# Patient Record
Sex: MALE | Race: WHITE | NOT HISPANIC OR LATINO | URBAN - METROPOLITAN AREA
[De-identification: names, ages, dates, MRNs, and addresses within clinical notes are randomized per-mention and may not be internally consistent; named-entity substitution may affect disease eponyms.]

---

## 2021-07-27 ENCOUNTER — EMERGENCY (EMERGENCY)
Facility: HOSPITAL | Age: 66
LOS: 0 days | Discharge: HOME | End: 2021-07-27
Attending: EMERGENCY MEDICINE | Admitting: EMERGENCY MEDICINE
Payer: MEDICARE

## 2021-07-27 VITALS
TEMPERATURE: 98 F | RESPIRATION RATE: 18 BRPM | OXYGEN SATURATION: 98 % | DIASTOLIC BLOOD PRESSURE: 91 MMHG | HEART RATE: 78 BPM | SYSTOLIC BLOOD PRESSURE: 201 MMHG

## 2021-07-27 VITALS — OXYGEN SATURATION: 100 % | SYSTOLIC BLOOD PRESSURE: 153 MMHG | HEART RATE: 60 BPM | DIASTOLIC BLOOD PRESSURE: 68 MMHG

## 2021-07-27 DIAGNOSIS — R53.1 WEAKNESS: ICD-10-CM

## 2021-07-27 DIAGNOSIS — Z95.5 PRESENCE OF CORONARY ANGIOPLASTY IMPLANT AND GRAFT: ICD-10-CM

## 2021-07-27 DIAGNOSIS — Z86.69 PERSONAL HISTORY OF OTHER DISEASES OF THE NERVOUS SYSTEM AND SENSE ORGANS: ICD-10-CM

## 2021-07-27 DIAGNOSIS — I10 ESSENTIAL (PRIMARY) HYPERTENSION: ICD-10-CM

## 2021-07-27 DIAGNOSIS — R35.0 FREQUENCY OF MICTURITION: ICD-10-CM

## 2021-07-27 DIAGNOSIS — I25.10 ATHEROSCLEROTIC HEART DISEASE OF NATIVE CORONARY ARTERY WITHOUT ANGINA PECTORIS: ICD-10-CM

## 2021-07-27 DIAGNOSIS — R42 DIZZINESS AND GIDDINESS: ICD-10-CM

## 2021-07-27 DIAGNOSIS — E87.1 HYPO-OSMOLALITY AND HYPONATREMIA: ICD-10-CM

## 2021-07-27 LAB
ALBUMIN SERPL ELPH-MCNC: 4.7 G/DL — SIGNIFICANT CHANGE UP (ref 3.5–5.2)
ALP SERPL-CCNC: 120 U/L — HIGH (ref 30–115)
ALT FLD-CCNC: 6 U/L — SIGNIFICANT CHANGE UP (ref 0–41)
ANION GAP SERPL CALC-SCNC: 12 MMOL/L — SIGNIFICANT CHANGE UP (ref 7–14)
APPEARANCE UR: CLEAR — SIGNIFICANT CHANGE UP
AST SERPL-CCNC: 26 U/L — SIGNIFICANT CHANGE UP (ref 0–41)
BASOPHILS # BLD AUTO: 0.04 K/UL — SIGNIFICANT CHANGE UP (ref 0–0.2)
BASOPHILS NFR BLD AUTO: 0.7 % — SIGNIFICANT CHANGE UP (ref 0–1)
BILIRUB SERPL-MCNC: 0.6 MG/DL — SIGNIFICANT CHANGE UP (ref 0.2–1.2)
BILIRUB UR-MCNC: NEGATIVE — SIGNIFICANT CHANGE UP
BUN SERPL-MCNC: 12 MG/DL — SIGNIFICANT CHANGE UP (ref 10–20)
CALCIUM SERPL-MCNC: 9.3 MG/DL — SIGNIFICANT CHANGE UP (ref 8.5–10.1)
CHLORIDE SERPL-SCNC: 92 MMOL/L — LOW (ref 98–110)
CO2 SERPL-SCNC: 25 MMOL/L — SIGNIFICANT CHANGE UP (ref 17–32)
COLOR SPEC: COLORLESS — SIGNIFICANT CHANGE UP
CREAT SERPL-MCNC: 0.8 MG/DL — SIGNIFICANT CHANGE UP (ref 0.7–1.5)
DIFF PNL FLD: NEGATIVE — SIGNIFICANT CHANGE UP
EOSINOPHIL # BLD AUTO: 0.12 K/UL — SIGNIFICANT CHANGE UP (ref 0–0.7)
EOSINOPHIL NFR BLD AUTO: 2.1 % — SIGNIFICANT CHANGE UP (ref 0–8)
GLUCOSE SERPL-MCNC: 114 MG/DL — HIGH (ref 70–99)
GLUCOSE UR QL: NEGATIVE — SIGNIFICANT CHANGE UP
HCT VFR BLD CALC: 38.5 % — LOW (ref 42–52)
HGB BLD-MCNC: 14.2 G/DL — SIGNIFICANT CHANGE UP (ref 14–18)
IMM GRANULOCYTES NFR BLD AUTO: 0.4 % — HIGH (ref 0.1–0.3)
KETONES UR-MCNC: NEGATIVE — SIGNIFICANT CHANGE UP
LEUKOCYTE ESTERASE UR-ACNC: NEGATIVE — SIGNIFICANT CHANGE UP
LYMPHOCYTES # BLD AUTO: 1.26 K/UL — SIGNIFICANT CHANGE UP (ref 1.2–3.4)
LYMPHOCYTES # BLD AUTO: 22.1 % — SIGNIFICANT CHANGE UP (ref 20.5–51.1)
MAGNESIUM SERPL-MCNC: 2 MG/DL — SIGNIFICANT CHANGE UP (ref 1.8–2.4)
MCHC RBC-ENTMCNC: 31.4 PG — HIGH (ref 27–31)
MCHC RBC-ENTMCNC: 36.9 G/DL — SIGNIFICANT CHANGE UP (ref 32–37)
MCV RBC AUTO: 85.2 FL — SIGNIFICANT CHANGE UP (ref 80–94)
MONOCYTES # BLD AUTO: 0.57 K/UL — SIGNIFICANT CHANGE UP (ref 0.1–0.6)
MONOCYTES NFR BLD AUTO: 10 % — HIGH (ref 1.7–9.3)
NEUTROPHILS # BLD AUTO: 3.7 K/UL — SIGNIFICANT CHANGE UP (ref 1.4–6.5)
NEUTROPHILS NFR BLD AUTO: 64.7 % — SIGNIFICANT CHANGE UP (ref 42.2–75.2)
NITRITE UR-MCNC: NEGATIVE — SIGNIFICANT CHANGE UP
NRBC # BLD: 0 /100 WBCS — SIGNIFICANT CHANGE UP (ref 0–0)
PH UR: 7.5 — SIGNIFICANT CHANGE UP (ref 5–8)
PLATELET # BLD AUTO: 169 K/UL — SIGNIFICANT CHANGE UP (ref 130–400)
POTASSIUM SERPL-MCNC: 4.3 MMOL/L — SIGNIFICANT CHANGE UP (ref 3.5–5)
POTASSIUM SERPL-SCNC: 4.3 MMOL/L — SIGNIFICANT CHANGE UP (ref 3.5–5)
PROT SERPL-MCNC: 6.9 G/DL — SIGNIFICANT CHANGE UP (ref 6–8)
PROT UR-MCNC: NEGATIVE — SIGNIFICANT CHANGE UP
RBC # BLD: 4.52 M/UL — LOW (ref 4.7–6.1)
RBC # FLD: 11.8 % — SIGNIFICANT CHANGE UP (ref 11.5–14.5)
SODIUM SERPL-SCNC: 129 MMOL/L — LOW (ref 135–146)
SP GR SPEC: 1.01 — LOW (ref 1.01–1.03)
TROPONIN T SERPL-MCNC: <0.01 NG/ML — SIGNIFICANT CHANGE UP
UROBILINOGEN FLD QL: SIGNIFICANT CHANGE UP
WBC # BLD: 5.71 K/UL — SIGNIFICANT CHANGE UP (ref 4.8–10.8)
WBC # FLD AUTO: 5.71 K/UL — SIGNIFICANT CHANGE UP (ref 4.8–10.8)

## 2021-07-27 PROCEDURE — 71045 X-RAY EXAM CHEST 1 VIEW: CPT | Mod: 26

## 2021-07-27 PROCEDURE — 93010 ELECTROCARDIOGRAM REPORT: CPT

## 2021-07-27 PROCEDURE — 99285 EMERGENCY DEPT VISIT HI MDM: CPT

## 2021-07-27 NOTE — ED PROVIDER NOTE - ATTENDING CONTRIBUTION TO CARE
66 y/o male with h/o htn, parkinsons, cad s/p stent, in ER with c/o elevated BP. SBP at home 192, pt felt concerned and so came to ER.  Per pt/wife, pt also feeling some generalized weakness recently.  Had some dizziness a few days ago, not now.  no ha.  denies any cp/sob.  no cough. no abd pain.  no n/v/d.  + urinary frequency which pt states is his baseline.  no dysuria. no leg pain.  + b/l leg weakness, which is his baseline 2/2 parkinsons.    PE - nad, nc/at, eomi, perrl, op - clear, mmm, cta b/l, no w/r/r, rrr, abd- soft, nt/nd, nabs, no LE tenderness/swelling, A&O x 3, moves all extremities, no focal neuro deficits.  -check labs, ua, re-eval

## 2021-07-27 NOTE — ED PROVIDER NOTE - CLINICAL SUMMARY MEDICAL DECISION MAKING FREE TEXT BOX
labs reviewed, mild hyponatremia.  ua neg. ekg with no acute ischemic changes.  BP improved without intervention. pt/wife state pt at baseline and they want to go home.  pt to f/u with pmd re: bp meds and to return to ER for any new/concerning symptoms.

## 2021-07-27 NOTE — ED PROVIDER NOTE - PATIENT PORTAL LINK FT
You can access the FollowMyHealth Patient Portal offered by [facility] by registering at the following website: [email or web address]. By joining [employer]’s FollowMyHealth portal, you will also be able to view your health information using other applications (apps) compatible with our system.

## 2021-07-27 NOTE — ED ADULT TRIAGE NOTE - CHIEF COMPLAINT QUOTE
pt c/o dizziness and elevated Bp x 2 days. hx of parkinson's. c/o weakness to bilat legs. denies any CP

## 2021-07-27 NOTE — ED ADULT NURSE NOTE - OBJECTIVE STATEMENT
Pt came to ED c/o dizziness and elevated Bp x 2 days. Pt. has hx of parkinson's. c/o weakness to bilat legs. denies any CP or sob.

## 2021-07-27 NOTE — ED ADULT NURSE NOTE - MODE OF DISCHARGE
I put in an order for the COVID-19 testing for patient  He can go to [address] [place] [address] testing  He should still follow the masking and social distancing guidelines, and should also take extra steps with quarantining while awaiting results  Ambulatory

## 2021-07-27 NOTE — ED PROVIDER NOTE - OBJECTIVE STATEMENT
65 y male, pmh of htn, parkinson's, pw high bp reading. Endorses when he was taking his bp at home today, reading was 192 systolic. Pt felt nervous and generically weak and came to the ed. Denies headache/vision changes/ nausea/vomiting, cp. Denies f/c, sob, abd pain, dysuria.

## 2021-07-27 NOTE — ED PROVIDER NOTE - NS ED ROS FT
Eyes:  No visual changes, eye pain or discharge.  ENMT:  No hearing changes, pain, no sore throat or runny nose, no difficulty swallowing  Cardiac:  No chest pain, SOB or edema. No chest pain with exertion.  Respiratory:  No cough or respiratory distress. No hemoptysis. No history of asthma or RAD.  GI:  No nausea, vomiting, diarrhea or abdominal pain.  :  No dysuria, frequency or burning.  MS:  No myalgia, muscle weakness, joint pain or back pain.  Neuro:  No headache or LOC. +diffuse weakness.   Skin:  No skin rash.   Endocrine: No history of thyroid disease or diabetes.

## 2021-07-28 LAB
CULTURE RESULTS: NO GROWTH — SIGNIFICANT CHANGE UP
SPECIMEN SOURCE: SIGNIFICANT CHANGE UP

## 2025-01-29 ENCOUNTER — INPATIENT (INPATIENT)
Facility: HOSPITAL | Age: 70
LOS: 34 days | Discharge: HOME | DRG: 195 | End: 2025-03-05
Attending: STUDENT IN AN ORGANIZED HEALTH CARE EDUCATION/TRAINING PROGRAM | Admitting: INTERNAL MEDICINE
Payer: MEDICARE

## 2025-01-29 VITALS
OXYGEN SATURATION: 90 % | SYSTOLIC BLOOD PRESSURE: 113 MMHG | TEMPERATURE: 98 F | HEART RATE: 94 BPM | RESPIRATION RATE: 30 BRPM | DIASTOLIC BLOOD PRESSURE: 76 MMHG

## 2025-01-29 DIAGNOSIS — J18.9 PNEUMONIA, UNSPECIFIED ORGANISM: ICD-10-CM

## 2025-01-29 PROBLEM — G20 PARKINSON'S DISEASE: Chronic | Status: ACTIVE | Noted: 2021-07-27

## 2025-01-29 LAB
ALBUMIN SERPL ELPH-MCNC: 3.3 G/DL — LOW (ref 3.5–5.2)
ALP SERPL-CCNC: 93 U/L — SIGNIFICANT CHANGE UP (ref 30–115)
ALT FLD-CCNC: <5 U/L — SIGNIFICANT CHANGE UP (ref 0–41)
ANION GAP SERPL CALC-SCNC: 16 MMOL/L — HIGH (ref 7–14)
APPEARANCE UR: CLEAR — SIGNIFICANT CHANGE UP
AST SERPL-CCNC: 21 U/L — SIGNIFICANT CHANGE UP (ref 0–41)
BACTERIA # UR AUTO: NEGATIVE /HPF — SIGNIFICANT CHANGE UP
BASE EXCESS BLDV CALC-SCNC: 4 MMOL/L — HIGH (ref -2–3)
BASOPHILS # BLD AUTO: 0 K/UL — SIGNIFICANT CHANGE UP (ref 0–0.2)
BASOPHILS NFR BLD AUTO: 0 % — SIGNIFICANT CHANGE UP (ref 0–1)
BILIRUB SERPL-MCNC: 0.7 MG/DL — SIGNIFICANT CHANGE UP (ref 0.2–1.2)
BILIRUB UR-MCNC: NEGATIVE — SIGNIFICANT CHANGE UP
BUN SERPL-MCNC: 86 MG/DL — CRITICAL HIGH (ref 10–20)
CA-I SERPL-SCNC: 1.03 MMOL/L — LOW (ref 1.15–1.33)
CALCIUM SERPL-MCNC: 8.4 MG/DL — SIGNIFICANT CHANGE UP (ref 8.4–10.5)
CAST: 22 /LPF — HIGH (ref 0–4)
CHLORIDE SERPL-SCNC: 104 MMOL/L — SIGNIFICANT CHANGE UP (ref 98–110)
CO2 SERPL-SCNC: 27 MMOL/L — SIGNIFICANT CHANGE UP (ref 17–32)
COARSE GRAN CASTS #/AREA URNS AUTO: PRESENT
COLOR SPEC: SIGNIFICANT CHANGE UP
CREAT SERPL-MCNC: 1.5 MG/DL — SIGNIFICANT CHANGE UP (ref 0.7–1.5)
DIFF PNL FLD: NEGATIVE — SIGNIFICANT CHANGE UP
EGFR: 50 ML/MIN/1.73M2 — LOW
EGFR: 50 ML/MIN/1.73M2 — LOW
EOSINOPHIL # BLD AUTO: 0 K/UL — SIGNIFICANT CHANGE UP (ref 0–0.7)
EOSINOPHIL NFR BLD AUTO: 0 % — SIGNIFICANT CHANGE UP (ref 0–8)
FLUAV AG NPH QL: DETECTED
FLUBV AG NPH QL: SIGNIFICANT CHANGE UP
GAS PNL BLDA: SIGNIFICANT CHANGE UP
GAS PNL BLDV: 136 MMOL/L — SIGNIFICANT CHANGE UP (ref 136–145)
GAS PNL BLDV: SIGNIFICANT CHANGE UP
GAS PNL BLDV: SIGNIFICANT CHANGE UP
GIANT PLATELETS BLD QL SMEAR: PRESENT — SIGNIFICANT CHANGE UP
GLUCOSE SERPL-MCNC: 101 MG/DL — HIGH (ref 70–99)
GLUCOSE UR QL: NEGATIVE MG/DL — SIGNIFICANT CHANGE UP
HCO3 BLDV-SCNC: 30 MMOL/L — HIGH (ref 22–29)
HCT VFR BLD CALC: 39.3 % — LOW (ref 42–52)
HGB BLD-MCNC: 13.1 G/DL — LOW (ref 14–18)
KETONES UR-MCNC: ABNORMAL MG/DL
LACTATE BLDV-MCNC: 2.2 MMOL/L — HIGH (ref 0.5–2)
LEUKOCYTE ESTERASE UR-ACNC: NEGATIVE — SIGNIFICANT CHANGE UP
LYMPHOCYTES # BLD AUTO: 0 % — LOW (ref 20.5–51.1)
LYMPHOCYTES # BLD AUTO: 0 K/UL — LOW (ref 1.2–3.4)
MANUAL SMEAR VERIFICATION: SIGNIFICANT CHANGE UP
MCHC RBC-ENTMCNC: 30.7 PG — SIGNIFICANT CHANGE UP (ref 27–31)
MCHC RBC-ENTMCNC: 33.3 G/DL — SIGNIFICANT CHANGE UP (ref 32–37)
MCV RBC AUTO: 92 FL — SIGNIFICANT CHANGE UP (ref 80–94)
MONOCYTES # BLD AUTO: 0.55 K/UL — SIGNIFICANT CHANGE UP (ref 0.1–0.6)
MONOCYTES NFR BLD AUTO: 3.5 % — SIGNIFICANT CHANGE UP (ref 1.7–9.3)
NEUTROPHILS # BLD AUTO: 15.26 K/UL — HIGH (ref 1.4–6.5)
NEUTROPHILS NFR BLD AUTO: 94.8 % — HIGH (ref 42.2–75.2)
NEUTS BAND # BLD: 1.7 % — SIGNIFICANT CHANGE UP (ref 0–6)
NEUTS BAND NFR BLD: 1.7 % — SIGNIFICANT CHANGE UP (ref 0–6)
NITRITE UR-MCNC: NEGATIVE — SIGNIFICANT CHANGE UP
NT-PROBNP SERPL-SCNC: 699 PG/ML — HIGH (ref 0–300)
PCO2 BLDV: 48 MMHG — SIGNIFICANT CHANGE UP (ref 42–55)
PH BLDV: 7.4 — SIGNIFICANT CHANGE UP (ref 7.32–7.43)
PH UR: 5.5 — SIGNIFICANT CHANGE UP (ref 5–8)
PLAT MORPH BLD: NORMAL — SIGNIFICANT CHANGE UP
PLATELET # BLD AUTO: 239 K/UL — SIGNIFICANT CHANGE UP (ref 130–400)
PMV BLD: 11 FL — HIGH (ref 7.4–10.4)
PO2 BLDV: 24 MMHG — LOW (ref 25–45)
POLYCHROMASIA BLD QL SMEAR: SLIGHT — SIGNIFICANT CHANGE UP
POTASSIUM BLDV-SCNC: 4 MMOL/L — SIGNIFICANT CHANGE UP (ref 3.5–5.1)
POTASSIUM SERPL-MCNC: 4.9 MMOL/L — SIGNIFICANT CHANGE UP (ref 3.5–5)
POTASSIUM SERPL-SCNC: 4.9 MMOL/L — SIGNIFICANT CHANGE UP (ref 3.5–5)
PROT SERPL-MCNC: 5.7 G/DL — LOW (ref 6–8)
PROT UR-MCNC: 30 MG/DL
RBC # BLD: 4.27 M/UL — LOW (ref 4.7–6.1)
RBC # FLD: 13.2 % — SIGNIFICANT CHANGE UP (ref 11.5–14.5)
RBC BLD AUTO: NORMAL — SIGNIFICANT CHANGE UP
RBC CASTS # UR COMP ASSIST: 3 /HPF — SIGNIFICANT CHANGE UP (ref 0–4)
RSV RNA NPH QL NAA+NON-PROBE: SIGNIFICANT CHANGE UP
SARS-COV-2 RNA SPEC QL NAA+PROBE: SIGNIFICANT CHANGE UP
SODIUM SERPL-SCNC: 147 MMOL/L — HIGH (ref 135–146)
SP GR SPEC: 1.02 — SIGNIFICANT CHANGE UP (ref 1–1.03)
SQUAMOUS # UR AUTO: 2 /HPF — SIGNIFICANT CHANGE UP (ref 0–5)
TROPONIN T, HIGH SENSITIVITY RESULT: 46 NG/L — HIGH (ref 6–21)
UROBILINOGEN FLD QL: 1 MG/DL — SIGNIFICANT CHANGE UP (ref 0.2–1)
WBC # BLD: 15.81 K/UL — HIGH (ref 4.8–10.8)
WBC # FLD AUTO: 15.81 K/UL — HIGH (ref 4.8–10.8)
WBC UR QL: 1 /HPF — SIGNIFICANT CHANGE UP (ref 0–5)

## 2025-01-29 PROCEDURE — 85730 THROMBOPLASTIN TIME PARTIAL: CPT

## 2025-01-29 PROCEDURE — 70496 CT ANGIOGRAPHY HEAD: CPT | Mod: MC

## 2025-01-29 PROCEDURE — 93926 LOWER EXTREMITY STUDY: CPT | Mod: RT

## 2025-01-29 PROCEDURE — 82550 ASSAY OF CK (CPK): CPT

## 2025-01-29 PROCEDURE — 83010 ASSAY OF HAPTOGLOBIN QUANT: CPT

## 2025-01-29 PROCEDURE — 86900 BLOOD TYPING SEROLOGIC ABO: CPT

## 2025-01-29 PROCEDURE — 72192 CT PELVIS W/O DYE: CPT | Mod: MC

## 2025-01-29 PROCEDURE — 47490 INCISION OF GALLBLADDER: CPT

## 2025-01-29 PROCEDURE — 83615 LACTATE (LD) (LDH) ENZYME: CPT

## 2025-01-29 PROCEDURE — 87116 MYCOBACTERIA CULTURE: CPT

## 2025-01-29 PROCEDURE — 87102 FUNGUS ISOLATION CULTURE: CPT

## 2025-01-29 PROCEDURE — 84295 ASSAY OF SERUM SODIUM: CPT

## 2025-01-29 PROCEDURE — 87206 SMEAR FLUORESCENT/ACID STAI: CPT

## 2025-01-29 PROCEDURE — 85610 PROTHROMBIN TIME: CPT

## 2025-01-29 PROCEDURE — 80048 BASIC METABOLIC PNL TOTAL CA: CPT

## 2025-01-29 PROCEDURE — 71045 X-RAY EXAM CHEST 1 VIEW: CPT | Mod: 26

## 2025-01-29 PROCEDURE — 83735 ASSAY OF MAGNESIUM: CPT

## 2025-01-29 PROCEDURE — 87070 CULTURE OTHR SPECIMN AEROBIC: CPT

## 2025-01-29 PROCEDURE — 85027 COMPLETE CBC AUTOMATED: CPT

## 2025-01-29 PROCEDURE — 82962 GLUCOSE BLOOD TEST: CPT

## 2025-01-29 PROCEDURE — 70450 CT HEAD/BRAIN W/O DYE: CPT | Mod: MC

## 2025-01-29 PROCEDURE — 82330 ASSAY OF CALCIUM: CPT

## 2025-01-29 PROCEDURE — 87640 STAPH A DNA AMP PROBE: CPT

## 2025-01-29 PROCEDURE — 84145 PROCALCITONIN (PCT): CPT

## 2025-01-29 PROCEDURE — 93010 ELECTROCARDIOGRAM REPORT: CPT

## 2025-01-29 PROCEDURE — 85379 FIBRIN DEGRADATION QUANT: CPT

## 2025-01-29 PROCEDURE — 85018 HEMOGLOBIN: CPT

## 2025-01-29 PROCEDURE — 87449 NOS EACH ORGANISM AG IA: CPT

## 2025-01-29 PROCEDURE — 74018 RADEX ABDOMEN 1 VIEW: CPT

## 2025-01-29 PROCEDURE — 94660 CPAP INITIATION&MGMT: CPT

## 2025-01-29 PROCEDURE — L8699: CPT

## 2025-01-29 PROCEDURE — 87641 MR-STAPH DNA AMP PROBE: CPT

## 2025-01-29 PROCEDURE — 94003 VENT MGMT INPAT SUBQ DAY: CPT

## 2025-01-29 PROCEDURE — 71045 X-RAY EXAM CHEST 1 VIEW: CPT

## 2025-01-29 PROCEDURE — 93005 ELECTROCARDIOGRAM TRACING: CPT

## 2025-01-29 PROCEDURE — 83880 ASSAY OF NATRIURETIC PEPTIDE: CPT

## 2025-01-29 PROCEDURE — 84146 ASSAY OF PROLACTIN: CPT

## 2025-01-29 PROCEDURE — 81001 URINALYSIS AUTO W/SCOPE: CPT

## 2025-01-29 PROCEDURE — 87077 CULTURE AEROBIC IDENTIFY: CPT

## 2025-01-29 PROCEDURE — 87205 SMEAR GRAM STAIN: CPT

## 2025-01-29 PROCEDURE — 93970 EXTREMITY STUDY: CPT

## 2025-01-29 PROCEDURE — 84484 ASSAY OF TROPONIN QUANT: CPT

## 2025-01-29 PROCEDURE — 87186 SC STD MICRODIL/AGAR DIL: CPT

## 2025-01-29 PROCEDURE — 82803 BLOOD GASES ANY COMBINATION: CPT

## 2025-01-29 PROCEDURE — 86901 BLOOD TYPING SEROLOGIC RH(D): CPT

## 2025-01-29 PROCEDURE — 71250 CT THORAX DX C-: CPT | Mod: MC

## 2025-01-29 PROCEDURE — 36430 TRANSFUSION BLD/BLD COMPNT: CPT

## 2025-01-29 PROCEDURE — 71275 CT ANGIOGRAPHY CHEST: CPT | Mod: MC

## 2025-01-29 PROCEDURE — 87075 CULTR BACTERIA EXCEPT BLOOD: CPT

## 2025-01-29 PROCEDURE — 76705 ECHO EXAM OF ABDOMEN: CPT

## 2025-01-29 PROCEDURE — 94760 N-INVAS EAR/PLS OXIMETRY 1: CPT

## 2025-01-29 PROCEDURE — 80053 COMPREHEN METABOLIC PANEL: CPT

## 2025-01-29 PROCEDURE — 85025 COMPLETE CBC W/AUTO DIFF WBC: CPT

## 2025-01-29 PROCEDURE — 87184 SC STD DISK METHOD PER PLATE: CPT

## 2025-01-29 PROCEDURE — 94002 VENT MGMT INPAT INIT DAY: CPT

## 2025-01-29 PROCEDURE — 86923 COMPATIBILITY TEST ELECTRIC: CPT

## 2025-01-29 PROCEDURE — 71250 CT THORAX DX C-: CPT | Mod: 26

## 2025-01-29 PROCEDURE — P9016: CPT

## 2025-01-29 PROCEDURE — C1769: CPT

## 2025-01-29 PROCEDURE — 87015 SPECIMEN INFECT AGNT CONCNTJ: CPT

## 2025-01-29 PROCEDURE — 87252 VIRUS INOCULATION TISSUE: CPT

## 2025-01-29 PROCEDURE — 86850 RBC ANTIBODY SCREEN: CPT

## 2025-01-29 PROCEDURE — 84132 ASSAY OF SERUM POTASSIUM: CPT

## 2025-01-29 PROCEDURE — 84478 ASSAY OF TRIGLYCERIDES: CPT

## 2025-01-29 PROCEDURE — 99291 CRITICAL CARE FIRST HOUR: CPT | Mod: FS

## 2025-01-29 PROCEDURE — 74177 CT ABD & PELVIS W/CONTRAST: CPT | Mod: MC

## 2025-01-29 PROCEDURE — 99291 CRITICAL CARE FIRST HOUR: CPT

## 2025-01-29 PROCEDURE — 83036 HEMOGLOBIN GLYCOSYLATED A1C: CPT

## 2025-01-29 PROCEDURE — 84100 ASSAY OF PHOSPHORUS: CPT

## 2025-01-29 PROCEDURE — 80076 HEPATIC FUNCTION PANEL: CPT

## 2025-01-29 PROCEDURE — 70498 CT ANGIOGRAPHY NECK: CPT | Mod: MC

## 2025-01-29 PROCEDURE — 86022 PLATELET ANTIBODIES: CPT

## 2025-01-29 PROCEDURE — 92610 EVALUATE SWALLOWING FUNCTION: CPT | Mod: GN

## 2025-01-29 PROCEDURE — 36415 COLL VENOUS BLD VENIPUNCTURE: CPT

## 2025-01-29 PROCEDURE — 83605 ASSAY OF LACTIC ACID: CPT

## 2025-01-29 PROCEDURE — 85384 FIBRINOGEN ACTIVITY: CPT

## 2025-01-29 PROCEDURE — 87040 BLOOD CULTURE FOR BACTERIA: CPT

## 2025-01-29 PROCEDURE — 85045 AUTOMATED RETICULOCYTE COUNT: CPT

## 2025-01-29 PROCEDURE — 95816 EEG AWAKE AND DROWSY: CPT

## 2025-01-29 PROCEDURE — C1729: CPT

## 2025-01-29 PROCEDURE — 93306 TTE W/DOPPLER COMPLETE: CPT

## 2025-01-29 PROCEDURE — 87899 AGENT NOS ASSAY W/OPTIC: CPT

## 2025-01-29 PROCEDURE — 73701 CT LOWER EXTREMITY W/DYE: CPT | Mod: MC,RT

## 2025-01-29 PROCEDURE — C9399: CPT

## 2025-01-29 PROCEDURE — 85014 HEMATOCRIT: CPT

## 2025-01-29 RX ORDER — CEFTRIAXONE 500 MG/1
2000 INJECTION, POWDER, FOR SOLUTION INTRAMUSCULAR; INTRAVENOUS ONCE
Refills: 0 | Status: COMPLETED | OUTPATIENT
Start: 2025-01-29 | End: 2025-01-29

## 2025-01-29 RX ORDER — RASAGILINE 0.5 MG/1
1 TABLET ORAL
Refills: 0 | Status: DISCONTINUED | OUTPATIENT
Start: 2025-01-29 | End: 2025-02-26

## 2025-01-29 RX ORDER — ACETAMINOPHEN 500 MG/5ML
650 LIQUID (ML) ORAL ONCE
Refills: 0 | Status: COMPLETED | OUTPATIENT
Start: 2025-01-29 | End: 2025-01-29

## 2025-01-29 RX ORDER — VANCOMYCIN HCL IN 5 % DEXTROSE 1.5G/250ML
1000 PLASTIC BAG, INJECTION (ML) INTRAVENOUS EVERY 24 HOURS
Refills: 0 | Status: DISCONTINUED | OUTPATIENT
Start: 2025-01-29 | End: 2025-01-31

## 2025-01-29 RX ORDER — OSELTAMIVIR PHOSPHATE 75 MG/1
75 CAPSULE ORAL ONCE
Refills: 0 | Status: COMPLETED | OUTPATIENT
Start: 2025-01-29 | End: 2025-01-29

## 2025-01-29 RX ORDER — SODIUM CHLORIDE 9 G/1000ML
1000 INJECTION, SOLUTION INTRAVENOUS ONCE
Refills: 0 | Status: COMPLETED | OUTPATIENT
Start: 2025-01-29 | End: 2025-01-29

## 2025-01-29 RX ORDER — ENOXAPARIN SODIUM 100 MG/ML
40 INJECTION SUBCUTANEOUS EVERY 24 HOURS
Refills: 0 | Status: DISCONTINUED | OUTPATIENT
Start: 2025-01-29 | End: 2025-01-31

## 2025-01-29 RX ORDER — ASPIRIN 325 MG
1 TABLET ORAL
Refills: 0 | DISCHARGE

## 2025-01-29 RX ORDER — AZITHROMYCIN 250 MG
500 CAPSULE ORAL ONCE
Refills: 0 | Status: COMPLETED | OUTPATIENT
Start: 2025-01-29 | End: 2025-01-29

## 2025-01-29 RX ORDER — VANCOMYCIN HCL IN 5 % DEXTROSE 1.5G/250ML
1000 PLASTIC BAG, INJECTION (ML) INTRAVENOUS EVERY 12 HOURS
Refills: 0 | Status: DISCONTINUED | OUTPATIENT
Start: 2025-01-29 | End: 2025-01-29

## 2025-01-29 RX ORDER — OSELTAMIVIR PHOSPHATE 75 MG/1
30 CAPSULE ORAL
Refills: 0 | Status: DISCONTINUED | OUTPATIENT
Start: 2025-01-30 | End: 2025-02-03

## 2025-01-29 RX ORDER — OSELTAMIVIR PHOSPHATE 75 MG/1
CAPSULE ORAL
Refills: 0 | Status: DISCONTINUED | OUTPATIENT
Start: 2025-01-29 | End: 2025-02-03

## 2025-01-29 RX ORDER — ASPIRIN 325 MG
81 TABLET ORAL DAILY
Refills: 0 | Status: DISCONTINUED | OUTPATIENT
Start: 2025-01-29 | End: 2025-01-31

## 2025-01-29 RX ORDER — CEFTRIAXONE 500 MG/1
1000 INJECTION, POWDER, FOR SOLUTION INTRAMUSCULAR; INTRAVENOUS EVERY 24 HOURS
Refills: 0 | Status: DISCONTINUED | OUTPATIENT
Start: 2025-01-29 | End: 2025-01-30

## 2025-01-29 RX ORDER — CARBIDOPA/LEVODOPA 25MG-100MG
7 TABLET ORAL
Refills: 0 | Status: DISCONTINUED | OUTPATIENT
Start: 2025-01-29 | End: 2025-01-29

## 2025-01-29 RX ADMIN — SODIUM CHLORIDE 1000 MILLILITER(S): 9 INJECTION, SOLUTION INTRAVENOUS at 10:42

## 2025-01-29 RX ADMIN — CEFTRIAXONE 100 MILLIGRAM(S): 500 INJECTION, POWDER, FOR SOLUTION INTRAMUSCULAR; INTRAVENOUS at 10:33

## 2025-01-29 RX ADMIN — Medication 75 MILLILITER(S): at 16:54

## 2025-01-29 RX ADMIN — Medication 650 MILLIGRAM(S): at 10:13

## 2025-01-29 RX ADMIN — Medication 255 MILLIGRAM(S): at 10:33

## 2025-01-29 RX ADMIN — Medication 250 MILLIGRAM(S): at 19:31

## 2025-01-29 RX ADMIN — Medication 3 MILLILITER(S): at 10:38

## 2025-01-29 NOTE — H&P ADULT - ASSESSMENT
Acute Hypoxic Respiratory Failure  Toxic Metabolic Encephalopathy   CAP  R/o aspiration  PNA  Sepsis POA  HO Parkinson's Disease  HO Dementia   HO CAD    PLAN:    CNS: CT head, avoid depressants    HEENT: Oral care    PULMONARY:  HOB @ 45 degrees.  Aspiration precautions. AVAPS 4 hours on and off, QHS and PRN, ABG noted, CXR reviewed, CT Chest NC, low threshold for intubation    CARDIOVASCULAR: keep even to negative fluid balance. pro-BNP noted 699. trend CE, TTE    GI: GI prophylaxis.  Feeding per speech and swallow.  Bowel regimen     RENAL:  Follow up lytes.  Correct as needed    INFECTIOUS DISEASE: f/u cultures, Ceftriaxone, Levaquin and Vancomycin for now, check urine legionella and strep Ag, nasal MRSA, full RVP, procalcitonin     HEMATOLOGICAL:  DVT prophylaxis. LE duplex    ENDOCRINE:  Follow up FS.  Insulin protocol if needed.    MUSCULOSKELETAL: Bedrest for now    Goals of care    MICU monitoring

## 2025-01-29 NOTE — ED ADULT NURSE REASSESSMENT NOTE - NS ED NURSE REASSESS COMMENT FT1
Pt repeatedly removing BIBAP mask.  BIBPAP removed for now and placed on 4L NC. ICU resident made aware and approved order

## 2025-01-29 NOTE — ED PROVIDER NOTE - OBJECTIVE STATEMENT
69-year-old male past medical history of hypertension, Parkinson's, CAD presents for worsening generalized weakness over the past week, decreased p.o. intake and development of shortness of breath today. I am unable to obtain a comprehensive history, review of systems, past medical history, and/or physical exam due to constraints imposed by the urgency of the patient's clinical condition and/or mental status.

## 2025-01-29 NOTE — ED ADULT TRIAGE NOTE - CHIEF COMPLAINT QUOTE
BIBA from home, EMS states family reports a  "gradual decline" in patient's mental status, decreased PO intake, increased work of breathing. history of Parkinson's and dementia

## 2025-01-29 NOTE — H&P ADULT - NSHPLABSRESULTS_GEN_ALL_CORE
LABS:                        13.1   15.81 )-----------( 239      ( 29 Jan 2025 10:40 )             39.3     01-29    147[H]  |  104  |  86[HH]  ----------------------------<  101[H]  4.9   |  27  |  1.5    Ca    8.4      29 Jan 2025 10:40    TPro  5.7[L]  /  Alb  3.3[L]  /  TBili  0.7  /  DBili  x   /  AST  21  /  ALT  <5  /  AlkPhos  93  01-29    ABG - ( 29 Jan 2025 13:00 )  pH, Arterial: 7.48  pH, Blood: x     /  pCO2: 38    /  pO2: 76    / HCO3: 28    / Base Excess: 4.6   /  SaO2: 97.4      CXR:Diffuse bilateral opacities, right greater than left.

## 2025-01-29 NOTE — ED PROVIDER NOTE - CRITICAL CARE ATTENDING CONTRIBUTION TO CARE
This was a shared visit with the [facility]ER[facility]. I reviewed and verified the documentation and independently performed the documented: History, Exam and Medical Decision Making.    I have reviewed and agree with the mid-level note, except as documented in my note below.    69-year-old male history of HTN, Parkinson's disease,? CAD, PSH significant for back surgery, now presents with progressive weakness over the past week, has not been ambulating, eating or drinking, today reported shortness of breath, denies fever, hemoptysis, orthopnea, PND, chest pain, LE pain or swelling, recent immobilization or travel or other associated complaints at present. Old chart reviewed. I have reviewed and agree with the initial nursing note, except as documented in my note.    VSS, Temp 100.9, awake, alert, oropharynx clear, no skin rash or lesions, no tracheal deviation, labored breathing without accessory muscle use apparent, mild retractions, speaks single words, equal BS BL, no w/r/r, +S1/S2, RRR, no m/r/g, abdomen soft, NT, ND, +BS, AO x 3.

## 2025-01-29 NOTE — CONSULT NOTE ADULT - ASSESSMENT
IMPRESSION:      PLAN:    CNS: Daily SAT/SBT    HEENT: Oral care    PULMONARY:  HOB @ 45 degrees.  Keep Pulse Ox >94%    CARDIOVASCULAR: Vasopressor as needed. Keep MAP >65    GI: GI prophylaxis.  Feeding.  Bowel regimen     RENAL:  Follow up lytes.  Correct as needed    INFECTIOUS DISEASE: Follow up cultures    HEMATOLOGICAL:  DVT prophylaxis.    ENDOCRINE:  Follow up FS.  Insulin protocol if needed.    MUSCULOSKELETAL: Bedrest    LINES/DRAINS:  PIV    ADVANCED DIRECTIVES:  FULL CODE  HCP/Emergency Contact-     INDICATION FOR ICU:     DISPO:        IMPRESSION:  acute hypoxic respiratory failure  community acquired pneumonia  HO CAD    PLAN:    CNS: CT head non con    HEENT: Oral care    PULMONARY:  HOB @ 45 degrees.  aspiration precautions. continue with AVAPs therapy keep pulse ox 92-96%. duonebs q4hrs for wheezing. low threshold for intubation    CARDIOVASCULAR: keep even to negative fluid balance. bnp troponin. echocardiogram    GI: GI prophylaxis.  Feeding while off bipap.  Bowel regimen     RENAL:  Follow up lytes.  Correct as needed    INFECTIOUS DISEASE: 2 sets of blood cultures. sputum culture. IV abx: ceftriaxone azithromycin. urine strep legionella. full RVP panel. procal MRSA nares.    HEMATOLOGICAL:  DVT prophylaxis. LE duplex    ENDOCRINE:  Follow up FS.  Insulin protocol if needed.    MUSCULOSKELETAL: Bedrest    LINES/DRAINS:  PIV    ADVANCED DIRECTIVES:  FULL CODE    DISPO: MICU       IMPRESSION:  acute hypoxic respiratory failure  community acquired pneumonia  HO CAD    PLAN:    CNS: CT head non con    HEENT: Oral care    PULMONARY:  HOB @ 45 degrees.  aspiration precautions. continue with AVAPs therapy keep pulse ox 92-96%. duonebs q4hrs for wheezing. low threshold for intubation    CARDIOVASCULAR: keep even to negative fluid balance. bnp noted 699. trend troponin until peak/fall. echocardiogram    GI: GI prophylaxis.  Feeding while off avaps.  Bowel regimen     RENAL:  Follow up lytes.  Correct as needed    INFECTIOUS DISEASE: 2 sets of blood cultures. sputum culture. IV abx: ceftriaxone azithromycin. urine strep legionella. full RVP panel. procal MRSA nares.    HEMATOLOGICAL:  DVT prophylaxis. LE duplex    ENDOCRINE:  Follow up FS.  Insulin protocol if needed.    MUSCULOSKELETAL: Bedrest    LINES/DRAINS:  PIV    ADVANCED DIRECTIVES:  FULL CODE    DISPO: MICU       IMPRESSION:    Acute Hypoxic Respiratory Failure  Toxic Metabolic Encephalopathy   Probable CAP  HO Parkinson's Disease  HO Dementia   HO CAD    PLAN:    CNS: CT head, avoid depressants    HEENT: Oral care    PULMONARY:  HOB @ 45 degrees.  Aspiration precautions. AVAPS 4 hours on and off, QHS and PRN, ABG noted, CXR reviewed, CT Chest NC, low threshold for intubation     CARDIOVASCULAR: keep even to negative fluid balance. pro-BNP noted 699. trend CE, TTE    GI: GI prophylaxis.  Feeding per speech and swallow.  Bowel regimen     RENAL:  Follow up lytes.  Correct as needed    INFECTIOUS DISEASE: f/u cultures, Ceftriaxone, Levaquin and Vancomycin for now, check urine legionella and strep Ag, nasal MRSA, full RVP, procalcitonin     HEMATOLOGICAL:  DVT prophylaxis. LE duplex    ENDOCRINE:  Follow up FS.  Insulin protocol if needed.    MUSCULOSKELETAL: Bedrest for now    Goals of care    MICU monitoring        IMPRESSION:    Acute Hypoxic Respiratory Failure  Toxic Metabolic Encephalopathy   Probable CAP  Sepsis POA  HO Parkinson's Disease  HO Dementia   HO CAD    PLAN:    CNS: CT head, avoid depressants    HEENT: Oral care    PULMONARY:  HOB @ 45 degrees.  Aspiration precautions. AVAPS 4 hours on and off, QHS and PRN, ABG noted, CXR reviewed, CT Chest NC, low threshold for intubation    CARDIOVASCULAR: keep even to negative fluid balance. pro-BNP noted 699. trend CE, TTE    GI: GI prophylaxis.  Feeding per speech and swallow.  Bowel regimen     RENAL:  Follow up lytes.  Correct as needed    INFECTIOUS DISEASE: f/u cultures, Ceftriaxone, Levaquin and Vancomycin for now, check urine legionella and strep Ag, nasal MRSA, full RVP, procalcitonin     HEMATOLOGICAL:  DVT prophylaxis. LE duplex    ENDOCRINE:  Follow up FS.  Insulin protocol if needed.    MUSCULOSKELETAL: Bedrest for now    Goals of care    MICU monitoring

## 2025-01-29 NOTE — H&P ADULT - NSHPPHYSICALEXAM_GEN_ALL_CORE
ICU Vital Signs Last 24 Hrs  T(C): 36.6 (29 Jan 2025 09:29), Max: 36.6 (29 Jan 2025 09:29)  T(F): 97.9 (29 Jan 2025 09:29), Max: 97.9 (29 Jan 2025 09:29)  HR: 70 (29 Jan 2025 12:05) (70 - 94)  BP: 123/70 (29 Jan 2025 12:05) (107/64 - 123/70)  RR: 18 (29 Jan 2025 12:05) (18 - 30)  SpO2: 98% (29 Jan 2025 12:05) (90% - 98%)    O2 Parameters below as of 29 Jan 2025 12:05  Patient On (Oxygen Delivery Method): BiPAP/CPAP    CONST: Lethargic, protecting airway, On bipap  EYES: Sclera and conjunctiva clear.   ENT: No nasal discharge. Oropharynx normal appearing  NECK: Non-tender, no meningeal signs. normal ROM. supple   CARD: S1 S2; No jvd  RESP: Tachypneic, diffuse rhonchi.  GI: Soft, non-tender, non-distended. normal BS  MS: Normal ROM in all extremities. pulses 2 +. no calf tenderness   SKIN: Warm, dry, no acute rashes. Good turgor  NEURO: AOx1-2, no focal deficits

## 2025-01-29 NOTE — CONSULT NOTE ADULT - SUBJECTIVE AND OBJECTIVE BOX
Patient is a 69y old  Male who presents with a chief complaint of     HPI:  69-year-old male history of HTN, Parkinson's disease,? CAD, PSH significant for back surgery, now presents with progressive weakness over the past week, has not been ambulating, eating or drinking, today reported shortness of breath, denies fever, hemoptysis, orthopnea, PND, chest pain, LE pain or swelling, recent immobilization or travel or other associated complaints at present. Old chart reviewed. I have reviewed and agree with the initial nursing note, except as documented in my note.    VSS, Temp 100.9, awake, alert, oropharynx clear, no skin rash or lesions, no tracheal deviation, labored breathing without accessory muscle use apparent, mild retractions, speaks single words, equal BS BL, no w/r/r, +S1/S2, RRR, no m/r/g, abdomen soft, NT, ND, +BS, AO x 3.      PAST MEDICAL & SURGICAL HISTORY:  Parkinson disease          FAMILY HISTORY:  :  No known cardiovacular family hisotry     Review Of Systems:     All ROS are negative except per HPI       Allergies    Allergy Status Unknown    Intolerances          PHYSICAL EXAM    ICU Vital Signs Last 24 Hrs  T(C): 36.6 (29 Jan 2025 09:29), Max: 36.6 (29 Jan 2025 09:29)  T(F): 97.9 (29 Jan 2025 09:29), Max: 97.9 (29 Jan 2025 09:29)  HR: 94 (29 Jan 2025 09:29) (94 - 94)  BP: 113/76 (29 Jan 2025 09:29) (113/76 - 113/76)  BP(mean): --  ABP: --  ABP(mean): --  RR: 30 (29 Jan 2025 09:29) (30 - 30)  SpO2: 90% (29 Jan 2025 09:29) (90% - 90%)    O2 Parameters below as of 29 Jan 2025 09:29  Patient On (Oxygen Delivery Method): mask, nonrebreather  O2 Flow (L/min): 8          CONSTITUTIONAL:  lethargic but withdraws to pain    ENT:   Airway patent,   Mouth with normal mucosa.   No thrush      CARDIAC:   Normal rate,   Regular rhythm.    No edema      Vascular:   normal systolic impulse  no bruits    RESPIRATORY:   bilateral crackles  Not tachypneic,  No use of accessory muscles    GASTROINTESTINAL:  Abdomen soft,   Non-tender,   No guarding,   + BS      NEUROLOGICAL:   Alert and oriented x0    SKIN:   Skin normal color for race,   No evidence of rash.                LABS:                          13.1   15.81 )-----------( 239      ( 29 Jan 2025 10:40 )             39.3                                               01-29    147[H]  |  104  |  86[HH]  ----------------------------<  101[H]  4.9   |  27  |  1.5    Ca    8.4      29 Jan 2025 10:40    TPro  5.7[L]  /  Alb  3.3[L]  /  TBili  0.7  /  DBili  x   /  AST  21  /  ALT  <5  /  AlkPhos  93  01-29                                             Urinalysis Basic - ( 29 Jan 2025 10:40 )    Color: x / Appearance: x / SG: x / pH: x  Gluc: 101 mg/dL / Ketone: x  / Bili: x / Urobili: x   Blood: x / Protein: x / Nitrite: x   Leuk Esterase: x / RBC: x / WBC x   Sq Epi: x / Non Sq Epi: x / Bacteria: x                                                  LIVER FUNCTIONS - ( 29 Jan 2025 10:40 )  Alb: 3.3 g/dL / Pro: 5.7 g/dL / ALK PHOS: 93 U/L / ALT: <5 U/L / AST: 21 U/L / GGT: x                                                                                                                                       X-Rays reviewed       bilateral opacities    MEDICATIONS  (STANDING):  sodium chloride 0.9%. 1000 milliLiter(s) (125 mL/Hr) IV Continuous <Continuous>    MEDICATIONS  (PRN):         Patient is a 69y old  Male who presents with a chief complaint of SOB    HPI obtained from ED: 69-year-old male history of HTN, Parkinson's disease,? CAD, PSH significant for back surgery, now presents with progressive weakness over the past week, has not been ambulating, eating or drinking, today reported shortness of breath, denies fever, hemoptysis, orthopnea, PND, chest pain, LE pain or swelling, recent immobilization or travel or other associated complaints at present.     PAST MEDICAL & SURGICAL HISTORY:  Parkinson disease          FAMILY HISTORY:  :  No known cardiovascular family history     Review Of Systems:     All ROS are negative except per HPI       Allergies    Allergy Status Unknown    Intolerances      Vital Signs Last 24 Hrs  T(C): 36.6 (29 Jan 2025 09:29), Max: 36.6 (29 Jan 2025 09:29)  T(F): 97.9 (29 Jan 2025 09:29), Max: 97.9 (29 Jan 2025 09:29)  HR: 94 (29 Jan 2025 09:29) (94 - 94)  BP: 113/76 (29 Jan 2025 09:29) (113/76 - 113/76)  RR: 30 (29 Jan 2025 09:29) (30 - 30)  SpO2: 90% (29 Jan 2025 09:29) (90% - 90%)    O2 Parameters below as of 29 Jan 2025 09:29  Patient On (Oxygen Delivery Method): mask, nonrebreather  O2 Flow (L/min): 8          CONSTITUTIONAL:  ill appearing    ENT:   Airway patent,   Mouth with normal mucosa.   No thrush      CARDIAC:   Normal rate  Regular rhythm.        RESPIRATORY:   bilateral crackles  Not tachypneic,  No use of accessory muscles    GASTROINTESTINAL:  Abdomen soft,   Non-tender,   No guarding,   + BS      NEUROLOGICAL:  alert    SKIN:   Skin normal color for race                LABS:                          13.1   15.81 )-----------( 239      ( 29 Jan 2025 10:40 )             39.3                                               01-29    147[H]  |  104  |  86[HH]  ----------------------------<  101[H]  4.9   |  27  |  1.5    Ca    8.4      29 Jan 2025 10:40    TPro  5.7[L]  /  Alb  3.3[L]  /  TBili  0.7  /  DBili  x   /  AST  21  /  ALT  <5  /  AlkPhos  93  01-29                                             Urinalysis Basic - ( 29 Jan 2025 10:40 )    Color: x / Appearance: x / SG: x / pH: x  Gluc: 101 mg/dL / Ketone: x  / Bili: x / Urobili: x   Blood: x / Protein: x / Nitrite: x   Leuk Esterase: x / RBC: x / WBC x   Sq Epi: x / Non Sq Epi: x / Bacteria: x                                                  LIVER FUNCTIONS - ( 29 Jan 2025 10:40 )  Alb: 3.3 g/dL / Pro: 5.7 g/dL / ALK PHOS: 93 U/L / ALT: <5 U/L / AST: 21 U/L / GGT: x                                                              MEDICATIONS  (STANDING):  sodium chloride 0.9%. 1000 milliLiter(s) (125 mL/Hr) IV Continuous <Continuous>

## 2025-01-29 NOTE — ED ADULT NURSE NOTE - NSFALLRISKINTERV_ED_ALL_ED
Assistance OOB with selected safe patient handling equipment if applicable/Assistance with ambulation/Communicate fall risk and risk factors to all staff, patient, and family/Monitor gait and stability/Monitor for mental status changes and reorient to person, place, and time, as needed/Provide visual cue: yellow wristband, yellow gown, etc/Reinforce activity limits and safety measures with patient and family/Toileting schedule using arm’s reach rule for commode and bathroom/Use of alarms - bed, stretcher, chair and/or video monitoring/Call bell, personal items and telephone in reach/Instruct patient to call for assistance before getting out of bed/chair/stretcher/Non-slip footwear applied when patient is off stretcher/Sneads to call system/Physically safe environment - no spills, clutter or unnecessary equipment/Purposeful Proactive Rounding/Room/bathroom lighting operational, light cord in reach

## 2025-01-29 NOTE — ED ADULT NURSE REASSESSMENT NOTE - NS ED NURSE REASSESS COMMENT FT1
Upon reassessment, two pressure injuries noted, Stage 2 bed sore on ulcer and Stage 2 bed sore on lower back. Wounds cleaned and applied Allevyn

## 2025-01-29 NOTE — CONSULT NOTE ADULT - ATTENDING COMMENTS
IMPRESSION:    Acute Hypoxic Respiratory Failure  Toxic Metabolic Encephalopathy   Probable CAP  HO Parkinson's Disease  HO Dementia   HO CAD IMPRESSION:    Acute Hypoxic Respiratory Failure  Toxic Metabolic Encephalopathy   Probable CAP  Sepsis POA  HO Parkinson's Disease  HO Dementia   HO CAD

## 2025-01-29 NOTE — ED PROVIDER NOTE - DISPOSITION TYPE
Morenita Yip (:  2004) is a 22 y.o. male,New patient, here for evaluation of the following chief complaint(s):  Follow-up (Left shoulder injury, )         ASSESSMENT/PLAN:  1. Rotator cuff strain, left, initial encounter  -     Amb External Referral To Physical Therapy    No follow-ups on file. Subjective   SUBJECTIVE/OBJECTIVE:  This is a 69-year-old right-hand-dominant male complaining of left shoulder pain. He was snowboarding 2 weeks ago when he fell landing on an outstretched left arm. He felt a pop in his shoulder. He was subsequently seen in emergency department where x-rays did not show any fracture of the left shoulder. He states his pain is improving. He initially had pain raising his arm above shoulder level. He states that has improved. He denies change in sensation of the arm. He denies any new neck pain. Review of Systems   Constitutional: Negative. HENT: Negative. Respiratory: Negative. Skin: Negative. Neurological: Negative. Objective   EXAMINATION:   THREE XRAY VIEWS OF THE LEFT SHOULDER       2022 6:19 pm       COMPARISON:   None. HISTORY:   ORDERING SYSTEM PROVIDED HISTORY: pain\   TECHNOLOGIST PROVIDED HISTORY:   Reason for exam:->pain\   What reading provider will be dictating this exam?->CRC       FINDINGS:   Glenohumeral joint is normally aligned. No evidence of acute fracture or   dislocation. No abnormal periarticular calcifications. [facility] [place] joint is   unremarkable in appearance. Visualized lung is unremarkable. Impression   No acute abnormality. Physical Exam  Musculoskeletal:      Comments: Left shoulder-no acromioclavicular, clavicle, SC joint tenderness with palpation. Abduction and abduction strength is strong and symmetrical.  Internal rotation is 0 degrees, external rotation is symmetrical and 130 degrees. Supraspinatus test elicits mild pain but no specific weakness.   Apprehension sign is negative. [place]'s test is negative. Biceps contour is normal.  Sensation is intact distally to light touch. Explained to the patient has rotator cuff strain. I do not believe he has a complete rotator cuff tear that would warrant surgery. X-rays did not show any dislocation. It does not sound like the patient had a subluxation. He attends [employer]. I like him to begin performing physical therapy, the family has a relationship with a physical therapist at [facility].  Patient was given a prescription for physical therapy to take to that physical therapist.  Begin taking meloxicam daily. We discussed [place] and wall climbing exercises. Patient will follow-up with me in about 4 weeks. An electronic signature was used to authenticate this note.     --NIR Arreola ADMIT Patient

## 2025-01-29 NOTE — H&P ADULT - HISTORY OF PRESENT ILLNESS
69-year-old male past medical history of hypertension, Parkinson's, CAD presents for shortness of breath and cough of 1 day duration. History was obtained from patient's wife who noted that the patient has been having decreased po intake, activity, and unintentional weight loss for some time. Howver, yesterday he started having cough and shortness of breath.   Denies any sick contacts or choking on food.    InED pt was found to be hypoxic and in respiratory distress. He was placed on FAcemask O2 then switched to AVAPS.  He is somnolent/lethargic and unable to follow commad. opens eyes slightly when on physical stimuli .    Work up showed AHRF and sepsis 2/2 to mutilobar PNA.

## 2025-01-29 NOTE — ED PROVIDER NOTE - PROGRESS NOTE DETAILS
bh: Due to the need for continuous patient care in the emergency department, the times documented are the time of computer entry, not necessarily the time or order in which the event occurred. bh: VSS, bipap ordered, amenable to intubation if needed.

## 2025-01-29 NOTE — H&P ADULT - NSICDXPASTMEDICALHX_GEN_ALL_CORE_FT
PAST MEDICAL HISTORY:  CAD (coronary artery disease)     History of basal cell carcinoma (BCC) excision     Parkinson disease

## 2025-01-29 NOTE — ED PROVIDER NOTE - CLINICAL SUMMARY MEDICAL DECISION MAKING FREE TEXT BOX
Patient's with fever, leukocytosis, PNA. Presentation not consistent with acute cardiac etiologies to include ACS (non ischemic ekg), CHF, pericardial effusion / tamponade. Presentation not consistent with acute respiratory etiologies to include acute PE, pneumothorax , asthma, COPD exacerbation, allergic etiologies. Presentation also not consistent with non-cardiopulmonary causes to include toxidromes, metabolic etiologies such as acidemia or electrolyte derangements, sepsis, neurologic causes (i.e. demyelinating diseases). Plan to admit for further evaluation and management.

## 2025-01-29 NOTE — ED PROVIDER NOTE - PHYSICAL EXAMINATION
CONST: Lethargic, protecting airway  EYES: Sclera and conjunctiva clear.   ENT: No nasal discharge. Oropharynx normal appearing  NECK: Non-tender, no meningeal signs. normal ROM. supple   CARD: S1 S2; No jvd  RESP: Labored breathing, diffuse rhonchi.  GI: Soft, non-tender, non-distended. normal BS  MS: Normal ROM in all extremities. pulses 2 +. no calf tenderness   SKIN: Warm, dry, no acute rashes. Good turgor

## 2025-01-30 DIAGNOSIS — Z51.5 ENCOUNTER FOR PALLIATIVE CARE: ICD-10-CM

## 2025-01-30 DIAGNOSIS — G20.A1 PARKINSON'S DISEASE WITHOUT DYSKINESIA, WITHOUT MENTION OF FLUCTUATIONS: ICD-10-CM

## 2025-01-30 DIAGNOSIS — J18.9 PNEUMONIA, UNSPECIFIED ORGANISM: ICD-10-CM

## 2025-01-30 DIAGNOSIS — J96.01 ACUTE RESPIRATORY FAILURE WITH HYPOXIA: ICD-10-CM

## 2025-01-30 LAB
ALBUMIN SERPL ELPH-MCNC: 2.8 G/DL — LOW (ref 3.5–5.2)
ALBUMIN SERPL ELPH-MCNC: 2.9 G/DL — LOW (ref 3.5–5.2)
ALP SERPL-CCNC: 79 U/L — SIGNIFICANT CHANGE UP (ref 30–115)
ALP SERPL-CCNC: 82 U/L — SIGNIFICANT CHANGE UP (ref 30–115)
ALT FLD-CCNC: <5 U/L — SIGNIFICANT CHANGE UP (ref 0–41)
ALT FLD-CCNC: <5 U/L — SIGNIFICANT CHANGE UP (ref 0–41)
ANION GAP SERPL CALC-SCNC: 10 MMOL/L — SIGNIFICANT CHANGE UP (ref 7–14)
ANION GAP SERPL CALC-SCNC: 11 MMOL/L — SIGNIFICANT CHANGE UP (ref 7–14)
AST SERPL-CCNC: 14 U/L — SIGNIFICANT CHANGE UP (ref 0–41)
AST SERPL-CCNC: 15 U/L — SIGNIFICANT CHANGE UP (ref 0–41)
BASOPHILS # BLD AUTO: 0.01 K/UL — SIGNIFICANT CHANGE UP (ref 0–0.2)
BASOPHILS NFR BLD AUTO: 0.1 % — SIGNIFICANT CHANGE UP (ref 0–1)
BILIRUB SERPL-MCNC: 0.6 MG/DL — SIGNIFICANT CHANGE UP (ref 0.2–1.2)
BILIRUB SERPL-MCNC: 0.6 MG/DL — SIGNIFICANT CHANGE UP (ref 0.2–1.2)
BUN SERPL-MCNC: 76 MG/DL — CRITICAL HIGH (ref 10–20)
BUN SERPL-MCNC: 78 MG/DL — CRITICAL HIGH (ref 10–20)
CALCIUM SERPL-MCNC: 8.4 MG/DL — SIGNIFICANT CHANGE UP (ref 8.4–10.4)
CALCIUM SERPL-MCNC: 8.4 MG/DL — SIGNIFICANT CHANGE UP (ref 8.4–10.4)
CHLORIDE SERPL-SCNC: 113 MMOL/L — HIGH (ref 98–110)
CHLORIDE SERPL-SCNC: 114 MMOL/L — HIGH (ref 98–110)
CO2 SERPL-SCNC: 27 MMOL/L — SIGNIFICANT CHANGE UP (ref 17–32)
CO2 SERPL-SCNC: 28 MMOL/L — SIGNIFICANT CHANGE UP (ref 17–32)
CREAT SERPL-MCNC: 1.1 MG/DL — SIGNIFICANT CHANGE UP (ref 0.7–1.5)
CREAT SERPL-MCNC: 1.1 MG/DL — SIGNIFICANT CHANGE UP (ref 0.7–1.5)
D DIMER BLD IA.RAPID-MCNC: 3659 NG/ML DDU — HIGH
EGFR: 72 ML/MIN/1.73M2 — SIGNIFICANT CHANGE UP
EOSINOPHIL # BLD AUTO: 0 K/UL — SIGNIFICANT CHANGE UP (ref 0–0.7)
EOSINOPHIL NFR BLD AUTO: 0 % — SIGNIFICANT CHANGE UP (ref 0–8)
GAS PNL BLDA: SIGNIFICANT CHANGE UP
GLUCOSE SERPL-MCNC: 114 MG/DL — HIGH (ref 70–99)
GLUCOSE SERPL-MCNC: 142 MG/DL — HIGH (ref 70–99)
GRAM STN FLD: ABNORMAL
HCT VFR BLD CALC: 34.6 % — LOW (ref 42–52)
HCT VFR BLD CALC: 35.3 % — LOW (ref 42–52)
HGB BLD-MCNC: 11.5 G/DL — LOW (ref 14–18)
HGB BLD-MCNC: 11.8 G/DL — LOW (ref 14–18)
IMM GRANULOCYTES NFR BLD AUTO: 0.5 % — HIGH (ref 0.1–0.3)
LEGIONELLA AG UR QL: NEGATIVE — SIGNIFICANT CHANGE UP
LYMPHOCYTES # BLD AUTO: 0.13 K/UL — LOW (ref 1.2–3.4)
LYMPHOCYTES # BLD AUTO: 1.1 % — LOW (ref 20.5–51.1)
MAGNESIUM SERPL-MCNC: 2.5 MG/DL — HIGH (ref 1.8–2.4)
MCHC RBC-ENTMCNC: 30.8 PG — SIGNIFICANT CHANGE UP (ref 27–31)
MCHC RBC-ENTMCNC: 30.9 PG — SIGNIFICANT CHANGE UP (ref 27–31)
MCHC RBC-ENTMCNC: 33.2 G/DL — SIGNIFICANT CHANGE UP (ref 32–37)
MCHC RBC-ENTMCNC: 33.4 G/DL — SIGNIFICANT CHANGE UP (ref 32–37)
MCV RBC AUTO: 92.2 FL — SIGNIFICANT CHANGE UP (ref 80–94)
MCV RBC AUTO: 93 FL — SIGNIFICANT CHANGE UP (ref 80–94)
METHOD TYPE: SIGNIFICANT CHANGE UP
MONOCYTES # BLD AUTO: 0.44 K/UL — SIGNIFICANT CHANGE UP (ref 0.1–0.6)
MONOCYTES NFR BLD AUTO: 3.6 % — SIGNIFICANT CHANGE UP (ref 1.7–9.3)
MRSA PCR RESULT.: NEGATIVE — SIGNIFICANT CHANGE UP
MSSA DNA SPEC QL NAA+PROBE: SIGNIFICANT CHANGE UP
NEUTROPHILS # BLD AUTO: 11.49 K/UL — HIGH (ref 1.4–6.5)
NEUTROPHILS NFR BLD AUTO: 94.7 % — HIGH (ref 42.2–75.2)
NRBC # BLD: 0 /100 WBCS — SIGNIFICANT CHANGE UP (ref 0–0)
NRBC # BLD: 0 /100 WBCS — SIGNIFICANT CHANGE UP (ref 0–0)
NRBC BLD-RTO: 0 /100 WBCS — SIGNIFICANT CHANGE UP (ref 0–0)
NRBC BLD-RTO: 0 /100 WBCS — SIGNIFICANT CHANGE UP (ref 0–0)
PLATELET # BLD AUTO: 179 K/UL — SIGNIFICANT CHANGE UP (ref 130–400)
PLATELET # BLD AUTO: 198 K/UL — SIGNIFICANT CHANGE UP (ref 130–400)
PMV BLD: 11.2 FL — HIGH (ref 7.4–10.4)
PMV BLD: 11.5 FL — HIGH (ref 7.4–10.4)
POTASSIUM SERPL-MCNC: 4.3 MMOL/L — SIGNIFICANT CHANGE UP (ref 3.5–5)
POTASSIUM SERPL-MCNC: 4.3 MMOL/L — SIGNIFICANT CHANGE UP (ref 3.5–5)
POTASSIUM SERPL-SCNC: 4.3 MMOL/L — SIGNIFICANT CHANGE UP (ref 3.5–5)
POTASSIUM SERPL-SCNC: 4.3 MMOL/L — SIGNIFICANT CHANGE UP (ref 3.5–5)
PROCALCITONIN SERPL-MCNC: 6.93 NG/ML — HIGH (ref 0.02–0.1)
PROT SERPL-MCNC: 4.8 G/DL — LOW (ref 6–8)
PROT SERPL-MCNC: 4.9 G/DL — LOW (ref 6–8)
RBC # BLD: 3.72 M/UL — LOW (ref 4.7–6.1)
RBC # BLD: 3.83 M/UL — LOW (ref 4.7–6.1)
RBC # FLD: 13.2 % — SIGNIFICANT CHANGE UP (ref 11.5–14.5)
RBC # FLD: 13.3 % — SIGNIFICANT CHANGE UP (ref 11.5–14.5)
S PNEUM AG UR QL: NEGATIVE — SIGNIFICANT CHANGE UP
SODIUM SERPL-SCNC: 151 MMOL/L — HIGH (ref 135–146)
SODIUM SERPL-SCNC: 152 MMOL/L — HIGH (ref 135–146)
SPECIMEN SOURCE: SIGNIFICANT CHANGE UP
SPECIMEN SOURCE: SIGNIFICANT CHANGE UP
TROPONIN T, HIGH SENSITIVITY RESULT: 26 NG/L — HIGH (ref 6–21)
WBC # BLD: 12.13 K/UL — HIGH (ref 4.8–10.8)
WBC # BLD: 12.62 K/UL — HIGH (ref 4.8–10.8)
WBC # FLD AUTO: 12.13 K/UL — HIGH (ref 4.8–10.8)
WBC # FLD AUTO: 12.62 K/UL — HIGH (ref 4.8–10.8)

## 2025-01-30 PROCEDURE — 99291 CRITICAL CARE FIRST HOUR: CPT

## 2025-01-30 PROCEDURE — 93970 EXTREMITY STUDY: CPT | Mod: 26

## 2025-01-30 PROCEDURE — 99223 1ST HOSP IP/OBS HIGH 75: CPT

## 2025-01-30 PROCEDURE — 93306 TTE W/DOPPLER COMPLETE: CPT | Mod: 26

## 2025-01-30 PROCEDURE — 71045 X-RAY EXAM CHEST 1 VIEW: CPT | Mod: 26

## 2025-01-30 RX ORDER — SODIUM CHLORIDE 9 G/1000ML
1000 INJECTION, SOLUTION INTRAVENOUS
Refills: 0 | Status: DISCONTINUED | OUTPATIENT
Start: 2025-01-30 | End: 2025-01-31

## 2025-01-30 RX ORDER — PIPERACILLIN-TAZO-DEXTROSE,ISO 3.375G/5
3.75 IV SOLUTION, PIGGYBACK PREMIX FROZEN(ML) INTRAVENOUS EVERY 8 HOURS
Refills: 0 | Status: DISCONTINUED | OUTPATIENT
Start: 2025-01-30 | End: 2025-01-31

## 2025-01-30 RX ADMIN — Medication 250 MILLIGRAM(S): at 18:35

## 2025-01-30 RX ADMIN — Medication 25 GRAM(S): at 22:46

## 2025-01-30 RX ADMIN — ENOXAPARIN SODIUM 40 MILLIGRAM(S): 100 INJECTION SUBCUTANEOUS at 22:47

## 2025-01-30 RX ADMIN — SODIUM CHLORIDE 80 MILLILITER(S): 9 INJECTION, SOLUTION INTRAVENOUS at 11:46

## 2025-01-30 RX ADMIN — Medication 25 GRAM(S): at 13:42

## 2025-01-30 NOTE — PROGRESS NOTE ADULT - SUBJECTIVE AND OBJECTIVE BOX
Patient is a 69y old  Male who presents with a chief complaint of penumonia (2025 15:13)        Over Night Events:  Comfortable in bed.  On NC.  Off pressors.          ROS:     All ROS are negative except HPI         PHYSICAL EXAM    ICU Vital Signs Last 24 Hrs  T(C): 37.2 (2025 05:30), Max: 37.2 (2025 05:30)  T(F): 98.9 (2025 05:30), Max: 98.9 (2025 05:30)  HR: 80 (2025 05:30) (70 - 102)  BP: 167/82 (2025 05:30) (107/64 - 168/83)  BP(mean): 101 (2025 14:00) (95 - 101)  ABP: --  ABP(mean): --  RR: 18 (2025 05:30) (18 - 30)  SpO2: 98% (2025 05:30) (90% - 100%)    O2 Parameters below as of 2025 05:30  Patient On (Oxygen Delivery Method): nasal cannula  O2 Flow (L/min): 4          CONSTITUTIONAL:   NAD    ENT:   Airway patent,   Mouth with normal mucosa.     EYES:   Pupils equal,   Round and reactive to light.    CARDIAC:   Normal rate,   Regular rhythm.    No edema    RESPIRATORY:   No wheezing  Bilateral crackles   Normal chest expansion  Not tachypneic,  No use of accessory muscles    GASTROINTESTINAL:  Abdomen soft,   Non-tender,   No guarding,   + BS    MUSCULOSKELETAL:   Range of motion is not limited,  No clubbing, cyanosis    NEUROLOGICAL:   Alert and oriented   No motor  deficits.    SKIN:   Skin normal color for race,   Warm and dry  No evidence of rash.          25 @ 07:01  -  25 @ 06:36  --------------------------------------------------------  IN:    Lactated Ringers Bolus: 1000 mL    sodium chloride 0.9%: 225 mL  Total IN: 1225 mL    OUT:    Voided (mL): 420 mL  Total OUT: 420 mL    Total NET: 805 mL          LABS:                            13.1   15.81 )-----------( 239      ( 2025 10:40 )             39.3                                                   147[H]  |  104  |  86[HH]  ----------------------------<  101[H]  4.9   |  27  |  1.5    Ca    8.4      2025 10:40    TPro  5.7[L]  /  Alb  3.3[L]  /  TBili  0.7  /  DBili  x   /  AST  21  /  ALT  <5  /  AlkPhos  93                                               Urinalysis Basic - ( 2025 16:48 )    Color: Dark Yellow / Appearance: Clear / S.023 / pH: x  Gluc: x / Ketone: Trace mg/dL  / Bili: Negative / Urobili: 1.0 mg/dL   Blood: x / Protein: 30 mg/dL / Nitrite: Negative   Leuk Esterase: Negative / RBC: 3 /HPF / WBC 1 /HPF   Sq Epi: x / Non Sq Epi: 2 /HPF / Bacteria: Negative /HPF                                                  LIVER FUNCTIONS - ( 2025 10:40 )  Alb: 3.3 g/dL / Pro: 5.7 g/dL / ALK PHOS: 93 U/L / ALT: <5 U/L / AST: 21 U/L / GGT: x                                                  Urinalysis with Rflx Culture (collected 2025 16:48)                                                                                       ABG - ( 2025 13:00 )  pH, Arterial: 7.48  pH, Blood: x     /  pCO2: 38    /  pO2: 76    / HCO3: 28    / Base Excess: 4.6   /  SaO2: 97.4                MEDICATIONS  (STANDING):  aspirin enteric coated 81 milliGRAM(s) Oral daily  cefTRIAXone   IVPB 1000 milliGRAM(s) IV Intermittent every 24 hours  enoxaparin Injectable 40 milliGRAM(s) SubCutaneous every 24 hours  levoFLOXacin IVPB      oseltamivir 30 milliGRAM(s) Oral two times a day  oseltamivir      rasagiline Tablet 1 milliGRAM(s) Oral <User Schedule>  sodium chloride 0.9%. 1000 milliLiter(s) (75 mL/Hr) IV Continuous <Continuous>  vancomycin  IVPB 1000 milliGRAM(s) IV Intermittent every 24 hours    MEDICATIONS  (PRN):      New X-rays reviewed:                                                                                  ECHO    CXR interpreted by me: Bilateral infiltrates       Patient is a 69y old  Male who presents with a chief complaint of penumonia (2025 15:13)        Over Night Events:  On NC.  Off pressors.  Remains lethargic and confused.          ROS:     All ROS are negative except HPI         PHYSICAL EXAM    ICU Vital Signs Last 24 Hrs  T(C): 37.2 (2025 05:30), Max: 37.2 (2025 05:30)  T(F): 98.9 (2025 05:30), Max: 98.9 (2025 05:30)  HR: 80 (2025 05:30) (70 - 102)  BP: 167/82 (2025 05:30) (107/64 - 168/83)  BP(mean): 101 (2025 14:00) (95 - 101)  ABP: --  ABP(mean): --  RR: 18 (2025 05:30) (18 - 30)  SpO2: 98% (2025 05:30) (90% - 100%)    O2 Parameters below as of 2025 05:30  Patient On (Oxygen Delivery Method): nasal cannula  O2 Flow (L/min): 4          CONSTITUTIONAL:  Ill appearing     ENT:   Airway patent,   Mouth with normal mucosa.     EYES:   Pupils equal,   Round and reactive to light.    CARDIAC:   Normal rate,   Regular rhythm.    No edema    RESPIRATORY:   No wheezing  Bilateral crackles   Normal chest expansion  Not tachypneic,  No use of accessory muscles    GASTROINTESTINAL:  Abdomen soft,   Non-tender,   No guarding,   + BS    MUSCULOSKELETAL:   Range of motion is not limited,  No clubbing, cyanosis    NEUROLOGICAL:   Lethargic   Not following commands     SKIN:   Skin normal color for race,   Warm and dry  No evidence of rash.          25 @ 07:01  -  25 @ 06:36  --------------------------------------------------------  IN:    Lactated Ringers Bolus: 1000 mL    sodium chloride 0.9%: 225 mL  Total IN: 1225 mL    OUT:    Voided (mL): 420 mL  Total OUT: 420 mL    Total NET: 805 mL          LABS:                            13.1   15.81 )-----------( 239      ( 2025 10:40 )             39.3                                                   147[H]  |  104  |  86[HH]  ----------------------------<  101[H]  4.9   |  27  |  1.5    Ca    8.4      2025 10:40    TPro  5.7[L]  /  Alb  3.3[L]  /  TBili  0.7  /  DBili  x   /  AST  21  /  ALT  <5  /  AlkPhos  93                                               Urinalysis Basic - ( 2025 16:48 )    Color: Dark Yellow / Appearance: Clear / S.023 / pH: x  Gluc: x / Ketone: Trace mg/dL  / Bili: Negative / Urobili: 1.0 mg/dL   Blood: x / Protein: 30 mg/dL / Nitrite: Negative   Leuk Esterase: Negative / RBC: 3 /HPF / WBC 1 /HPF   Sq Epi: x / Non Sq Epi: 2 /HPF / Bacteria: Negative /HPF                                                  LIVER FUNCTIONS - ( 2025 10:40 )  Alb: 3.3 g/dL / Pro: 5.7 g/dL / ALK PHOS: 93 U/L / ALT: <5 U/L / AST: 21 U/L / GGT: x                                                  Urinalysis with Rflx Culture (collected 2025 16:48)                                                                                       ABG - ( 2025 13:00 )  pH, Arterial: 7.48  pH, Blood: x     /  pCO2: 38    /  pO2: 76    / HCO3: 28    / Base Excess: 4.6   /  SaO2: 97.4                MEDICATIONS  (STANDING):  aspirin enteric coated 81 milliGRAM(s) Oral daily  cefTRIAXone   IVPB 1000 milliGRAM(s) IV Intermittent every 24 hours  enoxaparin Injectable 40 milliGRAM(s) SubCutaneous every 24 hours  levoFLOXacin IVPB      oseltamivir 30 milliGRAM(s) Oral two times a day  oseltamivir      rasagiline Tablet 1 milliGRAM(s) Oral <User Schedule>  sodium chloride 0.9%. 1000 milliLiter(s) (75 mL/Hr) IV Continuous <Continuous>  vancomycin  IVPB 1000 milliGRAM(s) IV Intermittent every 24 hours    MEDICATIONS  (PRN):      New X-rays reviewed:                                                                                  ECHO    CXR interpreted by me: Bilateral infiltrates

## 2025-01-30 NOTE — CONSULT NOTE ADULT - ASSESSMENT
69-year-old male past medical history of hypertension, Parkinson's, CAD presents for shortness of breath and cough found to have PNA and respiratory failure. Palliative consulted to assist with GOC.     Patient presently lethargic. No family present.     Discussed with Dr. Clarke; crit team to update family before the palliative care team makes contact    Plan:  - symptoms per primary team  - continue current medical management    Palliative care will continue to follow.   Please call [phone] with questions or concerns 24/7.   _____________  Jimmy Grimes MD  Palliative Medicine  [facility]   of Geriatric and Palliative Medicine  [phone]

## 2025-01-30 NOTE — CONSULT NOTE ADULT - SUBJECTIVE AND OBJECTIVE BOX
HPI:  69-year-old male past medical history of hypertension, Parkinson's, CAD presents for shortness of breath and cough of 1 day duration. History was obtained from patient's wife who noted that the patient has been having decreased po intake, activity, and unintentional weight loss for some time. Howver, yesterday he started having cough and shortness of breath.   Denies any sick contacts or choking on food.    InED pt was found to be hypoxic and in respiratory distress. He was placed on FAcemask O2 then switched to AVAPS.  He is somnolent/lethargic and unable to follow commad. opens eyes slightly when on physical stimuli .    Work up showed AHRF and sepsis 2/2 to mutilobar PNA. (29 Jan 2025 15:13)    Patient on NIV. Lethargic. No family present.     ITEMS NOT CHECKED ARE NOT PRESENT    SOCIAL HISTORY:   Significant other/partner[x ]  Children[ ]  Muslim/Spirituality:  Substance hx:  [ ]   Tobacco hx:  [ ]   Alcohol hx: [ ]   Living Situation: [ ]Home  [ ]Long term care  [ ]Rehab [ ]Other  Home Services: [ ] HHA [ ] Visting RN [ ] Hospice  Occupation:  Home Opioid hx:  [ ] Y [ ] N [ ] I-Stop Reference No:     ADVANCE DIRECTIVES:    [ ] Full Code [ ] DNR  MOLST  [ ]  Living Will  [ ]   DECISION MAKER(s):  [ ] Health Care Proxy(s)  [ ] Surrogate(s)  [ ] Guardian           Name(s): Phone Number(s):    BASELINE (I)ADL(s) (prior to admission):  [place]: [ ]Total  [ ] Moderate [ ]Dependent  Palliative Performance Status Version 2:         %    http://npcrc.org/files/news/palliative_performance_scale_ppsv2.pdf    Allergies    Allergy Status Unknown    Intolerances    MEDICATIONS  (STANDING):  aspirin enteric coated 81 milliGRAM(s) Oral daily  dextrose 5%. 1000 milliLiter(s) (80 mL/Hr) IV Continuous <Continuous>  enoxaparin Injectable 40 milliGRAM(s) SubCutaneous every 24 hours  levoFLOXacin IVPB      oseltamivir 30 milliGRAM(s) Oral two times a day  oseltamivir      piperacillin/tazobactam IVPB.. 3.75 Gram(s) IV Intermittent every 8 hours  rasagiline Tablet 1 milliGRAM(s) Oral <User Schedule>  vancomycin  IVPB 1000 milliGRAM(s) IV Intermittent every 24 hours    MEDICATIONS  (PRN):      PRESENT SYMPTOMS: [x ]Unable to obtain due to poor mentation   Source if other than patient:  [ ]Family   [ ]Team     Pain: [ ]yes [ ]no  QOL impact -   Location -                    Aggravating factors -  Alleviating factors -   Quality -  Radiation -  Timing -   Severity (0-10 scale):  Minimal acceptable level (0-10 scale):     CPOT:  3  https://www.[facility].org/getattachment/[email or web address]/Critical-Care-Pain-Observation-Tool-(CPOT)    PAIN AD Score:   http://geriatrictoolkit.[facility]/cog/painad.pdf     Dyspnea:                           [ ]None[ ]Mild [ ]Moderate [ ]Severe     Respiratory Distress Observation Scale (RDOS): 2  A score of 0 to 2 signifies little or no respiratory distress, 3 signifies mild distress, scores 4 to 6 indicate moderate distress, and scores greater than 7 signify severe distress  https://www.[facility].ca/sites/default/files/PDFS/659987-zmdttmeogsp-magbupzr-cdgchmiyvmi-nlpbn.pdf    Anxiety:                             [ ]None[ ]Mild [ ]Moderate [ ]Severe   Fatigue:                             [ ]None[ ]Mild [ ]Moderate [ ]Severe   Nausea:                             [ ]None[ ]Mild [ ]Moderate [ ]Severe   Loss of appetite:              [ ]None[ ]Mild [ ]Moderate [ ]Severe   Constipation:                    [ ]None[ ]Mild [ ]Moderate [ ]Severe    Other Symptoms:  [ ]All other review of systems negative     Palliative Performance Status Version 2:         %    http://npcrc.org/files/news/palliative_performance_scale_ppsv2.pdf  PHYSICAL EXAM:    GENERAL:  NAD   PULMONARY:  Non labored breathing  NEUROLOGIC: Lying in bed  BEHAVIORAL/PSYCH:  Calm    LABS: I have reviewed daily labs                          11.5 12.13 )-----------( 179      ( 30 Jan 2025 07:26 )             34.6       01-30    151[H]  |  113[H]  |  78[HH]  ----------------------------<  114[H]  4.3   |  28  |  1.1    Ca    8.4      30 Jan 2025 07:26  Mg     2.5     01-30    TPro  4.9[L]  /  Alb  2.9[L]  /  TBili  0.6  /  DBili  x   /  AST  15  /  ALT  <5  /  AlkPhos  79  01-30          RADIOLOGY & ADDITIONAL STUDIES: I have reviewed new imaging    PROTEIN CALORIE MALNUTRITION PRESENT: [ ]mild [ ]moderate [ ]severe [ ]underweight [ ]morbid obesity  https://www.andeal.org/vault/2440/web/files/ONC/Table_Clinical%20Characteristics%20to%20Document%20Malnutrition-White%20JV%20et%20al%202012.pdf      Weight (kg): 52.2 (01-29-25 @ 10:14)    [ ]PPSV2 < or = to 30% [ ]significant weight loss  [ ]poor nutritional intake  [ ]anasarca      [ ]Artificial Nutrition      Palliative Care Spiritual/Emotional Screening Tool Question  Severity (0-4):                    OR                    [ x] Unable to determine. Will assess at later time if appropriate.  Score of 2 or greater indicates recommendation of Chaplaincy and/or SW referral  Chaplaincy Referral: [ ] Yes [ ] Refused [ ] Following     Caregiver [place]:  [ ] Yes [ ] No    OR    [x ] Unable to determine. Will assess at later time if appropriate.  Social Work Referral [ ]  Patient and Family Centered Care Referral [ ]    Anticipatory Grief Present: [ ] Yes [ ] No    OR     [ x] Unable to determine. Will assess at later time if appropriate.  Social Work Referral [ ]  Patient and Family Centered Care Referral [ ]    REFERRALS:   [ ]Chaplaincy  [ ]Hospice  [ ]Child Life  [ ]Social Work  [ ]Case management [ ]Holistic Therapy     Palliative care education provided to patient and/or family

## 2025-01-30 NOTE — PROGRESS NOTE ADULT - ASSESSMENT
IMPRESSION:    Acute Hypoxic Respiratory Failure improved  Toxic Metabolic Encephalopathy improved   CAP  Influenza A  Sepsis POA  HO Parkinson's Disease  HO Dementia   HO CAD    PLAN:    CNS:  Avoid depressants.  MS improving.  Continue Anti parkinson's     HEENT: Oral care.  Speech and swallow     PULMONARY:  HOB @ 45 degrees.  Aspiration precautions.  ABG noted.  CT Chest noted.  Wean o2 as tolerated      CARDIOVASCULAR:  Avoid overload.  Gentle hydration.  ECHO     GI: GI prophylaxis.  Feeding per speech and swallow.  Bowel regimen     RENAL:  Follow up lytes.  Correct as needed    INFECTIOUS DISEASE: Follow up cultures, Zosyn, Levaquin and Vancomycin for now.  Check urine legionella and strep Ag.  Nasal MRSA.  RVP noted.  tamiflu.  Follow up  procalcitonin     HEMATOLOGICAL:  DVT prophylaxis. Dimer.  Monitor CBC     ENDOCRINE:  Follow up FS.  Insulin protocol if needed.    MUSCULOSKELETAL: OOB with PT OT     DGTF    This is a transition of care note.    For any questions or clarifications during regular hours, please do not hesitate to call or text me.    For after hours and weekends, please call the MICU fellow at [phone].          IMPRESSION:    Acute Hypoxic Respiratory Failure   Toxic Metabolic Encephalopathy  CAP likely aspiration   Influenza A  Sepsis POA  HO Parkinson's Disease  HO Dementia   HO CAD    PLAN:    CNS:  Avoid depressants.  CTH.  Continue Anti parkinson's     HEENT: Oral care.  Speech and swallow when more awake.  NGT     PULMONARY:  HOB @ 45 degrees.  Aspiration precautions.  ABG noted.  CT Chest noted.  Wean o2 as tolerated.  Might need intubation.  ETCO2 monitoring      CARDIOVASCULAR:  Avoid overload.  Gentle hydration.  ECHO     GI: GI prophylaxis.  Feeding per speech and swallow.  NG feeding for now.  Bowel regimen     RENAL:  Follow up lytes.  Correct as needed.  Monitor UO    INFECTIOUS DISEASE: Follow up cultures, Zosyn, Levaquin and Vancomycin for now.  Check urine legionella and strep Ag.  Nasal MRSA.  RVP noted.  Tamiflu.  Follow up  procalcitonin     HEMATOLOGICAL:  DVT prophylaxis. Dimer.  Monitor CBC     ENDOCRINE:  Follow up FS.  Insulin protocol if needed.    MUSCULOSKELETAL: Bed rest.  Off loading    MICU

## 2025-01-30 NOTE — GOALS OF CARE CONVERSATION - ADVANCED CARE PLANNING - CONVERSATION DETAILS
Discussed with patient's spouse Nichole with Kristian Mcadams (Palliative Care NP) who is [place]ian-speaking. Nichole has received a medical update and understands that patient is currently being treated for respiratory failure. She is hoping that he is able to be transferred out of the ED soon, and has no other concerns. She understands that should patient's respiratory status worsen, he could need intubation, and she would be agreeable with this interventions. She is also agreeable to resuscitation if needed.     Plan:  - continue current interventions  - full code     Palliative care will continue to follow peripherally.   Please call [phone] with questions or concerns 24/7.   _____________  He Victor M Grimes MD  Palliative Medicine  [facility]   of Geriatric and Palliative Medicine  [phone]

## 2025-01-31 LAB
ALBUMIN SERPL ELPH-MCNC: 2.8 G/DL — LOW (ref 3.5–5.2)
ALBUMIN SERPL ELPH-MCNC: 2.9 G/DL — LOW (ref 3.5–5.2)
ALP SERPL-CCNC: 89 U/L — SIGNIFICANT CHANGE UP (ref 30–115)
ALP SERPL-CCNC: 92 U/L — SIGNIFICANT CHANGE UP (ref 30–115)
ALT FLD-CCNC: 6 U/L — SIGNIFICANT CHANGE UP (ref 0–41)
ALT FLD-CCNC: <5 U/L — SIGNIFICANT CHANGE UP (ref 0–41)
ANION GAP SERPL CALC-SCNC: 10 MMOL/L — SIGNIFICANT CHANGE UP (ref 7–14)
ANION GAP SERPL CALC-SCNC: 11 MMOL/L — SIGNIFICANT CHANGE UP (ref 7–14)
ANION GAP SERPL CALC-SCNC: 12 MMOL/L — SIGNIFICANT CHANGE UP (ref 7–14)
APTT BLD: 27.2 SEC — SIGNIFICANT CHANGE UP (ref 27–39.2)
APTT BLD: 30 SEC — SIGNIFICANT CHANGE UP (ref 27–39.2)
APTT BLD: 30.5 SEC — SIGNIFICANT CHANGE UP (ref 27–39.2)
AST SERPL-CCNC: 14 U/L — SIGNIFICANT CHANGE UP (ref 0–41)
AST SERPL-CCNC: 14 U/L — SIGNIFICANT CHANGE UP (ref 0–41)
BILIRUB SERPL-MCNC: 0.9 MG/DL — SIGNIFICANT CHANGE UP (ref 0.2–1.2)
BILIRUB SERPL-MCNC: 0.9 MG/DL — SIGNIFICANT CHANGE UP (ref 0.2–1.2)
BUN SERPL-MCNC: 56 MG/DL — HIGH (ref 10–20)
BUN SERPL-MCNC: 59 MG/DL — HIGH (ref 10–20)
BUN SERPL-MCNC: 60 MG/DL — HIGH (ref 10–20)
CALCIUM SERPL-MCNC: 8.3 MG/DL — LOW (ref 8.4–10.4)
CALCIUM SERPL-MCNC: 8.4 MG/DL — SIGNIFICANT CHANGE UP (ref 8.4–10.4)
CALCIUM SERPL-MCNC: 8.5 MG/DL — SIGNIFICANT CHANGE UP (ref 8.4–10.5)
CHLORIDE SERPL-SCNC: 111 MMOL/L — HIGH (ref 98–110)
CHLORIDE SERPL-SCNC: 112 MMOL/L — HIGH (ref 98–110)
CHLORIDE SERPL-SCNC: 112 MMOL/L — HIGH (ref 98–110)
CO2 SERPL-SCNC: 29 MMOL/L — SIGNIFICANT CHANGE UP (ref 17–32)
CO2 SERPL-SCNC: 29 MMOL/L — SIGNIFICANT CHANGE UP (ref 17–32)
CO2 SERPL-SCNC: 30 MMOL/L — SIGNIFICANT CHANGE UP (ref 17–32)
CREAT SERPL-MCNC: 0.9 MG/DL — SIGNIFICANT CHANGE UP (ref 0.7–1.5)
CREAT SERPL-MCNC: 1 MG/DL — SIGNIFICANT CHANGE UP (ref 0.7–1.5)
CREAT SERPL-MCNC: 1 MG/DL — SIGNIFICANT CHANGE UP (ref 0.7–1.5)
EGFR: 81 ML/MIN/1.73M2 — SIGNIFICANT CHANGE UP
EGFR: 92 ML/MIN/1.73M2 — SIGNIFICANT CHANGE UP
EGFR: 92 ML/MIN/1.73M2 — SIGNIFICANT CHANGE UP
FLUAV H1 2009 PAND RNA SPEC QL NAA+PROBE: DETECTED
GLUCOSE SERPL-MCNC: 116 MG/DL — HIGH (ref 70–99)
GLUCOSE SERPL-MCNC: 126 MG/DL — HIGH (ref 70–99)
GLUCOSE SERPL-MCNC: 161 MG/DL — HIGH (ref 70–99)
HCT VFR BLD CALC: 36.1 % — LOW (ref 42–52)
HCT VFR BLD CALC: 37.6 % — LOW (ref 42–52)
HCT VFR BLD CALC: 37.9 % — LOW (ref 42–52)
HGB BLD-MCNC: 11.7 G/DL — LOW (ref 14–18)
HGB BLD-MCNC: 12.3 G/DL — LOW (ref 14–18)
HGB BLD-MCNC: 12.4 G/DL — LOW (ref 14–18)
INR BLD: 1.88 RATIO — HIGH (ref 0.65–1.3)
INR BLD: 1.9 RATIO — HIGH (ref 0.65–1.3)
INR BLD: 1.94 RATIO — HIGH (ref 0.65–1.3)
MAGNESIUM SERPL-MCNC: 2.4 MG/DL — SIGNIFICANT CHANGE UP (ref 1.8–2.4)
MAGNESIUM SERPL-MCNC: 2.4 MG/DL — SIGNIFICANT CHANGE UP (ref 1.8–2.4)
MCHC RBC-ENTMCNC: 30.7 PG — SIGNIFICANT CHANGE UP (ref 27–31)
MCHC RBC-ENTMCNC: 30.9 PG — SIGNIFICANT CHANGE UP (ref 27–31)
MCHC RBC-ENTMCNC: 30.9 PG — SIGNIFICANT CHANGE UP (ref 27–31)
MCHC RBC-ENTMCNC: 32.4 G/DL — SIGNIFICANT CHANGE UP (ref 32–37)
MCHC RBC-ENTMCNC: 32.7 G/DL — SIGNIFICANT CHANGE UP (ref 32–37)
MCHC RBC-ENTMCNC: 32.7 G/DL — SIGNIFICANT CHANGE UP (ref 32–37)
MCV RBC AUTO: 94.5 FL — HIGH (ref 80–94)
MCV RBC AUTO: 94.5 FL — HIGH (ref 80–94)
MCV RBC AUTO: 94.8 FL — HIGH (ref 80–94)
NRBC # BLD: 0 /100 WBCS — SIGNIFICANT CHANGE UP (ref 0–0)
NRBC BLD-RTO: 0 /100 WBCS — SIGNIFICANT CHANGE UP (ref 0–0)
PLATELET # BLD AUTO: 156 K/UL — SIGNIFICANT CHANGE UP (ref 130–400)
PLATELET # BLD AUTO: 172 K/UL — SIGNIFICANT CHANGE UP (ref 130–400)
PLATELET # BLD AUTO: 180 K/UL — SIGNIFICANT CHANGE UP (ref 130–400)
PMV BLD: 11.4 FL — HIGH (ref 7.4–10.4)
PMV BLD: 11.6 FL — HIGH (ref 7.4–10.4)
PMV BLD: 11.8 FL — HIGH (ref 7.4–10.4)
POTASSIUM SERPL-MCNC: 4 MMOL/L — SIGNIFICANT CHANGE UP (ref 3.5–5)
POTASSIUM SERPL-MCNC: 4.1 MMOL/L — SIGNIFICANT CHANGE UP (ref 3.5–5)
POTASSIUM SERPL-MCNC: 4.2 MMOL/L — SIGNIFICANT CHANGE UP (ref 3.5–5)
POTASSIUM SERPL-SCNC: 4 MMOL/L — SIGNIFICANT CHANGE UP (ref 3.5–5)
POTASSIUM SERPL-SCNC: 4.1 MMOL/L — SIGNIFICANT CHANGE UP (ref 3.5–5)
POTASSIUM SERPL-SCNC: 4.2 MMOL/L — SIGNIFICANT CHANGE UP (ref 3.5–5)
PROT SERPL-MCNC: 4.9 G/DL — LOW (ref 6–8)
PROT SERPL-MCNC: 4.9 G/DL — LOW (ref 6–8)
PROTHROM AB SERPL-ACNC: 22.5 SEC — HIGH (ref 9.95–12.87)
PROTHROM AB SERPL-ACNC: 22.7 SEC — HIGH (ref 9.95–12.87)
PROTHROM AB SERPL-ACNC: 23.2 SEC — HIGH (ref 9.95–12.87)
RAPID RVP RESULT: DETECTED
RBC # BLD: 3.81 M/UL — LOW (ref 4.7–6.1)
RBC # BLD: 3.98 M/UL — LOW (ref 4.7–6.1)
RBC # BLD: 4.01 M/UL — LOW (ref 4.7–6.1)
RBC # FLD: 13.2 % — SIGNIFICANT CHANGE UP (ref 11.5–14.5)
RBC # FLD: 13.2 % — SIGNIFICANT CHANGE UP (ref 11.5–14.5)
RBC # FLD: 13.3 % — SIGNIFICANT CHANGE UP (ref 11.5–14.5)
SARS-COV-2 RNA SPEC QL NAA+PROBE: SIGNIFICANT CHANGE UP
SODIUM SERPL-SCNC: 151 MMOL/L — HIGH (ref 135–146)
SODIUM SERPL-SCNC: 152 MMOL/L — HIGH (ref 135–146)
SODIUM SERPL-SCNC: 153 MMOL/L — HIGH (ref 135–146)
WBC # BLD: 12.97 K/UL — HIGH (ref 4.8–10.8)
WBC # BLD: 15.91 K/UL — HIGH (ref 4.8–10.8)
WBC # BLD: 16.05 K/UL — HIGH (ref 4.8–10.8)
WBC # FLD AUTO: 12.97 K/UL — HIGH (ref 4.8–10.8)
WBC # FLD AUTO: 15.91 K/UL — HIGH (ref 4.8–10.8)
WBC # FLD AUTO: 16.05 K/UL — HIGH (ref 4.8–10.8)

## 2025-01-31 PROCEDURE — 70450 CT HEAD/BRAIN W/O DYE: CPT | Mod: 26,59,77

## 2025-01-31 PROCEDURE — 71045 X-RAY EXAM CHEST 1 VIEW: CPT | Mod: 26,77

## 2025-01-31 PROCEDURE — 99222 1ST HOSP IP/OBS MODERATE 55: CPT

## 2025-01-31 PROCEDURE — 71045 X-RAY EXAM CHEST 1 VIEW: CPT | Mod: 26

## 2025-01-31 PROCEDURE — 70450 CT HEAD/BRAIN W/O DYE: CPT | Mod: 26,59

## 2025-01-31 PROCEDURE — 70498 CT ANGIOGRAPHY NECK: CPT | Mod: 26

## 2025-01-31 PROCEDURE — 99291 CRITICAL CARE FIRST HOUR: CPT

## 2025-01-31 PROCEDURE — 70496 CT ANGIOGRAPHY HEAD: CPT | Mod: 26

## 2025-01-31 RX ORDER — SODIUM CHLORIDE 9 G/1000ML
1000 INJECTION, SOLUTION INTRAVENOUS
Refills: 0 | Status: DISCONTINUED | OUTPATIENT
Start: 2025-01-31 | End: 2025-02-02

## 2025-01-31 RX ORDER — CEFEPIME 2 G/20ML
1000 INJECTION, POWDER, FOR SOLUTION INTRAVENOUS EVERY 12 HOURS
Refills: 0 | Status: DISCONTINUED | OUTPATIENT
Start: 2025-01-31 | End: 2025-01-31

## 2025-01-31 RX ORDER — CEFEPIME 2 G/20ML
INJECTION, POWDER, FOR SOLUTION INTRAVENOUS
Refills: 0 | Status: DISCONTINUED | OUTPATIENT
Start: 2025-01-31 | End: 2025-01-31

## 2025-01-31 RX ORDER — DESMOPRESSIN ACETATE 4 UG/ML
15 INJECTION INTRAVENOUS ONCE
Refills: 0 | Status: COMPLETED | OUTPATIENT
Start: 2025-01-31 | End: 2025-01-31

## 2025-01-31 RX ORDER — CEFEPIME 2 G/20ML
1000 INJECTION, POWDER, FOR SOLUTION INTRAVENOUS ONCE
Refills: 0 | Status: COMPLETED | OUTPATIENT
Start: 2025-01-31 | End: 2025-01-31

## 2025-01-31 RX ORDER — CEFAZOLIN SODIUM IN 0.9 % NACL 3 G/100 ML
2000 INTRAVENOUS SOLUTION, PIGGYBACK (ML) INTRAVENOUS ONCE
Refills: 0 | Status: COMPLETED | OUTPATIENT
Start: 2025-01-31 | End: 2025-01-31

## 2025-01-31 RX ORDER — CEFAZOLIN SODIUM IN 0.9 % NACL 3 G/100 ML
2000 INTRAVENOUS SOLUTION, PIGGYBACK (ML) INTRAVENOUS EVERY 8 HOURS
Refills: 0 | Status: DISCONTINUED | OUTPATIENT
Start: 2025-01-31 | End: 2025-02-02

## 2025-01-31 RX ORDER — SODIUM CHLORIDE 9 G/1000ML
1000 INJECTION, SOLUTION INTRAVENOUS
Refills: 0 | Status: DISCONTINUED | OUTPATIENT
Start: 2025-01-31 | End: 2025-01-31

## 2025-01-31 RX ORDER — LABETALOL HYDROCHLORIDE 200 MG/1
10 TABLET, FILM COATED ORAL ONCE
Refills: 0 | Status: COMPLETED | OUTPATIENT
Start: 2025-01-31 | End: 2025-01-31

## 2025-01-31 RX ORDER — CARBIDOPA/LEVODOPA 25MG-100MG
2 TABLET ORAL THREE TIMES A DAY
Refills: 0 | Status: DISCONTINUED | OUTPATIENT
Start: 2025-01-31 | End: 2025-02-26

## 2025-01-31 RX ORDER — CEFAZOLIN SODIUM IN 0.9 % NACL 3 G/100 ML
INTRAVENOUS SOLUTION, PIGGYBACK (ML) INTRAVENOUS
Refills: 0 | Status: DISCONTINUED | OUTPATIENT
Start: 2025-01-31 | End: 2025-02-02

## 2025-01-31 RX ORDER — NIFEDIPINE 30 MG
60 TABLET, EXTENDED RELEASE 24 HR ORAL ONCE
Refills: 0 | Status: COMPLETED | OUTPATIENT
Start: 2025-01-31 | End: 2025-01-31

## 2025-01-31 RX ADMIN — SODIUM CHLORIDE 125 MILLILITER(S): 9 INJECTION, SOLUTION INTRAVENOUS at 18:36

## 2025-01-31 RX ADMIN — DESMOPRESSIN ACETATE 215 MICROGRAM(S): 4 INJECTION INTRAVENOUS at 15:19

## 2025-01-31 RX ADMIN — Medication 25 GRAM(S): at 06:32

## 2025-01-31 RX ADMIN — Medication 2 TABLET(S): at 21:51

## 2025-01-31 RX ADMIN — LABETALOL HYDROCHLORIDE 10 MILLIGRAM(S): 200 TABLET, FILM COATED ORAL at 02:17

## 2025-01-31 RX ADMIN — Medication 2 TABLET(S): at 17:20

## 2025-01-31 RX ADMIN — Medication 100 MILLIGRAM(S): at 14:03

## 2025-01-31 RX ADMIN — Medication 10 MILLIGRAM(S): at 23:08

## 2025-01-31 RX ADMIN — CEFEPIME 100 MILLIGRAM(S): 2 INJECTION, POWDER, FOR SOLUTION INTRAVENOUS at 08:09

## 2025-01-31 RX ADMIN — Medication 100 MILLIGRAM(S): at 21:51

## 2025-01-31 RX ADMIN — OSELTAMIVIR PHOSPHATE 30 MILLIGRAM(S): 75 CAPSULE ORAL at 17:21

## 2025-01-31 NOTE — CONSULT NOTE ADULT - SUBJECTIVE AND OBJECTIVE BOX
SUMMARY: HPI:  69-year-old male past medical history of hypertension, Parkinson's, CAD presents for shortness of breath and cough of 1 day duration. History was obtained from patient's wife who noted that the patient has been having decreased po intake, activity, and unintentional weight loss for some time. Howver, yesterday he started having cough and shortness of breath.   Denies any sick contacts or choking on food.    InED pt was found to be hypoxic and in respiratory distress. He was placed on FAcemask O2 then switched to AVAPS.  He is somnolent/lethargic and unable to follow commad. opens eyes slightly when on physical stimuli .    Work up showed AHRF and sepsis 2/2 to mutilobar PNA. (29 Jan 2025 15:13)    24 hour EVENTS: CTH showing trace IVH, NSICU consulted    ALLERGIES: Allergies    Allergy Status Unknown    Intolerances    ADMISSION SCORES:   GCS: 5  ICH Score: 2    REVIEW OF SYSTEMS: Negative except for that of HPI    VITALS: [x] Reviewed  ICU Vital Signs Last 24 Hrs  T(C): 37.8 (31 Jan 2025 05:11), Max: 37.8 (31 Jan 2025 05:11)  T(F): 100.1 (31 Jan 2025 05:11), Max: 100.1 (31 Jan 2025 05:11)  HR: 87 (31 Jan 2025 06:20) (72 - 93)  BP: 159/80 (31 Jan 2025 06:20) (150/86 - 186/91)  BP(mean): 120 (31 Jan 2025 01:15) (106 - 130)  ABP: --  ABP(mean): --  RR: 18 (31 Jan 2025 06:20) (18 - 20)  SpO2: 95% (31 Jan 2025 06:20) (95% - 100%)    LABS:  Na: 152 (01-30 @ 17:16), 151 (01-30 @ 07:26), 147 (01-29 @ 10:40)  K: 4.3 (01-30 @ 17:16), 4.3 (01-30 @ 07:26), 4.9 (01-29 @ 10:40)  Cl: 114 (01-30 @ 17:16), 113 (01-30 @ 07:26), 104 (01-29 @ 10:40)  CO2: 27 (01-30 @ 17:16), 28 (01-30 @ 07:26), 27 (01-29 @ 10:40)  BUN: 76 (01-30 @ 17:16), 78 (01-30 @ 07:26), 86 (01-29 @ 10:40)  Cr: 1.1 (01-30 @ 17:16), 1.1 (01-30 @ 07:26), 1.5 (01-29 @ 10:40)  Glu: 142(01-30 @ 17:16), 114(01-30 @ 07:26), 101(01-29 @ 10:40)    Hgb: 11.8 (01-30 @ 17:16), 11.5 (01-30 @ 07:26), 13.1 (01-29 @ 10:40)  Hct: 35.3 (01-30 @ 17:16), 34.6 (01-30 @ 07:26), 39.3 (01-29 @ 10:40)  WBC: 12.62 (01-30 @ 17:16), 12.13 (01-30 @ 07:26), 15.81 (01-29 @ 10:40)  Plt: 198 (01-30 @ 17:16), 179 (01-30 @ 07:26), 239 (01-29 @ 10:40)    INR:   PTT:     LIVER FUNCTIONS - ( 30 Jan 2025 17:16 )  Alb: 2.8 g/dL / Pro: 4.8 g/dL / ALK PHOS: 82 U/L / ALT: <5 U/L / AST: 14 U/L / GGT: x           ABG - ( 30 Jan 2025 14:11 )  pH, Arterial: 7.48  pH, Blood: x     /  pCO2: 41    /  pO2: 79    / HCO3: 30    / Base Excess: 6.4   /  SaO2: 97.4        MEDICATIONS  (STANDING):  ceFAZolin   IVPB      ceFAZolin   IVPB 2000 milliGRAM(s) IV Intermittent once  ceFAZolin   IVPB 2000 milliGRAM(s) IV Intermittent every 8 hours  oseltamivir 30 milliGRAM(s) Oral two times a day  oseltamivir      rasagiline Tablet 1 milliGRAM(s) Oral <User Schedule>    MEDICATIONS  (PRN):      IMAGING/DATA: [x] Reviewed    EXAMINATION:  General: No acute distress  HEENT: Anicteric sclerae  Cardiac: [id number]  Lungs: Clear  Abdomen: Soft, non-tender, +BS  Extremities: No c/c/e  Skin/Incision Site: Clean, dry and intact  Neurologic: Obtunded, no command following, gaze midline, PERRL, face symmetric, minimal movement to noxious stimuli in b/l UE and LE      DEVICES:   [] Restraints [] PIVs [] ET tube [] central line [] PICC [] arterial line [] leslie [] NGT/OGT [] EVD [] LD [] BRYNN/HMV [] LiCOX [] ICP monitor [] Trach [] PEG [] Chest Tube [] other:

## 2025-01-31 NOTE — PROGRESS NOTE ADULT - ASSESSMENT
IMPRESSION:    Acute Hypoxic Respiratory Failure   Toxic Metabolic Encephalopathy  CAP likely aspiration   Influenza A  MSSA bacteremia   Sepsis POA  HO Parkinson's Disease  HO Dementia   HO CAD    PLAN:    CNS:  Avoid depressants.  CTH.  Continue Anti parkinson's     HEENT: Oral care.  Speech and swallow when more awake.  NGT     PULMONARY:  HOB @ 45 degrees.  Aspiration precautions.  ABG noted.  Wean o2 as tolerated.  Might need intubation.  ETCO2 monitoring,  repeat CXR       CARDIOVASCULAR:  Avoid overload.  GDFR.  ECHO noted.  Continue gentle hydration while nPO     GI: GI prophylaxis.  Feeding per speech and swallow.  NG feeding for now.  Bowel regimen.  Free H2O via NGT      RENAL:  Follow up lytes.  Correct as needed.  Monitor UO    INFECTIOUS DISEASE: Cultures noted.  Change ABX for Cefepime for 5 days adn switch to Ancef.  Daily BC.  Urine legionella and strep Ag negative.  Nasal MRSA negative.  RVP noted.  Continue Tamiflu.       HEMATOLOGICAL:  DVT prophylaxis. Dimer noted.  LE duplex.  Monitor CBC     ENDOCRINE:  Follow up FS.  Insulin protocol if needed.    MUSCULOSKELETAL: Bed rest.  Off loading    SDU     Palliative care eval noted.      Prognosis overall remains poor     This is a transition of care note.    For any questions or clarifications during regular hours, please do not hesitate to call or text me.    For after hours and weekends, please call the MICU fellow at [phone].

## 2025-01-31 NOTE — PROGRESS NOTE ADULT - SUBJECTIVE AND OBJECTIVE BOX
Patient is a 69y old  Male who presents with a chief complaint of penumonia (30 Jan 2025 12:09)        Over Night Events:  Remains lethargic.  Off pressors.          ROS:     All ROS are negative except HPI         PHYSICAL EXAM    ICU Vital Signs Last 24 Hrs  T(C): 37.8 (31 Jan 2025 05:11), Max: 37.8 (31 Jan 2025 05:11)  T(F): 100.1 (31 Jan 2025 05:11), Max: 100.1 (31 Jan 2025 05:11)  HR: 87 (31 Jan 2025 06:20) (72 - 107)  BP: 159/80 (31 Jan 2025 06:20) (150/86 - 186/91)  BP(mean): 120 (31 Jan 2025 01:15) (102 - 130)  ABP: --  ABP(mean): --  RR: 18 (31 Jan 2025 06:20) (18 - 20)  SpO2: 95% (31 Jan 2025 06:20) (95% - 100%)    O2 Parameters below as of 31 Jan 2025 06:20  Patient On (Oxygen Delivery Method): nasal cannula  O2 Flow (L/min): 2          CONSTITUTIONAL:  Ill appearing     ENT:   Airway patent,   Mouth with normal mucosa.       EYES:   Pupils equal,   Round and reactive to light.    CARDIAC:   Normal rate,   Regular rhythm.    No edema    RESPIRATORY:   No wheezing  Bilateral BS  Normal chest expansion  Not tachypneic,  No use of accessory muscles    GASTROINTESTINAL:  Abdomen soft,   Non-tender,   No guarding,   + BS    MUSCULOSKELETAL:   No clubbing, cyanosis    NEUROLOGICAL:   Lethargic  Responds to pain     SKIN:   Skin normal color for race,   Warm and dry  No evidence of rash.        LABS:                            11.8   12.62 )-----------( 198      ( 30 Jan 2025 17:16 )             35.3                                               01-30    152[H]  |  114[H]  |  76[HH]  ----------------------------<  142[H]  4.3   |  27  |  1.1    Ca    8.4      30 Jan 2025 17:16  Mg     2.5     01-30    TPro  4.8[L]  /  Alb  2.8[L]  /  TBili  0.6  /  DBili  x   /  AST  14  /  ALT  <5  /  AlkPhos  82  01-30                                             Urinalysis Basic - ( 30 Jan 2025 17:16 )    Color: x / Appearance: x / SG: x / pH: x  Gluc: 142 mg/dL / Ketone: x  / Bili: x / Urobili: x   Blood: x / Protein: x / Nitrite: x   Leuk Esterase: x / RBC: x / WBC x   Sq Epi: x / Non Sq Epi: x / Bacteria: x                                                  LIVER FUNCTIONS - ( 30 Jan 2025 17:16 )  Alb: 2.8 g/dL / Pro: 4.8 g/dL / ALK PHOS: 82 U/L / ALT: <5 U/L / AST: 14 U/L / GGT: x                                                  Urinalysis with Rflx Culture (collected 29 Jan 2025 16:48)    Culture - Blood (collected 29 Jan 2025 10:09)  Source: .Blood BLOOD  Gram Stain (30 Jan 2025 15:40):    Growth in aerobic bottle: Gram Positive Cocci in Clusters    Growth in anaerobic bottle: Gram Positive Cocci in Clusters  Preliminary Report (30 Jan 2025 15:41):    Growth in aerobic bottle: Gram Positive Cocci in Clusters    Growth in anaerobic bottle: Gram Positive Cocci in Clusters    Direct identification is available within approximately 3-5    hours either by Blood Panel Multiplexed PCR or Direct    MALDI-TOF. Details: [email or web address]  Organism: Blood Culture PCR (30 Jan 2025 14:14)  Organism: Blood Culture PCR (30 Jan 2025 14:14)    Culture - Blood (collected 29 Jan 2025 10:09)  Source: .Blood BLOOD  Gram Stain (30 Jan 2025 12:23):    Growth in aerobic bottle: Gram Positive Cocci in Clusters  Preliminary Report (30 Jan 2025 12:23):    Growth in aerobic bottle: Gram Positive Cocci in Clusters                                                                                       ABG - ( 30 Jan 2025 14:11 )  pH, Arterial: 7.48  pH, Blood: x     /  pCO2: 41    /  pO2: 79    / HCO3: 30    / Base Excess: 6.4   /  SaO2: 97.4                MEDICATIONS  (STANDING):  aspirin enteric coated 81 milliGRAM(s) Oral daily  dextrose 5%. 1000 milliLiter(s) (80 mL/Hr) IV Continuous <Continuous>  enoxaparin Injectable 40 milliGRAM(s) SubCutaneous every 24 hours  levoFLOXacin IVPB      levoFLOXacin IVPB 750 milliGRAM(s) IV Intermittent every 48 hours  oseltamivir 30 milliGRAM(s) Oral two times a day  oseltamivir      piperacillin/tazobactam IVPB.. 3.75 Gram(s) IV Intermittent every 8 hours  rasagiline Tablet 1 milliGRAM(s) Oral <User Schedule>  vancomycin  IVPB 1000 milliGRAM(s) IV Intermittent every 24 hours    MEDICATIONS  (PRN):      New X-rays reviewed:                                                                                  ECHO

## 2025-01-31 NOTE — CONSULT NOTE ADULT - ASSESSMENT
69-year-old male past medical history of hypertension, Parkinson's, CAD presents for shortness of breath and cough of 1 day duration, NSICU consulted for trace IVH    Recommendations:    - Repeat CTH 6 hours after last, please obtain CTA with next CTH  - Resume all parkinsonian medications (may need NGT)  - vEEG  - D/c lovenox/ASA - renally dose DDAVP to reverse ASA  -  - 140  - Normothermia  -  - 180  - Nsx c/s  - If repeat CTH and CTA stable, NSICU will likely transfer consult to Gen Neuro 69-year-old male past medical history of hypertension, Parkinson's, CAD presents for shortness of breath and cough of 1 day duration, NSICU consulted for trace IVH      encephalopathy- possibly toxic-metabolic     Recommendations:    - Repeat CTH 6 hours after last, please obtain CTA h/n with next CTH  obtain coags and reverse coagulopathy   - Resume all parkinsonian medications (may need NGT)  - vEEG  - D/c lovenox/ASA   - dose DDAVP 0.3 mcg/kg IVPB over 20 min to for uremic associated thrombocytopathy   -  - 140  - Normothermia  -  - 180  - If repeat CTH and CTA stable, NSICU will likely transfer consult to stroke neuro

## 2025-01-31 NOTE — CONSULT NOTE ADULT - NS ATTEST RISK PROBLEM GEN_ALL_CORE FT
- Patient has an illness which poses a threat to life or bodily function without treatment  - The doses of the antimicrobials will be adjusted according to the estimated creatinine clearance (using the Cockroft-Gault equation)  - I independently interpreted the most recent CT CXR, multifocal PNA  - I discussed my recommendations with the primary team housestaff

## 2025-01-31 NOTE — CONSULT NOTE ADULT - SUBJECTIVE AND OBJECTIVE BOX
GARETT ESCAMILLA  69y, Male  Allergy: Allergy Status Unknown      CHIEF COMPLAINT:   penumonia (31 Jan 2025 07:29)      LOS  2d    HPI  HPI:  69-year-old male past medical history of hypertension, Parkinson's, CAD presents for shortness of breath and cough of 1 day duration. History was obtained from patient's wife who noted that the patient has been having decreased po intake, activity, and unintentional weight loss for some time. Howver, yesterday he started having cough and shortness of breath.   Denies any sick contacts or choking on food.    InED pt was found to be hypoxic and in respiratory distress. He was placed on FAcemask O2 then switched to AVAPS.  He is somnolent/lethargic and unable to follow commad. opens eyes slightly when on physical stimuli .    Work up showed AHRF and sepsis 2/2 to mutilobar PNA. (29 Jan 2025 15:13)      INFECTIOUS DISEASE HISTORY:  ID consulted for Flu+ and PNA  CT concerning for multifocal PNA  BCX MSSA  cannot obtain further history from the patient secondary to altered mental status or sedation     Currently ordered for:  cefepime   IVPB      cefepime   IVPB 1000 milliGRAM(s) IV Intermittent every 12 hours  oseltamivir 30 milliGRAM(s) Oral two times a day  oseltamivir          PMH  PAST MEDICAL & SURGICAL HISTORY:  Parkinson disease      CAD (coronary artery disease)      History of basal cell carcinoma (BCC) excision          FAMILY HISTORY      SOCIAL HISTORY  Social History:        ROS  unable to obtain history secondary to patient's mental status and/or sedation     VITALS:  T(F): 100.1, Max: 100.1 (01-31-25 @ 05:11)  HR: 87  BP: 159/80  RR: 18Vital Signs Last 24 Hrs  T(C): 37.8 (31 Jan 2025 05:11), Max: 37.8 (31 Jan 2025 05:11)  T(F): 100.1 (31 Jan 2025 05:11), Max: 100.1 (31 Jan 2025 05:11)  HR: 87 (31 Jan 2025 06:20) (72 - 96)  BP: 159/80 (31 Jan 2025 06:20) (150/86 - 186/91)  BP(mean): 120 (31 Jan 2025 01:15) (102 - 130)  RR: 18 (31 Jan 2025 06:20) (18 - 20)  SpO2: 95% (31 Jan 2025 06:20) (95% - 100%)    Parameters below as of 31 Jan 2025 06:20  Patient On (Oxygen Delivery Method): nasal cannula  O2 Flow (L/min): 2      PHYSICAL EXAM:  Gen: chronically ill appearing cachectic  HEENT: Normocephalic, atraumatic  Neck: supple, no lymphadenopathy  CV: Regular rate & regular rhythm  Lungs: decreased BS at bases, no fremitus  Abdomen: Soft, BS present  Ext: Warm, well perfused  Neuro: non focal, not following commands  Skin: no rash, no erythema  Lines: no phlebitis     TESTS & MEASUREMENTS:                        11.8   12.62 )-----------( 198      ( 30 Jan 2025 17:16 )             35.3     01-30    152[H]  |  114[H]  |  76[HH]  ----------------------------<  142[H]  4.3   |  27  |  1.1    Ca    8.4      30 Jan 2025 17:16  Mg     2.5     01-30    TPro  4.8[L]  /  Alb  2.8[L]  /  TBili  0.6  /  DBili  x   /  AST  14  /  ALT  <5  /  AlkPhos  82  01-30      LIVER FUNCTIONS - ( 30 Jan 2025 17:16 )  Alb: 2.8 g/dL / Pro: 4.8 g/dL / ALK PHOS: 82 U/L / ALT: <5 U/L / AST: 14 U/L / GGT: x           Urinalysis Basic - ( 30 Jan 2025 17:16 )    Color: x / Appearance: x / SG: x / pH: x  Gluc: 142 mg/dL / Ketone: x  / Bili: x / Urobili: x   Blood: x / Protein: x / Nitrite: x   Leuk Esterase: x / RBC: x / WBC x   Sq Epi: x / Non Sq Epi: x / Bacteria: x        Urinalysis with Rflx Culture (collected 01-29-25 @ 16:48)    Culture - Blood (collected 01-29-25 @ 10:09)  Source: .Blood BLOOD  Gram Stain (01-30-25 @ 15:40):    Growth in aerobic bottle: Gram Positive Cocci in Clusters    Growth in anaerobic bottle: Gram Positive Cocci in Clusters  Preliminary Report (01-30-25 @ 15:41):    Growth in aerobic bottle: Gram Positive Cocci in Clusters    Growth in anaerobic bottle: Gram Positive Cocci in Clusters    Direct identification is available within approximately 3-5    hours either by Blood Panel Multiplexed PCR or Direct    MALDI-TOF. Details: [email or web address]  Organism: Blood Culture PCR (01-30-25 @ 14:14)  Organism: Blood Culture PCR (01-30-25 @ 14:14)      Method Type: PCR      -  Methicillin SENSITIVE Staphylococcus aureus (MSSA): Detec Any isolate of Staphylococcus aureus from a blood culture is NOT considered a contaminant.    Culture - Blood (collected 01-29-25 @ 10:09)  Source: .Blood BLOOD  Gram Stain (01-30-25 @ 12:23):    Growth in aerobic bottle: Gram Positive Cocci in Clusters  Preliminary Report (01-30-25 @ 12:23):    Growth in aerobic bottle: Gram Positive Cocci in Clusters        Blood Gas Venous - Lactate: 2.2 mmol/L (01-29-25 @ 10:40)      INFECTIOUS DISEASES TESTING  MRSA PCR Result.: Negative (01-30-25 @ 13:40)  Procalcitonin: 6.93 ng/mL (01-30-25 @ 07:26)  Legionella Antigen, Urine: Negative (01-29-25 @ 15:49)  Streptococcus pneumoniae Ag, Ur Result: Negative (01-29-25 @ 15:49)      INFLAMMATORY MARKERS      RADIOLOGY & ADDITIONAL TESTS:  I have personally reviewed the last Chest xray  CXR  Xray Chest 1 View AP/PA:   ACC: [id number] EXAM:  XR CHEST 1 VIEW   ORDERED BY: DREA JAFFE     PROCEDURE DATE:  01/31/2025          INTERPRETATION:  CLINICAL HISTORY: Dyspnea    COMPARISON: 1/30/2025.    TECHNIQUE: Frontal    FINDINGS:    Support devices: None.    Cardiac/mediastinum/hilum: Stable.    Lung parenchyma/Pleura: Bilateral opacities unchanged. No pleural   effusion or air leak    Skeleton/soft tissues: Stable.      IMPRESSION:    Bilateral opacities unchanged. No pleural effusion or air leak    --- End of Report ---            LOLI FALL MD; Attending Radiologist  This document has been electronically signed. Jan 31 2025 10:30AM (01-31-25 @ 08:34)      CT  CT Chest No Cont:   ACC: [id number] EXAM:  CT CHEST   ORDERED BY: RASHAUN SUH     PROCEDURE DATE:  01/29/2025          INTERPRETATION:  CLINICAL INFORMATION: Shortness of breath    COMPARISON: None.    CONTRAST/COMPLICATIONS:  IV Contrast: None  Oral Contrast: None      PROCEDURE:  CT of the Chest was performed.  Sagittal and coronal reformats were performed.    FINDINGS:    LUNGS AND LARGE AIRWAYS: Patent central airways. Multifocal consolidative   opacities seen in the lungs bilaterally, most significant areas of   involvement include the posterior right upper lobe right lower lobe and   left lower lobe.  PLEURA: No pleural effusion.  VESSELS: Aortic calcifications. Coronary artery calcifications.  HEART: Heart size is normal. No pericardial effusion.  MEDIASTINUM AND JOE: No lymphadenopathy.  CHEST WALL AND LOWER NECK: Within normal limits.  VISUALIZED UPPER ABDOMEN: Significant streak artifact from arm   positioning limits evaluation of the upper abdomen. Punctate left   nonobstructing renal calculus.  BONES: Degenerative changes.    IMPRESSION:  Multifocal bilateral consolidative opacities as above, suggestive of   multifocal pneumonia in the appropriate clinical context.        --- End of Report ---            FAROOQ SZYMANSKI MD; Attending Radiologist  This document has been electronically signed. Jan 29 2025  8:44PM (01-29-25 @ 18:09)      CARDIOLOGY TESTING  12 Lead ECG:   Ventricular Rate 91 BPM    Atrial Rate 91 BPM    P-R Interval 158 ms <TRUNCATED> (01-29-25 @ 10:39)       MEDICATIONS  aspirin enteric coated 81 Oral daily  cefepime   IVPB     cefepime   IVPB 1000 IV Intermittent every 12 hours  enoxaparin Injectable 40 SubCutaneous every 24 hours  oseltamivir 30 Oral two times a day  oseltamivir     rasagiline Tablet 1 Oral <User Schedule>      ANTIBIOTICS:  cefepime   IVPB      cefepime   IVPB 1000 milliGRAM(s) IV Intermittent every 12 hours  oseltamivir 30 milliGRAM(s) Oral two times a day  oseltamivir          ALLERGIES:  Allergy Status Unknown

## 2025-01-31 NOTE — CONSULT NOTE ADULT - ASSESSMENT
ASSESSMENT  69-year-old male past medical history of hypertension, Parkinson's, CAD presents for shortness of breath and cough of 1 day duration. History was obtained from patient's wife who noted that the patient has been having decreased po intake, activity, and unintentional weight loss for some time. Howver, yesterday he started having cough and shortness of breath. Found to have Flu, PNA and MSSA bacteremia     IMPRESSION  #Influenza +, MSSA bacteremia , suspect MSSA superimposed PNA    1/29 BCX MSSA 3/4 bottles    MRSA PCR Result.: Negative (01-30-25 @ 13:40)    Procalcitonin: 6.93 ng/mL (01-30-25 @ 07:26)    Legionella Antigen, Urine: Negative (01-29-25 @ 15:49)    Streptococcus pneumoniae Ag, Ur Result: Negative (01-29-25 @ 15:49)    CT Multifocal bilateral consolidative opacities as above, suggestive of   multifocal pneumonia in the appropriate clinical context.  #Lactic acidosis  #Hypernatremia   #Severe protein calorie malnutrition  BMI (kg/m2): 17  #PD  #Immunodeficiency secondary to Senescence which could results in poor clinical outcomes  #Abx allergy: Allergy Status Unknown    Creatinine: 1.1 (01-30-25 @ 17:16) crcl calculated 46     Height (cm): 175.3 (01-30-25 @ 13:00)  Weight (kg): 52.2 (01-29-25 @ 10:14)    RECOMMENDATIONS  - tamiflu 30mg BID x 5 days  - D/C Cefepime  - Cefazolin 2g q8h IV   - Repeat BM AM 2/1   - TTE    If any questions, please send a message or call on [employer] Teams  Please continue to update ID with any pertinent new laboratory, radiographic findings, or change in clinical status

## 2025-01-31 NOTE — CONSULT NOTE ADULT - NS ATTEND AMEND GEN_ALL_CORE FT
69 with parkinsonian's cad on dapt, admitted to flu pna, encephalopathy now NSICU called for trace R IVH    interval cth in 6 hours with cta h/n (if renal function can tolerate)  obtain coags and reverse coagulopathy  hold antithrombotics  administer DDAVP 0.3 mcg/kg for uremic thrombocytopathy  sbp goal 110-140  septic/metabolic w/u as source of encephalopathy  resume parkinsonian's medication (for risk of withdrawal- monitor for hyperpyrexia, confusion, increased extremity tone)     remainder of care per primary team

## 2025-02-01 LAB
-  CEFTAROLINE: SIGNIFICANT CHANGE UP
-  CLINDAMYCIN: SIGNIFICANT CHANGE UP
-  ERYTHROMYCIN: SIGNIFICANT CHANGE UP
-  GENTAMICIN: SIGNIFICANT CHANGE UP
-  OXACILLIN: SIGNIFICANT CHANGE UP
-  RIFAMPIN: SIGNIFICANT CHANGE UP
-  TETRACYCLINE: SIGNIFICANT CHANGE UP
-  TRIMETHOPRIM/SULFAMETHOXAZOLE: SIGNIFICANT CHANGE UP
-  VANCOMYCIN: SIGNIFICANT CHANGE UP
ALBUMIN SERPL ELPH-MCNC: 2.9 G/DL — LOW (ref 3.5–5.2)
ALP SERPL-CCNC: 94 U/L — SIGNIFICANT CHANGE UP (ref 30–115)
ALT FLD-CCNC: <5 U/L — SIGNIFICANT CHANGE UP (ref 0–41)
ANION GAP SERPL CALC-SCNC: 10 MMOL/L — SIGNIFICANT CHANGE UP (ref 7–14)
ANION GAP SERPL CALC-SCNC: 12 MMOL/L — SIGNIFICANT CHANGE UP (ref 7–14)
ANION GAP SERPL CALC-SCNC: 9 MMOL/L — SIGNIFICANT CHANGE UP (ref 7–14)
AST SERPL-CCNC: 18 U/L — SIGNIFICANT CHANGE UP (ref 0–41)
BASE EXCESS BLDA CALC-SCNC: 10.3 MMOL/L — HIGH (ref -2–3)
BASOPHILS # BLD AUTO: 0.01 K/UL — SIGNIFICANT CHANGE UP (ref 0–0.2)
BASOPHILS NFR BLD AUTO: 0.1 % — SIGNIFICANT CHANGE UP (ref 0–1)
BILIRUB SERPL-MCNC: 1.4 MG/DL — HIGH (ref 0.2–1.2)
BUN SERPL-MCNC: 53 MG/DL — HIGH (ref 10–20)
BUN SERPL-MCNC: 56 MG/DL — HIGH (ref 10–20)
BUN SERPL-MCNC: 57 MG/DL — HIGH (ref 10–20)
CALCIUM SERPL-MCNC: 8.2 MG/DL — LOW (ref 8.4–10.4)
CALCIUM SERPL-MCNC: 8.2 MG/DL — LOW (ref 8.4–10.5)
CALCIUM SERPL-MCNC: 8.4 MG/DL — SIGNIFICANT CHANGE UP (ref 8.4–10.5)
CHLORIDE SERPL-SCNC: 113 MMOL/L — HIGH (ref 98–110)
CO2 SERPL-SCNC: 30 MMOL/L — SIGNIFICANT CHANGE UP (ref 17–32)
CO2 SERPL-SCNC: 31 MMOL/L — SIGNIFICANT CHANGE UP (ref 17–32)
CO2 SERPL-SCNC: 31 MMOL/L — SIGNIFICANT CHANGE UP (ref 17–32)
CREAT SERPL-MCNC: 0.8 MG/DL — SIGNIFICANT CHANGE UP (ref 0.7–1.5)
CULTURE RESULTS: ABNORMAL
CULTURE RESULTS: ABNORMAL
EGFR: 95 ML/MIN/1.73M2 — SIGNIFICANT CHANGE UP
EOSINOPHIL # BLD AUTO: 0.01 K/UL — SIGNIFICANT CHANGE UP (ref 0–0.7)
EOSINOPHIL NFR BLD AUTO: 0.1 % — SIGNIFICANT CHANGE UP (ref 0–8)
GLUCOSE SERPL-MCNC: 119 MG/DL — HIGH (ref 70–99)
GLUCOSE SERPL-MCNC: 123 MG/DL — HIGH (ref 70–99)
GLUCOSE SERPL-MCNC: 138 MG/DL — HIGH (ref 70–99)
GRAM STN FLD: ABNORMAL
HCO3 BLDA-SCNC: 35 MMOL/L — HIGH (ref 21–28)
HCT VFR BLD CALC: 39.4 % — LOW (ref 42–52)
HGB BLD-MCNC: 12.4 G/DL — LOW (ref 14–18)
HOROWITZ INDEX BLDA+IHG-RTO: 100 — SIGNIFICANT CHANGE UP
IMM GRANULOCYTES NFR BLD AUTO: 0.6 % — HIGH (ref 0.1–0.3)
LYMPHOCYTES # BLD AUTO: 0.31 K/UL — LOW (ref 1.2–3.4)
LYMPHOCYTES # BLD AUTO: 2.7 % — LOW (ref 20.5–51.1)
MAGNESIUM SERPL-MCNC: 2.5 MG/DL — HIGH (ref 1.8–2.4)
MCHC RBC-ENTMCNC: 30.6 PG — SIGNIFICANT CHANGE UP (ref 27–31)
MCHC RBC-ENTMCNC: 31.5 G/DL — LOW (ref 32–37)
MCV RBC AUTO: 97.3 FL — HIGH (ref 80–94)
METHOD TYPE: SIGNIFICANT CHANGE UP
MONOCYTES # BLD AUTO: 0.52 K/UL — SIGNIFICANT CHANGE UP (ref 0.1–0.6)
MONOCYTES NFR BLD AUTO: 4.6 % — SIGNIFICANT CHANGE UP (ref 1.7–9.3)
NEUTROPHILS # BLD AUTO: 10.37 K/UL — HIGH (ref 1.4–6.5)
NEUTROPHILS NFR BLD AUTO: 91.9 % — HIGH (ref 42.2–75.2)
NRBC # BLD: 0 /100 WBCS — SIGNIFICANT CHANGE UP (ref 0–0)
NRBC BLD-RTO: 0 /100 WBCS — SIGNIFICANT CHANGE UP (ref 0–0)
ORGANISM # SPEC MICROSCOPIC CNT: ABNORMAL
ORGANISM # SPEC MICROSCOPIC CNT: ABNORMAL
ORGANISM # SPEC MICROSCOPIC CNT: SIGNIFICANT CHANGE UP
PCO2 BLDA: 46 MMHG — SIGNIFICANT CHANGE UP (ref 35–48)
PH BLDA: 7.49 — HIGH (ref 7.35–7.45)
PLATELET # BLD AUTO: 151 K/UL — SIGNIFICANT CHANGE UP (ref 130–400)
PMV BLD: 11.9 FL — HIGH (ref 7.4–10.4)
PO2 BLDA: 68 MMHG — LOW (ref 83–108)
POTASSIUM SERPL-MCNC: 3.7 MMOL/L — SIGNIFICANT CHANGE UP (ref 3.5–5)
POTASSIUM SERPL-MCNC: 3.9 MMOL/L — SIGNIFICANT CHANGE UP (ref 3.5–5)
POTASSIUM SERPL-MCNC: 4 MMOL/L — SIGNIFICANT CHANGE UP (ref 3.5–5)
POTASSIUM SERPL-SCNC: 3.7 MMOL/L — SIGNIFICANT CHANGE UP (ref 3.5–5)
POTASSIUM SERPL-SCNC: 3.9 MMOL/L — SIGNIFICANT CHANGE UP (ref 3.5–5)
POTASSIUM SERPL-SCNC: 4 MMOL/L — SIGNIFICANT CHANGE UP (ref 3.5–5)
PROT SERPL-MCNC: 5 G/DL — LOW (ref 6–8)
RBC # BLD: 4.05 M/UL — LOW (ref 4.7–6.1)
RBC # FLD: 13.2 % — SIGNIFICANT CHANGE UP (ref 11.5–14.5)
SAO2 % BLDA: 95.4 % — SIGNIFICANT CHANGE UP (ref 94–98)
SODIUM SERPL-SCNC: 153 MMOL/L — HIGH (ref 135–146)
SODIUM SERPL-SCNC: 154 MMOL/L — HIGH (ref 135–146)
SODIUM SERPL-SCNC: 155 MMOL/L — HIGH (ref 135–146)
SPECIMEN SOURCE: SIGNIFICANT CHANGE UP
SPECIMEN SOURCE: SIGNIFICANT CHANGE UP
WBC # BLD: 11.29 K/UL — HIGH (ref 4.8–10.8)
WBC # FLD AUTO: 11.29 K/UL — HIGH (ref 4.8–10.8)

## 2025-02-01 PROCEDURE — 99291 CRITICAL CARE FIRST HOUR: CPT

## 2025-02-01 PROCEDURE — 71045 X-RAY EXAM CHEST 1 VIEW: CPT | Mod: 26

## 2025-02-01 PROCEDURE — 99233 SBSQ HOSP IP/OBS HIGH 50: CPT

## 2025-02-01 RX ORDER — ACETAMINOPHEN 500 MG/5ML
650 LIQUID (ML) ORAL EVERY 6 HOURS
Refills: 0 | Status: DISCONTINUED | OUTPATIENT
Start: 2025-02-01 | End: 2025-03-05

## 2025-02-01 RX ORDER — LOSARTAN POTASSIUM 100 MG/1
25 TABLET, FILM COATED ORAL DAILY
Refills: 0 | Status: DISCONTINUED | OUTPATIENT
Start: 2025-02-01 | End: 2025-02-08

## 2025-02-01 RX ADMIN — SODIUM CHLORIDE 125 MILLILITER(S): 9 INJECTION, SOLUTION INTRAVENOUS at 05:25

## 2025-02-01 RX ADMIN — Medication 650 MILLIGRAM(S): at 17:40

## 2025-02-01 RX ADMIN — OSELTAMIVIR PHOSPHATE 30 MILLIGRAM(S): 75 CAPSULE ORAL at 05:22

## 2025-02-01 RX ADMIN — Medication 2 TABLET(S): at 13:36

## 2025-02-01 RX ADMIN — Medication 2 TABLET(S): at 05:22

## 2025-02-01 RX ADMIN — Medication 2 TABLET(S): at 22:42

## 2025-02-01 RX ADMIN — Medication 100 MILLIGRAM(S): at 13:37

## 2025-02-01 RX ADMIN — OSELTAMIVIR PHOSPHATE 30 MILLIGRAM(S): 75 CAPSULE ORAL at 17:06

## 2025-02-01 RX ADMIN — Medication 100 MILLIGRAM(S): at 05:25

## 2025-02-01 RX ADMIN — SODIUM CHLORIDE 125 MILLILITER(S): 9 INJECTION, SOLUTION INTRAVENOUS at 22:11

## 2025-02-01 RX ADMIN — Medication 650 MILLIGRAM(S): at 17:06

## 2025-02-01 RX ADMIN — RASAGILINE 1 MILLIGRAM(S): 0.5 TABLET ORAL at 05:25

## 2025-02-01 RX ADMIN — LOSARTAN POTASSIUM 25 MILLIGRAM(S): 100 TABLET, FILM COATED ORAL at 22:42

## 2025-02-01 RX ADMIN — Medication 100 MILLIGRAM(S): at 22:11

## 2025-02-01 NOTE — SWALLOW BEDSIDE ASSESSMENT ADULT - SLP PERTINENT HISTORY OF CURRENT PROBLEM
69-year-old male w/ PMHx hypertension, Parkinson's, basal cell carcinoma (BCC) excision, CAD presented for shortness of breath and cough of 1 day duration.  Wife noted decreased po intake, activity, and unintentional weight loss for some time.  InED pt was found to be hypoxic and in respiratory distress. He was placed on Facemask O2 then switched to AVAPS. Work up showed AHRF and sepsis 2/2 to mutilobar PNA. NSICU consulted for trace IVH  within the right occipital horn. Remains dysarthric and lethargic. NSICU reports improvement after starting Parkinsons meds. IVH stable on last CTH. 69-year-old male w/ PMHx hypertension, Parkinson's, basal cell carcinoma (BCC) excision, CAD presented for shortness of breath and cough of 1 day duration.  Wife noted decreased po intake, activity, and unintentional weight loss for some time.  In ED pt was found to be hypoxic and in respiratory distress. He was placed on Facemask O2 then switched to AVAPS. Work up showed AHRF and sepsis 2/2 to mutilobar PNA & +flu. NSICU consulted for trace IVH  within the right occipital horn. Remains dysarthric and lethargic. NSICU reports improvement after starting Parkinsons meds. IVH stable on last CTH.

## 2025-02-01 NOTE — PROGRESS NOTE ADULT - ASSESSMENT
IMPRESSION:    Acute Hypoxic Respiratory Failure   Toxic Metabolic Encephalopathy  infectious encephalopathy   CAP likely aspiration   Influenza A  MSSA bacteremia   Sepsis POA  HO Parkinson's Disease   HO CAD    PLAN:    CNS:  Avoid depressants.  CTH. noted with right periventricular bleed stable. Neurosurgery consult.  Desmo given.  Continue Anti parkinson's     HEENT: Oral care.  Speech and swallow when more awake.  NGT     PULMONARY:  HOB @ 45 degrees. on 3L   Aspiration precautions.  ABG noted.  Wean o2 as tolerated.  Might need intubation.  ETCO2 monitoring,     CARDIOVASCULAR:  Avoid overload.  GDFR.  ECHO noted. MARY ordered.      GI: GI prophylaxis.  Feeding per speech and swallow.  NG feeding for now.  Bowel regimen.  Free H2O via NGT    RENAL:  Follow up lytes. hypernatremia noted, free water and d5, BMP in PM  Correct as needed.  Monitor UO    INFECTIOUS DISEASE: Cultures noted.  Change ABX for Cefepime for 5 days adn switch to Ancef.    Nasal MRSA negative.  RVP noted.  Continue Tamiflu.       HEMATOLOGICAL:  DVT prophylaxis. Dimer noted.  LE duplex.  Monitor CBC     ENDOCRINE:  Follow up FS.  Insulin protocol if needed.    MUSCULOSKELETAL: Bed rest.  Off loading    SDU     Palliative care eval noted.      Prognosis overall remains poor     This is a transition of care note.    For any questions or clarifications during regular hours, please do not hesitate to call or text me.    For after hours and weekends, please call the MICU fellow at [phone].        IMPRESSION:    Acute Hypoxic Respiratory Failure   Toxic Metabolic Encephalopathy  infectious encephalopathy   CAP likely aspiration   Influenza A  MSSA bacteremia   Sepsis POA  HO Parkinson's Disease   HO CAD    PLAN:    CNS:  Avoid depressants.  CTH. noted with right periventricular bleed stable. Neurosurgery consult.  Desmo given.  Continue Anti parkinson's     HEENT: Oral care.  Speech and swallow when more awake.  NGT     PULMONARY:  HOB @ 45 degrees. on 3L   Aspiration precautions.  ABG noted.  Wean o2 as tolerated.  Might need intubation.  ETCO2 monitoring,     CARDIOVASCULAR:  Avoid overload.  GDFR.  ECHO noted. MARY ordered.      GI: GI prophylaxis.  Feeding per speech and swallow.  NG feeding for now.  Bowel regimen.  Free H2O via NGT    RENAL:  Follow up lytes. hypernatremia noted, free water and d5, BMP in PM  Correct as needed.  Monitor UO    INFECTIOUS DISEASE: Cultures noted.  Change ABX for Cefepime for 5 days adn switch to Ancef.    Nasal MRSA negative.  RVP noted.  Continue Tamiflu.       HEMATOLOGICAL:  DVT prophylaxis. Dimer noted.  LE duplex.  Monitor CBC     ENDOCRINE:  Follow up FS.  Insulin protocol if needed.    MUSCULOSKELETAL: Bed rest.  Off loading    SDU DGTF    Palliative care eval noted.      Prognosis overall remains poor     This is a transition of care note.    For any questions or clarifications during regular hours, please do not hesitate to call or text me.    For after hours and weekends, please call the MICU fellow at [phone].        IMPRESSION:    Acute Hypoxic Respiratory Failure   Toxic Metabolic Encephalopathy  infectious encephalopathy   CAP likely aspiration   Influenza A  MSSA bacteremia   Sepsis POA  HO Parkinson's Disease   HO CAD    PLAN:    CNS:  Avoid depressants.  CTH. noted with right periventricular bleed stable. Neuro on board. s/p Desmopressin. Continue Anti parkinson's     HEENT: Oral care.  Speech and swallow when more awake.  NGT     PULMONARY:  HOB @ 45 degrees. on 3L   Aspiration precautions.  ABG noted.  Wean o2 as tolerated.      CARDIOVASCULAR:  Avoid overload.  GDFR.  ECHO noted. MARY ordered.      GI: GI prophylaxis.  Feeding per speech and swallow.  NG feeding for now.  Bowel regimen.  Free H2O via NGT    RENAL:  Follow up lytes. hypernatremia noted, free water and d5, BMP in PM   Monitor UO    INFECTIOUS DISEASE: Cultures noted.  Changed ABX from Cefepime for 5 days now switch to Ancef.    Nasal MRSA negative.  RVP noted.  Continue Tamiflu.       HEMATOLOGICAL:  DVT prophylaxis. Dimer noted.  LE duplex.  Monitor CBC     ENDOCRINE:  Follow up FS.  Insulin protocol if needed.    MUSCULOSKELETAL: Bed rest.  Off loading    SDU DGT to tele    Palliative care eval noted.      Prognosis overall remains poor however he does not have ICU needs at this time     This is a transition of care note.    For any questions or clarifications during regular hours, please do not hesitate to call or text me.    For after hours and weekends, please call the MICU fellow at [phone].        IMPRESSION:    Acute Hypoxic Respiratory Failure   Toxic Metabolic Encephalopathy  infectious encephalopathy   CAP likely aspiration   Influenza A  MSSA bacteremia   Sepsis POA  HO Parkinson's Disease   HO CAD    PLAN:    CNS:  Avoid depressants.  CTH. noted with right periventricular bleed stable. Neuro on board. s/p Desmopressin. Continue Anti parkinson's     HEENT: Oral care.  Speech and swallow when more awake.  NGT     PULMONARY:  HOB @ 45 degrees. on 3L   Aspiration precautions.  ABG noted.  Wean o2 as tolerated.      CARDIOVASCULAR:  Avoid overload.  GDFR.  ECHO noted. MAYR ordered.      GI: GI prophylaxis.  Feeding per speech and swallow.  NG feeding for now.  Bowel regimen.  Free H2O via NGT    RENAL:  Follow up lytes. hypernatremia noted, free water and d5, BMP in PM   Monitor UO    INFECTIOUS DISEASE: Cultures noted.  Changed ABX from Cefepime for 5 days now switch to Ancef.    Nasal MRSA negative.  RVP noted.  Continue Tamiflu.       HEMATOLOGICAL:  DVT prophylaxis. Dimer noted.  LE duplex.  Monitor CBC     ENDOCRINE:  Follow up FS.  Insulin protocol if needed.    MUSCULOSKELETAL: Bed rest.  Off loading    SDU DGT to tele    Palliative care eval noted.      Prognosis overall remains poor however he does not have ICU needs at this time     This is a transition of care note.    For any questions or clarifications during regular hours, please do not hesitate to call or text me.    For after hours and weekends, please call the MICU fellow at [phone].

## 2025-02-01 NOTE — CONSULT NOTE ADULT - ASSESSMENT
69-year-old male past medical history of hypertension, Parkinson's, CAD presents for shortness of breath and cough of 1 day duration. History was obtained from patient's wife who noted that the patient has been having decreased po intake, activity, and unintentional weight loss for some time. Cardiology consulted for evaluation for MARY in the setting of Staph Aureus bacteremia.    IMPRESSION  #MSSA bacteremia possible IE  #reported hx of CAD  #HTN  #Parkinson's disease     PLAN  - Due to the patients mental status and swallowing dysfunction he is not a candidate for MARY at this time  - Recommend continuing IE treatment for the full 6 weeks   - Please see attending attestation below    **Incomplete Note** 69-year-old male past medical history of hypertension, Parkinson's, CAD presents for shortness of breath and cough of 1 day duration. History was obtained from patient's wife who noted that the patient has been having decreased po intake, activity, and unintentional weight loss for some time. Cardiology consulted for evaluation for MARY in the setting of Staph Aureus bacteremia.    IMPRESSION  #MSSA bacteremia possible IE  #reported hx of CAD  #HTN  #Parkinson's disease   #IVH    PLAN  - Due to the patients mental status and swallowing dysfunction he is not a candidate for MARY at this time  - Recommend continuing IE treatment for the full 6 weeks   - Please see attending attestation below    **Incomplete Note** 69-year-old male past medical history of hypertension, Parkinson's, CAD presents for shortness of breath and cough of 1 day duration. History was obtained from patient's wife who noted that the patient has been having decreased po intake, activity, and unintentional weight loss for some time. Cardiology consulted for evaluation for MARY in the setting of Staph Aureus bacteremia.    IMPRESSION  #MSSA bacteremia possible IE  #reported hx of CAD  #HTN  #Parkinson's disease   #IVH    PLAN  - Due to the patients mental status and swallowing dysfunction he is not a candidate for MARY at this time  - Recommend continuing IE treatment for the full duration of IE treatment  - Please see attending attestation below

## 2025-02-01 NOTE — CONSULT NOTE ADULT - SUBJECTIVE AND OBJECTIVE BOX
Neurology Consult    Patient is a 69y old  Male who presents with a chief complaint of penumonia (01 Feb 2025 13:36)      HPI:  69-year-old male past medical history of hypertension, Parkinson's, CAD presents for shortness of breath and cough of 1 day duration. History was obtained from patient's wife who noted that the patient has been having decreased po intake, activity, and unintentional weight loss for some time. Howver, yesterday he started having cough and shortness of breath.   Denies any sick contacts or choking on food.    InED pt was found to be hypoxic and in respiratory distress. He was placed on FAcemask O2 then switched to AVAPS.  He is somnolent/lethargic and unable to follow commad. opens eyes slightly when on physical stimuli .    Work up showed AHRF and sepsis 2/2 to mutilobar PNA. (29 Jan 2025 15:13)      PAST MEDICAL & SURGICAL HISTORY:  Parkinson disease      CAD (coronary artery disease)      History of basal cell carcinoma (BCC) excision          FAMILY HISTORY:      Social History: (-) x 3    Allergies    Allergy Status Unknown    Intolerances        MEDICATIONS  (STANDING):  carbidopa/levodopa  25/250 2 Tablet(s) Oral three times a day  ceFAZolin   IVPB      ceFAZolin   IVPB 2000 milliGRAM(s) IV Intermittent every 8 hours  dextrose 5%. 1000 milliLiter(s) (125 mL/Hr) IV Continuous <Continuous>  oseltamivir 30 milliGRAM(s) Oral two times a day  oseltamivir      rasagiline Tablet 1 milliGRAM(s) Oral <User Schedule>    MEDICATIONS  (PRN):  acetaminophen     Tablet .. 650 milliGRAM(s) Oral every 6 hours PRN Temp greater or equal to 38C (100.4F), Mild Pain (1 - 3)  losartan 25 milliGRAM(s) Oral daily PRN >140 SBP    Vital Signs Last 24 Hrs  T(C): 37.5 (01 Feb 2025 04:00), Max: 37.8 (31 Jan 2025 20:33)  T(F): 99.5 (01 Feb 2025 04:00), Max: 100.1 (31 Jan 2025 20:33)  HR: 83 (01 Feb 2025 11:00) (74 - 97)  BP: 135/73 (01 Feb 2025 11:00) (135/70 - 167/89)  BP(mean): 98 (01 Feb 2025 11:00) (95 - 121)  RR: 44 (01 Feb 2025 10:00) (16 - 44)  SpO2: 97% (01 Feb 2025 11:00) (93% - 98%)    Parameters below as of 31 Jan 2025 20:33  Patient On (Oxygen Delivery Method): nasal cannula  O2 Flow (L/min): 2      Examination:   [id number]  Patient lethargic  Arouses to verbal stimuli  Minimal verbal output but severely dysarthric  Closes eyes to commands in [place]  Patient with minimal increased tone of bilateral UE  Following simple commands such as close eyes in [place]  Moves Bilateral UE laterally but not against gravity  Reacts to noxious stimuli to bilateral LE with minimal movement  No facial asymmetry, reacts to visual threat throughout.     NIHSS 18, ICH score 2    Labs:   CBC Full  -  ( 01 Feb 2025 04:37 )  WBC Count : 11.29 K/uL  RBC Count : 4.05 M/uL  Hemoglobin : 12.4 g/dL  Hematocrit : 39.4 %  Platelet Count - Automated : 151 K/uL  Mean Cell Volume : 97.3 fL  Mean Cell Hemoglobin : 30.6 pg  Mean Cell Hemoglobin Concentration : 31.5 g/dL  Auto Neutrophil # : 10.37 K/uL  Auto Lymphocyte # : 0.31 K/uL  Auto Monocyte # : 0.52 K/uL  Auto Eosinophil # : 0.01 K/uL  Auto Basophil # : 0.01 K/uL  Auto Neutrophil % : 91.9 %  Auto Lymphocyte % : 2.7 %  Auto Monocyte % : 4.6 %  Auto Eosinophil % : 0.1 %  Auto Basophil % : 0.1 %    02-01    155[H]  |  113[H]  |  56[H]  ----------------------------<  123[H]  4.0   |  30  |  0.8    Ca    8.4      01 Feb 2025 04:37  Mg     2.5     02-01    TPro  5.0[L]  /  Alb  2.9[L]  /  TBili  1.4[H]  /  DBili  x   /  AST  18  /  ALT  <5  /  AlkPhos  94  02-01    LIVER FUNCTIONS - ( 01 Feb 2025 04:37 )  Alb: 2.9 g/dL / Pro: 5.0 g/dL / ALK PHOS: 94 U/L / ALT: <5 U/L / AST: 18 U/L / GGT: x           PT/INR - ( 31 Jan 2025 22:34 )   PT: 23.20 sec;   INR: 1.94 ratio         PTT - ( 31 Jan 2025 22:34 )  PTT:30.5 sec  Urinalysis Basic - ( 01 Feb 2025 04:37 )    Color: x / Appearance: x / SG: x / pH: x  Gluc: 123 mg/dL / Ketone: x  / Bili: x / Urobili: x   Blood: x / Protein: x / Nitrite: x   Leuk Esterase: x / RBC: x / WBC x   Sq Epi: x / Non Sq Epi: x / Bacteria: x          Neuroimaging:  NCHCT: CT Head No Cont:   ACC: [id number] EXAM:  CT BRAIN   ORDERED BY: RASHAUN GARCIA     PROCEDURE DATE:  01/31/2025          INTERPRETATION:  CLINICAL INDICATION: Follow-up, intraventricular   hemorrhage.    TECHNIQUE: CT of the head was performed without the administration of   intravenous contrast.    COMPARISON: CT head from earlier that day, 11:40 AM.    FINDINGS:    Trace intraventricular hemorrhage layering within the right occipital   horn, stable in appearance (series 2, image 15). No new hemorrhage, mass   effect, or midline shift.    Unchanged prominence of the sulci, sylvian fissures, and ventricles   consistent with age-related cortical volume loss.    Chronic microvascular ischemic changes again noted.    Mucosal thickening in the ethmoid air cells. Interval placement of a   nasogastric tube.      IMPRESSION:    Unchanged trace intraventricular hemorrhage within the right occipital   horn.    < from: CT Angio Neck w/ IV Cont (01.31.25 @ 18:43) >  IMPRESSION:    CTA HEAD:  Moderate stenosis of the right P2 segment of the PCA.    Intracranial vessels are otherwise patent and unremarkable.    CTA NECK:  No evidence of carotid or vertebral artery stenosis.    Incidentally noted likely patent ductus arteriosus with stenosis versus   mural thrombus at the ostium of the left main pulmonary artery.      < end of copied text >            JESI CASTILLO MD; Resident Radiologist  This document has been electronically signed.  CLAIR MALIK MD; Attending Radiologist  This document has been electronically signed. Jan 31 2025  7:45PM (01-31-25 @ 18:33)      02-01-25 @ 17:28

## 2025-02-01 NOTE — CONSULT NOTE ADULT - SUBJECTIVE AND OBJECTIVE BOX
Outpt cardiologist:    HPI:  69-year-old male past medical history of hypertension, Parkinson's, CAD presents for shortness of breath and cough of 1 day duration. History was obtained from patient's wife who noted that the patient has been having decreased po intake, activity, and unintentional weight loss for some time. Howver, yesterday he started having cough and shortness of breath.   Denies any sick contacts or choking on food.    InED pt was found to be hypoxic and in respiratory distress. He was placed on FAcemask O2 then switched to AVAPS.  He is somnolent/lethargic and unable to follow commad. opens eyes slightly when on physical stimuli .    Work up showed AHRF and sepsis 2/2 to mutilobar PNA. (29 Jan 2025 15:13)      ---  cardio fellow additional notes:    Cardiology consulted for evaluation for MARY in the setting of Staph Aureus bacteremia. He is altered and not following commands with B/L mitten restraints on and he has not been cleared for a PO diet and has an NG tube in for nutrition. He also examines to be cachectic.     PAST MEDICAL & SURGICAL HISTORY  Parkinson disease    CAD (coronary artery disease)    History of basal cell carcinoma (BCC) excision        FAMILY HISTORY:  FAMILY HISTORY:      SOCIAL HISTORY:  Social History:      ALLERGIES:  Allergy Status Unknown      MEDICATIONS:  carbidopa/levodopa  25/250 2 Tablet(s) Oral three times a day  ceFAZolin   IVPB      ceFAZolin   IVPB 2000 milliGRAM(s) IV Intermittent every 8 hours  dextrose 5%. 1000 milliLiter(s) (125 mL/Hr) IV Continuous <Continuous>  oseltamivir 30 milliGRAM(s) Oral two times a day  oseltamivir      rasagiline Tablet 1 milliGRAM(s) Oral <User Schedule>    PRN:  losartan 25 milliGRAM(s) Oral daily PRN      HOME MEDICATIONS:  Home Medications:  aspirin 81 mg oral tablet: 1 tab(s) orally once a day (29 Jan 2025 15:28)  Crexont 87.5 mg-350 mg oral capsule, extended release: 2 cap(s) orally 3 times a day (29 Jan 2025 15:28)  rasagiline 1 mg oral tablet: 1 tab(s) orally once a day (29 Jan 2025 15:28)      VITALS:   T(F): 99.5 (02-01 @ 04:00), Max: 100.1 (01-31 @ 05:11)  HR: 83 (02-01 @ 11:00) (70 - 107)  BP: 135/73 (02-01 @ 11:00) (107/64 - 186/91)  BP(mean): 98 (02-01 @ 11:00) (95 - 130)  RR: 44 (02-01 @ 10:00) (16 - 44)  SpO2: 97% (02-01 @ 11:00) (90% - 100%)    I&O's Summary    31 Jan 2025 07:01  -  01 Feb 2025 07:00  --------------------------------------------------------  IN: 1475 mL / OUT: 50 mL / NET: 1425 mL    01 Feb 2025 07:01  -  01 Feb 2025 13:38  --------------------------------------------------------  IN: 625 mL / OUT: 400 mL / NET: 225 mL            PHYSICAL EXAM:  General: Not in distress.   Cardio: regular, S1, S2, no murmur  Pulm: B/L BS crackles  Abdomen: Soft, non-tender, non-distended.  Extremities: No edema b/l le  Neuro: A&O x0 not following commands    LABS:                        12.4   11.29 )-----------( 151      ( 01 Feb 2025 04:37 )             39.4     02-01    155[H]  |  113[H]  |  56[H]  ----------------------------<  123[H]  4.0   |  30  |  0.8    Ca    8.4      01 Feb 2025 04:37  Mg     2.5     02-01    TPro  5.0[L]  /  Alb  2.9[L]  /  TBili  1.4[H]  /  DBili  x   /  AST  18  /  ALT  <5  /  AlkPhos  94  02-01    PT/INR - ( 31 Jan 2025 22:34 )   PT: 23.20 sec;   INR: 1.94 ratio         PTT - ( 31 Jan 2025 22:34 )  PTT:30.5 sec          Troponin trend:        SARS-CoV-2: NotDetec (29 Jan 2025 10:30)      RADIOLOGY:  -CXR:  IMPRESSION:    Bilateral opacities unchanged. No pleural effusion or air leak    --- End of Report ---            LOLI FALL MD; Attending Radiologist  This document has been electronically signed. Jan 31 2025 10:30AM    -TTE:  Summary:   1. Normal global left ventricular systolic function.   2. LV Ejection Fraction by Aguilar's Method with a biplane EF of 56 %.   3. Mildly increased LV wall thickness.   4. Spectral Doppler shows impaired relaxation pattern of left   ventricular myocardial filling (Grade I diastolic dysfunction).   5. Normal left atrial size.   6. Normal right atrial size.   7. Mild to moderate mitral valve regurgitation.   8. Moderate tricuspid regurgitation.   9. Mild pulmonic valve regurgitation.  10. Estimated pulmonary artery systolic pressure is 41.4 mmHg assuming a   right atrial pressure of 3 mmHg, which is consistent with mild pulmonary   hypertension.    -CCTA:  -STRESS TEST:  -CATHETERIZATION:  -OTHER:  ECG:    Ventricular Rate 91 BPM    Atrial Rate 91 BPM    P-R Interval 158 ms    QRS Duration 74 ms    Q-T Interval 366 ms    QTC Calculation(Bazett) 450 ms    P Axis 77 degrees    R Axis 64 degrees    T Axis 68 degrees    Diagnosis Line Normal sinus rhythm  Biatrial enlargement  Abnormal ECG    Confirmed by LILLIE LUND, JOSE A ([id number]) on 1/29/2025 12:29:13 PM    TELEMETRY EVENTS:   Outpt cardiologist:    HPI:  69-year-old male past medical history of hypertension, Parkinson's, CAD presents for shortness of breath and cough of 1 day duration. History was obtained from patient's wife who noted that the patient has been having decreased po intake, activity, and unintentional weight loss for some time. Howver, yesterday he started having cough and shortness of breath.   Denies any sick contacts or choking on food.    InED pt was found to be hypoxic and in respiratory distress. He was placed on FAcemask O2 then switched to AVAPS.  He is somnolent/lethargic and unable to follow commad. opens eyes slightly when on physical stimuli .    Work up showed AHRF and sepsis 2/2 to mutilobar PNA. (29 Jan 2025 15:13)      ---  cardio fellow additional notes:    Cardiology consulted for evaluation for MARY in the setting of Staph Aureus bacteremia. He is altered and not following commands with B/L mitten restraints on and he has not been cleared for a PO diet and has an NG tube in for nutrition. He also examines to be cachectic. Also found to have intraventricular hemorrhage.     PAST MEDICAL & SURGICAL HISTORY  Parkinson disease    CAD (coronary artery disease)    History of basal cell carcinoma (BCC) excision        FAMILY HISTORY:  FAMILY HISTORY:      SOCIAL HISTORY:  Social History:      ALLERGIES:  Allergy Status Unknown      MEDICATIONS:  carbidopa/levodopa  25/250 2 Tablet(s) Oral three times a day  ceFAZolin   IVPB      ceFAZolin   IVPB 2000 milliGRAM(s) IV Intermittent every 8 hours  dextrose 5%. 1000 milliLiter(s) (125 mL/Hr) IV Continuous <Continuous>  oseltamivir 30 milliGRAM(s) Oral two times a day  oseltamivir      rasagiline Tablet 1 milliGRAM(s) Oral <User Schedule>    PRN:  losartan 25 milliGRAM(s) Oral daily PRN      HOME MEDICATIONS:  Home Medications:  aspirin 81 mg oral tablet: 1 tab(s) orally once a day (29 Jan 2025 15:28)  Crexont 87.5 mg-350 mg oral capsule, extended release: 2 cap(s) orally 3 times a day (29 Jan 2025 15:28)  rasagiline 1 mg oral tablet: 1 tab(s) orally once a day (29 Jan 2025 15:28)      VITALS:   T(F): 99.5 (02-01 @ 04:00), Max: 100.1 (01-31 @ 05:11)  HR: 83 (02-01 @ 11:00) (70 - 107)  BP: 135/73 (02-01 @ 11:00) (107/64 - 186/91)  BP(mean): 98 (02-01 @ 11:00) (95 - 130)  RR: 44 (02-01 @ 10:00) (16 - 44)  SpO2: 97% (02-01 @ 11:00) (90% - 100%)    I&O's Summary    31 Jan 2025 07:01  -  01 Feb 2025 07:00  --------------------------------------------------------  IN: 1475 mL / OUT: 50 mL / NET: 1425 mL    01 Feb 2025 07:01  -  01 Feb 2025 13:38  --------------------------------------------------------  IN: 625 mL / OUT: 400 mL / NET: 225 mL            PHYSICAL EXAM:  General: Not in distress.   Cardio: regular, S1, S2, no murmur  Pulm: B/L BS crackles  Abdomen: Soft, non-tender, non-distended.  Extremities: No edema b/l le  Neuro: A&O x0 not following commands    LABS:                        12.4   11.29 )-----------( 151      ( 01 Feb 2025 04:37 )             39.4     02-01    155[H]  |  113[H]  |  56[H]  ----------------------------<  123[H]  4.0   |  30  |  0.8    Ca    8.4      01 Feb 2025 04:37  Mg     2.5     02-01    TPro  5.0[L]  /  Alb  2.9[L]  /  TBili  1.4[H]  /  DBili  x   /  AST  18  /  ALT  <5  /  AlkPhos  94  02-01    PT/INR - ( 31 Jan 2025 22:34 )   PT: 23.20 sec;   INR: 1.94 ratio         PTT - ( 31 Jan 2025 22:34 )  PTT:30.5 sec          Troponin trend:        SARS-CoV-2: NotDetec (29 Jan 2025 10:30)      RADIOLOGY:  -CXR:  IMPRESSION:    Bilateral opacities unchanged. No pleural effusion or air leak    --- End of Report ---            LOLI FALL MD; Attending Radiologist  This document has been electronically signed. Jan 31 2025 10:30AM    -TTE:  Summary:   1. Normal global left ventricular systolic function.   2. LV Ejection Fraction by Aguilar's Method with a biplane EF of 56 %.   3. Mildly increased LV wall thickness.   4. Spectral Doppler shows impaired relaxation pattern of left   ventricular myocardial filling (Grade I diastolic dysfunction).   5. Normal left atrial size.   6. Normal right atrial size.   7. Mild to moderate mitral valve regurgitation.   8. Moderate tricuspid regurgitation.   9. Mild pulmonic valve regurgitation.  10. Estimated pulmonary artery systolic pressure is 41.4 mmHg assuming a   right atrial pressure of 3 mmHg, which is consistent with mild pulmonary   hypertension.    -CCTA:  -STRESS TEST:  -CATHETERIZATION:  -OTHER:  ECG:    Ventricular Rate 91 BPM    Atrial Rate 91 BPM    P-R Interval 158 ms    QRS Duration 74 ms    Q-T Interval 366 ms    QTC Calculation(Bazett) 450 ms    P Axis 77 degrees    R Axis 64 degrees    T Axis 68 degrees    Diagnosis Line Normal sinus rhythm  Biatrial enlargement  Abnormal ECG    Confirmed by LILLIE LUND, JOSE A ([id number]) on 1/29/2025 12:29:13 PM    TELEMETRY EVENTS:

## 2025-02-01 NOTE — CONSULT NOTE ADULT - ASSESSMENT
Impression:  69-year-old male past medical history of hypertension, Parkinson's, CAD presents for shortness of breath and cough of 1 day duration, NSICU consulted for trace IVH  within the right occipital horn. Remains dysarthric and lethargic. NSICU reports improvement after starting parkinsons meds. IVH stable on last CTH.        Suggestion:  - Trend coags and reverse coagulopathy  - vEEG  - Continue to hold lovenox/ASA   -  - 140       Impression:  69-year-old male past medical history of hypertension, Parkinson's, CAD presents for shortness of breath and cough of 1 day duration, NSICU consulted for trace IVH  within the right occipital horn. Remains dysarthric and lethargic. NSICU reports improvement after starting parkinsons meds. IVH stable on last CTH.        Suggestion:  - Trend coags and reverse coagulopathy  - Continue to hold lovenox/ASA   -  - 140       Impression:  69-year-old male past medical history of hypertension, Parkinson's, CAD presents for shortness of breath and cough of 1 day duration, NSICU consulted for trace IVH  within the right occipital horn. Remains dysarthric and lethargic. NSICU reports improvement after starting parkinsons meds. IVH stable on last CTH. Suspect IVH 2/2 coagulopathy.        Suggestion:  - Trend coags and reverse coagulopathy  - Continue to hold lovenox/ASA   - c/w home PD medications  -  - 140

## 2025-02-01 NOTE — CHART NOTE - NSCHARTNOTEFT_GEN_A_CORE
Patient had an episode of likely aspiration   Desatted to 70s while on NC, aggressive suctioning w/ nasotracheal and orotracheal suctioning, placed on NRB  SPO2 increased to 93-94%  ABG obtained which showed low pO2, otherwise unremarkable  CXR w/ multifocal pna, unchanged from prior on wet read  Attempted to call spouse Nichole, no answer Patient had an episode of likely aspiration   Desatted to 70s while on NC, aggressive suctioning w/ nasotracheal and orotracheal suctioning, placed on NRB  SPO2 increased to 93-94%  ABG obtained which showed low pO2, otherwise unremarkable  CXR w/ multifocal pna, unchanged from prior on wet read  Attempted to call spouse Nichole, no answer    Also Na 153, has not improved  On D5 @ 125 cc/hr  Increase free water flushes to 400 cc q6H  F/u BMP in AM

## 2025-02-01 NOTE — CONSULT NOTE ADULT - ATTENDING COMMENTS
Cardiology consulted for MARY in the s/o MSSA bacteremia. Patient is here with failure to thrive, NGT in place, and Na 155. He has a grim prognosis, and he is not a surgical candidate if endocarditis were found. TTE with no echocardiographic evidence of infective endocarditis. Patient is a poor candidate for MARY given his dysphagia, his metabolic disarray, his poor prognosis, and the limited benefit it would provide. Please treat with antibiotics for the full course required for endocarditis.     Cardiology consult team to sign off. Cardiology consulted for MARY in the s/o MSSA bacteremia. Patient is here with failure to thrive, NGT in place, and Na 155. He has a grim prognosis, and he is not a surgical candidate if endocarditis were found. TTE with no echocardiographic evidence of infective endocarditis. Patient is a poor candidate for MARY given his dysphagia, his metabolic disarray, his poor prognosis, and the limited benefit it would provide. Please treat with antibiotics for the full course required for endocarditis. Wife and son are at bedside and were made aware.     Cardiology consult team to sign off. Cardiology consulted for MARY in the s/o MSSA bacteremia. Patient is here with failure to thrive, NGT in place, and Na 155. He has a grim prognosis, and he is not a surgical candidate if endocarditis were found. TTE with no echocardiographic evidence of infective endocarditis. Patient is a poor candidate for MARY given his dysphagia, his metabolic disarray, his poor prognosis, and the limited benefit it would provide. Please treat with antibiotics for the full course required for endocarditis and treat his hypernatremia. Wife and son are at bedside and were made aware.     Cardiology consult team to sign off.

## 2025-02-01 NOTE — CONSULT NOTE ADULT - CRITICAL CARE SERVICES PROVIDED
Patient is critically ill, requiring critical care services.
Patient is critically ill, requiring critical care services.
BMI: BMI (kg/m2): 20.9 (07-15-23 @ 09:31)  HbA1c: A1C with Estimated Average Glucose Result: 5.0 % (07-12-23 @ 08:30)    Glucose:   BP: 107/69 (07-21-23 @ 08:33) (107/69 - 123/75)  Lipid Panel: Date/Time: 07-12-23 @ 08:30  Cholesterol, Serum: 150  Direct LDL: --  HDL Cholesterol, Serum: 86  Total Cholesterol/HDL Ration Measurement: --  Triglycerides, Serum: 36

## 2025-02-01 NOTE — PROGRESS NOTE ADULT - SUBJECTIVE AND OBJECTIVE BOX
Patient is a 69y old  Male who presents with a chief complaint of weakness and hypersomnolent found with pneumonia  and flu (31 Jan 2025 13:07)      Over Night Events:  Patient seen and examined.   no events overnight   CT brain mild interventricular hemorrhage  desmopressin        ROS:  See HPI    PHYSICAL EXAM    ICU Vital Signs Last 24 Hrs  T(C): 37.5 (01 Feb 2025 04:00), Max: 37.8 (31 Jan 2025 20:33)  T(F): 99.5 (01 Feb 2025 04:00), Max: 100.1 (31 Jan 2025 20:33)  HR: 94 (01 Feb 2025 09:00) (74 - 97)  BP: 135/70 (01 Feb 2025 09:00) (135/70 - 167/89)  BP(mean): 95 (01 Feb 2025 09:00) (95 - 121)  ABP: --  ABP(mean): --  RR: 16 (01 Feb 2025 06:00) (16 - 18)  SpO2: 96% (01 Feb 2025 09:00) (93% - 99%)    O2 Parameters below as of 31 Jan 2025 20:33  Patient On (Oxygen Delivery Method): nasal cannula  O2 Flow (L/min): 2          General: No acute distress awake but confused   HEENT:        MMM no new lesions    NGT     Lymph Nodes: NO cervical LN   Lungs: Bilateral BS rhonchi  Cardiovascular: Regular S1, s2 heard   Abdomen: Soft, Positive BS  Extremities: No clubbing   Skin: warm   Neurological: confused      I&O's Detail    31 Jan 2025 07:01  -  01 Feb 2025 07:00  --------------------------------------------------------  IN:    dextrose 5%: 1375 mL    IV PiggyBack: 100 mL  Total IN: 1475 mL    OUT:    Voided (mL): 50 mL  Total OUT: 50 mL    Total NET: 1425 mL          LABS:                          12.4   11.29 )-----------( 151      ( 01 Feb 2025 04:37 )             39.4         01 Feb 2025 04:37    155    |  113    |  56     ----------------------------<  123    4.0     |  30     |  0.8      Ca    8.4        01 Feb 2025 04:37  Mg     2.5       01 Feb 2025 04:37    TPro  5.0    /  Alb  2.9    /  TBili  1.4    /  DBili  x      /  AST  18     /  ALT  <5     /  AlkPhos  94     01 Feb 2025 04:37  Amylase x     lipase x                                                 PT/INR - ( 31 Jan 2025 22:34 )   PT: 23.20 sec;   INR: 1.94 ratio         PTT - ( 31 Jan 2025 22:34 )  PTT:30.5 sec                                       Urinalysis Basic - ( 01 Feb 2025 04:37 )    Color: x / Appearance: x / SG: x / pH: x  Gluc: 123 mg/dL / Ketone: x  / Bili: x / Urobili: x   Blood: x / Protein: x / Nitrite: x   Leuk Esterase: x / RBC: x / WBC x   Sq Epi: x / Non Sq Epi: x / Bacteria: x                                                                Urinalysis with Rflx Culture (collected 29 Jan 2025 16:48)    Culture - Blood (collected 29 Jan 2025 10:09)  Source: .Blood BLOOD  Gram Stain (30 Jan 2025 15:40):    Growth in aerobic bottle: Gram Positive Cocci in Clusters    Growth in anaerobic bottle: Gram Positive Cocci in Clusters  Final Report (01 Feb 2025 07:51):    Growth in aerobic and anaerobic bottles: Staphylococcus aureus    Direct identification is available within approximately 3-5    hours either by Blood Panel Multiplexed PCR or Direct    MALDI-TOF. Details: [email or web address]  Organism: Blood Culture PCR  Staphylococcus aureus (01 Feb 2025 07:51)  Organism: Staphylococcus aureus (01 Feb 2025 07:51)  Organism: Blood Culture PCR (01 Feb 2025 07:51)    Culture - Blood (collected 29 Jan 2025 10:09)  Source: .Blood BLOOD  Gram Stain (01 Feb 2025 00:35):    Growth in aerobic bottle: Gram Positive Cocci in Clusters    Growth in anaerobic bottle: Gram Positive Cocci in Clusters  Preliminary Report (01 Feb 2025 00:36):    Growth in aerobic bottle: Staphylococcus aureus    Growth in anaerobic bottle: Gram Positive Cocci in Clusters    See previous culture [id number]                                                                                       ABG - ( 30 Jan 2025 14:11 )  pH, Arterial: 7.48  pH, Blood: x     /  pCO2: 41    /  pO2: 79    / HCO3: 30    / Base Excess: 6.4   /  SaO2: 97.4                MEDICATIONS  (STANDING):  carbidopa/levodopa  25/250 2 Tablet(s) Oral three times a day  ceFAZolin   IVPB      ceFAZolin   IVPB 2000 milliGRAM(s) IV Intermittent every 8 hours  dextrose 5%. 1000 milliLiter(s) (125 mL/Hr) IV Continuous <Continuous>  oseltamivir 30 milliGRAM(s) Oral two times a day  oseltamivir      rasagiline Tablet 1 milliGRAM(s) Oral <User Schedule>    MEDICATIONS  (PRN):  hydrALAZINE Injectable 10 milliGRAM(s) IV Push every 6 hours PRN Hypertension                   Patient is a 69y old  Male who presents with a chief complaint of weakness and hypersomnolent found with pneumonia  and flu (31 Jan 2025 13:07)      Over Night Events:  Patient seen and examined.   Underwent CT brain with  mild interventricular hemorrhage  desmopressin        ROS:  See HPI    PHYSICAL EXAM    ICU Vital Signs Last 24 Hrs  T(C): 37.5 (01 Feb 2025 04:00), Max: 37.8 (31 Jan 2025 20:33)  T(F): 99.5 (01 Feb 2025 04:00), Max: 100.1 (31 Jan 2025 20:33)  HR: 94 (01 Feb 2025 09:00) (74 - 97)  BP: 135/70 (01 Feb 2025 09:00) (135/70 - 167/89)  BP(mean): 95 (01 Feb 2025 09:00) (95 - 121)  ABP: --  ABP(mean): --  RR: 16 (01 Feb 2025 06:00) (16 - 18)  SpO2: 96% (01 Feb 2025 09:00) (93% - 99%)    O2 Parameters below as of 31 Jan 2025 20:33  Patient On (Oxygen Delivery Method): nasal cannula  O2 Flow (L/min): 2          General: No acute distress awake but confused   HEENT:        MMM no new lesions    NGT     Lymph Nodes: NO cervical LN   Lungs: Bilateral BS rhonchi  Cardiovascular: Regular S1, s2 heard   Abdomen: Soft, Positive BS  Extremities: No clubbing   Skin: warm   Neurological: confused      I&O's Detail    31 Jan 2025 07:01  -  01 Feb 2025 07:00  --------------------------------------------------------  IN:    dextrose 5%: 1375 mL    IV PiggyBack: 100 mL  Total IN: 1475 mL    OUT:    Voided (mL): 50 mL  Total OUT: 50 mL    Total NET: 1425 mL          LABS:                          12.4   11.29 )-----------( 151      ( 01 Feb 2025 04:37 )             39.4         01 Feb 2025 04:37    155    |  113    |  56     ----------------------------<  123    4.0     |  30     |  0.8      Ca    8.4        01 Feb 2025 04:37  Mg     2.5       01 Feb 2025 04:37    TPro  5.0    /  Alb  2.9    /  TBili  1.4    /  DBili  x      /  AST  18     /  ALT  <5     /  AlkPhos  94     01 Feb 2025 04:37  Amylase x     lipase x                                                 PT/INR - ( 31 Jan 2025 22:34 )   PT: 23.20 sec;   INR: 1.94 ratio         PTT - ( 31 Jan 2025 22:34 )  PTT:30.5 sec                                       Urinalysis Basic - ( 01 Feb 2025 04:37 )    Color: x / Appearance: x / SG: x / pH: x  Gluc: 123 mg/dL / Ketone: x  / Bili: x / Urobili: x   Blood: x / Protein: x / Nitrite: x   Leuk Esterase: x / RBC: x / WBC x   Sq Epi: x / Non Sq Epi: x / Bacteria: x                                                                Urinalysis with Rflx Culture (collected 29 Jan 2025 16:48)    Culture - Blood (collected 29 Jan 2025 10:09)  Source: .Blood BLOOD  Gram Stain (30 Jan 2025 15:40):    Growth in aerobic bottle: Gram Positive Cocci in Clusters    Growth in anaerobic bottle: Gram Positive Cocci in Clusters  Final Report (01 Feb 2025 07:51):    Growth in aerobic and anaerobic bottles: Staphylococcus aureus    Direct identification is available within approximately 3-5    hours either by Blood Panel Multiplexed PCR or Direct    MALDI-TOF. Details: [email or web address]  Organism: Blood Culture PCR  Staphylococcus aureus (01 Feb 2025 07:51)  Organism: Staphylococcus aureus (01 Feb 2025 07:51)  Organism: Blood Culture PCR (01 Feb 2025 07:51)    Culture - Blood (collected 29 Jan 2025 10:09)  Source: .Blood BLOOD  Gram Stain (01 Feb 2025 00:35):    Growth in aerobic bottle: Gram Positive Cocci in Clusters    Growth in anaerobic bottle: Gram Positive Cocci in Clusters  Preliminary Report (01 Feb 2025 00:36):    Growth in aerobic bottle: Staphylococcus aureus    Growth in anaerobic bottle: Gram Positive Cocci in Clusters    See previous culture [id number]                                                                                       ABG - ( 30 Jan 2025 14:11 )  pH, Arterial: 7.48  pH, Blood: x     /  pCO2: 41    /  pO2: 79    / HCO3: 30    / Base Excess: 6.4   /  SaO2: 97.4                MEDICATIONS  (STANDING):  carbidopa/levodopa  25/250 2 Tablet(s) Oral three times a day  ceFAZolin   IVPB      ceFAZolin   IVPB 2000 milliGRAM(s) IV Intermittent every 8 hours  dextrose 5%. 1000 milliLiter(s) (125 mL/Hr) IV Continuous <Continuous>  oseltamivir 30 milliGRAM(s) Oral two times a day  oseltamivir      rasagiline Tablet 1 milliGRAM(s) Oral <User Schedule>    MEDICATIONS  (PRN):  hydrALAZINE Injectable 10 milliGRAM(s) IV Push every 6 hours PRN Hypertension

## 2025-02-02 LAB
ALBUMIN SERPL ELPH-MCNC: 2.8 G/DL — LOW (ref 3.5–5.2)
ALP SERPL-CCNC: 109 U/L — SIGNIFICANT CHANGE UP (ref 30–115)
ALT FLD-CCNC: <5 U/L — SIGNIFICANT CHANGE UP (ref 0–41)
ANION GAP SERPL CALC-SCNC: 11 MMOL/L — SIGNIFICANT CHANGE UP (ref 7–14)
AST SERPL-CCNC: 35 U/L — SIGNIFICANT CHANGE UP (ref 0–41)
BASE EXCESS BLDA CALC-SCNC: 7.7 MMOL/L — HIGH (ref -2–3)
BASE EXCESS BLDA CALC-SCNC: 9.7 MMOL/L — HIGH (ref -2–3)
BASOPHILS # BLD AUTO: 0.02 K/UL — SIGNIFICANT CHANGE UP (ref 0–0.2)
BASOPHILS NFR BLD AUTO: 0.1 % — SIGNIFICANT CHANGE UP (ref 0–1)
BILIRUB SERPL-MCNC: 4.3 MG/DL — HIGH (ref 0.2–1.2)
BUN SERPL-MCNC: 45 MG/DL — HIGH (ref 10–20)
CALCIUM SERPL-MCNC: 8 MG/DL — LOW (ref 8.4–10.5)
CHLORIDE SERPL-SCNC: 108 MMOL/L — SIGNIFICANT CHANGE UP (ref 98–110)
CO2 SERPL-SCNC: 27 MMOL/L — SIGNIFICANT CHANGE UP (ref 17–32)
CREAT SERPL-MCNC: 0.5 MG/DL — LOW (ref 0.7–1.5)
EGFR: 110 ML/MIN/1.73M2 — SIGNIFICANT CHANGE UP
EGFR: 110 ML/MIN/1.73M2 — SIGNIFICANT CHANGE UP
EOSINOPHIL # BLD AUTO: 0 K/UL — SIGNIFICANT CHANGE UP (ref 0–0.7)
EOSINOPHIL NFR BLD AUTO: 0 % — SIGNIFICANT CHANGE UP (ref 0–8)
GAS PNL BLDA: SIGNIFICANT CHANGE UP
GAS PNL BLDA: SIGNIFICANT CHANGE UP
GLUCOSE SERPL-MCNC: 147 MG/DL — HIGH (ref 70–99)
HCO3 BLDA-SCNC: 33 MMOL/L — HIGH (ref 21–28)
HCO3 BLDA-SCNC: 34 MMOL/L — HIGH (ref 21–28)
HCT VFR BLD CALC: 37.3 % — LOW (ref 42–52)
HGB BLD-MCNC: 12 G/DL — LOW (ref 14–18)
HOROWITZ INDEX BLDA+IHG-RTO: 100 — SIGNIFICANT CHANGE UP
HOROWITZ INDEX BLDA+IHG-RTO: 80 — SIGNIFICANT CHANGE UP
IMM GRANULOCYTES NFR BLD AUTO: 0.7 % — HIGH (ref 0.1–0.3)
LYMPHOCYTES # BLD AUTO: 0.26 K/UL — LOW (ref 1.2–3.4)
LYMPHOCYTES # BLD AUTO: 1.8 % — LOW (ref 20.5–51.1)
MAGNESIUM SERPL-MCNC: 2.2 MG/DL — SIGNIFICANT CHANGE UP (ref 1.8–2.4)
MCHC RBC-ENTMCNC: 30.5 PG — SIGNIFICANT CHANGE UP (ref 27–31)
MCHC RBC-ENTMCNC: 32.2 G/DL — SIGNIFICANT CHANGE UP (ref 32–37)
MCV RBC AUTO: 94.7 FL — HIGH (ref 80–94)
MONOCYTES # BLD AUTO: 0.52 K/UL — SIGNIFICANT CHANGE UP (ref 0.1–0.6)
MONOCYTES NFR BLD AUTO: 3.6 % — SIGNIFICANT CHANGE UP (ref 1.7–9.3)
NEUTROPHILS # BLD AUTO: 13.64 K/UL — HIGH (ref 1.4–6.5)
NEUTROPHILS NFR BLD AUTO: 93.8 % — HIGH (ref 42.2–75.2)
NRBC # BLD: 0 /100 WBCS — SIGNIFICANT CHANGE UP (ref 0–0)
NRBC BLD-RTO: 0 /100 WBCS — SIGNIFICANT CHANGE UP (ref 0–0)
PCO2 BLDA: 45 MMHG — SIGNIFICANT CHANGE UP (ref 35–48)
PCO2 BLDA: 46 MMHG — SIGNIFICANT CHANGE UP (ref 35–48)
PH BLDA: 7.46 — HIGH (ref 7.35–7.45)
PH BLDA: 7.49 — HIGH (ref 7.35–7.45)
PLATELET # BLD AUTO: 117 K/UL — LOW (ref 130–400)
PMV BLD: 12.1 FL — HIGH (ref 7.4–10.4)
PO2 BLDA: 112 MMHG — HIGH (ref 83–108)
PO2 BLDA: 85 MMHG — SIGNIFICANT CHANGE UP (ref 83–108)
POTASSIUM SERPL-MCNC: 3.9 MMOL/L — SIGNIFICANT CHANGE UP (ref 3.5–5)
POTASSIUM SERPL-SCNC: 3.9 MMOL/L — SIGNIFICANT CHANGE UP (ref 3.5–5)
PROT SERPL-MCNC: 4.5 G/DL — LOW (ref 6–8)
RBC # BLD: 3.94 M/UL — LOW (ref 4.7–6.1)
RBC # FLD: 13.1 % — SIGNIFICANT CHANGE UP (ref 11.5–14.5)
SAO2 % BLDA: 96.1 % — SIGNIFICANT CHANGE UP (ref 94–98)
SAO2 % BLDA: 98.5 % — HIGH (ref 94–98)
SODIUM SERPL-SCNC: 146 MMOL/L — SIGNIFICANT CHANGE UP (ref 135–146)
WBC # BLD: 14.54 K/UL — HIGH (ref 4.8–10.8)
WBC # FLD AUTO: 14.54 K/UL — HIGH (ref 4.8–10.8)

## 2025-02-02 PROCEDURE — 99233 SBSQ HOSP IP/OBS HIGH 50: CPT | Mod: FS

## 2025-02-02 PROCEDURE — 71045 X-RAY EXAM CHEST 1 VIEW: CPT | Mod: 26,76

## 2025-02-02 PROCEDURE — 99223 1ST HOSP IP/OBS HIGH 75: CPT

## 2025-02-02 PROCEDURE — 71275 CT ANGIOGRAPHY CHEST: CPT | Mod: 26

## 2025-02-02 PROCEDURE — 99291 CRITICAL CARE FIRST HOUR: CPT

## 2025-02-02 PROCEDURE — 76705 ECHO EXAM OF ABDOMEN: CPT | Mod: 26

## 2025-02-02 PROCEDURE — 70450 CT HEAD/BRAIN W/O DYE: CPT | Mod: 26

## 2025-02-02 RX ORDER — PROPOFOL 10 MG/ML
0.5 INJECTION, EMULSION INTRAVENOUS
Qty: 1000 | Refills: 0 | Status: DISCONTINUED | OUTPATIENT
Start: 2025-02-02 | End: 2025-02-08

## 2025-02-02 RX ORDER — DEXMEDETOMIDINE HYDROCHLORIDE IN SODIUM CHLORIDE 4 UG/ML
0.5 INJECTION INTRAVENOUS
Qty: 400 | Refills: 0 | Status: DISCONTINUED | OUTPATIENT
Start: 2025-02-02 | End: 2025-02-23

## 2025-02-02 RX ORDER — PIPERACILLIN-TAZO-DEXTROSE,ISO 3.375G/5
3.38 IV SOLUTION, PIGGYBACK PREMIX FROZEN(ML) INTRAVENOUS ONCE
Refills: 0 | Status: COMPLETED | OUTPATIENT
Start: 2025-02-02 | End: 2025-02-02

## 2025-02-02 RX ORDER — FENTANYL CITRATE-0.9 % NACL/PF 100MCG/2ML
50 SYRINGE (ML) INTRAVENOUS ONCE
Refills: 0 | Status: DISCONTINUED | OUTPATIENT
Start: 2025-02-02 | End: 2025-02-02

## 2025-02-02 RX ORDER — NOREPINEPHRINE BITARTRATE 8 MG
0.05 SOLUTION INTRAVENOUS
Qty: 16 | Refills: 0 | Status: DISCONTINUED | OUTPATIENT
Start: 2025-02-02 | End: 2025-02-03

## 2025-02-02 RX ORDER — PIPERACILLIN-TAZO-DEXTROSE,ISO 3.375G/5
3.38 IV SOLUTION, PIGGYBACK PREMIX FROZEN(ML) INTRAVENOUS EVERY 8 HOURS
Refills: 0 | Status: DISCONTINUED | OUTPATIENT
Start: 2025-02-03 | End: 2025-02-03

## 2025-02-02 RX ORDER — PIPERACILLIN-TAZO-DEXTROSE,ISO 3.375G/5
3.38 IV SOLUTION, PIGGYBACK PREMIX FROZEN(ML) INTRAVENOUS ONCE
Refills: 0 | Status: DISCONTINUED | OUTPATIENT
Start: 2025-02-02 | End: 2025-02-02

## 2025-02-02 RX ORDER — SODIUM CHLORIDE 9 G/1000ML
1000 INJECTION, SOLUTION INTRAVENOUS
Refills: 0 | Status: DISCONTINUED | OUTPATIENT
Start: 2025-02-02 | End: 2025-02-03

## 2025-02-02 RX ADMIN — Medication 50 MICROGRAM(S): at 21:13

## 2025-02-02 RX ADMIN — Medication 2 TABLET(S): at 15:13

## 2025-02-02 RX ADMIN — Medication 50 MICROGRAM(S): at 21:28

## 2025-02-02 RX ADMIN — DEXMEDETOMIDINE HYDROCHLORIDE IN SODIUM CHLORIDE 6.39 MICROGRAM(S)/KG/HR: 4 INJECTION INTRAVENOUS at 21:30

## 2025-02-02 RX ADMIN — PROPOFOL 0.15 MICROGRAM(S)/KG/MIN: 10 INJECTION, EMULSION INTRAVENOUS at 21:14

## 2025-02-02 RX ADMIN — Medication 50 MILLIEQUIVALENT(S): at 21:22

## 2025-02-02 RX ADMIN — Medication 100 MILLIGRAM(S): at 15:13

## 2025-02-02 RX ADMIN — OSELTAMIVIR PHOSPHATE 30 MILLIGRAM(S): 75 CAPSULE ORAL at 18:44

## 2025-02-02 RX ADMIN — DEXMEDETOMIDINE HYDROCHLORIDE IN SODIUM CHLORIDE 6.39 MICROGRAM(S)/KG/HR: 4 INJECTION INTRAVENOUS at 21:14

## 2025-02-02 RX ADMIN — Medication 2 TABLET(S): at 05:27

## 2025-02-02 RX ADMIN — Medication 50 MILLIEQUIVALENT(S): at 21:30

## 2025-02-02 RX ADMIN — Medication 25 GRAM(S): at 21:30

## 2025-02-02 RX ADMIN — RASAGILINE 1 MILLIGRAM(S): 0.5 TABLET ORAL at 05:27

## 2025-02-02 RX ADMIN — OSELTAMIVIR PHOSPHATE 30 MILLIGRAM(S): 75 CAPSULE ORAL at 05:27

## 2025-02-02 RX ADMIN — Medication 2 TABLET(S): at 23:14

## 2025-02-02 RX ADMIN — Medication 100 MILLIGRAM(S): at 05:27

## 2025-02-02 RX ADMIN — PROPOFOL 0.15 MICROGRAM(S)/KG/MIN: 10 INJECTION, EMULSION INTRAVENOUS at 21:30

## 2025-02-02 RX ADMIN — Medication 1 APPLICATION(S): at 05:28

## 2025-02-02 RX ADMIN — Medication 200 GRAM(S): at 18:45

## 2025-02-02 NOTE — DIETITIAN INITIAL EVALUATION ADULT - ADD RECOMMEND
Recommendation: Change tube feed regimen to Jevity 1.2 goal rate 360 mL q6hrs. Tube feed regimen at goal to provide 1728 kcal, 80 g protein, 1166 mL free H2O. Provide 100 mL pre & post flushes with each feed. Monitor PO4.

## 2025-02-02 NOTE — DIETITIAN INITIAL EVALUATION ADULT - PERTINENT LABORATORY DATA
02-02    146  |  108  |  45[H]  ----------------------------<  147[H]  3.9   |  27  |  0.5[L]    Ca    8.0[L]      02 Feb 2025 04:54  Mg     2.2     02-02    TPro  4.5[L]  /  Alb  2.8[L]  /  TBili  4.3[H]  /  DBili  x   /  AST  35  /  ALT  <5  /  AlkPhos  109  02-02

## 2025-02-02 NOTE — PROGRESS NOTE ADULT - SUBJECTIVE AND OBJECTIVE BOX
SUBJECTIVE/OVERNIGHT EVENTS  Today is hospital day 4d. This morning patient was seen and examined at bedside, resting comfortably in bed. Overnight, patient desatted to the 70s and was started on NRB and suction. Feeds were held 2/2 concern for aspiration. Today, patient endorses SOB but denies any pain at this time. Denies fever, chills, nausea, vomiting, diarrhea, constipation, hematochezia, dysuria, hematuria, chest pain, palpitations, sob. Patient was informed of their plan of care at this time.    CODE STATUS: FULL    MEDICATIONS  STANDING MEDICATIONS  carbidopa/levodopa  25/250 2 Tablet(s) Oral three times a day  ceFAZolin   IVPB      ceFAZolin   IVPB 2000 milliGRAM(s) IV Intermittent every 8 hours  chlorhexidine 2% Cloths 1 Application(s) Topical <User Schedule>  lactated ringers. 1000 milliLiter(s) IV Continuous <Continuous>  oseltamivir 30 milliGRAM(s) Oral two times a day  oseltamivir      rasagiline Tablet 1 milliGRAM(s) Oral <User Schedule>    PRN MEDICATIONS  acetaminophen     Tablet .. 650 milliGRAM(s) Oral every 6 hours PRN  losartan 25 milliGRAM(s) Oral daily PRN    VITALS  T(F): 98.5 (02-02-25 @ 12:00), Max: 100.9 (02-01-25 @ 16:00)  HR: 97 (02-02-25 @ 12:00) (79 - 101)  BP: 135/68 (02-02-25 @ 12:00) (118/57 - 160/79)  RR: 24 (02-02-25 @ 12:00) (18 - 45)  SpO2: 94% (02-02-25 @ 12:00) (84% - 100%)    PHYSICAL EXAM  GENERAL  ( X ) NAD, lying in bed comfortably     (  ) obtunded     (  ) lethargic     (  ) somnolent    HEAD  ( X ) Atraumatic     (  ) hematoma     (  ) laceration (specify location:       )     NECK  ( X ) Supple     (  ) neck stiffness     (  ) nuchal rigidity     (  )  no JVD     (  ) JVD present ( -- cm)    HEART  Rate -->  ( X ) normal rate    (  ) bradycardic    (  ) tachycardic  Rhythm -->  ( X ) regular    (  ) regularly irregular    (  ) irregularly irregular  Murmurs -->  ( X ) normal s1/s2    (  ) systolic murmur    (  ) diastolic murmur    (  ) continuous murmur     (  ) S3 present    (  ) S4 present    LUNGS  ( X )Unlabored respirations     (  ) tachypnea  ( X ) B/L air entry     (  ) decreased breath sounds in:  (location     )    (  ) no adventitious sound     (  ) crackles     ( X ) wheezing- R sided      (  ) rhonchi      (specify location:       )  (  ) chest wall tenderness (specify location:       )    ABDOMEN  ( X ) Soft     (  ) tense   |   ( X ) nondistended     (  ) distended   |   ( X ) +BS     (  ) hypoactive bowel sounds     (  ) hyperactive bowel sounds  ( X ) nontender     (  ) RUQ tenderness     (  ) RLQ tenderness     (  ) LLQ tenderness     (  ) epigastric tenderness     (  ) diffuse tenderness  (  ) Splenomegaly      (  ) Hepatomegaly      (  ) Jaundice     (  ) ecchymosis     EXTREMITIES  ( X ) Normal     (  ) Rash     (  ) ecchymosis     (  ) varicose veins      (  ) pitting edema     (  ) non-pitting edema   (  ) ulceration     (  ) gangrene:     (location:     )    NERVOUS SYSTEM  ( X ) A&Ox1-2     (  ) confused     (  ) lethargic  CN II-XII:     (  ) Intact     (  ) focal deficits  (Specify:     )   Upper extremities:     (  ) strength X/5     (  ) focal deficit (specify:    )  Lower extremities:     (  ) strength  X/5    (  ) focal deficit (specify:    )    SKIN  ( X ) No rashes or lesions     (  ) maculopapular rash     (  ) pustules     (  ) vesicles     (  ) ulcer     (  ) ecchymosis     (specify location:     )      LABS             12.0   14.54 )-----------( 117      ( 02-02-25 @ 04:54 )             37.3     146  |  108  |  45  -------------------------<  147   02-02-25 @ 04:54  3.9  |  27  |  0.5    Ca      8.0     02-02-25 @ 04:54  Mg     2.2     02-02-25 @ 04:54    TPro  4.5  /  Alb  2.8  /  TBili  4.3  /  DBili  x   /  AST  35  /  ALT  <5  /  AlkPhos  109  /  GGT  x     02-02-25 @ 04:54    PT/INR - ( 01-31-25 @ 22:34 )   PT: 23.20 sec[H];   INR: 1.94 ratio[H]  PTT - ( 01-31-25 @ 22:34 )  PTT:30.5 sec      Urinalysis Basic - ( 02 Feb 2025 04:54 )    Color: x / Appearance: x / SG: x / pH: x  Gluc: 147 mg/dL / Ketone: x  / Bili: x / Urobili: x   Blood: x / Protein: x / Nitrite: x   Leuk Esterase: x / RBC: x / WBC x   Sq Epi: x / Non Sq Epi: x / Bacteria: x      ABG - ( 01 Feb 2025 21:17 )  pH, Arterial: 7.49  pH, Blood: x     /  pCO2: 46    /  pO2: 68    / HCO3: 35    / Base Excess: 10.3  /  SaO2: 95.4                Culture - Blood (collected 31 Jan 2025 17:22)  Source: .Blood BLOOD  Preliminary Report (01 Feb 2025 23:08):    No growth at 24 hours    Culture - Blood (collected 31 Jan 2025 17:22)  Source: .Blood BLOOD  Preliminary Report (01 Feb 2025 23:08):    No growth at 24 hours

## 2025-02-02 NOTE — PROGRESS NOTE ADULT - SUBJECTIVE AND OBJECTIVE BOX
Patient is a 69y old  Male who presents with a chief complaint of pneumonia (01 Feb 2025 17:27)      Over Night Events:        ROS:     All ROS are negative except HPI         PHYSICAL EXAM    ICU Vital Signs Last 24 Hrs  T(C): 36.9 (02 Feb 2025 04:00), Max: 38.3 (01 Feb 2025 16:00)  T(F): 98.4 (02 Feb 2025 04:00), Max: 100.9 (01 Feb 2025 16:00)  HR: 80 (02 Feb 2025 06:00) (79 - 96)  BP: 129/64 (02 Feb 2025 06:00) (123/68 - 160/79)  BP(mean): 91 (02 Feb 2025 06:00) (89 - 113)  ABP: --  ABP(mean): --  RR: 21 (02 Feb 2025 06:00) (18 - 45)  SpO2: 100% (02 Feb 2025 06:00) (84% - 100%)    O2 Parameters below as of 02 Feb 2025 00:00  Patient On (Oxygen Delivery Method): mask, nonrebreather            CONSTITUTIONAL:  NAD    ENT:   Airway patent,   Mouth with normal mucosa.   No thrush    EYES:   Pupils equal,   Round and reactive to light.    CARDIAC:   Normal rate,   Regular rhythm.    No edema    RESPIRATORY:   No wheezing  Bilateral BS  Normal chest expansion  Not tachypneic,  No use of accessory muscles    GASTROINTESTINAL:  Abdomen soft,   Non-tender,   No guarding,   + BS    MUSCULOSKELETAL:   Range of motion is not limited,  No clubbing, cyanosis    NEUROLOGICAL:   Alert and oriented   No motor  deficits.    SKIN:   Skin normal color for race,   Warm and dry and intact.   No evidence of rash.        02-01-25 @ 07:01  -  02-02-25 @ 07:00  --------------------------------------------------------  IN:    dextrose 5%: 2875 mL    Free Water: 500 mL    IV PiggyBack: 50 mL    Jevity: 400 mL  Total IN: 3825 mL    OUT:    Voided (mL): 1400 mL  Total OUT: 1400 mL    Total NET: 2425 mL          LABS:                            12.0   14.54 )-----------( 117      ( 02 Feb 2025 04:54 )             37.3                                               02-02    146  |  108  |  45[H]  ----------------------------<  147[H]  3.9   |  27  |  0.5[L]    Ca    8.0[L]      02 Feb 2025 04:54  Mg     2.2     02-02    TPro  4.5[L]  /  Alb  2.8[L]  /  TBili  4.3[H]  /  DBili  x   /  AST  35  /  ALT  <5  /  AlkPhos  109  02-02      PT/INR - ( 31 Jan 2025 22:34 )   PT: 23.20 sec;   INR: 1.94 ratio         PTT - ( 31 Jan 2025 22:34 )  PTT:30.5 sec                                       Urinalysis Basic - ( 02 Feb 2025 04:54 )    Color: x / Appearance: x / SG: x / pH: x  Gluc: 147 mg/dL / Ketone: x  / Bili: x / Urobili: x   Blood: x / Protein: x / Nitrite: x   Leuk Esterase: x / RBC: x / WBC x   Sq Epi: x / Non Sq Epi: x / Bacteria: x                                                  LIVER FUNCTIONS - ( 02 Feb 2025 04:54 )  Alb: 2.8 g/dL / Pro: 4.5 g/dL / ALK PHOS: 109 U/L / ALT: <5 U/L / AST: 35 U/L / GGT: x                                                  Culture - Blood (collected 31 Jan 2025 17:22)  Source: .Blood BLOOD  Preliminary Report (01 Feb 2025 23:08):    No growth at 24 hours    Culture - Blood (collected 31 Jan 2025 17:22)  Source: .Blood BLOOD  Preliminary Report (01 Feb 2025 23:08):    No growth at 24 hours                                                                                       ABG - ( 01 Feb 2025 21:17 )  pH, Arterial: 7.49  pH, Blood: x     /  pCO2: 46    /  pO2: 68    / HCO3: 35    / Base Excess: 10.3  /  SaO2: 95.4                MEDICATIONS  (STANDING):  carbidopa/levodopa  25/250 2 Tablet(s) Oral three times a day  ceFAZolin   IVPB      ceFAZolin   IVPB 2000 milliGRAM(s) IV Intermittent every 8 hours  chlorhexidine 2% Cloths 1 Application(s) Topical <User Schedule>  dextrose 5%. 1000 milliLiter(s) (125 mL/Hr) IV Continuous <Continuous>  oseltamivir 30 milliGRAM(s) Oral two times a day  oseltamivir      rasagiline Tablet 1 milliGRAM(s) Oral <User Schedule>    MEDICATIONS  (PRN):  acetaminophen     Tablet .. 650 milliGRAM(s) Oral every 6 hours PRN Temp greater or equal to 38C (100.4F), Mild Pain (1 - 3)  losartan 25 milliGRAM(s) Oral daily PRN >140 SBP      New X-rays reviewed:                                                                                  ECHO    CXR interpreted by me:       Patient is a 69y old  Male who presents with a chief complaint of pneumonia (01 Feb 2025 17:27)      Over Night Events: Desaturated to 70s overnight.           ROS:     All ROS are negative except HPI         PHYSICAL EXAM    ICU Vital Signs Last 24 Hrs  T(C): 36.9 (02 Feb 2025 04:00), Max: 38.3 (01 Feb 2025 16:00)  T(F): 98.4 (02 Feb 2025 04:00), Max: 100.9 (01 Feb 2025 16:00)  HR: 80 (02 Feb 2025 06:00) (79 - 96)  BP: 129/64 (02 Feb 2025 06:00) (123/68 - 160/79)  BP(mean): 91 (02 Feb 2025 06:00) (89 - 113)  ABP: --  ABP(mean): --  RR: 21 (02 Feb 2025 06:00) (18 - 45)  SpO2: 100% (02 Feb 2025 06:00) (84% - 100%)    O2 Parameters below as of 02 Feb 2025 00:00  Patient On (Oxygen Delivery Method): mask, nonrebreather            CONSTITUTIONAL:  Elderly    ENT:   Airway patent,   Mouth with normal mucosa.   No thrush    EYES:   Pupils equal,   Round and reactive to light.    CARDIAC:   Normal rate,   Regular rhythm.    No edema    RESPIRATORY:   Rt sided wheeze  Normal chest expansion  Not tachypneic,  No use of accessory muscles    GASTROINTESTINAL:  Abdomen soft,   Non-tender,   No guarding,   + BS    MUSCULOSKELETAL:   Range of motion is not limited,  No clubbing, cyanosis    NEUROLOGICAL:   Alert and oriented x1  No motor  deficits.    SKIN:   Skin normal color for race,         02-01-25 @ 07:01  -  02-02-25 @ 07:00  --------------------------------------------------------  IN:    dextrose 5%: 2875 mL    Free Water: 500 mL    IV PiggyBack: 50 mL    Jevity: 400 mL  Total IN: 3825 mL    OUT:    Voided (mL): 1400 mL  Total OUT: 1400 mL    Total NET: 2425 mL          LABS:                            12.0   14.54 )-----------( 117      ( 02 Feb 2025 04:54 )             37.3                                               02-02    146  |  108  |  45[H]  ----------------------------<  147[H]  3.9   |  27  |  0.5[L]    Ca    8.0[L]      02 Feb 2025 04:54  Mg     2.2     02-02    TPro  4.5[L]  /  Alb  2.8[L]  /  TBili  4.3[H]  /  DBili  x   /  AST  35  /  ALT  <5  /  AlkPhos  109  02-02      PT/INR - ( 31 Jan 2025 22:34 )   PT: 23.20 sec;   INR: 1.94 ratio         PTT - ( 31 Jan 2025 22:34 )  PTT:30.5 sec                                       Urinalysis Basic - ( 02 Feb 2025 04:54 )    Color: x / Appearance: x / SG: x / pH: x  Gluc: 147 mg/dL / Ketone: x  / Bili: x / Urobili: x   Blood: x / Protein: x / Nitrite: x   Leuk Esterase: x / RBC: x / WBC x   Sq Epi: x / Non Sq Epi: x / Bacteria: x                                                  LIVER FUNCTIONS - ( 02 Feb 2025 04:54 )  Alb: 2.8 g/dL / Pro: 4.5 g/dL / ALK PHOS: 109 U/L / ALT: <5 U/L / AST: 35 U/L / GGT: x                                                  Culture - Blood (collected 31 Jan 2025 17:22)  Source: .Blood BLOOD  Preliminary Report (01 Feb 2025 23:08):    No growth at 24 hours    Culture - Blood (collected 31 Jan 2025 17:22)  Source: .Blood BLOOD  Preliminary Report (01 Feb 2025 23:08):    No growth at 24 hours                                                                                       ABG - ( 01 Feb 2025 21:17 )  pH, Arterial: 7.49  pH, Blood: x     /  pCO2: 46    /  pO2: 68    / HCO3: 35    / Base Excess: 10.3  /  SaO2: 95.4                MEDICATIONS  (STANDING):  carbidopa/levodopa  25/250 2 Tablet(s) Oral three times a day  ceFAZolin   IVPB      ceFAZolin   IVPB 2000 milliGRAM(s) IV Intermittent every 8 hours  chlorhexidine 2% Cloths 1 Application(s) Topical <User Schedule>  dextrose 5%. 1000 milliLiter(s) (125 mL/Hr) IV Continuous <Continuous>  oseltamivir 30 milliGRAM(s) Oral two times a day  oseltamivir      rasagiline Tablet 1 milliGRAM(s) Oral <User Schedule>    MEDICATIONS  (PRN):  acetaminophen     Tablet .. 650 milliGRAM(s) Oral every 6 hours PRN Temp greater or equal to 38C (100.4F), Mild Pain (1 - 3)  losartan 25 milliGRAM(s) Oral daily PRN >140 SBP      New X-rays reviewed:                                                                                  ECHO    CXR interpreted by me:

## 2025-02-02 NOTE — DIETITIAN INITIAL EVALUATION ADULT - OTHER INFO
Pertinent Medical Information: Presented w/ SOB, cough x1 day duration. Reportedly had decreased po intake, activity and unintentional wt loss for sometime PTP. Admitted for AHRF and sepsis 2/2 multilobar PNA. Acute Hypoxic Respiratory Failure likely 2/2 CAP/aspiration and flu- improved per progress notes.    Seen by SLP services 2/1 - SLP notes "not appropriate for PO trials at this time 2' lethargy" Recommends continue NPO with alternate means of nutrition/hydration.    PMH includes hypertension, Parkinson's, CAD

## 2025-02-02 NOTE — DIETITIAN INITIAL EVALUATION ADULT - NUTRITIONGOAL OUTCOME1
Pt to demonstrate tolerance to nutrition support regimen, meeting >95% & <105% of estimated nutrient needs over next 3 days.    Monitor: Skin, labs, BM, wt, nutrition focused physical findings, body composition, GI, tube feed tolerance, diet order.

## 2025-02-02 NOTE — DIETITIAN INITIAL EVALUATION ADULT - PERTINENT MEDS FT
MEDICATIONS  (STANDING):  carbidopa/levodopa  25/250 2 Tablet(s) Oral three times a day  chlorhexidine 2% Cloths 1 Application(s) Topical <User Schedule>  dexMEDEtomidine Infusion 0.5 MICROgram(s)/kG/Hr (6.39 mL/Hr) IV Continuous <Continuous>  lactated ringers. 1000 milliLiter(s) (50 mL/Hr) IV Continuous <Continuous>  norepinephrine Infusion 0.05 MICROgram(s)/kG/Min (2.4 mL/Hr) IV Continuous <Continuous>  oseltamivir 30 milliGRAM(s) Oral two times a day  oseltamivir      potassium chloride  20 mEq/100 mL IVPB 20 milliEquivalent(s) IV Intermittent every 2 hours  propofol Infusion 0.5 MICROgram(s)/kG/Min (0.15 mL/Hr) IV Continuous <Continuous>  rasagiline Tablet 1 milliGRAM(s) Oral <User Schedule>    MEDICATIONS  (PRN):  acetaminophen     Tablet .. 650 milliGRAM(s) Oral every 6 hours PRN Temp greater or equal to 38C (100.4F), Mild Pain (1 - 3)  losartan 25 milliGRAM(s) Oral daily PRN >140 SBP

## 2025-02-02 NOTE — CHART NOTE - NSCHARTNOTEFT_GEN_A_CORE
Primary Team called regarding new CTA chest findings which showed is a focal filling defect within the main pulmonary artery at site of the ductus arteriosus suspicious for thrombus and if AC could be started.    CT head from 2/2 showed unchanged trace intraventricular hemorrhage in the right occipital horn since CT head on 1/31/2025.     Recommendation:  Advised weighing risk vs benefits prior to starting AC, per primary team.     If AC is indicated, recommend Heparin gtt with PTT goal 50-70   Get STAT CT head once PTT is therapeutic.   Q2 neuro checks   SBP goal <140    Discussed with attending .

## 2025-02-02 NOTE — PROGRESS NOTE ADULT - ASSESSMENT
69-year-old male past medical history of hypertension, Parkinson's, CAD presents for shortness of breath and cough of 1 day duration. History was obtained from patient's wife who noted that the patient has been having decreased po intake, activity, and unintentional weight loss for some time. Howver, yesterday he started having cough and shortness of breath. Admitted to MICU for AHRF and sepsis 2/2 to mutilobar PNA.    #Acute Hypoxic Respiratory Failure likely 2/2 CAP/aspiration and flu- improved  #Toxic Metabolic Encephalopathy likely 2/2 CAP/aspiration and flu- improved  #Influenza A positive  #MSSA bacteremia- resolved  #Sepsis POA  - Aspiration precautions.    - Start HFNC.    - F/u CTA chest to r/o PE.  - Repeat Bcx- NGTD.    - S/p cefepime  - C/w Ancef (Day 3)  - Nasal MRSA negative.    - C/w Tamiflu (Day 5)  - F/u S&S c/s   - C/w NGT feeds    #Periventricular bleed  - Avoid depressants.    - CTH. noted with right periventricular bleed, stable.   - Neuro on board.   - s/p Desmopressin.   - Continue Anti parkinson's meds.    - F/u vEEG.    - F/u Palliative c/s    #Elevated Bilirubin  - F/u RUQ US  - F/u LE duplex  - F/u DIC and hemolysis panel    #Possible Mural thrombus in PA  #CAD  - Avoid overload  - C/w LR at 50  - TTE: EF 56%, G1DD, mild-mod MR, mod TR, mild VT, mild pHTN  - Cardio recs appreciated       - Patient is poor candidate for MARY       - consider full endocarditis abx course    #MISC  - DVT ppx: SCDs  - GI ppx: not needed  - Activity: as tolerated  - Diet: NPO w/ tube feed    Pending: CTA chest, S&S c/s, vEEG, palliative c/s, RUQ US, DIC panel, LE duplex

## 2025-02-02 NOTE — CONSULT NOTE ADULT - SUBJECTIVE AND OBJECTIVE BOX
GENERAL SURGERY CONSULT NOTE    Patient: GARETT ESCAMILLA , 69y ([birth year])Male   MRN: [id number]  Location: [address]  Visit: 01-29-25 Inpatient  Date: 02-02-25 @ 17:28    HPI:  69M with PMH of HTN, Parkinson's, CAD presents for shortness of breath and cough of 1 day duration. History was obtained from patient's wife who noted that the patient has been having decreased po intake, activity, and unintentional weight loss for some time. Howver, yesterday he started having cough and shortness of breath. Denies any sick contacts or choking on food.InED pt was found to be hypoxic and in respiratory distress. He was placed on FAcemask O2 then switched to AVAPS. He is somnolent/lethargic and unable to follow commad. opens eyes slightly when on physical stimuli. Work up showed AHRF and sepsis 2/2 to mutilobar PNA. Patient admitted to MICU for further management.     Surgery consulted for distended GB with sludge and GB polyp in the setting of rising bilirubin.     PAST MEDICAL & SURGICAL HISTORY:  Parkinson disease  CAD (coronary artery disease)  History of basal cell carcinoma (BCC) excision    Home Medications:  aspirin 81 mg oral tablet: 1 tab(s) orally once a day (29 Jan 2025 15:28)  Crexont 87.5 mg-350 mg oral capsule, extended release: 2 cap(s) orally 3 times a day (29 Jan 2025 15:28)  rasagiline 1 mg oral tablet: 1 tab(s) orally once a day (29 Jan 2025 15:28)    VITALS:  T(F): 98.5 (02-02-25 @ 12:00), Max: 99.8 (02-01-25 @ 20:00)  HR: 103 (02-02-25 @ 16:45) (79 - 104)  BP: 115/59 (02-02-25 @ 15:00) (111/58 - 160/79)  RR: 20 (02-02-25 @ 15:00) (18 - 45)  SpO2: 95% (02-02-25 @ 16:45) (84% - 100%)    PHYSICAL EXAM:  General: lethargic, grunting to voice  HEENT: NCAT, BEATRICE, EOMI, Trachea ML, Neck supple  Cardiac: RRR  Respiratory: increased work of breathing, accessory muscle usage  Abdomen: Soft inbetween breaths, non-distended  Musculoskeletal: Strength 5/5 BL UE/LE, ROM intact, compartments soft  Neuro: Sensation grossly intact and equal throughout, no focal deficits  Vascular: Pulses 2+ throughout, extremities well perfused  Skin: Warm/dry, normal color, no jaundice    MEDICATIONS  (STANDING):  carbidopa/levodopa  25/250 2 Tablet(s) Oral three times a day  chlorhexidine 2% Cloths 1 Application(s) Topical <User Schedule>  lactated ringers. 1000 milliLiter(s) (50 mL/Hr) IV Continuous <Continuous>  oseltamivir 30 milliGRAM(s) Oral two times a day  oseltamivir      piperacillin/tazobactam IVPB. 3.375 Gram(s) IV Intermittent once  piperacillin/tazobactam IVPB. 3.375 Gram(s) IV Intermittent once  piperacillin/tazobactam IVPB.- 3.375 Gram(s) IV Intermittent once  rasagiline Tablet 1 milliGRAM(s) Oral <User Schedule>    MEDICATIONS  (PRN):  acetaminophen     Tablet .. 650 milliGRAM(s) Oral every 6 hours PRN Temp greater or equal to 38C (100.4F), Mild Pain (1 - 3)  losartan 25 milliGRAM(s) Oral daily PRN >140 SBP      LAB/STUDIES:                        12.0   14.54 )-----------( 117      ( 02 Feb 2025 04:54 )             37.3     02-02    146  |  108  |  45[H]  ----------------------------<  147[H]  3.9   |  27  |  0.5[L]    Ca    8.0[L]      02 Feb 2025 04:54  Mg     2.2     02-02    TPro  4.5[L]  /  Alb  2.8[L]  /  TBili  4.3[H]  /  DBili  x   /  AST  35  /  ALT  <5  /  AlkPhos  109  02-02    PT/INR - ( 31 Jan 2025 22:34 )   PT: 23.20 sec;   INR: 1.94 ratio         PTT - ( 31 Jan 2025 22:34 )  PTT:30.5 sec  LIVER FUNCTIONS - ( 02 Feb 2025 04:54 )  Alb: 2.8 g/dL / Pro: 4.5 g/dL / ALK PHOS: 109 U/L / ALT: <5 U/L / AST: 35 U/L / GGT: x           Urinalysis Basic - ( 02 Feb 2025 04:54 )    Color: x / Appearance: x / SG: x / pH: x  Gluc: 147 mg/dL / Ketone: x  / Bili: x / Urobili: x   Blood: x / Protein: x / Nitrite: x   Leuk Esterase: x / RBC: x / WBC x   Sq Epi: x / Non Sq Epi: x / Bacteria: x    ABG - ( 01 Feb 2025 21:17 )  pH, Arterial: 7.49  pH, Blood: x     /  pCO2: 46    /  pO2: 68    / HCO3: 35    / Base Excess: 10.3  /  SaO2: 95.4      Culture - Blood (collected 31 Jan 2025 17:22)  Source: .Blood BLOOD  Preliminary Report (01 Feb 2025 23:08):    No growth at 24 hours    Culture - Blood (collected 31 Jan 2025 17:22)  Source: .Blood BLOOD  Preliminary Report (01 Feb 2025 23:08):    No growth at 24 hours    IMAGING:  < from: US Abdomen Upper Quadrant Right (02.02.25 @ 12:28) >  FINDINGS:  Liver: Within normal limits.  Bile ducts: Normal caliber. Common bile duct measures 5 mm.  Gallbladder: Distended gallbladder with sludge. No gallbladder wall   thickening. Gallbladder polyp measuring 4 mm. Negative Navarro's sign.  Pancreas: Largely obscured by overlying bowel gas.  Right kidney: Measures 11.8 cm. No hydronephrosis.  Other: Partially imaged right lower lobe consolidation.    ASSESSMENT:  69M with PMH of HTN, Parkinson's, CAD presents for shortness of breath and cough of 1 day duration. History was obtained from patient's wife who noted that the patient has been having decreased po intake, activity, and unintentional weight loss for some time. Howver, yesterday he started having cough and shortness of breath. Denies any sick contacts or choking on food.InED pt was found to be hypoxic and in respiratory distress. He was placed on FAcemask O2 then switched to AVAPS. He is somnolent/lethargic and unable to follow commad. opens eyes slightly when on physical stimuli. Work up showed AHRF and sepsis 2/2 to mutilobar PNA. Patient admitted to MICU for further management.     Surgery consulted for distended GB with sludge and GB polyp in the setting of rising bilirubin.     PLAN:  #Distended Gallbladder w Sludge/Polyp (4mm)   - Patient likely has cholestasis 2/2 acute illness   - Recommend MRCP when stable and trended LFTs with fractionated bilirubin   - Continue IV abx   - If patient begins to decompensate with rising bilirubin, recommend IR consult for percutaneous cholecystostomy tube   - Will follow    Discussed with surgical attending on call, Dr. Mora  SPECTRA [phone]

## 2025-02-02 NOTE — DIETITIAN INITIAL EVALUATION ADULT - ORAL INTAKE PTA/DIET HISTORY
Pt confused at time of RD visit & unable to participate in nutrition interview. Per family, pt reportedly w/ poor appetite & po intake over past six months d/t increased weakness & worsening functional capacity. Reportedly consuming <75% of estimated nutrient needs >3 months. Followed a soft diet PTP. No therapeutic diet restrictions reported. Consumed 2-3 meals/day but small portions reported. UBW unknown but unintentional wt loss reported per family. No previous admit wt records available at this time. NKFA. No supplements reported.

## 2025-02-02 NOTE — PROGRESS NOTE ADULT - ASSESSMENT
IMPRESSION:    Acute Hypoxic Respiratory Failure   Toxic Metabolic Encephalopathy  infectious encephalopathy   CAP likely aspiration   Influenza A  MSSA bacteremia   Sepsis POA  HO Parkinson's Disease   HO CAD    PLAN:    CNS:  Avoid depressants.  CTH. noted with right periventricular bleed stable. Neuro on board. s/p Desmopressin. Continue Anti parkinson's     HEENT: Oral care.  Speech and swallow when more awake.  NGT     PULMONARY:  HOB @ 45 degrees. on 3L   Aspiration precautions.  ABG noted.  Wean o2 as tolerated.      CARDIOVASCULAR:  Avoid overload.  GDFR.  ECHO noted. MARY ordered.      GI: GI prophylaxis.  Feeding per speech and swallow.  NG feeding for now.  Bowel regimen.  Free H2O via NGT    RENAL:  Follow up lytes. hypernatremia noted, free water and d5, BMP in PM   Monitor UO    INFECTIOUS DISEASE: Cultures noted.  Changed ABX from Cefepime for 5 days now switch to Ancef.    Nasal MRSA negative.  RVP noted.  Continue Tamiflu.       HEMATOLOGICAL:  DVT prophylaxis. Dimer noted.  LE duplex.  Monitor CBC     ENDOCRINE:  Follow up FS.  Insulin protocol if needed.    MUSCULOSKELETAL: Bed rest.  Off loading    SDU DGT to tele    Palliative care eval noted.      Prognosis overall remains poor however he does not have ICU needs at this time     This is a transition of care note.    For any questions or clarifications during regular hours, please do not hesitate to call or text me.    For after hours and weekends, please call the MICU fellow at [phone].              IMPRESSION:    Acute Hypoxic Respiratory Failure , improved  Toxic Metabolic Encephalopathy  CAP likely aspiration   Influenza A  MSSA bacteremia   Sepsis POA  Periventricular bleed  Elevated Bilirubin  ?Mural thrombus in PA  HO Parkinson's Disease   HO CAD    PLAN:    CNS:  Avoid depressants.  CTH. noted with right periventricular bleed stable. Neuro on board. s/p Desmopressin. Continue Anti parkinson's.  vEEG.  Palliative care eval.     HEENT: Oral care.  Speech and swallow when more awake.  NGT     PULMONARY:  HOB @ 45 degrees.  Aspiration precautions.  Start HFNC.  CTA chest to r/o PE.      CARDIOVASCULAR:  Avoid overload.  GDFR.  ECHO noted.  Cardio eval noted - poor candidate for MARY.      GI: GI prophylaxis.  SLP eval.  NG feeding for now.  Bowel regimen.  Free H2O via NGT    RENAL:  Follow up lytes.  Free water.   BMP in PM.  Monitor UO    INFECTIOUS DISEASE: Cultures noted.  Changed ABX from Cefepime for 5 days now switch to Ancef.  Nasal MRSA negative.  RVP noted.  Continue Tamiflu.       HEMATOLOGICAL:  DVT prophylaxis. Dimer noted.  LE duplex.  Monitor CBC .  DIC and hemolysis panel    ENDOCRINE:  Follow up FS.  Insulin protocol if needed.    MUSCULOSKELETAL: Bed rest.  Off loading    Full code  MICU for today - SDU if stays stable on HFNC

## 2025-02-02 NOTE — DIETITIAN INITIAL EVALUATION ADULT - NSFNSGIASSESSMENTFT_GEN_A_CORE
No GI issues reported at this time. Abd noted non-distended per progress notes. Per family, pt had BM 1/31; no nausea/vomiting/diarrhea/constipation reported at this time.

## 2025-02-02 NOTE — DIETITIAN INITIAL EVALUATION ADULT - NS FNS DIET ORDER
Jevity 1.2 at 400 mL q8hrs with 75 mL pre & post flushes with each feed. Tube feed regimen at goal provides 1440 kcal, 67 g protein, 1422 mL free H2O. Tube feed regimen reportedly first provided yesterday (2/1). Per RN, pt is receiving tube feed regimen at goal rate and is tolerating tube feed regimen at this time.

## 2025-02-02 NOTE — CHART NOTE - NSCHARTNOTEFT_GEN_A_CORE
Patient is having increased difficulty breathing and respiratory distress.  ABG was alkalotic with CO2 wnl.  Repeat ABG was more alkalotic an hour later  Patient is on AVAPS and satting well, but became increasingly lethargic throughout the day and is now obtunded, not responding to painful stimuli. Pupils equal and reactive.  Family was updated via telephone and wife is ok with intubation.  Patient is being intubated.   Patient has recent intracranial bleed.  CTH will be ordered once patient is stabilized and intubated.

## 2025-02-02 NOTE — DIETITIAN INITIAL EVALUATION ADULT - NSFNSGIIOFT_GEN_A_CORE
IBW: 61.8 kg.    02-01-25 @ 07:01  -  02-02-25 @ 07:00  --------------------------------------------------------  OUT:  Total OUT: 0 mL    Total NET: 400 mL      02-02-25 @ 07:01  -  02-02-25 @ 23:48  --------------------------------------------------------  OUT:  Total OUT: 0 mL    Total NET: 400 mL       MSK + distal fibula fx  No dislocation, No foreign body

## 2025-02-03 LAB
ALBUMIN SERPL ELPH-MCNC: 2.5 G/DL — LOW (ref 3.5–5.2)
ALP SERPL-CCNC: 124 U/L — HIGH (ref 30–115)
ALT FLD-CCNC: <5 U/L — SIGNIFICANT CHANGE UP (ref 0–41)
ANION GAP SERPL CALC-SCNC: 8 MMOL/L — SIGNIFICANT CHANGE UP (ref 7–14)
APTT BLD: 31.3 SEC — SIGNIFICANT CHANGE UP (ref 27–39.2)
AST SERPL-CCNC: 26 U/L — SIGNIFICANT CHANGE UP (ref 0–41)
BASOPHILS # BLD AUTO: 0.01 K/UL — SIGNIFICANT CHANGE UP (ref 0–0.2)
BASOPHILS NFR BLD AUTO: 0.1 % — SIGNIFICANT CHANGE UP (ref 0–1)
BILIRUB SERPL-MCNC: 3 MG/DL — HIGH (ref 0.2–1.2)
BUN SERPL-MCNC: 49 MG/DL — HIGH (ref 10–20)
CALCIUM SERPL-MCNC: 7.8 MG/DL — LOW (ref 8.4–10.5)
CHLORIDE SERPL-SCNC: 111 MMOL/L — HIGH (ref 98–110)
CO2 SERPL-SCNC: 30 MMOL/L — SIGNIFICANT CHANGE UP (ref 17–32)
CREAT SERPL-MCNC: 0.8 MG/DL — SIGNIFICANT CHANGE UP (ref 0.7–1.5)
EGFR: 95 ML/MIN/1.73M2 — SIGNIFICANT CHANGE UP
EGFR: 95 ML/MIN/1.73M2 — SIGNIFICANT CHANGE UP
EOSINOPHIL # BLD AUTO: 0.01 K/UL — SIGNIFICANT CHANGE UP (ref 0–0.7)
EOSINOPHIL NFR BLD AUTO: 0.1 % — SIGNIFICANT CHANGE UP (ref 0–8)
GAS PNL BLDA: SIGNIFICANT CHANGE UP
GAS PNL BLDA: SIGNIFICANT CHANGE UP
GLUCOSE SERPL-MCNC: 163 MG/DL — HIGH (ref 70–99)
HCT VFR BLD CALC: 33.2 % — LOW (ref 42–52)
HGB BLD-MCNC: 10.7 G/DL — LOW (ref 14–18)
IMM GRANULOCYTES NFR BLD AUTO: 0.6 % — HIGH (ref 0.1–0.3)
INR BLD: 2.02 RATIO — HIGH (ref 0.65–1.3)
LYMPHOCYTES # BLD AUTO: 0.34 K/UL — LOW (ref 1.2–3.4)
LYMPHOCYTES # BLD AUTO: 2.6 % — LOW (ref 20.5–51.1)
MAGNESIUM SERPL-MCNC: 2.3 MG/DL — SIGNIFICANT CHANGE UP (ref 1.8–2.4)
MCHC RBC-ENTMCNC: 30.7 PG — SIGNIFICANT CHANGE UP (ref 27–31)
MCHC RBC-ENTMCNC: 32.2 G/DL — SIGNIFICANT CHANGE UP (ref 32–37)
MCV RBC AUTO: 95.1 FL — HIGH (ref 80–94)
MONOCYTES # BLD AUTO: 0.49 K/UL — SIGNIFICANT CHANGE UP (ref 0.1–0.6)
MONOCYTES NFR BLD AUTO: 3.8 % — SIGNIFICANT CHANGE UP (ref 1.7–9.3)
NEUTROPHILS # BLD AUTO: 12.08 K/UL — HIGH (ref 1.4–6.5)
NEUTROPHILS NFR BLD AUTO: 92.8 % — HIGH (ref 42.2–75.2)
NRBC # BLD: 0 /100 WBCS — SIGNIFICANT CHANGE UP (ref 0–0)
NRBC BLD-RTO: 0 /100 WBCS — SIGNIFICANT CHANGE UP (ref 0–0)
NT-PROBNP SERPL-SCNC: 508 PG/ML — HIGH (ref 0–300)
PLATELET # BLD AUTO: 117 K/UL — LOW (ref 130–400)
PMV BLD: 13.4 FL — HIGH (ref 7.4–10.4)
POTASSIUM SERPL-MCNC: 4.1 MMOL/L — SIGNIFICANT CHANGE UP (ref 3.5–5)
POTASSIUM SERPL-SCNC: 4.1 MMOL/L — SIGNIFICANT CHANGE UP (ref 3.5–5)
PROT SERPL-MCNC: 4 G/DL — LOW (ref 6–8)
PROTHROM AB SERPL-ACNC: 24.2 SEC — HIGH (ref 9.95–12.87)
RBC # BLD: 3.49 M/UL — LOW (ref 4.7–6.1)
RBC # FLD: 13.1 % — SIGNIFICANT CHANGE UP (ref 11.5–14.5)
SODIUM SERPL-SCNC: 149 MMOL/L — HIGH (ref 135–146)
WBC # BLD: 13.01 K/UL — HIGH (ref 4.8–10.8)
WBC # FLD AUTO: 13.01 K/UL — HIGH (ref 4.8–10.8)

## 2025-02-03 PROCEDURE — 99232 SBSQ HOSP IP/OBS MODERATE 35: CPT

## 2025-02-03 PROCEDURE — 99291 CRITICAL CARE FIRST HOUR: CPT

## 2025-02-03 PROCEDURE — 71045 X-RAY EXAM CHEST 1 VIEW: CPT | Mod: 26

## 2025-02-03 RX ORDER — CEFEPIME 2 G/20ML
2000 INJECTION, POWDER, FOR SOLUTION INTRAVENOUS EVERY 8 HOURS
Refills: 0 | Status: DISCONTINUED | OUTPATIENT
Start: 2025-02-03 | End: 2025-02-05

## 2025-02-03 RX ORDER — NAFCILLIN 2 G/100ML
INJECTION, SOLUTION INTRAVENOUS
Refills: 0 | Status: DISCONTINUED | OUTPATIENT
Start: 2025-02-03 | End: 2025-02-17

## 2025-02-03 RX ORDER — NOREPINEPHRINE BITARTRATE 8 MG
0.05 SOLUTION INTRAVENOUS
Qty: 8 | Refills: 0 | Status: DISCONTINUED | OUTPATIENT
Start: 2025-02-03 | End: 2025-02-08

## 2025-02-03 RX ORDER — NAFCILLIN 2 G/100ML
2 INJECTION, SOLUTION INTRAVENOUS EVERY 4 HOURS
Refills: 0 | Status: DISCONTINUED | OUTPATIENT
Start: 2025-02-03 | End: 2025-02-17

## 2025-02-03 RX ORDER — CEFEPIME 2 G/20ML
INJECTION, POWDER, FOR SOLUTION INTRAVENOUS
Refills: 0 | Status: DISCONTINUED | OUTPATIENT
Start: 2025-02-03 | End: 2025-02-05

## 2025-02-03 RX ORDER — OSELTAMIVIR PHOSPHATE 75 MG/1
75 CAPSULE ORAL
Refills: 0 | Status: DISCONTINUED | OUTPATIENT
Start: 2025-02-03 | End: 2025-02-05

## 2025-02-03 RX ORDER — POLYETHYLENE GLYCOL 3350 17 G/17G
17 POWDER, FOR SOLUTION ORAL EVERY 12 HOURS
Refills: 0 | Status: DISCONTINUED | OUTPATIENT
Start: 2025-02-03 | End: 2025-03-05

## 2025-02-03 RX ORDER — SENNA 187 MG
2 TABLET ORAL AT BEDTIME
Refills: 0 | Status: DISCONTINUED | OUTPATIENT
Start: 2025-02-03 | End: 2025-03-05

## 2025-02-03 RX ORDER — NAFCILLIN 2 G/100ML
2 INJECTION, SOLUTION INTRAVENOUS ONCE
Refills: 0 | Status: COMPLETED | OUTPATIENT
Start: 2025-02-03 | End: 2025-02-03

## 2025-02-03 RX ORDER — CEFEPIME 2 G/20ML
2000 INJECTION, POWDER, FOR SOLUTION INTRAVENOUS ONCE
Refills: 0 | Status: COMPLETED | OUTPATIENT
Start: 2025-02-03 | End: 2025-02-03

## 2025-02-03 RX ADMIN — Medication 1 APPLICATION(S): at 05:56

## 2025-02-03 RX ADMIN — NAFCILLIN 200 GRAM(S): 2 INJECTION, SOLUTION INTRAVENOUS at 18:24

## 2025-02-03 RX ADMIN — Medication 15 MILLILITER(S): at 05:55

## 2025-02-03 RX ADMIN — NAFCILLIN 200 GRAM(S): 2 INJECTION, SOLUTION INTRAVENOUS at 11:00

## 2025-02-03 RX ADMIN — CEFEPIME 100 MILLIGRAM(S): 2 INJECTION, POWDER, FOR SOLUTION INTRAVENOUS at 21:49

## 2025-02-03 RX ADMIN — NAFCILLIN 200 GRAM(S): 2 INJECTION, SOLUTION INTRAVENOUS at 14:29

## 2025-02-03 RX ADMIN — Medication 50 MILLIEQUIVALENT(S): at 01:03

## 2025-02-03 RX ADMIN — NAFCILLIN 200 GRAM(S): 2 INJECTION, SOLUTION INTRAVENOUS at 21:49

## 2025-02-03 RX ADMIN — Medication 2 TABLET(S): at 21:48

## 2025-02-03 RX ADMIN — RASAGILINE 1 MILLIGRAM(S): 0.5 TABLET ORAL at 05:55

## 2025-02-03 RX ADMIN — CEFEPIME 100 MILLIGRAM(S): 2 INJECTION, POWDER, FOR SOLUTION INTRAVENOUS at 10:59

## 2025-02-03 RX ADMIN — CEFEPIME 100 MILLIGRAM(S): 2 INJECTION, POWDER, FOR SOLUTION INTRAVENOUS at 14:22

## 2025-02-03 RX ADMIN — POLYETHYLENE GLYCOL 3350 17 GRAM(S): 17 POWDER, FOR SOLUTION ORAL at 17:34

## 2025-02-03 RX ADMIN — Medication 40 MILLIGRAM(S): at 12:12

## 2025-02-03 RX ADMIN — Medication 15 MILLILITER(S): at 17:34

## 2025-02-03 RX ADMIN — OSELTAMIVIR PHOSPHATE 30 MILLIGRAM(S): 75 CAPSULE ORAL at 05:55

## 2025-02-03 RX ADMIN — Medication 2 TABLET(S): at 05:55

## 2025-02-03 RX ADMIN — Medication 2 TABLET(S): at 13:50

## 2025-02-03 RX ADMIN — OSELTAMIVIR PHOSPHATE 75 MILLIGRAM(S): 75 CAPSULE ORAL at 17:34

## 2025-02-03 RX ADMIN — Medication 25 GRAM(S): at 05:54

## 2025-02-03 NOTE — PROGRESS NOTE ADULT - SUBJECTIVE AND OBJECTIVE BOX
SUBJECTIVE/OVERNIGHT EVENTS  Today is hospital day 5d. This morning patient was seen and examined at bedside. Intubated overnight due to increasing WOB, worsening ABG and declining mental status     CODE STATUS: FULL    MEDICATIONS  STANDING MEDICATIONS  carbidopa/levodopa  25/250 2 Tablet(s) Oral three times a day  ceFAZolin   IVPB      ceFAZolin   IVPB 2000 milliGRAM(s) IV Intermittent every 8 hours  chlorhexidine 2% Cloths 1 Application(s) Topical <User Schedule>  lactated ringers. 1000 milliLiter(s) IV Continuous <Continuous>  oseltamivir 30 milliGRAM(s) Oral two times a day  oseltamivir      rasagiline Tablet 1 milliGRAM(s) Oral <User Schedule>    PRN MEDICATIONS  acetaminophen     Tablet .. 650 milliGRAM(s) Oral every 6 hours PRN  losartan 25 milliGRAM(s) Oral daily PRN    VITALS  T(F): 98.5 (02-02-25 @ 12:00), Max: 100.9 (02-01-25 @ 16:00)  HR: 97 (02-02-25 @ 12:00) (79 - 101)  BP: 135/68 (02-02-25 @ 12:00) (118/57 - 160/79)  RR: 24 (02-02-25 @ 12:00) (18 - 45)  SpO2: 94% (02-02-25 @ 12:00) (84% - 100%)    PHYSICAL EXAM  GENERAL  ( X ) NAD, lying in bed comfortably     (  ) obtunded     (  ) lethargic     (  ) somnolent    HEAD  ( X ) Atraumatic     (  ) hematoma     (  ) laceration (specify location:       )     NECK  ( X ) Supple     (  ) neck stiffness     (  ) nuchal rigidity     (  )  no JVD     (  ) JVD present ( -- cm)    HEART  Rate -->  ( X ) normal rate    (  ) bradycardic    (  ) tachycardic  Rhythm -->  ( X ) regular    (  ) regularly irregular    (  ) irregularly irregular  Murmurs -->  ( X ) normal s1/s2    (  ) systolic murmur    (  ) diastolic murmur    (  ) continuous murmur     (  ) S3 present    (  ) S4 present    LUNGS  ( X )Unlabored respirations     (  ) tachypnea  ( X ) B/L air entry     (  ) decreased breath sounds in:  (location     )    (  ) no adventitious sound     (  ) crackles     ( X ) wheezing- R sided      (  ) rhonchi      (specify location:       )  (  ) chest wall tenderness (specify location:       )    ABDOMEN  ( X ) Soft     (  ) tense   |   ( X ) nondistended     (  ) distended   |   ( X ) +BS     (  ) hypoactive bowel sounds     (  ) hyperactive bowel sounds  ( X ) nontender     (  ) RUQ tenderness     (  ) RLQ tenderness     (  ) LLQ tenderness     (  ) epigastric tenderness     (  ) diffuse tenderness  (  ) Splenomegaly      (  ) Hepatomegaly      (  ) Jaundice     (  ) ecchymosis     EXTREMITIES  ( X ) Normal     (  ) Rash     (  ) ecchymosis     (  ) varicose veins      (  ) pitting edema     (  ) non-pitting edema   (  ) ulceration     (  ) gangrene:     (location:     )    NERVOUS SYSTEM  ( X ) A&Ox1-2     (  ) confused     (  ) lethargic  CN II-XII:     (  ) Intact     (  ) focal deficits  (Specify:     )   Upper extremities:     (  ) strength X/5     (  ) focal deficit (specify:    )  Lower extremities:     (  ) strength  X/5    (  ) focal deficit (specify:    )    SKIN  ( X ) No rashes or lesions     (  ) maculopapular rash     (  ) pustules     (  ) vesicles     (  ) ulcer     (  ) ecchymosis     (specify location:     )      LABS             12.0   14.54 )-----------( 117      ( 02-02-25 @ 04:54 )             37.3     146  |  108  |  45  -------------------------<  147   02-02-25 @ 04:54  3.9  |  27  |  0.5    Ca      8.0     02-02-25 @ 04:54  Mg     2.2     02-02-25 @ 04:54    TPro  4.5  /  Alb  2.8  /  TBili  4.3  /  DBili  x   /  AST  35  /  ALT  <5  /  AlkPhos  109  /  GGT  x     02-02-25 @ 04:54    PT/INR - ( 01-31-25 @ 22:34 )   PT: 23.20 sec[H];   INR: 1.94 ratio[H]  PTT - ( 01-31-25 @ 22:34 )  PTT:30.5 sec      Urinalysis Basic - ( 02 Feb 2025 04:54 )    Color: x / Appearance: x / SG: x / pH: x  Gluc: 147 mg/dL / Ketone: x  / Bili: x / Urobili: x   Blood: x / Protein: x / Nitrite: x   Leuk Esterase: x / RBC: x / WBC x   Sq Epi: x / Non Sq Epi: x / Bacteria: x      ABG - ( 01 Feb 2025 21:17 )  pH, Arterial: 7.49  pH, Blood: x     /  pCO2: 46    /  pO2: 68    / HCO3: 35    / Base Excess: 10.3  /  SaO2: 95.4                Culture - Blood (collected 31 Jan 2025 17:22)  Source: .Blood BLOOD  Preliminary Report (01 Feb 2025 23:08):    No growth at 24 hours    Culture - Blood (collected 31 Jan 2025 17:22)  Source: .Blood BLOOD  Preliminary Report (01 Feb 2025 23:08):    No growth at 24 hours

## 2025-02-03 NOTE — PROGRESS NOTE ADULT - ASSESSMENT
ASSESSMENT  69-year-old male past medical history of hypertension, Parkinson's, CAD presents for shortness of breath and cough of 1 day duration. History was obtained from patient's wife who noted that the patient has been having decreased po intake, activity, and unintentional weight loss for some time. Howver, yesterday he started having cough and shortness of breath. Found to have Flu, PNA and MSSA bacteremia     IMPRESSION  # intubated on mechanical ventilation , septic shock requiring pressors   #Influenza +, MSSA bacteremia , suspect MSSA superimposed PNA    1/31 BCX NGTD     1/29 BCX MSSA 3/4 bottles    MRSA PCR Result.: Negative (01-30-25 @ 13:40)    Procalcitonin: 6.93 ng/mL (01-30-25 @ 07:26)    Legionella Antigen, Urine: Negative (01-29-25 @ 15:49)    Streptococcus pneumoniae Ag, Ur Result: Negative (01-29-25 @ 15:49)    CT Multifocal bilateral consolidative opacities as above, suggestive of   multifocal pneumonia in the appropriate clinical context.  #Lactic acidosis  #Hypernatremia   #Severe protein calorie malnutrition  BMI (kg/m2): 17  #PD  #Immunodeficiency secondary to Senescence which could results in poor clinical outcomes  #Abx allergy: Allergy Status Unknown    Creatinine: 0.8 crcl 63 calculated    Height (cm): 175.3 (01-30-25 @ 13:00)  Weight (kg): 52.2 (01-29-25 @ 10:14)    RECOMMENDATIONS  - Send DTA  - tamiflu 30mg BID x 5 days  - D/C Zosyn, not recommended as first line for MSSA bacteremia   - Cefepime 2g q8h IV  - Nafcillin 2g q4h IV   - TTE no vegetations , guidelines recommend MARY as community acquired bacteremia   - Poor prognosis, GOC     If any questions, please send a message or call on [employer] Teams  Please continue to update ID with any pertinent new laboratory, radiographic findings, or change in clinical status ASSESSMENT  69-year-old male past medical history of hypertension, Parkinson's, CAD presents for shortness of breath and cough of 1 day duration. History was obtained from patient's wife who noted that the patient has been having decreased po intake, activity, and unintentional weight loss for some time. Howver, yesterday he started having cough and shortness of breath. Found to have Flu, PNA and MSSA bacteremia     IMPRESSION  # intubated on mechanical ventilation , septic shock requiring pressors   #Influenza +, MSSA bacteremia , suspect MSSA superimposed PNA    1/31 BCX NGTD     1/29 BCX MSSA 3/4 bottles    MRSA PCR Result.: Negative (01-30-25 @ 13:40)    Procalcitonin: 6.93 ng/mL (01-30-25 @ 07:26)    Legionella Antigen, Urine: Negative (01-29-25 @ 15:49)    Streptococcus pneumoniae Ag, Ur Result: Negative (01-29-25 @ 15:49)    CT Multifocal bilateral consolidative opacities as above, suggestive of   multifocal pneumonia in the appropriate clinical context.  #Lactic acidosis  #Hypernatremia   #Severe protein calorie malnutrition  BMI (kg/m2): 17  #PD  #Immunodeficiency secondary to Senescence which could results in poor clinical outcomes  #Abx allergy: Allergy Status Unknown    Creatinine: 0.8 crcl 63 calculated    Height (cm): 175.3 (01-30-25 @ 13:00)  Weight (kg): 52.2 (01-29-25 @ 10:14)    RECOMMENDATIONS  - Send DTA  - tamiflu increase to 75mg BID to complete 10 days given critical illness   - D/C Zosyn, not recommended as first line for MSSA bacteremia   - Cefepime 2g q8h IV  - Nafcillin 2g q4h IV   - TTE no vegetations , guidelines recommend MARY as community acquired bacteremia   - Poor prognosis, GOC     If any questions, please send a message or call on [employer] Teams  Please continue to update ID with any pertinent new laboratory, radiographic findings, or change in clinical status

## 2025-02-03 NOTE — PROGRESS NOTE ADULT - ASSESSMENT
IMPRESSION:    Acute Hypoxic Respiratory Failure so reintubation  Toxic Metabolic Encephalopathy  CAP likely aspiration   Influenza A  MSSA bacteremia repeat CX -  Sepsis POA  Periventricular bleed  Elevated Bilirubin  ?Mural thrombus in PA  HO Parkinson's Disease   HO CAD    PLAN:    CNS:  Avoid depressants.  CTH. noted with right periventricular bleed stable. Neuro on board. Continue Anti parkinson's.  vEEG.  Palliative care eval.     HEENT: Oral care.      PULMONARY:  HOB @ 45 degrees.  Aspiration precautions.  Increase PEEP, dec FIO2    CARDIOVASCULAR:  Avoid overload.  Cario for MARY    GI: GI prophylaxis.  FEEDING, free water    RENAL:  Follow up lytes.  Free water.   BMP in PM.  Monitor UO    INFECTIOUS DISEASE: Cultures noted.  tamiflu, naf, cefepime    HEMATOLOGICAL:  DVT prophylaxis.  HIT abx    ENDOCRINE:  Follow up FS.  Insulin protocol if needed.    MUSCULOSKELETAL: Bed rest.  Off loading    Full code  MICU

## 2025-02-03 NOTE — INITIAL ORGAN DONATION REFERRAL - NSREQFORORGANDONATIONREFERRAL_GEN_ALL_CORE
Detail Level: Detailed no Detail Level: Generalized Sunscreen Recommendations: Prescribed broad spectrum sunscreen spf 30+ Patient Specific Counseling (Will Not Stick From Patient To Patient): Photo taken today. Patient to monitor and return if he notices changes. Detail Level: Zone

## 2025-02-03 NOTE — PROGRESS NOTE ADULT - SUBJECTIVE AND OBJECTIVE BOX
WILLIAMTIFFANIEGARETT BAXTER  69y, Male  Allergy: Allergy Status Unknown      LOS  5d    CHIEF COMPLAINT: pneumonia (02 Feb 2025 17:26)      INTERVAL EVENTS/HPI  - T(F): , Max: 100.1 (02-02-25 @ 20:00), intubated on mechanical ventilation , now off pressors, repeat BCX NG,   - WBC Count: 13.01 (02-03-25 @ 05:14)  WBC Count: 14.54 (02-02-25 @ 04:54)     - Creatinine: 0.8 (02-03-25 @ 05:14)  Creatinine: 0.5 (02-02-25 @ 04:54)     -   -   -     ROS  cannot obtain secondary to patient's sedation and/or mental status    VITALS:  T(F): 99.4, Max: 100.1 (02-02-25 @ 20:00)  HR: 69  BP: 113/67  RR: 18Vital Signs Last 24 Hrs  T(C): 37.4 (03 Feb 2025 04:00), Max: 37.8 (02 Feb 2025 20:00)  T(F): 99.4 (03 Feb 2025 04:00), Max: 100.1 (02 Feb 2025 20:00)  HR: 69 (03 Feb 2025 06:00) (69 - 118)  BP: 113/67 (03 Feb 2025 06:00) (79/49 - 144/63)  BP(mean): 85 (03 Feb 2025 06:00) (59 - 104)  RR: 18 (03 Feb 2025 06:00) (17 - 33)  SpO2: 100% (03 Feb 2025 06:00) (90% - 100%)    Parameters below as of 03 Feb 2025 04:00  Patient On (Oxygen Delivery Method): ventilator    O2 Concentration (%): 100    PHYSICAL EXAM:  ***    FH: Non-contributory  Social Hx: Non-contributory    TESTS & MEASUREMENTS:                        10.7   13.01 )-----------( 117      ( 03 Feb 2025 05:14 )             33.2     02-03    149[H]  |  111[H]  |  49[H]  ----------------------------<  163[H]  4.1   |  30  |  0.8    Ca    7.8[L]      03 Feb 2025 05:14  Mg     2.3     02-03    TPro  4.0[L]  /  Alb  2.5[L]  /  TBili  3.0[H]  /  DBili  x   /  AST  26  /  ALT  x   /  AlkPhos  124[H]  02-03      LIVER FUNCTIONS - ( 03 Feb 2025 05:14 )  Alb: 2.5 g/dL / Pro: 4.0 g/dL / ALK PHOS: 124 U/L / ALT: x     / AST: 26 U/L / GGT: x           Urinalysis Basic - ( 03 Feb 2025 05:14 )    Color: x / Appearance: x / SG: x / pH: x  Gluc: 163 mg/dL / Ketone: x  / Bili: x / Urobili: x   Blood: x / Protein: x / Nitrite: x   Leuk Esterase: x / RBC: x / WBC x   Sq Epi: x / Non Sq Epi: x / Bacteria: x        Culture - Blood (collected 01-31-25 @ 17:22)  Source: .Blood BLOOD  Preliminary Report (02-02-25 @ 23:01):    No growth at 48 Hours    Culture - Blood (collected 01-31-25 @ 17:22)  Source: .Blood BLOOD  Preliminary Report (02-02-25 @ 23:01):    No growth at 48 Hours    Urinalysis with Rflx Culture (collected 01-29-25 @ 16:48)    Culture - Blood (collected 01-29-25 @ 10:09)  Source: .Blood BLOOD  Gram Stain (01-30-25 @ 15:40):    Growth in aerobic bottle: Gram Positive Cocci in Clusters    Growth in anaerobic bottle: Gram Positive Cocci in Clusters  Final Report (02-01-25 @ 07:51):    Growth in aerobic and anaerobic bottles: Staphylococcus aureus    Direct identification is available within approximately 3-5    hours either by Blood Panel Multiplexed PCR or Direct    MALDI-TOF. Details: [email or web address]  Organism: Blood Culture PCR  Staphylococcus aureus (02-01-25 @ 07:51)  Organism: Staphylococcus aureus (02-01-25 @ 07:51)      Method Type: KARTIK      -  Ceftaroline: S <=0.5      -  Clindamycin: R <=0.25 This isolate is presumed to be clindamycin resistant based on detection of inducible resistance. Clindamycin may still be effective in some patients.      -  Erythromycin: R >4      -  Gentamicin: S <=4 Should not be used as monotherapy      -  Oxacillin: S <=0.25 Oxacillin predicts susceptibility for dicloxacillin, methicillin, and nafcillin      -  Rifampin: S <=1 Should not be used as monotherapy      -  Tetracycline: S <=4      -  Trimethoprim/Sulfamethoxazole: S <=0.5/9.5      -  Vancomycin: S 1  Organism: Blood Culture PCR (02-01-25 @ 07:51)      Method Type: PCR      -  Methicillin SENSITIVE Staphylococcus aureus (MSSA): Detec Any isolate of Staphylococcus aureus from a blood culture is NOT considered a contaminant.    Culture - Blood (collected 01-29-25 @ 10:09)  Source: .Blood BLOOD  Gram Stain (02-01-25 @ 00:35):    Growth in aerobic bottle: Gram Positive Cocci in Clusters    Growth in anaerobic bottle: Gram Positive Cocci in Clusters  Final Report (02-01-25 @ 16:44):    Growth in aerobic and anaerobic bottles: Staphylococcus aureus    See previous culture [id number]        Blood Gas Venous - Lactate: 2.2 mmol/L (01-29-25 @ 10:40)      INFECTIOUS DISEASES TESTING  MRSA PCR Result.: Negative (01-30-25 @ 13:40)  Procalcitonin: 6.93 (01-30-25 @ 07:26)  Legionella Antigen, Urine: Negative (01-29-25 @ 15:49)  Rapid RVP Result: Detected (01-29-25 @ 10:30)      INFLAMMATORY MARKERS      RADIOLOGY & ADDITIONAL TESTS:  I have personally reviewed the last available Chest xray  CXR  Xray Chest 1 View AP/PA:   ACC: [id number] EXAM:  XR CHEST 1 VIEW   ORDERED BY: JANENE URENA     PROCEDURE DATE:  02/02/2025          INTERPRETATION:  CLINICAL HISTORY / REASON FOR EXAM: Line placement.    COMPARISON: Chest radiograph from February 2, 2025.    TECHNIQUE/POSITIONING: The patient is rotated. Single image, AP chest   radiograph.    FINDINGS:    SUPPORT DEVICES: Endotracheal tube with distal tip approximately 3 cm   above the minh. Enteric tube courses below the left hemidiaphragm, with   distal tip overlying the gastric body.    CARDIAC/MEDIASTINUM/HILUM: Unchanged cardiac silhouette.    LUNG PARENCHYMA/PLEURA: Unchanged bilateral patchy opacities.    SKELETON/SOFT TISSUES: Unchanged.      IMPRESSION:    Support devices in satisfactory position.    --- End of Report ---            JENNIE JAIN MD; Attending Radiologist  This document has been electronically signed. Feb 2 2025  9:47PM (02-02-25 @ 21:11)      CT  CT Angio Chest PE Protocol w/ IV Cont:   ACC: [id number] EXAM:  CT ANGIO CHEST PULM ART [facility]   ORDERED BY: DREA JAFFE     PROCEDURE DATE:  02/02/2025          INTERPRETATION:  CLINICAL INDICATION: patent ductus arteriosus    TECHNIQUE:  CTA of the thorax was performed after administration of intravenous   contrast. Sagittal and coronal reformats were performed as well as MIP   reconstructions.    COMPARISON CT: 1/29/2025    INTERPRETATION:    AIRWAYS, LUNGS AND PLEURA: Increased opacities/debris within the central   bronchi with occlusion of the right lower lobe central bronchi. There is   increased consolidation within the right lower lobe. Small bilateral   pleural effusion, increased. Additional findings overall without   significant change    HEART AND VESSELS: There is afocal filling defect within the main   pulmonary artery at site of the ductus arteriosus . Additional findings   without significant change    MEDIASTINUM: No significant change    BONES AND SOFT TISSUES: No significant change    PARTIALLY IMAGED UPPER ABDOMEN: Interval placement of enteric tube with   tip within the stomach. Otherwise no significant change    IMPRESSION:  There is a focal filling defect within the main pulmonary artery at site   of the ductus arteriosus suspicious for thrombus.    Increased opacities/debris within the central bronchi with occlusion of   the right lower lobe central bronchi. There is increased consolidation   within the right lower lobe.          --- End of Report ---            PAL HINDS MD; Attending Radiologist  This document has been electronically signed. Feb 2 2025  9:48PM (02-02-25 @ 13:44)      CARDIOLOGY TESTING  12 Lead ECG:   Ventricular Rate 91 BPM    Atrial Rate 91 BPM    P-R Interval 158 ms    QRS Duration 74 ms    Q-T Interval 366 ms    QTC Calculation(Bazett) 450 ms    P Axis 77 degrees    R Axis 64 degrees    T Axis 68 degrees    Diagnosis Line Normal sinus rhythm  Biatrial enlargement  Abnormal ECG    Confirmed by LILLIE LUND, [facility] ([id number]) on 1/29/2025 12:29:13 PM (01-29-25 @ 10:39)      MEDICATIONS  carbidopa/levodopa  25/250 2  chlorhexidine 0.12% Liquid 15  chlorhexidine 2% Cloths 1  dexMEDEtomidine Infusion 0.5  norepinephrine Infusion 0.05  oseltamivir 30  oseltamivir   piperacillin/tazobactam IVPB.. 3.375  propofol Infusion 0.5  rasagiline Tablet 1      WEIGHT  Weight (kg): 51.1 (01-31-25 @ 20:33)  Creatinine: 0.8 mg/dL (02-03-25 @ 05:14)      ANTIBIOTICS:  oseltamivir 30 milliGRAM(s) Oral two times a day  oseltamivir      piperacillin/tazobactam IVPB.. 3.375 Gram(s) IV Intermittent every 8 hours      All available historical records have been reviewed   GARETT ESCAMILLA  69y, Male  Allergy: Allergy Status Unknown      LOS  5d    CHIEF COMPLAINT: pneumonia (02 Feb 2025 17:26)      INTERVAL EVENTS/HPI  - T(F): , Max: 100.1 (02-02-25 @ 20:00), intubated on mechanical ventilation , now off pressors, repeat BCX NG,   - WBC Count: 13.01 (02-03-25 @ 05:14)  WBC Count: 14.54 (02-02-25 @ 04:54)     - Creatinine: 0.8 (02-03-25 @ 05:14)  Creatinine: 0.5 (02-02-25 @ 04:54)     -   -   -     ROS  cannot obtain secondary to patient's sedation and/or mental status    VITALS:  T(F): 99.4, Max: 100.1 (02-02-25 @ 20:00)  HR: 69  BP: 113/67  RR: 18Vital Signs Last 24 Hrs  T(C): 37.4 (03 Feb 2025 04:00), Max: 37.8 (02 Feb 2025 20:00)  T(F): 99.4 (03 Feb 2025 04:00), Max: 100.1 (02 Feb 2025 20:00)  HR: 69 (03 Feb 2025 06:00) (69 - 118)  BP: 113/67 (03 Feb 2025 06:00) (79/49 - 144/63)  BP(mean): 85 (03 Feb 2025 06:00) (59 - 104)  RR: 18 (03 Feb 2025 06:00) (17 - 33)  SpO2: 100% (03 Feb 2025 06:00) (90% - 100%)    Parameters below as of 03 Feb 2025 04:00  Patient On (Oxygen Delivery Method): ventilator    O2 Concentration (%): 100    PHYSICAL EXAM:  General: intubated  HEENT: NCAT  Neck: supple  CV: RRR  Lungs: symmetric chest expansion, decreased BS at bases  Abd: Soft  Extr: wwp  Skin: no rash  Neuro: sedated  Lines: no phlebitis     FH: Non-contributory  Social Hx: Non-contributory    TESTS & MEASUREMENTS:                        10.7   13.01 )-----------( 117      ( 03 Feb 2025 05:14 )             33.2     02-03    149[H]  |  111[H]  |  49[H]  ----------------------------<  163[H]  4.1   |  30  |  0.8    Ca    7.8[L]      03 Feb 2025 05:14  Mg     2.3     02-03    TPro  4.0[L]  /  Alb  2.5[L]  /  TBili  3.0[H]  /  DBili  x   /  AST  26  /  ALT  x   /  AlkPhos  124[H]  02-03      LIVER FUNCTIONS - ( 03 Feb 2025 05:14 )  Alb: 2.5 g/dL / Pro: 4.0 g/dL / ALK PHOS: 124 U/L / ALT: x     / AST: 26 U/L / GGT: x           Urinalysis Basic - ( 03 Feb 2025 05:14 )    Color: x / Appearance: x / SG: x / pH: x  Gluc: 163 mg/dL / Ketone: x  / Bili: x / Urobili: x   Blood: x / Protein: x / Nitrite: x   Leuk Esterase: x / RBC: x / WBC x   Sq Epi: x / Non Sq Epi: x / Bacteria: x        Culture - Blood (collected 01-31-25 @ 17:22)  Source: .Blood BLOOD  Preliminary Report (02-02-25 @ 23:01):    No growth at 48 Hours    Culture - Blood (collected 01-31-25 @ 17:22)  Source: .Blood BLOOD  Preliminary Report (02-02-25 @ 23:01):    No growth at 48 Hours    Urinalysis with Rflx Culture (collected 01-29-25 @ 16:48)    Culture - Blood (collected 01-29-25 @ 10:09)  Source: .Blood BLOOD  Gram Stain (01-30-25 @ 15:40):    Growth in aerobic bottle: Gram Positive Cocci in Clusters    Growth in anaerobic bottle: Gram Positive Cocci in Clusters  Final Report (02-01-25 @ 07:51):    Growth in aerobic and anaerobic bottles: Staphylococcus aureus    Direct identification is available within approximately 3-5    hours either by Blood Panel Multiplexed PCR or Direct    MALDI-TOF. Details: [email or web address]  Organism: Blood Culture PCR  Staphylococcus aureus (02-01-25 @ 07:51)  Organism: Staphylococcus aureus (02-01-25 @ 07:51)      Method Type: KARTIK      -  Ceftaroline: S <=0.5      -  Clindamycin: R <=0.25 This isolate is presumed to be clindamycin resistant based on detection of inducible resistance. Clindamycin may still be effective in some patients.      -  Erythromycin: R >4      -  Gentamicin: S <=4 Should not be used as monotherapy      -  Oxacillin: S <=0.25 Oxacillin predicts susceptibility for dicloxacillin, methicillin, and nafcillin      -  Rifampin: S <=1 Should not be used as monotherapy      -  Tetracycline: S <=4      -  Trimethoprim/Sulfamethoxazole: S <=0.5/9.5      -  Vancomycin: S 1  Organism: Blood Culture PCR (02-01-25 @ 07:51)      Method Type: PCR      -  Methicillin SENSITIVE Staphylococcus aureus (MSSA): Detec Any isolate of Staphylococcus aureus from a blood culture is NOT considered a contaminant.    Culture - Blood (collected 01-29-25 @ 10:09)  Source: .Blood BLOOD  Gram Stain (02-01-25 @ 00:35):    Growth in aerobic bottle: Gram Positive Cocci in Clusters    Growth in anaerobic bottle: Gram Positive Cocci in Clusters  Final Report (02-01-25 @ 16:44):    Growth in aerobic and anaerobic bottles: Staphylococcus aureus    See previous culture [id number]        Blood Gas Venous - Lactate: 2.2 mmol/L (01-29-25 @ 10:40)      INFECTIOUS DISEASES TESTING  MRSA PCR Result.: Negative (01-30-25 @ 13:40)  Procalcitonin: 6.93 (01-30-25 @ 07:26)  Legionella Antigen, Urine: Negative (01-29-25 @ 15:49)  Rapid RVP Result: Detected (01-29-25 @ 10:30)      INFLAMMATORY MARKERS      RADIOLOGY & ADDITIONAL TESTS:  I have personally reviewed the last available Chest xray  CXR  Xray Chest 1 View AP/PA:   ACC: [id number] EXAM:  XR CHEST 1 VIEW   ORDERED BY: JANENE URENA     PROCEDURE DATE:  02/02/2025          INTERPRETATION:  CLINICAL HISTORY / REASON FOR EXAM: Line placement.    COMPARISON: Chest radiograph from February 2, 2025.    TECHNIQUE/POSITIONING: The patient is rotated. Single image, AP chest   radiograph.    FINDINGS:    SUPPORT DEVICES: Endotracheal tube with distal tip approximately 3 cm   above the minh. Enteric tube courses below the left hemidiaphragm, with   distal tip overlying the gastric body.    CARDIAC/MEDIASTINUM/HILUM: Unchanged cardiac silhouette.    LUNG PARENCHYMA/PLEURA: Unchanged bilateral patchy opacities.    SKELETON/SOFT TISSUES: Unchanged.      IMPRESSION:    Support devices in satisfactory position.    --- End of Report ---            JENNIE JAIN MD; Attending Radiologist  This document has been electronically signed. Feb 2 2025  9:47PM (02-02-25 @ 21:11)      CT  CT Angio Chest PE Protocol w/ IV Cont:   ACC: [id number] EXAM:  CT ANGIO CHEST PULM ART [facility]   ORDERED BY: DREA JAFFE     PROCEDURE DATE:  02/02/2025          INTERPRETATION:  CLINICAL INDICATION: patent ductus arteriosus    TECHNIQUE:  CTA of the thorax was performed after administration of intravenous   contrast. Sagittal and coronal reformats were performed as well as MIP   reconstructions.    COMPARISON CT: 1/29/2025    INTERPRETATION:    AIRWAYS, LUNGS AND PLEURA: Increased opacities/debris within the central   bronchi with occlusion of the right lower lobe central bronchi. There is   increased consolidation within the right lower lobe. Small bilateral   pleural effusion, increased. Additional findings overall without   significant change    HEART AND VESSELS: There is afocal filling defect within the main   pulmonary artery at site of the ductus arteriosus . Additional findings   without significant change    MEDIASTINUM: No significant change    BONES AND SOFT TISSUES: No significant change    PARTIALLY IMAGED UPPER ABDOMEN: Interval placement of enteric tube with   tip within the stomach. Otherwise no significant change    IMPRESSION:  There is a focal filling defect within the main pulmonary artery at site   of the ductus arteriosus suspicious for thrombus.    Increased opacities/debris within the central bronchi with occlusion of   the right lower lobe central bronchi. There is increased consolidation   within the right lower lobe.          --- End of Report ---            PAL HINDS MD; Attending Radiologist  This document has been electronically signed. Feb 2 2025  9:48PM (02-02-25 @ 13:44)      CARDIOLOGY TESTING  12 Lead ECG:   Ventricular Rate 91 BPM    Atrial Rate 91 BPM    P-R Interval 158 ms    QRS Duration 74 ms    Q-T Interval 366 ms    QTC Calculation(Bazett) 450 ms    P Axis 77 degrees    R Axis 64 degrees    T Axis 68 degrees    Diagnosis Line Normal sinus rhythm  Biatrial enlargement  Abnormal ECG    Confirmed by LILLIE LUND, [facility] ([id number]) on 1/29/2025 12:29:13 PM (01-29-25 @ 10:39)      MEDICATIONS  carbidopa/levodopa  25/250 2  chlorhexidine 0.12% Liquid 15  chlorhexidine 2% Cloths 1  dexMEDEtomidine Infusion 0.5  norepinephrine Infusion 0.05  oseltamivir 30  oseltamivir   piperacillin/tazobactam IVPB.. 3.375  propofol Infusion 0.5  rasagiline Tablet 1      WEIGHT  Weight (kg): 51.1 (01-31-25 @ 20:33)  Creatinine: 0.8 mg/dL (02-03-25 @ 05:14)      ANTIBIOTICS:  oseltamivir 30 milliGRAM(s) Oral two times a day  oseltamivir      piperacillin/tazobactam IVPB.. 3.375 Gram(s) IV Intermittent every 8 hours      All available historical records have been reviewed

## 2025-02-03 NOTE — PROGRESS NOTE ADULT - ASSESSMENT
ASSESSMENT:  69M with PMH of HTN, Parkinson's, CAD presents for shortness of breath and cough of 1 day duration. History was obtained from patient's wife who noted that the patient has been having decreased po intake, activity, and unintentional weight loss for some time. Howver, yesterday he started having cough and shortness of breath. Denies any sick contacts or choking on food.InED pt was found to be hypoxic and in respiratory distress. He was placed on FAcemask O2 then switched to AVAPS. He is somnolent/lethargic and unable to follow commad. opens eyes slightly when on physical stimuli. Work up showed AHRF and sepsis 2/2 to mutilobar PNA. Patient admitted to MICU for further management.     PLAN:  - No acute surgical intervention   - Patient likely has cholestasis 2/2 acute illness  - If patient begins to decompensate with rising bilirubin, recommend IR consult for percutaneous cholecystostomy tube  - Surgery to sign off   - Recall as needed   - Rest of care per primary team   [phone]

## 2025-02-03 NOTE — PROGRESS NOTE ADULT - SUBJECTIVE AND OBJECTIVE BOX
GENERAL SURGERY PROGRESS NOTE    Patient: GARETT ESCAMILLA , 69y ([birth year])Male   MRN: [id number]  Location: [address]  Visit: 01-29-25 Inpatient  Date: 02-03-25 @ 12:39    Hospital Day #: 6    Procedure/Dx/Injuries: Distended GB     Events of past 24 hours: Patient seen and examined at bedside. Patient intubated, on norepinpherine and sedated on propofol. No acute surgical intervention.     PAST MEDICAL & SURGICAL HISTORY:  Parkinson disease      CAD (coronary artery disease)      History of basal cell carcinoma (BCC) excision          Vitals:   T(F): 99.5 (02-03-25 @ 08:00), Max: 100.1 (02-02-25 @ 20:00)  HR: 91 (02-03-25 @ 11:00)  BP: 97/54 (02-03-25 @ 11:00)  RR: 22 (02-03-25 @ 11:00)  SpO2: 98% (02-03-25 @ 11:00)  Mode: AC/ CMV (Assist Control/ Continuous Mandatory Ventilation), RR (machine): 16, TV (machine): 400, FiO2: 60, PEEP: 8, ITime: 1, MAP: 11, PIP: 30    Diet, NPO with Tube Feed:   Tube Feeding Modality: Nasogastric  Jevity 1.2 Zaid (JEVITY1.2RTH)  Total Volume for 24 Hours (mL): 1200  Bolus  Total Volume of Bolus (mL):  400  Tube Feed Frequency: Every 8 hours   Tube Feed Start Time: 18:00  Bolus Feed Rate (mL per Hour): 100   Bolus Feed Duration (in Hours): 4  Free Water Flush Instructions:  250cc q 6 hours      Fluids:     I & O's:    02-02-25 @ 07:01  -  02-03-25 @ 07:00  --------------------------------------------------------  IN:    Dexmedetomidine: 196.1 mL    dextrose 5%: 250 mL    Free Water: 550 mL    IV PiggyBack: 400 mL    Jevity: 1200 mL    Lactated Ringers: 850 mL    Norepinephrine: 6.5 mL    Norepinephrine: 10.4 mL    Propofol: 74.9 mL  Total IN: 3537.9 mL    OUT:    Voided (mL): 400 mL  Total OUT: 400 mL    Total NET: 3137.9 mL    PHYSICAL EXAM:  GENERAL: Intubated   CHEST/LUNG: vent 100/8   HEART: Regular rate and rhythm  ABDOMEN: Soft, Nontender, Nondistended  EXTREMITIES:  No clubbing, cyanosis, or edema      MEDICATIONS  (STANDING):  carbidopa/levodopa  25/250 2 Tablet(s) Oral three times a day  cefepime   IVPB      cefepime   IVPB 2000 milliGRAM(s) IV Intermittent every 8 hours  chlorhexidine 0.12% Liquid 15 milliLiter(s) Oral Mucosa every 12 hours  chlorhexidine 2% Cloths 1 Application(s) Topical <User Schedule>  dexMEDEtomidine Infusion 0.5 MICROgram(s)/kG/Hr (6.39 mL/Hr) IV Continuous <Continuous>  nafcillin  IVPB      nafcillin  IVPB 2 Gram(s) IV Intermittent every 4 hours  norepinephrine Infusion 0.05 MICROgram(s)/kG/Min (4.79 mL/Hr) IV Continuous <Continuous>  oseltamivir 75 milliGRAM(s) Oral two times a day  pantoprazole   Suspension 40 milliGRAM(s) Oral daily  propofol Infusion 0.5 MICROgram(s)/kG/Min (0.15 mL/Hr) IV Continuous <Continuous>  rasagiline Tablet 1 milliGRAM(s) Oral <User Schedule>    MEDICATIONS  (PRN):  acetaminophen     Tablet .. 650 milliGRAM(s) Oral every 6 hours PRN Temp greater or equal to 38C (100.4F), Mild Pain (1 - 3)  losartan 25 milliGRAM(s) Oral daily PRN >140 SBP      DVT PROPHYLAXIS:   GI PROPHYLAXIS: pantoprazole   Suspension 40 milliGRAM(s) Oral daily    ANTICOAGULATION:   ANTIBIOTICS:  cefepime   IVPB    cefepime   IVPB 2000 milliGRAM(s)  nafcillin  IVPB    nafcillin  IVPB 2 Gram(s)  oseltamivir 75 milliGRAM(s)      LAB/STUDIES:  Labs:  CAPILLARY BLOOD GLUCOSE                              10.7   13.01 )-----------( 117      ( 03 Feb 2025 05:14 )             33.2       Auto Neutrophil %: 92.8 % (02-03-25 @ 05:14)  Auto Immature Granulocyte %: 0.6 % (02-03-25 @ 05:14)    02-03    149[H]  |  111[H]  |  49[H]  ----------------------------<  163[H]  4.1   |  30  |  0.8      Calcium: 7.8 mg/dL (02-03-25 @ 05:14)      LFTs:             4.0  | 3.0  | 26       ------------------[124     ( 03 Feb 2025 05:14 )  2.5  | x    | <5          Lipase:x      Amylase:x         Blood Gas Arterial, Lactate: 1.3 mmol/L (02-03-25 @ 08:50)  Blood Gas Arterial, Lactate: 2.0 mmol/L (02-03-25 @ 04:36)  Blood Gas Arterial, Lactate: 1.6 mmol/L (02-02-25 @ 21:44)  Blood Gas Arterial, Lactate: 1.6 mmol/L (02-02-25 @ 20:07)  Blood Gas Arterial, Lactate: 1.5 mmol/L (02-02-25 @ 18:32)  Blood Gas Arterial, Lactate: 1.2 mmol/L (02-01-25 @ 21:17)    ABG - ( 03 Feb 2025 08:50 )  pH: 7.48  /  pCO2: 44    /  pO2: 178   / HCO3: 33    / Base Excess: 8.4   /  SaO2: 98.0      ABG - ( 03 Feb 2025 04:36 )  pH: 7.45  /  pCO2: 48    /  pO2: 67    / HCO3: 33    / Base Excess: 8.1   /  SaO2: 94.9      ABG - ( 02 Feb 2025 21:44 )  pH: 7.46  /  pCO2: 46    /  pO2: 85    / HCO3: 33    / Base Excess: 7.7   /  SaO2: 98.5      Coags:     24.20  ----< 2.02    ( 03 Feb 2025 02:07 )     31.3       Urinalysis Basic - ( 03 Feb 2025 05:14 )    Color: x / Appearance: x / SG: x / pH: x  Gluc: 163 mg/dL / Ketone: x  / Bili: x / Urobili: x   Blood: x / Protein: x / Nitrite: x   Leuk Esterase: x / RBC: x / WBC x   Sq Epi: x / Non Sq Epi: x / Bacteria: x        Culture - Blood (collected 02 Feb 2025 04:54)  Source: .Blood BLOOD  Preliminary Report (03 Feb 2025 12:01):    No growth at 24 hours    Culture - Blood (collected 31 Jan 2025 17:22)  Source: .Blood BLOOD  Preliminary Report (02 Feb 2025 23:01):    No growth at 48 Hours    Culture - Blood (collected 31 Jan 2025 17:22)  Source: .Blood BLOOD  Preliminary Report (02 Feb 2025 23:01):    No growth at 48 Hours

## 2025-02-03 NOTE — PROGRESS NOTE ADULT - ASSESSMENT
69-year-old male past medical history of hypertension, Parkinson's, CAD presents for shortness of breath and cough of 1 day duration. History was obtained from patient's wife who noted that the patient has been having decreased po intake, activity, and unintentional weight loss for some time. However yesterday he started having cough and shortness of breath. Admitted to MICU for AHRF and sepsis 2/2 to mutilobar PNA.    #Acute Hypoxic Respiratory Failure likely 2/2 CAP/aspiration and flu- improved  #Toxic Metabolic Encephalopathy likely 2/2 CAP/aspiration and flu- improved  #Influenza A positive  #MSSA bacteremia- resolved  #Sepsis POA  - Aspiration precautions.    - patient inc WOB, worsening alkalosis, obtunded-intubated for airway protection   - CTA chest to r/o PE: focal filling defect within main pulm artery   - repeat CT head: stable intraventricular hemorrhage   - Neuro for AC: risk vs benefits, can start AC with heprain ggt and repeat CT head once PTT 50-70.   - Platelets downtrending, 239>>117, check HIT panel if neg then can start AC   - Repeat Bcx- NGTD.    - Nasal MRSA negative  - ID: abx switched to nafcillin and cefepime   - C/w Tamiflu 75 BID  - f/u repeat ABG   - F/u S&S c/s   - C/w NGT feeds  - f/u palliative and cardio regarding MARY since patient vented now     #Periventricular bleed  - Avoid depressants.    - CTH. noted with right periventricular bleed, stable.   - Neuro on board.   - s/p Desmopressin.   - Continue Anti parkinson's meds.    - F/u vEEG.    - F/u Palliative c/s    #Elevated Bilirubin  - F/u RUQ US  - F/u LE duplex  - F/u DIC and hemolysis panel    #Possible Mural thrombus in PA  #CAD  - Avoid overload  - C/w LR at 50  - TTE: EF 56%, G1DD, mild-mod MR, mod TR, mild VA, mild pHTN  - Cardio recs appreciated       - Patient is poor candidate for MARY       - consider full endocarditis abx course    #MISC  - DVT ppx: SCDs  - GI ppx: not needed  - Activity: as tolerated  - Diet: NPO w/ tube feed    Pending: CTA chest, S&S c/s, vEEG, palliative c/s, RUQ US, DIC panel, LE duplex

## 2025-02-03 NOTE — PROGRESS NOTE ADULT - SUBJECTIVE AND OBJECTIVE BOX
Over Night Events: events noted, remains critically ill, sp reintubation, on propofol, precedex    PHYSICAL EXAM    ICU Vital Signs Last 24 Hrs  T(C): 37.5 (03 Feb 2025 08:00), Max: 37.8 (02 Feb 2025 20:00)  T(F): 99.5 (03 Feb 2025 08:00), Max: 100.1 (02 Feb 2025 20:00)  HR: 71 (03 Feb 2025 08:00) (66 - 118)  BP: 116/67 (03 Feb 2025 08:00) (79/49 - 144/63)  BP(mean): 85 (03 Feb 2025 08:00) (59 - 104)  RR: 18 (03 Feb 2025 08:00) (17 - 33)  SpO2: 100% (03 Feb 2025 08:00) (90% - 100%)    O2 Parameters below as of 03 Feb 2025 08:00  Patient On (Oxygen Delivery Method): ventilator    O2 Concentration (%): 100        General: ill looking  ETT  Lungs: Bilateral Rhonchi  Cardiovascular: Regular   Abdomen: Soft, Positive BS  Extremities: No clubbing   cachectic      02-02-25 @ 07:01  -  02-03-25 @ 07:00  --------------------------------------------------------  IN:    Dexmedetomidine: 196.1 mL    dextrose 5%: 250 mL    Free Water: 550 mL    IV PiggyBack: 400 mL    Jevity: 1200 mL    Lactated Ringers: 850 mL    Norepinephrine: 6.5 mL    Norepinephrine: 10.4 mL    Propofol: 74.9 mL  Total IN: 3537.9 mL    OUT:    Voided (mL): 400 mL  Total OUT: 400 mL    Total NET: 3137.9 mL      02-03-25 @ 07:01  -  02-03-25 @ 08:38  --------------------------------------------------------  IN:    Dexmedetomidine: 19.2 mL    Propofol: 9.2 mL  Total IN: 28.4 mL    OUT:  Total OUT: 0 mL    Total NET: 28.4 mL          LABS:                          10.7   13.01 )-----------( 117      ( 03 Feb 2025 05:14 )             33.2                                               02-03    149[H]  |  111[H]  |  49[H]  ----------------------------<  163[H]  4.1   |  30  |  0.8    Ca    7.8[L]      03 Feb 2025 05:14  Mg     2.3     02-03    TPro  4.0[L]  /  Alb  2.5[L]  /  TBili  3.0[H]  /  DBili  x   /  AST  26  /  ALT  <5  /  AlkPhos  124[H]  02-03      PT/INR - ( 03 Feb 2025 02:07 )   PT: 24.20 sec;   INR: 2.02 ratio         PTT - ( 03 Feb 2025 02:07 )  PTT:31.3 sec                                       Urinalysis Basic - ( 03 Feb 2025 05:14 )    Color: x / Appearance: x / SG: x / pH: x  Gluc: 163 mg/dL / Ketone: x  / Bili: x / Urobili: x   Blood: x / Protein: x / Nitrite: x   Leuk Esterase: x / RBC: x / WBC x   Sq Epi: x / Non Sq Epi: x / Bacteria: x                                                  LIVER FUNCTIONS - ( 03 Feb 2025 05:14 )  Alb: 2.5 g/dL / Pro: 4.0 g/dL / ALK PHOS: 124 U/L / ALT: <5 U/L / AST: 26 U/L / GGT: x                                                  Culture - Blood (collected 31 Jan 2025 17:22)  Source: .Blood BLOOD  Preliminary Report (02 Feb 2025 23:01):    No growth at 48 Hours    Culture - Blood (collected 31 Jan 2025 17:22)  Source: .Blood BLOOD  Preliminary Report (02 Feb 2025 23:01):    No growth at 48 Hours                                                   Mode: AC/ CMV (Assist Control/ Continuous Mandatory Ventilation)  RR (machine): 16  TV (machine): 400  FiO2: 100  PEEP: 8  ITime: 1  MAP: 11  PIP: 30                                      ABG - ( 03 Feb 2025 04:36 )  pH, Arterial: 7.45  pH, Blood: x     /  pCO2: 48    /  pO2: 67    / HCO3: 33    / Base Excess: 8.1   /  SaO2: 94.9          P/P 17/14      MEDICATIONS  (STANDING):  carbidopa/levodopa  25/250 2 Tablet(s) Oral three times a day  chlorhexidine 0.12% Liquid 15 milliLiter(s) Oral Mucosa every 12 hours  chlorhexidine 2% Cloths 1 Application(s) Topical <User Schedule>  dexMEDEtomidine Infusion 0.5 MICROgram(s)/kG/Hr (6.39 mL/Hr) IV Continuous <Continuous>  norepinephrine Infusion 0.05 MICROgram(s)/kG/Min (4.79 mL/Hr) IV Continuous <Continuous>  oseltamivir 30 milliGRAM(s) Oral two times a day  oseltamivir      piperacillin/tazobactam IVPB.. 3.375 Gram(s) IV Intermittent every 8 hours  propofol Infusion 0.5 MICROgram(s)/kG/Min (0.15 mL/Hr) IV Continuous <Continuous>  rasagiline Tablet 1 milliGRAM(s) Oral <User Schedule>    MEDICATIONS  (PRN):  acetaminophen     Tablet .. 650 milliGRAM(s) Oral every 6 hours PRN Temp greater or equal to 38C (100.4F), Mild Pain (1 - 3)  losartan 25 milliGRAM(s) Oral daily PRN >140 SBP    CXR noted

## 2025-02-04 LAB
ALBUMIN SERPL ELPH-MCNC: 2.4 G/DL — LOW (ref 3.5–5.2)
ALP SERPL-CCNC: 171 U/L — HIGH (ref 30–115)
ALT FLD-CCNC: <5 U/L — SIGNIFICANT CHANGE UP (ref 0–41)
ANION GAP SERPL CALC-SCNC: 11 MMOL/L — SIGNIFICANT CHANGE UP (ref 7–14)
APTT BLD: 29.8 SEC — SIGNIFICANT CHANGE UP (ref 27–39.2)
APTT BLD: 33.5 SEC — SIGNIFICANT CHANGE UP (ref 27–39.2)
APTT BLD: 48.3 SEC — HIGH (ref 27–39.2)
AST SERPL-CCNC: 37 U/L — SIGNIFICANT CHANGE UP (ref 0–41)
BASOPHILS # BLD AUTO: 0.02 K/UL — SIGNIFICANT CHANGE UP (ref 0–0.2)
BASOPHILS NFR BLD AUTO: 0.1 % — SIGNIFICANT CHANGE UP (ref 0–1)
BILIRUB SERPL-MCNC: 5.2 MG/DL — HIGH (ref 0.2–1.2)
BUN SERPL-MCNC: 53 MG/DL — HIGH (ref 10–20)
CALCIUM SERPL-MCNC: 8.1 MG/DL — LOW (ref 8.4–10.4)
CHLORIDE SERPL-SCNC: 111 MMOL/L — HIGH (ref 98–110)
CO2 SERPL-SCNC: 27 MMOL/L — SIGNIFICANT CHANGE UP (ref 17–32)
CREAT SERPL-MCNC: 1.1 MG/DL — SIGNIFICANT CHANGE UP (ref 0.7–1.5)
EGFR: 72 ML/MIN/1.73M2 — SIGNIFICANT CHANGE UP
EGFR: 72 ML/MIN/1.73M2 — SIGNIFICANT CHANGE UP
EOSINOPHIL # BLD AUTO: 0.02 K/UL — SIGNIFICANT CHANGE UP (ref 0–0.7)
EOSINOPHIL NFR BLD AUTO: 0.1 % — SIGNIFICANT CHANGE UP (ref 0–8)
GAS PNL BLDA: SIGNIFICANT CHANGE UP
GLUCOSE SERPL-MCNC: 175 MG/DL — HIGH (ref 70–99)
GRAM STN FLD: ABNORMAL
HCT VFR BLD CALC: 39.7 % — LOW (ref 42–52)
HEPARIN-PF4 AB RESULT: <0.6 U/ML — SIGNIFICANT CHANGE UP (ref 0–0.9)
HEPARIN-PF4 AB RESULT: <0.6 U/ML — SIGNIFICANT CHANGE UP (ref 0–0.9)
HGB BLD-MCNC: 12.8 G/DL — LOW (ref 14–18)
IMM GRANULOCYTES NFR BLD AUTO: 1 % — HIGH (ref 0.1–0.3)
INR BLD: 1.34 RATIO — HIGH (ref 0.65–1.3)
INR BLD: 1.79 RATIO — HIGH (ref 0.65–1.3)
LYMPHOCYTES # BLD AUTO: 0.58 K/UL — LOW (ref 1.2–3.4)
LYMPHOCYTES # BLD AUTO: 3.5 % — LOW (ref 20.5–51.1)
MAGNESIUM SERPL-MCNC: 2.2 MG/DL — SIGNIFICANT CHANGE UP (ref 1.8–2.4)
MCHC RBC-ENTMCNC: 30.7 PG — SIGNIFICANT CHANGE UP (ref 27–31)
MCHC RBC-ENTMCNC: 32.2 G/DL — SIGNIFICANT CHANGE UP (ref 32–37)
MCV RBC AUTO: 95.2 FL — HIGH (ref 80–94)
MONOCYTES # BLD AUTO: 0.76 K/UL — HIGH (ref 0.1–0.6)
MONOCYTES NFR BLD AUTO: 4.6 % — SIGNIFICANT CHANGE UP (ref 1.7–9.3)
NEUTROPHILS # BLD AUTO: 15.14 K/UL — HIGH (ref 1.4–6.5)
NEUTROPHILS NFR BLD AUTO: 90.7 % — HIGH (ref 42.2–75.2)
NRBC # BLD: 0 /100 WBCS — SIGNIFICANT CHANGE UP (ref 0–0)
NRBC BLD-RTO: 0 /100 WBCS — SIGNIFICANT CHANGE UP (ref 0–0)
PF4 HEPARIN CMPLX AB SER-ACNC: NEGATIVE — SIGNIFICANT CHANGE UP
PF4 HEPARIN CMPLX AB SER-ACNC: NEGATIVE — SIGNIFICANT CHANGE UP
PHOSPHATE SERPL-MCNC: 3.9 MG/DL — SIGNIFICANT CHANGE UP (ref 2.1–4.9)
PLATELET # BLD AUTO: 156 K/UL — SIGNIFICANT CHANGE UP (ref 130–400)
PMV BLD: 13.2 FL — HIGH (ref 7.4–10.4)
POTASSIUM SERPL-MCNC: 4.5 MMOL/L — SIGNIFICANT CHANGE UP (ref 3.5–5)
POTASSIUM SERPL-SCNC: 4.5 MMOL/L — SIGNIFICANT CHANGE UP (ref 3.5–5)
PROT SERPL-MCNC: 4.5 G/DL — LOW (ref 6–8)
PROTHROM AB SERPL-ACNC: 15.9 SEC — HIGH (ref 9.95–12.87)
PROTHROM AB SERPL-ACNC: 21.4 SEC — HIGH (ref 9.95–12.87)
RBC # BLD: 4.17 M/UL — LOW (ref 4.7–6.1)
RBC # FLD: 13.4 % — SIGNIFICANT CHANGE UP (ref 11.5–14.5)
SODIUM SERPL-SCNC: 149 MMOL/L — HIGH (ref 135–146)
SPECIMEN SOURCE: SIGNIFICANT CHANGE UP
WBC # BLD: 16.69 K/UL — HIGH (ref 4.8–10.8)
WBC # FLD AUTO: 16.69 K/UL — HIGH (ref 4.8–10.8)

## 2025-02-04 PROCEDURE — 74177 CT ABD & PELVIS W/CONTRAST: CPT | Mod: 26

## 2025-02-04 PROCEDURE — 99291 CRITICAL CARE FIRST HOUR: CPT | Mod: 25

## 2025-02-04 PROCEDURE — 31624 DX BRONCHOSCOPE/LAVAGE: CPT

## 2025-02-04 PROCEDURE — 71045 X-RAY EXAM CHEST 1 VIEW: CPT | Mod: 26

## 2025-02-04 RX ORDER — METRONIDAZOLE 250 MG
500 TABLET ORAL EVERY 12 HOURS
Refills: 0 | Status: DISCONTINUED | OUTPATIENT
Start: 2025-02-04 | End: 2025-02-04

## 2025-02-04 RX ORDER — FENTANYL CITRATE-0.9 % NACL/PF 100MCG/2ML
0.5 SYRINGE (ML) INTRAVENOUS
Qty: 2500 | Refills: 0 | Status: DISCONTINUED | OUTPATIENT
Start: 2025-02-04 | End: 2025-02-04

## 2025-02-04 RX ORDER — ACETAMINOPHEN 500 MG/5ML
1000 LIQUID (ML) ORAL ONCE
Refills: 0 | Status: COMPLETED | OUTPATIENT
Start: 2025-02-04 | End: 2025-02-04

## 2025-02-04 RX ORDER — HEPARIN SODIUM 1000 [USP'U]/ML
2000 INJECTION INTRAVENOUS; SUBCUTANEOUS ONCE
Refills: 0 | Status: COMPLETED | OUTPATIENT
Start: 2025-02-04 | End: 2025-02-04

## 2025-02-04 RX ORDER — IOHEXOL 350 MG/ML
30 INJECTION, SOLUTION INTRAVENOUS ONCE
Refills: 0 | Status: COMPLETED | OUTPATIENT
Start: 2025-02-04 | End: 2025-02-04

## 2025-02-04 RX ORDER — HEPARIN SODIUM 1000 [USP'U]/ML
6.5 INJECTION INTRAVENOUS; SUBCUTANEOUS
Qty: 25000 | Refills: 0 | Status: DISCONTINUED | OUTPATIENT
Start: 2025-02-04 | End: 2025-02-04

## 2025-02-04 RX ORDER — METRONIDAZOLE 250 MG
500 TABLET ORAL EVERY 8 HOURS
Refills: 0 | Status: DISCONTINUED | OUTPATIENT
Start: 2025-02-04 | End: 2025-02-07

## 2025-02-04 RX ORDER — LACTULOSE 10 G/15ML
20 SOLUTION ORAL EVERY 8 HOURS
Refills: 0 | Status: DISCONTINUED | OUTPATIENT
Start: 2025-02-04 | End: 2025-02-05

## 2025-02-04 RX ORDER — FENTANYL CITRATE-0.9 % NACL/PF 100MCG/2ML
0.5 SYRINGE (ML) INTRAVENOUS
Qty: 2500 | Refills: 0 | Status: DISCONTINUED | OUTPATIENT
Start: 2025-02-04 | End: 2025-02-05

## 2025-02-04 RX ADMIN — OSELTAMIVIR PHOSPHATE 75 MILLIGRAM(S): 75 CAPSULE ORAL at 06:48

## 2025-02-04 RX ADMIN — POLYETHYLENE GLYCOL 3350 17 GRAM(S): 17 POWDER, FOR SOLUTION ORAL at 17:16

## 2025-02-04 RX ADMIN — NAFCILLIN 200 GRAM(S): 2 INJECTION, SOLUTION INTRAVENOUS at 21:35

## 2025-02-04 RX ADMIN — DEXMEDETOMIDINE HYDROCHLORIDE IN SODIUM CHLORIDE 6.39 MICROGRAM(S)/KG/HR: 4 INJECTION INTRAVENOUS at 13:39

## 2025-02-04 RX ADMIN — Medication 100 MILLIGRAM(S): at 10:22

## 2025-02-04 RX ADMIN — Medication 102 MILLIGRAM(S): at 23:09

## 2025-02-04 RX ADMIN — DEXMEDETOMIDINE HYDROCHLORIDE IN SODIUM CHLORIDE 6.39 MICROGRAM(S)/KG/HR: 4 INJECTION INTRAVENOUS at 18:01

## 2025-02-04 RX ADMIN — Medication 40 MILLIGRAM(S): at 13:46

## 2025-02-04 RX ADMIN — NAFCILLIN 200 GRAM(S): 2 INJECTION, SOLUTION INTRAVENOUS at 17:16

## 2025-02-04 RX ADMIN — LACTULOSE 20 GRAM(S): 10 SOLUTION ORAL at 13:38

## 2025-02-04 RX ADMIN — Medication 2 TABLET(S): at 21:34

## 2025-02-04 RX ADMIN — CEFEPIME 100 MILLIGRAM(S): 2 INJECTION, POWDER, FOR SOLUTION INTRAVENOUS at 13:39

## 2025-02-04 RX ADMIN — CEFEPIME 100 MILLIGRAM(S): 2 INJECTION, POWDER, FOR SOLUTION INTRAVENOUS at 21:35

## 2025-02-04 RX ADMIN — PROPOFOL 0.15 MICROGRAM(S)/KG/MIN: 10 INJECTION, EMULSION INTRAVENOUS at 18:01

## 2025-02-04 RX ADMIN — LACTULOSE 20 GRAM(S): 10 SOLUTION ORAL at 10:25

## 2025-02-04 RX ADMIN — POLYETHYLENE GLYCOL 3350 17 GRAM(S): 17 POWDER, FOR SOLUTION ORAL at 06:49

## 2025-02-04 RX ADMIN — CEFEPIME 100 MILLIGRAM(S): 2 INJECTION, POWDER, FOR SOLUTION INTRAVENOUS at 06:48

## 2025-02-04 RX ADMIN — Medication 15 MILLILITER(S): at 19:33

## 2025-02-04 RX ADMIN — Medication 2 TABLET(S): at 13:40

## 2025-02-04 RX ADMIN — Medication 100 MILLIGRAM(S): at 21:35

## 2025-02-04 RX ADMIN — Medication 2 TABLET(S): at 06:48

## 2025-02-04 RX ADMIN — NAFCILLIN 200 GRAM(S): 2 INJECTION, SOLUTION INTRAVENOUS at 02:34

## 2025-02-04 RX ADMIN — NAFCILLIN 200 GRAM(S): 2 INJECTION, SOLUTION INTRAVENOUS at 14:40

## 2025-02-04 RX ADMIN — PROPOFOL 0.15 MICROGRAM(S)/KG/MIN: 10 INJECTION, EMULSION INTRAVENOUS at 10:17

## 2025-02-04 RX ADMIN — Medication 1 APPLICATION(S): at 06:49

## 2025-02-04 RX ADMIN — OSELTAMIVIR PHOSPHATE 75 MILLIGRAM(S): 75 CAPSULE ORAL at 17:16

## 2025-02-04 RX ADMIN — IOHEXOL 30 MILLILITER(S): 350 INJECTION, SOLUTION INTRAVENOUS at 10:26

## 2025-02-04 RX ADMIN — Medication 650 MILLIGRAM(S): at 06:30

## 2025-02-04 RX ADMIN — Medication 400 MILLIGRAM(S): at 03:30

## 2025-02-04 RX ADMIN — LACTULOSE 20 GRAM(S): 10 SOLUTION ORAL at 21:34

## 2025-02-04 RX ADMIN — Medication 102 MILLIGRAM(S): at 16:32

## 2025-02-04 RX ADMIN — RASAGILINE 1 MILLIGRAM(S): 0.5 TABLET ORAL at 06:49

## 2025-02-04 RX ADMIN — Medication 15 MILLILITER(S): at 06:49

## 2025-02-04 RX ADMIN — NOREPINEPHRINE BITARTRATE 4.79 MICROGRAM(S)/KG/MIN: 8 SOLUTION at 10:17

## 2025-02-04 RX ADMIN — NAFCILLIN 200 GRAM(S): 2 INJECTION, SOLUTION INTRAVENOUS at 10:00

## 2025-02-04 RX ADMIN — HEPARIN SODIUM 2000 UNIT(S): 1000 INJECTION INTRAVENOUS; SUBCUTANEOUS at 05:18

## 2025-02-04 RX ADMIN — Medication 1000 MILLIGRAM(S): at 04:00

## 2025-02-04 RX ADMIN — NAFCILLIN 200 GRAM(S): 2 INJECTION, SOLUTION INTRAVENOUS at 06:49

## 2025-02-04 RX ADMIN — Medication 650 MILLIGRAM(S): at 07:00

## 2025-02-04 RX ADMIN — HEPARIN SODIUM 0.07 UNIT(S)/HR: 1000 INJECTION INTRAVENOUS; SUBCUTANEOUS at 05:19

## 2025-02-04 RX ADMIN — Medication 100 MILLIGRAM(S): at 16:33

## 2025-02-04 NOTE — CONSULT NOTE ADULT - ASSESSMENT
INTERVENTIONAL RADIOLOGY CONSULT:     HPI:  69-year-old male past medical history of hypertension, Parkinson's, CAD presents for shortness of breath and cough of 1 day duration. History was obtained from patient's wife who noted that the patient has been having decreased po intake, activity, and unintentional weight loss for some time. Howver, yesterday he started having cough and shortness of breath.   Denies any sick contacts or choking on food.    InED pt was found to be hypoxic and in respiratory distress. He was placed on FAcemask O2 then switched to AVAPS.  He is somnolent/lethargic and unable to follow commad. opens eyes slightly when on physical stimuli .    Work up showed AHRF and sepsis 2/2 to mutilobar PNA. (29 Jan 2025 15:13)      PAST MEDICAL & SURGICAL HISTORY:  Parkinson disease      CAD (coronary artery disease)      History of basal cell carcinoma (BCC) excision        MEDICATIONS  (STANDING):  carbidopa/levodopa  25/250 2 Tablet(s) Oral three times a day  cefepime   IVPB      cefepime   IVPB 2000 milliGRAM(s) IV Intermittent every 8 hours  chlorhexidine 0.12% Liquid 15 milliLiter(s) Oral Mucosa every 12 hours  chlorhexidine 2% Cloths 1 Application(s) Topical <User Schedule>  dexMEDEtomidine Infusion 0.5 MICROgram(s)/kG/Hr (6.39 mL/Hr) IV Continuous <Continuous>  fentaNYL   Infusion. 0.501 MICROgram(s)/kG/Hr (2.56 mL/Hr) IV Continuous <Continuous>  lactulose Syrup 20 Gram(s) Oral every 8 hours  metroNIDAZOLE  IVPB 500 milliGRAM(s) IV Intermittent every 8 hours  nafcillin  IVPB 2 Gram(s) IV Intermittent every 4 hours  nafcillin  IVPB      norepinephrine Infusion 0.05 MICROgram(s)/kG/Min (4.79 mL/Hr) IV Continuous <Continuous>  oseltamivir 75 milliGRAM(s) Oral two times a day  pantoprazole   Suspension 40 milliGRAM(s) Oral daily  polyethylene glycol 3350 17 Gram(s) Oral every 12 hours  propofol Infusion 0.5 MICROgram(s)/kG/Min (0.15 mL/Hr) IV Continuous <Continuous>  rasagiline Tablet 1 milliGRAM(s) Oral <User Schedule>  senna 2 Tablet(s) Oral at bedtime    MEDICATIONS  (PRN):  acetaminophen     Tablet .. 650 milliGRAM(s) Oral every 6 hours PRN Temp greater or equal to 38C (100.4F), Mild Pain (1 - 3)  losartan 25 milliGRAM(s) Oral daily PRN >140 SBP    Allergies    Allergy Status Unknown    Intolerances        Physical Exam:   Vital Signs Last 24 Hrs  T(C): 39.2 (04 Feb 2025 07:00), Max: 39.9 (04 Feb 2025 04:30)  T(F): 102.5 (04 Feb 2025 07:00), Max: 103.8 (04 Feb 2025 04:30)  HR: 110 (04 Feb 2025 11:00) (87 - 128)  BP: 125/59 (04 Feb 2025 11:00) (61/38 - 141/59)  BP(mean): 84 (04 Feb 2025 11:00) (44 - 97)  RR: 13 (04 Feb 2025 11:00) (12 - 37)  SpO2: 96% (04 Feb 2025 11:00) (93% - 100%)    Parameters below as of 04 Feb 2025 11:00  Patient On (Oxygen Delivery Method): ventilator    O2 Concentration (%): 40    Labs:                         12.8   16.69 )-----------( 156      ( 04 Feb 2025 04:54 )             39.7     02-04    149[H]  |  111[H]  |  53[H]  ----------------------------<  175[H]  4.5   |  27  |  1.1    Ca    8.1[L]      04 Feb 2025 04:54  Phos  3.9     02-04  Mg     2.2     02-04    TPro  4.5[L]  /  Alb  2.4[L]  /  TBili  5.2[H]  /  DBili  x   /  AST  37  /  ALT  <5  /  AlkPhos  171[H]  02-04    PT/INR - ( 03 Feb 2025 02:07 )   PT: 24.20 sec;   INR: 2.02 ratio         PTT - ( 04 Feb 2025 11:00 )  PTT:48.3 sec    ASSESSMENT/PLAN:   69F on ASA (last dose Jan 31) and heparin gtt, admitted to MICU for AHRF and sepsis secondary to PNA. US 2/2/25 shows GB sludge, no gallbladder wall thickening, and negative sonographic Navarro's. CT 2/4/25 shows a distended GB with pericholecystic fluid. WBC is increasing an team is concerned about cholecystitis as the cause. IR consulted for perc haydee tube. NPO.  - INR is currently 2.02. Need <1.5 for the procedure.  - Heparin gtt will need to be off for 6 hours prior to procedure.  - If INR<1.5 tomorrow AM, please call IR for possible add-on case.     Thank you for the courtesy of this consult. Please call Interventional Radiology [phone] (M-F, until 5pm) or [phone] (all other hours) if questions.

## 2025-02-04 NOTE — PROCEDURE NOTE - NSBRONCHPROCDETAILS_GEN_A_CORE_FT
PROCEDURE PERFORMED:  Bronchoscopy, washing.    CONSENT: After explanining the risk and benefit the consent was obtained from     The patient had been previously intubated and was on mechanical ventilatory support. He was on sedation, which was continued and adjusted during the procedure. His FiO2 was increased to 100% during the procedure. The  fiberoptic bronchoscope was introduced through an endotracheal tube adaptor and the tip of the endotracheal tube was noted to be in good position above the minh.   The Right tracheobronchial tree was inspected closely to the level of the subsegmental bronchi. All bronchi are patent with no endobronchial lesions and no mucosal lesions noted. There were copious amount of secretions in The RML lobe, thick   The Left tracheobronchial tree was also patent and intact with the mucosa normal   The bronchoscope was then introduced to the R lobe and secretions were suctioned   The procedure was completed and all samples were submitted for appropriate studies  After adequate clearing of secretions was accomplished, the bronchoscope was removed from the patient and the procedure was terminated.   The patient tolerated the procedure well and there were no complications. PROCEDURE PERFORMED:  Bronchoscopy, washing.    CONSENT: After explanining the risk and benefit the consent was obtained from     The patient had been previously intubated and was on mechanical ventilatory support. He was on sedation, which was continued and adjusted during the procedure. His FiO2 was increased to 100% during the procedure. The  fiberoptic bronchoscope was introduced through an endotracheal tube adaptor and the tip of the endotracheal tube was noted to be in good position above the minh.   The Right tracheobronchial tree was inspected closely to the level of the subsegmental bronchi. All bronchi are patent with no endobronchial lesions and no mucosal lesions noted. There were copious amount of secretions in The RML/ RLL lobe, thick   The Left tracheobronchial tree was also patent and intact with the mucosa normal   The bronchoscope was then introduced to the R lobe and secretions were suctioned   The procedure was completed and all samples were submitted for appropriate studies  After adequate clearing of secretions was accomplished, the bronchoscope was removed from the patient and the procedure was terminated.   The patient tolerated the procedure well and there were no complications.

## 2025-02-04 NOTE — CHART NOTE - NSCHARTNOTEFT_GEN_A_CORE
Registered Dietitian Follow-Up     Patient Profile Reviewed                           Yes [x]   No []     Nutrition History Previously Obtained        Yes [x]  No []       Pertinent Subjective Information:  Patient noted to be intubated at this time. Propofol infusing at 12.2 mL/hr at time of RD visit     Pertinent Medical Interventions:  Acute Hypoxic Respiratory Failure so reintubation  Toxic Metabolic Encephalopathy  CAP likely aspiration   Influenza A  MSSA bacteremia repeat CX -  Sepsis POA  cholecystitis  Periventricular bleed  Elevated Bilirubin  ?Mural thrombus in PA    Feeding NPO; CT AP     Diet order:   Diet, NPO:   Except Medications (25 @ 08:41) [Active]    Anthropometrics:  Height (cm): 165 (25 @ 20:33)  Weight (kg): 51.1 (25 @ 20:33)  BMI (kg/m2): 18.8 (25 @ 20:33)  IBW: 61.8 kg     Daily Weight in k (), Weight in k.5 (), Weight in k.8 (), Weight in k.1 ()  51.1 kg () vs 58 kg ()  indicating 13.5% weight gain x 4 days    MEDICATIONS  (STANDING):  carbidopa/levodopa  25/250 2 Tablet(s) Oral three times a day  cefepime   IVPB      cefepime   IVPB 2000 milliGRAM(s) IV Intermittent every 8 hours  chlorhexidine 0.12% Liquid 15 milliLiter(s) Oral Mucosa every 12 hours  chlorhexidine 2% Cloths 1 Application(s) Topical <User Schedule>  dexMEDEtomidine Infusion 0.5 MICROgram(s)/kG/Hr (6.39 mL/Hr) IV Continuous <Continuous>  fentaNYL   Infusion. 0.501 MICROgram(s)/kG/Hr (2.56 mL/Hr) IV Continuous <Continuous>  heparin  Infusion 6.5 Unit(s)/Hr (8.5 mL/Hr) IV Continuous <Continuous>  lactulose Syrup 20 Gram(s) Oral every 8 hours  metroNIDAZOLE  IVPB 500 milliGRAM(s) IV Intermittent every 8 hours  nafcillin  IVPB      nafcillin  IVPB 2 Gram(s) IV Intermittent every 4 hours  norepinephrine Infusion 0.05 MICROgram(s)/kG/Min (4.79 mL/Hr) IV Continuous <Continuous>  oseltamivir 75 milliGRAM(s) Oral two times a day  pantoprazole   Suspension 40 milliGRAM(s) Oral daily  polyethylene glycol 3350 17 Gram(s) Oral every 12 hours  propofol Infusion 0.5 MICROgram(s)/kG/Min (0.15 mL/Hr) IV Continuous <Continuous>  rasagiline Tablet 1 milliGRAM(s) Oral <User Schedule>  senna 2 Tablet(s) Oral at bedtime    MEDICATIONS  (PRN):  acetaminophen     Tablet .. 650 milliGRAM(s) Oral every 6 hours PRN Temp greater or equal to 38C (100.4F), Mild Pain (1 - 3)  losartan 25 milliGRAM(s) Oral daily PRN >140 SBP    Pertinent Labs: - @ 04:54: Na 149[H], BUN 53[H], Cr 1.1, [H], K+ 4.5, Phos 3.9, Mg 2.2, Alk Phos 171[H], ALT/SGPT <5, AST/SGOT 37, HbA1c --    Finger Sticks:    Physical Findings:  - Appearance: intubated  - GI function: no recent BM documented   - Tubes: +OGT  - Oral/Mouth cavity: NPO   - Skin: Per WOCN note 2/: evolving DTI to Sacrococcygeal region extending to left buttock; DTI to Left Heel  - Edema: no edema documented      Nutrition Requirements:  Weight Used: 51.1 kg      Estimated Energy Needs    Continue []  Adjust [x]  1277 - 1533 kcal/day (25-30 kcal/kg) vs 1885.86 kcal/day (PSE Ve 9.2, Tmax 39.9)   +330 kcal from propofol (12.5 mL/hr)     Estimated Protein Needs    Continue []  Adjust [x]  61 - 77 gm/day (1.2 - 1.5 gm/kg)      Estimated Fluid Needs        Continue []  Adjust [x]  1 mL/kcal      Nutrient Intake: NPO     [x] Previous Nutrition Diagnosis:  Severe PCM             [x] Ongoing          [] Resolved    Nutrition Education: deferred     Nutrition Intervention:  EN recs, coordination of care     Goal/Expected Outcome:   EN to meet >85% and <105% of estimated nutrient needs within 3-5 days      Indicator/Monitoring:   EN initiation, BM, GI s/s, weight, labs, skin status, NFPF     Recommendation:   1) When medically feasible to initiate EN, would recommend 18 continuous hour feeds with Vital HP (low fat formula while on propofol) formula starting at 20 mL and advancing 10 mL q4hrs until goal rate of 50 mL x 18hrs is reached.    This will provide: 900 kcal + 330 kcal from propofol = 1230 kcal, 78.7 gm Protein, 756 mL free water from formula + recommend 50 mL water flushes q6hrs = 956 mL total water (or per Team)    IF PATIENT IS OFF OF PROPOFOL:  Recommend 18hr continuous feeds of Peptamen AF formula (as glucose appears elevated at this time). Start feeds at 20 mL and advance 10 mL q4hrs until goal rate of  55 mL x 18 hrs is reached.    This will provide:  990 mL total volume, 1188 kcal, 77.2 gm Protein, 801.9 mL free water from formula + recommend 50 mL water flushes q6hrs = 1001.9 mL water (or per Team)    2) Monitor electrolytes, BG, renal profile  3) Monitor BM, GI s/s - continue bowel regimen when starting feeds    Patient is at high nutrition risk, RD to f/u  RD to remain available: Dayanna Jeffers, MACARIO [phone] or via Teams

## 2025-02-04 NOTE — PROGRESS NOTE ADULT - ASSESSMENT
IMPRESSION:    Acute Hypoxic Respiratory Failure so reintubation  Toxic Metabolic Encephalopathy  CAP likely aspiration   Influenza A  MSSA bacteremia repeat CX -  Sepsis POA  Periventricular bleed  Elevated Bilirubin  ?Mural thrombus in PA  HO Parkinson's Disease   HO CAD    PLAN:    CNS:  Avoid depressants.  CTH. noted with right periventricular bleed stable. Neuro on board. Continue Anti parkinson's.  vEEG.      HEENT: Oral care.      PULMONARY:  HOB @ 45 degrees.  Aspiration precautions.   PEEP 8, dec FIO2, Monitor airway pressures    CARDIOVASCULAR:  Avoid overload.  Cardio for MARY    GI: GI prophylaxis.  FEEDING, free water    RENAL:  Follow up lytes.  Free water.   BMP in PM.  Monitor UO    INFECTIOUS DISEASE: Cultures noted.  tamiflu, naf, cefepime    HEMATOLOGICAL:  DVT prophylaxis.  HIT abx -, IV hep    ENDOCRINE:  Follow up FS.  Insulin protocol if needed.    MUSCULOSKELETAL: Bed rest.  Off loading    Full code  MICU  palliative care eval             IMPRESSION:    Acute Hypoxic Respiratory Failure so reintubation  Toxic Metabolic Encephalopathy  CAP likely aspiration   Influenza A  MSSA bacteremia repeat CX -  Sepsis POA  cholecystitis  Periventricular bleed  Elevated Bilirubin  ?Mural thrombus in PA  HO Parkinson's Disease   HO CAD    PLAN:    CNS:  CTH. noted with right periventricular bleed stable. Neuro on board. Continue Anti parkinson's.  vEEG.  ADD Fentanyl dc propofol    HEENT: Oral care.      PULMONARY:  HOB @ 45 degrees.  Aspiration precautions.   PEEP 8, dec FIO2, Monitor airway pressures, bornch    CARDIOVASCULAR:  Avoid overload.  Cardio for MARY on hold, taper pressors    GI: GI prophylaxis.  FEEDING NPO., CT AP    RENAL:  Follow up lytes.  Free water.   BMP in PM.  Monitor UO    INFECTIOUS DISEASE: Cultures noted.  tamiflu, naf, cefepime    HEMATOLOGICAL:  DVT prophylaxis.  HIT abx -, IV hep    ENDOCRINE:  Follow up FS.  Insulin protocol if needed.    MUSCULOSKELETAL: Bed rest.  Off loading    Full code  MICU  palliative care judith

## 2025-02-04 NOTE — PROGRESS NOTE ADULT - SUBJECTIVE AND OBJECTIVE BOX
SUBJECTIVE/OVERNIGHT EVENTS  Today is hospital day 6d. This morning patient was seen and examined at bedside.     CODE STATUS: FULL    MEDICATIONS  MEDICATIONS  (STANDING):  carbidopa/levodopa  25/250 2 Tablet(s) Oral three times a day  cefepime   IVPB      cefepime   IVPB 2000 milliGRAM(s) IV Intermittent every 8 hours  chlorhexidine 0.12% Liquid 15 milliLiter(s) Oral Mucosa every 12 hours  chlorhexidine 2% Cloths 1 Application(s) Topical <User Schedule>  dexMEDEtomidine Infusion 0.5 MICROgram(s)/kG/Hr (6.39 mL/Hr) IV Continuous <Continuous>  fentaNYL   Infusion. 0.501 MICROgram(s)/kG/Hr (2.56 mL/Hr) IV Continuous <Continuous>  heparin  Infusion 6.5 Unit(s)/Hr (8.5 mL/Hr) IV Continuous <Continuous>  lactulose Syrup 20 Gram(s) Oral every 8 hours  metroNIDAZOLE  IVPB 500 milliGRAM(s) IV Intermittent every 8 hours  nafcillin  IVPB 2 Gram(s) IV Intermittent every 4 hours  nafcillin  IVPB      norepinephrine Infusion 0.05 MICROgram(s)/kG/Min (4.79 mL/Hr) IV Continuous <Continuous>  oseltamivir 75 milliGRAM(s) Oral two times a day  pantoprazole   Suspension 40 milliGRAM(s) Oral daily  polyethylene glycol 3350 17 Gram(s) Oral every 12 hours  propofol Infusion 0.5 MICROgram(s)/kG/Min (0.15 mL/Hr) IV Continuous <Continuous>  rasagiline Tablet 1 milliGRAM(s) Oral <User Schedule>  senna 2 Tablet(s) Oral at bedtime    MEDICATIONS  (PRN):  acetaminophen     Tablet .. 650 milliGRAM(s) Oral every 6 hours PRN Temp greater or equal to 38C (100.4F), Mild Pain (1 - 3)  losartan 25 milliGRAM(s) Oral daily PRN >140 SBP    VITALS  Vital Signs Last 24 Hrs  T(C): 39.2 (04 Feb 2025 07:00), Max: 39.9 (04 Feb 2025 04:30)  T(F): 102.5 (04 Feb 2025 07:00), Max: 103.8 (04 Feb 2025 04:30)  HR: 118 (04 Feb 2025 07:30) (87 - 126)  BP: 111/53 (04 Feb 2025 07:00) (74/50 - 141/59)  BP(mean): 76 (04 Feb 2025 07:00) (58 - 97)  RR: 15 (04 Feb 2025 07:00) (15 - 34)  SpO2: 95% (04 Feb 2025 07:30) (93% - 100%)    Parameters below as of 04 Feb 2025 04:00  Patient On (Oxygen Delivery Method): ventilator    O2 Concentration (%): 40    PHYSICAL EXAM  GENERAL  ( X ) NAD, lying in bed comfortably     (  ) obtunded     (  ) lethargic     (  ) somnolent    HEAD  ( X ) Atraumatic     (  ) hematoma     (  ) laceration (specify location:       )     NECK  ( X ) Supple     (  ) neck stiffness     (  ) nuchal rigidity     (  )  no JVD     (  ) JVD present ( -- cm)    HEART  Rate -->  ( X ) normal rate    (  ) bradycardic    (  ) tachycardic  Rhythm -->  ( X ) regular    (  ) regularly irregular    (  ) irregularly irregular  Murmurs -->  ( X ) normal s1/s2    (  ) systolic murmur    (  ) diastolic murmur    (  ) continuous murmur     (  ) S3 present    (  ) S4 present    LUNGS  ( X )Unlabored respirations     (  ) tachypnea  ( X ) B/L air entry     (  ) decreased breath sounds in:  (location     )    (  ) no adventitious sound     (  ) crackles     ( X ) wheezing- R sided      (  ) rhonchi      (specify location:       )  (  ) chest wall tenderness (specify location:       )    ABDOMEN  ( X ) Soft     (  ) tense   |   ( X ) nondistended     (  ) distended   |   ( X ) +BS     (  ) hypoactive bowel sounds     (  ) hyperactive bowel sounds  ( X ) nontender     (  ) RUQ tenderness     (  ) RLQ tenderness     (  ) LLQ tenderness     (  ) epigastric tenderness     (  ) diffuse tenderness  (  ) Splenomegaly      (  ) Hepatomegaly      (  ) Jaundice     (  ) ecchymosis     EXTREMITIES  ( X ) Normal     (  ) Rash     (  ) ecchymosis     (  ) varicose veins      (  ) pitting edema     (  ) non-pitting edema   (  ) ulceration     (  ) gangrene:     (location:     )    NERVOUS SYSTEM  ( X ) A&Ox1-2     (  ) confused     (  ) lethargic  CN II-XII:     (  ) Intact     (  ) focal deficits  (Specify:     )   Upper extremities:     (  ) strength X/5     (  ) focal deficit (specify:    )  Lower extremities:     (  ) strength  X/5    (  ) focal deficit (specify:    )    SKIN  ( X ) No rashes or lesions     (  ) maculopapular rash     (  ) pustules     (  ) vesicles     (  ) ulcer     (  ) ecchymosis     (specify location:     )      LABS              LABS:                          12.8   16.69 )-----------( 156      ( 04 Feb 2025 04:54 )             39.7     02-04    149[H]  |  111[H]  |  53[H]  ----------------------------<  175[H]  4.5   |  27  |  1.1    Ca    8.1[L]      04 Feb 2025 04:54  Phos  3.9     02-04  Mg     2.2     02-04    TPro  4.5[L]  /  Alb  2.4[L]  /  TBili  5.2[H]  /  DBili  x   /  AST  37  /  ALT  <5  /  AlkPhos  171[H]  02-04    LIVER FUNCTIONS - ( 04 Feb 2025 04:54 )  Alb: 2.4 g/dL / Pro: 4.5 g/dL / ALK PHOS: 171 U/L / ALT: <5 U/L / AST: 37 U/L / GGT: x           PT/INR - ( 03 Feb 2025 02:07 )   PT: 24.20 sec;   INR: 2.02 ratio         PTT - ( 04 Feb 2025 03:03 )  PTT:29.8 sec  Urinalysis Basic - ( 04 Feb 2025 04:54 )    Color: x / Appearance: x / SG: x / pH: x  Gluc: 175 mg/dL / Ketone: x  / Bili: x / Urobili: x   Blood: x / Protein: x / Nitrite: x   Leuk Esterase: x / RBC: x / WBC x   Sq Epi: x / Non Sq Epi: x / Bacteria: x

## 2025-02-04 NOTE — PROGRESS NOTE ADULT - SUBJECTIVE AND OBJECTIVE BOX
GARETT ESCAMILLA  69y, Male  Allergy: Allergy Status Unknown      LOS  6d    CHIEF COMPLAINT: pneumonia (04 Feb 2025 07:04)      INTERVAL EVENTS/HPI  - T(F): , Max: 103.8 (02-04-25 @ 04:30) fever  - WBC Count: 16.69 (02-04-25 @ 04:54)  WBC Count: 13.01 (02-03-25 @ 05:14)  - Creatinine: 1.1 (02-04-25 @ 04:54)  Creatinine: 0.8 (02-03-25 @ 05:14)     -   -   -     ROS  cannot obtain secondary to patient's sedation and/or mental status    VITALS:  T(F): 102.5, Max: 103.8 (02-04-25 @ 04:30)  HR: 118  BP: 111/53  RR: 15Vital Signs Last 24 Hrs  T(C): 39.2 (04 Feb 2025 07:00), Max: 39.9 (04 Feb 2025 04:30)  T(F): 102.5 (04 Feb 2025 07:00), Max: 103.8 (04 Feb 2025 04:30)  HR: 118 (04 Feb 2025 07:30) (75 - 126)  BP: 111/53 (04 Feb 2025 07:00) (59/38 - 141/59)  BP(mean): 76 (04 Feb 2025 07:00) (44 - 97)  RR: 15 (04 Feb 2025 07:00) (15 - 34)  SpO2: 95% (04 Feb 2025 07:30) (93% - 100%)    Parameters below as of 04 Feb 2025 04:00  Patient On (Oxygen Delivery Method): ventilator    O2 Concentration (%): 40    PHYSICAL EXAM:  ***    FH: Non-contributory  Social Hx: Non-contributory    TESTS & MEASUREMENTS:                        12.8   16.69 )-----------( 156      ( 04 Feb 2025 04:54 )             39.7     02-04    149[H]  |  111[H]  |  53[H]  ----------------------------<  175[H]  4.5   |  27  |  1.1    Ca    8.1[L]      04 Feb 2025 04:54  Phos  3.9     02-04  Mg     2.2     02-04    TPro  4.5[L]  /  Alb  2.4[L]  /  TBili  5.2[H]  /  DBili  x   /  AST  37  /  ALT  <5  /  AlkPhos  171[H]  02-04      LIVER FUNCTIONS - ( 04 Feb 2025 04:54 )  Alb: 2.4 g/dL / Pro: 4.5 g/dL / ALK PHOS: 171 U/L / ALT: <5 U/L / AST: 37 U/L / GGT: x           Urinalysis Basic - ( 04 Feb 2025 04:54 )    Color: x / Appearance: x / SG: x / pH: x  Gluc: 175 mg/dL / Ketone: x  / Bili: x / Urobili: x   Blood: x / Protein: x / Nitrite: x   Leuk Esterase: x / RBC: x / WBC x   Sq Epi: x / Non Sq Epi: x / Bacteria: x        Culture - Blood (collected 02-02-25 @ 04:54)  Source: .Blood BLOOD  Preliminary Report (02-03-25 @ 12:01):    No growth at 24 hours    Culture - Blood (collected 01-31-25 @ 17:22)  Source: .Blood BLOOD  Preliminary Report (02-03-25 @ 23:01):    No growth at 72 Hours    Culture - Blood (collected 01-31-25 @ 17:22)  Source: .Blood BLOOD  Preliminary Report (02-03-25 @ 23:01):    No growth at 72 Hours    Urinalysis with Rflx Culture (collected 01-29-25 @ 16:48)    Culture - Blood (collected 01-29-25 @ 10:09)  Source: .Blood BLOOD  Gram Stain (01-30-25 @ 15:40):    Growth in aerobic bottle: Gram Positive Cocci in Clusters    Growth in anaerobic bottle: Gram Positive Cocci in Clusters  Final Report (02-01-25 @ 07:51):    Growth in aerobic and anaerobic bottles: Staphylococcus aureus    Direct identification is available within approximately 3-5    hours either by Blood Panel Multiplexed PCR or Direct    MALDI-TOF. Details: [email or web address]  Organism: Blood Culture PCR  Staphylococcus aureus (02-01-25 @ 07:51)  Organism: Staphylococcus aureus (02-01-25 @ 07:51)      Method Type: KARTIK      -  Ceftaroline: S <=0.5      -  Clindamycin: R <=0.25 This isolate is presumed to be clindamycin resistant based on detection of inducible resistance. Clindamycin may still be effective in some patients.      -  Erythromycin: R >4      -  Gentamicin: S <=4 Should not be used as monotherapy      -  Oxacillin: S <=0.25 Oxacillin predicts susceptibility for dicloxacillin, methicillin, and nafcillin      -  Rifampin: S <=1 Should not be used as monotherapy      -  Tetracycline: S <=4      -  Trimethoprim/Sulfamethoxazole: S <=0.5/9.5      -  Vancomycin: S 1  Organism: Blood Culture PCR (02-01-25 @ 07:51)      Method Type: PCR      -  Methicillin SENSITIVE Staphylococcus aureus (MSSA): Detec Any isolate of Staphylococcus aureus from a blood culture is NOT considered a contaminant.    Culture - Blood (collected 01-29-25 @ 10:09)  Source: .Blood BLOOD  Gram Stain (02-01-25 @ 00:35):    Growth in aerobic bottle: Gram Positive Cocci in Clusters    Growth in anaerobic bottle: Gram Positive Cocci in Clusters  Final Report (02-01-25 @ 16:44):    Growth in aerobic and anaerobic bottles: Staphylococcus aureus    See previous culture [id number]            INFECTIOUS DISEASES TESTING  MRSA PCR Result.: Negative (01-30-25 @ 13:40)  Procalcitonin: 6.93 (01-30-25 @ 07:26)  Legionella Antigen, Urine: Negative (01-29-25 @ 15:49)  Rapid RVP Result: Detected (01-29-25 @ 10:30)      INFLAMMATORY MARKERS      RADIOLOGY & ADDITIONAL TESTS:  I have personally reviewed the last available Chest xray  CXR  Xray Chest 1 View AP/PA:   ACC: [id number] EXAM:  XR CHEST 1 VIEW   ORDERED BY: JANENE URENA     PROCEDURE DATE:  02/02/2025          INTERPRETATION:  CLINICAL HISTORY / REASON FOR EXAM: Line placement.    COMPARISON: Chest radiograph from February 2, 2025.    TECHNIQUE/POSITIONING: The patient is rotated. Single image, AP chest   radiograph.    FINDINGS:    SUPPORT DEVICES: Endotracheal tube with distal tip approximately 3 cm   above the minh. Enteric tube courses below the left hemidiaphragm, with   distal tip overlying the gastric body.    CARDIAC/MEDIASTINUM/HILUM: Unchanged cardiac silhouette.    LUNG PARENCHYMA/PLEURA: Unchanged bilateral patchy opacities.    SKELETON/SOFT TISSUES: Unchanged.      IMPRESSION:    Support devices in satisfactory position.    --- End of Report ---            JENNIE JAIN MD; Attending Radiologist  This document has been electronically signed. Feb 2 2025  9:47PM (02-02-25 @ 21:11)      CT  CT Angio Chest PE Protocol w/ IV Cont:   ACC: [id number] EXAM:  CT ANGIO CHEST PULM ART [facility]   ORDERED BY: DREA JAFFE     PROCEDURE DATE:  02/02/2025          INTERPRETATION:  CLINICAL INDICATION: patent ductus arteriosus    TECHNIQUE:  CTA of the thorax was performed after administration of intravenous   contrast. Sagittal and coronal reformats were performed as well as MIP   reconstructions.    COMPARISON CT: 1/29/2025    INTERPRETATION:    AIRWAYS, LUNGS AND PLEURA: Increased opacities/debris within the central   bronchi with occlusion of the right lower lobe central bronchi. There is   increased consolidation within the right lower lobe. Small bilateral   pleural effusion, increased. Additional findings overall without   significant change    HEART AND VESSELS: There is afocal filling defect within the main   pulmonary artery at site of the ductus arteriosus . Additional findings   without significant change    MEDIASTINUM: No significant change    BONES AND SOFT TISSUES: No significant change    PARTIALLY IMAGED UPPER ABDOMEN: Interval placement of enteric tube with   tip within the stomach. Otherwise no significant change    IMPRESSION:  There is a focal filling defect within the main pulmonary artery at site   of the ductus arteriosus suspicious for thrombus.    Increased opacities/debris within the central bronchi with occlusion of   the right lower lobe central bronchi. There is increased consolidation   within the right lower lobe.          --- End of Report ---            PAL HINDS MD; Attending Radiologist  This document has been electronically signed. Feb 2 2025  9:48PM (02-02-25 @ 13:44)      CARDIOLOGY TESTING  12 Lead ECG:   Ventricular Rate 91 BPM    Atrial Rate 91 BPM    P-R Interval 158 ms    QRS Duration 74 ms    Q-T Interval 366 ms    QTC Calculation(Bazett) 450 ms    P Axis 77 degrees    R Axis 64 degrees    T Axis 68 degrees    Diagnosis Line Normal sinus rhythm  Biatrial enlargement  Abnormal ECG    Confirmed by LILLIE LUND, [facility] ([id number]) on 1/29/2025 12:29:13 PM (01-29-25 @ 10:39)      MEDICATIONS  carbidopa/levodopa  25/250 2  cefepime   IVPB   cefepime   IVPB 2000  chlorhexidine 0.12% Liquid 15  chlorhexidine 2% Cloths 1  dexMEDEtomidine Infusion 0.5  fentaNYL   Infusion. 0.5  heparin  Infusion 6.5  iohexol 300 mG (iodine)/mL Oral Solution 30  lactulose Syrup 20  metroNIDAZOLE  IVPB 500  nafcillin  IVPB 2  nafcillin  IVPB   norepinephrine Infusion 0.05  oseltamivir 75  pantoprazole   Suspension 40  polyethylene glycol 3350 17  propofol Infusion 0.5  rasagiline Tablet 1  senna 2      WEIGHT  Weight (kg): 51.1 (01-31-25 @ 20:33)  Creatinine: 1.1 mg/dL (02-04-25 @ 04:54)      ANTIBIOTICS:  cefepime   IVPB      cefepime   IVPB 2000 milliGRAM(s) IV Intermittent every 8 hours  metroNIDAZOLE  IVPB 500 milliGRAM(s) IV Intermittent every 8 hours  nafcillin  IVPB 2 Gram(s) IV Intermittent every 4 hours  nafcillin  IVPB      oseltamivir 75 milliGRAM(s) Oral two times a day      All available historical records have been reviewed   GARETT ESCAMILLA  69y, Male  Allergy: Allergy Status Unknown      LOS  6d    CHIEF COMPLAINT: pneumonia (04 Feb 2025 07:04)      INTERVAL EVENTS/HPI  - T(F): , Max: 103.8 (02-04-25 @ 04:30) fever, s/p bronch with copious secretions  - WBC Count: 16.69 (02-04-25 @ 04:54)  WBC Count: 13.01 (02-03-25 @ 05:14)  - Creatinine: 1.1 (02-04-25 @ 04:54)  Creatinine: 0.8 (02-03-25 @ 05:14)     -   -   -     ROS  cannot obtain secondary to patient's sedation and/or mental status    VITALS:  T(F): 102.5, Max: 103.8 (02-04-25 @ 04:30)  HR: 118  BP: 111/53  RR: 15Vital Signs Last 24 Hrs  T(C): 39.2 (04 Feb 2025 07:00), Max: 39.9 (04 Feb 2025 04:30)  T(F): 102.5 (04 Feb 2025 07:00), Max: 103.8 (04 Feb 2025 04:30)  HR: 118 (04 Feb 2025 07:30) (75 - 126)  BP: 111/53 (04 Feb 2025 07:00) (59/38 - 141/59)  BP(mean): 76 (04 Feb 2025 07:00) (44 - 97)  RR: 15 (04 Feb 2025 07:00) (15 - 34)  SpO2: 95% (04 Feb 2025 07:30) (93% - 100%)    Parameters below as of 04 Feb 2025 04:00  Patient On (Oxygen Delivery Method): ventilator    O2 Concentration (%): 40    PHYSICAL EXAM:  General: intubated  HEENT: NCAT  Neck: supple  CV: RRR  Lungs: symmetric chest expansion, decreased BS at bases  Abd: Soft  Extr: wwp  Skin: no rash  Neuro: sedated  Lines: no phlebitis     FH: Non-contributory  Social Hx: Non-contributory    TESTS & MEASUREMENTS:                        12.8   16.69 )-----------( 156      ( 04 Feb 2025 04:54 )             39.7     02-04    149[H]  |  111[H]  |  53[H]  ----------------------------<  175[H]  4.5   |  27  |  1.1    Ca    8.1[L]      04 Feb 2025 04:54  Phos  3.9     02-04  Mg     2.2     02-04    TPro  4.5[L]  /  Alb  2.4[L]  /  TBili  5.2[H]  /  DBili  x   /  AST  37  /  ALT  <5  /  AlkPhos  171[H]  02-04      LIVER FUNCTIONS - ( 04 Feb 2025 04:54 )  Alb: 2.4 g/dL / Pro: 4.5 g/dL / ALK PHOS: 171 U/L / ALT: <5 U/L / AST: 37 U/L / GGT: x           Urinalysis Basic - ( 04 Feb 2025 04:54 )    Color: x / Appearance: x / SG: x / pH: x  Gluc: 175 mg/dL / Ketone: x  / Bili: x / Urobili: x   Blood: x / Protein: x / Nitrite: x   Leuk Esterase: x / RBC: x / WBC x   Sq Epi: x / Non Sq Epi: x / Bacteria: x        Culture - Blood (collected 02-02-25 @ 04:54)  Source: .Blood BLOOD  Preliminary Report (02-03-25 @ 12:01):    No growth at 24 hours    Culture - Blood (collected 01-31-25 @ 17:22)  Source: .Blood BLOOD  Preliminary Report (02-03-25 @ 23:01):    No growth at 72 Hours    Culture - Blood (collected 01-31-25 @ 17:22)  Source: .Blood BLOOD  Preliminary Report (02-03-25 @ 23:01):    No growth at 72 Hours    Urinalysis with Rflx Culture (collected 01-29-25 @ 16:48)    Culture - Blood (collected 01-29-25 @ 10:09)  Source: .Blood BLOOD  Gram Stain (01-30-25 @ 15:40):    Growth in aerobic bottle: Gram Positive Cocci in Clusters    Growth in anaerobic bottle: Gram Positive Cocci in Clusters  Final Report (02-01-25 @ 07:51):    Growth in aerobic and anaerobic bottles: Staphylococcus aureus    Direct identification is available within approximately 3-5    hours either by Blood Panel Multiplexed PCR or Direct    MALDI-TOF. Details: [email or web address]  Organism: Blood Culture PCR  Staphylococcus aureus (02-01-25 @ 07:51)  Organism: Staphylococcus aureus (02-01-25 @ 07:51)      Method Type: KARTIK      -  Ceftaroline: S <=0.5      -  Clindamycin: R <=0.25 This isolate is presumed to be clindamycin resistant based on detection of inducible resistance. Clindamycin may still be effective in some patients.      -  Erythromycin: R >4      -  Gentamicin: S <=4 Should not be used as monotherapy      -  Oxacillin: S <=0.25 Oxacillin predicts susceptibility for dicloxacillin, methicillin, and nafcillin      -  Rifampin: S <=1 Should not be used as monotherapy      -  Tetracycline: S <=4      -  Trimethoprim/Sulfamethoxazole: S <=0.5/9.5      -  Vancomycin: S 1  Organism: Blood Culture PCR (02-01-25 @ 07:51)      Method Type: PCR      -  Methicillin SENSITIVE Staphylococcus aureus (MSSA): Detec Any isolate of Staphylococcus aureus from a blood culture is NOT considered a contaminant.    Culture - Blood (collected 01-29-25 @ 10:09)  Source: .Blood BLOOD  Gram Stain (02-01-25 @ 00:35):    Growth in aerobic bottle: Gram Positive Cocci in Clusters    Growth in anaerobic bottle: Gram Positive Cocci in Clusters  Final Report (02-01-25 @ 16:44):    Growth in aerobic and anaerobic bottles: Staphylococcus aureus    See previous culture [id number]            INFECTIOUS DISEASES TESTING  MRSA PCR Result.: Negative (01-30-25 @ 13:40)  Procalcitonin: 6.93 (01-30-25 @ 07:26)  Legionella Antigen, Urine: Negative (01-29-25 @ 15:49)  Rapid RVP Result: Detected (01-29-25 @ 10:30)      INFLAMMATORY MARKERS      RADIOLOGY & ADDITIONAL TESTS:  I have personally reviewed the last available Chest xray  CXR  Xray Chest 1 View AP/PA:   ACC: [id number] EXAM:  XR CHEST 1 VIEW   ORDERED BY: JANENE URENA     PROCEDURE DATE:  02/02/2025          INTERPRETATION:  CLINICAL HISTORY / REASON FOR EXAM: Line placement.    COMPARISON: Chest radiograph from February 2, 2025.    TECHNIQUE/POSITIONING: The patient is rotated. Single image, AP chest   radiograph.    FINDINGS:    SUPPORT DEVICES: Endotracheal tube with distal tip approximately 3 cm   above the minh. Enteric tube courses below the left hemidiaphragm, with   distal tip overlying the gastric body.    CARDIAC/MEDIASTINUM/HILUM: Unchanged cardiac silhouette.    LUNG PARENCHYMA/PLEURA: Unchanged bilateral patchy opacities.    SKELETON/SOFT TISSUES: Unchanged.      IMPRESSION:    Support devices in satisfactory position.    --- End of Report ---            JENNIE JAIN MD; Attending Radiologist  This document has been electronically signed. Feb 2 2025  9:47PM (02-02-25 @ 21:11)      CT  CT Angio Chest PE Protocol w/ IV Cont:   ACC: [id number] EXAM:  CT ANGIO CHEST PULM [facility]   ORDERED BY: DREA JAFFE     PROCEDURE DATE:  02/02/2025          INTERPRETATION:  CLINICAL INDICATION: patent ductus arteriosus    TECHNIQUE:  CTA of the thorax was performed after administration of intravenous   contrast. Sagittal and coronal reformats were performed as well as MIP   reconstructions.    COMPARISON CT: 1/29/2025    INTERPRETATION:    AIRWAYS, LUNGS AND PLEURA: Increased opacities/debris within the central   bronchi with occlusion of the right lower lobe central bronchi. There is   increased consolidation within the right lower lobe. Small bilateral   pleural effusion, increased. Additional findings overall without   significant change    HEART AND VESSELS: There is afocal filling defect within the main   pulmonary artery at site of the ductus arteriosus . Additional findings   without significant change    MEDIASTINUM: No significant change    BONES AND SOFT TISSUES: No significant change    PARTIALLY IMAGED UPPER ABDOMEN: Interval placement of enteric tube with   tip within the stomach. Otherwise no significant change    IMPRESSION:  There is a focal filling defect within the main pulmonary artery at site   of the ductus arteriosus suspicious for thrombus.    Increased opacities/debris within the central bronchi with occlusion of   the right lower lobe central bronchi. There is increased consolidation   within the right lower lobe.          --- End of Report ---            PAL HINDS MD; Attending Radiologist  This document has been electronically signed. Feb 2 2025  9:48PM (02-02-25 @ 13:44)      CARDIOLOGY TESTING  12 Lead ECG:   Ventricular Rate 91 BPM    Atrial Rate 91 BPM    P-R Interval 158 ms    QRS Duration 74 ms    Q-T Interval 366 ms    QTC Calculation(Bazett) 450 ms    P Axis 77 degrees    R Axis 64 degrees    T Axis 68 degrees    Diagnosis Line Normal sinus rhythm  Biatrial enlargement  Abnormal ECG    Confirmed by LILLIE LUND, [facility] ([id number]) on 1/29/2025 12:29:13 PM (01-29-25 @ 10:39)      MEDICATIONS  carbidopa/levodopa  25/250 2  cefepime   IVPB   cefepime   IVPB 2000  chlorhexidine 0.12% Liquid 15  chlorhexidine 2% Cloths 1  dexMEDEtomidine Infusion 0.5  fentaNYL   Infusion. 0.5  heparin  Infusion 6.5  iohexol 300 mG (iodine)/mL Oral Solution 30  lactulose Syrup 20  metroNIDAZOLE  IVPB 500  nafcillin  IVPB 2  nafcillin  IVPB   norepinephrine Infusion 0.05  oseltamivir 75  pantoprazole   Suspension 40  polyethylene glycol 3350 17  propofol Infusion 0.5  rasagiline Tablet 1  senna 2      WEIGHT  Weight (kg): 51.1 (01-31-25 @ 20:33)  Creatinine: 1.1 mg/dL (02-04-25 @ 04:54)      ANTIBIOTICS:  cefepime   IVPB      cefepime   IVPB 2000 milliGRAM(s) IV Intermittent every 8 hours  metroNIDAZOLE  IVPB 500 milliGRAM(s) IV Intermittent every 8 hours  nafcillin  IVPB 2 Gram(s) IV Intermittent every 4 hours  nafcillin  IVPB      oseltamivir 75 milliGRAM(s) Oral two times a day      All available historical records have been reviewed

## 2025-02-04 NOTE — PROGRESS NOTE ADULT - ASSESSMENT
Called Patient left message on voicemail,  Asked Pt to call Writer back directly re: her concern.     Clinic hours today and    [facility] Call [facility] # provided.      ASSESSMENT  69-year-old male past medical history of hypertension, Parkinson's, CAD presents for shortness of breath and cough of 1 day duration. History was obtained from patient's wife who noted that the patient has been having decreased po intake, activity, and unintentional weight loss for some time. Howver, yesterday he started having cough and shortness of breath. 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  Increased opacities/debris within the central bronchi with occlusion of the right lower lobe central bronchi. There is increased consolidation   within the right lower lobe.  #Influenza +, MSSA bacteremia , suspect MSSA superimposed PNA    2/ 2 BCX NGTD     1/31 BCX NGTD     1/29 BCX MSSA 3/4 bottles    MRSA PCR Result.: Negative (01-30-25 @ 13:40)    Procalcitonin: 6.93 ng/mL (01-30-25 @ 07:26)    Legionella Antigen, Urine: Negative (01-29-25 @ 15:49)    Streptococcus pneumoniae Ag, Ur Result: Negative (01-29-25 @ 15:49)    CT Multifocal bilateral consolidative opacities as above, suggestive of   multifocal pneumonia in the appropriate clinical context.  #Lactic acidosis  #Hypernatremia   #Severe protein calorie malnutrition  BMI (kg/m2): 17  #PD  #Immunodeficiency secondary to Senescence which could results in poor clinical outcomes  #Abx allergy: Allergy Status Unknown    Creatinine: 0.8 crcl 63 calculated    Height (cm): 175.3 (01-30-25 @ 13:00)  Weight (kg): 52.2 (01-29-25 @ 10:14)    RECOMMENDATIONS  - f/u DTA  - Continue tamiflu 75mg BID to complete 10 days given critical illness   - Cefepime 2g q8h IV  - Nafcillin 2g q4h IV to target MSSA in blood  - ICU team added flagyl  - If worsening hemodynamic compromise, D/C Cefepime/flagyl--> Meropenem 1g q8h IV   - TTE no vegetations , guidelines recommend MARY as community acquired bacteremia   - Grave prognosis, GOC     If any questions, please send a message or call on [employer] Teams  Please continue to update ID with any pertinent new laboratory, radiographic findings, or change in clinical status ASSESSMENT  69-year-old male past medical history of hypertension, Parkinson's, CAD presents for shortness of breath and cough of 1 day duration. History was obtained from patient's wife who noted that the patient has been having decreased po intake, activity, and unintentional weight loss for some time. Howver, yesterday he started having cough and shortness of breath. 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  Increased opacities/debris within the central bronchi with occlusion of the right lower lobe central bronchi. There is increased consolidation   within the right lower lobe.  #Influenza +, MSSA bacteremia , suspect MSSA superimposed PNA    2/ 2 BCX NGTD     1/31 BCX NGTD     1/29 BCX MSSA 3/4 bottles    MRSA PCR Result.: Negative (01-30-25 @ 13:40)    Procalcitonin: 6.93 ng/mL (01-30-25 @ 07:26)    Legionella Antigen, Urine: Negative (01-29-25 @ 15:49)    Streptococcus pneumoniae Ag, Ur Result: Negative (01-29-25 @ 15:49)    CT Multifocal bilateral consolidative opacities as above, suggestive of   multifocal pneumonia in the appropriate clinical context.  #Lactic acidosis  #Hypernatremia   #Severe protein calorie malnutrition  BMI (kg/m2): 17  #PD  #Immunodeficiency secondary to Senescence which could results in poor clinical outcomes  #Abx allergy: Allergy Status Unknown    Creatinine: 0.8 crcl 63 calculated    Height (cm): 175.3 (01-30-25 @ 13:00)  Weight (kg): 52.2 (01-29-25 @ 10:14)    RECOMMENDATIONS  - f/u DTA, bronch studies  - Continue tamiflu 75mg BID to complete 10 days given critical illness   - Cefepime 2g q8h IV  - Nafcillin 2g q4h IV to target MSSA in blood  - ICU team added flagyl  - If worsening hemodynamic compromise, D/C Cefepime/flagyl--> Meropenem 1g q8h IV   - TTE no vegetations , guidelines recommend MARY as community acquired bacteremia   - Grave prognosis, GOC     If any questions, please send a message or call on [employer] Teams  Please continue to update ID with any pertinent new laboratory, radiographic findings, or change in clinical status

## 2025-02-04 NOTE — PROGRESS NOTE ADULT - SUBJECTIVE AND OBJECTIVE BOX
Over Night Events: events noted, remains critically ill, still intubated, ventilated, fever    PHYSICAL EXAM    ICU Vital Signs Last 24 Hrs  T(C): 39.6 (04 Feb 2025 04:00), Max: 39.6 (04 Feb 2025 04:00)  T(F): 103.3 (04 Feb 2025 04:00), Max: 103.3 (04 Feb 2025 04:00)  HR: 118 (04 Feb 2025 05:00) (67 - 120)  BP: 110/58 (04 Feb 2025 05:00) (59/38 - 141/59)  BP(mean): 80 (04 Feb 2025 05:00) (44 - 95)  RR: 20 (04 Feb 2025 05:00) (17 - 31)  SpO2: 95% (04 Feb 2025 05:00) (93% - 100%)    O2 Parameters below as of 04 Feb 2025 04:00  Patient On (Oxygen Delivery Method): ventilator    O2 Concentration (%): 40        General: ill looking  ETT  Lungs: Bilateral rhonchi  Cardiovascular: FRANKLYN 2.6  Abdomen: Soft, Positive BS  Extremities: No clubbing   Neurological: Non focal       02-03-25 @ 07:01  -  02-04-25 @ 07:00  --------------------------------------------------------  IN:    Dexmedetomidine: 230.4 mL    Free Water: 500 mL    IV PiggyBack: 400 mL    Jevity: 400 mL    Norepinephrine: 34.3 mL    Propofol: 125.4 mL  Total IN: 1690.1 mL    OUT:    Indwelling Catheter - Urethral (mL): 550 mL    Intermittent Catheterization - Urethral (mL): 750 mL  Total OUT: 1300 mL    Total NET: 390.1 mL          LABS:                          12.8   16.69 )-----------( 156      ( 04 Feb 2025 04:54 )             39.7                                               02-04    149[H]  |  111[H]  |  53[H]  ----------------------------<  175[H]  4.5   |  27  |  1.1    Ca    8.1[L]      04 Feb 2025 04:54  Phos  3.9     02-04  Mg     2.2     02-04    TPro  4.5[L]  /  Alb  2.4[L]  /  TBili  5.2[H]  /  DBili  x   /  AST  37  /  ALT  <5  /  AlkPhos  171[H]  02-04      PT/INR - ( 03 Feb 2025 02:07 )   PT: 24.20 sec;   INR: 2.02 ratio         PTT - ( 04 Feb 2025 03:03 )  PTT:29.8 sec                                       Urinalysis Basic - ( 04 Feb 2025 04:54 )    Color: x / Appearance: x / SG: x / pH: x  Gluc: 175 mg/dL / Ketone: x  / Bili: x / Urobili: x   Blood: x / Protein: x / Nitrite: x   Leuk Esterase: x / RBC: x / WBC x   Sq Epi: x / Non Sq Epi: x / Bacteria: x                                                  LIVER FUNCTIONS - ( 04 Feb 2025 04:54 )  Alb: 2.4 g/dL / Pro: 4.5 g/dL / ALK PHOS: 171 U/L / ALT: <5 U/L / AST: 37 U/L / GGT: x                                                  Culture - Blood (collected 02 Feb 2025 04:54)  Source: .Blood BLOOD  Preliminary Report (03 Feb 2025 12:01):    No growth at 24 hours                                                   Mode: AC/ CMV (Assist Control/ Continuous Mandatory Ventilation)  RR (machine): 16  TV (machine): 400  FiO2: 40  PEEP: 8  ITime: 1  MAP: 11  PIP: 21                                      ABG - ( 04 Feb 2025 03:46 )  pH, Arterial: 7.41  pH, Blood: x     /  pCO2: 46    /  pO2: 67    / HCO3: 29    / Base Excess: 3.8   /  SaO2: 94.1                MEDICATIONS  (STANDING):  carbidopa/levodopa  25/250 2 Tablet(s) Oral three times a day  cefepime   IVPB      cefepime   IVPB 2000 milliGRAM(s) IV Intermittent every 8 hours  chlorhexidine 0.12% Liquid 15 milliLiter(s) Oral Mucosa every 12 hours  chlorhexidine 2% Cloths 1 Application(s) Topical <User Schedule>  dexMEDEtomidine Infusion 0.5 MICROgram(s)/kG/Hr (6.39 mL/Hr) IV Continuous <Continuous>  heparin  Infusion 6.5 Unit(s)/Hr (6.5 mL/Hr) IV Continuous <Continuous>  nafcillin  IVPB      nafcillin  IVPB 2 Gram(s) IV Intermittent every 4 hours  norepinephrine Infusion 0.05 MICROgram(s)/kG/Min (4.79 mL/Hr) IV Continuous <Continuous>  oseltamivir 75 milliGRAM(s) Oral two times a day  pantoprazole   Suspension 40 milliGRAM(s) Oral daily  polyethylene glycol 3350 17 Gram(s) Oral every 12 hours  propofol Infusion 0.5 MICROgram(s)/kG/Min (0.15 mL/Hr) IV Continuous <Continuous>  rasagiline Tablet 1 milliGRAM(s) Oral <User Schedule>  senna 2 Tablet(s) Oral at bedtime    MEDICATIONS  (PRN):  acetaminophen     Tablet .. 650 milliGRAM(s) Oral every 6 hours PRN Temp greater or equal to 38C (100.4F), Mild Pain (1 - 3)  losartan 25 milliGRAM(s) Oral daily PRN >140 SBP      CXR NOTED     Over Night Events: events noted, remains critically ill, still intubated, ventilated, fever, levophed 0.21, propof 40, hep IV, increase    PHYSICAL EXAM    ICU Vital Signs Last 24 Hrs  T(C): 39.6 (04 Feb 2025 04:00), Max: 39.6 (04 Feb 2025 04:00)  T(F): 103.3 (04 Feb 2025 04:00), Max: 103.3 (04 Feb 2025 04:00)  HR: 118 (04 Feb 2025 05:00) (67 - 120)  BP: 110/58 (04 Feb 2025 05:00) (59/38 - 141/59)  BP(mean): 80 (04 Feb 2025 05:00) (44 - 95)  RR: 20 (04 Feb 2025 05:00) (17 - 31)  SpO2: 95% (04 Feb 2025 05:00) (93% - 100%)    O2 Parameters below as of 04 Feb 2025 04:00  Patient On (Oxygen Delivery Method): ventilator    O2 Concentration (%): 40        General: ill looking  ETT  Lungs: Bilateral rhonchi  Cardiovascular: FRANKLYN 2.6  Abdomen: Soft, Positive BS  Extremities: No clubbing   Neurological: Non focal       02-03-25 @ 07:01  -  02-04-25 @ 07:00  --------------------------------------------------------  IN:    Dexmedetomidine: 230.4 mL    Free Water: 500 mL    IV PiggyBack: 400 mL    Jevity: 400 mL    Norepinephrine: 34.3 mL    Propofol: 125.4 mL  Total IN: 1690.1 mL    OUT:    Indwelling Catheter - Urethral (mL): 550 mL    Intermittent Catheterization - Urethral (mL): 750 mL  Total OUT: 1300 mL    Total NET: 390.1 mL          LABS:                          12.8   16.69 )-----------( 156      ( 04 Feb 2025 04:54 )             39.7                                               02-04    149[H]  |  111[H]  |  53[H]  ----------------------------<  175[H]  4.5   |  27  |  1.1    Ca    8.1[L]      04 Feb 2025 04:54  Phos  3.9     02-04  Mg     2.2     02-04    TPro  4.5[L]  /  Alb  2.4[L]  /  TBili  5.2[H]  /  DBili  x   /  AST  37  /  ALT  <5  /  AlkPhos  171[H]  02-04      PT/INR - ( 03 Feb 2025 02:07 )   PT: 24.20 sec;   INR: 2.02 ratio         PTT - ( 04 Feb 2025 03:03 )  PTT:29.8 sec                                       Urinalysis Basic - ( 04 Feb 2025 04:54 )    Color: x / Appearance: x / SG: x / pH: x  Gluc: 175 mg/dL / Ketone: x  / Bili: x / Urobili: x   Blood: x / Protein: x / Nitrite: x   Leuk Esterase: x / RBC: x / WBC x   Sq Epi: x / Non Sq Epi: x / Bacteria: x                                                  LIVER FUNCTIONS - ( 04 Feb 2025 04:54 )  Alb: 2.4 g/dL / Pro: 4.5 g/dL / ALK PHOS: 171 U/L / ALT: <5 U/L / AST: 37 U/L / GGT: x                                                  Culture - Blood (collected 02 Feb 2025 04:54)  Source: .Blood BLOOD  Preliminary Report (03 Feb 2025 12:01):    No growth at 24 hours                                                   Mode: AC/ CMV (Assist Control/ Continuous Mandatory Ventilation)  RR (machine): 16  TV (machine): 400  FiO2: 40  PEEP: 8  ITime: 1  MAP: 11  PIP: 21                                      ABG - ( 04 Feb 2025 03:46 )  pH, Arterial: 7.41  pH, Blood: x     /  pCO2: 46    /  pO2: 67    / HCO3: 29    / Base Excess: 3.8   /  SaO2: 94.1                MEDICATIONS  (STANDING):  carbidopa/levodopa  25/250 2 Tablet(s) Oral three times a day  cefepime   IVPB      cefepime   IVPB 2000 milliGRAM(s) IV Intermittent every 8 hours  chlorhexidine 0.12% Liquid 15 milliLiter(s) Oral Mucosa every 12 hours  chlorhexidine 2% Cloths 1 Application(s) Topical <User Schedule>  dexMEDEtomidine Infusion 0.5 MICROgram(s)/kG/Hr (6.39 mL/Hr) IV Continuous <Continuous>  heparin  Infusion 6.5 Unit(s)/Hr (6.5 mL/Hr) IV Continuous <Continuous>  nafcillin  IVPB      nafcillin  IVPB 2 Gram(s) IV Intermittent every 4 hours  norepinephrine Infusion 0.05 MICROgram(s)/kG/Min (4.79 mL/Hr) IV Continuous <Continuous>  oseltamivir 75 milliGRAM(s) Oral two times a day  pantoprazole   Suspension 40 milliGRAM(s) Oral daily  polyethylene glycol 3350 17 Gram(s) Oral every 12 hours  propofol Infusion 0.5 MICROgram(s)/kG/Min (0.15 mL/Hr) IV Continuous <Continuous>  rasagiline Tablet 1 milliGRAM(s) Oral <User Schedule>  senna 2 Tablet(s) Oral at bedtime    MEDICATIONS  (PRN):  acetaminophen     Tablet .. 650 milliGRAM(s) Oral every 6 hours PRN Temp greater or equal to 38C (100.4F), Mild Pain (1 - 3)  losartan 25 milliGRAM(s) Oral daily PRN >140 SBP      CXR NOTED

## 2025-02-04 NOTE — PROGRESS NOTE ADULT - ASSESSMENT
69-year-old male past medical history of hypertension, Parkinson's, CAD presents for shortness of breath and cough of 1 day duration. History was obtained from patient's wife who noted that the patient has been having decreased po intake, activity, and unintentional weight loss for some time. However yesterday he started having cough and shortness of breath. Admitted to MICU for AHRF and sepsis 2/2 to mutilobar PNA.    #Acute Hypoxic Respiratory Failure likely 2/2 CAP/aspiration and flu- improved  #Toxic Metabolic Encephalopathy likely 2/2 CAP/aspiration and flu- improved  #Influenza A positive  #MSSA bacteremia- resolved  #Sepsis POA  - Aspiration precautions.    - patient inc WOB, worsening alkalosis, obtunded-intubated for airway protection   - CTA chest to r/o PE: focal filling defect within main pulm artery   - repeat CT head: stable intraventricular hemorrhage   - Neuro for AC: risk vs benefits, can start AC with heprain ggt and repeat CT head once PTT 50-70.   - Platelets downtrending, 239>>117, HIT neg   - Repeat Bcx- NGTD.    - Nasal MRSA negative  - ID: abx switched to nafcillin and cefepime  - add metronidazole, ID: if patient decompensates, dc cefepime/metro, start patricia 1g   - C/w Tamiflu 75 BID, total of 10 days   - F/u S&S c/s   - C/w NGT feeds  - cardio: patient critically sick, not a candidate for surgery therefore no MARY, can c/w abx for IE   - s/p bronch, f/u cultures   - patient npo, can dc heparin for possible procedure with IR     #Elevated Bilirubin  #Leukocytosis   #Cholangitis vs cholecystitis?   - F/u RUQ US: distended GB with sludge, GB polyp   - surgery: no acute intervention, if decompensating, IR for cholecystomy   - T.bili: 5<3, alk phos trending up (LFTs wnl), worsening leukocytosis   - IR: CTAP w oral/IV con, possible perc procedure   - add fentanyl for sedation    #Periventricular bleed  - CTH. noted with right periventricular bleed, stable.   - Neuro on board  - s/p Desmopressin.   - Continue Anti parkinson's meds.    - F/u Palliative c/s    #Constipation  - senna and miralax  - add lactulose     #Possible Mural thrombus in PA  #CAD  - Avoid overload  - C/w LR at 50  - TTE: EF 56%, G1DD, mild-mod MR, mod TR, mild KY, mild pHTN  - Cardio recs appreciated       - Patient is poor candidate for MARY       - consider full endocarditis abx course    #MISC  - DVT ppx: SCDs  - GI ppx: not needed  - Activity: as tolerated  - Diet: NPO w/ tube feed

## 2025-02-05 LAB
ALBUMIN SERPL ELPH-MCNC: 2.2 G/DL — LOW (ref 3.5–5.2)
ALBUMIN SERPL ELPH-MCNC: 2.2 G/DL — LOW (ref 3.5–5.2)
ALP SERPL-CCNC: 127 U/L — HIGH (ref 30–115)
ALP SERPL-CCNC: 130 U/L — HIGH (ref 30–115)
ALT FLD-CCNC: <5 U/L — SIGNIFICANT CHANGE UP (ref 0–41)
ALT FLD-CCNC: <5 U/L — SIGNIFICANT CHANGE UP (ref 0–41)
ANION GAP SERPL CALC-SCNC: 11 MMOL/L — SIGNIFICANT CHANGE UP (ref 7–14)
ANION GAP SERPL CALC-SCNC: 12 MMOL/L — SIGNIFICANT CHANGE UP (ref 7–14)
AST SERPL-CCNC: 19 U/L — SIGNIFICANT CHANGE UP (ref 0–41)
AST SERPL-CCNC: 19 U/L — SIGNIFICANT CHANGE UP (ref 0–41)
BASOPHILS # BLD AUTO: 0.02 K/UL — SIGNIFICANT CHANGE UP (ref 0–0.2)
BASOPHILS NFR BLD AUTO: 0.1 % — SIGNIFICANT CHANGE UP (ref 0–1)
BILIRUB SERPL-MCNC: 4.1 MG/DL — HIGH (ref 0.2–1.2)
BILIRUB SERPL-MCNC: 4.5 MG/DL — HIGH (ref 0.2–1.2)
BUN SERPL-MCNC: 77 MG/DL — CRITICAL HIGH (ref 10–20)
BUN SERPL-MCNC: 79 MG/DL — CRITICAL HIGH (ref 10–20)
CALCIUM SERPL-MCNC: 6.9 MG/DL — LOW (ref 8.4–10.5)
CALCIUM SERPL-MCNC: 7.1 MG/DL — LOW (ref 8.4–10.5)
CHLORIDE SERPL-SCNC: 106 MMOL/L — SIGNIFICANT CHANGE UP (ref 98–110)
CHLORIDE SERPL-SCNC: 107 MMOL/L — SIGNIFICANT CHANGE UP (ref 98–110)
CO2 SERPL-SCNC: 24 MMOL/L — SIGNIFICANT CHANGE UP (ref 17–32)
CO2 SERPL-SCNC: 27 MMOL/L — SIGNIFICANT CHANGE UP (ref 17–32)
CREAT SERPL-MCNC: 1.9 MG/DL — HIGH (ref 0.7–1.5)
CREAT SERPL-MCNC: 2 MG/DL — HIGH (ref 0.7–1.5)
CULTURE RESULTS: ABNORMAL
CULTURE RESULTS: SIGNIFICANT CHANGE UP
CULTURE RESULTS: SIGNIFICANT CHANGE UP
EGFR: 35 ML/MIN/1.73M2 — LOW
EGFR: 35 ML/MIN/1.73M2 — LOW
EGFR: 37 ML/MIN/1.73M2 — LOW
EGFR: 37 ML/MIN/1.73M2 — LOW
EOSINOPHIL # BLD AUTO: 0.07 K/UL — SIGNIFICANT CHANGE UP (ref 0–0.7)
EOSINOPHIL NFR BLD AUTO: 0.4 % — SIGNIFICANT CHANGE UP (ref 0–8)
GAS PNL BLDA: SIGNIFICANT CHANGE UP
GLUCOSE SERPL-MCNC: 140 MG/DL — HIGH (ref 70–99)
GLUCOSE SERPL-MCNC: 146 MG/DL — HIGH (ref 70–99)
GRAM STN FLD: SIGNIFICANT CHANGE UP
HCT VFR BLD CALC: 34 % — LOW (ref 42–52)
HGB BLD-MCNC: 10.8 G/DL — LOW (ref 14–18)
IMM GRANULOCYTES NFR BLD AUTO: 0.9 % — HIGH (ref 0.1–0.3)
INR BLD: 1.38 RATIO — HIGH (ref 0.65–1.3)
LYMPHOCYTES # BLD AUTO: 0.54 K/UL — LOW (ref 1.2–3.4)
LYMPHOCYTES # BLD AUTO: 2.8 % — LOW (ref 20.5–51.1)
MAGNESIUM SERPL-MCNC: 2.6 MG/DL — HIGH (ref 1.8–2.4)
MCHC RBC-ENTMCNC: 30.1 PG — SIGNIFICANT CHANGE UP (ref 27–31)
MCHC RBC-ENTMCNC: 31.8 G/DL — LOW (ref 32–37)
MCV RBC AUTO: 94.7 FL — HIGH (ref 80–94)
MONOCYTES # BLD AUTO: 0.61 K/UL — HIGH (ref 0.1–0.6)
MONOCYTES NFR BLD AUTO: 3.2 % — SIGNIFICANT CHANGE UP (ref 1.7–9.3)
NEUTROPHILS # BLD AUTO: 17.66 K/UL — HIGH (ref 1.4–6.5)
NEUTROPHILS NFR BLD AUTO: 92.6 % — HIGH (ref 42.2–75.2)
NIGHT BLUE STAIN TISS: SIGNIFICANT CHANGE UP
NRBC # BLD: 0 /100 WBCS — SIGNIFICANT CHANGE UP (ref 0–0)
NRBC BLD-RTO: 0 /100 WBCS — SIGNIFICANT CHANGE UP (ref 0–0)
PHOSPHATE SERPL-MCNC: 5 MG/DL — HIGH (ref 2.1–4.9)
PLATELET # BLD AUTO: 156 K/UL — SIGNIFICANT CHANGE UP (ref 130–400)
PMV BLD: 12.6 FL — HIGH (ref 7.4–10.4)
POTASSIUM SERPL-MCNC: 4.3 MMOL/L — SIGNIFICANT CHANGE UP (ref 3.5–5)
POTASSIUM SERPL-MCNC: 4.3 MMOL/L — SIGNIFICANT CHANGE UP (ref 3.5–5)
POTASSIUM SERPL-SCNC: 4.3 MMOL/L — SIGNIFICANT CHANGE UP (ref 3.5–5)
POTASSIUM SERPL-SCNC: 4.3 MMOL/L — SIGNIFICANT CHANGE UP (ref 3.5–5)
PROT SERPL-MCNC: 4 G/DL — LOW (ref 6–8)
PROT SERPL-MCNC: 4.2 G/DL — LOW (ref 6–8)
PROTHROM AB SERPL-ACNC: 16.4 SEC — HIGH (ref 9.95–12.87)
RBC # BLD: 3.59 M/UL — LOW (ref 4.7–6.1)
RBC # FLD: 13.5 % — SIGNIFICANT CHANGE UP (ref 11.5–14.5)
SODIUM SERPL-SCNC: 141 MMOL/L — SIGNIFICANT CHANGE UP (ref 135–146)
SODIUM SERPL-SCNC: 146 MMOL/L — SIGNIFICANT CHANGE UP (ref 135–146)
SPECIMEN SOURCE: SIGNIFICANT CHANGE UP
TRIGL SERPL-MCNC: 240 MG/DL — HIGH
WBC # BLD: 19.08 K/UL — HIGH (ref 4.8–10.8)
WBC # FLD AUTO: 19.08 K/UL — HIGH (ref 4.8–10.8)

## 2025-02-05 PROCEDURE — 99291 CRITICAL CARE FIRST HOUR: CPT

## 2025-02-05 PROCEDURE — 71045 X-RAY EXAM CHEST 1 VIEW: CPT | Mod: 26

## 2025-02-05 PROCEDURE — 47490 INCISION OF GALLBLADDER: CPT

## 2025-02-05 RX ORDER — OSELTAMIVIR PHOSPHATE 75 MG/1
30 CAPSULE ORAL DAILY
Refills: 0 | Status: DISCONTINUED | OUTPATIENT
Start: 2025-02-06 | End: 2025-02-08

## 2025-02-05 RX ORDER — FENTANYL CITRATE-0.9 % NACL/PF 100MCG/2ML
50 SYRINGE (ML) INTRAVENOUS
Refills: 0 | Status: DISCONTINUED | OUTPATIENT
Start: 2025-02-05 | End: 2025-02-11

## 2025-02-05 RX ORDER — CEFEPIME 2 G/20ML
2000 INJECTION, POWDER, FOR SOLUTION INTRAVENOUS EVERY 12 HOURS
Refills: 0 | Status: DISCONTINUED | OUTPATIENT
Start: 2025-02-05 | End: 2025-02-05

## 2025-02-05 RX ORDER — LACTULOSE 10 G/15ML
20 SOLUTION ORAL EVERY 6 HOURS
Refills: 0 | Status: DISCONTINUED | OUTPATIENT
Start: 2025-02-05 | End: 2025-02-08

## 2025-02-05 RX ORDER — CEFEPIME 2 G/20ML
1000 INJECTION, POWDER, FOR SOLUTION INTRAVENOUS EVERY 12 HOURS
Refills: 0 | Status: DISCONTINUED | OUTPATIENT
Start: 2025-02-05 | End: 2025-02-08

## 2025-02-05 RX ORDER — OSELTAMIVIR PHOSPHATE 75 MG/1
30 CAPSULE ORAL
Refills: 0 | Status: DISCONTINUED | OUTPATIENT
Start: 2025-02-05 | End: 2025-02-05

## 2025-02-05 RX ORDER — SODIUM CHLORIDE 9 G/1000ML
1000 INJECTION, SOLUTION INTRAVENOUS
Refills: 0 | Status: DISCONTINUED | OUTPATIENT
Start: 2025-02-05 | End: 2025-02-05

## 2025-02-05 RX ORDER — SODIUM CHLORIDE 9 G/1000ML
1000 INJECTION, SOLUTION INTRAVENOUS
Refills: 0 | Status: DISCONTINUED | OUTPATIENT
Start: 2025-02-05 | End: 2025-02-08

## 2025-02-05 RX ADMIN — Medication 15 MILLILITER(S): at 05:20

## 2025-02-05 RX ADMIN — Medication 100 MILLIGRAM(S): at 13:55

## 2025-02-05 RX ADMIN — Medication 2 TABLET(S): at 21:08

## 2025-02-05 RX ADMIN — NAFCILLIN 200 GRAM(S): 2 INJECTION, SOLUTION INTRAVENOUS at 02:03

## 2025-02-05 RX ADMIN — Medication 40 MILLIGRAM(S): at 12:04

## 2025-02-05 RX ADMIN — DEXMEDETOMIDINE HYDROCHLORIDE IN SODIUM CHLORIDE 6.39 MICROGRAM(S)/KG/HR: 4 INJECTION INTRAVENOUS at 21:07

## 2025-02-05 RX ADMIN — NAFCILLIN 200 GRAM(S): 2 INJECTION, SOLUTION INTRAVENOUS at 13:54

## 2025-02-05 RX ADMIN — Medication 2 TABLET(S): at 05:20

## 2025-02-05 RX ADMIN — POLYETHYLENE GLYCOL 3350 17 GRAM(S): 17 POWDER, FOR SOLUTION ORAL at 18:00

## 2025-02-05 RX ADMIN — NAFCILLIN 200 GRAM(S): 2 INJECTION, SOLUTION INTRAVENOUS at 05:20

## 2025-02-05 RX ADMIN — CEFEPIME 100 MILLIGRAM(S): 2 INJECTION, POWDER, FOR SOLUTION INTRAVENOUS at 05:20

## 2025-02-05 RX ADMIN — Medication 100 MILLIGRAM(S): at 05:20

## 2025-02-05 RX ADMIN — Medication 1 APPLICATION(S): at 05:21

## 2025-02-05 RX ADMIN — LACTULOSE 20 GRAM(S): 10 SOLUTION ORAL at 05:20

## 2025-02-05 RX ADMIN — CEFEPIME 100 MILLIGRAM(S): 2 INJECTION, POWDER, FOR SOLUTION INTRAVENOUS at 18:00

## 2025-02-05 RX ADMIN — NAFCILLIN 200 GRAM(S): 2 INJECTION, SOLUTION INTRAVENOUS at 18:01

## 2025-02-05 RX ADMIN — Medication 100 MILLIGRAM(S): at 21:08

## 2025-02-05 RX ADMIN — SODIUM CHLORIDE 100 MILLILITER(S): 9 INJECTION, SOLUTION INTRAVENOUS at 09:59

## 2025-02-05 RX ADMIN — LACTULOSE 20 GRAM(S): 10 SOLUTION ORAL at 18:02

## 2025-02-05 RX ADMIN — Medication 15 MILLILITER(S): at 17:59

## 2025-02-05 RX ADMIN — PROPOFOL 0.15 MICROGRAM(S)/KG/MIN: 10 INJECTION, EMULSION INTRAVENOUS at 21:06

## 2025-02-05 RX ADMIN — NAFCILLIN 200 GRAM(S): 2 INJECTION, SOLUTION INTRAVENOUS at 21:07

## 2025-02-05 RX ADMIN — DEXMEDETOMIDINE HYDROCHLORIDE IN SODIUM CHLORIDE 6.39 MICROGRAM(S)/KG/HR: 4 INJECTION INTRAVENOUS at 14:31

## 2025-02-05 RX ADMIN — OSELTAMIVIR PHOSPHATE 75 MILLIGRAM(S): 75 CAPSULE ORAL at 05:21

## 2025-02-05 RX ADMIN — SODIUM CHLORIDE 100 MILLILITER(S): 9 INJECTION, SOLUTION INTRAVENOUS at 21:07

## 2025-02-05 RX ADMIN — RASAGILINE 1 MILLIGRAM(S): 0.5 TABLET ORAL at 06:25

## 2025-02-05 RX ADMIN — DEXMEDETOMIDINE HYDROCHLORIDE IN SODIUM CHLORIDE 6.39 MICROGRAM(S)/KG/HR: 4 INJECTION INTRAVENOUS at 07:59

## 2025-02-05 RX ADMIN — PROPOFOL 0.15 MICROGRAM(S)/KG/MIN: 10 INJECTION, EMULSION INTRAVENOUS at 12:06

## 2025-02-05 RX ADMIN — Medication 2 TABLET(S): at 21:07

## 2025-02-05 RX ADMIN — Medication 2 TABLET(S): at 13:54

## 2025-02-05 RX ADMIN — NAFCILLIN 200 GRAM(S): 2 INJECTION, SOLUTION INTRAVENOUS at 09:58

## 2025-02-05 RX ADMIN — POLYETHYLENE GLYCOL 3350 17 GRAM(S): 17 POWDER, FOR SOLUTION ORAL at 05:20

## 2025-02-05 NOTE — CHART NOTE - NSCHARTNOTEFT_GEN_A_CORE
Patient critically ill with distended GB and pericholecystic fluid on pressors.  Will attempt perc haydee today.  Risks, benefits, and alternatives discussed with patient's wife and she would like us to proceed.

## 2025-02-05 NOTE — PROGRESS NOTE ADULT - SUBJECTIVE AND OBJECTIVE BOX
SUBJECTIVE/OVERNIGHT EVENTS  Today is hospital day 7d. This morning patient was seen and examined at bedside.     CODE STATUS: FULL    MEDICATIONS  MEDICATIONS  (STANDING):  carbidopa/levodopa  25/250 2 Tablet(s) Oral three times a day  cefepime   IVPB      cefepime   IVPB 2000 milliGRAM(s) IV Intermittent every 8 hours  chlorhexidine 0.12% Liquid 15 milliLiter(s) Oral Mucosa every 12 hours  dexMEDEtomidine Infusion 0.5 MICROgram(s)/kG/Hr (6.39 mL/Hr) IV Continuous <Continuous>  fentaNYL   Infusion. 0.501 MICROgram(s)/kG/Hr (2.56 mL/Hr) IV Continuous <Continuous>  lactated ringers. 1000 milliLiter(s) (100 mL/Hr) IV Continuous <Continuous>  lactulose Syrup 20 Gram(s) Oral every 8 hours  metroNIDAZOLE  IVPB 500 milliGRAM(s) IV Intermittent every 8 hours  nafcillin  IVPB 2 Gram(s) IV Intermittent every 4 hours  nafcillin  IVPB      norepinephrine Infusion 0.05 MICROgram(s)/kG/Min (4.79 mL/Hr) IV Continuous <Continuous>  oseltamivir 75 milliGRAM(s) Oral two times a day  pantoprazole   Suspension 40 milliGRAM(s) Oral daily  polyethylene glycol 3350 17 Gram(s) Oral every 12 hours  propofol Infusion 0.5 MICROgram(s)/kG/Min (0.15 mL/Hr) IV Continuous <Continuous>  rasagiline Tablet 1 milliGRAM(s) Oral <User Schedule>  senna 2 Tablet(s) Oral at bedtime    MEDICATIONS  (PRN):  acetaminophen     Tablet .. 650 milliGRAM(s) Oral every 6 hours PRN Temp greater or equal to 38C (100.4F), Mild Pain (1 - 3)  fentaNYL    Injectable 50 MICROGram(s) IV Push every 2 hours PRN Aggitation  losartan 25 milliGRAM(s) Oral daily PRN >140 SBP      VITALS  ICU Vital Signs Last 24 Hrs  T(C): 37.6 (05 Feb 2025 04:00), Max: 37.9 (04 Feb 2025 20:00)  T(F): 99.6 (05 Feb 2025 04:00), Max: 100.3 (04 Feb 2025 20:00)  HR: 100 (05 Feb 2025 08:15) (94 - 122)  BP: 102/61 (05 Feb 2025 08:15) (95/55 - 159/78)  BP(mean): 76 (05 Feb 2025 08:15) (65 - 110)  ABP: --  ABP(mean): --  RR: 19 (05 Feb 2025 08:15) (9 - 37)  SpO2: 100% (05 Feb 2025 08:15) (96% - 100%)    O2 Parameters below as of 05 Feb 2025 08:00  Patient On (Oxygen Delivery Method): ventilator    O2 Concentration (%): 40    PHYSICAL EXAM  GENERAL  ( X ) NAD, lying in bed comfortably     (  ) obtunded     (  ) lethargic     (  ) somnolent    HEAD  ( X ) Atraumatic     (  ) hematoma     (  ) laceration (specify location:       )     NECK  ( X ) Supple     (  ) neck stiffness     (  ) nuchal rigidity     (  )  no JVD     (  ) JVD present ( -- cm)    HEART  Rate -->  ( X ) normal rate    (  ) bradycardic    (  ) tachycardic  Rhythm -->  ( X ) regular    (  ) regularly irregular    (  ) irregularly irregular  Murmurs -->  ( X ) normal s1/s2    (  ) systolic murmur    (  ) diastolic murmur    (  ) continuous murmur     (  ) S3 present    (  ) S4 present    LUNGS  ( X )Unlabored respirations     (  ) tachypnea  ( X ) B/L air entry     (  ) decreased breath sounds in:  (location     )    (  ) no adventitious sound     (  ) crackles     ( X ) wheezing- R sided      (  ) rhonchi      (specify location:       )  (  ) chest wall tenderness (specify location:       )    ABDOMEN  ( X ) Soft     (  ) tense   |   ( X ) nondistended     (  ) distended   |   ( X ) +BS     (  ) hypoactive bowel sounds     (  ) hyperactive bowel sounds  ( X ) nontender     (  ) RUQ tenderness     (  ) RLQ tenderness     (  ) LLQ tenderness     (  ) epigastric tenderness     (  ) diffuse tenderness  (  ) Splenomegaly      (  ) Hepatomegaly      (  ) Jaundice     (  ) ecchymosis     EXTREMITIES  ( X ) Normal     (  ) Rash     (  ) ecchymosis     (  ) varicose veins      (  ) pitting edema     (  ) non-pitting edema   (  ) ulceration     (  ) gangrene:     (location:     )    NERVOUS SYSTEM  ( X ) A&Ox1-2     (  ) confused     (  ) lethargic  CN II-XII:     (  ) Intact     (  ) focal deficits  (Specify:     )   Upper extremities:     (  ) strength X/5     (  ) focal deficit (specify:    )  Lower extremities:     (  ) strength  X/5    (  ) focal deficit (specify:    )    SKIN  ( X ) No rashes or lesions     (  ) maculopapular rash     (  ) pustules     (  ) vesicles     (  ) ulcer     (  ) ecchymosis     (specify location:     )      LABS              LABS:                          10.8   19.08 )-----------( 156      ( 05 Feb 2025 04:49 )             34.0     02-05    141  |  106  |  77[HH]  ----------------------------<  146[H]  4.3   |  24  |  2.0[H]    Ca    7.1[L]      05 Feb 2025 04:49  Phos  5.0     02-05  Mg     2.6     02-05    TPro  4.0[L]  /  Alb  2.2[L]  /  TBili  4.5[H]  /  DBili  x   /  AST  19  /  ALT  <5  /  AlkPhos  130[H]  02-05    LIVER FUNCTIONS - ( 05 Feb 2025 04:49 )  Alb: 2.2 g/dL / Pro: 4.0 g/dL / ALK PHOS: 130 U/L / ALT: <5 U/L / AST: 19 U/L / GGT: x           PT/INR - ( 05 Feb 2025 04:49 )   PT: 16.40 sec;   INR: 1.38 ratio         PTT - ( 04 Feb 2025 21:10 )  PTT:33.5 sec  Urinalysis Basic - ( 05 Feb 2025 04:49 )    Color: x / Appearance: x / SG: x / pH: x  Gluc: 146 mg/dL / Ketone: x  / Bili: x / Urobili: x   Blood: x / Protein: x / Nitrite: x   Leuk Esterase: x / RBC: x / WBC x   Sq Epi: x / Non Sq Epi: x / Bacteria: x

## 2025-02-05 NOTE — PROGRESS NOTE ADULT - ASSESSMENT
ASSESSMENT  69-year-old male past medical history of hypertension, Parkinson's, CAD presents for shortness of breath and cough of 1 day duration. History was obtained from patient's wife who noted that the patient has been having decreased po intake, activity, and unintentional weight loss for some time. Howver, yesterday he started having cough and shortness of breath. 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  Increased opacities/debris within the central bronchi with occlusion of the right lower lobe central bronchi. There is increased consolidation   within the right lower lobe.  #Influenza +, MSSA bacteremia , suspect MSSA superimposed PNA     bronch   No organisms seen per oil power field     BCX NGTD      BCX NGTD      BCX MSSA 3/ bottles    MRSA PCR Result.: Negative (25 @ 13:40)    Procalcitonin: 6.93 ng/mL (25 @ 07:26)    Legionella Antigen, Urine: Negative (25 @ 15:49)    Streptococcus pneumoniae Ag, Ur Result: Negative (25 @ 15:49)    CT Multifocal bilateral consolidative opacities as above, suggestive of   multifocal pneumonia in the appropriate clinical context.  #Lactic acidosis  #Hypernatremia   #Severe protein calorie malnutrition  BMI (kg/m2): 17  #PD  #Immunodeficiency secondary to Senescence which could results in poor clinical outcomes  #Abx allergy: Allergy Status Unknown    #LYNNE Creatinine: 2.0 mg/dL (25 @ 04:49) 30 mL/min  Creatinine clearance for normal weight patient, using ideal body weight of 61 kg (135 lbs).    Height (cm): 175.3 (25 @ 13:00)  Weight (kg): 52.2 (25 @ 10:14)    RECOMMENDATIONS  - f/u bronch studies, thus far NG   - DECREASE tamiflu 30 mg twice daily to complete 10 days given critical illness   -  to Cefepime 2g q12h IV (if any worsening Cr decrease to 1g q12h )  - Nafcillin 2g q4h IV to target MSSA in blood  - ICU team added flagyl, see no need for anaerobic coverage  - TTE no vegetations , guidelines recommend MARY as community acquired bacteremia once stable   - Grave prognosis, GOC     If any questions, please send a message or call on [employer] Teams  Please continue to update ID with any pertinent new laboratory, radiographic findings, or change in clinical status ASSESSMENT  69-year-old male past medical history of hypertension, Parkinson's, CAD presents for shortness of breath and cough of 1 day duration. History was obtained from patient's wife who noted that the patient has been having decreased po intake, activity, and unintentional weight loss for some time. Howver, yesterday he started having cough and shortness of breath. 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  Increased opacities/debris within the central bronchi with occlusion of the right lower lobe central bronchi. There is increased consolidation   within the right lower lobe.  #Influenza +, MSSA bacteremia , suspect MSSA superimposed PNA     bronch   No organisms seen per oil power field     BCX NGTD      BCX NGTD      BCX MSSA 3/ bottles    MRSA PCR Result.: Negative (25 @ 13:40)    Procalcitonin: 6.93 ng/mL (25 @ 07:26)    Legionella Antigen, Urine: Negative (25 @ 15:49)    Streptococcus pneumoniae Ag, Ur Result: Negative (25 @ 15:49)    CT Multifocal bilateral consolidative opacities as above, suggestive of   multifocal pneumonia in the appropriate clinical context.  #Lactic acidosis  #Hypernatremia   #Severe protein calorie malnutrition  BMI (kg/m2): 17  #PD  #Immunodeficiency secondary to Senescence which could results in poor clinical outcomes  #Abx allergy: Allergy Status Unknown    #LYNNE Creatinine: 2.0 mg/dL (25 @ 04:49) 25 mL/min  Creatinine clearance, original Cockcroft-Gault    Height (cm): 175.3 (25 @ 13:00)  Weight (kg): 52.2 (25 @ 10:14)    RECOMMENDATIONS  - f/u bronch studies, thus far NG   - DECREASE tamiflu 30 mg  daily to complete 10 days given critical illness   -  to Cefepime 1g q12h IV   - Nafcillin 2g q4h IV to target MSSA in blood  - ICU team added flagyl, see no need for anaerobic coverage  - TTE no vegetations , guidelines recommend MARY as community acquired bacteremia once stable   - Grave prognosis, GOC     If any questions, please send a message or call on [employer] Teams  Please continue to update ID with any pertinent new laboratory, radiographic findings, or change in clinical status

## 2025-02-05 NOTE — PROGRESS NOTE ADULT - ASSESSMENT
69-year-old male past medical history of hypertension, Parkinson's, CAD presents for shortness of breath and cough of 1 day duration. History was obtained from patient's wife who noted that the patient has been having decreased po intake, activity, and unintentional weight loss for some time. However yesterday he started having cough and shortness of breath. Admitted to MICU for AHRF and sepsis 2/2 to mutilobar PNA.    #Acute Hypoxic Respiratory Failure likely 2/2 CAP/aspiration and flu  #Toxic Metabolic Encephalopathy likely 2/2 CAP/aspiration and flu  #Influenza A positive  #MSSA bacteremia- resolved  #Sepsis POA  - Aspiration precautions.    - patient inc WOB, worsening alkalosis, obtunded-intubated for airway protection   - Nasal MRSA negative  - CTA chest to r/o PE: focal filling defect within main pulm artery   - repeat CT head: stable intraventricular hemorrhage   - Platelets downtrending, 239>>117, HIT neg   - ID: abx switched to nafcillin and cefepime  - add metronidazole, ID: if patient decompensates, dc cefepime/metro, start patricia 1g   - C/w Tamiflu 75 BID, total of 10 days   - cardio: patient critically sick, not a candidate for surgery therefore no MARY, can c/w abx for IE   - s/p bronch, f/u cultures   - IR eval for perc cholecystostomy: add on for 2/5   - INR >2, now <1.5 s/p vit K   - dc heparin   - no repeat CT head since pt got CTAP w contrast and that would have showed up on non-con CT head   - cr 2<1.1, start LR @ 75  - f/u CMP at 11    #Elevated Bilirubin  #Leukocytosis   #Cholangitis vs cholecystitis?   - F/u RUQ US: distended GB with sludge, GB polyp   - surgery: no acute intervention, if decompensating, IR for cholecystomy   - T.bili: 5<3, alk phos trending up (LFTs wnl), worsening leukocytosis   - add fentanyl for pain   - IR: perc haydee 2/5    #Periventricular bleed  - CTH. noted with right periventricular bleed, stable.   - Neuro on board  - s/p Desmopressin.   - Continue Anti parkinson's meds.    - F/u Palliative c/s    #Constipation  - senna and miralax  - add lactulose     #Possible Mural thrombus in PA  #CAD  - Avoid overload  - C/w LR at 50  - TTE: EF 56%, G1DD, mild-mod MR, mod TR, mild SC, mild pHTN  - Cardio recs appreciated       - Patient is poor candidate for MARY       - consider full endocarditis abx course    #MISC  - DVT ppx: SCDs  - GI ppx: not needed  - Activity: as tolerated  - Diet: NPO w/ tube feed

## 2025-02-05 NOTE — PROGRESS NOTE ADULT - SUBJECTIVE AND OBJECTIVE BOX
Over Night Events: events noted, remains critically ill, still intubated, ventilated, febrile, CT AP noted, sp bronch, IR reviewed    PHYSICAL EXAM    ICU Vital Signs Last 24 Hrs  T(C): 37.6 (05 Feb 2025 04:00), Max: 39.2 (04 Feb 2025 07:00)  T(F): 99.6 (05 Feb 2025 04:00), Max: 102.5 (04 Feb 2025 07:00)  HR: 104 (05 Feb 2025 05:00) (94 - 128)  BP: 96/52 (05 Feb 2025 05:00) (61/38 - 159/78)  BP(mean): 65 (05 Feb 2025 05:00) (44 - 110)  ABP: --  ABP(mean): --  RR: 22 (05 Feb 2025 05:00) (9 - 37)  SpO2: 100% (05 Feb 2025 05:00) (94% - 100%)    O2 Parameters below as of 05 Feb 2025 04:00  Patient On (Oxygen Delivery Method): ventilator    O2 Concentration (%): 40        General: ill looking  ETT/ cachectic  Lungs: Bilateral Rhonchi  Cardiovascular: Regular   Abdomen: Soft, Positive BS  not following commands      02-03-25 @ 07:01  -  02-04-25 @ 07:00  --------------------------------------------------------  IN:    Dexmedetomidine: 450.4 mL    Free Water: 1000 mL    Heparin: 19.5 mL    IV PiggyBack: 400 mL    Jevity: 1200 mL    Norepinephrine: 140 mL    Propofol: 271.8 mL  Total IN: 3481.7 mL    OUT:    Indwelling Catheter - Urethral (mL): 1295 mL    Intermittent Catheterization - Urethral (mL): 750 mL  Total OUT: 2045 mL    Total NET: 1436.7 mL      02-04-25 @ 07:01  -  02-05-25 @ 06:53  --------------------------------------------------------  IN:    Dexmedetomidine: 183.6 mL    IV PiggyBack: 500 mL    Norepinephrine: 92.9 mL    Propofol: 147.6 mL  Total IN: 924.1 mL    OUT:    Indwelling Catheter - Urethral (mL): 1460 mL  Total OUT: 1460 mL    Total NET: -535.9 mL          LABS:                          10.8   19.08 )-----------( 156      ( 05 Feb 2025 04:49 )             34.0                                               02-05    141  |  106  |  77[HH]  ----------------------------<  146[H]  4.3   |  24  |  2.0[H]    Ca    7.1[L]      05 Feb 2025 04:49  Phos  5.0     02-05  Mg     2.6     02-05    TPro  4.0[L]  /  Alb  2.2[L]  /  TBili  4.5[H]  /  DBili  x   /  AST  19  /  ALT  <5  /  AlkPhos  130[H]  02-05      PT/INR - ( 05 Feb 2025 04:49 )   PT: 16.40 sec;   INR: 1.38 ratio         PTT - ( 04 Feb 2025 21:10 )  PTT:33.5 sec                                       Urinalysis Basic - ( 05 Feb 2025 04:49 )    Color: x / Appearance: x / SG: x / pH: x  Gluc: 146 mg/dL / Ketone: x  / Bili: x / Urobili: x   Blood: x / Protein: x / Nitrite: x   Leuk Esterase: x / RBC: x / WBC x   Sq Epi: x / Non Sq Epi: x / Bacteria: x                                                  LIVER FUNCTIONS - ( 05 Feb 2025 04:49 )  Alb: 2.2 g/dL / Pro: 4.0 g/dL / ALK PHOS: 130 U/L / ALT: <5 U/L / AST: 19 U/L / GGT: x                                                  Culture - Sputum (collected 04 Feb 2025 09:00)  Source: ET Tube ET Tube  Gram Stain (04 Feb 2025 19:37):    Numerous polymorphonuclear leukocytes per low power field    Rare Squamous epithelial cells per low power field    Rare Yeast like cells per oil power field                                                   Mode: AC/ CMV (Assist Control/ Continuous Mandatory Ventilation)  RR (machine): 16  TV (machine): 400  FiO2: 40  PEEP: 8  ITime: 1  MAP: 10  PIP: 14                                      ABG - ( 05 Feb 2025 03:31 )  pH, Arterial: 7.42  pH, Blood: x     /  pCO2: 40    /  pO2: 109   / HCO3: 26    / Base Excess: 1.3   /  SaO2: 99.6                MEDICATIONS  (STANDING):  carbidopa/levodopa  25/250 2 Tablet(s) Oral three times a day  cefepime   IVPB      cefepime   IVPB 2000 milliGRAM(s) IV Intermittent every 8 hours  chlorhexidine 0.12% Liquid 15 milliLiter(s) Oral Mucosa every 12 hours  dexMEDEtomidine Infusion 0.5 MICROgram(s)/kG/Hr (6.39 mL/Hr) IV Continuous <Continuous>  fentaNYL   Infusion. 0.501 MICROgram(s)/kG/Hr (2.56 mL/Hr) IV Continuous <Continuous>  lactulose Syrup 20 Gram(s) Oral every 8 hours  metroNIDAZOLE  IVPB 500 milliGRAM(s) IV Intermittent every 8 hours  nafcillin  IVPB 2 Gram(s) IV Intermittent every 4 hours  nafcillin  IVPB      norepinephrine Infusion 0.05 MICROgram(s)/kG/Min (4.79 mL/Hr) IV Continuous <Continuous>  oseltamivir 75 milliGRAM(s) Oral two times a day  pantoprazole   Suspension 40 milliGRAM(s) Oral daily  polyethylene glycol 3350 17 Gram(s) Oral every 12 hours  propofol Infusion 0.5 MICROgram(s)/kG/Min (0.15 mL/Hr) IV Continuous <Continuous>  rasagiline Tablet 1 milliGRAM(s) Oral <User Schedule>  senna 2 Tablet(s) Oral at bedtime    MEDICATIONS  (PRN):  acetaminophen     Tablet .. 650 milliGRAM(s) Oral every 6 hours PRN Temp greater or equal to 38C (100.4F), Mild Pain (1 - 3)  losartan 25 milliGRAM(s) Oral daily PRN >140 SBP      cxr noted     Over Night Events: events noted, remains critically ill, still intubated, ventilated, febrile, CT AP noted, sp bronch, IR reviewed, on levophed 0.08, propofol, precedex    PHYSICAL EXAM    ICU Vital Signs Last 24 Hrs  T(C): 37.6 (05 Feb 2025 04:00), Max: 39.2 (04 Feb 2025 07:00)  T(F): 99.6 (05 Feb 2025 04:00), Max: 102.5 (04 Feb 2025 07:00)  HR: 104 (05 Feb 2025 05:00) (94 - 128)  BP: 96/52 (05 Feb 2025 05:00) (61/38 - 159/78)  BP(mean): 65 (05 Feb 2025 05:00) (44 - 110  RR: 22 (05 Feb 2025 05:00) (9 - 37)  SpO2: 100% (05 Feb 2025 05:00) (94% - 100%)    O2 Parameters below as of 05 Feb 2025 04:00  Patient On (Oxygen Delivery Method): ventilator    O2 Concentration (%): 40        General: ill looking  ETT/ cachectic  Lungs: Bilateral Rhonchi  Cardiovascular: Regular   Abdomen: Soft, Positive BS  not following commands      02-03-25 @ 07:01  -  02-04-25 @ 07:00  --------------------------------------------------------  IN:    Dexmedetomidine: 450.4 mL    Free Water: 1000 mL    Heparin: 19.5 mL    IV PiggyBack: 400 mL    Jevity: 1200 mL    Norepinephrine: 140 mL    Propofol: 271.8 mL  Total IN: 3481.7 mL    OUT:    Indwelling Catheter - Urethral (mL): 1295 mL    Intermittent Catheterization - Urethral (mL): 750 mL  Total OUT: 2045 mL    Total NET: 1436.7 mL      02-04-25 @ 07:01  -  02-05-25 @ 06:53  --------------------------------------------------------  IN:    Dexmedetomidine: 183.6 mL    IV PiggyBack: 500 mL    Norepinephrine: 92.9 mL    Propofol: 147.6 mL  Total IN: 924.1 mL    OUT:    Indwelling Catheter - Urethral (mL): 1460 mL  Total OUT: 1460 mL    Total NET: -535.9 mL          LABS:                          10.8   19.08 )-----------( 156      ( 05 Feb 2025 04:49 )             34.0                                               02-05    141  |  106  |  77[HH]  ----------------------------<  146[H]  4.3   |  24  |  2.0[H]    Ca    7.1[L]      05 Feb 2025 04:49  Phos  5.0     02-05  Mg     2.6     02-05    TPro  4.0[L]  /  Alb  2.2[L]  /  TBili  4.5[H]  /  DBili  x   /  AST  19  /  ALT  <5  /  AlkPhos  130[H]  02-05      PT/INR - ( 05 Feb 2025 04:49 )   PT: 16.40 sec;   INR: 1.38 ratio         PTT - ( 04 Feb 2025 21:10 )  PTT:33.5 sec                                       Urinalysis Basic - ( 05 Feb 2025 04:49 )    Color: x / Appearance: x / SG: x / pH: x  Gluc: 146 mg/dL / Ketone: x  / Bili: x / Urobili: x   Blood: x / Protein: x / Nitrite: x   Leuk Esterase: x / RBC: x / WBC x   Sq Epi: x / Non Sq Epi: x / Bacteria: x                                                  LIVER FUNCTIONS - ( 05 Feb 2025 04:49 )  Alb: 2.2 g/dL / Pro: 4.0 g/dL / ALK PHOS: 130 U/L / ALT: <5 U/L / AST: 19 U/L / GGT: x                                                  Culture - Sputum (collected 04 Feb 2025 09:00)  Source: ET Tube ET Tube  Gram Stain (04 Feb 2025 19:37):    Numerous polymorphonuclear leukocytes per low power field    Rare Squamous epithelial cells per low power field    Rare Yeast like cells per oil power field                                                   Mode: AC/ CMV (Assist Control/ Continuous Mandatory Ventilation)  RR (machine): 16  TV (machine): 400  FiO2: 40  PEEP: 8  ITime: 1  MAP: 10  PIP: 14                                      ABG - ( 05 Feb 2025 03:31 )  pH, Arterial: 7.42  pH, Blood: x     /  pCO2: 40    /  pO2: 109   / HCO3: 26    / Base Excess: 1.3   /  SaO2: 99.6                MEDICATIONS  (STANDING):  carbidopa/levodopa  25/250 2 Tablet(s) Oral three times a day  cefepime   IVPB      cefepime   IVPB 2000 milliGRAM(s) IV Intermittent every 8 hours  chlorhexidine 0.12% Liquid 15 milliLiter(s) Oral Mucosa every 12 hours  dexMEDEtomidine Infusion 0.5 MICROgram(s)/kG/Hr (6.39 mL/Hr) IV Continuous <Continuous>  fentaNYL   Infusion. 0.501 MICROgram(s)/kG/Hr (2.56 mL/Hr) IV Continuous <Continuous>  lactulose Syrup 20 Gram(s) Oral every 8 hours  metroNIDAZOLE  IVPB 500 milliGRAM(s) IV Intermittent every 8 hours  nafcillin  IVPB 2 Gram(s) IV Intermittent every 4 hours  nafcillin  IVPB      norepinephrine Infusion 0.05 MICROgram(s)/kG/Min (4.79 mL/Hr) IV Continuous <Continuous>  oseltamivir 75 milliGRAM(s) Oral two times a day  pantoprazole   Suspension 40 milliGRAM(s) Oral daily  polyethylene glycol 3350 17 Gram(s) Oral every 12 hours  propofol Infusion 0.5 MICROgram(s)/kG/Min (0.15 mL/Hr) IV Continuous <Continuous>  rasagiline Tablet 1 milliGRAM(s) Oral <User Schedule>  senna 2 Tablet(s) Oral at bedtime    MEDICATIONS  (PRN):  acetaminophen     Tablet .. 650 milliGRAM(s) Oral every 6 hours PRN Temp greater or equal to 38C (100.4F), Mild Pain (1 - 3)  losartan 25 milliGRAM(s) Oral daily PRN >140 SBP      cxr noted

## 2025-02-05 NOTE — PROGRESS NOTE ADULT - SUBJECTIVE AND OBJECTIVE BOX
GARETT ESCAMILLA  69y, Male  Allergy: Allergy Status Unknown      LOS  7d    CHIEF COMPLAINT: pneumonia (05 Feb 2025 10:05)      INTERVAL EVENTS/HPI  - T(F): , Max: 100.3 (02-04-25 @ 20:00), on pressors   - WBC Count: 19.08 (02-05-25 @ 04:49)  WBC Count: 16.69 (02-04-25 @ 04:54)     - Creatinine: 2.0 (02-05-25 @ 04:49)  Creatinine: 1.1 (02-04-25 @ 04:54)     -   -   -     ROS  cannot obtain secondary to patient's sedation and/or mental status    VITALS:  T(F): 99.6, Max: 100.3 (02-04-25 @ 20:00)  HR: 100  BP: 102/61  RR: 19Vital Signs Last 24 Hrs  T(C): 37.6 (05 Feb 2025 04:00), Max: 37.9 (04 Feb 2025 20:00)  T(F): 99.6 (05 Feb 2025 04:00), Max: 100.3 (04 Feb 2025 20:00)  HR: 100 (05 Feb 2025 08:15) (94 - 122)  BP: 102/61 (05 Feb 2025 08:15) (95/55 - 159/78)  BP(mean): 76 (05 Feb 2025 08:15) (65 - 110)  RR: 19 (05 Feb 2025 08:15) (9 - 37)  SpO2: 100% (05 Feb 2025 08:15) (96% - 100%)    Parameters below as of 05 Feb 2025 08:00  Patient On (Oxygen Delivery Method): ventilator    O2 Concentration (%): 40    PHYSICAL EXAM:  ***    FH: Non-contributory  Social Hx: Non-contributory    TESTS & MEASUREMENTS:                        10.8   19.08 )-----------( 156      ( 05 Feb 2025 04:49 )             34.0     02-05    141  |  106  |  77[HH]  ----------------------------<  146[H]  4.3   |  24  |  2.0[H]    Ca    7.1[L]      05 Feb 2025 04:49  Phos  5.0     02-05  Mg     2.6     02-05    TPro  4.0[L]  /  Alb  2.2[L]  /  TBili  4.5[H]  /  DBili  x   /  AST  19  /  ALT  <5  /  AlkPhos  130[H]  02-05      LIVER FUNCTIONS - ( 05 Feb 2025 04:49 )  Alb: 2.2 g/dL / Pro: 4.0 g/dL / ALK PHOS: 130 U/L / ALT: <5 U/L / AST: 19 U/L / GGT: x           Urinalysis Basic - ( 05 Feb 2025 04:49 )    Color: x / Appearance: x / SG: x / pH: x  Gluc: 146 mg/dL / Ketone: x  / Bili: x / Urobili: x   Blood: x / Protein: x / Nitrite: x   Leuk Esterase: x / RBC: x / WBC x   Sq Epi: x / Non Sq Epi: x / Bacteria: x        Culture - Bronchial (collected 02-04-25 @ 09:29)  Source: Bronch Wash  Gram Stain (02-05-25 @ 07:26):    Few-moderate polymorphonuclear leukocytes seen per low power field    No Squamous epithelial cells seen per low power field    No organisms seen per oil power field    Culture - Sputum (collected 02-04-25 @ 09:00)  Source: ET Tube ET Tube  Gram Stain (02-04-25 @ 19:37):    Numerous polymorphonuclear leukocytes per low power field    Rare Squamous epithelial cells per low power field    Rare Yeast like cells per oil power field  Preliminary Report (02-05-25 @ 08:27):    Commensal halina consistent with body site    Culture - Blood (collected 02-02-25 @ 04:54)  Source: .Blood BLOOD  Preliminary Report (02-04-25 @ 12:02):    No growth at 48 Hours    Culture - Blood (collected 01-31-25 @ 17:22)  Source: .Blood BLOOD  Preliminary Report (02-04-25 @ 23:00):    No growth at 4 days    Culture - Blood (collected 01-31-25 @ 17:22)  Source: .Blood BLOOD  Preliminary Report (02-04-25 @ 23:00):    No growth at 4 days    Urinalysis with Rflx Culture (collected 01-29-25 @ 16:48)    Culture - Blood (collected 01-29-25 @ 10:09)  Source: .Blood BLOOD  Gram Stain (01-30-25 @ 15:40):    Growth in aerobic bottle: Gram Positive Cocci in Clusters    Growth in anaerobic bottle: Gram Positive Cocci in Clusters  Final Report (02-01-25 @ 07:51):    Growth in aerobic and anaerobic bottles: Staphylococcus aureus    Direct identification is available within approximately 3-5    hours either by Blood Panel Multiplexed PCR or Direct    MALDI-TOF. Details: [email or web address]  Organism: Blood Culture PCR  Staphylococcus aureus (02-01-25 @ 07:51)  Organism: Staphylococcus aureus (02-01-25 @ 07:51)      -  Clindamycin: R <=0.25 This isolate is presumed to be clindamycin resistant based on detection of inducible resistance. Clindamycin may still be effective in some patients.      -  Oxacillin: S <=0.25 Oxacillin predicts susceptibility for dicloxacillin, methicillin, and nafcillin      -  Gentamicin: S <=4 Should not be used as monotherapy      -  Vancomycin: S 1      -  Ceftaroline: S <=0.5      -  Tetracycline: S <=4      Method Type: KARTIK      -  Rifampin: S <=1 Should not be used as monotherapy      -  Erythromycin: R >4      -  Trimethoprim/Sulfamethoxazole: S <=0.5/9.5  Organism: Blood Culture PCR (02-01-25 @ 07:51)      Method Type: PCR      -  Methicillin SENSITIVE Staphylococcus aureus (MSSA): Detec Any isolate of Staphylococcus aureus from a blood culture is NOT considered a contaminant.    Culture - Blood (collected 01-29-25 @ 10:09)  Source: .Blood BLOOD  Gram Stain (02-01-25 @ 00:35):    Growth in aerobic bottle: Gram Positive Cocci in Clusters    Growth in anaerobic bottle: Gram Positive Cocci in Clusters  Final Report (02-01-25 @ 16:44):    Growth in aerobic and anaerobic bottles: Staphylococcus aureus    See previous culture [id number]            INFECTIOUS DISEASES TESTING  MRSA PCR Result.: Negative (01-30-25 @ 13:40)  Procalcitonin: 6.93 (01-30-25 @ 07:26)  Legionella Antigen, Urine: Negative (01-29-25 @ 15:49)  Rapid RVP Result: Detected (01-29-25 @ 10:30)      INFLAMMATORY MARKERS      RADIOLOGY & ADDITIONAL TESTS:  I have personally reviewed the last available Chest xray  CXR  Xray Chest 1 View AP/PA:   ACC: [id number] EXAM:  XR CHEST 1 VIEW   ORDERED BY: JANENE URENA     PROCEDURE DATE:  02/02/2025          INTERPRETATION:  CLINICAL HISTORY / REASON FOR EXAM: Line placement.    COMPARISON: Chest radiograph from February 2, 2025.    TECHNIQUE/POSITIONING: The patient is rotated. Single image, AP chest   radiograph.    FINDINGS:    SUPPORT DEVICES: Endotracheal tube with distal tip approximately 3 cm   above the minh. Enteric tube courses below the left hemidiaphragm, with   distal tip overlying the gastric body.    CARDIAC/MEDIASTINUM/HILUM: Unchanged cardiac silhouette.    LUNG PARENCHYMA/PLEURA: Unchanged bilateral patchy opacities.    SKELETON/SOFT TISSUES: Unchanged.      IMPRESSION:    Support devices in satisfactory position.    --- End of Report ---            JENNIE JAIN MD; Attending Radiologist  This document has been electronically signed. Feb 2 2025  9:47PM (02-02-25 @ 21:11)      CT  CT Abdomen and Pelvis w/ Oral Cont and w/ IV Cont:   ACC: [id number] EXAM:  CT ABDOMEN AND PELVIS OC IC   ORDERED BY: ANISH SINGH     PROCEDURE DATE:  02/04/2025          INTERPRETATION:  CLINICAL INFORMATION: Rule out cholangitis    COMPARISON: None.    CONTRAST/COMPLICATIONS:  IV Contrast: Omnipaque 350  95 cc administered   5 cc discarded  Oral Contrast: Omnipaque 300  .    PROCEDURE:  CT of the Abdomen and Pelvis was performed.  Sagittal and coronal reformats were performed.    FINDINGS:  LOWER CHEST: Right lung base near lobar consolidationwith small right   pleural effusion. Additional nodular opacities seen at the lung bases   mostly in the left lower lobe. Correlate for infectious/inflammatory   etiologies.    LIVER: Within normal limits.  BILE DUCTS: Normal caliber.  GALLBLADDER: Trace pericholecystic fluid. No calcified gallstones.  SPLEEN: Within normal limits.  PANCREAS: Within normal limits.  ADRENALS: Within normal limits.  KIDNEYS/URETERS: Bilateral renal cysts. No hydroureteronephrosis.    BLADDER: Mars catheter.  REPRODUCTIVE ORGANS: Unremarkable at CT.    BOWEL: No bowel obstruction. Appendix is not visualized. Solid and liquid   stool seen throughout the colon.  PERITONEUM/RETROPERITONEUM: Enteric tube terminates in the stomach.   Within normal limits.  VESSELS: Atherosclerotic changes.  LYMPH NODES: No lymphadenopathy.  ABDOMINAL WALL: Mild anasarca.  BONES: Degenerative changes.    IMPRESSION:  Right lung base near lobar consolidation with small right pleural   effusion. Additional nodular opacities seen atthe lung bases mostly in   the left lower lobe. Correlate for infectious/inflammatory etiologies.    Mild anasarca, trace pericholecystic fluid, question fluid overload.    Solid and liquid stool seen throughout the colon.    --- End of Report ---            FAROOQ SZYMANSKI MD; Attending Radiologist  This document has been electronically signed. Feb 4 2025  2:06PM (02-04-25 @ 13:11)      CARDIOLOGY TESTING  12 Lead ECG:   Ventricular Rate 91 BPM    Atrial Rate 91 BPM    P-R Interval 158 ms    QRS Duration 74 ms    Q-T Interval 366 ms    QTC Calculation(Bazett) 450 ms    P Axis 77 degrees    R Axis 64 degrees    T Axis 68 degrees    Diagnosis Line Normal sinus rhythm  Biatrial enlargement  Abnormal ECG    Confirmed by LILLIE LUND, [facility] ([id number]) on 1/29/2025 12:29:13 PM (01-29-25 @ 10:39)      MEDICATIONS  carbidopa/levodopa  25/250 2  cefepime   IVPB   cefepime   IVPB 2000  chlorhexidine 0.12% Liquid 15  dexMEDEtomidine Infusion 0.5  fentaNYL   Infusion. 0.501  lactated ringers. 1000  lactulose Syrup 20  metroNIDAZOLE  IVPB 500  nafcillin  IVPB 2  nafcillin  IVPB   norepinephrine Infusion 0.05  oseltamivir 75  pantoprazole   Suspension 40  polyethylene glycol 3350 17  propofol Infusion 0.5  rasagiline Tablet 1  senna 2      WEIGHT  Weight (kg): 51.1 (01-31-25 @ 20:33)  Creatinine: 2.0 mg/dL (02-05-25 @ 04:49)      ANTIBIOTICS:  cefepime   IVPB      cefepime   IVPB 2000 milliGRAM(s) IV Intermittent every 8 hours  metroNIDAZOLE  IVPB 500 milliGRAM(s) IV Intermittent every 8 hours  nafcillin  IVPB 2 Gram(s) IV Intermittent every 4 hours  nafcillin  IVPB      oseltamivir 75 milliGRAM(s) Oral two times a day      All available historical records have been reviewed   GARETT ESCAMILLA  69y, Male  Allergy: Allergy Status Unknown      LOS  7d    CHIEF COMPLAINT: pneumonia (05 Feb 2025 10:05)      INTERVAL EVENTS/HPI  - T(F): , Max: 100.3 (02-04-25 @ 20:00), on pressors   - WBC Count: 19.08 (02-05-25 @ 04:49)  WBC Count: 16.69 (02-04-25 @ 04:54)     - Creatinine: 2.0 (02-05-25 @ 04:49)  Creatinine: 1.1 (02-04-25 @ 04:54)     -   -   -     ROS  cannot obtain secondary to patient's sedation and/or mental status    VITALS:  T(F): 99.6, Max: 100.3 (02-04-25 @ 20:00)  HR: 100  BP: 102/61  RR: 19Vital Signs Last 24 Hrs  T(C): 37.6 (05 Feb 2025 04:00), Max: 37.9 (04 Feb 2025 20:00)  T(F): 99.6 (05 Feb 2025 04:00), Max: 100.3 (04 Feb 2025 20:00)  HR: 100 (05 Feb 2025 08:15) (94 - 122)  BP: 102/61 (05 Feb 2025 08:15) (95/55 - 159/78)  BP(mean): 76 (05 Feb 2025 08:15) (65 - 110)  RR: 19 (05 Feb 2025 08:15) (9 - 37)  SpO2: 100% (05 Feb 2025 08:15) (96% - 100%)    Parameters below as of 05 Feb 2025 08:00  Patient On (Oxygen Delivery Method): ventilator    O2 Concentration (%): 40    PHYSICAL EXAM:  General: intubated  HEENT: NCAT  Neck: supple  CV: RRR  Lungs: symmetric chest expansion, decreased BS at bases  Abd: Soft  Extr: wwp  Skin: no rash  Neuro: sedated  Lines: no phlebitis     FH: Non-contributory  Social Hx: Non-contributory    TESTS & MEASUREMENTS:                        10.8   19.08 )-----------( 156      ( 05 Feb 2025 04:49 )             34.0     02-05    141  |  106  |  77[HH]  ----------------------------<  146[H]  4.3   |  24  |  2.0[H]    Ca    7.1[L]      05 Feb 2025 04:49  Phos  5.0     02-05  Mg     2.6     02-05    TPro  4.0[L]  /  Alb  2.2[L]  /  TBili  4.5[H]  /  DBili  x   /  AST  19  /  ALT  <5  /  AlkPhos  130[H]  02-05      LIVER FUNCTIONS - ( 05 Feb 2025 04:49 )  Alb: 2.2 g/dL / Pro: 4.0 g/dL / ALK PHOS: 130 U/L / ALT: <5 U/L / AST: 19 U/L / GGT: x           Urinalysis Basic - ( 05 Feb 2025 04:49 )    Color: x / Appearance: x / SG: x / pH: x  Gluc: 146 mg/dL / Ketone: x  / Bili: x / Urobili: x   Blood: x / Protein: x / Nitrite: x   Leuk Esterase: x / RBC: x / WBC x   Sq Epi: x / Non Sq Epi: x / Bacteria: x        Culture - Bronchial (collected 02-04-25 @ 09:29)  Source: Bronch Wash  Gram Stain (02-05-25 @ 07:26):    Few-moderate polymorphonuclear leukocytes seen per low power field    No Squamous epithelial cells seen per low power field    No organisms seen per oil power field    Culture - Sputum (collected 02-04-25 @ 09:00)  Source: ET Tube ET Tube  Gram Stain (02-04-25 @ 19:37):    Numerous polymorphonuclear leukocytes per low power field    Rare Squamous epithelial cells per low power field    Rare Yeast like cells per oil power field  Preliminary Report (02-05-25 @ 08:27):    Commensal halina consistent with body site    Culture - Blood (collected 02-02-25 @ 04:54)  Source: .Blood BLOOD  Preliminary Report (02-04-25 @ 12:02):    No growth at 48 Hours    Culture - Blood (collected 01-31-25 @ 17:22)  Source: .Blood BLOOD  Preliminary Report (02-04-25 @ 23:00):    No growth at 4 days    Culture - Blood (collected 01-31-25 @ 17:22)  Source: .Blood BLOOD  Preliminary Report (02-04-25 @ 23:00):    No growth at 4 days    Urinalysis with Rflx Culture (collected 01-29-25 @ 16:48)    Culture - Blood (collected 01-29-25 @ 10:09)  Source: .Blood BLOOD  Gram Stain (01-30-25 @ 15:40):    Growth in aerobic bottle: Gram Positive Cocci in Clusters    Growth in anaerobic bottle: Gram Positive Cocci in Clusters  Final Report (02-01-25 @ 07:51):    Growth in aerobic and anaerobic bottles: Staphylococcus aureus    Direct identification is available within approximately 3-5    hours either by Blood Panel Multiplexed PCR or Direct    MALDI-TOF. Details: [email or web address]  Organism: Blood Culture PCR  Staphylococcus aureus (02-01-25 @ 07:51)  Organism: Staphylococcus aureus (02-01-25 @ 07:51)      -  Clindamycin: R <=0.25 This isolate is presumed to be clindamycin resistant based on detection of inducible resistance. Clindamycin may still be effective in some patients.      -  Oxacillin: S <=0.25 Oxacillin predicts susceptibility for dicloxacillin, methicillin, and nafcillin      -  Gentamicin: S <=4 Should not be used as monotherapy      -  Vancomycin: S 1      -  Ceftaroline: S <=0.5      -  Tetracycline: S <=4      Method Type: KARTIK      -  Rifampin: S <=1 Should not be used as monotherapy      -  Erythromycin: R >4      -  Trimethoprim/Sulfamethoxazole: S <=0.5/9.5  Organism: Blood Culture PCR (02-01-25 @ 07:51)      Method Type: PCR      -  Methicillin SENSITIVE Staphylococcus aureus (MSSA): Detec Any isolate of Staphylococcus aureus from a blood culture is NOT considered a contaminant.    Culture - Blood (collected 01-29-25 @ 10:09)  Source: .Blood BLOOD  Gram Stain (02-01-25 @ 00:35):    Growth in aerobic bottle: Gram Positive Cocci in Clusters    Growth in anaerobic bottle: Gram Positive Cocci in Clusters  Final Report (02-01-25 @ 16:44):    Growth in aerobic and anaerobic bottles: Staphylococcus aureus    See previous culture [id number]            INFECTIOUS DISEASES TESTING  MRSA PCR Result.: Negative (01-30-25 @ 13:40)  Procalcitonin: 6.93 (01-30-25 @ 07:26)  Legionella Antigen, Urine: Negative (01-29-25 @ 15:49)  Rapid RVP Result: Detected (01-29-25 @ 10:30)      INFLAMMATORY MARKERS      RADIOLOGY & ADDITIONAL TESTS:  I have personally reviewed the last available Chest xray  CXR  Xray Chest 1 View AP/PA:   ACC: [id number] EXAM:  XR CHEST 1 VIEW   ORDERED BY: JANENE URENA     PROCEDURE DATE:  02/02/2025          INTERPRETATION:  CLINICAL HISTORY / REASON FOR EXAM: Line placement.    COMPARISON: Chest radiograph from February 2, 2025.    TECHNIQUE/POSITIONING: The patient is rotated. Single image, AP chest   radiograph.    FINDINGS:    SUPPORT DEVICES: Endotracheal tube with distal tip approximately 3 cm   above the minh. Enteric tube courses below the left hemidiaphragm, with   distal tip overlying the gastric body.    CARDIAC/MEDIASTINUM/HILUM: Unchanged cardiac silhouette.    LUNG PARENCHYMA/PLEURA: Unchanged bilateral patchy opacities.    SKELETON/SOFT TISSUES: Unchanged.      IMPRESSION:    Support devices in satisfactory position.    --- End of Report ---            JENNIE JAIN MD; Attending Radiologist  This document has been electronically signed. Feb 2 2025  9:47PM (02-02-25 @ 21:11)      CT  CT Abdomen and Pelvis w/ Oral Cont and w/ IV Cont:   ACC: [id number] EXAM:  CT ABDOMEN AND PELVIS OC IC   ORDERED BY: ANISH SINGH     PROCEDURE DATE:  02/04/2025          INTERPRETATION:  CLINICAL INFORMATION: Rule out cholangitis    COMPARISON: None.    CONTRAST/COMPLICATIONS:  IV Contrast: Omnipaque 350  95 cc administered   5 cc discarded  Oral Contrast: Omnipaque 300  .    PROCEDURE:  CT of the Abdomen and Pelvis was performed.  Sagittal and coronal reformats were performed.    FINDINGS:  LOWER CHEST: Right lung base near lobar consolidationwith small right   pleural effusion. Additional nodular opacities seen at the lung bases   mostly in the left lower lobe. Correlate for infectious/inflammatory   etiologies.    LIVER: Within normal limits.  BILE DUCTS: Normal caliber.  GALLBLADDER: Trace pericholecystic fluid. No calcified gallstones.  SPLEEN: Within normal limits.  PANCREAS: Within normal limits.  ADRENALS: Within normal limits.  KIDNEYS/URETERS: Bilateral renal cysts. No hydroureteronephrosis.    BLADDER: Mars catheter.  REPRODUCTIVE ORGANS: Unremarkable at CT.    BOWEL: No bowel obstruction. Appendix is not visualized. Solid and liquid   stool seen throughout the colon.  PERITONEUM/RETROPERITONEUM: Enteric tube terminates in the stomach.   Within normal limits.  VESSELS: Atherosclerotic changes.  LYMPH NODES: No lymphadenopathy.  ABDOMINAL WALL: Mild anasarca.  BONES: Degenerative changes.    IMPRESSION:  Right lung base near lobar consolidation with small right pleural   effusion. Additional nodular opacities seen atthe lung bases mostly in   the left lower lobe. Correlate for infectious/inflammatory etiologies.    Mild anasarca, trace pericholecystic fluid, question fluid overload.    Solid and liquid stool seen throughout the colon.    --- End of Report ---            FAROOQ SZYMANSKI MD; Attending Radiologist  This document has been electronically signed. Feb 4 2025  2:06PM (02-04-25 @ 13:11)      CARDIOLOGY TESTING  12 Lead ECG:   Ventricular Rate 91 BPM    Atrial Rate 91 BPM    P-R Interval 158 ms    QRS Duration 74 ms    Q-T Interval 366 ms    QTC Calculation(Bazett) 450 ms    P Axis 77 degrees    R Axis 64 degrees    T Axis 68 degrees    Diagnosis Line Normal sinus rhythm  Biatrial enlargement  Abnormal ECG    Confirmed by LILLIE LUND, [facility] ([id number]) on 1/29/2025 12:29:13 PM (01-29-25 @ 10:39)      MEDICATIONS  carbidopa/levodopa  25/250 2  cefepime   IVPB   cefepime   IVPB 2000  chlorhexidine 0.12% Liquid 15  dexMEDEtomidine Infusion 0.5  fentaNYL   Infusion. 0.501  lactated ringers. 1000  lactulose Syrup 20  metroNIDAZOLE  IVPB 500  nafcillin  IVPB 2  nafcillin  IVPB   norepinephrine Infusion 0.05  oseltamivir 75  pantoprazole   Suspension 40  polyethylene glycol 3350 17  propofol Infusion 0.5  rasagiline Tablet 1  senna 2      WEIGHT  Weight (kg): 51.1 (01-31-25 @ 20:33)  Creatinine: 2.0 mg/dL (02-05-25 @ 04:49)      ANTIBIOTICS:  cefepime   IVPB      cefepime   IVPB 2000 milliGRAM(s) IV Intermittent every 8 hours  metroNIDAZOLE  IVPB 500 milliGRAM(s) IV Intermittent every 8 hours  nafcillin  IVPB 2 Gram(s) IV Intermittent every 4 hours  nafcillin  IVPB      oseltamivir 75 milliGRAM(s) Oral two times a day      All available historical records have been reviewed

## 2025-02-05 NOTE — PROGRESS NOTE ADULT - ASSESSMENT
IMPRESSION:    Acute Hypoxic Respiratory Failure so reintubation  Toxic Metabolic Encephalopathy  CAP likely aspiration   Influenza A  MSSA bacteremia repeat CX -  Sepsis POA  LYNNE  cholecystitis  Periventricular bleed  Elevated Bilirubin  ?Mural thrombus in PA  HO Parkinson's Disease   HO CAD    PLAN:    CNS:  CTH. noted with right periventricular bleed stable. Neuro on board. Continue Anti parkinson's.  Daily SAT    HEENT: Oral care.      PULMONARY:  HOB @ 45 degrees.  Aspiration precautions.   PEEP 8, dec FIO2 keeP Sao2 92 o 96%, Monitor airway pressures, fup bronch result    CARDIOVASCULAR:  Avoid overload.  Cardio for MARY on hold, taper pressors    GI: GI prophylaxis.  FEEDING NPO., CT AP noted, IR eval    RENAL:  Follow up lytes.  Free water.   BMP in PM.  Monitor UO    INFECTIOUS DISEASE: Cultures noted.  tamiflu, naf, cefepime, flagyl    HEMATOLOGICAL:  DVT prophylaxis.  HIT abx -, IV hep on hold    ENDOCRINE:  Follow up FS.  Insulin protocol if needed.    MUSCULOSKELETAL: Bed rest.  Off loading    Full code  MICU  palliative care eval  very poor prognosis             IMPRESSION:    Acute Hypoxic Respiratory Failure sp reintubation  Toxic Metabolic Encephalopathy  CAP likely aspiration   Influenza A  MSSA bacteremia repeat CX -  Sepsis POA  LYNNE  cholecystitis  Periventricular bleed  Elevated Bilirubin  ?Mural thrombus in PA  HO Parkinson's Disease   HO CAD    PLAN:    CNS:  CTH. noted with right periventricular bleed stable. Neuro on board. Continue Anti parkinson's.  Daily SAT    HEENT: Oral care.      PULMONARY:  HOB @ 45 degrees.  Aspiration precautions.   PEEP 8, dec FIO2 keeP Sao2 92 o 96%, Monitor airway pressures, fup bronch result    CARDIOVASCULAR:  Avoid overload.  Cardio for MARY on hold, taper pressors    GI: GI prophylaxis.  FEEDING NPO., CT AP noted, IR fup    RENAL:  Follow up lytes.  Free water.   BMP in PM.  Monitor UO    INFECTIOUS DISEASE: Cultures noted.  tamiflu, naf, cefepime, flagyl    HEMATOLOGICAL:  DVT prophylaxis.  HIT abx -, IV hep on hold    ENDOCRINE:  Follow up FS.  Insulin protocol if needed.    MUSCULOSKELETAL: Bed rest.  Off loading    Full code  MICU  palliative care eval  very poor prognosis

## 2025-02-05 NOTE — CHART NOTE - NSCHARTNOTEFT_GEN_A_CORE
ICU TRANSFER NOTE      Procedure:   Post op diagnosis:      __x__  Intubated  TV:_400_____       Rate: __16____      FiO2: __40____    ____  Patent Airway    ____  Full return of protective reflexes    ____  Full recovery from anesthesia / back to baseline status    Vitals  HR: 98  BP: 134/71  RR: 16  O2 Sat: 100%  Temp: 37    Mental Status:  ____ Awake   _____ Alert   _____ Drowsy   __x___ Sedated    Nausea/Vomiting:  __x__ NO  ______Yes,   See Post - Op Orders          Pain Scale (0-10):  _____    Treatment: ____ None    __x__ See Post - Op/PCA Orders    Post - Operative Fluids:   ____ Oral   __x__ See Post - Op Orders    Plan: Discharge when criteria met:   ____Home       _____Floor     ___x__Critical Care   Other:_________________    Comments: Patient had smooth intraoperative event, no anesthesia complication.

## 2025-02-06 LAB
ALBUMIN SERPL ELPH-MCNC: 2.2 G/DL — LOW (ref 3.5–5.2)
ALBUMIN SERPL ELPH-MCNC: 2.3 G/DL — LOW (ref 3.5–5.2)
ALP SERPL-CCNC: 111 U/L — SIGNIFICANT CHANGE UP (ref 30–115)
ALP SERPL-CCNC: 122 U/L — HIGH (ref 30–115)
ALT FLD-CCNC: <5 U/L — SIGNIFICANT CHANGE UP (ref 0–41)
ALT FLD-CCNC: <5 U/L — SIGNIFICANT CHANGE UP (ref 0–41)
ANION GAP SERPL CALC-SCNC: 17 MMOL/L — HIGH (ref 7–14)
ANION GAP SERPL CALC-SCNC: 6 MMOL/L — LOW (ref 7–14)
APTT BLD: 30.9 SEC — SIGNIFICANT CHANGE UP (ref 27–39.2)
APTT BLD: 32.9 SEC — SIGNIFICANT CHANGE UP (ref 27–39.2)
AST SERPL-CCNC: 17 U/L — SIGNIFICANT CHANGE UP (ref 0–41)
AST SERPL-CCNC: 19 U/L — SIGNIFICANT CHANGE UP (ref 0–41)
BASOPHILS # BLD AUTO: 0.01 K/UL — SIGNIFICANT CHANGE UP (ref 0–0.2)
BASOPHILS NFR BLD AUTO: 0.1 % — SIGNIFICANT CHANGE UP (ref 0–1)
BILIRUB SERPL-MCNC: 3 MG/DL — HIGH (ref 0.2–1.2)
BILIRUB SERPL-MCNC: 3.3 MG/DL — HIGH (ref 0.2–1.2)
BUN SERPL-MCNC: 78 MG/DL — CRITICAL HIGH (ref 10–20)
BUN SERPL-MCNC: 79 MG/DL — CRITICAL HIGH (ref 10–20)
CALCIUM SERPL-MCNC: 6.5 MG/DL — LOW (ref 8.4–10.5)
CALCIUM SERPL-MCNC: 7 MG/DL — LOW (ref 8.4–10.5)
CHLORIDE SERPL-SCNC: 105 MMOL/L — SIGNIFICANT CHANGE UP (ref 98–110)
CHLORIDE SERPL-SCNC: 112 MMOL/L — HIGH (ref 98–110)
CO2 SERPL-SCNC: 20 MMOL/L — SIGNIFICANT CHANGE UP (ref 17–32)
CO2 SERPL-SCNC: 23 MMOL/L — SIGNIFICANT CHANGE UP (ref 17–32)
CREAT SERPL-MCNC: 1.8 MG/DL — HIGH (ref 0.7–1.5)
CREAT SERPL-MCNC: 1.9 MG/DL — HIGH (ref 0.7–1.5)
CULTURE RESULTS: SIGNIFICANT CHANGE UP
EGFR: 37 ML/MIN/1.73M2 — LOW
EGFR: 37 ML/MIN/1.73M2 — LOW
EGFR: 40 ML/MIN/1.73M2 — LOW
EGFR: 40 ML/MIN/1.73M2 — LOW
EOSINOPHIL # BLD AUTO: 0.03 K/UL — SIGNIFICANT CHANGE UP (ref 0–0.7)
EOSINOPHIL NFR BLD AUTO: 0.2 % — SIGNIFICANT CHANGE UP (ref 0–8)
GAS PNL BLDA: SIGNIFICANT CHANGE UP
GLUCOSE SERPL-MCNC: 148 MG/DL — HIGH (ref 70–99)
GLUCOSE SERPL-MCNC: 157 MG/DL — HIGH (ref 70–99)
HCT VFR BLD CALC: 31.6 % — LOW (ref 42–52)
HCT VFR BLD CALC: 32.9 % — LOW (ref 42–52)
HGB BLD-MCNC: 10.2 G/DL — LOW (ref 14–18)
HGB BLD-MCNC: 10.6 G/DL — LOW (ref 14–18)
IMM GRANULOCYTES NFR BLD AUTO: 0.5 % — HIGH (ref 0.1–0.3)
INR BLD: 1.31 RATIO — HIGH (ref 0.65–1.3)
LYMPHOCYTES # BLD AUTO: 0.43 K/UL — LOW (ref 1.2–3.4)
LYMPHOCYTES # BLD AUTO: 3.5 % — LOW (ref 20.5–51.1)
MAGNESIUM SERPL-MCNC: 2.4 MG/DL — SIGNIFICANT CHANGE UP (ref 1.8–2.4)
MCHC RBC-ENTMCNC: 29.9 PG — SIGNIFICANT CHANGE UP (ref 27–31)
MCHC RBC-ENTMCNC: 30.2 PG — SIGNIFICANT CHANGE UP (ref 27–31)
MCHC RBC-ENTMCNC: 32.2 G/DL — SIGNIFICANT CHANGE UP (ref 32–37)
MCHC RBC-ENTMCNC: 32.3 G/DL — SIGNIFICANT CHANGE UP (ref 32–37)
MCV RBC AUTO: 92.7 FL — SIGNIFICANT CHANGE UP (ref 80–94)
MCV RBC AUTO: 93.5 FL — SIGNIFICANT CHANGE UP (ref 80–94)
MONOCYTES # BLD AUTO: 0.49 K/UL — SIGNIFICANT CHANGE UP (ref 0.1–0.6)
MONOCYTES NFR BLD AUTO: 4 % — SIGNIFICANT CHANGE UP (ref 1.7–9.3)
NEUTROPHILS # BLD AUTO: 11.31 K/UL — HIGH (ref 1.4–6.5)
NEUTROPHILS NFR BLD AUTO: 91.7 % — HIGH (ref 42.2–75.2)
NRBC # BLD: 0 /100 WBCS — SIGNIFICANT CHANGE UP (ref 0–0)
NRBC # BLD: 0 /100 WBCS — SIGNIFICANT CHANGE UP (ref 0–0)
NRBC BLD-RTO: 0 /100 WBCS — SIGNIFICANT CHANGE UP (ref 0–0)
NRBC BLD-RTO: 0 /100 WBCS — SIGNIFICANT CHANGE UP (ref 0–0)
PHOSPHATE SERPL-MCNC: 5.6 MG/DL — HIGH (ref 2.1–4.9)
PLATELET # BLD AUTO: 154 K/UL — SIGNIFICANT CHANGE UP (ref 130–400)
PLATELET # BLD AUTO: 210 K/UL — SIGNIFICANT CHANGE UP (ref 130–400)
PMV BLD: 12.3 FL — HIGH (ref 7.4–10.4)
PMV BLD: 12.6 FL — HIGH (ref 7.4–10.4)
POTASSIUM SERPL-MCNC: 3.6 MMOL/L — SIGNIFICANT CHANGE UP (ref 3.5–5)
POTASSIUM SERPL-MCNC: 4 MMOL/L — SIGNIFICANT CHANGE UP (ref 3.5–5)
POTASSIUM SERPL-SCNC: 3.6 MMOL/L — SIGNIFICANT CHANGE UP (ref 3.5–5)
POTASSIUM SERPL-SCNC: 4 MMOL/L — SIGNIFICANT CHANGE UP (ref 3.5–5)
PROT SERPL-MCNC: 4 G/DL — LOW (ref 6–8)
PROT SERPL-MCNC: 4.5 G/DL — LOW (ref 6–8)
PROTHROM AB SERPL-ACNC: 15.5 SEC — HIGH (ref 9.95–12.87)
RBC # BLD: 3.38 M/UL — LOW (ref 4.7–6.1)
RBC # BLD: 3.55 M/UL — LOW (ref 4.7–6.1)
RBC # FLD: 14 % — SIGNIFICANT CHANGE UP (ref 11.5–14.5)
RBC # FLD: 14.1 % — SIGNIFICANT CHANGE UP (ref 11.5–14.5)
SODIUM SERPL-SCNC: 141 MMOL/L — SIGNIFICANT CHANGE UP (ref 135–146)
SODIUM SERPL-SCNC: 142 MMOL/L — SIGNIFICANT CHANGE UP (ref 135–146)
SPECIMEN SOURCE: SIGNIFICANT CHANGE UP
WBC # BLD: 12.33 K/UL — HIGH (ref 4.8–10.8)
WBC # BLD: 14.73 K/UL — HIGH (ref 4.8–10.8)
WBC # FLD AUTO: 12.33 K/UL — HIGH (ref 4.8–10.8)
WBC # FLD AUTO: 14.73 K/UL — HIGH (ref 4.8–10.8)

## 2025-02-06 PROCEDURE — 71045 X-RAY EXAM CHEST 1 VIEW: CPT | Mod: 26

## 2025-02-06 PROCEDURE — 99291 CRITICAL CARE FIRST HOUR: CPT

## 2025-02-06 RX ORDER — HEPARIN SODIUM 1000 [USP'U]/ML
INJECTION INTRAVENOUS; SUBCUTANEOUS
Qty: 25000 | Refills: 0 | Status: DISCONTINUED | OUTPATIENT
Start: 2025-02-06 | End: 2025-02-08

## 2025-02-06 RX ORDER — HEPARIN SODIUM 1000 [USP'U]/ML
4000 INJECTION INTRAVENOUS; SUBCUTANEOUS EVERY 6 HOURS
Refills: 0 | Status: DISCONTINUED | OUTPATIENT
Start: 2025-02-06 | End: 2025-02-08

## 2025-02-06 RX ORDER — HEPARIN SODIUM 1000 [USP'U]/ML
2000 INJECTION INTRAVENOUS; SUBCUTANEOUS EVERY 6 HOURS
Refills: 0 | Status: DISCONTINUED | OUTPATIENT
Start: 2025-02-06 | End: 2025-02-08

## 2025-02-06 RX ORDER — HEPARIN SODIUM 1000 [USP'U]/ML
4000 INJECTION INTRAVENOUS; SUBCUTANEOUS ONCE
Refills: 0 | Status: COMPLETED | OUTPATIENT
Start: 2025-02-06 | End: 2025-02-06

## 2025-02-06 RX ADMIN — SODIUM CHLORIDE 100 MILLILITER(S): 9 INJECTION, SOLUTION INTRAVENOUS at 18:35

## 2025-02-06 RX ADMIN — LACTULOSE 20 GRAM(S): 10 SOLUTION ORAL at 12:00

## 2025-02-06 RX ADMIN — CEFEPIME 100 MILLIGRAM(S): 2 INJECTION, POWDER, FOR SOLUTION INTRAVENOUS at 05:11

## 2025-02-06 RX ADMIN — Medication 2 TABLET(S): at 21:09

## 2025-02-06 RX ADMIN — PROPOFOL 0.15 MICROGRAM(S)/KG/MIN: 10 INJECTION, EMULSION INTRAVENOUS at 05:11

## 2025-02-06 RX ADMIN — NAFCILLIN 200 GRAM(S): 2 INJECTION, SOLUTION INTRAVENOUS at 10:24

## 2025-02-06 RX ADMIN — HEPARIN SODIUM 1000 UNIT(S)/HR: 1000 INJECTION INTRAVENOUS; SUBCUTANEOUS at 10:30

## 2025-02-06 RX ADMIN — Medication 100 MILLIGRAM(S): at 13:03

## 2025-02-06 RX ADMIN — DEXMEDETOMIDINE HYDROCHLORIDE IN SODIUM CHLORIDE 6.39 MICROGRAM(S)/KG/HR: 4 INJECTION INTRAVENOUS at 08:39

## 2025-02-06 RX ADMIN — Medication 650 MILLIGRAM(S): at 12:00

## 2025-02-06 RX ADMIN — HEPARIN SODIUM 1200 UNIT(S)/HR: 1000 INJECTION INTRAVENOUS; SUBCUTANEOUS at 18:00

## 2025-02-06 RX ADMIN — Medication 15 MILLILITER(S): at 17:31

## 2025-02-06 RX ADMIN — NAFCILLIN 200 GRAM(S): 2 INJECTION, SOLUTION INTRAVENOUS at 23:00

## 2025-02-06 RX ADMIN — Medication 2 TABLET(S): at 05:12

## 2025-02-06 RX ADMIN — LACTULOSE 20 GRAM(S): 10 SOLUTION ORAL at 17:32

## 2025-02-06 RX ADMIN — Medication 650 MILLIGRAM(S): at 18:31

## 2025-02-06 RX ADMIN — NAFCILLIN 200 GRAM(S): 2 INJECTION, SOLUTION INTRAVENOUS at 05:11

## 2025-02-06 RX ADMIN — OSELTAMIVIR PHOSPHATE 30 MILLIGRAM(S): 75 CAPSULE ORAL at 12:00

## 2025-02-06 RX ADMIN — POLYETHYLENE GLYCOL 3350 17 GRAM(S): 17 POWDER, FOR SOLUTION ORAL at 05:12

## 2025-02-06 RX ADMIN — Medication 2 TABLET(S): at 13:02

## 2025-02-06 RX ADMIN — POLYETHYLENE GLYCOL 3350 17 GRAM(S): 17 POWDER, FOR SOLUTION ORAL at 17:32

## 2025-02-06 RX ADMIN — LACTULOSE 20 GRAM(S): 10 SOLUTION ORAL at 05:12

## 2025-02-06 RX ADMIN — LACTULOSE 20 GRAM(S): 10 SOLUTION ORAL at 00:05

## 2025-02-06 RX ADMIN — Medication 100 MILLIGRAM(S): at 05:11

## 2025-02-06 RX ADMIN — Medication 100 MILLIGRAM(S): at 21:09

## 2025-02-06 RX ADMIN — Medication 40 MILLIGRAM(S): at 12:00

## 2025-02-06 RX ADMIN — NAFCILLIN 200 GRAM(S): 2 INJECTION, SOLUTION INTRAVENOUS at 01:00

## 2025-02-06 RX ADMIN — NAFCILLIN 200 GRAM(S): 2 INJECTION, SOLUTION INTRAVENOUS at 18:06

## 2025-02-06 RX ADMIN — DEXMEDETOMIDINE HYDROCHLORIDE IN SODIUM CHLORIDE 6.39 MICROGRAM(S)/KG/HR: 4 INJECTION INTRAVENOUS at 14:17

## 2025-02-06 RX ADMIN — DEXMEDETOMIDINE HYDROCHLORIDE IN SODIUM CHLORIDE 6.39 MICROGRAM(S)/KG/HR: 4 INJECTION INTRAVENOUS at 18:32

## 2025-02-06 RX ADMIN — RASAGILINE 1 MILLIGRAM(S): 0.5 TABLET ORAL at 05:10

## 2025-02-06 RX ADMIN — Medication 650 MILLIGRAM(S): at 12:30

## 2025-02-06 RX ADMIN — NAFCILLIN 200 GRAM(S): 2 INJECTION, SOLUTION INTRAVENOUS at 14:17

## 2025-02-06 RX ADMIN — HEPARIN SODIUM 4000 UNIT(S): 1000 INJECTION INTRAVENOUS; SUBCUTANEOUS at 10:24

## 2025-02-06 RX ADMIN — PROPOFOL 0.15 MICROGRAM(S)/KG/MIN: 10 INJECTION, EMULSION INTRAVENOUS at 18:35

## 2025-02-06 RX ADMIN — Medication 15 MILLILITER(S): at 05:12

## 2025-02-06 RX ADMIN — SODIUM CHLORIDE 100 MILLILITER(S): 9 INJECTION, SOLUTION INTRAVENOUS at 05:11

## 2025-02-06 RX ADMIN — CEFEPIME 100 MILLIGRAM(S): 2 INJECTION, POWDER, FOR SOLUTION INTRAVENOUS at 17:32

## 2025-02-06 RX ADMIN — HEPARIN SODIUM 4000 UNIT(S): 1000 INJECTION INTRAVENOUS; SUBCUTANEOUS at 18:00

## 2025-02-06 NOTE — PROGRESS NOTE ADULT - SUBJECTIVE AND OBJECTIVE BOX
Over Night Events: events noted, remains critically ill, still intubated, ventilated, IR noted, afebrile    PHYSICAL EXAM    ICU Vital Signs Last 24 Hrs  T(C): 36.5 (06 Feb 2025 04:00), Max: 37.7 (05 Feb 2025 20:00)  T(F): 97.7 (06 Feb 2025 04:00), Max: 99.8 (05 Feb 2025 20:00)  HR: 90 (06 Feb 2025 06:15) (78 - 110)  BP: 84/48 (06 Feb 2025 06:15) (84/48 - 151/71)  BP(mean): 57 (06 Feb 2025 06:15) (57 - 102)  RR: 21 (06 Feb 2025 06:15) (8 - 32)  SpO2: 99% (06 Feb 2025 06:15) (98% - 100%)    O2 Parameters below as of 06 Feb 2025 04:00  Patient On (Oxygen Delivery Method): ventilator    O2 Concentration (%): 40        General: ill looking  Cachectic  ETT  Lungs: Bilateral Rhonchi  Cardiovascular: FRANKLYN 2/6  Abdomen: Soft, Positive BS  Extremities: No clubbing   Neurological: Non focal       02-04-25 @ 07:01  -  02-05-25 @ 07:00  --------------------------------------------------------  IN:    Dexmedetomidine: 198.9 mL    IV PiggyBack: 500 mL    Norepinephrine: 115.1 mL    Propofol: 159.9 mL  Total IN: 973.9 mL    OUT:    Indwelling Catheter - Urethral (mL): 1460 mL  Total OUT: 1460 mL    Total NET: -486.1 mL      02-05-25 @ 07:01  -  02-06-25 @ 06:50  --------------------------------------------------------  IN:    Dexmedetomidine: 198.9 mL    Free Water: 100 mL    IV PiggyBack: 250 mL    Lactated Ringers: 2400 mL    Norepinephrine: 46.6 mL    Propofol: 119.6 mL  Total IN: 3115.1 mL    OUT:    Drain (mL): 450 mL    Indwelling Catheter - Urethral (mL): 2080 mL  Total OUT: 2530 mL    Total NET: 585.1 mL          LABS:                          10.2   12.33 )-----------( 154      ( 06 Feb 2025 04:57 )             31.6                                               02-05    146  |  107  |  79[HH]  ----------------------------<  140[H]  4.3   |  27  |  1.9[H]    Ca    6.9[L]      05 Feb 2025 11:33  Phos  5.0     02-05  Mg     2.6     02-05    TPro  4.2[L]  /  Alb  2.2[L]  /  TBili  4.1[H]  /  DBili  x   /  AST  19  /  ALT  <5  /  AlkPhos  127[H]  02-05      PT/INR - ( 05 Feb 2025 04:49 )   PT: 16.40 sec;   INR: 1.38 ratio         PTT - ( 04 Feb 2025 21:10 )  PTT:33.5 sec                                       Urinalysis Basic - ( 05 Feb 2025 11:33 )    Color: x / Appearance: x / SG: x / pH: x  Gluc: 140 mg/dL / Ketone: x  / Bili: x / Urobili: x   Blood: x / Protein: x / Nitrite: x   Leuk Esterase: x / RBC: x / WBC x   Sq Epi: x / Non Sq Epi: x / Bacteria: x                                                  LIVER FUNCTIONS - ( 05 Feb 2025 11:33 )  Alb: 2.2 g/dL / Pro: 4.2 g/dL / ALK PHOS: 127 U/L / ALT: <5 U/L / AST: 19 U/L / GGT: x                                                  Culture - Acid Fast - Bronchial w/Smear (collected 04 Feb 2025 09:29)  Source: Bronch Wash  Preliminary Report (05 Feb 2025 23:07):    Culture is being performed.    Culture - Bronchial (collected 04 Feb 2025 09:29)  Source: Bronch Wash  Gram Stain (05 Feb 2025 07:26):    Few-moderate polymorphonuclear leukocytes seen per low power field    No Squamous epithelial cells seen per low power field    No organisms seen per oil power field  Preliminary Report (05 Feb 2025 16:12):    No growth    Culture - Sputum (collected 04 Feb 2025 09:00)  Source: ET Tube ET Tube  Gram Stain (04 Feb 2025 19:37):    Numerous polymorphonuclear leukocytes per low power field    Rare Squamous epithelial cells per low power field    Rare Yeast like cells per oil power field  Final Report (05 Feb 2025 22:23):    Commensal halina consistent with body site                                                   Mode: AC/ CMV (Assist Control/ Continuous Mandatory Ventilation)  RR (machine): 16  TV (machine): 400  FiO2: 40  PEEP: 8  ITime: 1  MAP: 10  PIP: 15                                      ABG - ( 06 Feb 2025 03:14 )  pH, Arterial: 7.45  pH, Blood: x     /  pCO2: 34    /  pO2: 163   / HCO3: 24    / Base Excess: x     /  SaO2: 100.0               MEDICATIONS  (STANDING):  carbidopa/levodopa  25/250 2 Tablet(s) Oral three times a day  cefepime   IVPB 1000 milliGRAM(s) IV Intermittent every 12 hours  chlorhexidine 0.12% Liquid 15 milliLiter(s) Oral Mucosa every 12 hours  dexMEDEtomidine Infusion 0.5 MICROgram(s)/kG/Hr (6.39 mL/Hr) IV Continuous <Continuous>  lactated ringers. 1000 milliLiter(s) (100 mL/Hr) IV Continuous <Continuous>  lactulose Syrup 20 Gram(s) Oral every 6 hours  metroNIDAZOLE  IVPB 500 milliGRAM(s) IV Intermittent every 8 hours  nafcillin  IVPB      nafcillin  IVPB 2 Gram(s) IV Intermittent every 4 hours  norepinephrine Infusion 0.05 MICROgram(s)/kG/Min (4.79 mL/Hr) IV Continuous <Continuous>  oseltamivir 30 milliGRAM(s) Oral daily  pantoprazole   Suspension 40 milliGRAM(s) Oral daily  polyethylene glycol 3350 17 Gram(s) Oral every 12 hours  propofol Infusion 0.5 MICROgram(s)/kG/Min (0.15 mL/Hr) IV Continuous <Continuous>  rasagiline Tablet 1 milliGRAM(s) Oral <User Schedule>  senna 2 Tablet(s) Oral at bedtime    MEDICATIONS  (PRN):  acetaminophen     Tablet .. 650 milliGRAM(s) Oral every 6 hours PRN Temp greater or equal to 38C (100.4F), Mild Pain (1 - 3)  fentaNYL    Injectable 50 MICROGram(s) IV Push every 2 hours PRN Aggitation  losartan 25 milliGRAM(s) Oral daily PRN >140 SBP           Over Night Events: events noted, remains critically ill, still intubated, ventilated, IR noted, afebrile, precedex, propofol, levophed     PHYSICAL EXAM    ICU Vital Signs Last 24 Hrs  T(C): 36.5 (06 Feb 2025 04:00), Max: 37.7 (05 Feb 2025 20:00)  T(F): 97.7 (06 Feb 2025 04:00), Max: 99.8 (05 Feb 2025 20:00)  HR: 90 (06 Feb 2025 06:15) (78 - 110)  BP: 84/48 (06 Feb 2025 06:15) (84/48 - 151/71)  BP(mean): 57 (06 Feb 2025 06:15) (57 - 102)  RR: 21 (06 Feb 2025 06:15) (8 - 32)  SpO2: 99% (06 Feb 2025 06:15) (98% - 100%)    O2 Parameters below as of 06 Feb 2025 04:00  Patient On (Oxygen Delivery Method): ventilator    O2 Concentration (%): 40        General: ill looking  Cachectic  ETT  Lungs: Bilateral Rhonchi  Cardiovascular: FRANKLYN 2/6  Abdomen: Soft, Positive BS  Extremities: No clubbing   Neurological: Non focal       02-04-25 @ 07:01  -  02-05-25 @ 07:00  --------------------------------------------------------  IN:    Dexmedetomidine: 198.9 mL    IV PiggyBack: 500 mL    Norepinephrine: 115.1 mL    Propofol: 159.9 mL  Total IN: 973.9 mL    OUT:    Indwelling Catheter - Urethral (mL): 1460 mL  Total OUT: 1460 mL    Total NET: -486.1 mL      02-05-25 @ 07:01  -  02-06-25 @ 06:50  --------------------------------------------------------  IN:    Dexmedetomidine: 198.9 mL    Free Water: 100 mL    IV PiggyBack: 250 mL    Lactated Ringers: 2400 mL    Norepinephrine: 46.6 mL    Propofol: 119.6 mL  Total IN: 3115.1 mL    OUT:    Drain (mL): 450 mL    Indwelling Catheter - Urethral (mL): 2080 mL  Total OUT: 2530 mL    Total NET: 585.1 mL          LABS:                          10.2   12.33 )-----------( 154      ( 06 Feb 2025 04:57 )             31.6                                               02-05    146  |  107  |  79[HH]  ----------------------------<  140[H]  4.3   |  27  |  1.9[H]    Ca    6.9[L]      05 Feb 2025 11:33  Phos  5.0     02-05  Mg     2.6     02-05    TPro  4.2[L]  /  Alb  2.2[L]  /  TBili  4.1[H]  /  DBili  x   /  AST  19  /  ALT  <5  /  AlkPhos  127[H]  02-05      PT/INR - ( 05 Feb 2025 04:49 )   PT: 16.40 sec;   INR: 1.38 ratio         PTT - ( 04 Feb 2025 21:10 )  PTT:33.5 sec                                       Urinalysis Basic - ( 05 Feb 2025 11:33 )    Color: x / Appearance: x / SG: x / pH: x  Gluc: 140 mg/dL / Ketone: x  / Bili: x / Urobili: x   Blood: x / Protein: x / Nitrite: x   Leuk Esterase: x / RBC: x / WBC x   Sq Epi: x / Non Sq Epi: x / Bacteria: x                                                  LIVER FUNCTIONS - ( 05 Feb 2025 11:33 )  Alb: 2.2 g/dL / Pro: 4.2 g/dL / ALK PHOS: 127 U/L / ALT: <5 U/L / AST: 19 U/L / GGT: x                                                  Culture - Acid Fast - Bronchial w/Smear (collected 04 Feb 2025 09:29)  Source: Bronch Wash  Preliminary Report (05 Feb 2025 23:07):    Culture is being performed.    Culture - Bronchial (collected 04 Feb 2025 09:29)  Source: Bronch Wash  Gram Stain (05 Feb 2025 07:26):    Few-moderate polymorphonuclear leukocytes seen per low power field    No Squamous epithelial cells seen per low power field    No organisms seen per oil power field  Preliminary Report (05 Feb 2025 16:12):    No growth    Culture - Sputum (collected 04 Feb 2025 09:00)  Source: ET Tube ET Tube  Gram Stain (04 Feb 2025 19:37):    Numerous polymorphonuclear leukocytes per low power field    Rare Squamous epithelial cells per low power field    Rare Yeast like cells per oil power field  Final Report (05 Feb 2025 22:23):    Commensal halina consistent with body site                                                   Mode: AC/ CMV (Assist Control/ Continuous Mandatory Ventilation)  RR (machine): 16  TV (machine): 400  FiO2: 40  PEEP: 8  ITime: 1  MAP: 10  PIP: 15                                      ABG - ( 06 Feb 2025 03:14 )  pH, Arterial: 7.45  pH, Blood: x     /  pCO2: 34    /  pO2: 163   / HCO3: 24    / Base Excess: x     /  SaO2: 100.0               MEDICATIONS  (STANDING):  carbidopa/levodopa  25/250 2 Tablet(s) Oral three times a day  cefepime   IVPB 1000 milliGRAM(s) IV Intermittent every 12 hours  chlorhexidine 0.12% Liquid 15 milliLiter(s) Oral Mucosa every 12 hours  dexMEDEtomidine Infusion 0.5 MICROgram(s)/kG/Hr (6.39 mL/Hr) IV Continuous <Continuous>  lactated ringers. 1000 milliLiter(s) (100 mL/Hr) IV Continuous <Continuous>  lactulose Syrup 20 Gram(s) Oral every 6 hours  metroNIDAZOLE  IVPB 500 milliGRAM(s) IV Intermittent every 8 hours  nafcillin  IVPB      nafcillin  IVPB 2 Gram(s) IV Intermittent every 4 hours  norepinephrine Infusion 0.05 MICROgram(s)/kG/Min (4.79 mL/Hr) IV Continuous <Continuous>  oseltamivir 30 milliGRAM(s) Oral daily  pantoprazole   Suspension 40 milliGRAM(s) Oral daily  polyethylene glycol 3350 17 Gram(s) Oral every 12 hours  propofol Infusion 0.5 MICROgram(s)/kG/Min (0.15 mL/Hr) IV Continuous <Continuous>  rasagiline Tablet 1 milliGRAM(s) Oral <User Schedule>  senna 2 Tablet(s) Oral at bedtime    MEDICATIONS  (PRN):  acetaminophen     Tablet .. 650 milliGRAM(s) Oral every 6 hours PRN Temp greater or equal to 38C (100.4F), Mild Pain (1 - 3)  fentaNYL    Injectable 50 MICROGram(s) IV Push every 2 hours PRN Aggitation  losartan 25 milliGRAM(s) Oral daily PRN >140 SBP

## 2025-02-06 NOTE — PROGRESS NOTE ADULT - ASSESSMENT
IMPRESSION:    Acute Hypoxic Respiratory Failure sp reintubation  Toxic Metabolic Encephalopathy  CAP likely aspiration   Influenza A  MSSA bacteremia repeat CX -  Sepsis POA  LYNNE  cholecystitis sp percut haydee  Periventricular bleed  Elevated Bilirubin  ?Mural thrombus in PA  HO Parkinson's Disease   HO CAD    PLAN:    CNS:  CTH. noted with right periventricular bleed stable. Continue Anti parkinson's.  Daily SAT    HEENT: Oral care.      PULMONARY:  HOB @ 45 degrees.  Aspiration precautions.   PEEP 8, dec FIO2 keeP Sao2 92 o 96%, Monitor airway pressures, fup bronch result    CARDIOVASCULAR:  Avoid overload.  Cardio for MARY on hold, taper pressors    GI: GI prophylaxis.  FEEDING NPO., CT AP noted, IR notd    RENAL:  Follow up lytes.  Free water.   BMP in PM.  Monitor UO    INFECTIOUS DISEASE: Cultures noted.  tamiflu, naf, cefepime, flagyl    HEMATOLOGICAL:  DVT prophylaxis.  HIT abx -, IV hep on hold    ENDOCRINE:  Follow up FS.  Insulin protocol if needed.    MUSCULOSKELETAL: Bed rest.  Off loading    Full code  MICU  palliative care fup  very poor prognosis             IMPRESSION:    Acute Hypoxic Respiratory Failure sp reintubation  Toxic Metabolic Encephalopathy  CAP likely aspiration   Influenza A  MSSA bacteremia repeat CX -  Sepsis POA  LYNNE improving  cholecystitis sp percut haydee  Periventricular bleed  Elevated Bilirubin  ?Mural thrombus in PA  HO Parkinson's Disease   HO CAD    PLAN:    CNS:  CTH. noted with right periventricular bleed stable. Continue Anti parkinson's.  Daily SAT    HEENT: Oral care.      PULMONARY:  HOB @ 45 degrees.  Aspiration precautions.   PEEP 8, dec FIO2 keeP Sao2 92 o 96%, Monitor airway pressures, fup bronch result    CARDIOVASCULAR:  Avoid overload.  Cardio for MARY on hold, taper pressors    GI: GI prophylaxis.  FEEDING NPO., CT AP noted, IR noted    RENAL:  Follow up lytes.  Free water.   BMP in PM.  Monitor UO    INFECTIOUS DISEASE: Cultures noted.  tamiflu, naf, cefepime, flagyl    HEMATOLOGICAL:  DVT prophylaxis.  HIT abx -, IV hep on hold    ENDOCRINE:  Follow up FS.  Insulin protocol if needed.    MUSCULOSKELETAL: Bed rest.  Off loading    Full code  MICU  palliative care fup  very poor prognosis

## 2025-02-06 NOTE — PROGRESS NOTE ADULT - ASSESSMENT
69-year-old male past medical history of hypertension, Parkinson's, CAD presents for shortness of breath and cough of 1 day duration. History was obtained from patient's wife who noted that the patient has been having decreased po intake, activity, and unintentional weight loss for some time. However yesterday he started having cough and shortness of breath. Admitted to MICU for AHRF and sepsis 2/2 to mutilobar PNA.    #Acute Hypoxic Respiratory Failure likely 2/2 CAP/aspiration and flu  #Toxic Metabolic Encephalopathy likely 2/2 CAP/aspiration and flu  #Influenza A positive  #MSSA bacteremia- resolved  #Sepsis POA  - Aspiration precautions.    - patient inc WOB, worsening alkalosis, obtunded-intubated for airway protection   - Nasal MRSA negative  - CTA chest to r/o PE: focal filling defect within main pulm artery   - repeat CT head: stable intraventricular hemorrhage   - Platelets downtrending, 239>>117, HIT neg   - add metronidazole, ID: if patient decompensates, dc cefepime/metro, start patricia 1g   - cardio: patient critically sick, not a candidate for surgery therefore no MARY, can c/w abx for IE   - s/p bronch, f/u cultures   - s/p IR perc haydee on 2/5, drained 250 cc dark bile, 450 cc output overnight. F/u GB drainage cultures   - ID: tamiflu 30 mg daily, dec cefepime to 1g q12h   - resume heparin, repeat CT head once PTT therapeutic   - cr improving, c/w LR @ 100    #Elevated Bilirubin-improving   #Leukocytosis-improving   #Cholangitis vs cholecystitis?   - F/u RUQ US: distended GB with sludge, GB polyp   - IR: perc haydee 2/5, drained 250 cc dark bile, 450 cc output overnight. F/u GB drainage cultures     #Periventricular bleed  - CTH. noted with right periventricular bleed, stable.   - Neuro on board  - Continue Anti parkinson's meds.    - resume heparin, repeat CT head once PTT therapeutic     #Constipation  - senna and miralax  - add lactulose     #Possible Mural thrombus in PA  #CAD  - Avoid overload  - C/w LR at 50  - TTE: EF 56%, G1DD, mild-mod MR, mod TR, mild SD, mild pHTN  - Cardio recs appreciated       - Patient is poor candidate for MARY       - consider full endocarditis abx course    #MISC  - DVT ppx: SCDs  - GI ppx: not needed  - Activity: as tolerated  - Diet: NPO w/ tube feed

## 2025-02-06 NOTE — PROGRESS NOTE ADULT - SUBJECTIVE AND OBJECTIVE BOX
SUBJECTIVE/OVERNIGHT EVENTS  Today is hospital day 8d. This morning patient was seen and examined at bedside.     CODE STATUS: FULL    MEDICATIONS  MEDICATIONS  (STANDING):  carbidopa/levodopa  25/250 2 Tablet(s) Oral three times a day  cefepime   IVPB 1000 milliGRAM(s) IV Intermittent every 12 hours  chlorhexidine 0.12% Liquid 15 milliLiter(s) Oral Mucosa every 12 hours  dexMEDEtomidine Infusion 0.5 MICROgram(s)/kG/Hr (6.39 mL/Hr) IV Continuous <Continuous>  heparin   Injectable 4000 Unit(s) IV Push once  heparin  Infusion.  Unit(s)/Hr (10 mL/Hr) IV Continuous <Continuous>  lactated ringers. 1000 milliLiter(s) (100 mL/Hr) IV Continuous <Continuous>  lactulose Syrup 20 Gram(s) Oral every 6 hours  metroNIDAZOLE  IVPB 500 milliGRAM(s) IV Intermittent every 8 hours  nafcillin  IVPB      nafcillin  IVPB 2 Gram(s) IV Intermittent every 4 hours  norepinephrine Infusion 0.05 MICROgram(s)/kG/Min (4.79 mL/Hr) IV Continuous <Continuous>  oseltamivir 30 milliGRAM(s) Oral daily  pantoprazole   Suspension 40 milliGRAM(s) Oral daily  polyethylene glycol 3350 17 Gram(s) Oral every 12 hours  propofol Infusion 0.5 MICROgram(s)/kG/Min (0.15 mL/Hr) IV Continuous <Continuous>  rasagiline Tablet 1 milliGRAM(s) Oral <User Schedule>  senna 2 Tablet(s) Oral at bedtime    MEDICATIONS  (PRN):  acetaminophen     Tablet .. 650 milliGRAM(s) Oral every 6 hours PRN Temp greater or equal to 38C (100.4F), Mild Pain (1 - 3)  fentaNYL    Injectable 50 MICROGram(s) IV Push every 2 hours PRN Aggitation  heparin   Injectable 4000 Unit(s) IV Push every 6 hours PRN For aPTT less than 40  heparin   Injectable 2000 Unit(s) IV Push every 6 hours PRN For aPTT between 40 - 57  losartan 25 milliGRAM(s) Oral daily PRN >140 SBP      VITALS  ICU Vital Signs Last 24 Hrs  T(C): 38.1 (06 Feb 2025 08:00), Max: 38.1 (06 Feb 2025 08:00)  T(F): 100.6 (06 Feb 2025 08:00), Max: 100.6 (06 Feb 2025 08:00)  HR: 87 (06 Feb 2025 09:00) (78 - 110)  BP: 85/51 (06 Feb 2025 09:00) (84/48 - 151/71)  BP(mean): 64 (06 Feb 2025 09:00) (57 - 102)  ABP: --  ABP(mean): --  RR: 18 (06 Feb 2025 09:00) (8 - 32)  SpO2: 100% (06 Feb 2025 09:00) (91% - 100%)    O2 Parameters below as of 06 Feb 2025 09:00  Patient On (Oxygen Delivery Method): ventilator    O2 Concentration (%): 40    PHYSICAL EXAM  GENERAL  ( X ) NAD, lying in bed comfortably     (  ) obtunded     (  ) lethargic     (  ) somnolent    HEAD  ( X ) Atraumatic     (  ) hematoma     (  ) laceration (specify location:       )     NECK  ( X ) Supple     (  ) neck stiffness     (  ) nuchal rigidity     (  )  no JVD     (  ) JVD present ( -- cm)    HEART  Rate -->  ( X ) normal rate    (  ) bradycardic    (  ) tachycardic  Rhythm -->  ( X ) regular    (  ) regularly irregular    (  ) irregularly irregular  Murmurs -->  ( X ) normal s1/s2    (  ) systolic murmur    (  ) diastolic murmur    (  ) continuous murmur     (  ) S3 present    (  ) S4 present    LUNGS  ( X )Unlabored respirations     (  ) tachypnea  ( X ) B/L air entry     (  ) decreased breath sounds in:  (location     )    (  ) no adventitious sound     (  ) crackles     ( X ) wheezing- R sided      (  ) rhonchi      (specify location:       )  (  ) chest wall tenderness (specify location:       )    ABDOMEN  ( X ) Soft     (  ) tense   |   ( X ) nondistended     (  ) distended   |   ( X ) +BS     (  ) hypoactive bowel sounds     (  ) hyperactive bowel sounds  ( X ) nontender     (  ) RUQ tenderness     (  ) RLQ tenderness     (  ) LLQ tenderness     (  ) epigastric tenderness     (  ) diffuse tenderness  (  ) Splenomegaly      (  ) Hepatomegaly      (  ) Jaundice     (  ) ecchymosis     EXTREMITIES  ( X ) Normal     (  ) Rash     (  ) ecchymosis     (  ) varicose veins      (  ) pitting edema     (  ) non-pitting edema   (  ) ulceration     (  ) gangrene:     (location:     )    NERVOUS SYSTEM  ( ) A&Ox1-2     (  ) confused     (  ) lethargic  CN II-XII:     (  ) Intact     (  ) focal deficits  (Specify:     )   Upper extremities:     (  ) strength X/5     (  ) focal deficit (specify:    )  Lower extremities:     (  ) strength  X/5    (  ) focal deficit (specify:    )    SKIN  ( X ) No rashes or lesions     (  ) maculopapular rash     (  ) pustules     (  ) vesicles     (  ) ulcer     (  ) ecchymosis     (specify location:     )      LABS                LABS:                          10.2   12.33 )-----------( 154      ( 06 Feb 2025 04:57 )             31.6     02-06    142  |  105  |  78[HH]  ----------------------------<  148[H]  4.0   |  20  |  1.8[H]    Ca    6.5[L]      06 Feb 2025 04:57  Phos  5.6     02-06  Mg     2.4     02-06    TPro  4.0[L]  /  Alb  2.2[L]  /  TBili  3.3[H]  /  DBili  x   /  AST  17  /  ALT  <5  /  AlkPhos  111  02-06    LIVER FUNCTIONS - ( 06 Feb 2025 04:57 )  Alb: 2.2 g/dL / Pro: 4.0 g/dL / ALK PHOS: 111 U/L / ALT: <5 U/L / AST: 17 U/L / GGT: x           PT/INR - ( 06 Feb 2025 08:49 )   PT: 15.50 sec;   INR: 1.31 ratio         PTT - ( 06 Feb 2025 08:49 )  PTT:32.9 sec  Urinalysis Basic - ( 06 Feb 2025 04:57 )    Color: x / Appearance: x / SG: x / pH: x  Gluc: 148 mg/dL / Ketone: x  / Bili: x / Urobili: x   Blood: x / Protein: x / Nitrite: x   Leuk Esterase: x / RBC: x / WBC x   Sq Epi: x / Non Sq Epi: x / Bacteria: x

## 2025-02-06 NOTE — PROGRESS NOTE ADULT - SUBJECTIVE AND OBJECTIVE BOX
GARETT ESCAMILLA  69y, Male  Allergy: Allergy Status Unknown      LOS      CHIEF COMPLAINT: pneumonia (06 Feb 2025 09:45)      INTERVAL EVENTS/HPI  - T(F): , Max: 100.6 (02-06-25 @ 08:00), fever, pressors, WBC downtrending   - WBC Count: 12.33 (02-06-25 @ 04:57)  WBC Count: 19.08 (02-05-25 @ 04:49)     - Creatinine: 1.8 (02-06-25 @ 04:57)  Creatinine: 1.9 (02-05-25 @ 11:33)     -   -   -     ROS  cannot obtain secondary to patient's sedation and/or mental status    VITALS:  T(F): 100.6, Max: 100.6 (02-06-25 @ 08:00)  HR: 90  BP: 106/54  RR: 21Vital Signs Last 24 Hrs  T(C): 38.1 (06 Feb 2025 08:00), Max: 38.1 (06 Feb 2025 08:00)  T(F): 100.6 (06 Feb 2025 08:00), Max: 100.6 (06 Feb 2025 08:00)  HR: 90 (06 Feb 2025 10:00) (78 - 110)  BP: 106/54 (06 Feb 2025 10:00) (84/48 - 151/71)  BP(mean): 76 (06 Feb 2025 10:00) (57 - 102)  RR: 21 (06 Feb 2025 10:00) (8 - 32)  SpO2: 100% (06 Feb 2025 10:00) (91% - 100%)    Parameters below as of 06 Feb 2025 09:00  Patient On (Oxygen Delivery Method): ventilator    O2 Concentration (%): 40    PHYSICAL EXAM:  ***    FH: Non-contributory  Social Hx: Non-contributory    TESTS & MEASUREMENTS:                        10.2   12.33 )-----------( 154      ( 06 Feb 2025 04:57 )             31.6     02-06    142  |  105  |  78[HH]  ----------------------------<  148[H]  4.0   |  20  |  1.8[H]    Ca    6.5[L]      06 Feb 2025 04:57  Phos  5.6     02-06  Mg     2.4     02-06    TPro  4.0[L]  /  Alb  2.2[L]  /  TBili  3.3[H]  /  DBili  x   /  AST  17  /  ALT  <5  /  AlkPhos  111  02-06      LIVER FUNCTIONS - ( 06 Feb 2025 04:57 )  Alb: 2.2 g/dL / Pro: 4.0 g/dL / ALK PHOS: 111 U/L / ALT: <5 U/L / AST: 17 U/L / GGT: x           Urinalysis Basic - ( 06 Feb 2025 04:57 )    Color: x / Appearance: x / SG: x / pH: x  Gluc: 148 mg/dL / Ketone: x  / Bili: x / Urobili: x   Blood: x / Protein: x / Nitrite: x   Leuk Esterase: x / RBC: x / WBC x   Sq Epi: x / Non Sq Epi: x / Bacteria: x        Culture - Acid Fast - Bronchial w/Smear (collected 02-04-25 @ 09:29)  Source: Bronch Wash  Preliminary Report (02-05-25 @ 23:07):    Culture is being performed.    Culture - Bronchial (collected 02-04-25 @ 09:29)  Source: Bronch Wash  Gram Stain (02-05-25 @ 07:26):    Few-moderate polymorphonuclear leukocytes seen per low power field    No Squamous epithelial cells seen per low power field    No organisms seen per oil power field  Preliminary Report (02-05-25 @ 16:12):    No growth    Culture - Sputum (collected 02-04-25 @ 09:00)  Source: ET Tube ET Tube  Gram Stain (02-04-25 @ 19:37):    Numerous polymorphonuclear leukocytes per low power field    Rare Squamous epithelial cells per low power field    Rare Yeast like cells per oil power field  Final Report (02-05-25 @ 22:23):    Commensal halina consistent with body site    Culture - Blood (collected 02-02-25 @ 04:54)  Source: .Blood BLOOD  Preliminary Report (02-05-25 @ 12:01):    No growth at 72 Hours    Culture - Blood (collected 01-31-25 @ 17:22)  Source: .Blood BLOOD  Final Report (02-05-25 @ 23:07):    No growth at 5 days    Culture - Blood (collected 01-31-25 @ 17:22)  Source: .Blood BLOOD  Final Report (02-05-25 @ 23:07):    No growth at 5 days    Urinalysis with Rflx Culture (collected 01-29-25 @ 16:48)    Culture - Blood (collected 01-29-25 @ 10:09)  Source: .Blood BLOOD  Gram Stain (01-30-25 @ 15:40):    Growth in aerobic bottle: Gram Positive Cocci in Clusters    Growth in anaerobic bottle: Gram Positive Cocci in Clusters  Final Report (02-01-25 @ 07:51):    Growth in aerobic and anaerobic bottles: Staphylococcus aureus    Direct identification is available within approximately 3-5    hours either by Blood Panel Multiplexed PCR or Direct    MALDI-TOF. Details: [email or web address]  Organism: Blood Culture PCR  Staphylococcus aureus (02-01-25 @ 07:51)  Organism: Staphylococcus aureus (02-01-25 @ 07:51)      Method Type: KARTIK      -  Ceftaroline: S <=0.5      -  Clindamycin: R <=0.25 This isolate is presumed to be clindamycin resistant based on detection of inducible resistance. Clindamycin may still be effective in some patients.      -  Erythromycin: R >4      -  Gentamicin: S <=4 Should not be used as monotherapy      -  Oxacillin: S <=0.25 Oxacillin predicts susceptibility for dicloxacillin, methicillin, and nafcillin      -  Rifampin: S <=1 Should not be used as monotherapy      -  Tetracycline: S <=4      -  Trimethoprim/Sulfamethoxazole: S <=0.5/9.5      -  Vancomycin: S 1  Organism: Blood Culture PCR (02-01-25 @ 07:51)      Method Type: PCR      -  Methicillin SENSITIVE Staphylococcus aureus (MSSA): Detec Any isolate of Staphylococcus aureus from a blood culture is NOT considered a contaminant.    Culture - Blood (collected 01-29-25 @ 10:09)  Source: .Blood BLOOD  Gram Stain (02-01-25 @ 00:35):    Growth in aerobic bottle: Gram Positive Cocci in Clusters    Growth in anaerobic bottle: Gram Positive Cocci in Clusters  Final Report (02-01-25 @ 16:44):    Growth in aerobic and anaerobic bottles: Staphylococcus aureus    See previous culture [id number]            INFECTIOUS DISEASES TESTING  MRSA PCR Result.: Negative (01-30-25 @ 13:40)  Procalcitonin: 6.93 (01-30-25 @ 07:26)  Legionella Antigen, Urine: Negative (01-29-25 @ 15:49)  Rapid RVP Result: Detected (01-29-25 @ 10:30)      INFLAMMATORY MARKERS      RADIOLOGY & ADDITIONAL TESTS:  I have personally reviewed the last available Chest xray  CXR      CT  CT Abdomen and Pelvis w/ Oral Cont and w/ IV Cont:   ACC: [id number] EXAM:  CT ABDOMEN AND PELVIS OC IC   ORDERED BY: ANISH SINGH     PROCEDURE DATE:  02/04/2025          INTERPRETATION:  CLINICAL INFORMATION: Rule out cholangitis    COMPARISON: None.    CONTRAST/COMPLICATIONS:  IV Contrast: Omnipaque 350  95 cc administered   5 cc discarded  Oral Contrast: Omnipaque 300  .    PROCEDURE:  CT of the Abdomen and Pelvis was performed.  Sagittal and coronal reformats were performed.    FINDINGS:  LOWER CHEST: Right lung base near lobar consolidationwith small right   pleural effusion. Additional nodular opacities seen at the lung bases   mostly in the left lower lobe. Correlate for infectious/inflammatory   etiologies.    LIVER: Within normal limits.  BILE DUCTS: Normal caliber.  GALLBLADDER: Trace pericholecystic fluid. No calcified gallstones.  SPLEEN: Within normal limits.  PANCREAS: Within normal limits.  ADRENALS: Within normal limits.  KIDNEYS/URETERS: Bilateral renal cysts. No hydroureteronephrosis.    BLADDER: Mars catheter.  REPRODUCTIVE ORGANS: Unremarkable at CT.    BOWEL: No bowel obstruction. Appendix is not visualized. Solid and liquid   stool seen throughout the colon.  PERITONEUM/RETROPERITONEUM: Enteric tube terminates in the stomach.   Within normal limits.  VESSELS: Atherosclerotic changes.  LYMPH NODES: No lymphadenopathy.  ABDOMINAL WALL: Mild anasarca.  BONES: Degenerative changes.    IMPRESSION:  Right lung base near lobar consolidation with small right pleural   effusion. Additional nodular opacities seen atthe lung bases mostly in   the left lower lobe. Correlate for infectious/inflammatory etiologies.    Mild anasarca, trace pericholecystic fluid, question fluid overload.    Solid and liquid stool seen throughout the colon.    --- End of Report ---            FAROOQ SZYMANSKI MD; Attending Radiologist  This document has been electronically signed. Feb 4 2025  2:06PM (02-04-25 @ 13:11)      CARDIOLOGY TESTING  12 Lead ECG:   Ventricular Rate 91 BPM    Atrial Rate 91 BPM    P-R Interval 158 ms    QRS Duration 74 ms    Q-T Interval 366 ms    QTC Calculation(Bazett) 450 ms    P Axis 77 degrees    R Axis 64 degrees    T Axis 68 degrees    Diagnosis Line Normal sinus rhythm  Biatrial enlargement  Abnormal ECG    Confirmed by LILLIE LUND, [facility] ([id number]) on 1/29/2025 12:29:13 PM (01-29-25 @ 10:39)      MEDICATIONS  carbidopa/levodopa  25/250 2  cefepime   IVPB 1000  chlorhexidine 0.12% Liquid 15  dexMEDEtomidine Infusion 0.5  heparin   Injectable 4000  heparin  Infusion.   lactated ringers. 1000  lactulose Syrup 20  metroNIDAZOLE  IVPB 500  nafcillin  IVPB   nafcillin  IVPB 2  norepinephrine Infusion 0.05  oseltamivir 30  pantoprazole   Suspension 40  polyethylene glycol 3350 17  propofol Infusion 0.5  rasagiline Tablet 1  senna 2      WEIGHT  Weight (kg): 51.1 (02-05-25 @ 13:52)  Creatinine: 1.8 mg/dL (02-06-25 @ 04:57)  Creatinine: 1.9 mg/dL (02-05-25 @ 11:33)      ANTIBIOTICS:  cefepime   IVPB 1000 milliGRAM(s) IV Intermittent every 12 hours  metroNIDAZOLE  IVPB 500 milliGRAM(s) IV Intermittent every 8 hours  nafcillin  IVPB      nafcillin  IVPB 2 Gram(s) IV Intermittent every 4 hours  oseltamivir 30 milliGRAM(s) Oral daily      All available historical records have been reviewed   GARETT ESCAMILLA  69y, Male  Allergy: Allergy Status Unknown      LOS  8d    CHIEF COMPLAINT: pneumonia (06 Feb 2025 09:45)      INTERVAL EVENTS/HPI  - T(F): , Max: 100.6 (02-06-25 @ 08:00), fever, pressors, WBC downtrending , s/p Successful placement of 8Fr perc haydee tube. 250cc of dark bile aspirated from GB.  - WBC Count: 12.33 (02-06-25 @ 04:57)  WBC Count: 19.08 (02-05-25 @ 04:49)     - Creatinine: 1.8 (02-06-25 @ 04:57)  Creatinine: 1.9 (02-05-25 @ 11:33)     -   -   -     ROS  cannot obtain secondary to patient's sedation and/or mental status    VITALS:  T(F): 100.6, Max: 100.6 (02-06-25 @ 08:00)  HR: 90  BP: 106/54  RR: 21Vital Signs Last 24 Hrs  T(C): 38.1 (06 Feb 2025 08:00), Max: 38.1 (06 Feb 2025 08:00)  T(F): 100.6 (06 Feb 2025 08:00), Max: 100.6 (06 Feb 2025 08:00)  HR: 90 (06 Feb 2025 10:00) (78 - 110)  BP: 106/54 (06 Feb 2025 10:00) (84/48 - 151/71)  BP(mean): 76 (06 Feb 2025 10:00) (57 - 102)  RR: 21 (06 Feb 2025 10:00) (8 - 32)  SpO2: 100% (06 Feb 2025 10:00) (91% - 100%)    Parameters below as of 06 Feb 2025 09:00  Patient On (Oxygen Delivery Method): ventilator    O2 Concentration (%): 40    PHYSICAL EXAM:  General: intubated  HEENT: NCAT  Neck: supple  CV: RRR  Lungs: symmetric chest expansion, decreased BS at bases  Abd: Soft per haydee  Extr: wwp  Skin: no rash  Neuro: sedated  Lines: no phlebitis     FH: Non-contributory  Social Hx: Non-contributory    TESTS & MEASUREMENTS:                        10.2   12.33 )-----------( 154      ( 06 Feb 2025 04:57 )             31.6     02-06    142  |  105  |  78[HH]  ----------------------------<  148[H]  4.0   |  20  |  1.8[H]    Ca    6.5[L]      06 Feb 2025 04:57  Phos  5.6     02-06  Mg     2.4     02-06    TPro  4.0[L]  /  Alb  2.2[L]  /  TBili  3.3[H]  /  DBili  x   /  AST  17  /  ALT  <5  /  AlkPhos  111  02-06      LIVER FUNCTIONS - ( 06 Feb 2025 04:57 )  Alb: 2.2 g/dL / Pro: 4.0 g/dL / ALK PHOS: 111 U/L / ALT: <5 U/L / AST: 17 U/L / GGT: x           Urinalysis Basic - ( 06 Feb 2025 04:57 )    Color: x / Appearance: x / SG: x / pH: x  Gluc: 148 mg/dL / Ketone: x  / Bili: x / Urobili: x   Blood: x / Protein: x / Nitrite: x   Leuk Esterase: x / RBC: x / WBC x   Sq Epi: x / Non Sq Epi: x / Bacteria: x        Culture - Acid Fast - Bronchial w/Smear (collected 02-04-25 @ 09:29)  Source: Bronch Wash  Preliminary Report (02-05-25 @ 23:07):    Culture is being performed.    Culture - Bronchial (collected 02-04-25 @ 09:29)  Source: Bronch Wash  Gram Stain (02-05-25 @ 07:26):    Few-moderate polymorphonuclear leukocytes seen per low power field    No Squamous epithelial cells seen per low power field    No organisms seen per oil power field  Preliminary Report (02-05-25 @ 16:12):    No growth    Culture - Sputum (collected 02-04-25 @ 09:00)  Source: ET Tube ET Tube  Gram Stain (02-04-25 @ 19:37):    Numerous polymorphonuclear leukocytes per low power field    Rare Squamous epithelial cells per low power field    Rare Yeast like cells per oil power field  Final Report (02-05-25 @ 22:23):    Commensal halina consistent with body site    Culture - Blood (collected 02-02-25 @ 04:54)  Source: .Blood BLOOD  Preliminary Report (02-05-25 @ 12:01):    No growth at 72 Hours    Culture - Blood (collected 01-31-25 @ 17:22)  Source: .Blood BLOOD  Final Report (02-05-25 @ 23:07):    No growth at 5 days    Culture - Blood (collected 01-31-25 @ 17:22)  Source: .Blood BLOOD  Final Report (02-05-25 @ 23:07):    No growth at 5 days    Urinalysis with Rflx Culture (collected 01-29-25 @ 16:48)    Culture - Blood (collected 01-29-25 @ 10:09)  Source: .Blood BLOOD  Gram Stain (01-30-25 @ 15:40):    Growth in aerobic bottle: Gram Positive Cocci in Clusters    Growth in anaerobic bottle: Gram Positive Cocci in Clusters  Final Report (02-01-25 @ 07:51):    Growth in aerobic and anaerobic bottles: Staphylococcus aureus    Direct identification is available within approximately 3-5    hours either by Blood Panel Multiplexed PCR or Direct    MALDI-TOF. Details: [email or web address]  Organism: Blood Culture PCR  Staphylococcus aureus (02-01-25 @ 07:51)  Organism: Staphylococcus aureus (02-01-25 @ 07:51)      Method Type: KARTIK      -  Ceftaroline: S <=0.5      -  Clindamycin: R <=0.25 This isolate is presumed to be clindamycin resistant based on detection of inducible resistance. Clindamycin may still be effective in some patients.      -  Erythromycin: R >4      -  Gentamicin: S <=4 Should not be used as monotherapy      -  Oxacillin: S <=0.25 Oxacillin predicts susceptibility for dicloxacillin, methicillin, and nafcillin      -  Rifampin: S <=1 Should not be used as monotherapy      -  Tetracycline: S <=4      -  Trimethoprim/Sulfamethoxazole: S <=0.5/9.5      -  Vancomycin: S 1  Organism: Blood Culture PCR (02-01-25 @ 07:51)      Method Type: PCR      -  Methicillin SENSITIVE Staphylococcus aureus (MSSA): Detec Any isolate of Staphylococcus aureus from a blood culture is NOT considered a contaminant.    Culture - Blood (collected 01-29-25 @ 10:09)  Source: .Blood BLOOD  Gram Stain (02-01-25 @ 00:35):    Growth in aerobic bottle: Gram Positive Cocci in Clusters    Growth in anaerobic bottle: Gram Positive Cocci in Clusters  Final Report (02-01-25 @ 16:44):    Growth in aerobic and anaerobic bottles: Staphylococcus aureus    See previous culture [id number]            INFECTIOUS DISEASES TESTING  MRSA PCR Result.: Negative (01-30-25 @ 13:40)  Procalcitonin: 6.93 (01-30-25 @ 07:26)  Legionella Antigen, Urine: Negative (01-29-25 @ 15:49)  Rapid RVP Result: Detected (01-29-25 @ 10:30)      INFLAMMATORY MARKERS      RADIOLOGY & ADDITIONAL TESTS:  I have personally reviewed the last available Chest xray  CXR      CT  CT Abdomen and Pelvis w/ Oral Cont and w/ IV Cont:   ACC: [id number] EXAM:  CT ABDOMEN AND PELVIS OC IC   ORDERED BY: ANISH SINGH     PROCEDURE DATE:  02/04/2025          INTERPRETATION:  CLINICAL INFORMATION: Rule out cholangitis    COMPARISON: None.    CONTRAST/COMPLICATIONS:  IV Contrast: Omnipaque 350  95 cc administered   5 cc discarded  Oral Contrast: Omnipaque 300  .    PROCEDURE:  CT of the Abdomen and Pelvis was performed.  Sagittal and coronal reformats were performed.    FINDINGS:  LOWER CHEST: Right lung base near lobar consolidationwith small right   pleural effusion. Additional nodular opacities seen at the lung bases   mostly in the left lower lobe. Correlate for infectious/inflammatory   etiologies.    LIVER: Within normal limits.  BILE DUCTS: Normal caliber.  GALLBLADDER: Trace pericholecystic fluid. No calcified gallstones.  SPLEEN: Within normal limits.  PANCREAS: Within normal limits.  ADRENALS: Within normal limits.  KIDNEYS/URETERS: Bilateral renal cysts. No hydroureteronephrosis.    BLADDER: Mars catheter.  REPRODUCTIVE ORGANS: Unremarkable at CT.    BOWEL: No bowel obstruction. Appendix is not visualized. Solid and liquid   stool seen throughout the colon.  PERITONEUM/RETROPERITONEUM: Enteric tube terminates in the stomach.   Within normal limits.  VESSELS: Atherosclerotic changes.  LYMPH NODES: No lymphadenopathy.  ABDOMINAL WALL: Mild anasarca.  BONES: Degenerative changes.    IMPRESSION:  Right lung base near lobar consolidation with small right pleural   effusion. Additional nodular opacities seen atthe lung bases mostly in   the left lower lobe. Correlate for infectious/inflammatory etiologies.    Mild anasarca, trace pericholecystic fluid, question fluid overload.    Solid and liquid stool seen throughout the colon.    --- End of Report ---            FAROOQ SZYMANSKI MD; Attending Radiologist  This document has been electronically signed. Feb 4 2025  2:06PM (02-04-25 @ 13:11)      CARDIOLOGY TESTING  12 Lead ECG:   Ventricular Rate 91 BPM    Atrial Rate 91 BPM    P-R Interval 158 ms    QRS Duration 74 ms    Q-T Interval 366 ms    QTC Calculation(Bazett) 450 ms    P Axis 77 degrees    R Axis 64 degrees    T Axis 68 degrees    Diagnosis Line Normal sinus rhythm  Biatrial enlargement  Abnormal ECG    Confirmed by LILLIE LUND, [facility] ([id number]) on 1/29/2025 12:29:13 PM (01-29-25 @ 10:39)      MEDICATIONS  carbidopa/levodopa  25/250 2  cefepime   IVPB 1000  chlorhexidine 0.12% Liquid 15  dexMEDEtomidine Infusion 0.5  heparin   Injectable 4000  heparin  Infusion.   lactated ringers. 1000  lactulose Syrup 20  metroNIDAZOLE  IVPB 500  nafcillin  IVPB   nafcillin  IVPB 2  norepinephrine Infusion 0.05  oseltamivir 30  pantoprazole   Suspension 40  polyethylene glycol 3350 17  propofol Infusion 0.5  rasagiline Tablet 1  senna 2      WEIGHT  Weight (kg): 51.1 (02-05-25 @ 13:52)  Creatinine: 1.8 mg/dL (02-06-25 @ 04:57)  Creatinine: 1.9 mg/dL (02-05-25 @ 11:33)      ANTIBIOTICS:  cefepime   IVPB 1000 milliGRAM(s) IV Intermittent every 12 hours  metroNIDAZOLE  IVPB 500 milliGRAM(s) IV Intermittent every 8 hours  nafcillin  IVPB      nafcillin  IVPB 2 Gram(s) IV Intermittent every 4 hours  oseltamivir 30 milliGRAM(s) Oral daily      All available historical records have been reviewed

## 2025-02-06 NOTE — PROGRESS NOTE ADULT - ASSESSMENT
ASSESSMENT  69-year-old male past medical history of hypertension, Parkinson's, CAD presents for shortness of breath and cough of 1 day duration. History was obtained from patient's wife who noted that the patient has been having decreased po intake, activity, and unintentional weight loss for some time. Howver, yesterday he started having cough and shortness of breath. 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  Increased opacities/debris within the central bronchi with occlusion of the right lower lobe central bronchi. There is increased consolidation   within the right lower lobe.  #Influenza +, MSSA bacteremia , suspect MSSA superimposed PNA    2/4 bronch   No organisms seen per oil power field    2/ 2 BCX NGTD     1/31 BCX NGTD     1/29 BCX MSSA 3/4 bottles    MRSA PCR Result.: Negative (01-30-25 @ 13:40)    Procalcitonin: 6.93 ng/mL (01-30-25 @ 07:26)    Legionella Antigen, Urine: Negative (01-29-25 @ 15:49)    Streptococcus pneumoniae Ag, Ur Result: Negative (01-29-25 @ 15:49)    CT Multifocal bilateral consolidative opacities as above, suggestive of   multifocal pneumonia in the appropriate clinical context.  #Lactic acidosis  #Hypernatremia   #Severe protein calorie malnutrition  BMI (kg/m2): 17  #PD  #Immunodeficiency secondary to Senescence which could results in poor clinical outcomes  #Abx allergy: Allergy Status Unknown    #LYNNE Creatinine: 1.8 mg/dL (02-06-25 @ 04:57) 28 mL/min  Creatinine clearance for underweight patient (BMI 16.7 kg/m²), calculated using actual body weight (no adjustment).    Height (cm): 175.3 (01-30-25 @ 13:00)  Weight (kg): 52.2 (01-29-25 @ 10:14)    RECOMMENDATIONS  - f/u bronch studies, thus far NG   - Tamiflu 30 mg  daily to complete 10 days given critical illness   - Cefepime 1g q12h IV   - Nafcillin 2g q4h IV to target MSSA in blood  - ICU team added flagyl, see no need for anaerobic coverage  - TTE no vegetations , guidelines recommend MARY as community acquired bacteremia once stable   - Grave prognosis, GOC     If any questions, please send a message or call on [employer] Teams  Please continue to update ID with any pertinent new laboratory, radiographic findings, or change in clinical status ASSESSMENT  69-year-old male past medical history of hypertension, Parkinson's, CAD presents for shortness of breath and cough of 1 day duration. History was obtained from patient's wife who noted that the patient has been having decreased po intake, activity, and unintentional weight loss for some time. Howver, yesterday he started having cough and shortness of breath. 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  Increased opacities/debris within the central bronchi with occlusion of the right lower lobe central bronchi. There is increased consolidation   within the right lower lobe.  #Suspected cholecystitis   RUQ GB sludge     2/5 Successful placement of 8Fr perc haydee tube. 250cc of dark bile aspirated from GB.  #Influenza +, MSSA bacteremia , suspect MSSA superimposed PNA    2/4 bronch   No organisms seen per oil power field    2/ 2 BCX NGTD     1/31 BCX NGTD     1/29 BCX MSSA 3/4 bottles    MRSA PCR Result.: Negative (01-30-25 @ 13:40)    Procalcitonin: 6.93 ng/mL (01-30-25 @ 07:26)    Legionella Antigen, Urine: Negative (01-29-25 @ 15:49)    Streptococcus pneumoniae Ag, Ur Result: Negative (01-29-25 @ 15:49)    CT Multifocal bilateral consolidative opacities as above, suggestive of   multifocal pneumonia in the appropriate clinical context.  #Lactic acidosis  #Hypernatremia   #Severe protein calorie malnutrition  BMI (kg/m2): 17  #PD  #Immunodeficiency secondary to Senescence which could results in poor clinical outcomes  #Abx allergy: Allergy Status Unknown    #LYNNE Creatinine: 1.8 mg/dL (02-06-25 @ 04:57) 28 mL/min  Creatinine clearance for underweight patient (BMI 16.7 kg/m²), calculated using actual body weight (no adjustment).    Height (cm): 175.3 (01-30-25 @ 13:00)  Weight (kg): 52.2 (01-29-25 @ 10:14)    RECOMMENDATIONS  - f/u bronch studies, thus far NG . Biliary fluid not sent for CX.   - Tamiflu 30 mg  daily to complete 10 days given critical illness end 2/7  - Cefepime 1g q12h IV   - Nafcillin 2g q4h IV to target MSSA in blood  - metroNIDAZOLE  IVPB 500 milliGRAM(s) IV Intermittent every 8 hours, change to PO same dose when possible   - TTE no vegetations , guidelines recommend MARY as community acquired bacteremia once stable   - Grave prognosis, GOC     If any questions, please send a message or call on [employer] Teams  Please continue to update ID with any pertinent new laboratory, radiographic findings, or change in clinical status

## 2025-02-07 LAB
ALBUMIN SERPL ELPH-MCNC: 2.3 G/DL — LOW (ref 3.5–5.2)
ALP SERPL-CCNC: 128 U/L — HIGH (ref 30–115)
ALT FLD-CCNC: <5 U/L — SIGNIFICANT CHANGE UP (ref 0–41)
ANION GAP SERPL CALC-SCNC: 13 MMOL/L — SIGNIFICANT CHANGE UP (ref 7–14)
APTT BLD: 29.2 SEC — SIGNIFICANT CHANGE UP (ref 27–39.2)
APTT BLD: 32.4 SEC — SIGNIFICANT CHANGE UP (ref 27–39.2)
APTT BLD: 32.8 SEC — SIGNIFICANT CHANGE UP (ref 27–39.2)
APTT BLD: 35.4 SEC — SIGNIFICANT CHANGE UP (ref 27–39.2)
AST SERPL-CCNC: 24 U/L — SIGNIFICANT CHANGE UP (ref 0–41)
BASOPHILS # BLD AUTO: 0.01 K/UL — SIGNIFICANT CHANGE UP (ref 0–0.2)
BASOPHILS NFR BLD AUTO: 0.1 % — SIGNIFICANT CHANGE UP (ref 0–1)
BILIRUB SERPL-MCNC: 2 MG/DL — HIGH (ref 0.2–1.2)
BUN SERPL-MCNC: 77 MG/DL — CRITICAL HIGH (ref 10–20)
CALCIUM SERPL-MCNC: 6.8 MG/DL — LOW (ref 8.4–10.4)
CHLORIDE SERPL-SCNC: 108 MMOL/L — SIGNIFICANT CHANGE UP (ref 98–110)
CO2 SERPL-SCNC: 21 MMOL/L — SIGNIFICANT CHANGE UP (ref 17–32)
CREAT SERPL-MCNC: 1.7 MG/DL — HIGH (ref 0.7–1.5)
CULTURE RESULTS: SIGNIFICANT CHANGE UP
EGFR: 43 ML/MIN/1.73M2 — LOW
EGFR: 43 ML/MIN/1.73M2 — LOW
EOSINOPHIL # BLD AUTO: 0 K/UL — SIGNIFICANT CHANGE UP (ref 0–0.7)
EOSINOPHIL NFR BLD AUTO: 0 % — SIGNIFICANT CHANGE UP (ref 0–8)
GAS PNL BLDA: SIGNIFICANT CHANGE UP
GLUCOSE SERPL-MCNC: 271 MG/DL — HIGH (ref 70–99)
HCT VFR BLD CALC: 28.5 % — LOW (ref 42–52)
HGB BLD-MCNC: 9.4 G/DL — LOW (ref 14–18)
IMM GRANULOCYTES NFR BLD AUTO: 0.9 % — HIGH (ref 0.1–0.3)
LYMPHOCYTES # BLD AUTO: 0.2 K/UL — LOW (ref 1.2–3.4)
LYMPHOCYTES # BLD AUTO: 1.9 % — LOW (ref 20.5–51.1)
MAGNESIUM SERPL-MCNC: 2.5 MG/DL — HIGH (ref 1.8–2.4)
MCHC RBC-ENTMCNC: 30.8 PG — SIGNIFICANT CHANGE UP (ref 27–31)
MCHC RBC-ENTMCNC: 33 G/DL — SIGNIFICANT CHANGE UP (ref 32–37)
MCV RBC AUTO: 93.4 FL — SIGNIFICANT CHANGE UP (ref 80–94)
MONOCYTES # BLD AUTO: 0.41 K/UL — SIGNIFICANT CHANGE UP (ref 0.1–0.6)
MONOCYTES NFR BLD AUTO: 3.8 % — SIGNIFICANT CHANGE UP (ref 1.7–9.3)
NEUTROPHILS # BLD AUTO: 9.95 K/UL — HIGH (ref 1.4–6.5)
NEUTROPHILS NFR BLD AUTO: 93.3 % — HIGH (ref 42.2–75.2)
NRBC # BLD: 0 /100 WBCS — SIGNIFICANT CHANGE UP (ref 0–0)
NRBC BLD-RTO: 0 /100 WBCS — SIGNIFICANT CHANGE UP (ref 0–0)
PHOSPHATE SERPL-MCNC: 5.9 MG/DL — HIGH (ref 2.1–4.9)
PLATELET # BLD AUTO: 209 K/UL — SIGNIFICANT CHANGE UP (ref 130–400)
PMV BLD: 12.5 FL — HIGH (ref 7.4–10.4)
POTASSIUM SERPL-MCNC: 3.8 MMOL/L — SIGNIFICANT CHANGE UP (ref 3.5–5)
POTASSIUM SERPL-SCNC: 3.8 MMOL/L — SIGNIFICANT CHANGE UP (ref 3.5–5)
PROT SERPL-MCNC: 4.3 G/DL — LOW (ref 6–8)
RBC # BLD: 3.05 M/UL — LOW (ref 4.7–6.1)
RBC # FLD: 14.1 % — SIGNIFICANT CHANGE UP (ref 11.5–14.5)
SODIUM SERPL-SCNC: 142 MMOL/L — SIGNIFICANT CHANGE UP (ref 135–146)
SPECIMEN SOURCE: SIGNIFICANT CHANGE UP
WBC # BLD: 10.67 K/UL — SIGNIFICANT CHANGE UP (ref 4.8–10.8)
WBC # FLD AUTO: 10.67 K/UL — SIGNIFICANT CHANGE UP (ref 4.8–10.8)

## 2025-02-07 PROCEDURE — 99291 CRITICAL CARE FIRST HOUR: CPT

## 2025-02-07 PROCEDURE — 71045 X-RAY EXAM CHEST 1 VIEW: CPT | Mod: 26

## 2025-02-07 PROCEDURE — 70450 CT HEAD/BRAIN W/O DYE: CPT | Mod: 26

## 2025-02-07 RX ORDER — METRONIDAZOLE 250 MG
500 TABLET ORAL EVERY 12 HOURS
Refills: 0 | Status: COMPLETED | OUTPATIENT
Start: 2025-02-07 | End: 2025-02-16

## 2025-02-07 RX ADMIN — HEPARIN SODIUM 4000 UNIT(S): 1000 INJECTION INTRAVENOUS; SUBCUTANEOUS at 22:18

## 2025-02-07 RX ADMIN — CEFEPIME 100 MILLIGRAM(S): 2 INJECTION, POWDER, FOR SOLUTION INTRAVENOUS at 17:40

## 2025-02-07 RX ADMIN — HEPARIN SODIUM 1800 UNIT(S)/HR: 1000 INJECTION INTRAVENOUS; SUBCUTANEOUS at 14:51

## 2025-02-07 RX ADMIN — Medication 15 MILLILITER(S): at 05:03

## 2025-02-07 RX ADMIN — Medication 650 MILLIGRAM(S): at 05:03

## 2025-02-07 RX ADMIN — HEPARIN SODIUM 1400 UNIT(S)/HR: 1000 INJECTION INTRAVENOUS; SUBCUTANEOUS at 07:42

## 2025-02-07 RX ADMIN — CEFEPIME 100 MILLIGRAM(S): 2 INJECTION, POWDER, FOR SOLUTION INTRAVENOUS at 05:03

## 2025-02-07 RX ADMIN — NAFCILLIN 200 GRAM(S): 2 INJECTION, SOLUTION INTRAVENOUS at 09:48

## 2025-02-07 RX ADMIN — NAFCILLIN 200 GRAM(S): 2 INJECTION, SOLUTION INTRAVENOUS at 05:11

## 2025-02-07 RX ADMIN — RASAGILINE 1 MILLIGRAM(S): 0.5 TABLET ORAL at 05:45

## 2025-02-07 RX ADMIN — Medication 650 MILLIGRAM(S): at 11:39

## 2025-02-07 RX ADMIN — HEPARIN SODIUM 1400 UNIT(S)/HR: 1000 INJECTION INTRAVENOUS; SUBCUTANEOUS at 02:00

## 2025-02-07 RX ADMIN — DEXMEDETOMIDINE HYDROCHLORIDE IN SODIUM CHLORIDE 6.39 MICROGRAM(S)/KG/HR: 4 INJECTION INTRAVENOUS at 05:02

## 2025-02-07 RX ADMIN — NAFCILLIN 200 GRAM(S): 2 INJECTION, SOLUTION INTRAVENOUS at 22:18

## 2025-02-07 RX ADMIN — HEPARIN SODIUM 4000 UNIT(S): 1000 INJECTION INTRAVENOUS; SUBCUTANEOUS at 14:53

## 2025-02-07 RX ADMIN — HEPARIN SODIUM 1600 UNIT(S)/HR: 1000 INJECTION INTRAVENOUS; SUBCUTANEOUS at 08:10

## 2025-02-07 RX ADMIN — OSELTAMIVIR PHOSPHATE 30 MILLIGRAM(S): 75 CAPSULE ORAL at 11:11

## 2025-02-07 RX ADMIN — Medication 2 TABLET(S): at 05:03

## 2025-02-07 RX ADMIN — Medication 650 MILLIGRAM(S): at 11:11

## 2025-02-07 RX ADMIN — NAFCILLIN 200 GRAM(S): 2 INJECTION, SOLUTION INTRAVENOUS at 01:59

## 2025-02-07 RX ADMIN — Medication 2 TABLET(S): at 22:18

## 2025-02-07 RX ADMIN — Medication 2 TABLET(S): at 13:39

## 2025-02-07 RX ADMIN — NAFCILLIN 200 GRAM(S): 2 INJECTION, SOLUTION INTRAVENOUS at 17:40

## 2025-02-07 RX ADMIN — HEPARIN SODIUM 2000 UNIT(S)/HR: 1000 INJECTION INTRAVENOUS; SUBCUTANEOUS at 22:17

## 2025-02-07 RX ADMIN — Medication 500 MILLIGRAM(S): at 17:40

## 2025-02-07 RX ADMIN — HEPARIN SODIUM 4000 UNIT(S): 1000 INJECTION INTRAVENOUS; SUBCUTANEOUS at 02:03

## 2025-02-07 RX ADMIN — Medication 15 MILLILITER(S): at 17:40

## 2025-02-07 RX ADMIN — NAFCILLIN 200 GRAM(S): 2 INJECTION, SOLUTION INTRAVENOUS at 13:39

## 2025-02-07 RX ADMIN — Medication 100 MILLIGRAM(S): at 05:02

## 2025-02-07 RX ADMIN — Medication 40 MILLIGRAM(S): at 11:12

## 2025-02-07 RX ADMIN — Medication 650 MILLIGRAM(S): at 06:03

## 2025-02-07 RX ADMIN — HEPARIN SODIUM 1400 UNIT(S)/HR: 1000 INJECTION INTRAVENOUS; SUBCUTANEOUS at 05:02

## 2025-02-07 RX ADMIN — POLYETHYLENE GLYCOL 3350 17 GRAM(S): 17 POWDER, FOR SOLUTION ORAL at 17:40

## 2025-02-07 NOTE — PROGRESS NOTE ADULT - SUBJECTIVE AND OBJECTIVE BOX
Over Night Events: events noted, remains critically ill, still intubated, ventilated, low grade fever    PHYSICAL EXAM    ICU Vital Signs Last 24 Hrs  T(C): 37.9 (07 Feb 2025 04:00), Max: 38.2 (06 Feb 2025 12:00)  T(F): 100.2 (07 Feb 2025 04:00), Max: 100.8 (06 Feb 2025 12:00)  HR: 96 (07 Feb 2025 06:00) (75 - 102)  BP: 132/62 (07 Feb 2025 06:00) (84/50 - 132/62)  BP(mean): 89 (07 Feb 2025 06:00) (64 - 89)  RR: 26 (07 Feb 2025 06:00) (10 - 38)  SpO2: 100% (07 Feb 2025 06:00) (75% - 100%)    O2 Parameters below as of 07 Feb 2025 04:00  Patient On (Oxygen Delivery Method): ventilator  O2 Flow (L/min): 40          General: ill looking  ETT  Lungs: Bilateral rhonchi  Cardiovascular: FRANKLYN 2.6  Abdomen: Soft, Positive BS  Extremities: No clubbing   Non focal      02-05-25 @ 07:01  -  02-06-25 @ 07:00  --------------------------------------------------------  IN:    Dexmedetomidine: 198.9 mL    Free Water: 100 mL    IV PiggyBack: 250 mL    Lactated Ringers: 2400 mL    Norepinephrine: 47.6 mL    Propofol: 119.6 mL  Total IN: 3116.1 mL    OUT:    Drain (mL): 450 mL    Indwelling Catheter - Urethral (mL): 2080 mL  Total OUT: 2530 mL    Total NET: 586.1 mL      02-06-25 @ 07:01  -  02-07-25 @ 06:39  --------------------------------------------------------  IN:    Dexmedetomidine: 429.6 mL    Free Water: 500 mL    Heparin Infusion: 260 mL    IV PiggyBack: 950 mL    Jevity: 800 mL    Lactated Ringers: 2400 mL    Norepinephrine: 26.9 mL    Propofol: 46.1 mL  Total IN: 5412.6 mL    OUT:    Drain (mL): 615 mL    Indwelling Catheter - Urethral (mL): 2345 mL  Total OUT: 2960 mL    Total NET: 2452.6 mL          LABS:                          9.4    10.67 )-----------( 209      ( 07 Feb 2025 05:15 )             28.5                                               02-07    142  |  108  |  77[HH]  ----------------------------<  271[H]  3.8   |  21  |  1.7[H]    Ca    6.8[L]      07 Feb 2025 05:15  Phos  5.9     02-07  Mg     2.5     02-07    TPro  4.3[L]  /  Alb  2.3[L]  /  TBili  2.0[H]  /  DBili  x   /  AST  24  /  ALT  <5  /  AlkPhos  128[H]  02-07      PT/INR - ( 06 Feb 2025 08:49 )   PT: 15.50 sec;   INR: 1.31 ratio         PTT - ( 06 Feb 2025 23:55 )  PTT:32.8 sec                                       Urinalysis Basic - ( 07 Feb 2025 05:15 )    Color: x / Appearance: x / SG: x / pH: x  Gluc: 271 mg/dL / Ketone: x  / Bili: x / Urobili: x   Blood: x / Protein: x / Nitrite: x   Leuk Esterase: x / RBC: x / WBC x   Sq Epi: x / Non Sq Epi: x / Bacteria: x                                                  LIVER FUNCTIONS - ( 07 Feb 2025 05:15 )  Alb: 2.3 g/dL / Pro: 4.3 g/dL / ALK PHOS: 128 U/L / ALT: <5 U/L / AST: 24 U/L / GGT: x                                                  Culture - Acid Fast - Bronchial w/Smear (collected 04 Feb 2025 09:29)  Source: Bronch Wash  Preliminary Report (05 Feb 2025 23:07):    Culture is being performed.    Culture - Bronchial (collected 04 Feb 2025 09:29)  Source: Bronch Wash  Gram Stain (05 Feb 2025 07:26):    Few-moderate polymorphonuclear leukocytes seen per low power field    No Squamous epithelial cells seen per low power field    No organisms seen per oil power field  Final Report (06 Feb 2025 11:36):    Commensal halina consistent with body site    Culture - Sputum (collected 04 Feb 2025 09:00)  Source: ET Tube ET Tube  Gram Stain (04 Feb 2025 19:37):    Numerous polymorphonuclear leukocytes per low power field    Rare Squamous epithelial cells per low power field    Rare Yeast like cells per oil power field  Final Report (05 Feb 2025 22:23):    Commensal halina consistent with body site                                                   Mode: AC/ CMV (Assist Control/ Continuous Mandatory Ventilation)  RR (machine): 16  TV (machine): 400  FiO2: 40  PEEP: 8  ITime: 1  MAP: 10  PIP: 19                                      ABG - ( 07 Feb 2025 03:15 )  pH, Arterial: 7.35  pH, Blood: x     /  pCO2: 42    /  pO2: 115   / HCO3: 23    / Base Excess: -2.3  /  SaO2: 99.4                MEDICATIONS  (STANDING):  carbidopa/levodopa  25/250 2 Tablet(s) Oral three times a day  cefepime   IVPB 1000 milliGRAM(s) IV Intermittent every 12 hours  chlorhexidine 0.12% Liquid 15 milliLiter(s) Oral Mucosa every 12 hours  dexMEDEtomidine Infusion 0.5 MICROgram(s)/kG/Hr (6.39 mL/Hr) IV Continuous <Continuous>  heparin  Infusion.  Unit(s)/Hr (10 mL/Hr) IV Continuous <Continuous>  lactated ringers. 1000 milliLiter(s) (100 mL/Hr) IV Continuous <Continuous>  lactulose Syrup 20 Gram(s) Oral every 6 hours  metroNIDAZOLE  IVPB 500 milliGRAM(s) IV Intermittent every 8 hours  nafcillin  IVPB 2 Gram(s) IV Intermittent every 4 hours  nafcillin  IVPB      norepinephrine Infusion 0.05 MICROgram(s)/kG/Min (4.79 mL/Hr) IV Continuous <Continuous>  oseltamivir 30 milliGRAM(s) Oral daily  pantoprazole   Suspension 40 milliGRAM(s) Oral daily  polyethylene glycol 3350 17 Gram(s) Oral every 12 hours  propofol Infusion 0.5 MICROgram(s)/kG/Min (0.15 mL/Hr) IV Continuous <Continuous>  rasagiline Tablet 1 milliGRAM(s) Oral <User Schedule>  senna 2 Tablet(s) Oral at bedtime    MEDICATIONS  (PRN):  acetaminophen     Tablet .. 650 milliGRAM(s) Oral every 6 hours PRN Temp greater or equal to 38C (100.4F), Mild Pain (1 - 3)  fentaNYL    Injectable 50 MICROGram(s) IV Push every 2 hours PRN Aggitation  heparin   Injectable 4000 Unit(s) IV Push every 6 hours PRN For aPTT less than 40  heparin   Injectable 2000 Unit(s) IV Push every 6 hours PRN For aPTT between 40 - 57  losartan 25 milliGRAM(s) Oral daily PRN >140 SBP      cxr noted   Over Night Events: events noted, remains critically ill, still intubated, ventilated, low grade fever, precedex, hep iv hep    PHYSICAL EXAM    ICU Vital Signs Last 24 Hrs  T(C): 37.9 (07 Feb 2025 04:00), Max: 38.2 (06 Feb 2025 12:00)  T(F): 100.2 (07 Feb 2025 04:00), Max: 100.8 (06 Feb 2025 12:00)  HR: 96 (07 Feb 2025 06:00) (75 - 102)  BP: 132/62 (07 Feb 2025 06:00) (84/50 - 132/62)  BP(mean): 89 (07 Feb 2025 06:00) (64 - 89)  RR: 26 (07 Feb 2025 06:00) (10 - 38)  SpO2: 100% (07 Feb 2025 06:00) (75% - 100%)    O2 Parameters below as of 07 Feb 2025 04:00  Patient On (Oxygen Delivery Method): ventilator  O2 Flow (L/min): 40          General: ill looking  ETT  Lungs: Bilateral rhonchi  Cardiovascular: FRANKLYN 2.6  Abdomen: Soft, Positive BS  Extremities: No clubbing   Non focal      02-05-25 @ 07:01  -  02-06-25 @ 07:00  --------------------------------------------------------  IN:    Dexmedetomidine: 198.9 mL    Free Water: 100 mL    IV PiggyBack: 250 mL    Lactated Ringers: 2400 mL    Norepinephrine: 47.6 mL    Propofol: 119.6 mL  Total IN: 3116.1 mL    OUT:    Drain (mL): 450 mL    Indwelling Catheter - Urethral (mL): 2080 mL  Total OUT: 2530 mL    Total NET: 586.1 mL      02-06-25 @ 07:01  -  02-07-25 @ 06:39  --------------------------------------------------------  IN:    Dexmedetomidine: 429.6 mL    Free Water: 500 mL    Heparin Infusion: 260 mL    IV PiggyBack: 950 mL    Jevity: 800 mL    Lactated Ringers: 2400 mL    Norepinephrine: 26.9 mL    Propofol: 46.1 mL  Total IN: 5412.6 mL    OUT:    Drain (mL): 615 mL    Indwelling Catheter - Urethral (mL): 2345 mL  Total OUT: 2960 mL    Total NET: 2452.6 mL          LABS:                          9.4    10.67 )-----------( 209      ( 07 Feb 2025 05:15 )             28.5                                               02-07    142  |  108  |  77[HH]  ----------------------------<  271[H]  3.8   |  21  |  1.7[H]    Ca    6.8[L]      07 Feb 2025 05:15  Phos  5.9     02-07  Mg     2.5     02-07    TPro  4.3[L]  /  Alb  2.3[L]  /  TBili  2.0[H]  /  DBili  x   /  AST  24  /  ALT  <5  /  AlkPhos  128[H]  02-07      PT/INR - ( 06 Feb 2025 08:49 )   PT: 15.50 sec;   INR: 1.31 ratio         PTT - ( 06 Feb 2025 23:55 )  PTT:32.8 sec                                       Urinalysis Basic - ( 07 Feb 2025 05:15 )    Color: x / Appearance: x / SG: x / pH: x  Gluc: 271 mg/dL / Ketone: x  / Bili: x / Urobili: x   Blood: x / Protein: x / Nitrite: x   Leuk Esterase: x / RBC: x / WBC x   Sq Epi: x / Non Sq Epi: x / Bacteria: x                                                  LIVER FUNCTIONS - ( 07 Feb 2025 05:15 )  Alb: 2.3 g/dL / Pro: 4.3 g/dL / ALK PHOS: 128 U/L / ALT: <5 U/L / AST: 24 U/L / GGT: x                                                  Culture - Acid Fast - Bronchial w/Smear (collected 04 Feb 2025 09:29)  Source: Bronch Wash  Preliminary Report (05 Feb 2025 23:07):    Culture is being performed.    Culture - Bronchial (collected 04 Feb 2025 09:29)  Source: Bronch Wash  Gram Stain (05 Feb 2025 07:26):    Few-moderate polymorphonuclear leukocytes seen per low power field    No Squamous epithelial cells seen per low power field    No organisms seen per oil power field  Final Report (06 Feb 2025 11:36):    Commensal halina consistent with body site    Culture - Sputum (collected 04 Feb 2025 09:00)  Source: ET Tube ET Tube  Gram Stain (04 Feb 2025 19:37):    Numerous polymorphonuclear leukocytes per low power field    Rare Squamous epithelial cells per low power field    Rare Yeast like cells per oil power field  Final Report (05 Feb 2025 22:23):    Commensal halina consistent with body site                                                   Mode: AC/ CMV (Assist Control/ Continuous Mandatory Ventilation)  RR (machine): 16  TV (machine): 400  FiO2: 40  PEEP: 8  ITime: 1  MAP: 10  PIP: 19                                      ABG - ( 07 Feb 2025 03:15 )  pH, Arterial: 7.35  pH, Blood: x     /  pCO2: 42    /  pO2: 115   / HCO3: 23    / Base Excess: -2.3  /  SaO2: 99.4                MEDICATIONS  (STANDING):  carbidopa/levodopa  25/250 2 Tablet(s) Oral three times a day  cefepime   IVPB 1000 milliGRAM(s) IV Intermittent every 12 hours  chlorhexidine 0.12% Liquid 15 milliLiter(s) Oral Mucosa every 12 hours  dexMEDEtomidine Infusion 0.5 MICROgram(s)/kG/Hr (6.39 mL/Hr) IV Continuous <Continuous>  heparin  Infusion.  Unit(s)/Hr (10 mL/Hr) IV Continuous <Continuous>  lactated ringers. 1000 milliLiter(s) (100 mL/Hr) IV Continuous <Continuous>  lactulose Syrup 20 Gram(s) Oral every 6 hours  metroNIDAZOLE  IVPB 500 milliGRAM(s) IV Intermittent every 8 hours  nafcillin  IVPB 2 Gram(s) IV Intermittent every 4 hours  nafcillin  IVPB      norepinephrine Infusion 0.05 MICROgram(s)/kG/Min (4.79 mL/Hr) IV Continuous <Continuous>  oseltamivir 30 milliGRAM(s) Oral daily  pantoprazole   Suspension 40 milliGRAM(s) Oral daily  polyethylene glycol 3350 17 Gram(s) Oral every 12 hours  propofol Infusion 0.5 MICROgram(s)/kG/Min (0.15 mL/Hr) IV Continuous <Continuous>  rasagiline Tablet 1 milliGRAM(s) Oral <User Schedule>  senna 2 Tablet(s) Oral at bedtime    MEDICATIONS  (PRN):  acetaminophen     Tablet .. 650 milliGRAM(s) Oral every 6 hours PRN Temp greater or equal to 38C (100.4F), Mild Pain (1 - 3)  fentaNYL    Injectable 50 MICROGram(s) IV Push every 2 hours PRN Aggitation  heparin   Injectable 4000 Unit(s) IV Push every 6 hours PRN For aPTT less than 40  heparin   Injectable 2000 Unit(s) IV Push every 6 hours PRN For aPTT between 40 - 57  losartan 25 milliGRAM(s) Oral daily PRN >140 SBP      cxr noted

## 2025-02-07 NOTE — PROGRESS NOTE ADULT - ASSESSMENT
ASSESSMENT  69-year-old male past medical history of hypertension, Parkinson's, CAD presents for shortness of breath and cough of 1 day duration. History was obtained from patient's wife who noted that the patient has been having decreased po intake, activity, and unintentional weight loss for some time. Howver, yesterday he started having cough and shortness of breath. Found to have Flu, PNA and MSSA bacteremia     IMPRESSION  #Fever  #Intubated on mechanical ventilation , septic shock requiring pressors   #Pulmonary artery thrombus on CTA    2/ 2 CTA There is a focal filling defect within the main pulmonary artery at site of the ductus arteriosus suspicious for thrombus.  Increased opacities/debris within the central bronchi with occlusion of the right lower lobe central bronchi. There is increased consolidation   within the right lower lobe.  #Suspected cholecystitis   RUQ GB sludge     2/5 Successful placement of 8Fr perc haydee tube. 250cc of dark bile aspirated from GB.  #Influenza +, MSSA bacteremia , suspect MSSA superimposed PNA    2/4 bronch   No organisms seen per oil power field    2/ 2 BCX NGTD     1/31 BCX NGTD     1/29 BCX MSSA 3/4 bottles    MRSA PCR Result.: Negative (01-30-25 @ 13:40)    Procalcitonin: 6.93 ng/mL (01-30-25 @ 07:26)    Legionella Antigen, Urine: Negative (01-29-25 @ 15:49)    Streptococcus pneumoniae Ag, Ur Result: Negative (01-29-25 @ 15:49)    CT Multifocal bilateral consolidative opacities as above, suggestive of   multifocal pneumonia in the appropriate clinical context.  #Lactic acidosis  #Hypernatremia   #Severe protein calorie malnutrition  BMI (kg/m2): 17  #PD  #Immunodeficiency secondary to Senescence which could results in poor clinical outcomes  #Abx allergy: Allergy Status Unknown    #LYNNE Creatinine: 1.8 mg/dL (02-06-25 @ 04:57) 28 mL/min  Creatinine clearance for underweight patient (BMI 16.7 kg/m²), calculated using actual body weight (no adjustment).    Height (cm): 175.3 (01-30-25 @ 13:00)  Weight (kg): 52.2 (01-29-25 @ 10:14)    RECOMMENDATIONS  - Fever, send BCX  - Tamiflu 30 mg  daily to complete 10 days given critical illness end 2/7  - Cefepime 1g q12h IV   - Nafcillin 2g q4h IV to target MSSA in blood  - metroNIDAZOLE  IVPB 500 milliGRAM(s) IV Intermittent every 8 hours, change to PO same dose when possible   - TTE no vegetations , guidelines recommend MARY as community acquired bacteremia once stable   - Grave prognosis, GOC     If any questions, please send a message or call on [employer] Teams  Please continue to update ID with any pertinent new laboratory, radiographic findings, or change in clinical status ASSESSMENT  69-year-old male past medical history of hypertension, Parkinson's, CAD presents for shortness of breath and cough of 1 day duration. History was obtained from patient's wife who noted that the patient has been having decreased po intake, activity, and unintentional weight loss for some time. Howver, yesterday he started having cough and shortness of breath. Found to have Flu, PNA and MSSA bacteremia     IMPRESSION  #Fevers    CXR stable opacities   #Intubated on mechanical ventilation , septic shock requiring pressors   #Pulmonary artery thrombus on CTA    2/ 2 CTA There is a focal filling defect within the main pulmonary artery at site of the ductus arteriosus suspicious for thrombus.  Increased opacities/debris within the central bronchi with occlusion of the right lower lobe central bronchi. There is increased consolidation   within the right lower lobe.  #Suspected cholecystitis   RUQ GB sludge     2/5 Successful placement of 8Fr perc haydee tube. 250cc of dark bile aspirated from GB.  #Influenza +, MSSA bacteremia , suspect MSSA superimposed PNA    2/4 bronch   No organisms seen per oil power field    2/ 2 BCX NGTD     1/31 BCX NGTD     1/29 BCX MSSA 3/4 bottles    MRSA PCR Result.: Negative (01-30-25 @ 13:40)    Procalcitonin: 6.93 ng/mL (01-30-25 @ 07:26)    Legionella Antigen, Urine: Negative (01-29-25 @ 15:49)    Streptococcus pneumoniae Ag, Ur Result: Negative (01-29-25 @ 15:49)    CT Multifocal bilateral consolidative opacities as above, suggestive of   multifocal pneumonia in the appropriate clinical context.  #Lactic acidosis  #Hypernatremia   #Severe protein calorie malnutrition  BMI (kg/m2): 17  #PD  #Immunodeficiency secondary to Senescence which could results in poor clinical outcomes  #Abx allergy: Allergy Status Unknown    #LYNNE Creatinine: 1.8 mg/dL (02-06-25 @ 04:57) 28 mL/min  Creatinine clearance for underweight patient (BMI 16.7 kg/m²), calculated using actual body weight (no adjustment).    Height (cm): 175.3 (01-30-25 @ 13:00)  Weight (kg): 52.2 (01-29-25 @ 10:14)    RECOMMENDATIONS  - Fever, send BCX  - Tamiflu 30 mg  daily to complete 10 days given critical illness end 2/7  - Cefepime 1g q12h IV   - Nafcillin 2g q4h IV to target MSSA in blood  - metroNIDAZOLE  IVPB 500 milliGRAM(s) IV Intermittent every 8 hours, change to PO same dose when possible   - TTE no vegetations , guidelines recommend MARY as community acquired bacteremia once stable   - Trend WBC, Cr  - Grave prognosis, GOC     If any questions, please send a message or call on [employer] Teams  Please continue to update ID with any pertinent new laboratory, radiographic findings, or change in clinical status

## 2025-02-07 NOTE — PROGRESS NOTE ADULT - ASSESSMENT
69-year-old male past medical history of hypertension, Parkinson's, CAD presents for shortness of breath and cough of 1 day duration. History was obtained from patient's wife who noted that the patient has been having decreased po intake, activity, and unintentional weight loss for some time. However yesterday he started having cough and shortness of breath. Admitted to MICU for AHRF and sepsis 2/2 to mutilobar PNA.    #Acute Hypoxic Respiratory Failure likely 2/2 CAP/aspiration and flu  #Toxic Metabolic Encephalopathy likely 2/2 CAP/aspiration and flu  #Influenza A positive  #MSSA bacteremia- resolved  #Sepsis POA  - Aspiration precautions.    - patient inc WOB, worsening alkalosis, obtunded-intubated for airway protection   - Nasal MRSA negative  - CTA chest to r/o PE: focal filling defect within main pulm artery   - repeat CT head: stable intraventricular hemorrhage   - Platelets downtrending, 239>>117, HIT neg   - add metronidazole, ID: if patient decompensates, dc cefepime/metro, start patricia 1g   - cardio: patient critically sick, not a candidate for surgery therefore no MARY, can c/w abx for IE   - s/p bronch, f/u cultures   - s/p IR perc haydee on 2/5, drained 250 cc dark bile, 450 cc output overnight. F/u GB drainage cultures   - ID: tamiflu 30 mg daily, dec cefepime to 1g q12h, c/w metro and nafcillin   - c/w heparin, last PTT 32.8  - repeat CT head once PTT therapeutic   - cr improving, c/w LR @ 100    #Elevated Bilirubin-improving   #Leukocytosis-improving   #Cholangitis vs cholecystitis?   - F/u RUQ US: distended GB with sludge, GB polyp   - IR: perc haydee 2/5, drained 250 cc dark bile, 450 cc output overnight. F/u GB drainage cultures     #Periventricular bleed  - CTH. noted with right periventricular bleed, stable.   - Neuro on board  - Continue Anti parkinson's meds.    - c/w heparin, last PTT 32.8  - repeat CT head once PTT therapeutic     #Constipation  - senna and miralax  - c/w lactulose     #Possible Mural thrombus in PA  #CAD  - Avoid overload  - C/w LR at 50  - TTE: EF 56%, G1DD, mild-mod MR, mod TR, mild IN, mild pHTN  - Cardio recs appreciated       - Patient is poor candidate for MARY       - consider full endocarditis abx course    #MISC  - DVT ppx: SCDs  - GI ppx: not needed  - Activity: as tolerated  - Diet: NPO w/ tube feed         69-year-old male past medical history of hypertension, Parkinson's, CAD presents for shortness of breath and cough of 1 day duration. History was obtained from patient's wife who noted that the patient has been having decreased po intake, activity, and unintentional weight loss for some time. However yesterday he started having cough and shortness of breath. Admitted to MICU for AHRF and sepsis 2/2 to mutilobar PNA.    #Acute Hypoxic Respiratory Failure likely 2/2 CAP/aspiration and flu  #Toxic Metabolic Encephalopathy likely 2/2 CAP/aspiration and flu  #Influenza A positive  #MSSA bacteremia- resolved  #Sepsis POA  - Aspiration precautions.    - patient inc WOB, worsening alkalosis, obtunded-intubated for airway protection   - Nasal MRSA negative  - CTA chest to r/o PE: focal filling defect within main pulm artery   - repeat CT head: stable intraventricular hemorrhage   - Platelets downtrending, 239>>117, HIT neg   - add metronidazole, ID: if patient decompensates, dc cefepime/metro, start patricia 1g   - cardio: patient critically sick, not a candidate for surgery therefore no MARY, can c/w abx for IE   - s/p bronch, f/u cultures   - s/p IR perc haydee on 2/5, drained 250 cc dark bile, 450 cc output overnight. F/u GB drainage cultures   - ID: tamiflu 30 mg daily, dec cefepime to 1g q12h, c/w metro and nafcillin   - c/w heparin, last PTT 32.8  - cr improving, c/w LR @ 100  - f/u repeat CT head  - As per patient's daughter and wife, 2-3 days before current hospitalization patient was mobile and functional. Explained current status of patient's illness unresponsive on minimal sedation, possibility of failing SBT and requiring trach/PEG) , attempted to discuss GOC however patient has no advance direct or any wishes, therefore a discussion with all family members would have to take place to collectively decide on how to proceed.  Palliative is aware and can be recalled next week since they are not available during the weekend.    #Elevated Bilirubin-improving   #Leukocytosis-improving   #Cholangitis vs cholecystitis?   - F/u RUQ US: distended GB with sludge, GB polyp   - IR: perc haydee 2/5, drained 250 cc dark bile, 450 cc output overnight. F/u GB drainage cultures     #Periventricular bleed  - CTH. noted with right periventricular bleed, stable.   - Neuro on board  - Continue Anti parkinson's meds.    - c/w heparin, last PTT 32.8  - repeat CT head once PTT therapeutic     #Constipation  - senna and miralax  - c/w lactulose     #Possible Mural thrombus in PA  #CAD  - Avoid overload  - C/w LR at 50  - TTE: EF 56%, G1DD, mild-mod MR, mod TR, mild DE, mild pHTN  - Cardio recs appreciated       - Patient is poor candidate for MARY       - consider full endocarditis abx course    #MISC  - DVT ppx: SCDs  - GI ppx: not needed  - Activity: as tolerated  - Diet: NPO w/ tube feed

## 2025-02-07 NOTE — PROGRESS NOTE ADULT - ASSESSMENT
IMPRESSION:    Acute Hypoxic Respiratory Failure sp reintubation  Toxic Metabolic Encephalopathy  CAP likely aspiration   Influenza A  MSSA bacteremia repeat CX -  Sepsis POA  LYNNE improving  cholecystitis sp percut haydee  Periventricular bleed  ?Mural thrombus in PA  HO Parkinson's Disease   HO CAD    PLAN:    CNS:  CTH. noted with right periventricular bleed stable. Continue Anti parkinson's.  Daily SAT, repeat ct when PTT therapeutic    HEENT: Oral care.      PULMONARY:  HOB @ 45 degrees.  Aspiration precautions.   PEEP 8, dec FIO2 keeP Sao2 92 o 96%, Monitor airway pressures, SBT    CARDIOVASCULAR:  Avoid overload.  NO MARY    GI: GI prophylaxis.  FEEDING NPO., CT AP noted, IR noted    RENAL:  Follow up lytes.  Free water.   BMP in PM.  Monitor UO    INFECTIOUS DISEASE: Cultures noted.  tamiflu, naf, cefepime, flagyl    HEMATOLOGICAL:  DVT prophylaxis.  HIT abx -, IV hep on hold    ENDOCRINE:  Follow up FS.  Insulin protocol if needed.    MUSCULOSKELETAL: Bed rest.  Off loading    Full code  MICU  palliative care fup  very poor prognosis             IMPRESSION:    Acute Hypoxic Respiratory Failure sp reintubation  Toxic Metabolic Encephalopathy  CAP likely aspiration   Influenza A  MSSA bacteremia repeat CX -  Sepsis POA  LYNNE improving  cholecystitis sp percut haydee  Periventricular bleed  ?Mural thrombus in PA  HO Parkinson's Disease   HO CAD    PLAN:    CNS:  CTH. noted with right periventricular bleed stable. Continue Anti parkinson's.  Daily SAT, repeat ct when PTT therapeutic    HEENT: Oral care.      PULMONARY:  HOB @ 45 degrees.  Aspiration precautions.   PEEP 8, dec FIO2 keeP Sao2 92 o 96%, Monitor airway pressures, SBT, Will highly need trach    CARDIOVASCULAR:  Avoid overload.  NO MARY    GI: GI prophylaxis.  FEEDING NPO., CT AP noted, IR noted    RENAL:  Follow up lytes.  Free water.   BMP in PM.  Monitor UO    INFECTIOUS DISEASE: Cultures noted.  tamiflu, naf, cefepime, flagyl    HEMATOLOGICAL:  DVT prophylaxis.  HIT abx -, IV hep on hold    ENDOCRINE:  Follow up FS.  Insulin protocol if needed.    MUSCULOSKELETAL: Bed rest.  Off loading    Full code  MICU  palliative care fup  very poor prognosis

## 2025-02-07 NOTE — PROGRESS NOTE ADULT - SUBJECTIVE AND OBJECTIVE BOX
GARETT ESCAMILLA  69y, Male  Allergy: Allergy Status Unknown      LOS  9d    CHIEF COMPLAINT: pneumonia (07 Feb 2025 06:39)      INTERVAL EVENTS/HPI  - T(F): , Max: 100.8 (02-06-25 @ 12:00) fever  - WBC Count: 10.67 (02-07-25 @ 05:15) downtrending   WBC Count: 14.73 (02-06-25 @ 17:07)     - Creatinine: 1.7 (02-07-25 @ 05:15)  Creatinine: 1.9 (02-06-25 @ 11:15)     -   -   -     ROS  cannot obtain secondary to patient's sedation and/or mental status    VITALS:  T(F): 100.7, Max: 100.8 (02-06-25 @ 12:00)  HR: 97  BP: 118/69  RR: 20Vital Signs Last 24 Hrs  T(C): 38.2 (07 Feb 2025 08:00), Max: 38.2 (06 Feb 2025 12:00)  T(F): 100.7 (07 Feb 2025 08:00), Max: 100.8 (06 Feb 2025 12:00)  HR: 97 (07 Feb 2025 08:08) (75 - 102)  BP: 118/69 (07 Feb 2025 08:00) (84/53 - 134/63)  BP(mean): 89 (07 Feb 2025 08:00) (64 - 91)  RR: 20 (07 Feb 2025 08:00) (16 - 38)  SpO2: 100% (07 Feb 2025 08:08) (75% - 100%)    Parameters below as of 07 Feb 2025 08:00  Patient On (Oxygen Delivery Method): ventilator    O2 Concentration (%): 40    PHYSICAL EXAM:  ***    FH: Non-contributory  Social Hx: Non-contributory    TESTS & MEASUREMENTS:                        9.4    10.67 )-----------( 209      ( 07 Feb 2025 05:15 )             28.5     02-07    142  |  108  |  77[HH]  ----------------------------<  271[H]  3.8   |  21  |  1.7[H]    Ca    6.8[L]      07 Feb 2025 05:15  Phos  5.9     02-07  Mg     2.5     02-07    TPro  4.3[L]  /  Alb  2.3[L]  /  TBili  2.0[H]  /  DBili  x   /  AST  24  /  ALT  <5  /  AlkPhos  128[H]  02-07      LIVER FUNCTIONS - ( 07 Feb 2025 05:15 )  Alb: 2.3 g/dL / Pro: 4.3 g/dL / ALK PHOS: 128 U/L / ALT: <5 U/L / AST: 24 U/L / GGT: x           Urinalysis Basic - ( 07 Feb 2025 05:15 )    Color: x / Appearance: x / SG: x / pH: x  Gluc: 271 mg/dL / Ketone: x  / Bili: x / Urobili: x   Blood: x / Protein: x / Nitrite: x   Leuk Esterase: x / RBC: x / WBC x   Sq Epi: x / Non Sq Epi: x / Bacteria: x        Culture - Acid Fast - Bronchial w/Smear (collected 02-04-25 @ 09:29)  Source: Bronch Wash  Preliminary Report (02-05-25 @ 23:07):    Culture is being performed.    Culture - Bronchial (collected 02-04-25 @ 09:29)  Source: Bronch Wash  Gram Stain (02-05-25 @ 07:26):    Few-moderate polymorphonuclear leukocytes seen per low power field    No Squamous epithelial cells seen per low power field    No organisms seen per oil power field  Final Report (02-06-25 @ 11:36):    Commensal halina consistent with body site    Culture - Sputum (collected 02-04-25 @ 09:00)  Source: ET Tube ET Tube  Gram Stain (02-04-25 @ 19:37):    Numerous polymorphonuclear leukocytes per low power field    Rare Squamous epithelial cells per low power field    Rare Yeast like cells per oil power field  Final Report (02-05-25 @ 22:23):    Commensal halina consistent with body site    Culture - Blood (collected 02-02-25 @ 04:54)  Source: .Blood BLOOD  Preliminary Report (02-06-25 @ 12:00):    No growth at 4 days    Culture - Blood (collected 01-31-25 @ 17:22)  Source: .Blood BLOOD  Final Report (02-05-25 @ 23:07):    No growth at 5 days    Culture - Blood (collected 01-31-25 @ 17:22)  Source: .Blood BLOOD  Final Report (02-05-25 @ 23:07):    No growth at 5 days    Urinalysis with Rflx Culture (collected 01-29-25 @ 16:48)    Culture - Blood (collected 01-29-25 @ 10:09)  Source: .Blood BLOOD  Gram Stain (01-30-25 @ 15:40):    Growth in aerobic bottle: Gram Positive Cocci in Clusters    Growth in anaerobic bottle: Gram Positive Cocci in Clusters  Final Report (02-01-25 @ 07:51):    Growth in aerobic and anaerobic bottles: Staphylococcus aureus    Direct identification is available within approximately 3-5    hours either by Blood Panel Multiplexed PCR or Direct    MALDI-TOF. Details: [email or web address]  Organism: Blood Culture PCR  Staphylococcus aureus (02-01-25 @ 07:51)  Organism: Staphylococcus aureus (02-01-25 @ 07:51)      Method Type: KARTIK      -  Ceftaroline: S <=0.5      -  Clindamycin: R <=0.25 This isolate is presumed to be clindamycin resistant based on detection of inducible resistance. Clindamycin may still be effective in some patients.      -  Erythromycin: R >4      -  Gentamicin: S <=4 Should not be used as monotherapy      -  Oxacillin: S <=0.25 Oxacillin predicts susceptibility for dicloxacillin, methicillin, and nafcillin      -  Rifampin: S <=1 Should not be used as monotherapy      -  Tetracycline: S <=4      -  Trimethoprim/Sulfamethoxazole: S <=0.5/9.5      -  Vancomycin: S 1  Organism: Blood Culture PCR (02-01-25 @ 07:51)      Method Type: PCR      -  Methicillin SENSITIVE Staphylococcus aureus (MSSA): Detec Any isolate of Staphylococcus aureus from a blood culture is NOT considered a contaminant.    Culture - Blood (collected 01-29-25 @ 10:09)  Source: .Blood BLOOD  Gram Stain (02-01-25 @ 00:35):    Growth in aerobic bottle: Gram Positive Cocci in Clusters    Growth in anaerobic bottle: Gram Positive Cocci in Clusters  Final Report (02-01-25 @ 16:44):    Growth in aerobic and anaerobic bottles: Staphylococcus aureus    See previous culture [id number]            INFECTIOUS DISEASES TESTING  MRSA PCR Result.: Negative (01-30-25 @ 13:40)  Procalcitonin: 6.93 (01-30-25 @ 07:26)  Legionella Antigen, Urine: Negative (01-29-25 @ 15:49)  Rapid RVP Result: Detected (01-29-25 @ 10:30)      INFLAMMATORY MARKERS      RADIOLOGY & ADDITIONAL TESTS:  I have personally reviewed the last available Chest xray  CXR      CT  CT Abdomen and Pelvis w/ Oral Cont and w/ IV Cont:   ACC: [id number] EXAM:  CT ABDOMEN AND PELVIS OC IC   ORDERED BY: ANISH SINGH     PROCEDURE DATE:  02/04/2025          INTERPRETATION:  CLINICAL INFORMATION: Rule out cholangitis    COMPARISON: None.    CONTRAST/COMPLICATIONS:  IV Contrast: Omnipaque 350  95 cc administered   5 cc discarded  Oral Contrast: Omnipaque 300  .    PROCEDURE:  CT of the Abdomen and Pelvis was performed.  Sagittal and coronal reformats were performed.    FINDINGS:  LOWER CHEST: Right lung base near lobar consolidationwith small right   pleural effusion. Additional nodular opacities seen at the lung bases   mostly in the left lower lobe. Correlate for infectious/inflammatory   etiologies.    LIVER: Within normal limits.  BILE DUCTS: Normal caliber.  GALLBLADDER: Trace pericholecystic fluid. No calcified gallstones.  SPLEEN: Within normal limits.  PANCREAS: Within normal limits.  ADRENALS: Within normal limits.  KIDNEYS/URETERS: Bilateral renal cysts. No hydroureteronephrosis.    BLADDER: Mars catheter.  REPRODUCTIVE ORGANS: Unremarkable at CT.    BOWEL: No bowel obstruction. Appendix is not visualized. Solid and liquid   stool seen throughout the colon.  PERITONEUM/RETROPERITONEUM: Enteric tube terminates in the stomach.   Within normal limits.  VESSELS: Atherosclerotic changes.  LYMPH NODES: No lymphadenopathy.  ABDOMINAL WALL: Mild anasarca.  BONES: Degenerative changes.    IMPRESSION:  Right lung base near lobar consolidation with small right pleural   effusion. Additional nodular opacities seen atthe lung bases mostly in   the left lower lobe. Correlate for infectious/inflammatory etiologies.    Mild anasarca, trace pericholecystic fluid, question fluid overload.    Solid and liquid stool seen throughout the colon.    --- End of Report ---            FAROOQ SZYMANSKI MD; Attending Radiologist  This document has been electronically signed. Feb 4 2025  2:06PM (02-04-25 @ 13:11)      CARDIOLOGY TESTING  12 Lead ECG:   Ventricular Rate 91 BPM    Atrial Rate 91 BPM    P-R Interval 158 ms    QRS Duration 74 ms    Q-T Interval 366 ms    QTC Calculation(Bazett) 450 ms    P Axis 77 degrees    R Axis 64 degrees    T Axis 68 degrees    Diagnosis Line Normal sinus rhythm  Biatrial enlargement  Abnormal ECG    Confirmed by LILLIE LUND [facility] ([id number]) on 1/29/2025 12:29:13 PM (01-29-25 @ 10:39)      MEDICATIONS  carbidopa/levodopa  25/250 2  cefepime   IVPB 1000  chlorhexidine 0.12% Liquid 15  dexMEDEtomidine Infusion 0.5  heparin  Infusion.   lactated ringers. 1000  lactulose Syrup 20  metroNIDAZOLE    Tablet 500  nafcillin  IVPB 2  nafcillin  IVPB   norepinephrine Infusion 0.05  oseltamivir 30  pantoprazole   Suspension 40  polyethylene glycol 3350 17  propofol Infusion 0.5  rasagiline Tablet 1  senna 2      WEIGHT  Weight (kg): 51.1 (02-05-25 @ 13:52)  Creatinine: 1.7 mg/dL (02-07-25 @ 05:15)  Creatinine: 1.9 mg/dL (02-06-25 @ 11:15)      ANTIBIOTICS:  cefepime   IVPB 1000 milliGRAM(s) IV Intermittent every 12 hours  metroNIDAZOLE    Tablet 500 milliGRAM(s) Oral every 12 hours  nafcillin  IVPB 2 Gram(s) IV Intermittent every 4 hours  nafcillin  IVPB      oseltamivir 30 milliGRAM(s) Oral daily      All available historical records have been reviewed   GARETT ESCAMILLA  69y, Male  Allergy: Allergy Status Unknown      LOS  9d    CHIEF COMPLAINT: pneumonia (07 Feb 2025 06:39)      INTERVAL EVENTS/HPI  - T(F): , Max: 100.8 (02-06-25 @ 12:00) fever  - WBC Count: 10.67 (02-07-25 @ 05:15) downtrending   WBC Count: 14.73 (02-06-25 @ 17:07)     - Creatinine: 1.7 (02-07-25 @ 05:15)  Creatinine: 1.9 (02-06-25 @ 11:15)     -   -   -     ROS  cannot obtain secondary to patient's sedation and/or mental status    VITALS:  T(F): 100.7, Max: 100.8 (02-06-25 @ 12:00)  HR: 97  BP: 118/69  RR: 20Vital Signs Last 24 Hrs  T(C): 38.2 (07 Feb 2025 08:00), Max: 38.2 (06 Feb 2025 12:00)  T(F): 100.7 (07 Feb 2025 08:00), Max: 100.8 (06 Feb 2025 12:00)  HR: 97 (07 Feb 2025 08:08) (75 - 102)  BP: 118/69 (07 Feb 2025 08:00) (84/53 - 134/63)  BP(mean): 89 (07 Feb 2025 08:00) (64 - 91)  RR: 20 (07 Feb 2025 08:00) (16 - 38)  SpO2: 100% (07 Feb 2025 08:08) (75% - 100%)    Parameters below as of 07 Feb 2025 08:00  Patient On (Oxygen Delivery Method): ventilator    O2 Concentration (%): 40    PHYSICAL EXAM:  General: intubated on cooling blanket chronically ill appearing   HEENT: NCAT  Neck: supple  CV: RRR  Lungs: symmetric chest expansion, decreased BS at bases  Abd: Soft  Extr: wwp  Skin: no rash  Neuro: sedated  Lines: no phlebitis     FH: Non-contributory  Social Hx: Non-contributory    TESTS & MEASUREMENTS:                        9.4    10.67 )-----------( 209      ( 07 Feb 2025 05:15 )             28.5     02-07    142  |  108  |  77[HH]  ----------------------------<  271[H]  3.8   |  21  |  1.7[H]    Ca    6.8[L]      07 Feb 2025 05:15  Phos  5.9     02-07  Mg     2.5     02-07    TPro  4.3[L]  /  Alb  2.3[L]  /  TBili  2.0[H]  /  DBili  x   /  AST  24  /  ALT  <5  /  AlkPhos  128[H]  02-07      LIVER FUNCTIONS - ( 07 Feb 2025 05:15 )  Alb: 2.3 g/dL / Pro: 4.3 g/dL / ALK PHOS: 128 U/L / ALT: <5 U/L / AST: 24 U/L / GGT: x           Urinalysis Basic - ( 07 Feb 2025 05:15 )    Color: x / Appearance: x / SG: x / pH: x  Gluc: 271 mg/dL / Ketone: x  / Bili: x / Urobili: x   Blood: x / Protein: x / Nitrite: x   Leuk Esterase: x / RBC: x / WBC x   Sq Epi: x / Non Sq Epi: x / Bacteria: x        Culture - Acid Fast - Bronchial w/Smear (collected 02-04-25 @ 09:29)  Source: Bronch Wash  Preliminary Report (02-05-25 @ 23:07):    Culture is being performed.    Culture - Bronchial (collected 02-04-25 @ 09:29)  Source: Bronch Wash  Gram Stain (02-05-25 @ 07:26):    Few-moderate polymorphonuclear leukocytes seen per low power field    No Squamous epithelial cells seen per low power field    No organisms seen per oil power field  Final Report (02-06-25 @ 11:36):    Commensal halina consistent with body site    Culture - Sputum (collected 02-04-25 @ 09:00)  Source: ET Tube ET Tube  Gram Stain (02-04-25 @ 19:37):    Numerous polymorphonuclear leukocytes per low power field    Rare Squamous epithelial cells per low power field    Rare Yeast like cells per oil power field  Final Report (02-05-25 @ 22:23):    Commensal halina consistent with body site    Culture - Blood (collected 02-02-25 @ 04:54)  Source: .Blood BLOOD  Preliminary Report (02-06-25 @ 12:00):    No growth at 4 days    Culture - Blood (collected 01-31-25 @ 17:22)  Source: .Blood BLOOD  Final Report (02-05-25 @ 23:07):    No growth at 5 days    Culture - Blood (collected 01-31-25 @ 17:22)  Source: .Blood BLOOD  Final Report (02-05-25 @ 23:07):    No growth at 5 days    Urinalysis with Rflx Culture (collected 01-29-25 @ 16:48)    Culture - Blood (collected 01-29-25 @ 10:09)  Source: .Blood BLOOD  Gram Stain (01-30-25 @ 15:40):    Growth in aerobic bottle: Gram Positive Cocci in Clusters    Growth in anaerobic bottle: Gram Positive Cocci in Clusters  Final Report (02-01-25 @ 07:51):    Growth in aerobic and anaerobic bottles: Staphylococcus aureus    Direct identification is available within approximately 3-5    hours either by Blood Panel Multiplexed PCR or Direct    MALDI-TOF. Details: [email or web address]  Organism: Blood Culture PCR  Staphylococcus aureus (02-01-25 @ 07:51)  Organism: Staphylococcus aureus (02-01-25 @ 07:51)      Method Type: KARTIK      -  Ceftaroline: S <=0.5      -  Clindamycin: R <=0.25 This isolate is presumed to be clindamycin resistant based on detection of inducible resistance. Clindamycin may still be effective in some patients.      -  Erythromycin: R >4      -  Gentamicin: S <=4 Should not be used as monotherapy      -  Oxacillin: S <=0.25 Oxacillin predicts susceptibility for dicloxacillin, methicillin, and nafcillin      -  Rifampin: S <=1 Should not be used as monotherapy      -  Tetracycline: S <=4      -  Trimethoprim/Sulfamethoxazole: S <=0.5/9.5      -  Vancomycin: S 1  Organism: Blood Culture PCR (02-01-25 @ 07:51)      Method Type: PCR      -  Methicillin SENSITIVE Staphylococcus aureus (MSSA): Detec Any isolate of Staphylococcus aureus from a blood culture is NOT considered a contaminant.    Culture - Blood (collected 01-29-25 @ 10:09)  Source: .Blood BLOOD  Gram Stain (02-01-25 @ 00:35):    Growth in aerobic bottle: Gram Positive Cocci in Clusters    Growth in anaerobic bottle: Gram Positive Cocci in Clusters  Final Report (02-01-25 @ 16:44):    Growth in aerobic and anaerobic bottles: Staphylococcus aureus    See previous culture [id number]            INFECTIOUS DISEASES TESTING  MRSA PCR Result.: Negative (01-30-25 @ 13:40)  Procalcitonin: 6.93 (01-30-25 @ 07:26)  Legionella Antigen, Urine: Negative (01-29-25 @ 15:49)  Rapid RVP Result: Detected (01-29-25 @ 10:30)      INFLAMMATORY MARKERS      RADIOLOGY & ADDITIONAL TESTS:  I have personally reviewed the last available Chest xray  CXR      CT  CT Abdomen and Pelvis w/ Oral Cont and w/ IV Cont:   ACC: [id number] EXAM:  CT ABDOMEN AND PELVIS OC IC   ORDERED BY: ANISH SINGH     PROCEDURE DATE:  02/04/2025          INTERPRETATION:  CLINICAL INFORMATION: Rule out cholangitis    COMPARISON: None.    CONTRAST/COMPLICATIONS:  IV Contrast: Omnipaque 350  95 cc administered   5 cc discarded  Oral Contrast: Omnipaque 300  .    PROCEDURE:  CT of the Abdomen and Pelvis was performed.  Sagittal and coronal reformats were performed.    FINDINGS:  LOWER CHEST: Right lung base near lobar consolidationwith small right   pleural effusion. Additional nodular opacities seen at the lung bases   mostly in the left lower lobe. Correlate for infectious/inflammatory   etiologies.    LIVER: Within normal limits.  BILE DUCTS: Normal caliber.  GALLBLADDER: Trace pericholecystic fluid. No calcified gallstones.  SPLEEN: Within normal limits.  PANCREAS: Within normal limits.  ADRENALS: Within normal limits.  KIDNEYS/URETERS: Bilateral renal cysts. No hydroureteronephrosis.    BLADDER: Mars catheter.  REPRODUCTIVE ORGANS: Unremarkable at CT.    BOWEL: No bowel obstruction. Appendix is not visualized. Solid and liquid   stool seen throughout the colon.  PERITONEUM/RETROPERITONEUM: Enteric tube terminates in the stomach.   Within normal limits.  VESSELS: Atherosclerotic changes.  LYMPH NODES: No lymphadenopathy.  ABDOMINAL WALL: Mild anasarca.  BONES: Degenerative changes.    IMPRESSION:  Right lung base near lobar consolidation with small right pleural   effusion. Additional nodular opacities seen atthe lung bases mostly in   the left lower lobe. Correlate for infectious/inflammatory etiologies.    Mild anasarca, trace pericholecystic fluid, question fluid overload.    Solid and liquid stool seen throughout the colon.    --- End of Report ---            FAROOQ SZYMANSKI MD; Attending Radiologist  This document has been electronically signed. Feb 4 2025  2:06PM (02-04-25 @ 13:11)      CARDIOLOGY TESTING  12 Lead ECG:   Ventricular Rate 91 BPM    Atrial Rate 91 BPM    P-R Interval 158 ms    QRS Duration 74 ms    Q-T Interval 366 ms    QTC Calculation(Bazett) 450 ms    P Axis 77 degrees    R Axis 64 degrees    T Axis 68 degrees    Diagnosis Line Normal sinus rhythm  Biatrial enlargement  Abnormal ECG    Confirmed by LILLIE LUND, [facility] ([id number]) on 1/29/2025 12:29:13 PM (01-29-25 @ 10:39)      MEDICATIONS  carbidopa/levodopa  25/250 2  cefepime   IVPB 1000  chlorhexidine 0.12% Liquid 15  dexMEDEtomidine Infusion 0.5  heparin  Infusion.   lactated ringers. 1000  lactulose Syrup 20  metroNIDAZOLE    Tablet 500  nafcillin  IVPB 2  nafcillin  IVPB   norepinephrine Infusion 0.05  oseltamivir 30  pantoprazole   Suspension 40  polyethylene glycol 3350 17  propofol Infusion 0.5  rasagiline Tablet 1  senna 2      WEIGHT  Weight (kg): 51.1 (02-05-25 @ 13:52)  Creatinine: 1.7 mg/dL (02-07-25 @ 05:15)  Creatinine: 1.9 mg/dL (02-06-25 @ 11:15)      ANTIBIOTICS:  cefepime   IVPB 1000 milliGRAM(s) IV Intermittent every 12 hours  metroNIDAZOLE    Tablet 500 milliGRAM(s) Oral every 12 hours  nafcillin  IVPB 2 Gram(s) IV Intermittent every 4 hours  nafcillin  IVPB      oseltamivir 30 milliGRAM(s) Oral daily      All available historical records have been reviewed

## 2025-02-07 NOTE — PROGRESS NOTE ADULT - SUBJECTIVE AND OBJECTIVE BOX
SUBJECTIVE/OVERNIGHT EVENTS  Today is hospital day 9d. This morning patient was seen and examined at bedside.     CODE STATUS: FULL    MEDICATIONS  MEDICATIONS  (STANDING):  carbidopa/levodopa  25/250 2 Tablet(s) Oral three times a day  cefepime   IVPB 1000 milliGRAM(s) IV Intermittent every 12 hours  chlorhexidine 0.12% Liquid 15 milliLiter(s) Oral Mucosa every 12 hours  dexMEDEtomidine Infusion 0.5 MICROgram(s)/kG/Hr (6.39 mL/Hr) IV Continuous <Continuous>  heparin  Infusion.  Unit(s)/Hr (10 mL/Hr) IV Continuous <Continuous>  lactated ringers. 1000 milliLiter(s) (100 mL/Hr) IV Continuous <Continuous>  lactulose Syrup 20 Gram(s) Oral every 6 hours  metroNIDAZOLE    Tablet 500 milliGRAM(s) Oral every 12 hours  nafcillin  IVPB 2 Gram(s) IV Intermittent every 4 hours  nafcillin  IVPB      norepinephrine Infusion 0.05 MICROgram(s)/kG/Min (4.79 mL/Hr) IV Continuous <Continuous>  oseltamivir 30 milliGRAM(s) Oral daily  pantoprazole   Suspension 40 milliGRAM(s) Oral daily  polyethylene glycol 3350 17 Gram(s) Oral every 12 hours  propofol Infusion 0.5 MICROgram(s)/kG/Min (0.15 mL/Hr) IV Continuous <Continuous>  rasagiline Tablet 1 milliGRAM(s) Oral <User Schedule>  senna 2 Tablet(s) Oral at bedtime    MEDICATIONS  (PRN):  acetaminophen     Tablet .. 650 milliGRAM(s) Oral every 6 hours PRN Temp greater or equal to 38C (100.4F), Mild Pain (1 - 3)  fentaNYL    Injectable 50 MICROGram(s) IV Push every 2 hours PRN Aggitation  heparin   Injectable 4000 Unit(s) IV Push every 6 hours PRN For aPTT less than 40  heparin   Injectable 2000 Unit(s) IV Push every 6 hours PRN For aPTT between 40 - 57  losartan 25 milliGRAM(s) Oral daily PRN >140 SBP        VITALS  ICU Vital Signs Last 24 Hrs  T(C): 38.2 (07 Feb 2025 08:00), Max: 38.2 (06 Feb 2025 12:00)  T(F): 100.7 (07 Feb 2025 08:00), Max: 100.8 (06 Feb 2025 12:00)  HR: 97 (07 Feb 2025 08:08) (75 - 102)  BP: 118/69 (07 Feb 2025 08:00) (86/52 - 134/63)  BP(mean): 89 (07 Feb 2025 08:00) (64 - 91)  ABP: --  ABP(mean): --  RR: 20 (07 Feb 2025 08:00) (16 - 38)  SpO2: 100% (07 Feb 2025 08:08) (75% - 100%)    O2 Parameters below as of 07 Feb 2025 08:00  Patient On (Oxygen Delivery Method): ventilator    O2 Concentration (%): 40      PHYSICAL EXAM  GENERAL  ( ) NAD, lying in bed comfortably     (  ) obtunded     (  ) lethargic     (  ) somnolent    HEAD  ( X ) Atraumatic     (  ) hematoma     (  ) laceration (specify location:       )     NECK  ( X ) Supple     (  ) neck stiffness     (  ) nuchal rigidity     (  )  no JVD     (  ) JVD present ( -- cm)    HEART  Rate -->  ( X ) normal rate    (  ) bradycardic    (  ) tachycardic  Rhythm -->  ( X ) regular    (  ) regularly irregular    (  ) irregularly irregular  Murmurs -->  ( X ) normal s1/s2    (  ) systolic murmur    (  ) diastolic murmur    (  ) continuous murmur     (  ) S3 present    (  ) S4 present    LUNGS  ( X )Unlabored respirations     (  ) tachypnea  ( X ) B/L air entry     (  ) decreased breath sounds in:  (location     )    (  ) no adventitious sound     (  ) crackles     ( X ) wheezing- R sided      (  ) rhonchi      (specify location:       )  (  ) chest wall tenderness (specify location:       )    ABDOMEN  ( X ) Soft     (  ) tense   |   ( X ) nondistended     (  ) distended   |   ( X ) +BS     (  ) hypoactive bowel sounds     (  ) hyperactive bowel sounds  ( X ) nontender     (  ) RUQ tenderness     (  ) RLQ tenderness     (  ) LLQ tenderness     (  ) epigastric tenderness     (  ) diffuse tenderness  (  ) Splenomegaly      (  ) Hepatomegaly      (  ) Jaundice     (  ) ecchymosis     EXTREMITIES  ( X ) Normal     (  ) Rash     (  ) ecchymosis     (  ) varicose veins      (  ) pitting edema     (  ) non-pitting edema   (  ) ulceration     (  ) gangrene:     (location:     )    NERVOUS SYSTEM  ( ) A&Ox1-2     (  ) confused     (  ) lethargic  CN II-XII:     (  ) Intact     (  ) focal deficits  (Specify:     )   Upper extremities:     (  ) strength X/5     (  ) focal deficit (specify:    )  Lower extremities:     (  ) strength  X/5    (  ) focal deficit (specify:    )    SKIN  ( X ) No rashes or lesions     (  ) maculopapular rash     (  ) pustules     (  ) vesicles     (  ) ulcer     (  ) ecchymosis     (specify location:     )      LABS                  LABS:                          9.4    10.67 )-----------( 209      ( 07 Feb 2025 05:15 )             28.5     02-07    142  |  108  |  77[HH]  ----------------------------<  271[H]  3.8   |  21  |  1.7[H]    Ca    6.8[L]      07 Feb 2025 05:15  Phos  5.9     02-07  Mg     2.5     02-07    TPro  4.3[L]  /  Alb  2.3[L]  /  TBili  2.0[H]  /  DBili  x   /  AST  24  /  ALT  <5  /  AlkPhos  128[H]  02-07    LIVER FUNCTIONS - ( 07 Feb 2025 05:15 )  Alb: 2.3 g/dL / Pro: 4.3 g/dL / ALK PHOS: 128 U/L / ALT: <5 U/L / AST: 24 U/L / GGT: x           PT/INR - ( 06 Feb 2025 08:49 )   PT: 15.50 sec;   INR: 1.31 ratio         PTT - ( 07 Feb 2025 07:40 )  PTT:32.4 sec  Urinalysis Basic - ( 07 Feb 2025 05:15 )    Color: x / Appearance: x / SG: x / pH: x  Gluc: 271 mg/dL / Ketone: x  / Bili: x / Urobili: x   Blood: x / Protein: x / Nitrite: x   Leuk Esterase: x / RBC: x / WBC x   Sq Epi: x / Non Sq Epi: x / Bacteria: x

## 2025-02-08 LAB
ALBUMIN SERPL ELPH-MCNC: 2.4 G/DL — LOW (ref 3.5–5.2)
ALP SERPL-CCNC: 115 U/L — SIGNIFICANT CHANGE UP (ref 30–115)
ALT FLD-CCNC: <5 U/L — SIGNIFICANT CHANGE UP (ref 0–41)
ANION GAP SERPL CALC-SCNC: 10 MMOL/L — SIGNIFICANT CHANGE UP (ref 7–14)
APTT BLD: 123.2 SEC — CRITICAL HIGH (ref 27–39.2)
APTT BLD: 72 SEC — CRITICAL HIGH (ref 27–39.2)
APTT BLD: >200 SEC — CRITICAL HIGH (ref 27–39.2)
AST SERPL-CCNC: 22 U/L — SIGNIFICANT CHANGE UP (ref 0–41)
BASE EXCESS BLDA CALC-SCNC: 1.2 MMOL/L — SIGNIFICANT CHANGE UP (ref -2–3)
BASOPHILS # BLD AUTO: 0 K/UL — SIGNIFICANT CHANGE UP (ref 0–0.2)
BASOPHILS # BLD AUTO: 0.01 K/UL — SIGNIFICANT CHANGE UP (ref 0–0.2)
BASOPHILS NFR BLD AUTO: 0 % — SIGNIFICANT CHANGE UP (ref 0–1)
BASOPHILS NFR BLD AUTO: 0.1 % — SIGNIFICANT CHANGE UP (ref 0–1)
BILIRUB SERPL-MCNC: 1.5 MG/DL — HIGH (ref 0.2–1.2)
BUN SERPL-MCNC: 70 MG/DL — CRITICAL HIGH (ref 10–20)
CALCIUM SERPL-MCNC: 7 MG/DL — LOW (ref 8.4–10.5)
CHLORIDE SERPL-SCNC: 108 MMOL/L — SIGNIFICANT CHANGE UP (ref 98–110)
CO2 SERPL-SCNC: 24 MMOL/L — SIGNIFICANT CHANGE UP (ref 17–32)
CREAT SERPL-MCNC: 1.1 MG/DL — SIGNIFICANT CHANGE UP (ref 0.7–1.5)
D DIMER BLD IA.RAPID-MCNC: 1000 NG/ML DDU — HIGH
EGFR: 72 ML/MIN/1.73M2 — SIGNIFICANT CHANGE UP
EGFR: 72 ML/MIN/1.73M2 — SIGNIFICANT CHANGE UP
EOSINOPHIL # BLD AUTO: 0.02 K/UL — SIGNIFICANT CHANGE UP (ref 0–0.7)
EOSINOPHIL # BLD AUTO: 0.03 K/UL — SIGNIFICANT CHANGE UP (ref 0–0.7)
EOSINOPHIL NFR BLD AUTO: 0.3 % — SIGNIFICANT CHANGE UP (ref 0–8)
EOSINOPHIL NFR BLD AUTO: 0.3 % — SIGNIFICANT CHANGE UP (ref 0–8)
FIBRINOGEN PPP-MCNC: 268 MG/DL — SIGNIFICANT CHANGE UP (ref 200–435)
GAS PNL BLDA: SIGNIFICANT CHANGE UP
GAS PNL BLDA: SIGNIFICANT CHANGE UP
GLUCOSE BLDC GLUCOMTR-MCNC: 167 MG/DL — HIGH (ref 70–99)
GLUCOSE BLDC GLUCOMTR-MCNC: 184 MG/DL — HIGH (ref 70–99)
GLUCOSE BLDC GLUCOMTR-MCNC: 87 MG/DL — SIGNIFICANT CHANGE UP (ref 70–99)
GLUCOSE SERPL-MCNC: 242 MG/DL — HIGH (ref 70–99)
HCO3 BLDA-SCNC: 27 MMOL/L — SIGNIFICANT CHANGE UP (ref 21–28)
HCT VFR BLD CALC: 25.8 % — LOW (ref 42–52)
HCT VFR BLD CALC: 26.8 % — LOW (ref 42–52)
HCT VFR BLD CALC: 27.4 % — LOW (ref 42–52)
HGB BLD-MCNC: 8.2 G/DL — LOW (ref 14–18)
HGB BLD-MCNC: 8.8 G/DL — LOW (ref 14–18)
HGB BLD-MCNC: 8.9 G/DL — LOW (ref 14–18)
HOROWITZ INDEX BLDA+IHG-RTO: 40 — SIGNIFICANT CHANGE UP
IMM GRANULOCYTES NFR BLD AUTO: 0.4 % — HIGH (ref 0.1–0.3)
IMM GRANULOCYTES NFR BLD AUTO: 0.5 % — HIGH (ref 0.1–0.3)
INR BLD: 1.09 RATIO — SIGNIFICANT CHANGE UP (ref 0.65–1.3)
LYMPHOCYTES # BLD AUTO: 0.41 K/UL — LOW (ref 1.2–3.4)
LYMPHOCYTES # BLD AUTO: 0.58 K/UL — LOW (ref 1.2–3.4)
LYMPHOCYTES # BLD AUTO: 4.7 % — LOW (ref 20.5–51.1)
LYMPHOCYTES # BLD AUTO: 7.6 % — LOW (ref 20.5–51.1)
MAGNESIUM SERPL-MCNC: 2.6 MG/DL — HIGH (ref 1.8–2.4)
MCHC RBC-ENTMCNC: 30.3 PG — SIGNIFICANT CHANGE UP (ref 27–31)
MCHC RBC-ENTMCNC: 30.6 PG — SIGNIFICANT CHANGE UP (ref 27–31)
MCHC RBC-ENTMCNC: 30.6 PG — SIGNIFICANT CHANGE UP (ref 27–31)
MCHC RBC-ENTMCNC: 31.8 G/DL — LOW (ref 32–37)
MCHC RBC-ENTMCNC: 32.5 G/DL — SIGNIFICANT CHANGE UP (ref 32–37)
MCHC RBC-ENTMCNC: 32.8 G/DL — SIGNIFICANT CHANGE UP (ref 32–37)
MCV RBC AUTO: 93.1 FL — SIGNIFICANT CHANGE UP (ref 80–94)
MCV RBC AUTO: 94.2 FL — HIGH (ref 80–94)
MCV RBC AUTO: 95.2 FL — HIGH (ref 80–94)
MONOCYTES # BLD AUTO: 0.57 K/UL — SIGNIFICANT CHANGE UP (ref 0.1–0.6)
MONOCYTES # BLD AUTO: 0.63 K/UL — HIGH (ref 0.1–0.6)
MONOCYTES NFR BLD AUTO: 7.3 % — SIGNIFICANT CHANGE UP (ref 1.7–9.3)
MONOCYTES NFR BLD AUTO: 7.4 % — SIGNIFICANT CHANGE UP (ref 1.7–9.3)
NEUTROPHILS # BLD AUTO: 6.45 K/UL — SIGNIFICANT CHANGE UP (ref 1.4–6.5)
NEUTROPHILS # BLD AUTO: 7.56 K/UL — HIGH (ref 1.4–6.5)
NEUTROPHILS NFR BLD AUTO: 84.2 % — HIGH (ref 42.2–75.2)
NEUTROPHILS NFR BLD AUTO: 87.2 % — HIGH (ref 42.2–75.2)
NRBC # BLD: 0 /100 WBCS — SIGNIFICANT CHANGE UP (ref 0–0)
NRBC BLD-RTO: 0 /100 WBCS — SIGNIFICANT CHANGE UP (ref 0–0)
PCO2 BLDA: 44 MMHG — SIGNIFICANT CHANGE UP (ref 35–48)
PH BLDA: 7.39 — SIGNIFICANT CHANGE UP (ref 7.35–7.45)
PHOSPHATE SERPL-MCNC: 3.5 MG/DL — SIGNIFICANT CHANGE UP (ref 2.1–4.9)
PLATELET # BLD AUTO: 213 K/UL — SIGNIFICANT CHANGE UP (ref 130–400)
PLATELET # BLD AUTO: 232 K/UL — SIGNIFICANT CHANGE UP (ref 130–400)
PLATELET # BLD AUTO: 248 K/UL — SIGNIFICANT CHANGE UP (ref 130–400)
PMV BLD: 11.6 FL — HIGH (ref 7.4–10.4)
PMV BLD: 12 FL — HIGH (ref 7.4–10.4)
PMV BLD: 12.1 FL — HIGH (ref 7.4–10.4)
PO2 BLDA: 125 MMHG — HIGH (ref 83–108)
POTASSIUM SERPL-MCNC: 3 MMOL/L — LOW (ref 3.5–5)
POTASSIUM SERPL-SCNC: 3 MMOL/L — LOW (ref 3.5–5)
PROT SERPL-MCNC: 4.5 G/DL — LOW (ref 6–8)
PROTHROM AB SERPL-ACNC: 12.9 SEC — HIGH (ref 9.95–12.87)
RBC # BLD: 2.71 M/UL — LOW (ref 4.7–6.1)
RBC # BLD: 2.88 M/UL — LOW (ref 4.7–6.1)
RBC # BLD: 2.91 M/UL — LOW (ref 4.7–6.1)
RBC # FLD: 14 % — SIGNIFICANT CHANGE UP (ref 11.5–14.5)
RBC # FLD: 14.1 % — SIGNIFICANT CHANGE UP (ref 11.5–14.5)
RBC # FLD: 14.2 % — SIGNIFICANT CHANGE UP (ref 11.5–14.5)
SAO2 % BLDA: 100 % — HIGH (ref 94–98)
SODIUM SERPL-SCNC: 142 MMOL/L — SIGNIFICANT CHANGE UP (ref 135–146)
WBC # BLD: 6.79 K/UL — SIGNIFICANT CHANGE UP (ref 4.8–10.8)
WBC # BLD: 7.66 K/UL — SIGNIFICANT CHANGE UP (ref 4.8–10.8)
WBC # BLD: 8.67 K/UL — SIGNIFICANT CHANGE UP (ref 4.8–10.8)
WBC # FLD AUTO: 6.79 K/UL — SIGNIFICANT CHANGE UP (ref 4.8–10.8)
WBC # FLD AUTO: 7.66 K/UL — SIGNIFICANT CHANGE UP (ref 4.8–10.8)
WBC # FLD AUTO: 8.67 K/UL — SIGNIFICANT CHANGE UP (ref 4.8–10.8)

## 2025-02-08 PROCEDURE — 71045 X-RAY EXAM CHEST 1 VIEW: CPT | Mod: 26

## 2025-02-08 PROCEDURE — 99291 CRITICAL CARE FIRST HOUR: CPT

## 2025-02-08 RX ORDER — DEXTROSE 50 % IN WATER 50 %
15 SYRINGE (ML) INTRAVENOUS ONCE
Refills: 0 | Status: DISCONTINUED | OUTPATIENT
Start: 2025-02-08 | End: 2025-03-05

## 2025-02-08 RX ORDER — GLUCAGON 3 MG/1
1 POWDER NASAL ONCE
Refills: 0 | Status: DISCONTINUED | OUTPATIENT
Start: 2025-02-08 | End: 2025-03-05

## 2025-02-08 RX ORDER — HEPARIN SODIUM 1000 [USP'U]/ML
14 INJECTION INTRAVENOUS; SUBCUTANEOUS
Qty: 25000 | Refills: 0 | Status: DISCONTINUED | OUTPATIENT
Start: 2025-02-08 | End: 2025-02-09

## 2025-02-08 RX ORDER — DEXTROSE 50 % IN WATER 50 %
25 SYRINGE (ML) INTRAVENOUS ONCE
Refills: 0 | Status: DISCONTINUED | OUTPATIENT
Start: 2025-02-08 | End: 2025-03-05

## 2025-02-08 RX ORDER — OSELTAMIVIR PHOSPHATE 75 MG/1
30 CAPSULE ORAL EVERY 12 HOURS
Refills: 0 | Status: COMPLETED | OUTPATIENT
Start: 2025-02-08 | End: 2025-02-10

## 2025-02-08 RX ORDER — SODIUM CHLORIDE 9 G/1000ML
1000 INJECTION, SOLUTION INTRAVENOUS
Refills: 0 | Status: DISCONTINUED | OUTPATIENT
Start: 2025-02-08 | End: 2025-03-05

## 2025-02-08 RX ORDER — DEXTROSE 50 % IN WATER 50 %
12.5 SYRINGE (ML) INTRAVENOUS ONCE
Refills: 0 | Status: DISCONTINUED | OUTPATIENT
Start: 2025-02-08 | End: 2025-03-05

## 2025-02-08 RX ORDER — CEFEPIME 2 G/20ML
2000 INJECTION, POWDER, FOR SOLUTION INTRAVENOUS EVERY 12 HOURS
Refills: 0 | Status: DISCONTINUED | OUTPATIENT
Start: 2025-02-08 | End: 2025-02-13

## 2025-02-08 RX ORDER — INSULIN LISPRO 100 U/ML
INJECTION, SOLUTION INTRAVENOUS; SUBCUTANEOUS EVERY 6 HOURS
Refills: 0 | Status: DISCONTINUED | OUTPATIENT
Start: 2025-02-08 | End: 2025-02-25

## 2025-02-08 RX ORDER — INSULIN LISPRO 100 U/ML
8 INJECTION, SOLUTION INTRAVENOUS; SUBCUTANEOUS EVERY 6 HOURS
Refills: 0 | Status: DISCONTINUED | OUTPATIENT
Start: 2025-02-08 | End: 2025-02-12

## 2025-02-08 RX ADMIN — Medication 15 MILLILITER(S): at 05:14

## 2025-02-08 RX ADMIN — CEFEPIME 100 MILLIGRAM(S): 2 INJECTION, POWDER, FOR SOLUTION INTRAVENOUS at 05:18

## 2025-02-08 RX ADMIN — Medication 2 TABLET(S): at 13:20

## 2025-02-08 RX ADMIN — OSELTAMIVIR PHOSPHATE 30 MILLIGRAM(S): 75 CAPSULE ORAL at 13:20

## 2025-02-08 RX ADMIN — POLYETHYLENE GLYCOL 3350 17 GRAM(S): 17 POWDER, FOR SOLUTION ORAL at 17:04

## 2025-02-08 RX ADMIN — HEPARIN SODIUM 12 UNIT(S)/HR: 1000 INJECTION INTRAVENOUS; SUBCUTANEOUS at 20:01

## 2025-02-08 RX ADMIN — Medication 2 TABLET(S): at 22:02

## 2025-02-08 RX ADMIN — INSULIN LISPRO 2: 100 INJECTION, SOLUTION INTRAVENOUS; SUBCUTANEOUS at 13:29

## 2025-02-08 RX ADMIN — INSULIN LISPRO 8 UNIT(S): 100 INJECTION, SOLUTION INTRAVENOUS; SUBCUTANEOUS at 17:10

## 2025-02-08 RX ADMIN — Medication 40 MILLIEQUIVALENT(S): at 13:20

## 2025-02-08 RX ADMIN — Medication 50 MICROGRAM(S): at 20:45

## 2025-02-08 RX ADMIN — NAFCILLIN 200 GRAM(S): 2 INJECTION, SOLUTION INTRAVENOUS at 05:14

## 2025-02-08 RX ADMIN — Medication 50 MICROGRAM(S): at 20:30

## 2025-02-08 RX ADMIN — NAFCILLIN 200 GRAM(S): 2 INJECTION, SOLUTION INTRAVENOUS at 22:02

## 2025-02-08 RX ADMIN — HEPARIN SODIUM 0 UNIT(S)/HR: 1000 INJECTION INTRAVENOUS; SUBCUTANEOUS at 05:49

## 2025-02-08 RX ADMIN — Medication 40 MILLIEQUIVALENT(S): at 11:16

## 2025-02-08 RX ADMIN — Medication 2 TABLET(S): at 05:14

## 2025-02-08 RX ADMIN — CEFEPIME 100 MILLIGRAM(S): 2 INJECTION, POWDER, FOR SOLUTION INTRAVENOUS at 17:01

## 2025-02-08 RX ADMIN — INSULIN LISPRO 2: 100 INJECTION, SOLUTION INTRAVENOUS; SUBCUTANEOUS at 23:07

## 2025-02-08 RX ADMIN — OSELTAMIVIR PHOSPHATE 30 MILLIGRAM(S): 75 CAPSULE ORAL at 22:02

## 2025-02-08 RX ADMIN — Medication 1 APPLICATION(S): at 13:16

## 2025-02-08 RX ADMIN — NAFCILLIN 200 GRAM(S): 2 INJECTION, SOLUTION INTRAVENOUS at 13:21

## 2025-02-08 RX ADMIN — DEXMEDETOMIDINE HYDROCHLORIDE IN SODIUM CHLORIDE 6.39 MICROGRAM(S)/KG/HR: 4 INJECTION INTRAVENOUS at 22:01

## 2025-02-08 RX ADMIN — NAFCILLIN 200 GRAM(S): 2 INJECTION, SOLUTION INTRAVENOUS at 10:16

## 2025-02-08 RX ADMIN — HEPARIN SODIUM 14 UNIT(S)/HR: 1000 INJECTION INTRAVENOUS; SUBCUTANEOUS at 13:45

## 2025-02-08 RX ADMIN — HEPARIN SODIUM 1800 UNIT(S)/HR: 1000 INJECTION INTRAVENOUS; SUBCUTANEOUS at 06:50

## 2025-02-08 RX ADMIN — NAFCILLIN 200 GRAM(S): 2 INJECTION, SOLUTION INTRAVENOUS at 17:03

## 2025-02-08 RX ADMIN — Medication 500 MILLIGRAM(S): at 17:03

## 2025-02-08 RX ADMIN — INSULIN LISPRO 8 UNIT(S): 100 INJECTION, SOLUTION INTRAVENOUS; SUBCUTANEOUS at 13:29

## 2025-02-08 RX ADMIN — Medication 15 MILLILITER(S): at 17:03

## 2025-02-08 RX ADMIN — Medication 500 MILLIGRAM(S): at 05:14

## 2025-02-08 RX ADMIN — Medication 40 MILLIGRAM(S): at 11:17

## 2025-02-08 RX ADMIN — NAFCILLIN 200 GRAM(S): 2 INJECTION, SOLUTION INTRAVENOUS at 02:22

## 2025-02-08 RX ADMIN — Medication 50 MICROGRAM(S): at 17:02

## 2025-02-08 RX ADMIN — RASAGILINE 1 MILLIGRAM(S): 0.5 TABLET ORAL at 05:42

## 2025-02-08 NOTE — PROGRESS NOTE ADULT - ASSESSMENT
69-year-old male past medical history of hypertension, Parkinson's, CAD presents for shortness of breath and cough of 1 day duration. History was obtained from patient's wife who noted that the patient has been having decreased po intake, activity, and unintentional weight loss for some time. However yesterday he started having cough and shortness of breath. Admitted to MICU for AHRF and sepsis 2/2 to mutilobar PNA.    #Acute Hypoxic Respiratory Failure likely 2/2 CAP/aspiration and flu  #Toxic Metabolic Encephalopathy likely 2/2 CAP/aspiration and flu  #Influenza A positive  #MSSA bacteremia- resolved  #Sepsis POA  - Aspiration precautions.    - patient inc WOB, worsening alkalosis, obtunded-intubated for airway protection   - Nasal MRSA negative  - CTA chest to r/o PE: focal filling defect within main pulm artery   - repeat CT head: stable intraventricular hemorrhage   - Platelets downtrending, 239>>117, HIT neg   - add metronidazole, ID: if patient decompensates, dc cefepime/metro, start patricia 1g   - cardio: patient critically sick, not a candidate for surgery therefore no MARY, can c/w abx for IE   - s/p bronch, f/u cultures   - s/p IR perc haydee on 2/5, drained 250 cc dark bile, 450 cc output overnight. F/u GB drainage cultures   - ID: tamiflu 30 mg daily, dec cefepime to 1g q12h, c/w metro and nafcillin   - c/w heparin, last PTT 32.8  - cr improving, c/w LR @ 100  - repeat CTH stable  - As per patient's daughter and wife, 2-3 days before current hospitalization patient was mobile and functional. Explained current status of patient's illness unresponsive on minimal sedation, possibility of failing SBT and requiring trach/PEG) , attempted to discuss GOC however patient has no advance direct or any wishes, therefore a discussion with all family members would have to take place to collectively decide on how to proceed.  Palliative is aware and can be recalled next week since they are not available during the weekend.    #Elevated Bilirubin-improving   #Leukocytosis-improving   #Cholangitis vs cholecystitis?   - F/u RUQ US: distended GB with sludge, GB polyp   - IR: perc haydee 2/5, drained 250 cc dark bile, 450 cc output overnight. F/u GB drainage cultures     #Periventricular bleed  - CTH. noted with right periventricular bleed, stable.   - Neuro on board  - Continue Anti parkinson's meds.    - c/w heparin, last PTT 32.8  - repeat CT head once PTT therapeutic     #Constipation  - senna and miralax  - c/w lactulose     #Possible Mural thrombus in PA  #CAD  - Avoid overload  - C/w LR at 50  - TTE: EF 56%, G1DD, mild-mod MR, mod TR, mild ME, mild pHTN  - Cardio recs appreciated       - Patient is poor candidate for MARY       - consider full endocarditis abx course    #MISC  - DVT ppx: SCDs  - GI ppx: not needed  - Activity: as tolerated  - Diet: NPO w/ tube feed

## 2025-02-08 NOTE — PROGRESS NOTE ADULT - SUBJECTIVE AND OBJECTIVE BOX
Patient is a 69y old  Male who presents with a chief complaint of penumonia (08 Feb 2025 05:02)        Over Night Events:    Low grade temps   No pressors   IV heparin         ROS:  See HPI    PHYSICAL EXAM    ICU Vital Signs Last 24 Hrs  T(C): 37 (08 Feb 2025 04:00), Max: 38.1 (07 Feb 2025 12:00)  T(F): 98.6 (08 Feb 2025 04:00), Max: 100.5 (07 Feb 2025 12:00)  HR: 93 (08 Feb 2025 07:50) (78 - 93)  BP: 134/61 (08 Feb 2025 07:00) (114/57 - 136/64)  BP(mean): 88 (08 Feb 2025 07:00) (80 - 92)  ABP: --  ABP(mean): --  RR: 20 (07 Feb 2025 16:00) (20 - 20)  SpO2: 100% (08 Feb 2025 07:50) (92% - 100%)    O2 Parameters below as of 08 Feb 2025 04:00  Patient On (Oxygen Delivery Method): ventilator    O2 Concentration (%): 40        General: NAD, intubated   HEENT: BEATRICE             Lymphatic system: No cervical LN   Lungs: Bilateral BS ,coarse   Cardiovascular: Regular   Gastrointestinal: Soft, Positive BS  Extremities: No clubbing.  Weakness   Skin: Warm, intact  Neurological: Off sedation       02-07-25 @ 07:01  -  02-08-25 @ 07:00  --------------------------------------------------------  IN:    Dexmedetomidine: 228.8 mL    Free Water: 550 mL    Heparin Infusion: 416 mL    IV PiggyBack: 250 mL    Jevity: 800 mL    Lactated Ringers: 2400 mL  Total IN: 4644.8 mL    OUT:    Drain (mL): 270 mL    Indwelling Catheter - Urethral (mL): 2450 mL    Norepinephrine: 0 mL    Propofol: 0 mL  Total OUT: 2720 mL    Total NET: 1924.8 mL          LABS:                            8.8    7.66  )-----------( 213      ( 08 Feb 2025 04:58 )             26.8                                               02-08    142  |  108  |  70[HH]  ----------------------------<  242[H]  3.0[L]   |  24  |  1.1    Ca    7.0[L]      08 Feb 2025 04:58  Phos  3.5     02-08  Mg     2.6     02-08    TPro  4.5[L]  /  Alb  2.4[L]  /  TBili  1.5[H]  /  DBili  x   /  AST  22  /  ALT  <5  /  AlkPhos  115  02-08      PTT - ( 08 Feb 2025 04:37 )  PTT:>200.0 sec                                       Urinalysis Basic - ( 08 Feb 2025 04:58 )    Color: x / Appearance: x / SG: x / pH: x  Gluc: 242 mg/dL / Ketone: x  / Bili: x / Urobili: x   Blood: x / Protein: x / Nitrite: x   Leuk Esterase: x / RBC: x / WBC x   Sq Epi: x / Non Sq Epi: x / Bacteria: x                                                  LIVER FUNCTIONS - ( 08 Feb 2025 04:58 )  Alb: 2.4 g/dL / Pro: 4.5 g/dL / ALK PHOS: 115 U/L / ALT: <5 U/L / AST: 22 U/L / GGT: x                                                                                               Mode: AC/ CMV (Assist Control/ Continuous Mandatory Ventilation)  RR (machine): 16  TV (machine): 400  FiO2: 40  PEEP: 8  ITime: 1  MAP: 11  PIP: 15                                      ABG - ( 08 Feb 2025 04:01 )  pH, Arterial: 7.39  pH, Blood: x     /  pCO2: 44    /  pO2: 125   / HCO3: 27    / Base Excess: 1.2   /  SaO2: 100.0               MEDICATIONS  (STANDING):  carbidopa/levodopa  25/250 2 Tablet(s) Oral three times a day  cefepime   IVPB 1000 milliGRAM(s) IV Intermittent every 12 hours  chlorhexidine 0.12% Liquid 15 milliLiter(s) Oral Mucosa every 12 hours  dexMEDEtomidine Infusion 0.5 MICROgram(s)/kG/Hr (6.39 mL/Hr) IV Continuous <Continuous>  heparin  Infusion.  Unit(s)/Hr (10 mL/Hr) IV Continuous <Continuous>  lactated ringers. 1000 milliLiter(s) (100 mL/Hr) IV Continuous <Continuous>  lactulose Syrup 20 Gram(s) Oral every 6 hours  metroNIDAZOLE    Tablet 500 milliGRAM(s) Oral every 12 hours  nafcillin  IVPB 2 Gram(s) IV Intermittent every 4 hours  nafcillin  IVPB      norepinephrine Infusion 0.05 MICROgram(s)/kG/Min (4.79 mL/Hr) IV Continuous <Continuous>  oseltamivir 30 milliGRAM(s) Oral daily  pantoprazole   Suspension 40 milliGRAM(s) Oral daily  polyethylene glycol 3350 17 Gram(s) Oral every 12 hours  propofol Infusion 0.5 MICROgram(s)/kG/Min (0.15 mL/Hr) IV Continuous <Continuous>  rasagiline Tablet 1 milliGRAM(s) Oral <User Schedule>  senna 2 Tablet(s) Oral at bedtime    MEDICATIONS  (PRN):  acetaminophen     Tablet .. 650 milliGRAM(s) Oral every 6 hours PRN Temp greater or equal to 38C (100.4F), Mild Pain (1 - 3)  fentaNYL    Injectable 50 MICROGram(s) IV Push every 2 hours PRN Aggitation  heparin   Injectable 4000 Unit(s) IV Push every 6 hours PRN For aPTT less than 40  heparin   Injectable 2000 Unit(s) IV Push every 6 hours PRN For aPTT between 40 - 57  losartan 25 milliGRAM(s) Oral daily PRN >140 SBP      Xrays:                                                                                     ECHO

## 2025-02-08 NOTE — PROGRESS NOTE ADULT - SUBJECTIVE AND OBJECTIVE BOX
SUBJECTIVE/OVERNIGHT EVENTS  Today is hospital day 10d. This morning patient was seen and examined at bedside. On precedex. No pressors.    CODE STATUS: FULL    MEDICATIONS  MEDICATIONS  (STANDING):  carbidopa/levodopa  25/250 2 Tablet(s) Oral three times a day  cefepime   IVPB 1000 milliGRAM(s) IV Intermittent every 12 hours  chlorhexidine 0.12% Liquid 15 milliLiter(s) Oral Mucosa every 12 hours  dexMEDEtomidine Infusion 0.5 MICROgram(s)/kG/Hr (6.39 mL/Hr) IV Continuous <Continuous>  heparin  Infusion.  Unit(s)/Hr (10 mL/Hr) IV Continuous <Continuous>  lactated ringers. 1000 milliLiter(s) (100 mL/Hr) IV Continuous <Continuous>  lactulose Syrup 20 Gram(s) Oral every 6 hours  metroNIDAZOLE    Tablet 500 milliGRAM(s) Oral every 12 hours  nafcillin  IVPB 2 Gram(s) IV Intermittent every 4 hours  nafcillin  IVPB      norepinephrine Infusion 0.05 MICROgram(s)/kG/Min (4.79 mL/Hr) IV Continuous <Continuous>  oseltamivir 30 milliGRAM(s) Oral daily  pantoprazole   Suspension 40 milliGRAM(s) Oral daily  polyethylene glycol 3350 17 Gram(s) Oral every 12 hours  propofol Infusion 0.5 MICROgram(s)/kG/Min (0.15 mL/Hr) IV Continuous <Continuous>  rasagiline Tablet 1 milliGRAM(s) Oral <User Schedule>  senna 2 Tablet(s) Oral at bedtime    MEDICATIONS  (PRN):  acetaminophen     Tablet .. 650 milliGRAM(s) Oral every 6 hours PRN Temp greater or equal to 38C (100.4F), Mild Pain (1 - 3)  fentaNYL    Injectable 50 MICROGram(s) IV Push every 2 hours PRN Aggitation  heparin   Injectable 4000 Unit(s) IV Push every 6 hours PRN For aPTT less than 40  heparin   Injectable 2000 Unit(s) IV Push every 6 hours PRN For aPTT between 40 - 57  losartan 25 milliGRAM(s) Oral daily PRN >140 SBP        VITALS  ICU Vital Signs Last 24 Hrs  T(C): 38.2 (07 Feb 2025 08:00), Max: 38.2 (06 Feb 2025 12:00)  T(F): 100.7 (07 Feb 2025 08:00), Max: 100.8 (06 Feb 2025 12:00)  HR: 97 (07 Feb 2025 08:08) (75 - 102)  BP: 118/69 (07 Feb 2025 08:00) (86/52 - 134/63)  BP(mean): 89 (07 Feb 2025 08:00) (64 - 91)  ABP: --  ABP(mean): --  RR: 20 (07 Feb 2025 08:00) (16 - 38)  SpO2: 100% (07 Feb 2025 08:08) (75% - 100%)    O2 Parameters below as of 07 Feb 2025 08:00  Patient On (Oxygen Delivery Method): ventilator    O2 Concentration (%): 40      PHYSICAL EXAM  GENERAL  ( ) NAD, lying in bed comfortably     (  ) obtunded     (  ) lethargic     (  ) somnolent    HEAD  ( X ) Atraumatic     (  ) hematoma     (  ) laceration (specify location:       )     NECK  ( X ) Supple     (  ) neck stiffness     (  ) nuchal rigidity     (  )  no JVD     (  ) JVD present ( -- cm)    HEART  Rate -->  ( X ) normal rate    (  ) bradycardic    (  ) tachycardic  Rhythm -->  ( X ) regular    (  ) regularly irregular    (  ) irregularly irregular  Murmurs -->  ( X ) normal s1/s2    (  ) systolic murmur    (  ) diastolic murmur    (  ) continuous murmur     (  ) S3 present    (  ) S4 present    LUNGS  ( X )Unlabored respirations     (  ) tachypnea  ( X ) B/L air entry     (  ) decreased breath sounds in:  (location     )    (  ) no adventitious sound     (  ) crackles     ( X ) wheezing- R sided      (  ) rhonchi      (specify location:       )  (  ) chest wall tenderness (specify location:       )    ABDOMEN  ( X ) Soft     (  ) tense   |   ( X ) nondistended     (  ) distended   |   ( X ) +BS     (  ) hypoactive bowel sounds     (  ) hyperactive bowel sounds  ( X ) nontender     (  ) RUQ tenderness     (  ) RLQ tenderness     (  ) LLQ tenderness     (  ) epigastric tenderness     (  ) diffuse tenderness  (  ) Splenomegaly      (  ) Hepatomegaly      (  ) Jaundice     (  ) ecchymosis     EXTREMITIES  ( X ) Normal     (  ) Rash     (  ) ecchymosis     (  ) varicose veins      (  ) pitting edema     (  ) non-pitting edema   (  ) ulceration     (  ) gangrene:     (location:     )    NERVOUS SYSTEM  ( ) A&Ox1-2     (  ) confused     (  ) lethargic  CN II-XII:     (  ) Intact     (  ) focal deficits  (Specify:     )   Upper extremities:     (  ) strength X/5     (  ) focal deficit (specify:    )  Lower extremities:     (  ) strength  X/5    (  ) focal deficit (specify:    )    SKIN  ( X ) No rashes or lesions     (  ) maculopapular rash     (  ) pustules     (  ) vesicles     (  ) ulcer     (  ) ecchymosis     (specify location:     )      LABS                  LABS:                          9.4    10.67 )-----------( 209      ( 07 Feb 2025 05:15 )             28.5     02-07    142  |  108  |  77[HH]  ----------------------------<  271[H]  3.8   |  21  |  1.7[H]    Ca    6.8[L]      07 Feb 2025 05:15  Phos  5.9     02-07  Mg     2.5     02-07    TPro  4.3[L]  /  Alb  2.3[L]  /  TBili  2.0[H]  /  DBili  x   /  AST  24  /  ALT  <5  /  AlkPhos  128[H]  02-07    LIVER FUNCTIONS - ( 07 Feb 2025 05:15 )  Alb: 2.3 g/dL / Pro: 4.3 g/dL / ALK PHOS: 128 U/L / ALT: <5 U/L / AST: 24 U/L / GGT: x           PT/INR - ( 06 Feb 2025 08:49 )   PT: 15.50 sec;   INR: 1.31 ratio         PTT - ( 07 Feb 2025 07:40 )  PTT:32.4 sec  Urinalysis Basic - ( 07 Feb 2025 05:15 )    Color: x / Appearance: x / SG: x / pH: x  Gluc: 271 mg/dL / Ketone: x  / Bili: x / Urobili: x   Blood: x / Protein: x / Nitrite: x   Leuk Esterase: x / RBC: x / WBC x   Sq Epi: x / Non Sq Epi: x / Bacteria: x

## 2025-02-08 NOTE — PROGRESS NOTE ADULT - ASSESSMENT
IMPRESSION:    Acute Hypoxic Respiratory Failure sp reintubation  Toxic Metabolic Encephalopathy  CAP likely aspiration   Influenza A  MSSA bacteremia repeat CX -  Sepsis POA  LYNNE improving  cholecystitis sp percut haydee  Periventricular bleed  ?Mural thrombus in PA  HO Parkinson's Disease   HO CAD    PLAN:    CNS:  Noted with right periventricular bleed stable. Continue Anti parkinson's. CTH stable yesterday. SAT daily.     HEENT: Oral care.  ETT duration: 7 days.     PULMONARY:  No change in vent settings. PE on IV heparin; adjust to therapeutic level. Extensive secretions; weak cough. May need trach.     CARDIOVASCULAR:  Avoid overload.  NO MARY per cardiology.     GI: GI prophylaxis.  NGT feeds as tolerated. S/P perc cholecystostomy.     RENAL:  Replete K. leslie in place. Kidney functio nstable.     INFECTIOUS DISEASE: Cultures noted.  tamiflu, nafcillin, cefepime, flagyl; duration per ID.     HEMATOLOGICAL: IV heparin back on. Change to make sure its therapeutic. CTH stable while on heparin.      ENDOCRINE:  Follow up FS.  Insulin protocol if needed.    MUSCULOSKELETAL: Bed rest.  Off loading. Wound care for DTIs.     Full code  MICU  palliative care follow up   very poor prognosis  Madisyn, ETT, peripherals

## 2025-02-09 LAB
ALBUMIN SERPL ELPH-MCNC: 2.4 G/DL — LOW (ref 3.5–5.2)
ALP SERPL-CCNC: 123 U/L — HIGH (ref 30–115)
ALT FLD-CCNC: <5 U/L — SIGNIFICANT CHANGE UP (ref 0–41)
ANION GAP SERPL CALC-SCNC: 10 MMOL/L — SIGNIFICANT CHANGE UP (ref 7–14)
APTT BLD: 103.6 SEC — CRITICAL HIGH (ref 27–39.2)
APTT BLD: 105.9 SEC — CRITICAL HIGH (ref 27–39.2)
APTT BLD: 68.3 SEC — HIGH (ref 27–39.2)
AST SERPL-CCNC: 22 U/L — SIGNIFICANT CHANGE UP (ref 0–41)
BASOPHILS # BLD AUTO: 0.01 K/UL — SIGNIFICANT CHANGE UP (ref 0–0.2)
BASOPHILS NFR BLD AUTO: 0.2 % — SIGNIFICANT CHANGE UP (ref 0–1)
BILIRUB SERPL-MCNC: 1.1 MG/DL — SIGNIFICANT CHANGE UP (ref 0.2–1.2)
BUN SERPL-MCNC: 61 MG/DL — CRITICAL HIGH (ref 10–20)
CALCIUM SERPL-MCNC: 7.1 MG/DL — LOW (ref 8.4–10.5)
CHLORIDE SERPL-SCNC: 114 MMOL/L — HIGH (ref 98–110)
CO2 SERPL-SCNC: 22 MMOL/L — SIGNIFICANT CHANGE UP (ref 17–32)
CREAT SERPL-MCNC: 1 MG/DL — SIGNIFICANT CHANGE UP (ref 0.7–1.5)
EGFR: 81 ML/MIN/1.73M2 — SIGNIFICANT CHANGE UP
EGFR: 81 ML/MIN/1.73M2 — SIGNIFICANT CHANGE UP
EOSINOPHIL # BLD AUTO: 0.03 K/UL — SIGNIFICANT CHANGE UP (ref 0–0.7)
EOSINOPHIL NFR BLD AUTO: 0.5 % — SIGNIFICANT CHANGE UP (ref 0–8)
GAS PNL BLDA: SIGNIFICANT CHANGE UP
GLUCOSE BLDC GLUCOMTR-MCNC: 139 MG/DL — HIGH (ref 70–99)
GLUCOSE BLDC GLUCOMTR-MCNC: 146 MG/DL — HIGH (ref 70–99)
GLUCOSE BLDC GLUCOMTR-MCNC: 164 MG/DL — HIGH (ref 70–99)
GLUCOSE BLDC GLUCOMTR-MCNC: 169 MG/DL — HIGH (ref 70–99)
GLUCOSE SERPL-MCNC: 222 MG/DL — HIGH (ref 70–99)
HCT VFR BLD CALC: 25.5 % — LOW (ref 42–52)
HGB BLD-MCNC: 8.1 G/DL — LOW (ref 14–18)
IMM GRANULOCYTES NFR BLD AUTO: 0.3 % — SIGNIFICANT CHANGE UP (ref 0.1–0.3)
LYMPHOCYTES # BLD AUTO: 0.25 K/UL — LOW (ref 1.2–3.4)
LYMPHOCYTES # BLD AUTO: 4 % — LOW (ref 20.5–51.1)
MAGNESIUM SERPL-MCNC: 2.4 MG/DL — SIGNIFICANT CHANGE UP (ref 1.8–2.4)
MCHC RBC-ENTMCNC: 30.7 PG — SIGNIFICANT CHANGE UP (ref 27–31)
MCHC RBC-ENTMCNC: 31.8 G/DL — LOW (ref 32–37)
MCV RBC AUTO: 96.6 FL — HIGH (ref 80–94)
MONOCYTES # BLD AUTO: 0.55 K/UL — SIGNIFICANT CHANGE UP (ref 0.1–0.6)
MONOCYTES NFR BLD AUTO: 8.7 % — SIGNIFICANT CHANGE UP (ref 1.7–9.3)
NEUTROPHILS # BLD AUTO: 5.45 K/UL — SIGNIFICANT CHANGE UP (ref 1.4–6.5)
NEUTROPHILS NFR BLD AUTO: 86.3 % — HIGH (ref 42.2–75.2)
NRBC # BLD: 0 /100 WBCS — SIGNIFICANT CHANGE UP (ref 0–0)
NRBC BLD-RTO: 0 /100 WBCS — SIGNIFICANT CHANGE UP (ref 0–0)
PHOSPHATE SERPL-MCNC: 2.6 MG/DL — SIGNIFICANT CHANGE UP (ref 2.1–4.9)
PLATELET # BLD AUTO: 231 K/UL — SIGNIFICANT CHANGE UP (ref 130–400)
PMV BLD: 11.7 FL — HIGH (ref 7.4–10.4)
POTASSIUM SERPL-MCNC: 3.9 MMOL/L — SIGNIFICANT CHANGE UP (ref 3.5–5)
POTASSIUM SERPL-SCNC: 3.9 MMOL/L — SIGNIFICANT CHANGE UP (ref 3.5–5)
PROT SERPL-MCNC: 4.2 G/DL — LOW (ref 6–8)
RBC # BLD: 2.64 M/UL — LOW (ref 4.7–6.1)
RBC # FLD: 14.3 % — SIGNIFICANT CHANGE UP (ref 11.5–14.5)
SODIUM SERPL-SCNC: 146 MMOL/L — SIGNIFICANT CHANGE UP (ref 135–146)
WBC # BLD: 6.31 K/UL — SIGNIFICANT CHANGE UP (ref 4.8–10.8)
WBC # FLD AUTO: 6.31 K/UL — SIGNIFICANT CHANGE UP (ref 4.8–10.8)

## 2025-02-09 PROCEDURE — 71045 X-RAY EXAM CHEST 1 VIEW: CPT | Mod: 26

## 2025-02-09 PROCEDURE — 99291 CRITICAL CARE FIRST HOUR: CPT

## 2025-02-09 PROCEDURE — 70450 CT HEAD/BRAIN W/O DYE: CPT | Mod: 26

## 2025-02-09 PROCEDURE — 71045 X-RAY EXAM CHEST 1 VIEW: CPT | Mod: 26,77

## 2025-02-09 PROCEDURE — 72192 CT PELVIS W/O DYE: CPT | Mod: 26

## 2025-02-09 RX ORDER — NOREPINEPHRINE BITARTRATE 8 MG
0.05 SOLUTION INTRAVENOUS
Qty: 8 | Refills: 0 | Status: DISCONTINUED | OUTPATIENT
Start: 2025-02-09 | End: 2025-02-09

## 2025-02-09 RX ORDER — HEPARIN SODIUM 1000 [USP'U]/ML
1000 INJECTION INTRAVENOUS; SUBCUTANEOUS
Qty: 25000 | Refills: 0 | Status: DISCONTINUED | OUTPATIENT
Start: 2025-02-09 | End: 2025-02-10

## 2025-02-09 RX ORDER — SODIUM CHLORIDE 9 G/1000ML
500 INJECTION, SOLUTION INTRAVENOUS ONCE
Refills: 0 | Status: COMPLETED | OUTPATIENT
Start: 2025-02-09 | End: 2025-02-09

## 2025-02-09 RX ADMIN — NAFCILLIN 200 GRAM(S): 2 INJECTION, SOLUTION INTRAVENOUS at 05:12

## 2025-02-09 RX ADMIN — OSELTAMIVIR PHOSPHATE 30 MILLIGRAM(S): 75 CAPSULE ORAL at 10:15

## 2025-02-09 RX ADMIN — Medication 500 MILLIGRAM(S): at 17:50

## 2025-02-09 RX ADMIN — HEPARIN SODIUM 10 UNIT(S)/HR: 1000 INJECTION INTRAVENOUS; SUBCUTANEOUS at 03:20

## 2025-02-09 RX ADMIN — Medication 650 MILLIGRAM(S): at 20:00

## 2025-02-09 RX ADMIN — CEFEPIME 100 MILLIGRAM(S): 2 INJECTION, POWDER, FOR SOLUTION INTRAVENOUS at 17:50

## 2025-02-09 RX ADMIN — Medication 650 MILLIGRAM(S): at 10:15

## 2025-02-09 RX ADMIN — Medication 40 MILLIGRAM(S): at 13:27

## 2025-02-09 RX ADMIN — HEPARIN SODIUM 8 UNIT(S)/HR: 1000 INJECTION INTRAVENOUS; SUBCUTANEOUS at 21:07

## 2025-02-09 RX ADMIN — POLYETHYLENE GLYCOL 3350 17 GRAM(S): 17 POWDER, FOR SOLUTION ORAL at 05:13

## 2025-02-09 RX ADMIN — NAFCILLIN 200 GRAM(S): 2 INJECTION, SOLUTION INTRAVENOUS at 21:07

## 2025-02-09 RX ADMIN — Medication 650 MILLIGRAM(S): at 00:30

## 2025-02-09 RX ADMIN — Medication 1 APPLICATION(S): at 05:13

## 2025-02-09 RX ADMIN — DEXMEDETOMIDINE HYDROCHLORIDE IN SODIUM CHLORIDE 6.39 MICROGRAM(S)/KG/HR: 4 INJECTION INTRAVENOUS at 21:07

## 2025-02-09 RX ADMIN — OSELTAMIVIR PHOSPHATE 30 MILLIGRAM(S): 75 CAPSULE ORAL at 21:07

## 2025-02-09 RX ADMIN — INSULIN LISPRO 8 UNIT(S): 100 INJECTION, SOLUTION INTRAVENOUS; SUBCUTANEOUS at 13:37

## 2025-02-09 RX ADMIN — Medication 15 MILLILITER(S): at 17:51

## 2025-02-09 RX ADMIN — Medication 650 MILLIGRAM(S): at 20:15

## 2025-02-09 RX ADMIN — RASAGILINE 1 MILLIGRAM(S): 0.5 TABLET ORAL at 05:12

## 2025-02-09 RX ADMIN — Medication 15 MILLILITER(S): at 05:12

## 2025-02-09 RX ADMIN — Medication 2 TABLET(S): at 21:07

## 2025-02-09 RX ADMIN — Medication 2 TABLET(S): at 21:08

## 2025-02-09 RX ADMIN — POLYETHYLENE GLYCOL 3350 17 GRAM(S): 17 POWDER, FOR SOLUTION ORAL at 17:50

## 2025-02-09 RX ADMIN — Medication 650 MILLIGRAM(S): at 01:00

## 2025-02-09 RX ADMIN — NAFCILLIN 200 GRAM(S): 2 INJECTION, SOLUTION INTRAVENOUS at 13:33

## 2025-02-09 RX ADMIN — INSULIN LISPRO 2: 100 INJECTION, SOLUTION INTRAVENOUS; SUBCUTANEOUS at 23:20

## 2025-02-09 RX ADMIN — CEFEPIME 100 MILLIGRAM(S): 2 INJECTION, POWDER, FOR SOLUTION INTRAVENOUS at 05:13

## 2025-02-09 RX ADMIN — HEPARIN SODIUM 8 UNIT(S)/HR: 1000 INJECTION INTRAVENOUS; SUBCUTANEOUS at 10:15

## 2025-02-09 RX ADMIN — INSULIN LISPRO 2: 100 INJECTION, SOLUTION INTRAVENOUS; SUBCUTANEOUS at 06:03

## 2025-02-09 RX ADMIN — NAFCILLIN 200 GRAM(S): 2 INJECTION, SOLUTION INTRAVENOUS at 10:15

## 2025-02-09 RX ADMIN — Medication 2 TABLET(S): at 13:27

## 2025-02-09 RX ADMIN — NAFCILLIN 200 GRAM(S): 2 INJECTION, SOLUTION INTRAVENOUS at 01:45

## 2025-02-09 RX ADMIN — NAFCILLIN 200 GRAM(S): 2 INJECTION, SOLUTION INTRAVENOUS at 17:51

## 2025-02-09 RX ADMIN — INSULIN LISPRO 8 UNIT(S): 100 INJECTION, SOLUTION INTRAVENOUS; SUBCUTANEOUS at 17:50

## 2025-02-09 RX ADMIN — Medication 500 MILLIGRAM(S): at 05:11

## 2025-02-09 RX ADMIN — SODIUM CHLORIDE 1000 MILLILITER(S): 9 INJECTION, SOLUTION INTRAVENOUS at 13:00

## 2025-02-09 RX ADMIN — Medication 2 TABLET(S): at 05:11

## 2025-02-09 NOTE — PROGRESS NOTE ADULT - SUBJECTIVE AND OBJECTIVE BOX
Patient is a 69y old  Male who presents with a chief complaint of pneumonia (09 Feb 2025 00:44)        Over Night Events:    Low grade temps         ROS:  See HPI    PHYSICAL EXAM    ICU Vital Signs Last 24 Hrs  T(C): 37.7 (09 Feb 2025 04:00), Max: 38 (09 Feb 2025 00:30)  T(F): 99.8 (09 Feb 2025 04:00), Max: 100.4 (09 Feb 2025 00:30)  HR: 112 (09 Feb 2025 08:00) (74 - 112)  BP: 146/70 (09 Feb 2025 08:00) (96/52 - 146/70)  BP(mean): 100 (09 Feb 2025 08:00) (68 - 100)  ABP: --  ABP(mean): --  RR: 25 (09 Feb 2025 08:00) (15 - 25)  SpO2: 100% (09 Feb 2025 08:00) (96% - 100%)    O2 Parameters below as of 09 Feb 2025 07:00  Patient On (Oxygen Delivery Method): ventilator    O2 Concentration (%): 40        General: tachypneic   HEENT: BEATRICE             Lymphatic system: No cervical LN   Lungs: Bilateral BS; cannot tolerate SBT   Cardiovascular: Regular   Gastrointestinal: Soft, Positive BS  Extremities: No clubbing.  Very weak   Skin: Warm, intact  Neurological: AAO 0       02-08-25 @ 07:01  -  02-09-25 @ 07:00  --------------------------------------------------------  IN:    Dexmedetomidine: 289.5 mL    Enteral Tube Flush: 100 mL    Free Water: 250 mL    Heparin: 168 mL    Heparin: 50 mL    Heparin Infusion: 18 mL    IV PiggyBack: 350 mL    Jevity: 800 mL  Total IN: 2025.5 mL    OUT:    Drain (mL): 60 mL    Indwelling Catheter - Urethral (mL): 1670 mL  Total OUT: 1730 mL    Total NET: 295.5 mL          LABS:                            8.1    6.31  )-----------( 231      ( 09 Feb 2025 05:55 )             25.5                                               02-09    146  |  114[H]  |  61[HH]  ----------------------------<  222[H]  3.9   |  22  |  1.0    Ca    7.1[L]      09 Feb 2025 05:55  Phos  2.6     02-09  Mg     2.4     02-09    TPro  4.2[L]  /  Alb  2.4[L]  /  TBili  1.1  /  DBili  x   /  AST  22  /  ALT  <5  /  AlkPhos  123[H]  02-09      PT/INR - ( 08 Feb 2025 11:43 )   PT: 12.90 sec;   INR: 1.09 ratio         PTT - ( 09 Feb 2025 02:00 )  PTT:105.9 sec                                       Urinalysis Basic - ( 09 Feb 2025 05:55 )    Color: x / Appearance: x / SG: x / pH: x  Gluc: 222 mg/dL / Ketone: x  / Bili: x / Urobili: x   Blood: x / Protein: x / Nitrite: x   Leuk Esterase: x / RBC: x / WBC x   Sq Epi: x / Non Sq Epi: x / Bacteria: x                                                  LIVER FUNCTIONS - ( 09 Feb 2025 05:55 )  Alb: 2.4 g/dL / Pro: 4.2 g/dL / ALK PHOS: 123 U/L / ALT: <5 U/L / AST: 22 U/L / GGT: x                                                                                               Mode: AC/ CMV (Assist Control/ Continuous Mandatory Ventilation)  RR (machine): 16  TV (machine): 400  FiO2: 40  PEEP: 8  ITime: 1  MAP: 11  PIP: 20                                      ABG - ( 09 Feb 2025 03:12 )  pH, Arterial: 7.39  pH, Blood: x     /  pCO2: 44    /  pO2: 144   / HCO3: 27    / Base Excess: 1.4   /  SaO2: 99.6                MEDICATIONS  (STANDING):  carbidopa/levodopa  25/250 2 Tablet(s) Oral three times a day  cefepime   IVPB 2000 milliGRAM(s) IV Intermittent every 12 hours  chlorhexidine 0.12% Liquid 15 milliLiter(s) Oral Mucosa every 12 hours  chlorhexidine 2% Cloths 1 Application(s) Topical <User Schedule>  dexMEDEtomidine Infusion 0.5 MICROgram(s)/kG/Hr (6.39 mL/Hr) IV Continuous <Continuous>  dextrose 5%. 1000 milliLiter(s) (50 mL/Hr) IV Continuous <Continuous>  dextrose 5%. 1000 milliLiter(s) (100 mL/Hr) IV Continuous <Continuous>  dextrose 50% Injectable 25 Gram(s) IV Push once  dextrose 50% Injectable 12.5 Gram(s) IV Push once  dextrose 50% Injectable 25 Gram(s) IV Push once  glucagon  Injectable 1 milliGRAM(s) IntraMuscular once  heparin  Infusion 1000 Unit(s)/Hr (10 mL/Hr) IV Continuous <Continuous>  insulin lispro (ADMELOG) corrective regimen sliding scale   SubCutaneous every 6 hours  insulin lispro Injectable (ADMELOG) 8 Unit(s) SubCutaneous every 6 hours  metroNIDAZOLE    Tablet 500 milliGRAM(s) Oral every 12 hours  nafcillin  IVPB 2 Gram(s) IV Intermittent every 4 hours  nafcillin  IVPB      oseltamivir 30 milliGRAM(s) Oral every 12 hours  pantoprazole   Suspension 40 milliGRAM(s) Oral daily  polyethylene glycol 3350 17 Gram(s) Oral every 12 hours  rasagiline Tablet 1 milliGRAM(s) Oral <User Schedule>  senna 2 Tablet(s) Oral at bedtime    MEDICATIONS  (PRN):  acetaminophen     Tablet .. 650 milliGRAM(s) Oral every 6 hours PRN Temp greater or equal to 38C (100.4F), Mild Pain (1 - 3)  dextrose Oral Gel 15 Gram(s) Oral once PRN Blood Glucose LESS THAN 70 milliGRAM(s)/deciliter  fentaNYL    Injectable 50 MICROGram(s) IV Push every 2 hours PRN Aggitation      Xrays: stable                                                                                      ECHO

## 2025-02-09 NOTE — CHART NOTE - NSCHARTNOTEFT_GEN_A_CORE
While examining the patient earlier today, noted mass in R groin near femoral pulse. With POCUS assessment, found heterogeneous hypoechoic round mass medial to the femoral neurovascular bundle, there is concern for communication between artery and mass that would indicate an active hematoma.   Patient is on heparin gtt for mural PA thrombus  Vitals stable, hgb stable  F/u CT pelvis thigh

## 2025-02-09 NOTE — PROGRESS NOTE ADULT - SUBJECTIVE AND OBJECTIVE BOX
SUBJECTIVE/OVERNIGHT EVENTS  Today is hospital day 11d. This morning patient was seen and examined at bedside. 2/9: hematuria noted     MEDICATIONS  STANDING MEDICATIONS  carbidopa/levodopa  25/250 2 Tablet(s) Oral three times a day  cefepime   IVPB 2000 milliGRAM(s) IV Intermittent every 12 hours  chlorhexidine 0.12% Liquid 15 milliLiter(s) Oral Mucosa every 12 hours  chlorhexidine 2% Cloths 1 Application(s) Topical <User Schedule>  dexMEDEtomidine Infusion 0.5 MICROgram(s)/kG/Hr IV Continuous <Continuous>  dextrose 5%. 1000 milliLiter(s) IV Continuous <Continuous>  dextrose 5%. 1000 milliLiter(s) IV Continuous <Continuous>  dextrose 50% Injectable 25 Gram(s) IV Push once  dextrose 50% Injectable 12.5 Gram(s) IV Push once  dextrose 50% Injectable 25 Gram(s) IV Push once  glucagon  Injectable 1 milliGRAM(s) IntraMuscular once  heparin  Infusion 14 Unit(s)/Hr IV Continuous <Continuous>  insulin lispro (ADMELOG) corrective regimen sliding scale   SubCutaneous every 6 hours  insulin lispro Injectable (ADMELOG) 8 Unit(s) SubCutaneous every 6 hours  metroNIDAZOLE    Tablet 500 milliGRAM(s) Oral every 12 hours  nafcillin  IVPB      nafcillin  IVPB 2 Gram(s) IV Intermittent every 4 hours  oseltamivir 30 milliGRAM(s) Oral every 12 hours  pantoprazole   Suspension 40 milliGRAM(s) Oral daily  polyethylene glycol 3350 17 Gram(s) Oral every 12 hours  rasagiline Tablet 1 milliGRAM(s) Oral <User Schedule>  senna 2 Tablet(s) Oral at bedtime    PRN MEDICATIONS  acetaminophen     Tablet .. 650 milliGRAM(s) Oral every 6 hours PRN  dextrose Oral Gel 15 Gram(s) Oral once PRN  fentaNYL    Injectable 50 MICROGram(s) IV Push every 2 hours PRN    VITALS  T(F): 98.8 (02-08-25 @ 20:00), Max: 98.8 (02-08-25 @ 08:00)  HR: 94 (02-08-25 @ 23:00) (74 - 100)  BP: 125/59 (02-08-25 @ 23:00) (102/57 - 140/65)  RR: 17 (02-08-25 @ 23:00) (16 - 17)  SpO2: 100% (02-08-25 @ 23:00) (98% - 100%)  POCT Blood Glucose.: 167 mg/dL (02-08-25 @ 23:05)  POCT Blood Glucose.: 87 mg/dL (02-08-25 @ 17:05)  POCT Blood Glucose.: 184 mg/dL (02-08-25 @ 13:23)        LABS             8.2    6.79  )-----------( 232      ( 02-08-25 @ 17:24 )             25.8     142  |  108  |  70  -------------------------<  242   02-08-25 @ 04:58  3.0  |  24  |  1.1    Ca      7.0     02-08-25 @ 04:58  Phos   3.5     02-08-25 @ 04:58  Mg     2.6     02-08-25 @ 04:58    TPro  4.5  /  Alb  2.4  /  TBili  1.5  /  DBili  x   /  AST  22  /  ALT  <5  /  AlkPhos  115  /  GGT  x     02-08-25 @ 04:58    PT/INR - ( 02-08-25 @ 11:43 )   PT: 12.90 sec[H];   INR: 1.09 ratio  PTT - ( 02-08-25 @ 17:24 )  PTT:123.2 sec      Urinalysis Basic - ( 08 Feb 2025 04:58 )    Color: x / Appearance: x / SG: x / pH: x  Gluc: 242 mg/dL / Ketone: x  / Bili: x / Urobili: x   Blood: x / Protein: x / Nitrite: x   Leuk Esterase: x / RBC: x / WBC x   Sq Epi: x / Non Sq Epi: x / Bacteria: x      ABG - ( 08 Feb 2025 04:01 )  pH, Arterial: 7.39  pH, Blood: x     /  pCO2: 44    /  pO2: 125   / HCO3: 27    / Base Excess: 1.2   /  SaO2: 100.0

## 2025-02-09 NOTE — PROGRESS NOTE ADULT - ASSESSMENT
IMPRESSION:    Acute Hypoxic Respiratory Failure sp reintubation  Toxic Metabolic Encephalopathy  CAP likely aspiration   Influenza A  MSSA bacteremia repeat CX -  Sepsis POA  LYNNE improving  cholecystitis sp percut haydee  Periventricular bleed  ?Mural thrombus in PA  HO Parkinson's Disease   HO CAD    PLAN:    CNS:  CTH Noted with right periventricular bleed stable. Continue Anti parkinson's. CTH stable friday. SAT daily. Repeat CHT per neurology.     HEENT: Oral care.  ETT duration: 8 days. Will likely need trach.     PULMONARY:  No change in vent settings. PE on IV heparin; adjust to therapeutic level. Extensive secretions; weak cough. Tachypneic on SBT.     CARDIOVASCULAR:  Avoid overload.  NO MARY per cardiology.     GI: GI prophylaxis.  NGT feeds as tolerated. S/P perc cholecystostomy.     RENAL:  Replete K. Mars in place. Kidney function stable.     INFECTIOUS DISEASE: Cultures noted.  tamiflu, nafcillin, cefepime, flagyl; duration per ID.     HEMATOLOGICAL: IV heparin back on. Change to make sure its therapeutic. CTH on IV heparin.     ENDOCRINE:  Follow up FS.  Insulin protocol if needed.    MUSCULOSKELETAL: Bed rest.  Off loading. Wound care for DTIs.     Full code  MICU  palliative care follow up   very poor prognosis  Mars, ETT, peripherals

## 2025-02-09 NOTE — PROGRESS NOTE ADULT - ASSESSMENT
69-year-old male past medical history of hypertension, Parkinson's, CAD presents for shortness of breath and cough of 1 day duration. History was obtained from patient's wife who noted that the patient has been having decreased po intake, activity, and unintentional weight loss for some time. However yesterday he started having cough and shortness of breath. Admitted to MICU for AHRF and sepsis 2/2 to mutilobar PNA.    #Acute Hypoxic Respiratory Failure likely 2/2 CAP/aspiration and flu  #Toxic Metabolic Encephalopathy likely 2/2 CAP/aspiration and flu  #Influenza A positive  #MSSA bacteremia- resolved  #Sepsis POA  - Aspiration precautions.    - patient inc WOB, worsening alkalosis, obtunded-intubated for airway protection   - Nasal MRSA negative  - CTA chest to r/o PE: focal filling defect within main pulm artery   - repeat CT head: stable intraventricular hemorrhage   - Platelets downtrending, 239>>117, HIT neg   - add metronidazole, ID: if patient decompensates, dc cefepime/metro, start patricia 1g   - cardio: patient critically sick, not a candidate for surgery therefore no MARY, can c/w abx for IE   - s/p bronch, f/u cultures   - s/p IR perc haydee on 2/5, drained 250 cc dark bile, 450 cc output overnight. F/u GB drainage cultures   - ID: tamiflu 30 mg daily, dec cefepime to 1g q12h, c/w metro and nafcillin   - c/w heparin, last PTT 32.8  - cr improving, c/w LR @ 100  - repeat CTH stable  - As per patient's daughter and wife, 2-3 days before current hospitalization patient was mobile and functional. Explained current status of patient's illness unresponsive on minimal sedation, possibility of failing SBT and requiring trach/PEG) , attempted to discuss GOC however patient has no advance direct or any wishes, therefore a discussion with all family members would have to take place to collectively decide on how to proceed.  Palliative is aware and can be recalled next week since they are not available during the weekend.    #Elevated Bilirubin-improving   #Leukocytosis-improving   #Cholangitis vs cholecystitis?   - F/u RUQ US: distended GB with sludge, GB polyp   - IR: perc haydee 2/5, drained 250 cc dark bile, 450 cc output overnight. F/u GB drainage cultures     #Periventricular bleed  - CTH. noted with right periventricular bleed, stable.   - Neuro on board  - Continue Anti parkinson's meds.    - c/w heparin, last PTT 32.8  - repeat CT head once PTT therapeutic     #Constipation  - senna and miralax  - c/w lactulose     #Possible Mural thrombus in PA  #CAD  - Avoid overload  - C/w LR at 50  - TTE: EF 56%, G1DD, mild-mod MR, mod TR, mild OH, mild pHTN  - Cardio recs appreciated       - Patient is poor candidate for MARY       - consider full endocarditis abx course    #MISC  - DVT ppx: SCDs  - GI ppx: not needed  - Activity: as tolerated  - Diet: NPO w/ tube feed

## 2025-02-10 LAB
A1C WITH ESTIMATED AVERAGE GLUCOSE RESULT: 5.6 % — SIGNIFICANT CHANGE UP (ref 4–5.6)
ALBUMIN SERPL ELPH-MCNC: 2.3 G/DL — LOW (ref 3.5–5.2)
ALP SERPL-CCNC: 96 U/L — SIGNIFICANT CHANGE UP (ref 30–115)
ALT FLD-CCNC: <5 U/L — SIGNIFICANT CHANGE UP (ref 0–41)
ANION GAP SERPL CALC-SCNC: 6 MMOL/L — LOW (ref 7–14)
APTT BLD: 59.7 SEC — HIGH (ref 27–39.2)
APTT BLD: 71.7 SEC — CRITICAL HIGH (ref 27–39.2)
AST SERPL-CCNC: 24 U/L — SIGNIFICANT CHANGE UP (ref 0–41)
BASE EXCESS BLDA CALC-SCNC: 1.4 MMOL/L — SIGNIFICANT CHANGE UP (ref -2–3)
BASOPHILS # BLD AUTO: 0.02 K/UL — SIGNIFICANT CHANGE UP (ref 0–0.2)
BASOPHILS NFR BLD AUTO: 0.3 % — SIGNIFICANT CHANGE UP (ref 0–1)
BILIRUB SERPL-MCNC: 0.9 MG/DL — SIGNIFICANT CHANGE UP (ref 0.2–1.2)
BUN SERPL-MCNC: 61 MG/DL — CRITICAL HIGH (ref 10–20)
CALCIUM SERPL-MCNC: 7.2 MG/DL — LOW (ref 8.4–10.5)
CHLORIDE SERPL-SCNC: 115 MMOL/L — HIGH (ref 98–110)
CO2 SERPL-SCNC: 27 MMOL/L — SIGNIFICANT CHANGE UP (ref 17–32)
CREAT SERPL-MCNC: 1 MG/DL — SIGNIFICANT CHANGE UP (ref 0.7–1.5)
EGFR: 81 ML/MIN/1.73M2 — SIGNIFICANT CHANGE UP
EGFR: 81 ML/MIN/1.73M2 — SIGNIFICANT CHANGE UP
EOSINOPHIL # BLD AUTO: 0 K/UL — SIGNIFICANT CHANGE UP (ref 0–0.7)
EOSINOPHIL NFR BLD AUTO: 0 % — SIGNIFICANT CHANGE UP (ref 0–8)
ESTIMATED AVERAGE GLUCOSE: 114 MG/DL — SIGNIFICANT CHANGE UP (ref 68–114)
GAS PNL BLDA: SIGNIFICANT CHANGE UP
GLUCOSE BLDC GLUCOMTR-MCNC: 164 MG/DL — HIGH (ref 70–99)
GLUCOSE BLDC GLUCOMTR-MCNC: 167 MG/DL — HIGH (ref 70–99)
GLUCOSE BLDC GLUCOMTR-MCNC: 187 MG/DL — HIGH (ref 70–99)
GLUCOSE BLDC GLUCOMTR-MCNC: 227 MG/DL — HIGH (ref 70–99)
GLUCOSE SERPL-MCNC: 196 MG/DL — HIGH (ref 70–99)
GRAM STN FLD: SIGNIFICANT CHANGE UP
HCO3 BLDA-SCNC: 28 MMOL/L — SIGNIFICANT CHANGE UP (ref 21–28)
HCT VFR BLD CALC: 18.7 % — LOW (ref 42–52)
HCT VFR BLD CALC: 29.2 % — LOW (ref 42–52)
HGB BLD-MCNC: 5.8 G/DL — CRITICAL LOW (ref 14–18)
HGB BLD-MCNC: 9.7 G/DL — LOW (ref 14–18)
IMM GRANULOCYTES NFR BLD AUTO: 0.6 % — HIGH (ref 0.1–0.3)
INR BLD: 1.31 RATIO — HIGH (ref 0.65–1.3)
LYMPHOCYTES # BLD AUTO: 0.99 K/UL — LOW (ref 1.2–3.4)
LYMPHOCYTES # BLD AUTO: 14.7 % — LOW (ref 20.5–51.1)
MAGNESIUM SERPL-MCNC: 2.6 MG/DL — HIGH (ref 1.8–2.4)
MCHC RBC-ENTMCNC: 30.1 PG — SIGNIFICANT CHANGE UP (ref 27–31)
MCHC RBC-ENTMCNC: 31 G/DL — LOW (ref 32–37)
MCHC RBC-ENTMCNC: 31.5 PG — HIGH (ref 27–31)
MCHC RBC-ENTMCNC: 33.2 G/DL — SIGNIFICANT CHANGE UP (ref 32–37)
MCV RBC AUTO: 94.8 FL — HIGH (ref 80–94)
MCV RBC AUTO: 96.9 FL — HIGH (ref 80–94)
MONOCYTES # BLD AUTO: 0.78 K/UL — HIGH (ref 0.1–0.6)
MONOCYTES NFR BLD AUTO: 11.6 % — HIGH (ref 1.7–9.3)
MRSA PCR RESULT.: NEGATIVE — SIGNIFICANT CHANGE UP
NEUTROPHILS # BLD AUTO: 4.89 K/UL — SIGNIFICANT CHANGE UP (ref 1.4–6.5)
NEUTROPHILS NFR BLD AUTO: 72.8 % — SIGNIFICANT CHANGE UP (ref 42.2–75.2)
NRBC # BLD: 0 /100 WBCS — SIGNIFICANT CHANGE UP (ref 0–0)
NRBC # BLD: 0 /100 WBCS — SIGNIFICANT CHANGE UP (ref 0–0)
NRBC BLD-RTO: 0 /100 WBCS — SIGNIFICANT CHANGE UP (ref 0–0)
NRBC BLD-RTO: 0 /100 WBCS — SIGNIFICANT CHANGE UP (ref 0–0)
PCO2 BLDA: 52 MMHG — HIGH (ref 35–48)
PH BLDA: 7.34 — LOW (ref 7.35–7.45)
PHOSPHATE SERPL-MCNC: 3.3 MG/DL — SIGNIFICANT CHANGE UP (ref 2.1–4.9)
PLATELET # BLD AUTO: 269 K/UL — SIGNIFICANT CHANGE UP (ref 130–400)
PLATELET # BLD AUTO: 271 K/UL — SIGNIFICANT CHANGE UP (ref 130–400)
PMV BLD: 11.7 FL — HIGH (ref 7.4–10.4)
PMV BLD: 11.9 FL — HIGH (ref 7.4–10.4)
PO2 BLDA: 110 MMHG — HIGH (ref 83–108)
POTASSIUM SERPL-MCNC: 4.3 MMOL/L — SIGNIFICANT CHANGE UP (ref 3.5–5)
POTASSIUM SERPL-SCNC: 4.3 MMOL/L — SIGNIFICANT CHANGE UP (ref 3.5–5)
PROT SERPL-MCNC: 4.1 G/DL — LOW (ref 6–8)
PROTHROM AB SERPL-ACNC: 15.5 SEC — HIGH (ref 9.95–12.87)
RBC # BLD: 1.93 M/UL — LOW (ref 4.7–6.1)
RBC # BLD: 3.08 M/UL — LOW (ref 4.7–6.1)
RBC # FLD: 14 % — SIGNIFICANT CHANGE UP (ref 11.5–14.5)
RBC # FLD: 14.3 % — SIGNIFICANT CHANGE UP (ref 11.5–14.5)
SAO2 % BLDA: 98.6 % — HIGH (ref 94–98)
SODIUM SERPL-SCNC: 148 MMOL/L — HIGH (ref 135–146)
SPECIMEN SOURCE: SIGNIFICANT CHANGE UP
WBC # BLD: 6.68 K/UL — SIGNIFICANT CHANGE UP (ref 4.8–10.8)
WBC # BLD: 6.72 K/UL — SIGNIFICANT CHANGE UP (ref 4.8–10.8)
WBC # FLD AUTO: 6.68 K/UL — SIGNIFICANT CHANGE UP (ref 4.8–10.8)
WBC # FLD AUTO: 6.72 K/UL — SIGNIFICANT CHANGE UP (ref 4.8–10.8)

## 2025-02-10 PROCEDURE — 74018 RADEX ABDOMEN 1 VIEW: CPT | Mod: 26

## 2025-02-10 PROCEDURE — 99291 CRITICAL CARE FIRST HOUR: CPT

## 2025-02-10 PROCEDURE — 93926 LOWER EXTREMITY STUDY: CPT | Mod: 26,RT

## 2025-02-10 PROCEDURE — 71045 X-RAY EXAM CHEST 1 VIEW: CPT | Mod: 26

## 2025-02-10 RX ADMIN — RASAGILINE 1 MILLIGRAM(S): 0.5 TABLET ORAL at 05:14

## 2025-02-10 RX ADMIN — OSELTAMIVIR PHOSPHATE 30 MILLIGRAM(S): 75 CAPSULE ORAL at 11:20

## 2025-02-10 RX ADMIN — CEFEPIME 100 MILLIGRAM(S): 2 INJECTION, POWDER, FOR SOLUTION INTRAVENOUS at 17:05

## 2025-02-10 RX ADMIN — INSULIN LISPRO 2: 100 INJECTION, SOLUTION INTRAVENOUS; SUBCUTANEOUS at 14:58

## 2025-02-10 RX ADMIN — Medication 1 APPLICATION(S): at 05:14

## 2025-02-10 RX ADMIN — CEFEPIME 100 MILLIGRAM(S): 2 INJECTION, POWDER, FOR SOLUTION INTRAVENOUS at 05:12

## 2025-02-10 RX ADMIN — Medication 2 TABLET(S): at 05:17

## 2025-02-10 RX ADMIN — INSULIN LISPRO 2: 100 INJECTION, SOLUTION INTRAVENOUS; SUBCUTANEOUS at 17:48

## 2025-02-10 RX ADMIN — Medication 50 MICROGRAM(S): at 14:36

## 2025-02-10 RX ADMIN — Medication 15 MILLILITER(S): at 17:43

## 2025-02-10 RX ADMIN — INSULIN LISPRO 8 UNIT(S): 100 INJECTION, SOLUTION INTRAVENOUS; SUBCUTANEOUS at 17:48

## 2025-02-10 RX ADMIN — NAFCILLIN 200 GRAM(S): 2 INJECTION, SOLUTION INTRAVENOUS at 17:06

## 2025-02-10 RX ADMIN — POLYETHYLENE GLYCOL 3350 17 GRAM(S): 17 POWDER, FOR SOLUTION ORAL at 17:43

## 2025-02-10 RX ADMIN — NAFCILLIN 200 GRAM(S): 2 INJECTION, SOLUTION INTRAVENOUS at 02:10

## 2025-02-10 RX ADMIN — INSULIN LISPRO 2: 100 INJECTION, SOLUTION INTRAVENOUS; SUBCUTANEOUS at 05:13

## 2025-02-10 RX ADMIN — Medication 650 MILLIGRAM(S): at 02:54

## 2025-02-10 RX ADMIN — OSELTAMIVIR PHOSPHATE 30 MILLIGRAM(S): 75 CAPSULE ORAL at 22:07

## 2025-02-10 RX ADMIN — Medication 650 MILLIGRAM(S): at 02:39

## 2025-02-10 RX ADMIN — NAFCILLIN 200 GRAM(S): 2 INJECTION, SOLUTION INTRAVENOUS at 05:12

## 2025-02-10 RX ADMIN — INSULIN LISPRO 8 UNIT(S): 100 INJECTION, SOLUTION INTRAVENOUS; SUBCUTANEOUS at 14:57

## 2025-02-10 RX ADMIN — NAFCILLIN 200 GRAM(S): 2 INJECTION, SOLUTION INTRAVENOUS at 14:58

## 2025-02-10 RX ADMIN — Medication 50 MICROGRAM(S): at 08:02

## 2025-02-10 RX ADMIN — Medication 500 MILLIGRAM(S): at 05:12

## 2025-02-10 RX ADMIN — POLYETHYLENE GLYCOL 3350 17 GRAM(S): 17 POWDER, FOR SOLUTION ORAL at 05:12

## 2025-02-10 RX ADMIN — Medication 2 TABLET(S): at 14:48

## 2025-02-10 RX ADMIN — Medication 15 MILLILITER(S): at 05:13

## 2025-02-10 RX ADMIN — Medication 50 MICROGRAM(S): at 08:47

## 2025-02-10 RX ADMIN — Medication 40 MILLIGRAM(S): at 14:45

## 2025-02-10 RX ADMIN — Medication 2 TABLET(S): at 22:07

## 2025-02-10 RX ADMIN — NAFCILLIN 200 GRAM(S): 2 INJECTION, SOLUTION INTRAVENOUS at 11:20

## 2025-02-10 RX ADMIN — Medication 500 MILLIGRAM(S): at 17:43

## 2025-02-10 RX ADMIN — Medication 2 TABLET(S): at 22:33

## 2025-02-10 RX ADMIN — NAFCILLIN 200 GRAM(S): 2 INJECTION, SOLUTION INTRAVENOUS at 22:07

## 2025-02-10 NOTE — PROGRESS NOTE ADULT - SUBJECTIVE AND OBJECTIVE BOX
Patient is a 69y old  Male who presents with a chief complaint of penumonia (09 Feb 2025 08:56)        Over Night Events: Remains critically ill on MV.  Off pressors.  Sedated.          ROS:     All ROS are negative except HPI         PHYSICAL EXAM    ICU Vital Signs Last 24 Hrs  T(C): 37.3 (10 Feb 2025 08:00), Max: 38.6 (09 Feb 2025 23:00)  T(F): 99.2 (10 Feb 2025 08:00), Max: 101.4 (09 Feb 2025 23:00)  HR: 90 (10 Feb 2025 08:00) (80 - 116)  BP: 144/60 (10 Feb 2025 08:00) (88/51 - 175/80)  BP(mean): 86 (10 Feb 2025 08:00) (56 - 114)  ABP: --  ABP(mean): --  RR: 28 (10 Feb 2025 08:00) (16 - 33)  SpO2: 100% (10 Feb 2025 08:00) (97% - 100%)    O2 Parameters below as of 10 Feb 2025 08:00  Patient On (Oxygen Delivery Method): ventilator    O2 Concentration (%): 40        CONSTITUTIONAL:  ill appearing    ENT:   Airway patent,   ET     EYES:   Pupils equal,   Round and reactive to light.    CARDIAC:   Normal rate,     RESPIRATORY:   No wheezing  Reduced bs BL     GASTROINTESTINAL:  Abdomen soft,   Non-tender,     MUSCULOSKELETAL:   Range of motion limited     NEUROLOGICAL:   follows commands when off  sedation     SKIN:   Skin normal color for race,       02-09-25 @ 07:01  -  02-10-25 @ 07:00  --------------------------------------------------------  IN:    Dexmedetomidine: 371 mL    Enteral Tube Flush: 200 mL    Free Water: 100 mL    Heparin: 180 mL    IV PiggyBack: 300 mL    Jevity: 1200 mL    Lactated Ringers Bolus: 500 mL  Total IN: 2851 mL    OUT:    Drain (mL): 115 mL    Indwelling Catheter - Urethral (mL): 1150 mL  Total OUT: 1265 mL    Total NET: 1586 mL      02-10-25 @ 07:01  -  02-10-25 @ 08:52  --------------------------------------------------------  IN:    Dexmedetomidine: 5 mL    PRBCs (Packed Red Blood Cells): 339 mL  Total IN: 344 mL    OUT:    Indwelling Catheter - Urethral (mL): 40 mL  Total OUT: 40 mL    Total NET: 304 mL          LABS:                            5.8    6.72  )-----------( 271      ( 10 Feb 2025 04:30 )             18.7                                               02-10    148[H]  |  115[H]  |  61[HH]  ----------------------------<  196[H]  4.3   |  27  |  1.0    Ca    7.2[L]      10 Feb 2025 04:30  Phos  3.3     02-10  Mg     2.6     02-10    TPro  4.1[L]  /  Alb  2.3[L]  /  TBili  0.9  /  DBili  x   /  AST  24  /  ALT  <5  /  AlkPhos  96  02-10      PT/INR - ( 09 Feb 2025 23:35 )   PT: 15.50 sec;   INR: 1.31 ratio         PTT - ( 10 Feb 2025 04:30 )  PTT:59.7 sec                                       Urinalysis Basic - ( 10 Feb 2025 04:30 )    Color: x / Appearance: x / SG: x / pH: x  Gluc: 196 mg/dL / Ketone: x  / Bili: x / Urobili: x   Blood: x / Protein: x / Nitrite: x   Leuk Esterase: x / RBC: x / WBC x   Sq Epi: x / Non Sq Epi: x / Bacteria: x                                                  LIVER FUNCTIONS - ( 10 Feb 2025 04:30 )  Alb: 2.3 g/dL / Pro: 4.1 g/dL / ALK PHOS: 96 U/L / ALT: <5 U/L / AST: 24 U/L / GGT: x                                                  Culture - Blood (collected 08 Feb 2025 17:24)  Source: .Blood BLOOD  Preliminary Report (10 Feb 2025 01:02):    No growth at 24 hours    Culture - Blood (collected 08 Feb 2025 04:58)  Source: .Blood BLOOD  Preliminary Report (09 Feb 2025 17:01):    No growth at 24 hours                                                   Mode: AC/ CMV (Assist Control/ Continuous Mandatory Ventilation)  RR (machine): 16  TV (machine): 400  FiO2: 40  PEEP: 8  ITime: 1  MAP: 12  PIP: 22                                      ABG - ( 10 Feb 2025 03:02 )  pH, Arterial: 7.33  pH, Blood: x     /  pCO2: 52    /  pO2: 102   / HCO3: 27    / Base Excess: 1.3   /  SaO2: 99.2                MEDICATIONS  (STANDING):  carbidopa/levodopa  25/250 2 Tablet(s) Oral three times a day  cefepime   IVPB 2000 milliGRAM(s) IV Intermittent every 12 hours  chlorhexidine 0.12% Liquid 15 milliLiter(s) Oral Mucosa every 12 hours  chlorhexidine 2% Cloths 1 Application(s) Topical <User Schedule>  dexMEDEtomidine Infusion 0.5 MICROgram(s)/kG/Hr (6.39 mL/Hr) IV Continuous <Continuous>  dextrose 5%. 1000 milliLiter(s) (50 mL/Hr) IV Continuous <Continuous>  dextrose 5%. 1000 milliLiter(s) (100 mL/Hr) IV Continuous <Continuous>  dextrose 50% Injectable 25 Gram(s) IV Push once  dextrose 50% Injectable 12.5 Gram(s) IV Push once  dextrose 50% Injectable 25 Gram(s) IV Push once  glucagon  Injectable 1 milliGRAM(s) IntraMuscular once  insulin lispro (ADMELOG) corrective regimen sliding scale   SubCutaneous every 6 hours  insulin lispro Injectable (ADMELOG) 8 Unit(s) SubCutaneous every 6 hours  metroNIDAZOLE    Tablet 500 milliGRAM(s) Oral every 12 hours  nafcillin  IVPB 2 Gram(s) IV Intermittent every 4 hours  nafcillin  IVPB      oseltamivir 30 milliGRAM(s) Oral every 12 hours  pantoprazole   Suspension 40 milliGRAM(s) Oral daily  polyethylene glycol 3350 17 Gram(s) Oral every 12 hours  rasagiline Tablet 1 milliGRAM(s) Oral <User Schedule>  senna 2 Tablet(s) Oral at bedtime    MEDICATIONS  (PRN):  acetaminophen     Tablet .. 650 milliGRAM(s) Oral every 6 hours PRN Temp greater or equal to 38C (100.4F), Mild Pain (1 - 3)  dextrose Oral Gel 15 Gram(s) Oral once PRN Blood Glucose LESS THAN 70 milliGRAM(s)/deciliter  fentaNYL    Injectable 50 MICROGram(s) IV Push every 2 hours PRN Aggitation

## 2025-02-10 NOTE — PROGRESS NOTE ADULT - SUBJECTIVE AND OBJECTIVE BOX
GARETT ESCAMILLA  69y, Male  Allergy: Allergy Status Unknown      LOS  12d    CHIEF COMPLAINT: penumonia (09 Feb 2025 08:56)      INTERVAL EVENTS/HPI  - T(F): , Max: 101.4 (02-09-25 @ 23:00) Fever, no pressors  hgb drop, concern for mass in groin   - WBC Count: 6.72 (02-10-25 @ 04:30)  WBC Count: 6.31 (02-09-25 @ 05:55)     - Creatinine: 1.0 (02-10-25 @ 04:30)  Creatinine: 1.0 (02-09-25 @ 05:55)     -   -   -     ROS  cannot obtain secondary to patient's sedation and/or mental status    VITALS:  T(F): 99.2, Max: 101.4 (02-09-25 @ 23:00)  HR: 90  BP: 144/60  RR: 28Vital Signs Last 24 Hrs  T(C): 37.3 (10 Feb 2025 08:00), Max: 38.6 (09 Feb 2025 23:00)  T(F): 99.2 (10 Feb 2025 08:00), Max: 101.4 (09 Feb 2025 23:00)  HR: 90 (10 Feb 2025 08:00) (80 - 116)  BP: 144/60 (10 Feb 2025 08:00) (88/51 - 175/80)  BP(mean): 86 (10 Feb 2025 08:00) (56 - 114)  RR: 28 (10 Feb 2025 08:00) (16 - 33)  SpO2: 100% (10 Feb 2025 08:00) (97% - 100%)    Parameters below as of 10 Feb 2025 08:00  Patient On (Oxygen Delivery Method): ventilator    O2 Concentration (%): 40    PHYSICAL EXAM:  ***    FH: Non-contributory  Social Hx: Non-contributory    TESTS & MEASUREMENTS:                        5.8    6.72  )-----------( 271      ( 10 Feb 2025 04:30 )             18.7     02-10    148[H]  |  115[H]  |  61[HH]  ----------------------------<  196[H]  4.3   |  27  |  1.0    Ca    7.2[L]      10 Feb 2025 04:30  Phos  3.3     02-10  Mg     2.6     02-10    TPro  4.1[L]  /  Alb  2.3[L]  /  TBili  0.9  /  DBili  x   /  AST  24  /  ALT  <5  /  AlkPhos  96  02-10      LIVER FUNCTIONS - ( 10 Feb 2025 04:30 )  Alb: 2.3 g/dL / Pro: 4.1 g/dL / ALK PHOS: 96 U/L / ALT: <5 U/L / AST: 24 U/L / GGT: x           Urinalysis Basic - ( 10 Feb 2025 04:30 )    Color: x / Appearance: x / SG: x / pH: x  Gluc: 196 mg/dL / Ketone: x  / Bili: x / Urobili: x   Blood: x / Protein: x / Nitrite: x   Leuk Esterase: x / RBC: x / WBC x   Sq Epi: x / Non Sq Epi: x / Bacteria: x        Culture - Blood (collected 02-08-25 @ 17:24)  Source: .Blood BLOOD  Preliminary Report (02-10-25 @ 01:02):    No growth at 24 hours    Culture - Blood (collected 02-08-25 @ 04:58)  Source: .Blood BLOOD  Preliminary Report (02-09-25 @ 17:01):    No growth at 24 hours    Culture - Acid Fast - Bronchial w/Smear (collected 02-04-25 @ 09:29)  Source: Bronch Wash  Preliminary Report (02-05-25 @ 23:07):    Culture is being performed.    Culture - Bronchial (collected 02-04-25 @ 09:29)  Source: Bronch Wash  Gram Stain (02-05-25 @ 07:26):    Few-moderate polymorphonuclear leukocytes seen per low power field    No Squamous epithelial cells seen per low power field    No organisms seen per oil power field  Final Report (02-06-25 @ 11:36):    Commensal halina consistent with body site    Culture - Sputum (collected 02-04-25 @ 09:00)  Source: ET Tube ET Tube  Gram Stain (02-04-25 @ 19:37):    Numerous polymorphonuclear leukocytes per low power field    Rare Squamous epithelial cells per low power field    Rare Yeast like cells per oil power field  Final Report (02-05-25 @ 22:23):    Commensal halina consistent with body site    Culture - Blood (collected 02-02-25 @ 04:54)  Source: .Blood BLOOD  Final Report (02-07-25 @ 12:00):    No growth at 5 days    Culture - Blood (collected 01-31-25 @ 17:22)  Source: .Blood BLOOD  Final Report (02-05-25 @ 23:07):    No growth at 5 days    Culture - Blood (collected 01-31-25 @ 17:22)  Source: .Blood BLOOD  Final Report (02-05-25 @ 23:07):    No growth at 5 days    Urinalysis with Rflx Culture (collected 01-29-25 @ 16:48)    Culture - Blood (collected 01-29-25 @ 10:09)  Source: .Blood BLOOD  Gram Stain (01-30-25 @ 15:40):    Growth in aerobic bottle: Gram Positive Cocci in Clusters    Growth in anaerobic bottle: Gram Positive Cocci in Clusters  Final Report (02-01-25 @ 07:51):    Growth in aerobic and anaerobic bottles: Staphylococcus aureus    Direct identification is available within approximately 3-5    hours either by Blood Panel Multiplexed PCR or Direct    MALDI-TOF. Details: [email or web address]  Organism: Blood Culture PCR  Staphylococcus aureus (02-01-25 @ 07:51)  Organism: Staphylococcus aureus (02-01-25 @ 07:51)      Method Type: KARTIK      -  Ceftaroline: S <=0.5      -  Clindamycin: R <=0.25 This isolate is presumed to be clindamycin resistant based on detection of inducible resistance. Clindamycin may still be effective in some patients.      -  Erythromycin: R >4      -  Gentamicin: S <=4 Should not be used as monotherapy      -  Oxacillin: S <=0.25 Oxacillin predicts susceptibility for dicloxacillin, methicillin, and nafcillin      -  Rifampin: S <=1 Should not be used as monotherapy      -  Tetracycline: S <=4      -  Trimethoprim/Sulfamethoxazole: S <=0.5/9.5      -  Vancomycin: S 1  Organism: Blood Culture PCR (02-01-25 @ 07:51)      Method Type: PCR      -  Methicillin SENSITIVE Staphylococcus aureus (MSSA): Detec Any isolate of Staphylococcus aureus from a blood culture is NOT considered a contaminant.    Culture - Blood (collected 01-29-25 @ 10:09)  Source: .Blood BLOOD  Gram Stain (02-01-25 @ 00:35):    Growth in aerobic bottle: Gram Positive Cocci in Clusters    Growth in anaerobic bottle: Gram Positive Cocci in Clusters  Final Report (02-01-25 @ 16:44):    Growth in aerobic and anaerobic bottles: Staphylococcus aureus    See previous culture [id number]            INFECTIOUS DISEASES TESTING  MRSA PCR Result.: Negative (01-30-25 @ 13:40)  Procalcitonin: 6.93 (01-30-25 @ 07:26)  Legionella Antigen, Urine: Negative (01-29-25 @ 15:49)  Rapid RVP Result: Detected (01-29-25 @ 10:30)      INFLAMMATORY MARKERS      RADIOLOGY & ADDITIONAL TESTS:  I have personally reviewed the last available Chest xray  CXR      CT      CARDIOLOGY TESTING  12 Lead ECG:   Ventricular Rate 91 BPM    Atrial Rate 91 BPM    P-R Interval 158 ms    QRS Duration 74 ms    Q-T Interval 366 ms    QTC Calculation(Bazett) 450 ms    P Axis 77 degrees    R Axis 64 degrees    T Axis 68 degrees    Diagnosis Line Normal sinus rhythm  Biatrial enlargement  Abnormal ECG    Confirmed by LILLIE LUND, [facility] ([id number]) on 1/29/2025 12:29:13 PM (01-29-25 @ 10:39)      MEDICATIONS  carbidopa/levodopa  25/250 2  cefepime   IVPB 2000  chlorhexidine 0.12% Liquid 15  chlorhexidine 2% Cloths 1  dexMEDEtomidine Infusion 0.5  dextrose 5%. 1000  dextrose 5%. 1000  dextrose 50% Injectable 25  dextrose 50% Injectable 12.5  dextrose 50% Injectable 25  glucagon  Injectable 1  insulin lispro (ADMELOG) corrective regimen sliding scale   insulin lispro Injectable (ADMELOG) 8  metroNIDAZOLE    Tablet 500  nafcillin  IVPB 2  nafcillin  IVPB   oseltamivir 30  pantoprazole   Suspension 40  polyethylene glycol 3350 17  rasagiline Tablet 1  senna 2      WEIGHT  Weight (kg): 51.1 (02-05-25 @ 13:52)  Creatinine: 1.0 mg/dL (02-10-25 @ 04:30)      ANTIBIOTICS:  cefepime   IVPB 2000 milliGRAM(s) IV Intermittent every 12 hours  metroNIDAZOLE    Tablet 500 milliGRAM(s) Oral every 12 hours  nafcillin  IVPB 2 Gram(s) IV Intermittent every 4 hours  nafcillin  IVPB      oseltamivir 30 milliGRAM(s) Oral every 12 hours      All available historical records have been reviewed   GARETT ESCAMILLA  69y, Male  Allergy: Allergy Status Unknown      LOS  12d    CHIEF COMPLAINT: penumonia (09 Feb 2025 08:56)      INTERVAL EVENTS/HPI  - T(F): , Max: 101.4 (02-09-25 @ 23:00) Fever, no pressors  hgb drop, concern for mass in groin   - WBC Count: 6.72 (02-10-25 @ 04:30)  WBC Count: 6.31 (02-09-25 @ 05:55)     - Creatinine: 1.0 (02-10-25 @ 04:30)  Creatinine: 1.0 (02-09-25 @ 05:55)     -   -   -     ROS  cannot obtain secondary to patient's sedation and/or mental status    VITALS:  T(F): 99.2, Max: 101.4 (02-09-25 @ 23:00)  HR: 90  BP: 144/60  RR: 28Vital Signs Last 24 Hrs  T(C): 37.3 (10 Feb 2025 08:00), Max: 38.6 (09 Feb 2025 23:00)  T(F): 99.2 (10 Feb 2025 08:00), Max: 101.4 (09 Feb 2025 23:00)  HR: 90 (10 Feb 2025 08:00) (80 - 116)  BP: 144/60 (10 Feb 2025 08:00) (88/51 - 175/80)  BP(mean): 86 (10 Feb 2025 08:00) (56 - 114)  RR: 28 (10 Feb 2025 08:00) (16 - 33)  SpO2: 100% (10 Feb 2025 08:00) (97% - 100%)    Parameters below as of 10 Feb 2025 08:00  Patient On (Oxygen Delivery Method): ventilator    O2 Concentration (%): 40    PHYSICAL EXAM:  General: intubated chronically ill appearing   HEENT: NCAT  Neck: supple  CV: RRR  Lungs: symmetric chest expansion, decreased BS at bases  Abd: Soft perc haydee sanguinous   R groin induration/mass  Extr: wwp  Skin: no rash  Neuro: sedated  Lines: no phlebitis     FH: Non-contributory  Social Hx: Non-contributory    TESTS & MEASUREMENTS:                        5.8    6.72  )-----------( 271      ( 10 Feb 2025 04:30 )             18.7     02-10    148[H]  |  115[H]  |  61[HH]  ----------------------------<  196[H]  4.3   |  27  |  1.0    Ca    7.2[L]      10 Feb 2025 04:30  Phos  3.3     02-10  Mg     2.6     02-10    TPro  4.1[L]  /  Alb  2.3[L]  /  TBili  0.9  /  DBili  x   /  AST  24  /  ALT  <5  /  AlkPhos  96  02-10      LIVER FUNCTIONS - ( 10 Feb 2025 04:30 )  Alb: 2.3 g/dL / Pro: 4.1 g/dL / ALK PHOS: 96 U/L / ALT: <5 U/L / AST: 24 U/L / GGT: x           Urinalysis Basic - ( 10 Feb 2025 04:30 )    Color: x / Appearance: x / SG: x / pH: x  Gluc: 196 mg/dL / Ketone: x  / Bili: x / Urobili: x   Blood: x / Protein: x / Nitrite: x   Leuk Esterase: x / RBC: x / WBC x   Sq Epi: x / Non Sq Epi: x / Bacteria: x        Culture - Blood (collected 02-08-25 @ 17:24)  Source: .Blood BLOOD  Preliminary Report (02-10-25 @ 01:02):    No growth at 24 hours    Culture - Blood (collected 02-08-25 @ 04:58)  Source: .Blood BLOOD  Preliminary Report (02-09-25 @ 17:01):    No growth at 24 hours    Culture - Acid Fast - Bronchial w/Smear (collected 02-04-25 @ 09:29)  Source: Bronch Wash  Preliminary Report (02-05-25 @ 23:07):    Culture is being performed.    Culture - Bronchial (collected 02-04-25 @ 09:29)  Source: Bronch Wash  Gram Stain (02-05-25 @ 07:26):    Few-moderate polymorphonuclear leukocytes seen per low power field    No Squamous epithelial cells seen per low power field    No organisms seen per oil power field  Final Report (02-06-25 @ 11:36):    Commensal halina consistent with body site    Culture - Sputum (collected 02-04-25 @ 09:00)  Source: ET Tube ET Tube  Gram Stain (02-04-25 @ 19:37):    Numerous polymorphonuclear leukocytes per low power field    Rare Squamous epithelial cells per low power field    Rare Yeast like cells per oil power field  Final Report (02-05-25 @ 22:23):    Commensal halina consistent with body site    Culture - Blood (collected 02-02-25 @ 04:54)  Source: .Blood BLOOD  Final Report (02-07-25 @ 12:00):    No growth at 5 days    Culture - Blood (collected 01-31-25 @ 17:22)  Source: .Blood BLOOD  Final Report (02-05-25 @ 23:07):    No growth at 5 days    Culture - Blood (collected 01-31-25 @ 17:22)  Source: .Blood BLOOD  Final Report (02-05-25 @ 23:07):    No growth at 5 days    Urinalysis with Rflx Culture (collected 01-29-25 @ 16:48)    Culture - Blood (collected 01-29-25 @ 10:09)  Source: .Blood BLOOD  Gram Stain (01-30-25 @ 15:40):    Growth in aerobic bottle: Gram Positive Cocci in Clusters    Growth in anaerobic bottle: Gram Positive Cocci in Clusters  Final Report (02-01-25 @ 07:51):    Growth in aerobic and anaerobic bottles: Staphylococcus aureus    Direct identification is available within approximately 3-5    hours either by Blood Panel Multiplexed PCR or Direct    MALDI-TOF. Details: [email or web address]  Organism: Blood Culture PCR  Staphylococcus aureus (02-01-25 @ 07:51)  Organism: Staphylococcus aureus (02-01-25 @ 07:51)      Method Type: KARTIK      -  Ceftaroline: S <=0.5      -  Clindamycin: R <=0.25 This isolate is presumed to be clindamycin resistant based on detection of inducible resistance. Clindamycin may still be effective in some patients.      -  Erythromycin: R >4      -  Gentamicin: S <=4 Should not be used as monotherapy      -  Oxacillin: S <=0.25 Oxacillin predicts susceptibility for dicloxacillin, methicillin, and nafcillin      -  Rifampin: S <=1 Should not be used as monotherapy      -  Tetracycline: S <=4      -  Trimethoprim/Sulfamethoxazole: S <=0.5/9.5      -  Vancomycin: S 1  Organism: Blood Culture PCR (02-01-25 @ 07:51)      Method Type: PCR      -  Methicillin SENSITIVE Staphylococcus aureus (MSSA): Detec Any isolate of Staphylococcus aureus from a blood culture is NOT considered a contaminant.    Culture - Blood (collected 01-29-25 @ 10:09)  Source: .Blood BLOOD  Gram Stain (02-01-25 @ 00:35):    Growth in aerobic bottle: Gram Positive Cocci in Clusters    Growth in anaerobic bottle: Gram Positive Cocci in Clusters  Final Report (02-01-25 @ 16:44):    Growth in aerobic and anaerobic bottles: Staphylococcus aureus    See previous culture [id number]            INFECTIOUS DISEASES TESTING  MRSA PCR Result.: Negative (01-30-25 @ 13:40)  Procalcitonin: 6.93 (01-30-25 @ 07:26)  Legionella Antigen, Urine: Negative (01-29-25 @ 15:49)  Rapid RVP Result: Detected (01-29-25 @ 10:30)      INFLAMMATORY MARKERS      RADIOLOGY & ADDITIONAL TESTS:  I have personally reviewed the last available Chest xray  CXR      CT      CARDIOLOGY TESTING  12 Lead ECG:   Ventricular Rate 91 BPM    Atrial Rate 91 BPM    P-R Interval 158 ms    QRS Duration 74 ms    Q-T Interval 366 ms    QTC Calculation(Bazett) 450 ms    P Axis 77 degrees    R Axis 64 degrees    T Axis 68 degrees    Diagnosis Line Normal sinus rhythm  Biatrial enlargement  Abnormal ECG    Confirmed by LILLIE LUND, [facility] ([id number]) on 1/29/2025 12:29:13 PM (01-29-25 @ 10:39)      MEDICATIONS  carbidopa/levodopa  25/250 2  cefepime   IVPB 2000  chlorhexidine 0.12% Liquid 15  chlorhexidine 2% Cloths 1  dexMEDEtomidine Infusion 0.5  dextrose 5%. 1000  dextrose 5%. 1000  dextrose 50% Injectable 25  dextrose 50% Injectable 12.5  dextrose 50% Injectable 25  glucagon  Injectable 1  insulin lispro (ADMELOG) corrective regimen sliding scale   insulin lispro Injectable (ADMELOG) 8  metroNIDAZOLE    Tablet 500  nafcillin  IVPB 2  nafcillin  IVPB   oseltamivir 30  pantoprazole   Suspension 40  polyethylene glycol 3350 17  rasagiline Tablet 1  senna 2      WEIGHT  Weight (kg): 51.1 (02-05-25 @ 13:52)  Creatinine: 1.0 mg/dL (02-10-25 @ 04:30)      ANTIBIOTICS:  cefepime   IVPB 2000 milliGRAM(s) IV Intermittent every 12 hours  metroNIDAZOLE    Tablet 500 milliGRAM(s) Oral every 12 hours  nafcillin  IVPB 2 Gram(s) IV Intermittent every 4 hours  nafcillin  IVPB      oseltamivir 30 milliGRAM(s) Oral every 12 hours      All available historical records have been reviewed

## 2025-02-10 NOTE — PROGRESS NOTE ADULT - SUBJECTIVE AND OBJECTIVE BOX
SUBJECTIVE / OVERNIGHT EVENTS  Patient intubated & sedated. Overnight labs significant for Hb drop to 5.8>> will get 2 units blood.  CT pelvis came back +ve for large hematoma    MEDICATIONS  carbidopa/levodopa  25/250 2 Tablet(s) Oral three times a day  cefepime   IVPB 2000 milliGRAM(s) IV Intermittent every 12 hours  chlorhexidine 0.12% Liquid 15 milliLiter(s) Oral Mucosa every 12 hours  chlorhexidine 2% Cloths 1 Application(s) Topical <User Schedule>  dexMEDEtomidine Infusion 0.5 MICROgram(s)/kG/Hr IV Continuous <Continuous>  dextrose 5%. 1000 milliLiter(s) IV Continuous <Continuous>  dextrose 5%. 1000 milliLiter(s) IV Continuous <Continuous>  dextrose 50% Injectable 25 Gram(s) IV Push once  dextrose 50% Injectable 12.5 Gram(s) IV Push once  dextrose 50% Injectable 25 Gram(s) IV Push once  glucagon  Injectable 1 milliGRAM(s) IntraMuscular once  insulin lispro (ADMELOG) corrective regimen sliding scale   SubCutaneous every 6 hours  insulin lispro Injectable (ADMELOG) 8 Unit(s) SubCutaneous every 6 hours  metroNIDAZOLE    Tablet 500 milliGRAM(s) Oral every 12 hours  nafcillin  IVPB 2 Gram(s) IV Intermittent every 4 hours  nafcillin  IVPB      oseltamivir 30 milliGRAM(s) Oral every 12 hours  pantoprazole   Suspension 40 milliGRAM(s) Oral daily  polyethylene glycol 3350 17 Gram(s) Oral every 12 hours  rasagiline Tablet 1 milliGRAM(s) Oral <User Schedule>  senna 2 Tablet(s) Oral at bedtime    acetaminophen     Tablet .. 650 milliGRAM(s) Oral every 6 hours PRN Temp greater or equal to 38C (100.4F), Mild Pain (1 - 3)  dextrose Oral Gel 15 Gram(s) Oral once PRN Blood Glucose LESS THAN 70 milliGRAM(s)/deciliter  fentaNYL    Injectable 50 MICROGram(s) IV Push every 2 hours PRN Aggitation    VITALS /  EXAM    T(C): 36.7 (02-10-25 @ 09:00), Max: 38.6 (02-09-25 @ 23:00)  HR: 71 (02-10-25 @ 09:00) (71 - 116)  BP: 131/64 (02-10-25 @ 09:00) (88/51 - 175/80)  RR: 17 (02-10-25 @ 09:00) (16 - 33)  SpO2: 100% (02-10-25 @ 09:00) (97% - 100%)  POCT Blood Glucose.: 187 mg/dL (02-10-25 @ 05:07)  POCT Blood Glucose.: 227 mg/dL (02-10-25 @ 05:06)  POCT Blood Glucose.: 164 mg/dL (02-09-25 @ 23:16)  POCT Blood Glucose.: 146 mg/dL (02-09-25 @ 17:41)  POCT Blood Glucose.: 139 mg/dL (02-09-25 @ 13:32)    GENERAL: NAD, sedated  CHEST/LUNG: Clear to auscultation bilaterally; No wheezes, rales or rhonchi  HEART: Regular rate and rhythm; No murmurs, rubs, or gallops  ABDOMEN: Soft, Nontender, Nondistended; Bowel sounds present, no masses.  EXTREMITIES:  No clubbing, cyanosis, or edema    I's & O's     02-09-25 @ 07:01  -  02-10-25 @ 07:00  --------------------------------------------------------  IN:    Dexmedetomidine: 371 mL    Enteral Tube Flush: 200 mL    Free Water: 100 mL    Heparin: 180 mL    IV PiggyBack: 300 mL    Jevity: 1200 mL    Lactated Ringers Bolus: 500 mL  Total IN: 2851 mL    OUT:    Drain (mL): 115 mL    Indwelling Catheter - Urethral (mL): 1150 mL  Total OUT: 1265 mL    Total NET: 1586 mL      02-10-25 @ 07:01  -  02-10-25 @ 10:12  --------------------------------------------------------  IN:    Dexmedetomidine: 5 mL    PRBCs (Packed Red Blood Cells): 339 mL  Total IN: 344 mL    OUT:    Indwelling Catheter - Urethral (mL): 40 mL  Total OUT: 40 mL    Total NET: 304 mL        LABS             5.8    6.72  )-----------( 271      ( 02-10-25 @ 04:30 )             18.7     148  |  115  |  61  -------------------------<  196   02-10-25 @ 04:30  4.3  |  27  |  1.0    Ca      7.2     02-10-25 @ 04:30  Phos   3.3     02-10-25 @ 04:30  Mg     2.6     02-10-25 @ 04:30    TPro  4.1  /  Alb  2.3  /  TBili  0.9  /  DBili  x   /  AST  24  /  ALT  <5  /  AlkPhos  96  /  GGT  x     02-10-25 @ 04:30    PT/INR - ( 02-09-25 @ 23:35 )   PT: 15.50 sec[H];   INR: 1.31 ratio[H]  PTT - ( 02-10-25 @ 04:30 )  PTT:59.7 sec    Urinalysis Basic - ( 10 Feb 2025 04:30 )    Color: x / Appearance: x / SG: x / pH: x  Gluc: 196 mg/dL / Ketone: x  / Bili: x / Urobili: x   Blood: x / Protein: x / Nitrite: x   Leuk Esterase: x / RBC: x / WBC x   Sq Epi: x / Non Sq Epi: x / Bacteria: x      MICRO / IMAGING / CARDIOLOGY  Telemetry: Reviewed   EKG: Reviewed    CULTURES    Culture - Blood (collected 02-08-25 @ 17:24)  Source: .Blood BLOOD  Preliminary Report:    No growth at 24 hours    Culture - Blood (collected 02-08-25 @ 04:58)  Source: .Blood BLOOD  Preliminary Report:    No growth at 24 hours      IMAGING  PACS Image:  (02-10-25 @ 07:59)  PACS Image:  (02-10-25 @ 04:36)  PACS Image:  (02-10-25 @ 04:36)  PACS Image:  (02-09-25 @ 22:30)  PACS Image:  (02-09-25 @ 12:11)  PACS Image:  (02-09-25 @ 06:43)    CARDIOLOGY

## 2025-02-10 NOTE — PROGRESS NOTE ADULT - ASSESSMENT
ASSESSMENT  69-year-old male past medical history of hypertension, Parkinson's, CAD presents for shortness of breath and cough of 1 day duration. History was obtained from patient's wife who noted that the patient has been having decreased po intake, activity, and unintentional weight loss for some time. Howver, yesterday he started having cough and shortness of breath. Found to have Flu, PNA and MSSA bacteremia     IMPRESSION  #Fevers    2/8 BCX NGTD     CXR stable opacities   #Intubated on mechanical ventilation , septic shock requiring pressors   #Pulmonary artery thrombus on CTA    2/ 2 CTA There is a focal filling defect within the main pulmonary artery at site of the ductus arteriosus suspicious for thrombus.  Increased opacities/debris within the central bronchi with occlusion of the right lower lobe central bronchi. There is increased consolidation   within the right lower lobe.  #Suspected cholecystitis   RUQ GB sludge     2/5 Successful placement of 8Fr perc haydee tube. 250cc of dark bile aspirated from GB.  #Influenza +, MSSA bacteremia , suspect MSSA superimposed PNA    2/4 bronch   No organisms seen per oil power field    2/ 2 BCX NGTD     1/31 BCX NGTD     1/29 BCX MSSA 3/4 bottles    MRSA PCR Result.: Negative (01-30-25 @ 13:40)    Procalcitonin: 6.93 ng/mL (01-30-25 @ 07:26)    Legionella Antigen, Urine: Negative (01-29-25 @ 15:49)    Streptococcus pneumoniae Ag, Ur Result: Negative (01-29-25 @ 15:49)    CT Multifocal bilateral consolidative opacities as above, suggestive of multifocal pneumonia in the appropriate clinical context.  #Lactic acidosis  #Hypernatremia   #Severe protein calorie malnutrition  BMI (kg/m2): 17  #PD  #Immunodeficiency secondary to Senescence which could results in poor clinical outcomes  #Abx allergy: Allergy Status Unknown    #LYNNE Creatinine: 1.8 mg/dL (02-06-25 @ 04:57) 28 mL/min  Creatinine clearance for underweight patient (BMI 16.7 kg/m²), calculated using actual body weight (no adjustment).    Height (cm): 175.3 (01-30-25 @ 13:00)  Weight (kg): 52.2 (01-29-25 @ 10:14)    RECOMMENDATIONS  - SARS-CoV-2 PCR   - hgb drop workup per primary team   - cefepime   IVPB 2000 milliGRAM(s) IV Intermittent every 12 hours  - Nafcillin 2g q4h IV to target MSSA in blood  - metroNIDAZOLE  500mg TID PO   - TTE no vegetations , guidelines recommend MARY as community acquired bacteremia once stable   - Trend WBC, Cr  - Grave prognosis, GOC     If any questions, please send a message or call on [facility] Teams  Please continue to update ID with any pertinent new laboratory, radiographic findings, or change in clinical status ASSESSMENT  69-year-old male past medical history of hypertension, Parkinson's, CAD presents for shortness of breath and cough of 1 day duration. History was obtained from patient's wife who noted that the patient has been having decreased po intake, activity, and unintentional weight loss for some time. Howver, yesterday he started having cough and shortness of breath. Found to have Flu, PNA and MSSA bacteremia     IMPRESSION  #Fevers    2/8 BCX NGTD     CXR stable opacities   #Intubated on mechanical ventilation , septic shock requiring pressors   #Pulmonary artery thrombus on CTA    2/ 2 CTA There is a focal filling defect within the main pulmonary artery at site of the ductus arteriosus suspicious for thrombus.  Increased opacities/debris within the central bronchi with occlusion of the right lower lobe central bronchi. There is increased consolidation   within the right lower lobe.  #Suspected cholecystitis   RUQ GB sludge     2/5 Successful placement of 8Fr perc haydee tube. 250cc of dark bile aspirated from GB.  #Influenza +, MSSA bacteremia , suspect MSSA superimposed PNA    2/4 bronch   No organisms seen per oil power field    2/ 2 BCX NGTD     1/31 BCX NGTD     1/29 BCX MSSA 3/4 bottles    MRSA PCR Result.: Negative (01-30-25 @ 13:40)    Procalcitonin: 6.93 ng/mL (01-30-25 @ 07:26)    Legionella Antigen, Urine: Negative (01-29-25 @ 15:49)    Streptococcus pneumoniae Ag, Ur Result: Negative (01-29-25 @ 15:49)    CT Multifocal bilateral consolidative opacities as above, suggestive of multifocal pneumonia in the appropriate clinical context.  #Lactic acidosis  #Hypernatremia   #Severe protein calorie malnutrition  BMI (kg/m2): 17  #PD  #Immunodeficiency secondary to Senescence which could results in poor clinical outcomes  #Abx allergy: Allergy Status Unknown    #LYNNE Creatinine: 1.8 mg/dL (02-06-25 @ 04:57) 28 mL/min  Creatinine clearance for underweight patient (BMI 16.7 kg/m²), calculated using actual body weight (no adjustment).    Height (cm): 175.3 (01-30-25 @ 13:00)  Weight (kg): 52.2 (01-29-25 @ 10:14)    RECOMMENDATIONS  - SARS-CoV-2 PCR   - anemia workup per primary team   - Trend fever curve  - hgb drop workup per primary team   - cefepime   IVPB 2000 milliGRAM(s) IV Intermittent every 12 hours  - Nafcillin 2g q4h IV to target MSSA in blood  - metroNIDAZOLE  500mg TID PO   - TTE no vegetations , guidelines recommend MARY as community acquired bacteremia once stable   - Trend WBC, Cr  - Grave prognosis, GOC     If any questions, please send a message or call on [facility] Teams  Please continue to update ID with any pertinent new laboratory, radiographic findings, or change in clinical status

## 2025-02-10 NOTE — PROGRESS NOTE ADULT - ASSESSMENT
69-year-old male past medical history of hypertension, Parkinson's, CAD presents for shortness of breath and cough of 1 day duration. History was obtained from patient's wife who noted that the patient has been having decreased po intake, activity, and unintentional weight loss for some time. However yesterday he started having cough and shortness of breath. Admitted to MICU for AHRF and sepsis 2/2 to mutilobar PNA.    #Acute Hypoxic Respiratory Failure likely 2/2 CAP/aspiration and flu  #Toxic Metabolic Encephalopathy likely 2/2 CAP/aspiration and flu  #Influenza A positive  #MSSA bacteremia- resolved  #Sepsis POA  - Aspiration precautions.    - patient inc WOB, worsening alkalosis, obtunded-intubated for airway protection   -ID: if patient decompensates, dc cefepime/metro, start patricia 1g   - cardio: patient critically sick, not a candidate for surgery therefore no MARY, can c/w abx for IE as per ID  - s/p bronch, f/u cultures   - ID: tamiflu 30 mg daily: last dose on 2/10, cefepime 1g q12h,  metro and nafcillin       #PE on CTA  #large R groin intramuscular hematoma  #Periventricular bleed on CTH, stable  - CTH. noted with right periventricular bleed, stable.   - Neuro on board  - Continue Anti parkinson's meds.    - CTA chest to r/o PE: focal filling defect within main pulm artery   - repeat CT head: stable intraventricular hemorrhage >> repeat on 2/9 after on full AC for PE: stable  -CT pelvis:  Large intramuscular hematoma in the medial right thigh measuring approximately 9.8 x 6.4 x 17 cm.  -holding AC      #Elevated Bilirubin-improving   #Leukocytosis-improving   #Cholangitis vs cholecystitis s/o perc haydee  - F/u RUQ US: distended GB with sludge, GB polyp   - IR: perc haydee 2/5, drained 250 cc dark bile, 450 cc output overnight. F/u GB drainage cultures     #Constipation  - senna and miralax  - c/w lactulose     #Possible Mural thrombus in PA  #CAD  - Avoid overload  - C/w LR at 50  - TTE: EF 56%, G1DD, mild-mod MR, mod TR, mild AL, mild pHTN  - Cardio recs appreciated       - Patient is poor candidate for MARY       - full endocarditis abx course    #MISC  - DVT ppx: SCDs  - GI ppx: not needed  - Activity: as tolerated  - Diet: NPO w/ tube feed  ongoing pending GOC/palliative

## 2025-02-10 NOTE — PROGRESS NOTE ADULT - ASSESSMENT
IMPRESSION:    Acute Hypoxic Respiratory Failure   Toxic Metabolic Encephalopathy  CAP likely aspiration   Influenza A treated   MSSA bacteremia repeat CX -  Sepsis POA  LYNNE improving  cholecystitis sp percutaneous haydee  Periventricular bleed  Suspected Mural thrombus in PA  HO Parkinson's Disease   HO CAD    PLAN:    CNS:  CTH Noted with right periventricular bleed stable.  Continue Anti parkinson's. CTH stable.       HEENT: Oral care.  ETT duration: 9 days.     PULMONARY:  No change in vent settings. PE on IV heparin; adjust to therapeutic level. SBT     CARDIOVASCULAR:  Avoid overload.  NO MARY per cardiology. Keep even balance     GI: GI prophylaxis.  NGT feeds as tolerated. S/P perc cholecystostomy. Bowel regimen     RENAL:  Replete K. Mars in place for retention. Kidney function stable.     INFECTIOUS DISEASE: Cultures noted.  Tamiflu end date today.  ABX per ID.  nafcillin, cefepime, flagyl.  Duration per ID.     HEMATOLOGICAL: IV heparin back on. Change to make sure its therapeutic. CTH on IV heparin noted.  Monitor CBC and coags      ENDOCRINE:  Follow up FS.  Insulin protocol if needed.    MUSCULOSKELETAL: Bed rest.  Off loading. Wound care for DTIs.     Full code  MICU  palliative care follow up   very poor prognosis               IMPRESSION:    Acute Hypoxic Respiratory Failure   Toxic Metabolic Encephalopathy  CAP likely aspiration   Influenza A treated   MSSA bacteremia repeat CX -  Sepsis POA  LYNNE improving  Thigh hematoma   cholecystitis sp percutaneous haydee  Periventricular bleed  Suspected Mural thrombus in PA  HO Parkinson's Disease   HO CAD    PLAN:    CNS:  CTH Noted with right periventricular bleed stable.  Continue Anti parkinson's. CTH stable.       HEENT: Oral care.  ETT duration: 9 days.     PULMONARY:  No change in vent settings. PE on IV heparin; adjust to therapeutic level. SBT     CARDIOVASCULAR:  Avoid overload.  NO MARY per cardiology. Keep even balance     GI: GI prophylaxis.  NGT feeds as tolerated. S/P perc cholecystostomy. Bowel regimen     RENAL:  Replete K. Mars in place for retention. Kidney function stable.     INFECTIOUS DISEASE: Cultures noted.  Tamiflu end date today.  ABX per ID.  nafcillin, cefepime, flagyl.  Duration per ID.     HEMATOLOGICAL: Hold AC.  Monitor CBC and coags.  Keep Hg > 7     ENDOCRINE:  Follow up FS.  Insulin protocol if needed.    MUSCULOSKELETAL: Bed rest.  Off loading. Wound care for DTIs.     Full code  MICU  palliative care follow up   very poor prognosis

## 2025-02-11 LAB
ALBUMIN SERPL ELPH-MCNC: 2.3 G/DL — LOW (ref 3.5–5.2)
ALP SERPL-CCNC: 99 U/L — SIGNIFICANT CHANGE UP (ref 30–115)
ALT FLD-CCNC: <5 U/L — SIGNIFICANT CHANGE UP (ref 0–41)
ANION GAP SERPL CALC-SCNC: 11 MMOL/L — SIGNIFICANT CHANGE UP (ref 7–14)
ANION GAP SERPL CALC-SCNC: 7 MMOL/L — SIGNIFICANT CHANGE UP (ref 7–14)
AST SERPL-CCNC: 26 U/L — SIGNIFICANT CHANGE UP (ref 0–41)
BASOPHILS # BLD AUTO: 0.02 K/UL — SIGNIFICANT CHANGE UP (ref 0–0.2)
BASOPHILS NFR BLD AUTO: 0.3 % — SIGNIFICANT CHANGE UP (ref 0–1)
BILIRUB SERPL-MCNC: 0.9 MG/DL — SIGNIFICANT CHANGE UP (ref 0.2–1.2)
BUN SERPL-MCNC: 57 MG/DL — HIGH (ref 10–20)
BUN SERPL-MCNC: 63 MG/DL — CRITICAL HIGH (ref 10–20)
CALCIUM SERPL-MCNC: 7.1 MG/DL — LOW (ref 8.4–10.5)
CALCIUM SERPL-MCNC: 7.4 MG/DL — LOW (ref 8.4–10.5)
CHLORIDE SERPL-SCNC: 107 MMOL/L — SIGNIFICANT CHANGE UP (ref 98–110)
CHLORIDE SERPL-SCNC: 113 MMOL/L — HIGH (ref 98–110)
CO2 SERPL-SCNC: 23 MMOL/L — SIGNIFICANT CHANGE UP (ref 17–32)
CO2 SERPL-SCNC: 25 MMOL/L — SIGNIFICANT CHANGE UP (ref 17–32)
CREAT SERPL-MCNC: 0.9 MG/DL — SIGNIFICANT CHANGE UP (ref 0.7–1.5)
CREAT SERPL-MCNC: 0.9 MG/DL — SIGNIFICANT CHANGE UP (ref 0.7–1.5)
EGFR: 92 ML/MIN/1.73M2 — SIGNIFICANT CHANGE UP
EOSINOPHIL # BLD AUTO: 0.03 K/UL — SIGNIFICANT CHANGE UP (ref 0–0.7)
EOSINOPHIL NFR BLD AUTO: 0.4 % — SIGNIFICANT CHANGE UP (ref 0–8)
GAS PNL BLDA: SIGNIFICANT CHANGE UP
GLUCOSE BLDC GLUCOMTR-MCNC: 105 MG/DL — HIGH (ref 70–99)
GLUCOSE BLDC GLUCOMTR-MCNC: 177 MG/DL — HIGH (ref 70–99)
GLUCOSE BLDC GLUCOMTR-MCNC: 253 MG/DL — HIGH (ref 70–99)
GLUCOSE BLDC GLUCOMTR-MCNC: 288 MG/DL — HIGH (ref 70–99)
GLUCOSE BLDC GLUCOMTR-MCNC: 44 MG/DL — CRITICAL LOW (ref 70–99)
GLUCOSE BLDC GLUCOMTR-MCNC: 47 MG/DL — CRITICAL LOW (ref 70–99)
GLUCOSE SERPL-MCNC: 139 MG/DL — HIGH (ref 70–99)
GLUCOSE SERPL-MCNC: 160 MG/DL — HIGH (ref 70–99)
GRAM STN FLD: SIGNIFICANT CHANGE UP
HCT VFR BLD CALC: 25.4 % — LOW (ref 42–52)
HCT VFR BLD CALC: 25.6 % — LOW (ref 42–52)
HGB BLD-MCNC: 8.3 G/DL — LOW (ref 14–18)
HGB BLD-MCNC: 8.4 G/DL — LOW (ref 14–18)
IMM GRANULOCYTES NFR BLD AUTO: 0.7 % — HIGH (ref 0.1–0.3)
LYMPHOCYTES # BLD AUTO: 0.5 K/UL — LOW (ref 1.2–3.4)
LYMPHOCYTES # BLD AUTO: 7 % — LOW (ref 20.5–51.1)
MAGNESIUM SERPL-MCNC: 2.5 MG/DL — HIGH (ref 1.8–2.4)
MCHC RBC-ENTMCNC: 31.2 PG — HIGH (ref 27–31)
MCHC RBC-ENTMCNC: 31.2 PG — HIGH (ref 27–31)
MCHC RBC-ENTMCNC: 32.7 G/DL — SIGNIFICANT CHANGE UP (ref 32–37)
MCHC RBC-ENTMCNC: 32.8 G/DL — SIGNIFICANT CHANGE UP (ref 32–37)
MCV RBC AUTO: 95.2 FL — HIGH (ref 80–94)
MCV RBC AUTO: 95.5 FL — HIGH (ref 80–94)
MONOCYTES # BLD AUTO: 0.83 K/UL — HIGH (ref 0.1–0.6)
MONOCYTES NFR BLD AUTO: 11.5 % — HIGH (ref 1.7–9.3)
NEUTROPHILS # BLD AUTO: 5.76 K/UL — SIGNIFICANT CHANGE UP (ref 1.4–6.5)
NEUTROPHILS NFR BLD AUTO: 80.1 % — HIGH (ref 42.2–75.2)
NRBC # BLD: 0 /100 WBCS — SIGNIFICANT CHANGE UP (ref 0–0)
NRBC BLD AUTO-RTO: 0 /100 WBCS — SIGNIFICANT CHANGE UP (ref 0–0)
NRBC BLD-RTO: 0 /100 WBCS — SIGNIFICANT CHANGE UP (ref 0–0)
PLATELET # BLD AUTO: 303 K/UL — SIGNIFICANT CHANGE UP (ref 130–400)
PLATELET # BLD AUTO: 308 K/UL — SIGNIFICANT CHANGE UP (ref 130–400)
PMV BLD: 11.4 FL — HIGH (ref 7.4–10.4)
PMV BLD: 11.6 FL — HIGH (ref 7.4–10.4)
POTASSIUM SERPL-MCNC: 3.6 MMOL/L — SIGNIFICANT CHANGE UP (ref 3.5–5)
POTASSIUM SERPL-MCNC: 4.2 MMOL/L — SIGNIFICANT CHANGE UP (ref 3.5–5)
POTASSIUM SERPL-SCNC: 3.6 MMOL/L — SIGNIFICANT CHANGE UP (ref 3.5–5)
POTASSIUM SERPL-SCNC: 4.2 MMOL/L — SIGNIFICANT CHANGE UP (ref 3.5–5)
PROT SERPL-MCNC: 4.1 G/DL — LOW (ref 6–8)
RBC # BLD: 2.66 M/UL — LOW (ref 4.7–6.1)
RBC # BLD: 2.69 M/UL — LOW (ref 4.7–6.1)
RBC # FLD: 14.2 % — SIGNIFICANT CHANGE UP (ref 11.5–14.5)
RBC # FLD: 14.3 % — SIGNIFICANT CHANGE UP (ref 11.5–14.5)
SODIUM SERPL-SCNC: 139 MMOL/L — SIGNIFICANT CHANGE UP (ref 135–146)
SODIUM SERPL-SCNC: 147 MMOL/L — HIGH (ref 135–146)
SPECIMEN SOURCE: SIGNIFICANT CHANGE UP
WBC # BLD: 6.85 K/UL — SIGNIFICANT CHANGE UP (ref 4.8–10.8)
WBC # BLD: 7.19 K/UL — SIGNIFICANT CHANGE UP (ref 4.8–10.8)
WBC # FLD AUTO: 6.85 K/UL — SIGNIFICANT CHANGE UP (ref 4.8–10.8)
WBC # FLD AUTO: 7.19 K/UL — SIGNIFICANT CHANGE UP (ref 4.8–10.8)

## 2025-02-11 PROCEDURE — 31624 DX BRONCHOSCOPE/LAVAGE: CPT

## 2025-02-11 PROCEDURE — 99291 CRITICAL CARE FIRST HOUR: CPT | Mod: 25

## 2025-02-11 PROCEDURE — 71045 X-RAY EXAM CHEST 1 VIEW: CPT | Mod: 26

## 2025-02-11 RX ORDER — DEXTROSE 50 % IN WATER 50 %
25 SYRINGE (ML) INTRAVENOUS ONCE
Refills: 0 | Status: COMPLETED | OUTPATIENT
Start: 2025-02-11 | End: 2025-02-11

## 2025-02-11 RX ORDER — SODIUM CHLORIDE 9 G/1000ML
1000 INJECTION, SOLUTION INTRAVENOUS
Refills: 0 | Status: DISCONTINUED | OUTPATIENT
Start: 2025-02-11 | End: 2025-02-12

## 2025-02-11 RX ORDER — SODIUM CHLORIDE 9 G/1000ML
1000 INJECTION, SOLUTION INTRAVENOUS
Refills: 0 | Status: DISCONTINUED | OUTPATIENT
Start: 2025-02-11 | End: 2025-02-11

## 2025-02-11 RX ORDER — FUROSEMIDE 10 MG/ML
40 INJECTION INTRAMUSCULAR; INTRAVENOUS ONCE
Refills: 0 | Status: COMPLETED | OUTPATIENT
Start: 2025-02-11 | End: 2025-02-11

## 2025-02-11 RX ADMIN — Medication 50 MICROGRAM(S): at 18:15

## 2025-02-11 RX ADMIN — INSULIN LISPRO 8 UNIT(S): 100 INJECTION, SOLUTION INTRAVENOUS; SUBCUTANEOUS at 12:45

## 2025-02-11 RX ADMIN — FUROSEMIDE 40 MILLIGRAM(S): 10 INJECTION INTRAMUSCULAR; INTRAVENOUS at 17:59

## 2025-02-11 RX ADMIN — Medication 500 MILLIGRAM(S): at 06:14

## 2025-02-11 RX ADMIN — Medication 50 MICROGRAM(S): at 08:30

## 2025-02-11 RX ADMIN — RASAGILINE 1 MILLIGRAM(S): 0.5 TABLET ORAL at 06:16

## 2025-02-11 RX ADMIN — NAFCILLIN 200 GRAM(S): 2 INJECTION, SOLUTION INTRAVENOUS at 13:48

## 2025-02-11 RX ADMIN — Medication 500 MILLIGRAM(S): at 17:44

## 2025-02-11 RX ADMIN — Medication 2 TABLET(S): at 06:15

## 2025-02-11 RX ADMIN — SODIUM CHLORIDE 75 MILLILITER(S): 9 INJECTION, SOLUTION INTRAVENOUS at 18:01

## 2025-02-11 RX ADMIN — Medication 2 TABLET(S): at 13:46

## 2025-02-11 RX ADMIN — NAFCILLIN 200 GRAM(S): 2 INJECTION, SOLUTION INTRAVENOUS at 01:40

## 2025-02-11 RX ADMIN — Medication 1 APPLICATION(S): at 06:15

## 2025-02-11 RX ADMIN — POLYETHYLENE GLYCOL 3350 17 GRAM(S): 17 POWDER, FOR SOLUTION ORAL at 17:44

## 2025-02-11 RX ADMIN — Medication 25 GRAM(S): at 00:35

## 2025-02-11 RX ADMIN — Medication 40 MILLIGRAM(S): at 12:18

## 2025-02-11 RX ADMIN — INSULIN LISPRO 8 UNIT(S): 100 INJECTION, SOLUTION INTRAVENOUS; SUBCUTANEOUS at 18:27

## 2025-02-11 RX ADMIN — CEFEPIME 100 MILLIGRAM(S): 2 INJECTION, POWDER, FOR SOLUTION INTRAVENOUS at 17:45

## 2025-02-11 RX ADMIN — NAFCILLIN 200 GRAM(S): 2 INJECTION, SOLUTION INTRAVENOUS at 17:44

## 2025-02-11 RX ADMIN — Medication 2 TABLET(S): at 21:16

## 2025-02-11 RX ADMIN — INSULIN LISPRO 6: 100 INJECTION, SOLUTION INTRAVENOUS; SUBCUTANEOUS at 12:46

## 2025-02-11 RX ADMIN — POLYETHYLENE GLYCOL 3350 17 GRAM(S): 17 POWDER, FOR SOLUTION ORAL at 06:14

## 2025-02-11 RX ADMIN — NAFCILLIN 200 GRAM(S): 2 INJECTION, SOLUTION INTRAVENOUS at 06:15

## 2025-02-11 RX ADMIN — Medication 15 MILLILITER(S): at 17:44

## 2025-02-11 RX ADMIN — INSULIN LISPRO 6: 100 INJECTION, SOLUTION INTRAVENOUS; SUBCUTANEOUS at 18:28

## 2025-02-11 RX ADMIN — INSULIN LISPRO 8 UNIT(S): 100 INJECTION, SOLUTION INTRAVENOUS; SUBCUTANEOUS at 07:10

## 2025-02-11 RX ADMIN — INSULIN LISPRO 2: 100 INJECTION, SOLUTION INTRAVENOUS; SUBCUTANEOUS at 07:09

## 2025-02-11 RX ADMIN — NAFCILLIN 200 GRAM(S): 2 INJECTION, SOLUTION INTRAVENOUS at 12:16

## 2025-02-11 RX ADMIN — NAFCILLIN 200 GRAM(S): 2 INJECTION, SOLUTION INTRAVENOUS at 21:17

## 2025-02-11 RX ADMIN — Medication 15 MILLILITER(S): at 06:14

## 2025-02-11 RX ADMIN — CEFEPIME 100 MILLIGRAM(S): 2 INJECTION, POWDER, FOR SOLUTION INTRAVENOUS at 06:15

## 2025-02-11 RX ADMIN — Medication 2 TABLET(S): at 21:17

## 2025-02-11 NOTE — PROGRESS NOTE ADULT - SUBJECTIVE AND OBJECTIVE BOX
Patient is a 69y old  Male who presents with a chief complaint of penumonia (10 Feb 2025 10:11)        Over Night Events:  remains on MV.  Off pressors.          ROS:     All ROS are negative except HPI         PHYSICAL EXAM    ICU Vital Signs Last 24 Hrs  T(C): 36.7 (11 Feb 2025 08:00), Max: 37 (10 Feb 2025 12:00)  T(F): 98 (11 Feb 2025 08:00), Max: 98.6 (10 Feb 2025 12:00)  HR: 92 (11 Feb 2025 08:00) (57 - 94)  BP: 95/51 (11 Feb 2025 08:00) (91/50 - 136/58)  BP(mean): 66 (11 Feb 2025 08:00) (63 - 88)  ABP: --  ABP(mean): --  RR: 24 (11 Feb 2025 08:00) (10 - 38)  SpO2: 97% (11 Feb 2025 08:00) (97% - 100%)    O2 Parameters below as of 11 Feb 2025 08:00  Patient On (Oxygen Delivery Method): ventilator    O2 Concentration (%): 40        CONSTITUTIONAL:  Ill appearing NAD    ENT:   Airway patent,   Mouth with normal mucosa.   ET     EYES:   Pupils equal,   Round and reactive to light.    CARDIAC:   Normal rate,   Regular rhythm.      RESPIRATORY:   No wheezing  Bilateral BS  Normal chest expansion  Not tachypneic,  No use of accessory muscles    GASTROINTESTINAL:  Abdomen soft,   Non-tender,   No guarding,   + BS    MUSCULOSKELETAL:   No clubbing, cyanosis    NEUROLOGICAL:   Sedated  Moves all extremities     SKIN:   Skin normal color for race,   Warm and dry  No evidence of rash.        02-10-25 @ 07:01  -  02-11-25 @ 07:00  --------------------------------------------------------  IN:    Dexmedetomidine: 332.4 mL    Enteral Tube Flush: 250 mL    Free Water: 1800 mL    IV PiggyBack: 450 mL    Jevity: 1200 mL    PRBCs (Packed Red Blood Cells): 717 mL  Total IN: 4749.4 mL    OUT:    Drain (mL): 500 mL    Indwelling Catheter - Urethral (mL): 680 mL  Total OUT: 1180 mL    Total NET: 3569.4 mL          LABS:                            8.4    7.19  )-----------( 303      ( 11 Feb 2025 04:49 )             25.6                                               02-11    147[H]  |  113[H]  |  63[HH]  ----------------------------<  160[H]  4.2   |  23  |  0.9    Ca    7.4[L]      11 Feb 2025 04:49  Phos  3.3     02-10  Mg     2.5     02-11    TPro  4.1[L]  /  Alb  2.3[L]  /  TBili  0.9  /  DBili  x   /  AST  26  /  ALT  <5  /  AlkPhos  99  02-11      PT/INR - ( 09 Feb 2025 23:35 )   PT: 15.50 sec;   INR: 1.31 ratio         PTT - ( 10 Feb 2025 04:30 )  PTT:59.7 sec                                       Urinalysis Basic - ( 11 Feb 2025 04:49 )    Color: x / Appearance: x / SG: x / pH: x  Gluc: 160 mg/dL / Ketone: x  / Bili: x / Urobili: x   Blood: x / Protein: x / Nitrite: x   Leuk Esterase: x / RBC: x / WBC x   Sq Epi: x / Non Sq Epi: x / Bacteria: x                                                  LIVER FUNCTIONS - ( 11 Feb 2025 04:49 )  Alb: 2.3 g/dL / Pro: 4.1 g/dL / ALK PHOS: 99 U/L / ALT: <5 U/L / AST: 26 U/L / GGT: x                                                  Culture - Body Fluid with Gram Stain (collected 10 Feb 2025 16:22)  Source: Bile  Gram Stain (10 Feb 2025 23:48):    No polymorphonuclear cells seen per low power field    No organisms seen per oil power field    Culture - Fungal, Body Fluid (collected 10 Feb 2025 16:22)  Source: Bile  Preliminary Report (11 Feb 2025 07:45):    Testing in progress    Culture - Blood (collected 08 Feb 2025 17:24)  Source: .Blood BLOOD  Preliminary Report (11 Feb 2025 01:01):    No growth at 48 Hours                                                   Mode: AC/ CMV (Assist Control/ Continuous Mandatory Ventilation)  RR (machine): 16  TV (machine): 440  FiO2: 40  PEEP: 8  ITime: 1  MAP: 12  PIP: 21                                      ABG - ( 11 Feb 2025 03:05 )  pH, Arterial: 7.36  pH, Blood: x     /  pCO2: 47    /  pO2: 99    / HCO3: 27    / Base Excess: 0.9   /  SaO2: 98.7                MEDICATIONS  (STANDING):  carbidopa/levodopa  25/250 2 Tablet(s) Oral three times a day  cefepime   IVPB 2000 milliGRAM(s) IV Intermittent every 12 hours  chlorhexidine 0.12% Liquid 15 milliLiter(s) Oral Mucosa every 12 hours  chlorhexidine 2% Cloths 1 Application(s) Topical <User Schedule>  dexMEDEtomidine Infusion 0.5 MICROgram(s)/kG/Hr (6.39 mL/Hr) IV Continuous <Continuous>  dextrose 5%. 1000 milliLiter(s) (50 mL/Hr) IV Continuous <Continuous>  dextrose 5%. 1000 milliLiter(s) (100 mL/Hr) IV Continuous <Continuous>  dextrose 50% Injectable 25 Gram(s) IV Push once  dextrose 50% Injectable 12.5 Gram(s) IV Push once  dextrose 50% Injectable 25 Gram(s) IV Push once  glucagon  Injectable 1 milliGRAM(s) IntraMuscular once  insulin lispro (ADMELOG) corrective regimen sliding scale   SubCutaneous every 6 hours  insulin lispro Injectable (ADMELOG) 8 Unit(s) SubCutaneous every 6 hours  metroNIDAZOLE    Tablet 500 milliGRAM(s) Oral every 12 hours  nafcillin  IVPB      nafcillin  IVPB 2 Gram(s) IV Intermittent every 4 hours  pantoprazole   Suspension 40 milliGRAM(s) Oral daily  polyethylene glycol 3350 17 Gram(s) Oral every 12 hours  rasagiline Tablet 1 milliGRAM(s) Oral <User Schedule>  senna 2 Tablet(s) Oral at bedtime    MEDICATIONS  (PRN):  acetaminophen     Tablet .. 650 milliGRAM(s) Oral every 6 hours PRN Temp greater or equal to 38C (100.4F), Mild Pain (1 - 3)  dextrose Oral Gel 15 Gram(s) Oral once PRN Blood Glucose LESS THAN 70 milliGRAM(s)/deciliter  fentaNYL    Injectable 50 MICROGram(s) IV Push every 2 hours PRN Aggitation      New X-rays reviewed:                                                                                  ECHO

## 2025-02-11 NOTE — PROGRESS NOTE ADULT - ASSESSMENT
10/30/2023         RE:  :  MRN: Bonnie Bowers  1938  [id number]     Dear Dr. Rodriguez,    Thank you for asking me to see your very pleasant patient, Bonnie Bowers, in neuro-ophthalmic consultation.  I would like to thank you for sending your records and I have summarized them in the history of present illness.  My assessment and plan are below.  For further details, please see my attached clinic note.      Referring physician: Dr. Rodriguez    HPI:  Diagnosed with Grave's disease in her mid s. Presenting symptom was bulging eyes. Also had palpitation and heat intolerance. Treated with ZUNIGA and thyroidectomy in her s and has been on levothyroxine without issues for the last 50 years.     In 2023, noticed that her right eye was bulging out again.     Upper eyelid surgery in . 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     Denies any eyelid swelling or redness. States that the inner corner of her eye itches at times.     Thyroid history:  Diagnosed when? Approximately 59 years ago   ZUNIGA: yes at age 30   Thyroidectomy: left-sided thyroidectomy at age 26     TSI/TSH:     23 14:19   T3,TOTAL 85 - 202 ng/dL 78 (L)   THYROPEROXIDASE ANTWON <34.00 IU/mL 13.40   Thyrotropin Rec Ab 0.00 - 1.75 IU/L <1.10   TgAb+Thyroglobulin,DAYNA or LCMS 0.0 - 0.9 IU/mL 4.6 (H)   23 14:19   TSH 0.27 - 4.20 uIU/mL 0.09 (L) 1.22   T4,FREE 0.70 - 1.80 ng/dL 1.28     [facility] ONLY    Eye symptoms (since when):   Proptosis (better/worse/same since last visit): Initial    Diplopia(better/worse/same since last visit): Denies (Initial)   Eyelid retraction(better/worse/same since last visit): Yes (Initial)   Tearing(better/worse/same since last visit): Yes (Initial)   Redness (better/worse/same since last visit): Yes (Initial)   Pain (better/worse/same since last visit): Achiness behind the eyes (Initial)   Pain to move the eyes (better/worse/same since last  visit): Denies (Initial)   Blurred vision: Denies (Initial)     Ocular history:   Orbital decompression: Denies  Strabismus surgery: Denies  Eyelid surgery: Yes, in 2000 (but states this is due to age-related dermatoptosis)     Medical history:  There is no problem list on file for this patient.    Patient has a current medication list which includes the following prescription(s): isosorbide mononitrate, levothyroxine, levothyroxine, atorvastatin, and study - aspirin (ids# [id number]).    Patient  reports that she quit smoking about 73 years ago. Her smoking use included cigarettes. She smoked an average of .1 packs per day. She has been exposed to tobacco smoke. She has never used smokeless tobacco.      Exam:   Frank (base):23/21 (93)     Better/worse same: Initial  Strabismus (better/worse/same): Initial   Eyelid retraction (better/worse/same): Yes (Initial)    LY Score:  1. Spontaneous orbital pain.     0  2. Gaze evoked orbital pain.     0  3. Eyelid swelling due to active thyroid eye disease  0  4. Eyelid erythema.      0  5. Conjunctival redness due to active thyroid eye disease . 1  6. Chemosis.        1  7. Inflammation of caruncle OR plica.   1    Patients assessed after follow-up can be scored out of 10 by  including items 8-10.    8. Increase of > 2mm in proptosis.    N/A   9. Decrease in uniocular excursion in any direction of > 8 . N/A  10. Decrease of acuity equivalent to 1 Snellen line.  N/A    LY SCORE = 3    Lujan Diplopia Score = 0  0 = no diplopia  1 = intermittent (when tired, upon waking, end of day etc)  2 = inconstant (extreme gazes)  3 = constant      Bonnie GUILLERMINA Bowers is a 85 year old female with the following diagnoses:   1. Graves' eye disease       It is very unusual for Thyroid eye disease to recur after 60 years. I would recommend obtaining a CT orbit to make sure that there isn't something behind the RIGHT eye causing the proptosis.  If the CT is consistent with Thyroid eye  IMPRESSION:    Acute Hypoxic Respiratory Failure   Toxic Metabolic Encephalopathy  CAP likely aspiration   Influenza A treated   MSSA bacteremia repeat CX -  Sepsis POA  LYNNE improving  cholecystitis sp percutaneous haydee  Periventricular bleed  Suspected Mural thrombus in PA  Right thigh hematoma.    HO Parkinson's Disease   HO CAD    PLAN:    CNS:  CTH Noted with right periventricular bleed stable.  Continue Anti parkinson's. CTH stable.       HEENT: Oral care.  ETT duration: 10 days.     PULMONARY:  No change in vent settings.  SBT after bronchoscopy.  Monitor airway pressures.      CARDIOVASCULAR:  Avoid overload.  NO MARY per cardiology. Keep even balance.  D5W 75 ccper hour.  lasix 40 mg daily     GI: GI prophylaxis.  NGT feeds as tolerated. S/P perc cholecystostomy. Bowel regimen     RENAL:  Replete K. Mars in place for retention. Kidney function stable.     INFECTIOUS DISEASE: Cultures noted.  Tamiflu end date today.  ABX per ID.  nafcillin, cefepime, flagyl.  Duration per ID.     HEMATOLOGICAL: Monitor CBC and Coags.  DVT prophylaxis. seq       ENDOCRINE:  Follow up FS.  Insulin protocol if needed.    MUSCULOSKELETAL: Bed rest.  Off loading. Wound care for DTIs.     Full code  MICU  palliative care follow up   very poor prognosis               disease, then will have her follow up in 6 months sooner as needed for worsening symptoms.     The natural history of thyroid eye disease was discussed. We told the patient that typically FELICIA will worsen for a period of time, then improve to some degree, and then stabilize without normalizing.  This process can take somewhere between 1 and 3 years on average.  In the meantime, we recommended seeing the patient in the [facility] 6 months (sooner if the patient experiences worsening vision in either eye).  Once the patient becomes stable for at least 6 months' time, we discussed that the patient may need restorative surgery or prisms.  Finally, we discussed that correcting the thyroid hormone levels does not ensure that the eyes will normalize but that it could help to some degree.      Again, thank you for allowing me to participate in the care of your patient.      Sincerely,    Jorge Luis Macario MD  Professor  Ophthalmology Residency   Director of Neuro-Ophthalmology  [facility] [facility] Endowed Chair  Departments of Ophthalmology, Neurology, and Neurosurgery  [facility] [address] [place]  [address]  T - [phone]  F - [phone]  MARY [email or web address]      CC: Chaim Rodriguez, OD  [address] [facility] [address]  Via Fax: [phone]    Diagnosis: recurrence of Thyroid eye disease after 59 years?

## 2025-02-11 NOTE — PROCEDURE NOTE - NSBRONCHHISTORY_GEN_A_CORE_FT
69-year-old male past medical history of hypertension, Parkinson's, CAD presents for shortness of breath and cough. Found to have Flu, PNA and MSSA bacteremia, s/p intubation
69-year-old male past medical history of hypertension, Parkinson's, CAD presents for shortness of breath and cough of 1 day duration. History was obtained from patient's wife who noted that the patient has been having decreased po intake, activity, and unintentional weight loss for some time. However yesterday he started having cough and shortness of breath. Admitted to MICU for AHRF and sepsis 2/2 to [facility].

## 2025-02-11 NOTE — PROCEDURE NOTE - NSBRONCHPROCDETAILS_GEN_A_CORE_FT
PROCEDURE PERFORMED:  Bronchoscopy, BAL    CONSENT: After explanining the risk and benefit the consent was obtained from     The patient had been previously intubated and was on mechanical ventilatory support. He was on sedation, which was continued and adjusted during the procedure. His FiO2 was increased to 100% during the procedure. The  fiberoptic bronchoscope was introduced through an endotracheal tube adaptor and the tip of the endotracheal tube was noted to be in good position above the minh.   The Right tracheobronchial tree was inspected closely to the level of the subsegmental bronchi. All bronchi are patent with no endobronchial lesions and no mucosal lesions noted.   The Left tracheobronchial tree was also patent and intact with the mucosa normal   The procedure was completed and all samples were submitted for appropriate studies  After adequate clearing of secretions was accomplished, the bronchoscope was removed from the patient and the procedure was terminated.   The patient tolerated the procedure well and there were no complications.

## 2025-02-11 NOTE — PROCEDURAL SAFETY CHECKLIST WITH OR WITHOUT SEDATION - NSPRESITESIDESED_GEN_ALL_CORE
Recommendations from today's MTM visit:                                                    MTM (medication therapy management) is a service provided by a clinical pharmacist designed to help you get the most of out of your medicines.     1. Follow up with Dr. Doty about aspirin use with Eliquis.    2. Follow up with podiatrist to get stitches removed and try and arrange a ride to see your primary care doctor.    Next MTM visit: I will call you on 3/26 at 10:30 AM    To schedule another MTM appointment, please call the clinic directly or you may call the MTM scheduling line at [phone] or toll-free at [phone].     My Clinical Pharmacist's contact information:                                                      It was a pleasure talking with you today!  Please feel free to contact me with any questions or concerns you have.      Lacey Ennis PharmD, [facility]  Medication Therapy Management Provider  Phone: [phone]  [email or web address]    You may receive a survey about the MTM services you received by email and/or US Mail.  I would appreciate your feedback to help me serve you better in the future. Your comments will be anonymous.         not applicable

## 2025-02-11 NOTE — PROCEDURE NOTE - NSBRONCHFINDINGS_GEN_A_CORE_FT
-thick mucus plugging and secretions in RUL/RLL and BRITT, suctioned out 
Thick, copious secretions in RML and RUL, no endobronchial lesions

## 2025-02-11 NOTE — PROGRESS NOTE ADULT - ASSESSMENT
69-year-old male past medical history of hypertension, Parkinson's, CAD presents for shortness of breath and cough of 1 day duration. History was obtained from patient's wife who noted that the patient has been having decreased po intake, activity, and unintentional weight loss for some time. However yesterday he started having cough and shortness of breath. Admitted to MICU for AHRF and sepsis 2/2 to [facility] PNA.    #Acute Hypoxic Respiratory Failure likely 2/2 CAP/aspiration and flu  #Toxic Metabolic Encephalopathy likely 2/2 CAP/aspiration and flu  #Influenza A positive, treated  #MSSA bacteremia- resolved  #Sepsis POA  - Aspiration precautions.    - patient inc WOB, worsening alkalosis, obtunded-intubated for airway protection   -ID: if patient decompensates, dc cefepime/metro, start patricia 1g   - cardio: patient critically sick, not a candidate for surgery therefore no MARY, can c/w abx for IE as per ID  - s/p bronch, f/u cultures   - ID: tamiflu 30 mg daily: last dose on 2/10, continue cefepime 1g q12h,  metro and nafcillin as per ID  - SBT today & extubation if passes      #PE on CTA  #large R groin intramuscular hematoma  #Periventricular bleed on CTH, stable  - CTH. noted with right periventricular bleed, stable.   - Neuro on board  - Continue Anti parkinson's meds.    - CTA chest to r/o PE: focal filling defect within main pulm artery   - repeat CT head: stable intraventricular hemorrhage >> repeat on 2/9 after on full AC for PE: stable  -CT pelvis:  Large intramuscular hematoma in the medial right thigh measuring approximately 9.8 x 6.4 x 17 cm.  -holding AC      #Elevated Bilirubin-improving   #Leukocytosis-improving   #Cholangitis vs cholecystitis s/o perc haydee  - F/u RUQ US: distended GB with sludge, GB polyp   - IR: perc haydee 2/5, drained 250 cc dark bile, 450 cc output overnight. F/u GB drainage cultures: negative gram stain    #hypernatremia, mild  -Na 148>147  -free water with feeds  -added D5 70cc/hr.   -f/u 8pm bmp, avoid overcorrection    #Constipation, improved  - senna and miralax  - c/w lactulose   -2/10: kub: distented bowels  -had BM this am      #Possible Mural thrombus in PA  #CAD  - Avoid overload  - C/w LR at 50  - TTE: EF 56%, G1DD, mild-mod MR, mod TR, mild VA, mild pHTN  - Cardio recs appreciated       - Patient is poor candidate for MARY       - full endocarditis abx course    #MISC  - DVT ppx: SCDs, holding  - GI ppx: not needed  - Activity: as tolerated  - Diet: NPO w/ tube feed  - full code

## 2025-02-11 NOTE — CHART NOTE - NSCHARTNOTEFT_GEN_A_CORE
Registered Dietitian Follow-Up     Patient Profile Reviewed                           Yes [x]   No []     Pertinent Subjective Information:  Patient noted to be intubated with NGT in placed. Currently NPO for SBT and bronchoscopy at time of visit.     Pertinent Medical Interventions:  #Acute Hypoxic Respiratory Failure likely 2/2 CAP/aspiration and flu  #Toxic Metabolic Encephalopathy likely 2/2 CAP/aspiration and flu  #Influenza A positive, treated  #MSSA bacteremia- resolved  #Sepsis POA  - patient inc WOB, worsening alkalosis, obtunded-intubated for airway protection   - SBT today & extubation if passes  #PE on CTA  #Cholangitis vs cholecystitis s/o perc haydee  #Hypernatremia, mild  #Constipation, improved  2/10 - KUB - distended bowels  -had BM this AM    Diet order:   Diet, NPO with Tube Feed:   Tube Feeding Modality: Nasogastric  Jevity 1.2 Zaid (JEVITY1.2RTH)  Total Volume for 24 Hours (mL): 900  Continuous  Starting Tube Feed Rate {mL per Hour}: 50  Until Goal Tube Feed Rate (mL per Hour): 50  Tube Feed Duration (in Hours): 18  Tube Feed Start Time: 12:00  Free Water Flush   Total Volume per Flush (mL): 75   Frequency: Every 6 Hours   Total Daily Volume of Flush (mL): 300 (25 @ 13:53) [Active]    Anthropometrics:  Height (cm): 175.3 (25 @ 13:52)  Weight (kg): 51.1 (25 @ 13:52)  BMI (kg/m2): 16.6 (25 @ 13:52)  IBW: 72.7 kg    Daily Weight in k.3 (02-10), Weight in k.3 (), Weight in k.5 (), Weight in k.5 (), Weight in k.4 ()  51.1 kg () vs 72.3 kg (2/10)  indicating 41.1% weight gain x 5 days - likely fluid related    MEDICATIONS  (STANDING):  carbidopa/levodopa  25/250 2 Tablet(s) Oral three times a day  cefepime   IVPB 2000 milliGRAM(s) IV Intermittent every 12 hours  chlorhexidine 0.12% Liquid 15 milliLiter(s) Oral Mucosa every 12 hours  chlorhexidine 2% Cloths 1 Application(s) Topical <User Schedule>  dexMEDEtomidine Infusion 0.5 MICROgram(s)/kG/Hr (6.39 mL/Hr) IV Continuous <Continuous>  dextrose 5%. 1000 milliLiter(s) (50 mL/Hr) IV Continuous <Continuous>  dextrose 5%. 1000 milliLiter(s) (100 mL/Hr) IV Continuous <Continuous>  dextrose 5%. 1000 milliLiter(s) (75 mL/Hr) IV Continuous <Continuous>  dextrose 50% Injectable 25 Gram(s) IV Push once  dextrose 50% Injectable 12.5 Gram(s) IV Push once  dextrose 50% Injectable 25 Gram(s) IV Push once  furosemide   Injectable 40 milliGRAM(s) IV Push once  glucagon  Injectable 1 milliGRAM(s) IntraMuscular once  insulin lispro (ADMELOG) corrective regimen sliding scale   SubCutaneous every 6 hours  insulin lispro Injectable (ADMELOG) 8 Unit(s) SubCutaneous every 6 hours  metroNIDAZOLE    Tablet 500 milliGRAM(s) Oral every 12 hours  nafcillin  IVPB      nafcillin  IVPB 2 Gram(s) IV Intermittent every 4 hours  pantoprazole   Suspension 40 milliGRAM(s) Oral daily  polyethylene glycol 3350 17 Gram(s) Oral every 12 hours  rasagiline Tablet 1 milliGRAM(s) Oral <User Schedule>  senna 2 Tablet(s) Oral at bedtime    MEDICATIONS  (PRN):  acetaminophen     Tablet .. 650 milliGRAM(s) Oral every 6 hours PRN Temp greater or equal to 38C (100.4F), Mild Pain (1 - 3)  dextrose Oral Gel 15 Gram(s) Oral once PRN Blood Glucose LESS THAN 70 milliGRAM(s)/deciliter  fentaNYL    Injectable 50 MICROGram(s) IV Push every 2 hours PRN Aggitation    Pertinent Labs:  @ 04:49: Na 147[H], BUN 63[HH], Cr 0.9, [H], K+ 4.2, Phos --, Mg 2.5[H], Alk Phos 99, ALT/SGPT <5, AST/SGOT 26, HbA1c --    Finger Sticks:  POCT Blood Glucose.: 253 mg/dL ( @ 12:32)  POCT Blood Glucose.: 177 mg/dL ( @ 06:52)  POCT Blood Glucose.: 105 mg/dL ( @ 00:49)  POCT Blood Glucose.: 47 mg/dL ( @ 00:21)  POCT Blood Glucose.: 44 mg/dL ( @ 00:13)  POCT Blood Glucose.: 164 mg/dL (02-10 @ 17:15)    Physical Findings:  - Appearance: intubated   - GI function:   - Tubes:  - Oral/Mouth cavity:  - Skin:     Nutrition Requirements:  Weight Used:     Estimated Energy Needs    Continue []  Adjust []  Adjusted Energy Recommendations:   kcal/day        Estimated Protein Needs    Continue []  Adjust []  Adjusted Protein Recommendations:   gm/day        Estimated Fluid Needs        Continue []  Adjust []  Adjusted Fluid Recommendations:   mL/day     Nutrient Intake:     [] Previous Nutrition Diagnosis:            [] Ongoing          [] Resolved    [] No active nutrition diagnosis identified at this time    Nutrition Education:     Nutrition Intervention      Goal/Expected Outcome:      Indicator/Monitoring:      Recommendation: Registered Dietitian Follow-Up     Patient Profile Reviewed                           Yes [x]   No []     Pertinent Subjective Information:  Patient noted to be intubated with NGT in placed. Currently NPO for SBT and bronchoscopy at time of visit.     Pertinent Medical Interventions:  #Acute Hypoxic Respiratory Failure likely 2/2 CAP/aspiration and flu  #Toxic Metabolic Encephalopathy likely 2/2 CAP/aspiration and flu  #Influenza A positive, treated  #MSSA bacteremia- resolved  #Sepsis POA  - patient inc WOB, worsening alkalosis, obtunded-intubated for airway protection   - SBT today & extubation if passes  #PE on CTA  #Cholangitis vs cholecystitis s/o perc haydee  #Hypernatremia, mild  #Constipation, improved  2/10 - KUB - distended bowels  -had BM this AM    Diet order:   Diet, NPO with Tube Feed:   Tube Feeding Modality: Nasogastric  Jevity 1.2 Zaid (JEVITY1.2RTH)  Total Volume for 24 Hours (mL): 900  Continuous  Starting Tube Feed Rate {mL per Hour}: 50  Until Goal Tube Feed Rate (mL per Hour): 50  Tube Feed Duration (in Hours): 18  Tube Feed Start Time: 12:00  Free Water Flush   Total Volume per Flush (mL): 75   Frequency: Every 6 Hours   Total Daily Volume of Flush (mL): 300 (25 @ 13:53) [Active]    Anthropometrics:  Height (cm): 175.3 (25 @ 13:52)  Weight (kg): 51.1 (25 @ 13:52)  BMI (kg/m2): 16.6 (25 @ 13:52)  IBW: 72.7 kg    Daily Weight in k.3 (02-10), Weight in k.3 (), Weight in k.5 (), Weight in k.5 (), Weight in k.4 ()  51.1 kg () vs 72.3 kg (2/10)  indicating 41.1% weight gain x 5 days - likely fluid related    MEDICATIONS  (STANDING):  carbidopa/levodopa  25/250 2 Tablet(s) Oral three times a day  cefepime   IVPB 2000 milliGRAM(s) IV Intermittent every 12 hours  chlorhexidine 0.12% Liquid 15 milliLiter(s) Oral Mucosa every 12 hours  chlorhexidine 2% Cloths 1 Application(s) Topical <User Schedule>  dexMEDEtomidine Infusion 0.5 MICROgram(s)/kG/Hr (6.39 mL/Hr) IV Continuous <Continuous>  dextrose 5%. 1000 milliLiter(s) (50 mL/Hr) IV Continuous <Continuous>  dextrose 5%. 1000 milliLiter(s) (100 mL/Hr) IV Continuous <Continuous>  dextrose 5%. 1000 milliLiter(s) (75 mL/Hr) IV Continuous <Continuous>  dextrose 50% Injectable 25 Gram(s) IV Push once  dextrose 50% Injectable 12.5 Gram(s) IV Push once  dextrose 50% Injectable 25 Gram(s) IV Push once  furosemide   Injectable 40 milliGRAM(s) IV Push once  glucagon  Injectable 1 milliGRAM(s) IntraMuscular once  insulin lispro (ADMELOG) corrective regimen sliding scale   SubCutaneous every 6 hours  insulin lispro Injectable (ADMELOG) 8 Unit(s) SubCutaneous every 6 hours  metroNIDAZOLE    Tablet 500 milliGRAM(s) Oral every 12 hours  nafcillin  IVPB      nafcillin  IVPB 2 Gram(s) IV Intermittent every 4 hours  pantoprazole   Suspension 40 milliGRAM(s) Oral daily  polyethylene glycol 3350 17 Gram(s) Oral every 12 hours  rasagiline Tablet 1 milliGRAM(s) Oral <User Schedule>  senna 2 Tablet(s) Oral at bedtime    MEDICATIONS  (PRN):  acetaminophen     Tablet .. 650 milliGRAM(s) Oral every 6 hours PRN Temp greater or equal to 38C (100.4F), Mild Pain (1 - 3)  dextrose Oral Gel 15 Gram(s) Oral once PRN Blood Glucose LESS THAN 70 milliGRAM(s)/deciliter  fentaNYL    Injectable 50 MICROGram(s) IV Push every 2 hours PRN Aggitation    Pertinent Labs:  @ 04:49: Na 147[H], BUN 63[HH], Cr 0.9, [H], K+ 4.2, Phos --, Mg 2.5[H], Alk Phos 99, ALT/SGPT <5, AST/SGOT 26, HbA1c --    Finger Sticks:  POCT Blood Glucose.: 253 mg/dL ( @ 12:32)  POCT Blood Glucose.: 177 mg/dL ( @ 06:52)  POCT Blood Glucose.: 105 mg/dL ( @ 00:49)  POCT Blood Glucose.: 47 mg/dL ( @ 00:21)  POCT Blood Glucose.: 44 mg/dL ( @ 00:13)  POCT Blood Glucose.: 164 mg/dL (02-10 @ 17:15)    Physical Findings:  - Appearance: intubated   - GI function: last BM    - Tubes: +NGT, OGT  - Oral/Mouth cavity: NPO with EN via NGT  - Skin: WOCN note 2/4: Evolving DTI to sacrococcygeal region extending to left buttock; DTI to L Heel  - Edema: generalized 2+ edema; 3+ edema - left and right arm      Nutrition Requirements:  Weight Used: 51.1 kg dosing weight - estimated needs with consideration for age, BMI, critical care setting     Estimated Energy Needs    Continue []  Adjust [x]  1277 - 1533 kcal/day (25-30 kcal/kg) vs PSE 1435.46 kcal/day (Tmax 37, Ve 8.3)     Estimated Protein Needs    Continue []  Adjust [x]  61 - 77 gm/day (1.2 - 1.5 gm/kg)      Estimated Fluid Needs        Continue []  Adjust [x]  1 mL/kcal     Nutrient Intake:  Current EN regimen provides:  900 mL total volume, 1080 kcal, 50 gm Protein, 729 mL free water from formula + 75 mL water flushes q6hrs = 1029 mL total water      [x] Previous Nutrition Diagnosis:  Severe PCM             [x] Ongoing          [] Resolved    Nutrition Education: deferred     Nutrition Intervention:  EN recs, coordination of care     Goal/Expected Outcome:   EN to meet >85% and <105% of estimated nutrient needs within 3-5 days      Indicator/Monitoring:   EN tolerance, BM, GI s/s, weight, labs, skin status, NFPF     Recommendation:  1) Recommend to change EN regimen to the following:  bolus feeds of Peptamen AF formula as patient with NGT   Recommend to start feeds at 120 mL q6hrs and advance feeds as tolerated to goal rate of 240 mL q6hrs  This will provide: 960 mL total volume, 1152 kcal, 75 gm Protein, 777.6 mL free water from formula + recommend 50 mL water flushes pre/post feeds + noted with free water flush order of 300 mL q4hrs = 2977.6 mL total water (or per Team)    2) Monitor electrolytes, BG, renal profile  -adjust insulin regimen PRN    3) Monitor BM, GI s/s    Patient is at high nutrition risk, RD to f/u  RD to remain available: Dayanna Jeffers, MACARIO [phone] or via Teams Registered Dietitian Follow-Up     Patient Profile Reviewed                           Yes [x]   No []     Pertinent Subjective Information:  Patient noted to be intubated with NGT in placed. Currently NPO for SBT and bronchoscopy at time of visit.     Pertinent Medical Interventions:  #Acute Hypoxic Respiratory Failure likely 2/2 CAP/aspiration and flu  #Toxic Metabolic Encephalopathy likely 2/2 CAP/aspiration and flu  #Influenza A positive, treated  #MSSA bacteremia- resolved  #Sepsis POA  - patient inc WOB, worsening alkalosis, obtunded-intubated for airway protection   - SBT today & extubation if passes  #PE on CTA  #Cholangitis vs cholecystitis s/o perc haydee  #Hypernatremia, mild  #Constipation, improved  2/10 - KUB - distended bowels  -had BM this AM    Diet order:   Diet, NPO with Tube Feed:   Tube Feeding Modality: Nasogastric  Jevity 1.2 Zaid (JEVITY1.2RTH)  Total Volume for 24 Hours (mL): 900  Continuous  Starting Tube Feed Rate {mL per Hour}: 50  Until Goal Tube Feed Rate (mL per Hour): 50  Tube Feed Duration (in Hours): 18  Tube Feed Start Time: 12:00  Free Water Flush   Total Volume per Flush (mL): 75   Frequency: Every 6 Hours   Total Daily Volume of Flush (mL): 300 (25 @ 13:53) [Active]    Anthropometrics:  Height (cm): 175.3 (25 @ 13:52)  Weight (kg): 51.1 (25 @ 13:52)  BMI (kg/m2): 16.6 (25 @ 13:52)  IBW: 72.7 kg    Daily Weight in k.3 (02-10), Weight in k.3 (), Weight in k.5 (), Weight in k.5 (), Weight in k.4 ()  51.1 kg () vs 72.3 kg (2/10)  indicating 41.1% weight gain x 5 days - likely fluid related    MEDICATIONS  (STANDING):  carbidopa/levodopa  25/250 2 Tablet(s) Oral three times a day  cefepime   IVPB 2000 milliGRAM(s) IV Intermittent every 12 hours  chlorhexidine 0.12% Liquid 15 milliLiter(s) Oral Mucosa every 12 hours  chlorhexidine 2% Cloths 1 Application(s) Topical <User Schedule>  dexMEDEtomidine Infusion 0.5 MICROgram(s)/kG/Hr (6.39 mL/Hr) IV Continuous <Continuous>  dextrose 5%. 1000 milliLiter(s) (50 mL/Hr) IV Continuous <Continuous>  dextrose 5%. 1000 milliLiter(s) (100 mL/Hr) IV Continuous <Continuous>  dextrose 5%. 1000 milliLiter(s) (75 mL/Hr) IV Continuous <Continuous>  dextrose 50% Injectable 25 Gram(s) IV Push once  dextrose 50% Injectable 12.5 Gram(s) IV Push once  dextrose 50% Injectable 25 Gram(s) IV Push once  furosemide   Injectable 40 milliGRAM(s) IV Push once  glucagon  Injectable 1 milliGRAM(s) IntraMuscular once  insulin lispro (ADMELOG) corrective regimen sliding scale   SubCutaneous every 6 hours  insulin lispro Injectable (ADMELOG) 8 Unit(s) SubCutaneous every 6 hours  metroNIDAZOLE    Tablet 500 milliGRAM(s) Oral every 12 hours  nafcillin  IVPB      nafcillin  IVPB 2 Gram(s) IV Intermittent every 4 hours  pantoprazole   Suspension 40 milliGRAM(s) Oral daily  polyethylene glycol 3350 17 Gram(s) Oral every 12 hours  rasagiline Tablet 1 milliGRAM(s) Oral <User Schedule>  senna 2 Tablet(s) Oral at bedtime    MEDICATIONS  (PRN):  acetaminophen     Tablet .. 650 milliGRAM(s) Oral every 6 hours PRN Temp greater or equal to 38C (100.4F), Mild Pain (1 - 3)  dextrose Oral Gel 15 Gram(s) Oral once PRN Blood Glucose LESS THAN 70 milliGRAM(s)/deciliter  fentaNYL    Injectable 50 MICROGram(s) IV Push every 2 hours PRN Aggitation    Pertinent Labs:  @ 04:49: Na 147[H], BUN 63[HH], Cr 0.9, [H], K+ 4.2, Phos --, Mg 2.5[H], Alk Phos 99, ALT/SGPT <5, AST/SGOT 26, HbA1c --    Finger Sticks:  POCT Blood Glucose.: 253 mg/dL ( @ 12:32)  POCT Blood Glucose.: 177 mg/dL ( @ 06:52)  POCT Blood Glucose.: 105 mg/dL ( @ 00:49)  POCT Blood Glucose.: 47 mg/dL ( @ 00:21)  POCT Blood Glucose.: 44 mg/dL ( @ 00:13)  POCT Blood Glucose.: 164 mg/dL (02-10 @ 17:15)    Physical Findings:  - Appearance: intubated   - GI function: last BM    - Tubes: +NGT, OGT  - Oral/Mouth cavity: NPO with EN via NGT  - Skin: WOCN note 2/4: Evolving DTI to sacrococcygeal region extending to left buttock; DTI to L Heel  - Edema: generalized 2+ edema; 3+ edema - left and right arm      Nutrition Requirements:  Weight Used: 51.1 kg dosing weight - estimated needs with consideration for age, BMI, critical care setting     Estimated Energy Needs    Continue []  Adjust [x]  1277 - 1533 kcal/day (25-30 kcal/kg) vs PSE 1435.46 kcal/day (Tmax 37, Ve 8.3)     Estimated Protein Needs    Continue []  Adjust [x]  61 - 77 gm/day (1.2 - 1.5 gm/kg)      Estimated Fluid Needs        Continue []  Adjust [x]  1 mL/kcal     Nutrient Intake:  Current EN regimen provides:  900 mL total volume, 1080 kcal, 50 gm Protein, 729 mL free water from formula + 75 mL water flushes q6hrs = 1029 mL total water      [x] Previous Nutrition Diagnosis:  Severe PCM             [x] Ongoing          [] Resolved    Nutrition Education: deferred     Nutrition Intervention:  EN recs, coordination of care     Goal/Expected Outcome:   EN to meet >85% and <105% of estimated nutrient needs within 3-5 days      Indicator/Monitoring:   EN tolerance, BM, GI s/s, weight, labs, skin status, NFPF     Recommendation:  1) Recommend to change EN regimen to the following:  bolus feeds of Peptamen AF formula as patient with NGT   Recommend to start feeds at 120 mL q6hrs and advance feeds as tolerated to goal rate of 240 mL q6hrs  This will provide: 960 mL total volume, 1152 kcal, 75 gm Protein, 777.6 mL free water from formula + recommend 50 mL water flushes pre/post feeds + noted with free water flush order of 300 mL q4hrs = 2977.6 mL total water (or per Team)    *Addendum:  Team prefers continuous feeds, would recommend the following regimen:  18hr continuous feeds of Peptamen AF formula starting at 20 mL and advancing 10 mL q4hrs until goal rate of 55 mL x 18hrs is reached.  This will provide: 990 mL total volume, 1188 kcal, 77.2 gm Protein, 801.9 mL free water from formula + recommend 50 mL q6hrs = 1001.9 mL total water + noted with flush order for 300 mL q4hrs = 2801.9 mL total water     2) Monitor electrolytes, BG, renal profile  -adjust insulin regimen PRN    3) Monitor BM, GI s/s    Patient is at high nutrition risk, RD to f/u  RD to remain available: Dayanna Jeffers, MACAR[facility] [phone] or via Teams Registered Dietitian Follow-Up     Patient Profile Reviewed                           Yes [x]   No []     Pertinent Subjective Information:  Patient noted to be intubated with NGT in placed. Currently NPO for SBT and bronchoscopy at time of visit.     Pertinent Medical Interventions:  #Acute Hypoxic Respiratory Failure likely 2/2 CAP/aspiration and flu  #Toxic Metabolic Encephalopathy likely 2/2 CAP/aspiration and flu  #Influenza A positive, treated  #MSSA bacteremia- resolved  #Sepsis POA  - patient inc WOB, worsening alkalosis, obtunded-intubated for airway protection   - SBT today & extubation if passes  #PE on CTA  #Cholangitis vs cholecystitis s/o perc haydee  #Hypernatremia, mild  #Constipation, improved  2/10 - KUB - distended bowels  -had BM this AM    Diet order:   Diet, NPO with Tube Feed:   Tube Feeding Modality: Nasogastric  Jevity 1.2 Zaid (JEVITY1.2RTH)  Total Volume for 24 Hours (mL): 900  Continuous  Starting Tube Feed Rate {mL per Hour}: 50  Until Goal Tube Feed Rate (mL per Hour): 50  Tube Feed Duration (in Hours): 18  Tube Feed Start Time: 12:00  Free Water Flush   Total Volume per Flush (mL): 75   Frequency: Every 6 Hours   Total Daily Volume of Flush (mL): 300 (25 @ 13:53) [Active]    Anthropometrics:  Height (cm): 175.3 (25 @ 13:52)  Weight (kg): 51.1 (25 @ 13:52)  BMI (kg/m2): 16.6 (25 @ 13:52)  IBW: 72.7 kg    Daily Weight in k.3 (02-10), Weight in k.3 (), Weight in k.5 (), Weight in k.5 (), Weight in k.4 ()  51.1 kg () vs 72.3 kg (2/10)  indicating 41.1% weight gain x 5 days - likely fluid related    MEDICATIONS  (STANDING):  carbidopa/levodopa  25/250 2 Tablet(s) Oral three times a day  cefepime   IVPB 2000 milliGRAM(s) IV Intermittent every 12 hours  chlorhexidine 0.12% Liquid 15 milliLiter(s) Oral Mucosa every 12 hours  chlorhexidine 2% Cloths 1 Application(s) Topical <User Schedule>  dexMEDEtomidine Infusion 0.5 MICROgram(s)/kG/Hr (6.39 mL/Hr) IV Continuous <Continuous>  dextrose 5%. 1000 milliLiter(s) (50 mL/Hr) IV Continuous <Continuous>  dextrose 5%. 1000 milliLiter(s) (100 mL/Hr) IV Continuous <Continuous>  dextrose 5%. 1000 milliLiter(s) (75 mL/Hr) IV Continuous <Continuous>  dextrose 50% Injectable 25 Gram(s) IV Push once  dextrose 50% Injectable 12.5 Gram(s) IV Push once  dextrose 50% Injectable 25 Gram(s) IV Push once  furosemide   Injectable 40 milliGRAM(s) IV Push once  glucagon  Injectable 1 milliGRAM(s) IntraMuscular once  insulin lispro (ADMELOG) corrective regimen sliding scale   SubCutaneous every 6 hours  insulin lispro Injectable (ADMELOG) 8 Unit(s) SubCutaneous every 6 hours  metroNIDAZOLE    Tablet 500 milliGRAM(s) Oral every 12 hours  nafcillin  IVPB      nafcillin  IVPB 2 Gram(s) IV Intermittent every 4 hours  pantoprazole   Suspension 40 milliGRAM(s) Oral daily  polyethylene glycol 3350 17 Gram(s) Oral every 12 hours  rasagiline Tablet 1 milliGRAM(s) Oral <User Schedule>  senna 2 Tablet(s) Oral at bedtime    MEDICATIONS  (PRN):  acetaminophen     Tablet .. 650 milliGRAM(s) Oral every 6 hours PRN Temp greater or equal to 38C (100.4F), Mild Pain (1 - 3)  dextrose Oral Gel 15 Gram(s) Oral once PRN Blood Glucose LESS THAN 70 milliGRAM(s)/deciliter  fentaNYL    Injectable 50 MICROGram(s) IV Push every 2 hours PRN Aggitation    Pertinent Labs:  @ 04:49: Na 147[H], BUN 63[HH], Cr 0.9, [H], K+ 4.2, Phos --, Mg 2.5[H], Alk Phos 99, ALT/SGPT <5, AST/SGOT 26, HbA1c --    Finger Sticks:  POCT Blood Glucose.: 253 mg/dL ( @ 12:32)  POCT Blood Glucose.: 177 mg/dL ( @ 06:52)  POCT Blood Glucose.: 105 mg/dL ( @ 00:49)  POCT Blood Glucose.: 47 mg/dL ( @ 00:21)  POCT Blood Glucose.: 44 mg/dL ( @ 00:13)  POCT Blood Glucose.: 164 mg/dL (02-10 @ 17:15)    Physical Findings:  - Appearance: intubated   - GI function: last BM    - Tubes: +NGT, OGT  - Oral/Mouth cavity: NPO with EN via NGT  - Skin: WOCN note 2/4: Evolving DTI to sacrococcygeal region extending to left buttock; DTI to L Heel  - Edema: generalized 2+ edema; 3+ edema - left and right arm      Nutrition Requirements:  Weight Used: 51.1 kg dosing weight - estimated needs with consideration for age, BMI, critical care setting     Estimated Energy Needs    Continue []  Adjust [x]  1277 - 1533 kcal/day (25-30 kcal/kg) vs PSE 1435.46 kcal/day (Tmax 37, Ve 8.3)     Estimated Protein Needs    Continue []  Adjust [x]  61 - 77 gm/day (1.2 - 1.5 gm/kg)      Estimated Fluid Needs        Continue []  Adjust [x]  1 mL/kcal     Nutrient Intake:  Current EN regimen provides:  900 mL total volume, 1080 kcal, 50 gm Protein, 729 mL free water from formula + 75 mL water flushes q6hrs = 1029 mL total water      [x] Previous Nutrition Diagnosis:  Severe PCM             [x] Ongoing          [] Resolved    Nutrition Education: deferred     Nutrition Intervention:  EN recs, coordination of care     Goal/Expected Outcome:   EN to meet >85% and <105% of estimated nutrient needs within 3-5 days      Indicator/Monitoring:   EN tolerance, BM, GI s/s, weight, labs, skin status, NFPF     Recommendation:  1) Recommend to change EN regimen to the following:  bolus feeds of Peptamen AF formula as patient with NGT   Recommend to start feeds at 120 mL q6hrs and advance feeds as tolerated to goal rate of 240 mL q6hrs  This will provide: 960 mL total volume, 1152 kcal, 75 gm Protein, 777.6 mL free water from formula + recommend 50 mL water flushes pre/post feeds + noted with free water flush order of 300 mL q4hrs = 2977.6 mL total water (or per Team)    *Addendum:  Team prefers continuous feeds, would recommend the following regimen:  18hr continuous feeds of Peptamen AF formula starting at 20 mL and advancing 10 mL q4hrs until goal rate of 55 mL x 18hrs is reached.  This will provide: 990 mL total volume, 1188 kcal, 77.2 gm Protein, 801.9 mL free water from formula + noted with flush order for 300 mL q4hrs = 2601.9 mL total water     2) Monitor electrolytes, BG, renal profile  -adjust insulin regimen PRN    3) Monitor BM, GI s/s    Patient is at high nutrition risk, RD to f/u  RD to remain available: Dayanna Jeffers, MACAR[facility] [phone] or via Teams Registered Dietitian Follow-Up     Patient Profile Reviewed                           Yes [x]   No []     Pertinent Subjective Information:  Patient noted to be intubated with NGT in placed. Currently NPO for SBT and bronchoscopy at time of visit.     Pertinent Medical Interventions:  #Acute Hypoxic Respiratory Failure likely 2/2 CAP/aspiration and flu  #Toxic Metabolic Encephalopathy likely 2/2 CAP/aspiration and flu  #Influenza A positive, treated  #MSSA bacteremia- resolved  #Sepsis POA  - patient inc WOB, worsening alkalosis, obtunded-intubated for airway protection   - SBT today & extubation if passes  #PE on CTA  #Cholangitis vs cholecystitis s/o perc haydee  #Hypernatremia, mild  #Constipation, improved  2/10 - KUB - distended bowels  -had BM this AM    Diet order:   Diet, NPO with Tube Feed:   Tube Feeding Modality: Nasogastric  Jevity 1.2 Zaid (JEVITY1.2RTH)  Total Volume for 24 Hours (mL): 900  Continuous  Starting Tube Feed Rate {mL per Hour}: 50  Until Goal Tube Feed Rate (mL per Hour): 50  Tube Feed Duration (in Hours): 18  Tube Feed Start Time: 12:00  Free Water Flush   Total Volume per Flush (mL): 75   Frequency: Every 6 Hours   Total Daily Volume of Flush (mL): 300 (25 @ 13:53) [Active]    Anthropometrics:  Height (cm): 175.3 (25 @ 13:52)  Weight (kg): 51.1 (25 @ 13:52)  BMI (kg/m2): 16.6 (25 @ 13:52)  IBW: 72.7 kg    Daily Weight in k.3 (02-10), Weight in k.3 (), Weight in k.5 (), Weight in k.5 (), Weight in k.4 ()  51.1 kg () vs 72.3 kg (2/10)  indicating 41.1% weight gain x 5 days - likely fluid related    MEDICATIONS  (STANDING):  carbidopa/levodopa  25/250 2 Tablet(s) Oral three times a day  cefepime   IVPB 2000 milliGRAM(s) IV Intermittent every 12 hours  chlorhexidine 0.12% Liquid 15 milliLiter(s) Oral Mucosa every 12 hours  chlorhexidine 2% Cloths 1 Application(s) Topical <User Schedule>  dexMEDEtomidine Infusion 0.5 MICROgram(s)/kG/Hr (6.39 mL/Hr) IV Continuous <Continuous>  dextrose 5%. 1000 milliLiter(s) (50 mL/Hr) IV Continuous <Continuous>  dextrose 5%. 1000 milliLiter(s) (100 mL/Hr) IV Continuous <Continuous>  dextrose 5%. 1000 milliLiter(s) (75 mL/Hr) IV Continuous <Continuous>  dextrose 50% Injectable 25 Gram(s) IV Push once  dextrose 50% Injectable 12.5 Gram(s) IV Push once  dextrose 50% Injectable 25 Gram(s) IV Push once  furosemide   Injectable 40 milliGRAM(s) IV Push once  glucagon  Injectable 1 milliGRAM(s) IntraMuscular once  insulin lispro (ADMELOG) corrective regimen sliding scale   SubCutaneous every 6 hours  insulin lispro Injectable (ADMELOG) 8 Unit(s) SubCutaneous every 6 hours  metroNIDAZOLE    Tablet 500 milliGRAM(s) Oral every 12 hours  nafcillin  IVPB      nafcillin  IVPB 2 Gram(s) IV Intermittent every 4 hours  pantoprazole   Suspension 40 milliGRAM(s) Oral daily  polyethylene glycol 3350 17 Gram(s) Oral every 12 hours  rasagiline Tablet 1 milliGRAM(s) Oral <User Schedule>  senna 2 Tablet(s) Oral at bedtime    MEDICATIONS  (PRN):  acetaminophen     Tablet .. 650 milliGRAM(s) Oral every 6 hours PRN Temp greater or equal to 38C (100.4F), Mild Pain (1 - 3)  dextrose Oral Gel 15 Gram(s) Oral once PRN Blood Glucose LESS THAN 70 milliGRAM(s)/deciliter  fentaNYL    Injectable 50 MICROGram(s) IV Push every 2 hours PRN Aggitation    Pertinent Labs:  @ 04:49: Na 147[H], BUN 63[HH], Cr 0.9, [H], K+ 4.2, Phos --, Mg 2.5[H], Alk Phos 99, ALT/SGPT <5, AST/SGOT 26, HbA1c --    Finger Sticks:  POCT Blood Glucose.: 253 mg/dL ( @ 12:32)  POCT Blood Glucose.: 177 mg/dL ( @ 06:52)  POCT Blood Glucose.: 105 mg/dL ( @ 00:49)  POCT Blood Glucose.: 47 mg/dL ( @ 00:21)  POCT Blood Glucose.: 44 mg/dL ( @ 00:13)  POCT Blood Glucose.: 164 mg/dL (02-10 @ 17:15)    Physical Findings:  - Appearance: intubated   - GI function: last BM    - Tubes: +NGT, OGT  - Oral/Mouth cavity: NPO with EN via NGT  - Skin: WOCN note 2/: Evolving DTI to sacrococcygeal region extending to left buttock; DTI to L Heel  - Edema: generalized 2+ edema; 3+ edema - left and right arm      Nutrition Requirements:  Weight Used: 51.1 kg dosing weight - estimated needs with consideration for age, BMI, critical care setting     Estimated Energy Needs    Continue []  Adjust [x]  1277 - 1533 kcal/day (25-30 kcal/kg) vs PSE 1435.46 kcal/day (Tmax 37, Ve 8.3)     Estimated Protein Needs    Continue []  Adjust [x]  61 - 77 gm/day (1.2 - 1.5 gm/kg)      Estimated Fluid Needs        Continue []  Adjust [x]  1 mL/kcal     Nutrient Intake:  Current EN regimen provides:  900 mL total volume, 1080 kcal, 50 gm Protein, 729 mL free water from formula + 75 mL water flushes q6hrs = 1029 mL total water      [x] Previous Nutrition Diagnosis:  Severe PCM             [x] Ongoing          [] Resolved    Nutrition Education: deferred     Nutrition Intervention:  EN recs, coordination of care     Goal/Expected Outcome:   EN to meet >85% and <105% of estimated nutrient needs within 3-5 days      Indicator/Monitoring:   EN tolerance, BM, GI s/s, weight, labs, skin status, NFPF     Recommendation:  1) Recommend to change EN regimen to the following:  bolus feeds of Peptamen AF formula as patient with NGT (to maintain aspiration precautions)  Recommend to start feeds at 120 mL q6hrs and advance feeds as tolerated to goal rate of 240 mL q6hrs  This will provide: 960 mL total volume, 1152 kcal, 75 gm Protein, 777.6 mL free water from formula + recommend 50 mL water flushes pre/post feeds + noted with free water flush order of 300 mL q4hrs = 2977.6 mL total water (or per Team)    *Addendum:  Discussed with Resident Physician - Team prefers continuous feeds, would recommend the following regimen:  18hr continuous feeds of Peptamen AF formula starting at 20 mL and advancing 10 mL q4hrs until goal rate of 55 mL x 18hrs is reached.  This will provide: 990 mL total volume, 1188 kcal, 77.2 gm Protein, 801.9 mL free water from formula + noted with flush order for 300 mL q4hrs = 2601.9 mL total water     2) Monitor electrolytes, BG, renal profile  -adjust insulin regimen PRN    3) Monitor BM, GI s/s    Patient is at high nutrition risk, RD to f/u  RD to remain available: Dayanna Jeffers, RD [phone] or via Teams

## 2025-02-11 NOTE — PROGRESS NOTE ADULT - SUBJECTIVE AND OBJECTIVE BOX
SUBJECTIVE / OVERNIGHT EVENTS  Patient sedated & intubated. following commands in Mauritian as per wife    MEDICATIONS  carbidopa/levodopa  25/250 2 Tablet(s) Oral three times a day  cefepime   IVPB 2000 milliGRAM(s) IV Intermittent every 12 hours  chlorhexidine 0.12% Liquid 15 milliLiter(s) Oral Mucosa every 12 hours  chlorhexidine 2% Cloths 1 Application(s) Topical <User Schedule>  dexMEDEtomidine Infusion 0.5 MICROgram(s)/kG/Hr IV Continuous <Continuous>  dextrose 5%. 1000 milliLiter(s) IV Continuous <Continuous>  dextrose 5%. 1000 milliLiter(s) IV Continuous <Continuous>  dextrose 5%. 1000 milliLiter(s) IV Continuous <Continuous>  dextrose 50% Injectable 25 Gram(s) IV Push once  dextrose 50% Injectable 12.5 Gram(s) IV Push once  dextrose 50% Injectable 25 Gram(s) IV Push once  furosemide   Injectable 40 milliGRAM(s) IV Push once  glucagon  Injectable 1 milliGRAM(s) IntraMuscular once  insulin lispro (ADMELOG) corrective regimen sliding scale   SubCutaneous every 6 hours  insulin lispro Injectable (ADMELOG) 8 Unit(s) SubCutaneous every 6 hours  metroNIDAZOLE    Tablet 500 milliGRAM(s) Oral every 12 hours  nafcillin  IVPB      nafcillin  IVPB 2 Gram(s) IV Intermittent every 4 hours  pantoprazole   Suspension 40 milliGRAM(s) Oral daily  polyethylene glycol 3350 17 Gram(s) Oral every 12 hours  rasagiline Tablet 1 milliGRAM(s) Oral <User Schedule>  senna 2 Tablet(s) Oral at bedtime    acetaminophen     Tablet .. 650 milliGRAM(s) Oral every 6 hours PRN Temp greater or equal to 38C (100.4F), Mild Pain (1 - 3)  dextrose Oral Gel 15 Gram(s) Oral once PRN Blood Glucose LESS THAN 70 milliGRAM(s)/deciliter  fentaNYL    Injectable 50 MICROGram(s) IV Push every 2 hours PRN Aggitation    VITALS /  EXAM    T(C): 36.7 (02-11-25 @ 08:00), Max: 37 (02-10-25 @ 12:00)  HR: 84 (02-11-25 @ 10:30) (57 - 94)  BP: 113/65 (02-11-25 @ 09:00) (91/50 - 136/58)  RR: 16 (02-11-25 @ 09:00) (15 - 38)  SpO2: 100% (02-11-25 @ 10:30) (97% - 100%)  POCT Blood Glucose.: 177 mg/dL (02-11-25 @ 06:52)  POCT Blood Glucose.: 105 mg/dL (02-11-25 @ 00:49)  POCT Blood Glucose.: 47 mg/dL (02-11-25 @ 00:21)  POCT Blood Glucose.: 44 mg/dL (02-11-25 @ 00:13)  POCT Blood Glucose.: 164 mg/dL (02-10-25 @ 17:15)  POCT Blood Glucose.: 167 mg/dL (02-10-25 @ 14:51)    GENERAL: NAD, sedated  CHEST/LUNG: Clear to auscultation bilaterally; No wheezes, rales or rhonchi  HEART: Regular rate and rhythm; No murmurs, rubs, or gallops  ABDOMEN: Soft, Nontender, Nondistended; Bowel sounds present, no masses.  EXTREMITIES:  has stasis edema mostly in bilateral UL    I's & O's     02-10-25 @ 07:01  -  02-11-25 @ 07:00  --------------------------------------------------------  IN:    Dexmedetomidine: 332.4 mL    Enteral Tube Flush: 250 mL    Free Water: 1800 mL    IV PiggyBack: 450 mL    Jevity: 1200 mL    PRBCs (Packed Red Blood Cells): 717 mL  Total IN: 4749.4 mL    OUT:    Drain (mL): 500 mL    Indwelling Catheter - Urethral (mL): 680 mL  Total OUT: 1180 mL    Total NET: 3569.4 mL      02-11-25 @ 07:01  -  02-11-25 @ 10:39  --------------------------------------------------------  IN:    Dexmedetomidine: 6 mL    dextrose 5%: 75 mL  Total IN: 81 mL    OUT:    Indwelling Catheter - Urethral (mL): 70 mL  Total OUT: 70 mL    Total NET: 11 mL        LABS             8.4    7.19  )-----------( 303      ( 02-11-25 @ 04:49 )             25.6     147  |  113  |  63  -------------------------<  160   02-11-25 @ 04:49  4.2  |  23  |  0.9    Ca      7.4     02-11-25 @ 04:49  Phos   3.3     02-10-25 @ 04:30  Mg     2.5     02-11-25 @ 04:49    TPro  4.1  /  Alb  2.3  /  TBili  0.9  /  DBili  x   /  AST  26  /  ALT  <5  /  AlkPhos  99  /  GGT  x     02-11-25 @ 04:49    PT/INR - ( 02-09-25 @ 23:35 )   PT: 15.50 sec[H];   INR: 1.31 ratio[H]  PTT - ( 02-10-25 @ 04:30 )  PTT:59.7 sec        Urinalysis Basic - ( 11 Feb 2025 04:49 )    Color: x / Appearance: x / SG: x / pH: x  Gluc: 160 mg/dL / Ketone: x  / Bili: x / Urobili: x   Blood: x / Protein: x / Nitrite: x   Leuk Esterase: x / RBC: x / WBC x   Sq Epi: x / Non Sq Epi: x / Bacteria: x      MICRO / IMAGING / CARDIOLOGY  Telemetry: Reviewed   EKG: Reviewed    CULTURES    Culture - Body Fluid with Gram Stain (collected 02-10-25 @ 16:22)  Source: Bile  Gram Stain:    No polymorphonuclear cells seen per low power field    No organisms seen per oil power field    Culture - Fungal, Body Fluid (collected 02-10-25 @ 16:22)  Source: Bile  Preliminary Report:    Testing in progress    Culture - Blood (collected 02-08-25 @ 17:24)  Source: .Blood BLOOD  Preliminary Report:    No growth at 48 Hours    Culture - Blood (collected 02-08-25 @ 04:58)  Source: .Blood BLOOD  Preliminary Report:    No growth at 48 Hours      IMAGING  PACS Image:  (02-11-25 @ 06:12)  PACS Image:  (02-10-25 @ 07:59)  PACS Image:  (02-10-25 @ 04:36)  PACS Image:  (02-10-25 @ 04:36)  PACS Image:  (02-09-25 @ 22:30)  PACS Image:  (02-09-25 @ 19:12)  PACS Image:  (02-09-25 @ 12:11)    CARDIOLOGY

## 2025-02-12 LAB
ALBUMIN SERPL ELPH-MCNC: 2.3 G/DL — LOW (ref 3.5–5.2)
ALP SERPL-CCNC: 101 U/L — SIGNIFICANT CHANGE UP (ref 30–115)
ALT FLD-CCNC: <5 U/L — SIGNIFICANT CHANGE UP (ref 0–41)
ANION GAP SERPL CALC-SCNC: 10 MMOL/L — SIGNIFICANT CHANGE UP (ref 7–14)
AST SERPL-CCNC: 26 U/L — SIGNIFICANT CHANGE UP (ref 0–41)
BASOPHILS # BLD AUTO: 0.02 K/UL — SIGNIFICANT CHANGE UP (ref 0–0.2)
BASOPHILS NFR BLD AUTO: 0.2 % — SIGNIFICANT CHANGE UP (ref 0–1)
BILIRUB SERPL-MCNC: 0.8 MG/DL — SIGNIFICANT CHANGE UP (ref 0.2–1.2)
BUN SERPL-MCNC: 53 MG/DL — HIGH (ref 10–20)
CALCIUM SERPL-MCNC: 7.6 MG/DL — LOW (ref 8.4–10.5)
CHLORIDE SERPL-SCNC: 108 MMOL/L — SIGNIFICANT CHANGE UP (ref 98–110)
CO2 SERPL-SCNC: 21 MMOL/L — SIGNIFICANT CHANGE UP (ref 17–32)
CREAT SERPL-MCNC: 0.9 MG/DL — SIGNIFICANT CHANGE UP (ref 0.7–1.5)
EGFR: 92 ML/MIN/1.73M2 — SIGNIFICANT CHANGE UP
EGFR: 92 ML/MIN/1.73M2 — SIGNIFICANT CHANGE UP
EOSINOPHIL # BLD AUTO: 0.06 K/UL — SIGNIFICANT CHANGE UP (ref 0–0.7)
EOSINOPHIL NFR BLD AUTO: 0.7 % — SIGNIFICANT CHANGE UP (ref 0–8)
GAS PNL BLDA: SIGNIFICANT CHANGE UP
GLUCOSE BLDC GLUCOMTR-MCNC: 124 MG/DL — HIGH (ref 70–99)
GLUCOSE BLDC GLUCOMTR-MCNC: 145 MG/DL — HIGH (ref 70–99)
GLUCOSE BLDC GLUCOMTR-MCNC: 205 MG/DL — HIGH (ref 70–99)
GLUCOSE BLDC GLUCOMTR-MCNC: 91 MG/DL — SIGNIFICANT CHANGE UP (ref 70–99)
GLUCOSE BLDC GLUCOMTR-MCNC: 99 MG/DL — SIGNIFICANT CHANGE UP (ref 70–99)
GLUCOSE SERPL-MCNC: 146 MG/DL — HIGH (ref 70–99)
GRAM STN FLD: ABNORMAL
HCT VFR BLD CALC: 20.6 % — LOW (ref 42–52)
HCT VFR BLD CALC: 28.9 % — LOW (ref 42–52)
HGB BLD-MCNC: 6.8 G/DL — CRITICAL LOW (ref 14–18)
HGB BLD-MCNC: 9.4 G/DL — LOW (ref 14–18)
IMM GRANULOCYTES NFR BLD AUTO: 0.6 % — HIGH (ref 0.1–0.3)
LYMPHOCYTES # BLD AUTO: 0.64 K/UL — LOW (ref 1.2–3.4)
LYMPHOCYTES # BLD AUTO: 7.8 % — LOW (ref 20.5–51.1)
MCHC RBC-ENTMCNC: 30.5 PG — SIGNIFICANT CHANGE UP (ref 27–31)
MCHC RBC-ENTMCNC: 31.6 PG — HIGH (ref 27–31)
MCHC RBC-ENTMCNC: 32.5 G/DL — SIGNIFICANT CHANGE UP (ref 32–37)
MCHC RBC-ENTMCNC: 33 G/DL — SIGNIFICANT CHANGE UP (ref 32–37)
MCV RBC AUTO: 93.8 FL — SIGNIFICANT CHANGE UP (ref 80–94)
MCV RBC AUTO: 95.8 FL — HIGH (ref 80–94)
MONOCYTES # BLD AUTO: 1.15 K/UL — HIGH (ref 0.1–0.6)
MONOCYTES NFR BLD AUTO: 14 % — HIGH (ref 1.7–9.3)
NEUTROPHILS # BLD AUTO: 6.28 K/UL — SIGNIFICANT CHANGE UP (ref 1.4–6.5)
NEUTROPHILS NFR BLD AUTO: 76.7 % — HIGH (ref 42.2–75.2)
NIGHT BLUE STAIN TISS: SIGNIFICANT CHANGE UP
NRBC BLD AUTO-RTO: 0 /100 WBCS — SIGNIFICANT CHANGE UP (ref 0–0)
NRBC BLD AUTO-RTO: 0 /100 WBCS — SIGNIFICANT CHANGE UP (ref 0–0)
PLATELET # BLD AUTO: 214 K/UL — SIGNIFICANT CHANGE UP (ref 130–400)
PLATELET # BLD AUTO: 303 K/UL — SIGNIFICANT CHANGE UP (ref 130–400)
PMV BLD: 11.4 FL — HIGH (ref 7.4–10.4)
PMV BLD: 12.3 FL — HIGH (ref 7.4–10.4)
POTASSIUM SERPL-MCNC: 4.6 MMOL/L — SIGNIFICANT CHANGE UP (ref 3.5–5)
POTASSIUM SERPL-SCNC: 4.6 MMOL/L — SIGNIFICANT CHANGE UP (ref 3.5–5)
PROCALCITONIN SERPL-MCNC: 0.62 NG/ML — HIGH (ref 0.02–0.1)
PROT SERPL-MCNC: 4.5 G/DL — LOW (ref 6–8)
RBC # BLD: 2.15 M/UL — LOW (ref 4.7–6.1)
RBC # BLD: 3.08 M/UL — LOW (ref 4.7–6.1)
RBC # FLD: 14.3 % — SIGNIFICANT CHANGE UP (ref 11.5–14.5)
RBC # FLD: 15.5 % — HIGH (ref 11.5–14.5)
SODIUM SERPL-SCNC: 139 MMOL/L — SIGNIFICANT CHANGE UP (ref 135–146)
SPECIMEN SOURCE: SIGNIFICANT CHANGE UP
WBC # BLD: 8.2 K/UL — SIGNIFICANT CHANGE UP (ref 4.8–10.8)
WBC # BLD: 8.61 K/UL — SIGNIFICANT CHANGE UP (ref 4.8–10.8)
WBC # FLD AUTO: 8.2 K/UL — SIGNIFICANT CHANGE UP (ref 4.8–10.8)
WBC # FLD AUTO: 8.61 K/UL — SIGNIFICANT CHANGE UP (ref 4.8–10.8)

## 2025-02-12 PROCEDURE — 99291 CRITICAL CARE FIRST HOUR: CPT

## 2025-02-12 PROCEDURE — 73701 CT LOWER EXTREMITY W/DYE: CPT | Mod: 26,RT

## 2025-02-12 RX ORDER — INSULIN LISPRO 100 U/ML
8 INJECTION, SOLUTION INTRAVENOUS; SUBCUTANEOUS EVERY 6 HOURS
Refills: 0 | Status: DISCONTINUED | OUTPATIENT
Start: 2025-02-12 | End: 2025-02-13

## 2025-02-12 RX ADMIN — CEFEPIME 100 MILLIGRAM(S): 2 INJECTION, POWDER, FOR SOLUTION INTRAVENOUS at 17:00

## 2025-02-12 RX ADMIN — POLYETHYLENE GLYCOL 3350 17 GRAM(S): 17 POWDER, FOR SOLUTION ORAL at 05:59

## 2025-02-12 RX ADMIN — Medication 1 APPLICATION(S): at 05:57

## 2025-02-12 RX ADMIN — NAFCILLIN 200 GRAM(S): 2 INJECTION, SOLUTION INTRAVENOUS at 13:43

## 2025-02-12 RX ADMIN — Medication 2 TABLET(S): at 21:20

## 2025-02-12 RX ADMIN — Medication 2 TABLET(S): at 06:02

## 2025-02-12 RX ADMIN — Medication 40 MILLIGRAM(S): at 11:48

## 2025-02-12 RX ADMIN — Medication 15 MILLILITER(S): at 17:00

## 2025-02-12 RX ADMIN — INSULIN LISPRO 4: 100 INJECTION, SOLUTION INTRAVENOUS; SUBCUTANEOUS at 06:00

## 2025-02-12 RX ADMIN — Medication 500 MILLIGRAM(S): at 05:59

## 2025-02-12 RX ADMIN — Medication 2 TABLET(S): at 13:42

## 2025-02-12 RX ADMIN — Medication 15 MILLILITER(S): at 05:58

## 2025-02-12 RX ADMIN — POLYETHYLENE GLYCOL 3350 17 GRAM(S): 17 POWDER, FOR SOLUTION ORAL at 17:00

## 2025-02-12 RX ADMIN — Medication 500 MILLIGRAM(S): at 17:05

## 2025-02-12 RX ADMIN — NAFCILLIN 200 GRAM(S): 2 INJECTION, SOLUTION INTRAVENOUS at 09:21

## 2025-02-12 RX ADMIN — RASAGILINE 1 MILLIGRAM(S): 0.5 TABLET ORAL at 05:59

## 2025-02-12 RX ADMIN — INSULIN LISPRO 8 UNIT(S): 100 INJECTION, SOLUTION INTRAVENOUS; SUBCUTANEOUS at 06:01

## 2025-02-12 RX ADMIN — NAFCILLIN 200 GRAM(S): 2 INJECTION, SOLUTION INTRAVENOUS at 21:19

## 2025-02-12 RX ADMIN — CEFEPIME 100 MILLIGRAM(S): 2 INJECTION, POWDER, FOR SOLUTION INTRAVENOUS at 05:58

## 2025-02-12 RX ADMIN — INSULIN LISPRO 8 UNIT(S): 100 INJECTION, SOLUTION INTRAVENOUS; SUBCUTANEOUS at 18:48

## 2025-02-12 RX ADMIN — NAFCILLIN 200 GRAM(S): 2 INJECTION, SOLUTION INTRAVENOUS at 17:00

## 2025-02-12 RX ADMIN — Medication 40 MILLIEQUIVALENT(S): at 00:37

## 2025-02-12 RX ADMIN — NAFCILLIN 200 GRAM(S): 2 INJECTION, SOLUTION INTRAVENOUS at 02:00

## 2025-02-12 RX ADMIN — NAFCILLIN 200 GRAM(S): 2 INJECTION, SOLUTION INTRAVENOUS at 06:04

## 2025-02-12 NOTE — PROGRESS NOTE ADULT - SUBJECTIVE AND OBJECTIVE BOX
GARETT ESCAMILLA  69y, Male  Allergy: Allergy Status Unknown      LOS  14d    CHIEF COMPLAINT: penumonia (12 Feb 2025 10:33)      INTERVAL EVENTS/HPI  - T(F): , Max: 99.1 (02-11-25 @ 20:00), no pressors  - WBC Count: 8.20 (02-12-25 @ 05:30)  WBC Count: 6.85 (02-11-25 @ 20:27)  - Creatinine: 0.9 (02-12-25 @ 05:30)  Creatinine: 0.9 (02-11-25 @ 20:27)     -   -   -     ROS  cannot obtain secondary to patient's sedation and/or mental status    VITALS:  T(F): 97.8, Max: 99.1 (02-11-25 @ 20:00)  HR: 81  BP: 113/55  RR: 20Vital Signs Last 24 Hrs  T(C): 36.6 (12 Feb 2025 12:00), Max: 37.3 (11 Feb 2025 20:00)  T(F): 97.8 (12 Feb 2025 12:00), Max: 99.1 (11 Feb 2025 20:00)  HR: 81 (12 Feb 2025 12:00) (63 - 99)  BP: 113/55 (12 Feb 2025 12:00) (83/49 - 126/60)  BP(mean): 79 (12 Feb 2025 12:00) (63 - 87)  RR: 20 (12 Feb 2025 12:00) (12 - 27)  SpO2: 99% (12 Feb 2025 12:00) (97% - 100%)    Parameters below as of 12 Feb 2025 12:00  Patient On (Oxygen Delivery Method): ventilator    O2 Concentration (%): 40    PHYSICAL EXAM:  General: intubated  HEENT: NCAT  Neck: supple  CV: RRR  Lungs: symmetric chest expansion, decreased BS at bases  Abd: Soft  Extr: wwp  Skin: no rash  Neuro: sedated  Lines: no phlebitis     FH: Non-contributory  Social Hx: Non-contributory    TESTS & MEASUREMENTS:                        6.8    8.20  )-----------( 214      ( 12 Feb 2025 05:30 )             20.6     02-12    139  |  108  |  53[H]  ----------------------------<  146[H]  4.6   |  21  |  0.9    Ca    7.6[L]      12 Feb 2025 05:30  Mg     2.5     02-11    TPro  4.5[L]  /  Alb  2.3[L]  /  TBili  0.8  /  DBili  x   /  AST  26  /  ALT  <5  /  AlkPhos  101  02-12      LIVER FUNCTIONS - ( 12 Feb 2025 05:30 )  Alb: 2.3 g/dL / Pro: 4.5 g/dL / ALK PHOS: 101 U/L / ALT: <5 U/L / AST: 26 U/L / GGT: x           Urinalysis Basic - ( 12 Feb 2025 05:30 )    Color: x / Appearance: x / SG: x / pH: x  Gluc: 146 mg/dL / Ketone: x  / Bili: x / Urobili: x   Blood: x / Protein: x / Nitrite: x   Leuk Esterase: x / RBC: x / WBC x   Sq Epi: x / Non Sq Epi: x / Bacteria: x        Culture - Fungal, Bronchial (collected 02-11-25 @ 11:53)  Source: Bronchial  Preliminary Report (02-12-25 @ 09:08):    Testing in progress    Culture - Bronchial (collected 02-11-25 @ 11:53)  Source: Lavage  Gram Stain (02-11-25 @ 23:43):    No Squamous epithelial cells seen per low power field    Few polymorphonuclear leukocytes seen per low power field    No organisms seen per oil power field    Culture - Body Fluid with Gram Stain (collected 02-10-25 @ 16:22)  Source: Bile  Gram Stain (02-10-25 @ 23:48):    No polymorphonuclear cells seen per low power field    No organisms seen per oil power field  Preliminary Report (02-11-25 @ 17:57):    No growth    Culture - Fungal, Body Fluid (collected 02-10-25 @ 16:22)  Source: Bile  Preliminary Report (02-12-25 @ 07:10):    Rare Yeast Identification and susceptibility to follow.    Culture - Blood (collected 02-08-25 @ 17:24)  Source: .Blood BLOOD  Preliminary Report (02-12-25 @ 01:01):    No growth at 72 Hours    Culture - Blood (collected 02-08-25 @ 04:58)  Source: .Blood BLOOD  Preliminary Report (02-11-25 @ 17:01):    No growth at 72 Hours    Culture - Acid Fast - Bronchial w/Smear (collected 02-04-25 @ 09:29)  Source: Bronch Wash  Preliminary Report (02-05-25 @ 23:07):    Culture is being performed.    Culture - Bronchial (collected 02-04-25 @ 09:29)  Source: Bronch Wash  Gram Stain (02-05-25 @ 07:26):    Few-moderate polymorphonuclear leukocytes seen per low power field    No Squamous epithelial cells seen per low power field    No organisms seen per oil power field  Final Report (02-06-25 @ 11:36):    Commensal halina consistent with body site    Culture - Sputum (collected 02-04-25 @ 09:00)  Source: ET Tube ET Tube  Gram Stain (02-04-25 @ 19:37):    Numerous polymorphonuclear leukocytes per low power field    Rare Squamous epithelial cells per low power field    Rare Yeast like cells per oil power field  Final Report (02-05-25 @ 22:23):    Commensal halina consistent with body site    Culture - Blood (collected 02-02-25 @ 04:54)  Source: .Blood BLOOD  Final Report (02-07-25 @ 12:00):    No growth at 5 days    Culture - Blood (collected 01-31-25 @ 17:22)  Source: .Blood BLOOD  Final Report (02-05-25 @ 23:07):    No growth at 5 days    Culture - Blood (collected 01-31-25 @ 17:22)  Source: .Blood BLOOD  Final Report (02-05-25 @ 23:07):    No growth at 5 days    Urinalysis with Rflx Culture (collected 01-29-25 @ 16:48)    Culture - Blood (collected 01-29-25 @ 10:09)  Source: .Blood BLOOD  Gram Stain (01-30-25 @ 15:40):    Growth in aerobic bottle: Gram Positive Cocci in Clusters    Growth in anaerobic bottle: Gram Positive Cocci in Clusters  Final Report (02-01-25 @ 07:51):    Growth in aerobic and anaerobic bottles: Staphylococcus aureus    Direct identification is available within approximately 3-5    hours either by Blood Panel Multiplexed PCR or Direct    MALDI-TOF. Details: [email or web address]  Organism: Blood Culture PCR  Staphylococcus aureus (02-01-25 @ 07:51)  Organism: Staphylococcus aureus (02-01-25 @ 07:51)      Method Type: KARTIK      -  Ceftaroline: S <=0.5      -  Clindamycin: R <=0.25 This isolate is presumed to be clindamycin resistant based on detection of inducible resistance. Clindamycin may still be effective in some patients.      -  Erythromycin: R >4      -  Gentamicin: S <=4 Should not be used as monotherapy      -  Oxacillin: S <=0.25 Oxacillin predicts susceptibility for dicloxacillin, methicillin, and nafcillin      -  Rifampin: S <=1 Should not be used as monotherapy      -  Tetracycline: S <=4      -  Trimethoprim/Sulfamethoxazole: S <=0.5/9.5      -  Vancomycin: S 1  Organism: Blood Culture PCR (02-01-25 @ 07:51)      Method Type: PCR      -  Methicillin SENSITIVE Staphylococcus aureus (MSSA): Detec Any isolate of Staphylococcus aureus from a blood culture is NOT considered a contaminant.    Culture - Blood (collected 01-29-25 @ 10:09)  Source: .Blood BLOOD  Gram Stain (02-01-25 @ 00:35):    Growth in aerobic bottle: Gram Positive Cocci in Clusters    Growth in anaerobic bottle: Gram Positive Cocci in Clusters  Final Report (02-01-25 @ 16:44):    Growth in aerobic and anaerobic bottles: Staphylococcus aureus    See previous culture [id number]            INFECTIOUS DISEASES TESTING  Procalcitonin: 0.62 (02-11-25 @ 08:30)  MRSA PCR Result.: Negative (02-10-25 @ 16:22)  MRSA PCR Result.: Negative (01-30-25 @ 13:40)  Procalcitonin: 6.93 (01-30-25 @ 07:26)  Legionella Antigen, Urine: Negative (01-29-25 @ 15:49)  Rapid RVP Result: Detected (01-29-25 @ 10:30)      INFLAMMATORY MARKERS      RADIOLOGY & ADDITIONAL TESTS:  I have personally reviewed the last available Chest xray  CXR  Xray Chest 1 View- PORTABLE-Urgent:   ACC: [id number] EXAM:  XR CHEST PORTABLE URGENT 1V   ORDERED BY: TODD PEÑA     PROCEDURE DATE:  02/09/2025          INTERPRETATION:  CLINICAL HISTORY: NG tube placement.    COMPARISON: Chest x-ray 2/9/2025..    TECHNIQUE: Portable frontal chest radiograph.    FINDINGS:    Support devices: ET tube 6 cm above the minh. Enteric tube terminates   below the diaphragm.    Cardiac/mediastinum/hilum: Stable.    Lung parenchyma/Pleura: Stable bilateral opacities.    Skeleton/soft tissues: Stable.      IMPRESSION:    Stable bilateral opacities.  ET tube 6 cm above the minh.  Enteric tube terminates below the diaphragm.    --- End of Report ---          SAJAN CA MD; Resident Radiologist  This document has been electronically signed.  ALYSSA GÓMEZ MD; Attending Radiologist  This document has been electronically signed. Feb 10 2025 11:59AM (02-09-25 @ 19:12)      CT      CARDIOLOGY TESTING      MEDICATIONS  carbidopa/levodopa  25/250 2  cefepime   IVPB 2000  chlorhexidine 0.12% Liquid 15  chlorhexidine 2% Cloths 1  dexMEDEtomidine Infusion 0.5  dextrose 5%. 1000  dextrose 5%. 1000  dextrose 50% Injectable 25  dextrose 50% Injectable 12.5  dextrose 50% Injectable 25  glucagon  Injectable 1  insulin lispro (ADMELOG) corrective regimen sliding scale   insulin lispro Injectable (ADMELOG) 8  metroNIDAZOLE    Tablet 500  nafcillin  IVPB   nafcillin  IVPB 2  pantoprazole   Suspension 40  polyethylene glycol 3350 17  rasagiline Tablet 1  senna 2      WEIGHT  Weight (kg): 51.1 (02-05-25 @ 13:52)  Creatinine: 0.9 mg/dL (02-12-25 @ 05:30)  Creatinine: 0.9 mg/dL (02-11-25 @ 20:27)      ANTIBIOTICS:  cefepime   IVPB 2000 milliGRAM(s) IV Intermittent every 12 hours  metroNIDAZOLE    Tablet 500 milliGRAM(s) Oral every 12 hours  nafcillin  IVPB      nafcillin  IVPB 2 Gram(s) IV Intermittent every 4 hours      All available historical records have been reviewed

## 2025-02-12 NOTE — PROGRESS NOTE ADULT - ASSESSMENT
69-year-old male past medical history of hypertension, Parkinson's, CAD presents for shortness of breath and cough of 1 day duration. History was obtained from patient's wife who noted that the patient has been having decreased po intake, activity, and unintentional weight loss for some time. However yesterday he started having cough and shortness of breath. Admitted to MICU for AHRF and sepsis 2/2 to mutilobar PNA.    #Acute Hypoxic Respiratory Failure likely 2/2 CAP/aspiration and flu  #Toxic Metabolic Encephalopathy likely 2/2 CAP/aspiration and flu  #Influenza A positive, treated  #MSSA bacteremia- resolved  #Sepsis POA  - Aspiration precautions.    - patient inc WOB, worsening alkalosis, obtunded-intubated for airway protection   -ID: if patient decompensates, dc cefepime/metro, start patricia 1g   - cardio: patient critically sick, not a candidate for surgery therefore no MARY, can c/w abx for IE as per ID  - s/p bronch, f/u cultures   - ID: tamiflu 30 mg daily: last dose on 2/10, continue cefepime 1g q12h,  metro and nafcillin as per ID  - yesterday had bronch: showed copious amounts of secretions and poor cough.  Will need trach   -had extensive conversation with wife & son, wife is against trach & still hoping for extubation. explained risks & likely need for reintubation in that case. will likely repeat bronch today    #PE on CTA  #large R groin intramuscular hematoma  #Periventricular bleed on CTH, stable  #Hb drop  - CTH. noted with right periventricular bleed, stable.   - Neuro on board  - Continue Anti parkinson's meds.    - CTA chest to r/o PE: focal filling defect within main pulm artery   - repeat CT head: stable intraventricular hemorrhage >> repeat on 2/9 after on full AC for PE: stable  -CT pelvis:  Large intramuscular hematoma in the medial right thigh measuring approximately 9.8 x 6.4 x 17 cm.  -holding AC  -2/12: Hb drop to 6.8> getting 1 unit. will get CTA to r/o active extravasation from hematoma, likely consult IR after CTA    #Elevated Bilirubin-improving   #Leukocytosis-improving   #Cholangitis vs cholecystitis s/o perc haydee  - F/u RUQ US: distended GB with sludge, GB polyp   - IR: perc haydee 2/5, drained 250 cc dark bile, 450 cc output overnight. F/u GB drainage cultures: negative gram stain    #hypernatremia, resolved  -Na 148>147>139  -free water with feeds  -stopped d5    #Constipation, improved  - senna and miralax  - c/w lactulose   -2/10: kub: distented bowels. having BM      #Possible Mural thrombus in PA  #CAD  - Avoid overload  - C/w LR at 50  - TTE: EF 56%, G1DD, mild-mod MR, mod TR, mild AZ, mild pHTN  - Cardio recs appreciated       - Patient is poor candidate for MARY       - full endocarditis abx course    #MISC  - DVT ppx: SCDs, holding  - GI ppx: not needed  - Activity: as tolerated  - Diet: NPO w/ tube feed  - full code      Plan: 4pm cbc, trend Hb, CTA

## 2025-02-12 NOTE — PROGRESS NOTE ADULT - ASSESSMENT
IMPRESSION:    Acute Hypoxic Respiratory Failure   Toxic Metabolic Encephalopathy  CAP likely aspiration   Influenza A treated   MSSA bacteremia repeat CX -  Sepsis POA  LYNNE improving  cholecystitis sp percutaneous haydee  Periventricular bleed  Suspected Mural thrombus in PA  Right thigh hematoma.    HO Parkinson's Disease   HO CAD    PLAN:    CNS:  SAT.  Continue Anti parkinson's. CTH stable.       HEENT: Oral care.  ETT duration: 11 days.     PULMONARY:  No change in vent settings.  Monitor airway pressures.  Bronchoscopy yesterday wiht copious amounts of secretions and poor cough.        CARDIOVASCULAR:  Avoid overload.  NO MARY per cardiology. Keep even balance.     GI: GI prophylaxis.  NGT feeds as tolerated. S/P perc cholecystostomy. Bowel regimen     RENAL: Follow up lytes.  Mars in place for retention. Kidney function stable.     INFECTIOUS DISEASE: Cultures noted.  SP Tamiflu.  ABX per ID.  Duration per ID.     HEMATOLOGICAL: Monitor CBC and Coags.  CTA.  Might need IR.        ENDOCRINE:  Follow up FS.  Insulin protocol if needed.    MUSCULOSKELETAL: Bed rest.  Off loading. Wound care for DTIs.     Full code  MICU  palliative care follow up   very poor prognosis               IMPRESSION:    Acute Hypoxic Respiratory Failure   Toxic Metabolic Encephalopathy  CAP likely aspiration   Influenza A treated   MSSA bacteremia repeat CX -  Sepsis POA  LYNNE improving  cholecystitis sp percutaneous haydee  Periventricular bleed  Suspected Mural thrombus in PA  Right thigh hematoma.    HO Parkinson's Disease   HO CAD    PLAN:    CNS:  SAT.  Continue Anti parkinson's. CTH stable.       HEENT: Oral care.  ETT duration: 11 days.     PULMONARY:  No change in vent settings.  Monitor airway pressures.  Bronchoscopy yesterday wiht copious amounts of secretions and poor cough.  Will need trach     CARDIOVASCULAR:  Avoid overload.  NO MARY per cardiology. Keep even balance.     GI: GI prophylaxis.  NGT feeds as tolerated. S/P perc cholecystostomy. Bowel regimen     RENAL: Follow up lytes.  Mars in place for retention. Kidney function stable.     INFECTIOUS DISEASE: Cultures noted.  SP Tamiflu.  ABX per ID.  Duration per ID.     HEMATOLOGICAL: Monitor CBC and Coags.  CTA.  Might need IR.        ENDOCRINE:  Follow up FS.  Insulin protocol if needed.    MUSCULOSKELETAL: Bed rest.  Off loading. Wound care for DTIs.     Full code  MICU  palliative care follow up   very poor prognosis

## 2025-02-12 NOTE — PROGRESS NOTE ADULT - SUBJECTIVE AND OBJECTIVE BOX
Patient is a 69y old  Male who presents with a chief complaint of penumonia (11 Feb 2025 10:38)        Over Night Events:  Remains on MV.  Off pressor.s  SP 2 Units PRBCS lastr 24 hours.  Getting one Unit PRBC now.  SP bronchsocopy         ROS:     All ROS are negative except HPI         PHYSICAL EXAM    ICU Vital Signs Last 24 Hrs  T(C): 36.6 (12 Feb 2025 08:00), Max: 37.3 (11 Feb 2025 20:00)  T(F): 97.9 (12 Feb 2025 08:00), Max: 99.1 (11 Feb 2025 20:00)  HR: 75 (12 Feb 2025 08:00) (63 - 99)  BP: 90/54 (12 Feb 2025 08:00) (83/49 - 118/58)  BP(mean): 69 (12 Feb 2025 08:00) (63 - 83)  ABP: --  ABP(mean): --  RR: 15 (12 Feb 2025 08:00) (12 - 27)  SpO2: 100% (12 Feb 2025 08:00) (97% - 100%)    O2 Parameters below as of 12 Feb 2025 08:00  Patient On (Oxygen Delivery Method): ventilator    O2 Concentration (%): 40        CONSTITUTIONAL:  Ill appearing     ENT:   Airway patent,   Mouth with normal mucosa.       EYES:   Pupils equal,   Round and reactive to light.    CARDIAC:   Normal rate,   Regular rhythm.        RESPIRATORY:   No wheezing  Bilateral BS  Normal chest expansion  Not tachypneic,  No use of accessory muscles    GASTROINTESTINAL:  Abdomen soft,   Non-tender,   No guarding,   + BS    MUSCULOSKELETAL:   Range of motion is not limited,  No clubbing, cyanosis    NEUROLOGICAL:   Sedated     SKIN:   Skin normal color for race,   Warm and dry  No evidence of rash.        02-11-25 @ 07:01  -  02-12-25 @ 07:00  --------------------------------------------------------  IN:    Dexmedetomidine: 287.6 mL    dextrose 5%: 1125 mL    Enteral Tube Flush: 290 mL    Free Water: 900 mL    IV PiggyBack: 700 mL    Jevity: 900 mL    PRBCs (Packed Red Blood Cells): 300 mL  Total IN: 4502.6 mL    OUT:    Drain (mL): 600 mL    Indwelling Catheter - Urethral (mL): 1790 mL  Total OUT: 2390 mL    Total NET: 2112.6 mL      02-12-25 @ 07:01  -  02-12-25 @ 08:51  --------------------------------------------------------  IN:    Dexmedetomidine: 12.8 mL  Total IN: 12.8 mL    OUT:    Drain (mL): 220 mL    Indwelling Catheter - Urethral (mL): 200 mL  Total OUT: 420 mL    Total NET: -407.2 mL          LABS:                            6.8    8.20  )-----------( 214      ( 12 Feb 2025 05:30 )             20.6                                               02-12    139  |  108  |  53[H]  ----------------------------<  146[H]  4.6   |  21  |  0.9    Ca    7.6[L]      12 Feb 2025 05:30  Mg     2.5     02-11    TPro  4.5[L]  /  Alb  2.3[L]  /  TBili  0.8  /  DBili  x   /  AST  26  /  ALT  <5  /  AlkPhos  101  02-12                                             Urinalysis Basic - ( 12 Feb 2025 05:30 )    Color: x / Appearance: x / SG: x / pH: x  Gluc: 146 mg/dL / Ketone: x  / Bili: x / Urobili: x   Blood: x / Protein: x / Nitrite: x   Leuk Esterase: x / RBC: x / WBC x   Sq Epi: x / Non Sq Epi: x / Bacteria: x                                                  LIVER FUNCTIONS - ( 12 Feb 2025 05:30 )  Alb: 2.3 g/dL / Pro: 4.5 g/dL / ALK PHOS: 101 U/L / ALT: <5 U/L / AST: 26 U/L / GGT: x                                                  Culture - Bronchial (collected 11 Feb 2025 11:53)  Source: Lavage  Gram Stain (11 Feb 2025 23:43):    No Squamous epithelial cells seen per low power field    Few polymorphonuclear leukocytes seen per low power field    No organisms seen per oil power field    Culture - Body Fluid with Gram Stain (collected 10 Feb 2025 16:22)  Source: Bile  Gram Stain (10 Feb 2025 23:48):    No polymorphonuclear cells seen per low power field    No organisms seen per oil power field  Preliminary Report (11 Feb 2025 17:57):    No growth    Culture - Fungal, Body Fluid (collected 10 Feb 2025 16:22)  Source: Bile  Preliminary Report (12 Feb 2025 07:10):    Rare Yeast Identification and susceptibility to follow.                                                   Mode: AC/ CMV (Assist Control/ Continuous Mandatory Ventilation)  RR (machine): 16  TV (machine): 440  FiO2: 40  PEEP: 8  ITime: 1  MAP: 17  PIP: 34                                      ABG - ( 12 Feb 2025 03:40 )  pH, Arterial: 7.32  pH, Blood: x     /  pCO2: 52    /  pO2: 107   / HCO3: 27    / Base Excess: -0.1  /  SaO2: 99.1                MEDICATIONS  (STANDING):  carbidopa/levodopa  25/250 2 Tablet(s) Oral three times a day  cefepime   IVPB 2000 milliGRAM(s) IV Intermittent every 12 hours  chlorhexidine 0.12% Liquid 15 milliLiter(s) Oral Mucosa every 12 hours  chlorhexidine 2% Cloths 1 Application(s) Topical <User Schedule>  dexMEDEtomidine Infusion 0.5 MICROgram(s)/kG/Hr (6.39 mL/Hr) IV Continuous <Continuous>  dextrose 5%. 1000 milliLiter(s) (50 mL/Hr) IV Continuous <Continuous>  dextrose 5%. 1000 milliLiter(s) (100 mL/Hr) IV Continuous <Continuous>  dextrose 50% Injectable 25 Gram(s) IV Push once  dextrose 50% Injectable 12.5 Gram(s) IV Push once  dextrose 50% Injectable 25 Gram(s) IV Push once  glucagon  Injectable 1 milliGRAM(s) IntraMuscular once  insulin lispro (ADMELOG) corrective regimen sliding scale   SubCutaneous every 6 hours  insulin lispro Injectable (ADMELOG) 8 Unit(s) SubCutaneous every 6 hours  metroNIDAZOLE    Tablet 500 milliGRAM(s) Oral every 12 hours  nafcillin  IVPB      nafcillin  IVPB 2 Gram(s) IV Intermittent every 4 hours  pantoprazole   Suspension 40 milliGRAM(s) Oral daily  polyethylene glycol 3350 17 Gram(s) Oral every 12 hours  rasagiline Tablet 1 milliGRAM(s) Oral <User Schedule>  senna 2 Tablet(s) Oral at bedtime    MEDICATIONS  (PRN):  acetaminophen     Tablet .. 650 milliGRAM(s) Oral every 6 hours PRN Temp greater or equal to 38C (100.4F), Mild Pain (1 - 3)  dextrose Oral Gel 15 Gram(s) Oral once PRN Blood Glucose LESS THAN 70 milliGRAM(s)/deciliter  fentaNYL    Injectable 50 MICROGram(s) IV Push every 2 hours PRN Aggitation      New X-rays reviewed:                                                                                  ECHO

## 2025-02-12 NOTE — PROGRESS NOTE ADULT - SUBJECTIVE AND OBJECTIVE BOX
SUBJECTIVE / OVERNIGHT EVENTS  Patient intubated & sedated. overnight, Hb drop to 6.8>> getting 1unit prbc    MEDICATIONS  carbidopa/levodopa  25/250 2 Tablet(s) Oral three times a day  cefepime   IVPB 2000 milliGRAM(s) IV Intermittent every 12 hours  chlorhexidine 0.12% Liquid 15 milliLiter(s) Oral Mucosa every 12 hours  chlorhexidine 2% Cloths 1 Application(s) Topical <User Schedule>  dexMEDEtomidine Infusion 0.5 MICROgram(s)/kG/Hr IV Continuous <Continuous>  dextrose 5%. 1000 milliLiter(s) IV Continuous <Continuous>  dextrose 5%. 1000 milliLiter(s) IV Continuous <Continuous>  dextrose 50% Injectable 25 Gram(s) IV Push once  dextrose 50% Injectable 12.5 Gram(s) IV Push once  dextrose 50% Injectable 25 Gram(s) IV Push once  glucagon  Injectable 1 milliGRAM(s) IntraMuscular once  insulin lispro (ADMELOG) corrective regimen sliding scale   SubCutaneous every 6 hours  insulin lispro Injectable (ADMELOG) 8 Unit(s) SubCutaneous every 6 hours  metroNIDAZOLE    Tablet 500 milliGRAM(s) Oral every 12 hours  nafcillin  IVPB      nafcillin  IVPB 2 Gram(s) IV Intermittent every 4 hours  pantoprazole   Suspension 40 milliGRAM(s) Oral daily  polyethylene glycol 3350 17 Gram(s) Oral every 12 hours  rasagiline Tablet 1 milliGRAM(s) Oral <User Schedule>  senna 2 Tablet(s) Oral at bedtime    acetaminophen     Tablet .. 650 milliGRAM(s) Oral every 6 hours PRN Temp greater or equal to 38C (100.4F), Mild Pain (1 - 3)  dextrose Oral Gel 15 Gram(s) Oral once PRN Blood Glucose LESS THAN 70 milliGRAM(s)/deciliter  fentaNYL    Injectable 50 MICROGram(s) IV Push every 2 hours PRN Aggitation    VITALS /  EXAM    T(C): 36.6 (02-12-25 @ 08:00), Max: 37.3 (02-11-25 @ 20:00)  HR: 91 (02-12-25 @ 10:29) (63 - 99)  BP: 115/62 (02-12-25 @ 09:00) (83/49 - 118/58)  RR: 20 (02-12-25 @ 09:00) (12 - 27)  SpO2: 99% (02-12-25 @ 10:29) (97% - 100%)  POCT Blood Glucose.: 205 mg/dL (02-12-25 @ 05:54)  POCT Blood Glucose.: 145 mg/dL (02-12-25 @ 00:36)  POCT Blood Glucose.: 288 mg/dL (02-11-25 @ 18:11)  POCT Blood Glucose.: 253 mg/dL (02-11-25 @ 12:32)    GENERAL: NAD, sedated  CHEST/LUNG: Clear to auscultation bilaterally; No wheezes, rales or rhonchi  HEART: Regular rate and rhythm; No murmurs, rubs, or gallops  ABDOMEN: Soft, Nontender, Nondistended, no masses.  EXTREMITIES:  edema UL>LL    I's & O's     02-11-25 @ 07:01  -  02-12-25 @ 07:00  --------------------------------------------------------  IN:    Dexmedetomidine: 287.6 mL    dextrose 5%: 1125 mL    Enteral Tube Flush: 290 mL    Free Water: 900 mL    IV PiggyBack: 700 mL    Jevity: 900 mL    PRBCs (Packed Red Blood Cells): 300 mL  Total IN: 4502.6 mL    OUT:    Drain (mL): 600 mL    Indwelling Catheter - Urethral (mL): 1790 mL  Total OUT: 2390 mL    Total NET: 2112.6 mL      02-12-25 @ 07:01  -  02-12-25 @ 10:33  --------------------------------------------------------  IN:    Dexmedetomidine: 25.6 mL    IV PiggyBack: 100 mL  Total IN: 125.6 mL    OUT:    Drain (mL): 220 mL    Indwelling Catheter - Urethral (mL): 245 mL  Total OUT: 465 mL    Total NET: -339.4 mL        LABS             6.8    8.20  )-----------( 214      ( 02-12-25 @ 05:30 )             20.6     139  |  108  |  53  -------------------------<  146   02-12-25 @ 05:30  4.6  |  21  |  0.9    Ca      7.6     02-12-25 @ 05:30  Mg     2.5     02-11-25 @ 04:49    TPro  4.5  /  Alb  2.3  /  TBili  0.8  /  DBili  x   /  AST  26  /  ALT  <5  /  AlkPhos  101  /  GGT  x     02-12-25 @ 05:30        Urinalysis Basic - ( 12 Feb 2025 05:30 )    Color: x / Appearance: x / SG: x / pH: x  Gluc: 146 mg/dL / Ketone: x  / Bili: x / Urobili: x   Blood: x / Protein: x / Nitrite: x   Leuk Esterase: x / RBC: x / WBC x   Sq Epi: x / Non Sq Epi: x / Bacteria: x      MICRO / IMAGING / CARDIOLOGY  Telemetry: Reviewed   EKG: Reviewed    CULTURES    Culture - Fungal, Bronchial (collected 02-11-25 @ 11:53)  Source: Bronchial  Preliminary Report:    Testing in progress    Culture - Bronchial (collected 02-11-25 @ 11:53)  Source: Lavage  Gram Stain:    No Squamous epithelial cells seen per low power field    Few polymorphonuclear leukocytes seen per low power field    No organisms seen per oil power field    Culture - Body Fluid with Gram Stain (collected 02-10-25 @ 16:22)  Source: Bile  Gram Stain:    No polymorphonuclear cells seen per low power field    No organisms seen per oil power field  Preliminary Report:    No growth    Culture - Fungal, Body Fluid (collected 02-10-25 @ 16:22)  Source: Bile  Preliminary Report:    Rare Yeast Identification and susceptibility to follow.    Culture - Blood (collected 02-08-25 @ 17:24)  Source: .Blood BLOOD  Preliminary Report:    No growth at 72 Hours    Culture - Blood (collected 02-08-25 @ 04:58)  Source: .Blood BLOOD  Preliminary Report:    No growth at 72 Hours      IMAGING  PACS Image:  (02-11-25 @ 06:12)    CARDIOLOGY

## 2025-02-12 NOTE — PROGRESS NOTE ADULT - ASSESSMENT
ASSESSMENT  69-year-old male past medical history of hypertension, Parkinson's, CAD presents for shortness of breath and cough of 1 day duration. History was obtained from patient's wife who noted that the patient has been having decreased po intake, activity, and unintentional weight loss for some time. Howver, yesterday he started having cough and shortness of breath. 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  Increased opacities/debris within the central bronchi with occlusion of the right lower lobe central bronchi. There is increased consolidation   within the right lower lobe.  #Suspected cholecystitis     2/10 biliary fluid NG,   Rare Yeast Identification and susceptibility to follow.- suspect colonizer   RUQ GB sludge     2/5 Successful placement of 8Fr perc haydee tube. 250cc of dark bile aspirated from GB.  #Influenza +, MSSA bacteremia , suspect MSSA superimposed PNA    2/4 bronch   No organisms seen per oil power field    2/ 2 BCX NGTD     1/31 BCX NGTD     1/29 BCX MSSA 3/4 bottles    MRSA PCR Result.: Negative (01-30-25 @ 13:40)    Procalcitonin: 6.93 ng/mL (01-30-25 @ 07:26)    Legionella Antigen, Urine: Negative (01-29-25 @ 15:49)    Streptococcus pneumoniae Ag, Ur Result: Negative (01-29-25 @ 15:49)    CT Multifocal bilateral consolidative opacities as above, suggestive of multifocal pneumonia in the appropriate clinical context.  #Lactic acidosis  #Hypernatremia   #Severe protein calorie malnutrition  BMI (kg/m2): 17  #PD  #Immunodeficiency secondary to Senescence which could results in poor clinical outcomes  #Abx allergy: Allergy Status Unknown  #LYNNE , resolved  Creatinine: 0.9 mg/dL (02-12-25 @ 05:30)    Height (cm): 175.3 (01-30-25 @ 13:00)  Weight (kg): 52.2 (01-29-25 @ 10:14)    RECOMMENDATIONS  - anemia workup per primary team   - Trend fever curve  - Nafcillin 2g q4h IV to target MSSA in blood    - cefepime   IVPB 2000 milliGRAM(s) IV Intermittent every 12 hours E/D 2/16 x 7 days post perc haydee  - metroNIDAZOLE  500mg TID PO E/D 2/16  - TTE no vegetations , guidelines recommend MARY as community acquired bacteremia once stable , otherwise 6 weeks empiric therapy from date of cleared BCX E/D 3/13  - Trend WBC, Cr  - Grave prognosis, GOC     If any questions, please send a message or call on [employer] Teams  Please continue to update ID with any pertinent new laboratory, radiographic findings, or change in clinical status

## 2025-02-13 LAB
ALBUMIN SERPL ELPH-MCNC: 2.2 G/DL — LOW (ref 3.5–5.2)
ALP SERPL-CCNC: 101 U/L — SIGNIFICANT CHANGE UP (ref 30–115)
ALT FLD-CCNC: <5 U/L — SIGNIFICANT CHANGE UP (ref 0–41)
ANION GAP SERPL CALC-SCNC: 9 MMOL/L — SIGNIFICANT CHANGE UP (ref 7–14)
AST SERPL-CCNC: 23 U/L — SIGNIFICANT CHANGE UP (ref 0–41)
BASOPHILS # BLD AUTO: 0.01 K/UL — SIGNIFICANT CHANGE UP (ref 0–0.2)
BASOPHILS NFR BLD AUTO: 0.1 % — SIGNIFICANT CHANGE UP (ref 0–1)
BILIRUB SERPL-MCNC: 0.8 MG/DL — SIGNIFICANT CHANGE UP (ref 0.2–1.2)
BUN SERPL-MCNC: 46 MG/DL — HIGH (ref 10–20)
CALCIUM SERPL-MCNC: 7.6 MG/DL — LOW (ref 8.4–10.4)
CHLORIDE SERPL-SCNC: 108 MMOL/L — SIGNIFICANT CHANGE UP (ref 98–110)
CO2 SERPL-SCNC: 24 MMOL/L — SIGNIFICANT CHANGE UP (ref 17–32)
CREAT SERPL-MCNC: 0.7 MG/DL — SIGNIFICANT CHANGE UP (ref 0.7–1.5)
CULTURE RESULTS: SIGNIFICANT CHANGE UP
EGFR: 99 ML/MIN/1.73M2 — SIGNIFICANT CHANGE UP
EGFR: 99 ML/MIN/1.73M2 — SIGNIFICANT CHANGE UP
EOSINOPHIL # BLD AUTO: 0.07 K/UL — SIGNIFICANT CHANGE UP (ref 0–0.7)
EOSINOPHIL NFR BLD AUTO: 0.9 % — SIGNIFICANT CHANGE UP (ref 0–8)
GAS PNL BLDA: SIGNIFICANT CHANGE UP
GLUCOSE BLDC GLUCOMTR-MCNC: 110 MG/DL — HIGH (ref 70–99)
GLUCOSE BLDC GLUCOMTR-MCNC: 127 MG/DL — HIGH (ref 70–99)
GLUCOSE BLDC GLUCOMTR-MCNC: 169 MG/DL — HIGH (ref 70–99)
GLUCOSE BLDC GLUCOMTR-MCNC: 70 MG/DL — SIGNIFICANT CHANGE UP (ref 70–99)
GLUCOSE BLDC GLUCOMTR-MCNC: 78 MG/DL — SIGNIFICANT CHANGE UP (ref 70–99)
GLUCOSE SERPL-MCNC: 127 MG/DL — HIGH (ref 70–99)
HCT VFR BLD CALC: 28.2 % — LOW (ref 42–52)
HGB BLD-MCNC: 9.5 G/DL — LOW (ref 14–18)
IMM GRANULOCYTES NFR BLD AUTO: 1.1 % — HIGH (ref 0.1–0.3)
LYMPHOCYTES # BLD AUTO: 0.48 K/UL — LOW (ref 1.2–3.4)
LYMPHOCYTES # BLD AUTO: 6.1 % — LOW (ref 20.5–51.1)
MAGNESIUM SERPL-MCNC: 2.2 MG/DL — SIGNIFICANT CHANGE UP (ref 1.8–2.4)
MCHC RBC-ENTMCNC: 31.3 PG — HIGH (ref 27–31)
MCHC RBC-ENTMCNC: 33.7 G/DL — SIGNIFICANT CHANGE UP (ref 32–37)
MCV RBC AUTO: 92.8 FL — SIGNIFICANT CHANGE UP (ref 80–94)
MONOCYTES # BLD AUTO: 0.52 K/UL — SIGNIFICANT CHANGE UP (ref 0.1–0.6)
MONOCYTES NFR BLD AUTO: 6.6 % — SIGNIFICANT CHANGE UP (ref 1.7–9.3)
NEUTROPHILS # BLD AUTO: 6.7 K/UL — HIGH (ref 1.4–6.5)
NEUTROPHILS NFR BLD AUTO: 85.2 % — HIGH (ref 42.2–75.2)
NRBC BLD AUTO-RTO: 0 /100 WBCS — SIGNIFICANT CHANGE UP (ref 0–0)
PLATELET # BLD AUTO: 321 K/UL — SIGNIFICANT CHANGE UP (ref 130–400)
PMV BLD: 11.3 FL — HIGH (ref 7.4–10.4)
POTASSIUM SERPL-MCNC: 4.2 MMOL/L — SIGNIFICANT CHANGE UP (ref 3.5–5)
POTASSIUM SERPL-SCNC: 4.2 MMOL/L — SIGNIFICANT CHANGE UP (ref 3.5–5)
PROT SERPL-MCNC: 4.2 G/DL — LOW (ref 6–8)
RBC # BLD: 3.04 M/UL — LOW (ref 4.7–6.1)
RBC # FLD: 15.2 % — HIGH (ref 11.5–14.5)
SODIUM SERPL-SCNC: 141 MMOL/L — SIGNIFICANT CHANGE UP (ref 135–146)
SPECIMEN SOURCE: SIGNIFICANT CHANGE UP
WBC # BLD: 7.87 K/UL — SIGNIFICANT CHANGE UP (ref 4.8–10.8)
WBC # FLD AUTO: 7.87 K/UL — SIGNIFICANT CHANGE UP (ref 4.8–10.8)

## 2025-02-13 PROCEDURE — 71045 X-RAY EXAM CHEST 1 VIEW: CPT | Mod: 26

## 2025-02-13 PROCEDURE — 99291 CRITICAL CARE FIRST HOUR: CPT

## 2025-02-13 RX ORDER — FENTANYL CITRATE-0.9 % NACL/PF 100MCG/2ML
25 SYRINGE (ML) INTRAVENOUS
Refills: 0 | Status: DISCONTINUED | OUTPATIENT
Start: 2025-02-13 | End: 2025-02-16

## 2025-02-13 RX ORDER — CEFEPIME 2 G/20ML
2000 INJECTION, POWDER, FOR SOLUTION INTRAVENOUS EVERY 8 HOURS
Refills: 0 | Status: COMPLETED | OUTPATIENT
Start: 2025-02-13 | End: 2025-02-16

## 2025-02-13 RX ORDER — INSULIN LISPRO 100 U/ML
4 INJECTION, SOLUTION INTRAVENOUS; SUBCUTANEOUS EVERY 6 HOURS
Refills: 0 | Status: DISCONTINUED | OUTPATIENT
Start: 2025-02-13 | End: 2025-02-16

## 2025-02-13 RX ORDER — CEFEPIME 2 G/20ML
1000 INJECTION, POWDER, FOR SOLUTION INTRAVENOUS EVERY 8 HOURS
Refills: 0 | Status: DISCONTINUED | OUTPATIENT
Start: 2025-02-13 | End: 2025-02-13

## 2025-02-13 RX ORDER — FENTANYL CITRATE-0.9 % NACL/PF 100MCG/2ML
25 SYRINGE (ML) INTRAVENOUS
Refills: 0 | Status: DISCONTINUED | OUTPATIENT
Start: 2025-02-13 | End: 2025-02-13

## 2025-02-13 RX ADMIN — Medication 500 MILLIGRAM(S): at 06:00

## 2025-02-13 RX ADMIN — Medication 25 MICROGRAM(S): at 13:14

## 2025-02-13 RX ADMIN — NAFCILLIN 200 GRAM(S): 2 INJECTION, SOLUTION INTRAVENOUS at 02:06

## 2025-02-13 RX ADMIN — Medication 15 MILLILITER(S): at 05:59

## 2025-02-13 RX ADMIN — Medication 25 MICROGRAM(S): at 12:44

## 2025-02-13 RX ADMIN — INSULIN LISPRO 4 UNIT(S): 100 INJECTION, SOLUTION INTRAVENOUS; SUBCUTANEOUS at 17:27

## 2025-02-13 RX ADMIN — Medication 40 MILLIGRAM(S): at 12:43

## 2025-02-13 RX ADMIN — NAFCILLIN 200 GRAM(S): 2 INJECTION, SOLUTION INTRAVENOUS at 06:02

## 2025-02-13 RX ADMIN — CEFEPIME 100 MILLIGRAM(S): 2 INJECTION, POWDER, FOR SOLUTION INTRAVENOUS at 06:02

## 2025-02-13 RX ADMIN — Medication 15 MILLILITER(S): at 17:23

## 2025-02-13 RX ADMIN — Medication 2 TABLET(S): at 21:20

## 2025-02-13 RX ADMIN — INSULIN LISPRO 4 UNIT(S): 100 INJECTION, SOLUTION INTRAVENOUS; SUBCUTANEOUS at 12:46

## 2025-02-13 RX ADMIN — CEFEPIME 100 MILLIGRAM(S): 2 INJECTION, POWDER, FOR SOLUTION INTRAVENOUS at 21:20

## 2025-02-13 RX ADMIN — CEFEPIME 100 MILLIGRAM(S): 2 INJECTION, POWDER, FOR SOLUTION INTRAVENOUS at 14:39

## 2025-02-13 RX ADMIN — Medication 1 APPLICATION(S): at 05:58

## 2025-02-13 RX ADMIN — NAFCILLIN 200 GRAM(S): 2 INJECTION, SOLUTION INTRAVENOUS at 14:40

## 2025-02-13 RX ADMIN — POLYETHYLENE GLYCOL 3350 17 GRAM(S): 17 POWDER, FOR SOLUTION ORAL at 17:23

## 2025-02-13 RX ADMIN — NAFCILLIN 200 GRAM(S): 2 INJECTION, SOLUTION INTRAVENOUS at 21:20

## 2025-02-13 RX ADMIN — RASAGILINE 1 MILLIGRAM(S): 0.5 TABLET ORAL at 06:00

## 2025-02-13 RX ADMIN — Medication 2 TABLET(S): at 21:19

## 2025-02-13 RX ADMIN — NAFCILLIN 200 GRAM(S): 2 INJECTION, SOLUTION INTRAVENOUS at 17:24

## 2025-02-13 RX ADMIN — Medication 500 MILLIGRAM(S): at 17:23

## 2025-02-13 RX ADMIN — INSULIN LISPRO 2: 100 INJECTION, SOLUTION INTRAVENOUS; SUBCUTANEOUS at 17:26

## 2025-02-13 RX ADMIN — Medication 2 TABLET(S): at 14:41

## 2025-02-13 RX ADMIN — POLYETHYLENE GLYCOL 3350 17 GRAM(S): 17 POWDER, FOR SOLUTION ORAL at 05:59

## 2025-02-13 RX ADMIN — NAFCILLIN 200 GRAM(S): 2 INJECTION, SOLUTION INTRAVENOUS at 10:44

## 2025-02-13 RX ADMIN — Medication 2 TABLET(S): at 05:57

## 2025-02-13 NOTE — PHARMACOTHERAPY INTERVENTION NOTE - NSPHARMCOMMASP
ASP - Renal dose adjustment
ASP - Renal dose adjustment
ASP - Dose optimization/Non-Renal dose adjustment
ASP - Duration of therapy
ASP - Renal dose adjustment
ASP - Renal dose adjustment

## 2025-02-13 NOTE — PROGRESS NOTE ADULT - ASSESSMENT
ASSESSMENT  69-year-old male past medical history of hypertension, Parkinson's, CAD presents for shortness of breath and cough of 1 day duration. History was obtained from patient's wife who noted that the patient has been having decreased po intake, activity, and unintentional weight loss for some time. Howver, yesterday he started having cough and shortness of breath. Found to have Flu, PNA and MSSA bacteremia     IMPRESSION  #Fevers, resolving     2/11 bronch  Commensal halina consistent with body site    2/8 BCX NGTD     CXR stable opacities   #Intubated on mechanical ventilation , septic shock requiring pressors   #Pulmonary artery thrombus on CTA    2/ 2 CTA There is a focal filling defect within the main pulmonary artery at site of the ductus arteriosus suspicious for thrombus.  Increased opacities/debris within the central bronchi with occlusion of the right lower lobe central bronchi. There is increased consolidation   within the right lower lobe.  #Suspected cholecystitis     2/10 biliary fluid NG,   Rare Yeast Identification and susceptibility to follow.- suspect colonizer   RUQ GB sludge     2/5 Successful placement of 8Fr perc haydee tube. 250cc of dark bile aspirated from GB.  #Influenza +, MSSA bacteremia , suspect MSSA superimposed PNA    2/4 bronch   No organisms seen per oil power field    2/ 2 BCX NGTD     1/31 BCX NGTD     1/29 BCX MSSA 3/4 bottles    MRSA PCR Result.: Negative (01-30-25 @ 13:40)    Procalcitonin: 6.93 ng/mL (01-30-25 @ 07:26)    Legionella Antigen, Urine: Negative (01-29-25 @ 15:49)    Streptococcus pneumoniae Ag, Ur Result: Negative (01-29-25 @ 15:49)    CT Multifocal bilateral consolidative opacities as above, suggestive of multifocal pneumonia in the appropriate clinical context.  #Lactic acidosis  #Hypernatremia   #Severe protein calorie malnutrition  BMI (kg/m2): 17  #PD  #Immunodeficiency secondary to Senescence which could results in poor clinical outcomes  #Abx allergy: Allergy Status Unknown  #LYNNE , resolved  Creatinine: 0.9 mg/dL (02-12-25 @ 05:30)    Height (cm): 175.3 (01-30-25 @ 13:00)  Weight (kg): 52.2 (01-29-25 @ 10:14)    RECOMMENDATIONS  - Trend fever curve  - Nafcillin 2g q4h IV to target MSSA in blood    - cefepime   IVPB 2000 milliGRAM(s) IV Intermittent every 12 hours E/D 2/16 x 7 days post perc haydee  - metroNIDAZOLE  500mg TID PO E/D 2/16  - TTE no vegetations , guidelines recommend MARY as community acquired bacteremia once stable , otherwise 6 weeks empiric therapy from date of cleared BCX E/D 3/13  - Trend WBC, Cr  - Grave prognosis, GOC     If any questions, please send a message or call on [employer] Teams  Please continue to update ID with any pertinent new laboratory, radiographic findings, or change in clinical status

## 2025-02-13 NOTE — PROGRESS NOTE ADULT - SUBJECTIVE AND OBJECTIVE BOX
SUBJECTIVE / OVERNIGHT EVENTS  Pt intubated & sedated    MEDICATIONS  carbidopa/levodopa  25/250 2 Tablet(s) Oral three times a day  cefepime   IVPB 2000 milliGRAM(s) IV Intermittent every 8 hours  chlorhexidine 0.12% Liquid 15 milliLiter(s) Oral Mucosa every 12 hours  chlorhexidine 2% Cloths 1 Application(s) Topical <User Schedule>  dexMEDEtomidine Infusion 0.5 MICROgram(s)/kG/Hr IV Continuous <Continuous>  dextrose 5%. 1000 milliLiter(s) IV Continuous <Continuous>  dextrose 5%. 1000 milliLiter(s) IV Continuous <Continuous>  dextrose 50% Injectable 25 Gram(s) IV Push once  dextrose 50% Injectable 12.5 Gram(s) IV Push once  dextrose 50% Injectable 25 Gram(s) IV Push once  glucagon  Injectable 1 milliGRAM(s) IntraMuscular once  insulin lispro (ADMELOG) corrective regimen sliding scale   SubCutaneous every 6 hours  insulin lispro Injectable (ADMELOG) 4 Unit(s) SubCutaneous every 6 hours  metroNIDAZOLE    Tablet 500 milliGRAM(s) Oral every 12 hours  nafcillin  IVPB 2 Gram(s) IV Intermittent every 4 hours  nafcillin  IVPB      pantoprazole   Suspension 40 milliGRAM(s) Oral daily  polyethylene glycol 3350 17 Gram(s) Oral every 12 hours  rasagiline Tablet 1 milliGRAM(s) Oral <User Schedule>  senna 2 Tablet(s) Oral at bedtime    acetaminophen     Tablet .. 650 milliGRAM(s) Oral every 6 hours PRN Temp greater or equal to 38C (100.4F), Mild Pain (1 - 3)  dextrose Oral Gel 15 Gram(s) Oral once PRN Blood Glucose LESS THAN 70 milliGRAM(s)/deciliter    VITALS /  EXAM    T(C): 36.4 (02-13-25 @ 08:00), Max: 36.6 (02-12-25 @ 12:00)  HR: 80 (02-13-25 @ 08:00) (67 - 95)  BP: 105/57 (02-13-25 @ 08:00) (86/53 - 152/70)  RR: 18 (02-13-25 @ 08:00) (12 - 20)  SpO2: 100% (02-13-25 @ 08:00) (97% - 100%)  POCT Blood Glucose.: 127 mg/dL (02-13-25 @ 05:41)  POCT Blood Glucose.: 78 mg/dL (02-13-25 @ 00:05)  POCT Blood Glucose.: 70 mg/dL (02-13-25 @ 00:03)  POCT Blood Glucose.: 124 mg/dL (02-12-25 @ 18:46)  POCT Blood Glucose.: 99 mg/dL (02-12-25 @ 16:40)  POCT Blood Glucose.: 91 mg/dL (02-12-25 @ 11:48)    GENERAL: NAD, sedated  CHEST/LUNG: Clear to auscultation bilaterally, rales  HEART: Regular rate and rhythm; No murmurs, rubs, or gallops  ABDOMEN: Soft, Nontender, Nondistended  EXTREMITIES:  edema UL>LL    I's & O's     02-12-25 @ 07:01  -  02-13-25 @ 07:00  --------------------------------------------------------  IN:    Dexmedetomidine: 301.6 mL    Enteral Tube Flush: 75 mL    IV PiggyBack: 50 mL    IV PiggyBack: 500 mL    Jevity: 1000 mL  Total IN: 1926.6 mL    OUT:    Drain (mL): 520 mL    Indwelling Catheter - Urethral (mL): 1895 mL  Total OUT: 2415 mL    Total NET: -488.4 mL      02-13-25 @ 07:01  -  02-13-25 @ 10:38  --------------------------------------------------------  IN:    Dexmedetomidine: 10.2 mL  Total IN: 10.2 mL    OUT:    Indwelling Catheter - Urethral (mL): 175 mL  Total OUT: 175 mL    Total NET: -164.8 mL        LABS             9.5    7.87  )-----------( 321      ( 02-13-25 @ 04:58 )             28.2     141  |  108  |  46  -------------------------<  127   02-13-25 @ 04:58  4.2  |  24  |  0.7    Ca      7.6     02-13-25 @ 04:58  Mg     2.2     02-13-25 @ 04:58    TPro  4.2  /  Alb  2.2  /  TBili  0.8  /  DBili  x   /  AST  23  /  ALT  <5  /  AlkPhos  101  /  GGT  x     02-13-25 @ 04:58          Urinalysis Basic - ( 13 Feb 2025 04:58 )    Color: x / Appearance: x / SG: x / pH: x  Gluc: 127 mg/dL / Ketone: x  / Bili: x / Urobili: x   Blood: x / Protein: x / Nitrite: x   Leuk Esterase: x / RBC: x / WBC x   Sq Epi: x / Non Sq Epi: x / Bacteria: x      MICRO / IMAGING / CARDIOLOGY  Telemetry: Reviewed   EKG: Reviewed    CULTURES    Culture - Fungal, Bronchial (collected 02-11-25 @ 11:53)  Source: Bronchial  Preliminary Report:    No growth    Culture - Acid Fast - Bronchial w/Smear (collected 02-11-25 @ 11:53)  Source: Bronchial  Preliminary Report:    Culture is being performed.    Culture - Bronchial (collected 02-11-25 @ 11:53)  Source: Lavage  Gram Stain:    No Squamous epithelial cells seen per low power field    Few polymorphonuclear leukocytes seen per low power field    No organisms seen per oil power field  Preliminary Report:    Commensal halina consistent with body site    Culture - Body Fluid with Gram Stain (collected 02-10-25 @ 16:22)  Source: Bile  Gram Stain:    No polymorphonuclear cells seen per low power field    No organisms seen per oil power field  Preliminary Report:    Rare Candida albicans    Referred to Mycology    Culture - Fungal, Body Fluid (collected 02-10-25 @ 16:22)  Source: Bile  Preliminary Report:    Rare Yeast Identification and susceptibility to follow.    Culture - Blood (collected 02-08-25 @ 17:24)  Source: .Blood BLOOD  Preliminary Report:    No growth at 4 days      IMAGING  PACS Image:  (02-13-25 @ 08:59)  PACS Image:  (02-12-25 @ 22:03)    CARDIOLOGY

## 2025-02-13 NOTE — PROGRESS NOTE ADULT - ASSESSMENT
69-year-old male past medical history of hypertension, Parkinson's, CAD presents for shortness of breath and cough of 1 day duration. History was obtained from patient's wife who noted that the patient has been having decreased po intake, activity, and unintentional weight loss for some time. However yesterday he started having cough and shortness of breath. Admitted to MICU for AHRF and sepsis 2/2 to mutilobar PNA.    #Acute Hypoxic Respiratory Failure likely 2/2 CAP/aspiration and flu  #Toxic Metabolic Encephalopathy likely 2/2 CAP/aspiration and flu  #Influenza A positive, treated  #MSSA bacteremia- resolved  #Sepsis POA  - Aspiration precautions.    - patient inc WOB, worsening alkalosis, obtunded-intubated for airway protection   -ID: if patient decompensates, dc cefepime/metro, start patricia 1g   - cardio: patient critically sick, not a candidate for surgery therefore no MARY, can c/w abx for IE as per ID  - s/p bronch, f/u cultures   - ID: tamiflu 30 mg daily: last dose on 2/10, continue cefepime 1g q12h,  metro and nafcillin as per ID  -2/11 bronch: showed copious amounts of secretions and poor cough.  Will need trach: ongoing daily discussions with wife regarding trach vs extubation & change in code status, might repeat bronch today    #PE on CTA  #large R groin intramuscular hematoma, stable  #Periventricular bleed on CTH, stable  - CTH. noted with right periventricular bleed, stable.   - Neuro on board  - Continue Anti parkinson's meds.    - CTA chest to r/o PE: focal filling defect within main pulm artery   - repeat CT head: stable intraventricular hemorrhage >> repeat on 2/9 after on full AC for PE: stable  -CT pelvis:  Large intramuscular hematoma in the medial right thigh measuring approximately 9.8 x 6.4 x 17 cm.  -holding AC  -2/12: Hb drop to 6.8> got 1 unit> up to 9.5 .  CTA stable & negative for any active extravasation       #hypoglycemia  -decr insulin to 4u q6 & adjust as needed    #Elevated Bilirubin-improving   #Leukocytosis-improving   #Cholangitis vs cholecystitis s/o perc haydee  - F/u RUQ US: distended GB with sludge, GB polyp   - IR: perc haydee 2/5, drained 250 cc dark bile, 450 cc output overnight. F/u GB drainage cultures: NGTD    #hypernatremia, resolved  -Na 148>147>139>141  -free water with feeds  -stopped d5    #Constipation, improved  - senna and miralax  - c/w lactulose   -2/10: kub: distented bowels. having BM    #Possible Mural thrombus in PA  #CAD  - Avoid overload  - C/w LR at 50  - TTE: EF 56%, G1DD, mild-mod MR, mod TR, mild NJ, mild pHTN  - Cardio recs appreciated       - Patient is poor candidate for MARY       - full endocarditis abx course    #MISC  - DVT ppx: SCDs, holding  - GI ppx: not needed  - Activity: as tolerated  - Diet: NPO w/ tube feed  - full code     Detail Level: Detailed Detail Level: Generalized

## 2025-02-13 NOTE — CONSULT NOTE ADULT - ASSESSMENT
Pt seen at bedside for evaluation of sacral wound  -Clean wound with soap and water apply Medihoney and cover with allevyn twice a day  -frequent turning and positioning  -offloading  -nutritional optimization  -will follow

## 2025-02-13 NOTE — PROGRESS NOTE ADULT - ASSESSMENT
IMPRESSION:    Acute Hypoxic Respiratory Failure   Toxic Metabolic Encephalopathy  CAP likely aspiration   Influenza A treated   MSSA bacteremia repeat CX -  Sepsis POA  LYNNE improving  cholecystitis sp percutaneous haydee  Periventricular bleed  Suspected Mural thrombus in PA  Right thigh hematoma.    HO Parkinson's Disease   HO CAD    PLAN:    CNS:  SAT. Continue Anti parkinson's. CTH stable.       HEENT: Oral care.  ETT duration: 12 days.     PULMONARY:  No change in vent settings.  Monitor airway pressures.  Bronchoscopy w/ copious amounts of secretions and poor cough.  Will need trach and PEG     CARDIOVASCULAR:  Avoid overload.  NO MARY per cardiology. Keep even balance.     GI: GI prophylaxis.  NGT feeds as tolerated. S/P perc cholecystostomy. Bowel regimen     RENAL: Follow up lytes.  Mars in place for retention. Kidney function stable.     INFECTIOUS DISEASE: Cultures noted.  SP Tamiflu.  ABX per ID.  Duration per ID.     HEMATOLOGICAL: Monitor CBC and Coags. CTA noted with no active bled, Hb stable      ENDOCRINE:  Follow up FS.  Insulin protocol if needed.    MUSCULOSKELETAL: Bed rest.  Off loading. Wound care for DTIs.     Full code  MICU  palliative care follow up   very poor prognosis

## 2025-02-13 NOTE — PROGRESS NOTE ADULT - SUBJECTIVE AND OBJECTIVE BOX
GARETT ESCAMILLA  69y, Male  Allergy: Allergy Status Unknown      LOS  15d    CHIEF COMPLAINT: penumonia (13 Feb 2025 10:37)      INTERVAL EVENTS/HPI  - T(F): , Max: 97.8 (02-12-25 @ 16:00)  - WBC Count: 7.87 (02-13-25 @ 04:58)  WBC Count: 8.61 (02-12-25 @ 16:17)     - Creatinine: 0.7 (02-13-25 @ 04:58)  Creatinine: 0.9 (02-12-25 @ 05:30)     -   -   -     ROS  cannot obtain secondary to patient's sedation and/or mental status    VITALS:  T(F): 97.6, Max: 97.8 (02-12-25 @ 16:00)  HR: 82  BP: 95/57  RR: 18Vital Signs Last 24 Hrs  T(C): 36.4 (13 Feb 2025 12:00), Max: 36.6 (12 Feb 2025 16:00)  T(F): 97.6 (13 Feb 2025 12:00), Max: 97.8 (12 Feb 2025 16:00)  HR: 82 (13 Feb 2025 13:00) (67 - 95)  BP: 95/57 (13 Feb 2025 13:00) (86/53 - 152/70)  BP(mean): 71 (13 Feb 2025 13:00) (58 - 101)  RR: 18 (13 Feb 2025 13:00) (12 - 20)  SpO2: 100% (13 Feb 2025 08:00) (97% - 100%)    Parameters below as of 13 Feb 2025 08:00  Patient On (Oxygen Delivery Method): ventilator    O2 Concentration (%): 40    PHYSICAL EXAM:  General: intubated  HEENT: NCAT  Neck: supple  CV: RRR  Lungs: symmetric chest expansion, decreased BS at bases  Abd: Soft  Extr: wwp  Skin: no rash  Neuro: sedated  Lines: no phlebitis     FH: Non-contributory  Social Hx: Non-contributory    TESTS & MEASUREMENTS:                        9.5    7.87  )-----------( 321      ( 13 Feb 2025 04:58 )             28.2     02-13    141  |  108  |  46[H]  ----------------------------<  127[H]  4.2   |  24  |  0.7    Ca    7.6[L]      13 Feb 2025 04:58  Mg     2.2     02-13    TPro  4.2[L]  /  Alb  2.2[L]  /  TBili  0.8  /  DBili  x   /  AST  23  /  ALT  <5  /  AlkPhos  101  02-13      LIVER FUNCTIONS - ( 13 Feb 2025 04:58 )  Alb: 2.2 g/dL / Pro: 4.2 g/dL / ALK PHOS: 101 U/L / ALT: <5 U/L / AST: 23 U/L / GGT: x           Urinalysis Basic - ( 13 Feb 2025 04:58 )    Color: x / Appearance: x / SG: x / pH: x  Gluc: 127 mg/dL / Ketone: x  / Bili: x / Urobili: x   Blood: x / Protein: x / Nitrite: x   Leuk Esterase: x / RBC: x / WBC x   Sq Epi: x / Non Sq Epi: x / Bacteria: x        Culture - Fungal, Bronchial (collected 02-11-25 @ 11:53)  Source: Bronchial  Preliminary Report (02-13-25 @ 10:07):    No growth    Culture - Acid Fast - Bronchial w/Smear (collected 02-11-25 @ 11:53)  Source: Bronchial  Preliminary Report (02-12-25 @ 23:07):    Culture is being performed.    Culture - Bronchial (collected 02-11-25 @ 11:53)  Source: Lavage  Gram Stain (02-11-25 @ 23:43):    No Squamous epithelial cells seen per low power field    Few polymorphonuclear leukocytes seen per low power field    No organisms seen per oil power field  Preliminary Report (02-12-25 @ 13:33):    Commensal halina consistent with body site    Culture - Body Fluid with Gram Stain (collected 02-10-25 @ 16:22)  Source: Bile  Gram Stain (02-12-25 @ 23:54):    No polymorphonuclear cells seen per low power field    No organisms seen per oil power field  Preliminary Report (02-12-25 @ 23:54):    Rare Candida albicans    Referred to Mycology    Culture - Fungal, Body Fluid (collected 02-10-25 @ 16:22)  Source: Bile  Preliminary Report (02-12-25 @ 07:10):    Rare Yeast Identification and susceptibility to follow.    Culture - Blood (collected 02-08-25 @ 17:24)  Source: .Blood BLOOD  Preliminary Report (02-13-25 @ 01:00):    No growth at 4 days    Culture - Blood (collected 02-08-25 @ 04:58)  Source: .Blood BLOOD  Preliminary Report (02-12-25 @ 17:00):    No growth at 4 days    Culture - Acid Fast - Bronchial w/Smear (collected 02-04-25 @ 09:29)  Source: Bronch Wash  Preliminary Report (02-12-25 @ 23:06):    No acid-fast bacilli isolated at 1 week. ****Acid-fast cultures are held    for 6 weeks.****    Culture - Bronchial (collected 02-04-25 @ 09:29)  Source: Bronch Wash  Gram Stain (02-05-25 @ 07:26):    Few-moderate polymorphonuclear leukocytes seen per low power field    No Squamous epithelial cells seen per low power field    No organisms seen per oil power field  Final Report (02-06-25 @ 11:36):    Commensal halina consistent with body site    Culture - Sputum (collected 02-04-25 @ 09:00)  Source: ET Tube ET Tube  Gram Stain (02-04-25 @ 19:37):    Numerous polymorphonuclear leukocytes per low power field    Rare Squamous epithelial cells per low power field    Rare Yeast like cells per oil power field  Final Report (02-05-25 @ 22:23):    Commensal halina consistent with body site    Culture - Blood (collected 02-02-25 @ 04:54)  Source: .Blood BLOOD  Final Report (02-07-25 @ 12:00):    No growth at 5 days    Culture - Blood (collected 01-31-25 @ 17:22)  Source: .Blood BLOOD  Final Report (02-05-25 @ 23:07):    No growth at 5 days    Culture - Blood (collected 01-31-25 @ 17:22)  Source: .Blood BLOOD  Final Report (02-05-25 @ 23:07):    No growth at 5 days    Urinalysis with Rflx Culture (collected 01-29-25 @ 16:48)    Culture - Blood (collected 01-29-25 @ 10:09)  Source: .Blood BLOOD  Gram Stain (01-30-25 @ 15:40):    Growth in aerobic bottle: Gram Positive Cocci in Clusters    Growth in anaerobic bottle: Gram Positive Cocci in Clusters  Final Report (02-01-25 @ 07:51):    Growth in aerobic and anaerobic bottles: Staphylococcus aureus    Direct identification is available within approximately 3-5    hours either by Blood Panel Multiplexed PCR or Direct    MALDI-TOF. Details: [email or web address]  Organism: Blood Culture PCR  Staphylococcus aureus (02-01-25 @ 07:51)  Organism: Staphylococcus aureus (02-01-25 @ 07:51)      Method Type: KARTIK      -  Ceftaroline: S <=0.5      -  Clindamycin: R <=0.25 This isolate is presumed to be clindamycin resistant based on detection of inducible resistance. Clindamycin may still be effective in some patients.      -  Erythromycin: R >4      -  Gentamicin: S <=4 Should not be used as monotherapy      -  Oxacillin: S <=0.25 Oxacillin predicts susceptibility for dicloxacillin, methicillin, and nafcillin      -  Rifampin: S <=1 Should not be used as monotherapy      -  Tetracycline: S <=4      -  Trimethoprim/Sulfamethoxazole: S <=0.5/9.5      -  Vancomycin: S 1  Organism: Blood Culture PCR (02-01-25 @ 07:51)      Method Type: PCR      -  Methicillin SENSITIVE Staphylococcus aureus (MSSA): Detec Any isolate of Staphylococcus aureus from a blood culture is NOT considered a contaminant.    Culture - Blood (collected 01-29-25 @ 10:09)  Source: .Blood BLOOD  Gram Stain (02-01-25 @ 00:35):    Growth in aerobic bottle: Gram Positive Cocci in Clusters    Growth in anaerobic bottle: Gram Positive Cocci in Clusters  Final Report (02-01-25 @ 16:44):    Growth in aerobic and anaerobic bottles: Staphylococcus aureus    See previous culture [id number]            INFECTIOUS DISEASES TESTING  Procalcitonin: 0.62 (02-11-25 @ 08:30)  MRSA PCR Result.: Negative (02-10-25 @ 16:22)  MRSA PCR Result.: Negative (01-30-25 @ 13:40)  Procalcitonin: 6.93 (01-30-25 @ 07:26)  Legionella Antigen, Urine: Negative (01-29-25 @ 15:49)  Rapid RVP Result: Detected (01-29-25 @ 10:30)      INFLAMMATORY MARKERS      RADIOLOGY & ADDITIONAL TESTS:  I have personally reviewed the last available Chest xray  CXR      CT      CARDIOLOGY TESTING      MEDICATIONS  carbidopa/levodopa  25/250 2  cefepime   IVPB 2000  chlorhexidine 0.12% Liquid 15  chlorhexidine 2% Cloths 1  dexMEDEtomidine Infusion 0.5  dextrose 5%. 1000  dextrose 5%. 1000  dextrose 50% Injectable 25  dextrose 50% Injectable 12.5  dextrose 50% Injectable 25  glucagon  Injectable 1  insulin lispro (ADMELOG) corrective regimen sliding scale   insulin lispro Injectable (ADMELOG) 4  metroNIDAZOLE    Tablet 500  nafcillin  IVPB   nafcillin  IVPB 2  pantoprazole   Suspension 40  polyethylene glycol 3350 17  rasagiline Tablet 1  senna 2      WEIGHT  Weight (kg): 51.1 (02-05-25 @ 13:52)  Creatinine: 0.7 mg/dL (02-13-25 @ 04:58)      ANTIBIOTICS:  cefepime   IVPB 2000 milliGRAM(s) IV Intermittent every 8 hours  metroNIDAZOLE    Tablet 500 milliGRAM(s) Oral every 12 hours  nafcillin  IVPB 2 Gram(s) IV Intermittent every 4 hours  nafcillin  IVPB          All available historical records have been reviewed

## 2025-02-13 NOTE — PROGRESS NOTE ADULT - SUBJECTIVE AND OBJECTIVE BOX
Patient is a 69y old  Male who presents with a chief complaint of penumonia (12 Feb 2025 12:45)        Over Night Events: No acute events noted, Hb stable, episode of hypoglycemia noted         ROS:     All ROS are negative except HPI         PHYSICAL EXAM    ICU Vital Signs Last 24 Hrs  T(C): 36.4 (13 Feb 2025 08:00), Max: 36.6 (12 Feb 2025 12:00)  T(F): 97.6 (13 Feb 2025 08:00), Max: 97.8 (12 Feb 2025 12:00)  HR: 80 (13 Feb 2025 08:00) (67 - 95)  BP: 105/57 (13 Feb 2025 08:00) (86/53 - 152/70)  BP(mean): 75 (13 Feb 2025 08:00) (58 - 101)  ABP: --  ABP(mean): --  RR: 18 (13 Feb 2025 08:00) (12 - 20)  SpO2: 100% (13 Feb 2025 08:00) (97% - 100%)    O2 Parameters below as of 13 Feb 2025 08:00  Patient On (Oxygen Delivery Method): ventilator    O2 Concentration (%): 40      CONSTITUTIONAL:  Ill appearing     ENT:   Airway patent,   Mouth with normal mucosa.       EYES:   Pupils equal,   Round and reactive to light.    CARDIAC:   Normal rate,   Regular rhythm.        RESPIRATORY:   No wheezing  Bilateral BS  Normal chest expansion  Not tachypneic,  No use of accessory muscles    GASTROINTESTINAL:  Abdomen soft,   Non-tender,   No guarding,   + BS    MUSCULOSKELETAL:   Range of motion is not limited,  No clubbing, cyanosis    NEUROLOGICAL:   Sedated     SKIN:   Skin normal color for race,   Warm and dry  No evidence of rash.    02-12-25 @ 07:01  -  02-13-25 @ 07:00  --------------------------------------------------------  IN:    Dexmedetomidine: 301.6 mL    Enteral Tube Flush: 75 mL    IV PiggyBack: 50 mL    IV PiggyBack: 500 mL    Jevity: 1000 mL  Total IN: 1926.6 mL    OUT:    Drain (mL): 520 mL    Indwelling Catheter - Urethral (mL): 1895 mL  Total OUT: 2415 mL    Total NET: -488.4 mL      02-13-25 @ 07:01  -  02-13-25 @ 10:09  --------------------------------------------------------  IN:    Dexmedetomidine: 10.2 mL  Total IN: 10.2 mL    OUT:    Indwelling Catheter - Urethral (mL): 175 mL  Total OUT: 175 mL    Total NET: -164.8 mL          LABS:                            9.5    7.87  )-----------( 321      ( 13 Feb 2025 04:58 )             28.2                                               02-13    141  |  108  |  46[H]  ----------------------------<  127[H]  4.2   |  24  |  0.7    Ca    7.6[L]      13 Feb 2025 04:58  Mg     2.2     02-13    TPro  4.2[L]  /  Alb  2.2[L]  /  TBili  0.8  /  DBili  x   /  AST  23  /  ALT  <5  /  AlkPhos  101  02-13                                             Urinalysis Basic - ( 13 Feb 2025 04:58 )    Color: x / Appearance: x / SG: x / pH: x  Gluc: 127 mg/dL / Ketone: x  / Bili: x / Urobili: x   Blood: x / Protein: x / Nitrite: x   Leuk Esterase: x / RBC: x / WBC x   Sq Epi: x / Non Sq Epi: x / Bacteria: x                                                  LIVER FUNCTIONS - ( 13 Feb 2025 04:58 )  Alb: 2.2 g/dL / Pro: 4.2 g/dL / ALK PHOS: 101 U/L / ALT: <5 U/L / AST: 23 U/L / GGT: x                                                  Culture - Fungal, Bronchial (collected 11 Feb 2025 11:53)  Source: Bronchial  Preliminary Report (13 Feb 2025 10:07):    No growth    Culture - Acid Fast - Bronchial w/Smear (collected 11 Feb 2025 11:53)  Source: Bronchial  Preliminary Report (12 Feb 2025 23:07):    Culture is being performed.    Culture - Bronchial (collected 11 Feb 2025 11:53)  Source: Lavage  Gram Stain (11 Feb 2025 23:43):    No Squamous epithelial cells seen per low power field    Few polymorphonuclear leukocytes seen per low power field    No organisms seen per oil power field  Preliminary Report (12 Feb 2025 13:33):    Commensal halina consistent with body site    Culture - Body Fluid with Gram Stain (collected 10 Feb 2025 16:22)  Source: Bile  Gram Stain (12 Feb 2025 23:54):    No polymorphonuclear cells seen per low power field    No organisms seen per oil power field  Preliminary Report (12 Feb 2025 23:54):    Rare Candida albicans    Referred to Mycology    Culture - Fungal, Body Fluid (collected 10 Feb 2025 16:22)  Source: Bile  Preliminary Report (12 Feb 2025 07:10):    Rare Yeast Identification and susceptibility to follow.                                                   Mode: AC/ CMV (Assist Control/ Continuous Mandatory Ventilation)  RR (machine): 16  TV (machine): 440  FiO2: 40  PEEP: 8  ITime: 1  MAP: 13  PIP: 22                                      ABG - ( 13 Feb 2025 03:52 )  pH, Arterial: 7.37  pH, Blood: x     /  pCO2: 46    /  pO2: 81    / HCO3: 27    / Base Excess: 0.9   /  SaO2: 97.9                MEDICATIONS  (STANDING):  carbidopa/levodopa  25/250 2 Tablet(s) Oral three times a day  cefepime   IVPB 1000 milliGRAM(s) IV Intermittent every 8 hours  chlorhexidine 0.12% Liquid 15 milliLiter(s) Oral Mucosa every 12 hours  chlorhexidine 2% Cloths 1 Application(s) Topical <User Schedule>  dexMEDEtomidine Infusion 0.5 MICROgram(s)/kG/Hr (6.39 mL/Hr) IV Continuous <Continuous>  dextrose 5%. 1000 milliLiter(s) (100 mL/Hr) IV Continuous <Continuous>  dextrose 5%. 1000 milliLiter(s) (50 mL/Hr) IV Continuous <Continuous>  dextrose 50% Injectable 25 Gram(s) IV Push once  dextrose 50% Injectable 12.5 Gram(s) IV Push once  dextrose 50% Injectable 25 Gram(s) IV Push once  glucagon  Injectable 1 milliGRAM(s) IntraMuscular once  insulin lispro (ADMELOG) corrective regimen sliding scale   SubCutaneous every 6 hours  insulin lispro Injectable (ADMELOG) 8 Unit(s) SubCutaneous every 6 hours  metroNIDAZOLE    Tablet 500 milliGRAM(s) Oral every 12 hours  nafcillin  IVPB 2 Gram(s) IV Intermittent every 4 hours  nafcillin  IVPB      pantoprazole   Suspension 40 milliGRAM(s) Oral daily  polyethylene glycol 3350 17 Gram(s) Oral every 12 hours  rasagiline Tablet 1 milliGRAM(s) Oral <User Schedule>  senna 2 Tablet(s) Oral at bedtime    MEDICATIONS  (PRN):  acetaminophen     Tablet .. 650 milliGRAM(s) Oral every 6 hours PRN Temp greater or equal to 38C (100.4F), Mild Pain (1 - 3)  dextrose Oral Gel 15 Gram(s) Oral once PRN Blood Glucose LESS THAN 70 milliGRAM(s)/deciliter      New X-rays reviewed:                                                                                  ECHO         Patient is a 69y old  Male who presents with a chief complaint of penumonia (12 Feb 2025 12:45)        Over Night Events: remains on MV.  Off pressors.          ROS:     All ROS are negative except HPI         PHYSICAL EXAM    ICU Vital Signs Last 24 Hrs  T(C): 36.4 (13 Feb 2025 08:00), Max: 36.6 (12 Feb 2025 12:00)  T(F): 97.6 (13 Feb 2025 08:00), Max: 97.8 (12 Feb 2025 12:00)  HR: 80 (13 Feb 2025 08:00) (67 - 95)  BP: 105/57 (13 Feb 2025 08:00) (86/53 - 152/70)  BP(mean): 75 (13 Feb 2025 08:00) (58 - 101)  ABP: --  ABP(mean): --  RR: 18 (13 Feb 2025 08:00) (12 - 20)  SpO2: 100% (13 Feb 2025 08:00) (97% - 100%)    O2 Parameters below as of 13 Feb 2025 08:00  Patient On (Oxygen Delivery Method): ventilator    O2 Concentration (%): 40      CONSTITUTIONAL:  Ill appearing     ENT:   Airway patent,   Mouth with normal mucosa.       EYES:   Pupils equal,   Round and reactive to light.    CARDIAC:   Normal rate,   Regular rhythm.        RESPIRATORY:   No wheezing  Bilateral BS  Normal chest expansion  Not tachypneic,  No use of accessory muscles    GASTROINTESTINAL:  Abdomen soft,   Non-tender,   No guarding,   + BS    MUSCULOSKELETAL:   Range of motion is not limited,  No clubbing, cyanosis    NEUROLOGICAL:   Sedated     SKIN:   Skin normal color for race,   Warm and dry  No evidence of rash.    02-12-25 @ 07:01  -  02-13-25 @ 07:00  --------------------------------------------------------  IN:    Dexmedetomidine: 301.6 mL    Enteral Tube Flush: 75 mL    IV PiggyBack: 50 mL    IV PiggyBack: 500 mL    Jevity: 1000 mL  Total IN: 1926.6 mL    OUT:    Drain (mL): 520 mL    Indwelling Catheter - Urethral (mL): 1895 mL  Total OUT: 2415 mL    Total NET: -488.4 mL      02-13-25 @ 07:01  -  02-13-25 @ 10:09  --------------------------------------------------------  IN:    Dexmedetomidine: 10.2 mL  Total IN: 10.2 mL    OUT:    Indwelling Catheter - Urethral (mL): 175 mL  Total OUT: 175 mL    Total NET: -164.8 mL          LABS:                            9.5    7.87  )-----------( 321      ( 13 Feb 2025 04:58 )             28.2                                               02-13    141  |  108  |  46[H]  ----------------------------<  127[H]  4.2   |  24  |  0.7    Ca    7.6[L]      13 Feb 2025 04:58  Mg     2.2     02-13    TPro  4.2[L]  /  Alb  2.2[L]  /  TBili  0.8  /  DBili  x   /  AST  23  /  ALT  <5  /  AlkPhos  101  02-13                                             Urinalysis Basic - ( 13 Feb 2025 04:58 )    Color: x / Appearance: x / SG: x / pH: x  Gluc: 127 mg/dL / Ketone: x  / Bili: x / Urobili: x   Blood: x / Protein: x / Nitrite: x   Leuk Esterase: x / RBC: x / WBC x   Sq Epi: x / Non Sq Epi: x / Bacteria: x                                                  LIVER FUNCTIONS - ( 13 Feb 2025 04:58 )  Alb: 2.2 g/dL / Pro: 4.2 g/dL / ALK PHOS: 101 U/L / ALT: <5 U/L / AST: 23 U/L / GGT: x                                                  Culture - Fungal, Bronchial (collected 11 Feb 2025 11:53)  Source: Bronchial  Preliminary Report (13 Feb 2025 10:07):    No growth    Culture - Acid Fast - Bronchial w/Smear (collected 11 Feb 2025 11:53)  Source: Bronchial  Preliminary Report (12 Feb 2025 23:07):    Culture is being performed.    Culture - Bronchial (collected 11 Feb 2025 11:53)  Source: Lavage  Gram Stain (11 Feb 2025 23:43):    No Squamous epithelial cells seen per low power field    Few polymorphonuclear leukocytes seen per low power field    No organisms seen per oil power field  Preliminary Report (12 Feb 2025 13:33):    Commensal halina consistent with body site    Culture - Body Fluid with Gram Stain (collected 10 Feb 2025 16:22)  Source: Bile  Gram Stain (12 Feb 2025 23:54):    No polymorphonuclear cells seen per low power field    No organisms seen per oil power field  Preliminary Report (12 Feb 2025 23:54):    Rare Candida albicans    Referred to Mycology    Culture - Fungal, Body Fluid (collected 10 Feb 2025 16:22)  Source: Bile  Preliminary Report (12 Feb 2025 07:10):    Rare Yeast Identification and susceptibility to follow.                                                   Mode: AC/ CMV (Assist Control/ Continuous Mandatory Ventilation)  RR (machine): 16  TV (machine): 440  FiO2: 40  PEEP: 8  ITime: 1  MAP: 13  PIP: 22                                      ABG - ( 13 Feb 2025 03:52 )  pH, Arterial: 7.37  pH, Blood: x     /  pCO2: 46    /  pO2: 81    / HCO3: 27    / Base Excess: 0.9   /  SaO2: 97.9                MEDICATIONS  (STANDING):  carbidopa/levodopa  25/250 2 Tablet(s) Oral three times a day  cefepime   IVPB 1000 milliGRAM(s) IV Intermittent every 8 hours  chlorhexidine 0.12% Liquid 15 milliLiter(s) Oral Mucosa every 12 hours  chlorhexidine 2% Cloths 1 Application(s) Topical <User Schedule>  dexMEDEtomidine Infusion 0.5 MICROgram(s)/kG/Hr (6.39 mL/Hr) IV Continuous <Continuous>  dextrose 5%. 1000 milliLiter(s) (100 mL/Hr) IV Continuous <Continuous>  dextrose 5%. 1000 milliLiter(s) (50 mL/Hr) IV Continuous <Continuous>  dextrose 50% Injectable 25 Gram(s) IV Push once  dextrose 50% Injectable 12.5 Gram(s) IV Push once  dextrose 50% Injectable 25 Gram(s) IV Push once  glucagon  Injectable 1 milliGRAM(s) IntraMuscular once  insulin lispro (ADMELOG) corrective regimen sliding scale   SubCutaneous every 6 hours  insulin lispro Injectable (ADMELOG) 8 Unit(s) SubCutaneous every 6 hours  metroNIDAZOLE    Tablet 500 milliGRAM(s) Oral every 12 hours  nafcillin  IVPB 2 Gram(s) IV Intermittent every 4 hours  nafcillin  IVPB      pantoprazole   Suspension 40 milliGRAM(s) Oral daily  polyethylene glycol 3350 17 Gram(s) Oral every 12 hours  rasagiline Tablet 1 milliGRAM(s) Oral <User Schedule>  senna 2 Tablet(s) Oral at bedtime    MEDICATIONS  (PRN):  acetaminophen     Tablet .. 650 milliGRAM(s) Oral every 6 hours PRN Temp greater or equal to 38C (100.4F), Mild Pain (1 - 3)  dextrose Oral Gel 15 Gram(s) Oral once PRN Blood Glucose LESS THAN 70 milliGRAM(s)/deciliter      New X-rays reviewed:                                                                                  ECHO

## 2025-02-13 NOTE — CONSULT NOTE ADULT - SUBJECTIVE AND OBJECTIVE BOX
Pt is a 69-year-old male past medical history of hypertension, Parkinson's, CAD presents for shortness of breath and cough of 1 day duration. History was obtained from patient's wife who noted that the patient has been having decreased po intake, activity, and unintentional weight loss for some time. Howver, yesterday he started having cough and shortness of breath. Pt was admitted to MICU for AHRF and sepsis 2/2 to mutilobar PNA. Burn team was consulted for evaluation of sacral wound.    Allergies    Allergy Status Unknown    Intolerances    PAST MEDICAL & SURGICAL HISTORY:  Parkinson disease  CAD (coronary artery disease)  History of basal cell carcinoma (BCC) excision      Vital Signs Last 24 Hrs  T(C): 36.4 (13 Feb 2025 12:00), Max: 36.4 (12 Feb 2025 20:00)  T(F): 97.6 (13 Feb 2025 12:00), Max: 97.6 (12 Feb 2025 20:00)  HR: 85 (13 Feb 2025 15:00) (71 - 95)  BP: 123/60 (13 Feb 2025 15:00) (86/53 - 152/70)  BP(mean): 87 (13 Feb 2025 15:00) (58 - 101)  RR: 18 (13 Feb 2025 15:00) (12 - 20)  SpO2: 100% (13 Feb 2025 12:00) (97% - 100%)    Parameters below as of 13 Feb 2025 12:00  Patient On (Oxygen Delivery Method): ventilator    O2 Concentration (%): 40                          9.5    7.87  )-----------( 321      ( 13 Feb 2025 04:58 )             28.2     PE:  Gen: pt lying in bed, intubated  Skin: full thickness sacral wound with a black eschar in the centr, wound about [id number], periphery of the wound pink and moist, no erythema, no fluctuance, no drainage, no malodor, bone easily palpable  Dressing done by the burn team     Pt is a 69-year-old male past medical history of hypertension, Parkinson's, CAD presents for shortness of breath and cough of 1 day duration. History was obtained from patient's wife who noted that the patient has been having decreased po intake, activity, and unintentional weight loss for some time. Howver, yesterday he started having cough and shortness of breath. Pt was admitted to MICU for AHRF and sepsis 2/2 to mutilobar PNA. Burn team was consulted for evaluation of sacral wound.    Allergies    Allergy Status Unknown    Intolerances    PAST MEDICAL & SURGICAL HISTORY:  Parkinson disease  CAD (coronary artery disease)  History of basal cell carcinoma (BCC) excision      Vital Signs Last 24 Hrs  T(C): 36.4 (13 Feb 2025 12:00), Max: 36.4 (12 Feb 2025 20:00)  T(F): 97.6 (13 Feb 2025 12:00), Max: 97.6 (12 Feb 2025 20:00)  HR: 85 (13 Feb 2025 15:00) (71 - 95)  BP: 123/60 (13 Feb 2025 15:00) (86/53 - 152/70)  BP(mean): 87 (13 Feb 2025 15:00) (58 - 101)  RR: 18 (13 Feb 2025 15:00) (12 - 20)  SpO2: 100% (13 Feb 2025 12:00) (97% - 100%)    Parameters below as of 13 Feb 2025 12:00  Patient On (Oxygen Delivery Method): ventilator    O2 Concentration (%): 40                          9.5    7.87  )-----------( 321      ( 13 Feb 2025 04:58 )             28.2     PE:  Gen: pt lying in bed, intubated  Skin: full thickness sacral wound with a black eschar in the centr, wound about [id number], periphery of the wound pink and moist, no erythema, no fluctuance, no drainage, no malodor, bone easily palpable  Dressing done by the burn team

## 2025-02-14 LAB
-  FLUCONAZOLE: SIGNIFICANT CHANGE UP
ALBUMIN SERPL ELPH-MCNC: 2.2 G/DL — LOW (ref 3.5–5.2)
ALBUMIN SERPL ELPH-MCNC: 2.5 G/DL — LOW (ref 3.5–5.2)
ALP SERPL-CCNC: 105 U/L — SIGNIFICANT CHANGE UP (ref 30–115)
ALP SERPL-CCNC: 92 U/L — SIGNIFICANT CHANGE UP (ref 30–115)
ALT FLD-CCNC: <5 U/L — SIGNIFICANT CHANGE UP (ref 0–41)
ALT FLD-CCNC: <5 U/L — SIGNIFICANT CHANGE UP (ref 0–41)
ANION GAP SERPL CALC-SCNC: 9 MMOL/L — SIGNIFICANT CHANGE UP (ref 7–14)
AST SERPL-CCNC: 24 U/L — SIGNIFICANT CHANGE UP (ref 0–41)
AST SERPL-CCNC: 25 U/L — SIGNIFICANT CHANGE UP (ref 0–41)
BASOPHILS # BLD AUTO: 0.02 K/UL — SIGNIFICANT CHANGE UP (ref 0–0.2)
BASOPHILS NFR BLD AUTO: 0.4 % — SIGNIFICANT CHANGE UP (ref 0–1)
BILIRUB DIRECT SERPL-MCNC: 0.8 MG/DL — HIGH (ref 0–0.3)
BILIRUB INDIRECT FLD-MCNC: 0.3 MG/DL — SIGNIFICANT CHANGE UP (ref 0.2–1.2)
BILIRUB SERPL-MCNC: 0.7 MG/DL — SIGNIFICANT CHANGE UP (ref 0.2–1.2)
BILIRUB SERPL-MCNC: 1.1 MG/DL — SIGNIFICANT CHANGE UP (ref 0.2–1.2)
BUN SERPL-MCNC: 45 MG/DL — HIGH (ref 10–20)
CALCIUM SERPL-MCNC: 7.8 MG/DL — LOW (ref 8.4–10.4)
CHLORIDE SERPL-SCNC: 109 MMOL/L — SIGNIFICANT CHANGE UP (ref 98–110)
CO2 SERPL-SCNC: 21 MMOL/L — SIGNIFICANT CHANGE UP (ref 17–32)
CREAT SERPL-MCNC: 0.9 MG/DL — SIGNIFICANT CHANGE UP (ref 0.7–1.5)
CULTURE RESULTS: SIGNIFICANT CHANGE UP
EGFR: 92 ML/MIN/1.73M2 — SIGNIFICANT CHANGE UP
EGFR: 92 ML/MIN/1.73M2 — SIGNIFICANT CHANGE UP
EOSINOPHIL # BLD AUTO: 0.05 K/UL — SIGNIFICANT CHANGE UP (ref 0–0.7)
EOSINOPHIL NFR BLD AUTO: 0.9 % — SIGNIFICANT CHANGE UP (ref 0–8)
GAS PNL BLDA: SIGNIFICANT CHANGE UP
GLUCOSE BLDC GLUCOMTR-MCNC: 131 MG/DL — HIGH (ref 70–99)
GLUCOSE BLDC GLUCOMTR-MCNC: 141 MG/DL — HIGH (ref 70–99)
GLUCOSE BLDC GLUCOMTR-MCNC: 158 MG/DL — HIGH (ref 70–99)
GLUCOSE BLDC GLUCOMTR-MCNC: 74 MG/DL — SIGNIFICANT CHANGE UP (ref 70–99)
GLUCOSE SERPL-MCNC: 110 MG/DL — HIGH (ref 70–99)
HCT VFR BLD CALC: 20.8 % — LOW (ref 42–52)
HCT VFR BLD CALC: 33.1 % — LOW (ref 42–52)
HGB BLD-MCNC: 10.9 G/DL — LOW (ref 14–18)
HGB BLD-MCNC: 6.6 G/DL — CRITICAL LOW (ref 14–18)
IMM GRANULOCYTES NFR BLD AUTO: 0.9 % — HIGH (ref 0.1–0.3)
LDH SERPL L TO P-CCNC: 246 U/L — HIGH (ref 50–242)
LYMPHOCYTES # BLD AUTO: 0.54 K/UL — LOW (ref 1.2–3.4)
LYMPHOCYTES # BLD AUTO: 10.2 % — LOW (ref 20.5–51.1)
MAGNESIUM SERPL-MCNC: 2.2 MG/DL — SIGNIFICANT CHANGE UP (ref 1.8–2.4)
MCHC RBC-ENTMCNC: 29.9 PG — SIGNIFICANT CHANGE UP (ref 27–31)
MCHC RBC-ENTMCNC: 30.3 PG — SIGNIFICANT CHANGE UP (ref 27–31)
MCHC RBC-ENTMCNC: 31.7 G/DL — LOW (ref 32–37)
MCHC RBC-ENTMCNC: 32.9 G/DL — SIGNIFICANT CHANGE UP (ref 32–37)
MCV RBC AUTO: 90.9 FL — SIGNIFICANT CHANGE UP (ref 80–94)
MCV RBC AUTO: 95.4 FL — HIGH (ref 80–94)
METHOD TYPE: SIGNIFICANT CHANGE UP
MONOCYTES # BLD AUTO: 0.39 K/UL — SIGNIFICANT CHANGE UP (ref 0.1–0.6)
MONOCYTES NFR BLD AUTO: 7.4 % — SIGNIFICANT CHANGE UP (ref 1.7–9.3)
NEUTROPHILS # BLD AUTO: 4.23 K/UL — SIGNIFICANT CHANGE UP (ref 1.4–6.5)
NEUTROPHILS NFR BLD AUTO: 80.2 % — HIGH (ref 42.2–75.2)
NRBC BLD AUTO-RTO: 0 /100 WBCS — SIGNIFICANT CHANGE UP (ref 0–0)
NRBC BLD AUTO-RTO: 0 /100 WBCS — SIGNIFICANT CHANGE UP (ref 0–0)
PLATELET # BLD AUTO: 272 K/UL — SIGNIFICANT CHANGE UP (ref 130–400)
PLATELET # BLD AUTO: 326 K/UL — SIGNIFICANT CHANGE UP (ref 130–400)
PMV BLD: 10.8 FL — HIGH (ref 7.4–10.4)
PMV BLD: 12.3 FL — HIGH (ref 7.4–10.4)
POTASSIUM SERPL-MCNC: 4.4 MMOL/L — SIGNIFICANT CHANGE UP (ref 3.5–5)
POTASSIUM SERPL-SCNC: 4.4 MMOL/L — SIGNIFICANT CHANGE UP (ref 3.5–5)
PROT SERPL-MCNC: 4.2 G/DL — LOW (ref 6–8)
PROT SERPL-MCNC: 4.6 G/DL — LOW (ref 6–8)
RBC # BLD: 2.18 M/UL — LOW (ref 4.7–6.1)
RBC # BLD: 3.64 M/UL — LOW (ref 4.7–6.1)
RBC # BLD: 3.64 M/UL — LOW (ref 4.7–6.1)
RBC # FLD: 15.2 % — HIGH (ref 11.5–14.5)
RBC # FLD: 17.1 % — HIGH (ref 11.5–14.5)
RETICS #: 117.9 K/UL — SIGNIFICANT CHANGE UP (ref 25–125)
RETICS/RBC NFR: 3.2 % — HIGH (ref 0.5–1.5)
SODIUM SERPL-SCNC: 139 MMOL/L — SIGNIFICANT CHANGE UP (ref 135–146)
SPECIMEN SOURCE: SIGNIFICANT CHANGE UP
WBC # BLD: 10.53 K/UL — SIGNIFICANT CHANGE UP (ref 4.8–10.8)
WBC # BLD: 5.28 K/UL — SIGNIFICANT CHANGE UP (ref 4.8–10.8)
WBC # FLD AUTO: 10.53 K/UL — SIGNIFICANT CHANGE UP (ref 4.8–10.8)
WBC # FLD AUTO: 5.28 K/UL — SIGNIFICANT CHANGE UP (ref 4.8–10.8)

## 2025-02-14 PROCEDURE — 99291 CRITICAL CARE FIRST HOUR: CPT

## 2025-02-14 RX ORDER — QUETIAPINE FUMARATE 25 MG/1
25 TABLET ORAL EVERY 12 HOURS
Refills: 0 | Status: DISCONTINUED | OUTPATIENT
Start: 2025-02-14 | End: 2025-02-14

## 2025-02-14 RX ORDER — BUMETANIDE 1 MG/1
2 TABLET ORAL
Refills: 0 | Status: DISCONTINUED | OUTPATIENT
Start: 2025-02-14 | End: 2025-02-15

## 2025-02-14 RX ADMIN — NAFCILLIN 200 GRAM(S): 2 INJECTION, SOLUTION INTRAVENOUS at 14:55

## 2025-02-14 RX ADMIN — POLYETHYLENE GLYCOL 3350 17 GRAM(S): 17 POWDER, FOR SOLUTION ORAL at 17:12

## 2025-02-14 RX ADMIN — NAFCILLIN 200 GRAM(S): 2 INJECTION, SOLUTION INTRAVENOUS at 21:55

## 2025-02-14 RX ADMIN — Medication 25 MICROGRAM(S): at 00:15

## 2025-02-14 RX ADMIN — POLYETHYLENE GLYCOL 3350 17 GRAM(S): 17 POWDER, FOR SOLUTION ORAL at 05:05

## 2025-02-14 RX ADMIN — Medication 25 MICROGRAM(S): at 00:01

## 2025-02-14 RX ADMIN — NAFCILLIN 200 GRAM(S): 2 INJECTION, SOLUTION INTRAVENOUS at 10:48

## 2025-02-14 RX ADMIN — Medication 25 MICROGRAM(S): at 17:36

## 2025-02-14 RX ADMIN — NAFCILLIN 200 GRAM(S): 2 INJECTION, SOLUTION INTRAVENOUS at 17:25

## 2025-02-14 RX ADMIN — Medication 2 TABLET(S): at 05:05

## 2025-02-14 RX ADMIN — Medication 1 APPLICATION(S): at 05:05

## 2025-02-14 RX ADMIN — Medication 15 MILLILITER(S): at 17:12

## 2025-02-14 RX ADMIN — Medication 2 TABLET(S): at 21:56

## 2025-02-14 RX ADMIN — Medication 500 MILLIGRAM(S): at 05:05

## 2025-02-14 RX ADMIN — INSULIN LISPRO 2: 100 INJECTION, SOLUTION INTRAVENOUS; SUBCUTANEOUS at 17:31

## 2025-02-14 RX ADMIN — Medication 2 TABLET(S): at 14:55

## 2025-02-14 RX ADMIN — Medication 25 MICROGRAM(S): at 08:47

## 2025-02-14 RX ADMIN — Medication 40 MILLIGRAM(S): at 12:30

## 2025-02-14 RX ADMIN — NAFCILLIN 200 GRAM(S): 2 INJECTION, SOLUTION INTRAVENOUS at 01:27

## 2025-02-14 RX ADMIN — NAFCILLIN 200 GRAM(S): 2 INJECTION, SOLUTION INTRAVENOUS at 05:04

## 2025-02-14 RX ADMIN — Medication 25 MICROGRAM(S): at 09:17

## 2025-02-14 RX ADMIN — Medication 15 MILLILITER(S): at 05:04

## 2025-02-14 RX ADMIN — CEFEPIME 100 MILLIGRAM(S): 2 INJECTION, POWDER, FOR SOLUTION INTRAVENOUS at 14:55

## 2025-02-14 RX ADMIN — Medication 500 MILLIGRAM(S): at 17:13

## 2025-02-14 RX ADMIN — BUMETANIDE 2 MILLIGRAM(S): 1 TABLET ORAL at 15:11

## 2025-02-14 RX ADMIN — INSULIN LISPRO 4 UNIT(S): 100 INJECTION, SOLUTION INTRAVENOUS; SUBCUTANEOUS at 00:35

## 2025-02-14 RX ADMIN — Medication 25 MICROGRAM(S): at 18:06

## 2025-02-14 RX ADMIN — DEXMEDETOMIDINE HYDROCHLORIDE IN SODIUM CHLORIDE 6.39 MICROGRAM(S)/KG/HR: 4 INJECTION INTRAVENOUS at 17:36

## 2025-02-14 RX ADMIN — INSULIN LISPRO 4 UNIT(S): 100 INJECTION, SOLUTION INTRAVENOUS; SUBCUTANEOUS at 17:31

## 2025-02-14 RX ADMIN — RASAGILINE 1 MILLIGRAM(S): 0.5 TABLET ORAL at 05:05

## 2025-02-14 RX ADMIN — Medication 25 MICROGRAM(S): at 23:45

## 2025-02-14 RX ADMIN — DEXMEDETOMIDINE HYDROCHLORIDE IN SODIUM CHLORIDE 6.39 MICROGRAM(S)/KG/HR: 4 INJECTION INTRAVENOUS at 23:55

## 2025-02-14 RX ADMIN — INSULIN LISPRO 4 UNIT(S): 100 INJECTION, SOLUTION INTRAVENOUS; SUBCUTANEOUS at 05:04

## 2025-02-14 RX ADMIN — BUMETANIDE 2 MILLIGRAM(S): 1 TABLET ORAL at 10:42

## 2025-02-14 RX ADMIN — Medication 2 TABLET(S): at 21:55

## 2025-02-14 RX ADMIN — CEFEPIME 100 MILLIGRAM(S): 2 INJECTION, POWDER, FOR SOLUTION INTRAVENOUS at 05:04

## 2025-02-14 RX ADMIN — CEFEPIME 100 MILLIGRAM(S): 2 INJECTION, POWDER, FOR SOLUTION INTRAVENOUS at 21:55

## 2025-02-14 NOTE — PROGRESS NOTE ADULT - ASSESSMENT
IMPRESSION:    Acute Hypoxic Respiratory Failure   Toxic Metabolic Encephalopathy  CAP likely aspiration   Influenza A treated   MSSA bacteremia repeat CX -  Sepsis POA  LYNNE improving  cholecystitis sp percutaneous haydee  Periventricular bleed  Suspected Mural thrombus in PA  Right thigh hematoma.    HO Parkinson's Disease   HO CAD    PLAN:    CNS:  SAT. Continue Anti parkinson's. CTH stable.      HEENT: Oral care.  ETT duration: 13 days.     PULMONARY:  No change in vent settings.  Monitor airway pressures.  remains with significant secretions.  Will likely need trach and PEG     CARDIOVASCULAR:  Avoid overload.  NO MARY per cardiology. Volume contraction muna tolerated     GI: GI prophylaxis.  NGT feeds as tolerated. S/P perc cholecystostomy. Bowel regimen     RENAL: Follow up lytes.  Mars in place for retention. Kidney function stable.     INFECTIOUS DISEASE: Cultures noted.  SP Tamiflu.  ABX per ID.  Duration per ID.     HEMATOLOGICAL: Monitor CBC and Coags. CTA noted with no active bleed.  keep Hg > 7         ENDOCRINE:  Follow up FS.  Insulin protocol if needed.    MUSCULOSKELETAL: Bed rest.  Off loading. Wound care for DTIs.     Full code  MICU  palliative care follow up   very poor prognosis

## 2025-02-14 NOTE — PROGRESS NOTE ADULT - SUBJECTIVE AND OBJECTIVE BOX
Patient is a 69y old  Male who presents with a chief complaint of penumonia (13 Feb 2025 17:41)        Over Night Events:  remains critically ill on MV.         ROS:     All ROS are negative except HPI         PHYSICAL EXAM    ICU Vital Signs Last 24 Hrs  T(C): 36.8 (14 Feb 2025 08:00), Max: 36.8 (14 Feb 2025 04:00)  T(F): 98.3 (14 Feb 2025 08:00), Max: 98.3 (14 Feb 2025 08:00)  HR: 108 (14 Feb 2025 09:00) (60 - 108)  BP: 146/69 (14 Feb 2025 09:00) (74/44 - 146/69)  BP(mean): 99 (14 Feb 2025 09:00) (54 - 99)  ABP: --  ABP(mean): --  RR: 42 (14 Feb 2025 09:00) (7 - 44)  SpO2: 97% (14 Feb 2025 09:00) (97% - 100%)    O2 Parameters below as of 14 Feb 2025 08:00  Patient On (Oxygen Delivery Method): ventilator    O2 Concentration (%): 40        CONSTITUTIONAL:  Ill appearing.  NAD    ENT:   Airway patent,   Mouth with normal mucosa.   ET     EYES:   Pupils equal,   Round and reactive to light.    CARDIAC:   Normal rate,   Regular rhythm.     edema      RESPIRATORY:   No wheezing  Bilateral BS  Normal chest expansion  Not tachypneic,  No use of accessory muscles    GASTROINTESTINAL:  Abdomen soft,   Non-tender,   No guarding,   + BS    MUSCULOSKELETAL:   Range of motion is limited,  No clubbing, cyanosis    NEUROLOGICAL:   Sedated    SKIN:   Skin normal color for race,   Warm and dry   No evidence of rash.        02-13-25 @ 07:01  -  02-14-25 @ 07:00  --------------------------------------------------------  IN:    Dexmedetomidine: 256.9 mL    Enteral Tube Flush: 175 mL    Free Water: 75 mL    IV PiggyBack: 550 mL    Jevity: 900 mL    PRBCs (Packed Red Blood Cells): 300 mL  Total IN: 2256.9 mL    OUT:    Drain (mL): 15 mL    Indwelling Catheter - Urethral (mL): 1200 mL  Total OUT: 1215 mL    Total NET: 1041.9 mL      02-14-25 @ 07:01  -  02-14-25 @ 09:23  --------------------------------------------------------  IN:    Dexmedetomidine: 23 mL  Total IN: 23 mL    OUT:    Indwelling Catheter - Urethral (mL): 60 mL  Total OUT: 60 mL    Total NET: -37 mL          LABS:                            6.6    5.28  )-----------( 272      ( 14 Feb 2025 05:00 )             20.8                                               02-14    139  |  109  |  45[H]  ----------------------------<  110[H]  4.4   |  21  |  0.9    Ca    7.8[L]      14 Feb 2025 05:00  Mg     2.2     02-14    TPro  4.2[L]  /  Alb  2.2[L]  /  TBili  0.7  /  DBili  x   /  AST  25  /  ALT  <5  /  AlkPhos  105  02-14                                             Urinalysis Basic - ( 14 Feb 2025 05:00 )    Color: x / Appearance: x / SG: x / pH: x  Gluc: 110 mg/dL / Ketone: x  / Bili: x / Urobili: x   Blood: x / Protein: x / Nitrite: x   Leuk Esterase: x / RBC: x / WBC x   Sq Epi: x / Non Sq Epi: x / Bacteria: x                                                  LIVER FUNCTIONS - ( 14 Feb 2025 05:00 )  Alb: 2.2 g/dL / Pro: 4.2 g/dL / ALK PHOS: 105 U/L / ALT: <5 U/L / AST: 25 U/L / GGT: x                                                  Culture - Fungal, Bronchial (collected 11 Feb 2025 11:53)  Source: Bronchial  Preliminary Report (14 Feb 2025 07:48):    Few Yeast    Culture - Acid Fast - Bronchial w/Smear (collected 11 Feb 2025 11:53)  Source: Bronchial  Preliminary Report (12 Feb 2025 23:07):    Culture is being performed.    Culture - Bronchial (collected 11 Feb 2025 11:53)  Source: Lavage  Gram Stain (11 Feb 2025 23:43):    No Squamous epithelial cells seen per low power field    Few polymorphonuclear leukocytes seen per low power field    No organisms seen per oil power field  Preliminary Report (12 Feb 2025 13:33):    Commensal halina consistent with body site                                                   Mode: AC/ CMV (Assist Control/ Continuous Mandatory Ventilation)  RR (machine): 16  TV (machine): 440  FiO2: 40  PEEP: 8  ITime: 1  MAP: 12  PIP: 30                                      ABG - ( 14 Feb 2025 03:26 )  pH, Arterial: 7.33  pH, Blood: x     /  pCO2: 52    /  pO2: 114   / HCO3: 27    / Base Excess: 1.0   /  SaO2: 98.4                MEDICATIONS  (STANDING):  carbidopa/levodopa  25/250 2 Tablet(s) Oral three times a day  cefepime   IVPB 2000 milliGRAM(s) IV Intermittent every 8 hours  chlorhexidine 0.12% Liquid 15 milliLiter(s) Oral Mucosa every 12 hours  chlorhexidine 2% Cloths 1 Application(s) Topical <User Schedule>  dexMEDEtomidine Infusion 0.5 MICROgram(s)/kG/Hr (6.39 mL/Hr) IV Continuous <Continuous>  dextrose 5%. 1000 milliLiter(s) (50 mL/Hr) IV Continuous <Continuous>  dextrose 5%. 1000 milliLiter(s) (100 mL/Hr) IV Continuous <Continuous>  dextrose 50% Injectable 25 Gram(s) IV Push once  dextrose 50% Injectable 12.5 Gram(s) IV Push once  dextrose 50% Injectable 25 Gram(s) IV Push once  glucagon  Injectable 1 milliGRAM(s) IntraMuscular once  insulin lispro (ADMELOG) corrective regimen sliding scale   SubCutaneous every 6 hours  insulin lispro Injectable (ADMELOG) 4 Unit(s) SubCutaneous every 6 hours  metroNIDAZOLE    Tablet 500 milliGRAM(s) Oral every 12 hours  nafcillin  IVPB 2 Gram(s) IV Intermittent every 4 hours  nafcillin  IVPB      pantoprazole   Suspension 40 milliGRAM(s) Oral daily  polyethylene glycol 3350 17 Gram(s) Oral every 12 hours  QUEtiapine 25 milliGRAM(s) Oral every 12 hours  rasagiline Tablet 1 milliGRAM(s) Oral <User Schedule>  senna 2 Tablet(s) Oral at bedtime    MEDICATIONS  (PRN):  acetaminophen     Tablet .. 650 milliGRAM(s) Oral every 6 hours PRN Temp greater or equal to 38C (100.4F), Mild Pain (1 - 3)  dextrose Oral Gel 15 Gram(s) Oral once PRN Blood Glucose LESS THAN 70 milliGRAM(s)/deciliter  fentaNYL    Injectable 25 MICROGram(s) IV Push every 2 hours PRN Aggitation      New X-rays reviewed:                                                                                  ECHO    CXR interpreted by me:

## 2025-02-14 NOTE — PROGRESS NOTE ADULT - ASSESSMENT
69-year-old male past medical history of hypertension, Parkinson's, CAD presents for shortness of breath and cough of 1 day duration. History was obtained from patient's wife who noted that the patient has been having decreased po intake, activity, and unintentional weight loss for some time. However yesterday he started having cough and shortness of breath. Admitted to MICU for AHRF and sepsis 2/2 to mutilobar PNA.    #Acute Hypoxic Respiratory Failure likely 2/2 CAP/aspiration and flu  #Toxic Metabolic Encephalopathy likely 2/2 CAP/aspiration and flu  #Influenza A positive, treated  #MSSA bacteremia- resolved  #Sepsis POA  - Aspiration precautions.    - patient inc WOB, worsening alkalosis, obtunded-intubated for airway protection   -ID: if patient decompensates, dc cefepime/metro, start patricia 1g   - cardio: patient critically sick, not a candidate for surgery therefore no MARY, can c/w abx for IE as per ID  - s/p bronch, f/u cultures   - ID: tamiflu 30 mg daily: last dose on 2/10, continue cefepime 1g q12h,  metro (end date 2/16) and nafcillin as per ID (6w)  -2/11 bronch: showed copious amounts of secretions and poor cough.  Will need trach: ongoing daily discussions with wife regarding trach vs extubation & change in code status  2/14: will repeat bronch today, SBT. pending family discussion for trach if needed    #PE on CTA  #large R groin intramuscular hematoma, stable  #Periventricular bleed on CTH, stable  - CTH. noted with right periventricular bleed, stable.   - Neuro on board  - Continue Anti parkinson's meds.    - CTA chest to r/o PE: focal filling defect within main pulm artery   - repeat CT head: stable intraventricular hemorrhage >> repeat on 2/9 after on full AC for PE: stable  -CT pelvis:  Large intramuscular hematoma in the medial right thigh measuring approximately 9.8 x 6.4 x 17 cm.  -holding AC  -2/12: Hb drop to 6.8> got 1 unit> up to 9.5 .  CTA stable & negative for any active extravasation   2/14: 1 unit prbc, f/u 11am hb. no signs of active bleed    #hypoglycemia, resolved  -decr insulin to 4u q6 & adjust as needed    #Elevated Bilirubin-improving   #Leukocytosis-improving   #Cholangitis vs cholecystitis s/o perc haydee  - F/u RUQ US: distended GB with sludge, GB polyp   - IR: perc haydee 2/5, drained 250 cc dark bile, 450 cc output overnight. F/u GB drainage cultures: NGTD    #hypernatremia, resolved  -free water with feeds  -stopped d5    #Constipation, improved  - senna and miralax  - c/w lactulose   -2/10: kub: distented bowels. having BM    #Possible Mural thrombus in PA  #CAD  - Avoid overload  - C/w LR at 50  - TTE: EF 56%, G1DD, mild-mod MR, mod TR, mild MT, mild pHTN  - Cardio recs appreciated       - Patient is poor candidate for MARY       - full endocarditis abx course of nafcillin    #MISC  - DVT ppx: SCDs, holding  - GI ppx: not needed  - Activity: as tolerated  - Diet: NPO w/ tube feed  - full code      69-year-old male past medical history of hypertension, Parkinson's, CAD presents for shortness of breath and cough of 1 day duration. History was obtained from patient's wife who noted that the patient has been having decreased po intake, activity, and unintentional weight loss for some time. However yesterday he started having cough and shortness of breath. Admitted to MICU for AHRF and sepsis 2/2 to mutilobar PNA.    #Acute Hypoxic Respiratory Failure likely 2/2 CAP/aspiration and flu  #Toxic Metabolic Encephalopathy likely 2/2 CAP/aspiration and flu  #Influenza A positive, treated  #MSSA bacteremia- resolved  #Sepsis POA  - Aspiration precautions.    - patient inc WOB, worsening alkalosis, obtunded-intubated for airway protection   -ID: if patient decompensates, dc cefepime/metro, start patricia 1g   - cardio: patient critically sick, not a candidate for surgery therefore no MARY, can c/w abx for IE as per ID  - s/p bronch, f/u cultures   - ID: tamiflu 30 mg daily: last dose on 2/10, continue cefepime 1g q12h,  metro (end date 2/16) and nafcillin as per ID (6w)  -2/11 bronch: showed copious amounts of secretions and poor cough.  Will need trach: ongoing daily discussions with wife regarding trach vs extubation & change in code status  2/14: will repeat bronch today, SBT. pending family discussion for trach if needed    #PE on CTA  #large R groin intramuscular hematoma, stable  #Periventricular bleed on CTH, stable  - CTH. noted with right periventricular bleed, stable.   - Neuro on board  - Continue Anti parkinson's meds.    - CTA chest to r/o PE: focal filling defect within main pulm artery   - repeat CT head: stable intraventricular hemorrhage >> repeat on 2/9 after on full AC for PE: stable  -CT pelvis:  Large intramuscular hematoma in the medial right thigh measuring approximately 9.8 x 6.4 x 17 cm.  -holding AC  -2/12: Hb drop to 6.8> got 1 unit> up to 9.5 .  CTA stable & negative for any active extravasation   2/14: 1 unit prbc, f/u 11am hb. no signs of active bleed    #hypoglycemia, resolved  -decr insulin to 4u q6 & adjust as needed    #Elevated Bilirubin-improving   #Leukocytosis-improving   #Cholangitis vs cholecystitis s/o perc haydee  - F/u RUQ US: distended GB with sludge, GB polyp   - IR: perc haydee 2/5, drained 250 cc dark bile, 450 cc output overnight. F/u GB drainage cultures: few candida, likely coloniz.    #hypernatremia, resolved  -free water with feeds  -stopped d5    #Constipation, improved  - senna and miralax  - c/w lactulose   -2/10: kub: distented bowels. having BM    #Possible Mural thrombus in PA  #CAD  - Avoid overload  - C/w LR at 50  - TTE: EF 56%, G1DD, mild-mod MR, mod TR, mild DE, mild pHTN  - Cardio recs appreciated       - Patient is poor candidate for MARY       - full endocarditis abx course of nafcillin    #MISC  - DVT ppx: SCDs, holding  - GI ppx: not needed  - Activity: as tolerated  - Diet: NPO w/ tube feed  - full code

## 2025-02-14 NOTE — PROGRESS NOTE ADULT - SUBJECTIVE AND OBJECTIVE BOX
GARETT ESCAMILLA  69y, Male  Allergy: Allergy Status Unknown      LOS  16d    CHIEF COMPLAINT: penumonia (14 Feb 2025 10:51)      INTERVAL EVENTS/HPI  - T(F): , Max: 98.5 (02-14-25 @ 12:00)  - WBC Count: 10.53 (02-14-25 @ 11:55)  WBC Count: 5.28 (02-14-25 @ 05:00)     - Creatinine: 0.9 (02-14-25 @ 05:00)  Creatinine: 0.7 (02-13-25 @ 04:58)     -   -   -     ROS  cannot obtain secondary to patient's sedation and/or mental status    VITALS:  T(F): 98.5, Max: 98.5 (02-14-25 @ 12:00)  HR: 79  BP: 110/59  RR: 19Vital Signs Last 24 Hrs  T(C): 36.9 (14 Feb 2025 12:00), Max: 36.9 (14 Feb 2025 12:00)  T(F): 98.5 (14 Feb 2025 12:00), Max: 98.5 (14 Feb 2025 12:00)  HR: 79 (14 Feb 2025 14:00) (60 - 108)  BP: 110/59 (14 Feb 2025 14:00) (74/44 - 146/69)  BP(mean): 78 (14 Feb 2025 14:00) (54 - 99)  RR: 19 (14 Feb 2025 14:00) (7 - 44)  SpO2: 100% (14 Feb 2025 14:00) (96% - 100%)    Parameters below as of 14 Feb 2025 13:00  Patient On (Oxygen Delivery Method): ventilator    O2 Concentration (%): 40    PHYSICAL EXAM:  General: intubated  HEENT: NCAT  Neck: supple  CV: RRR  Lungs: symmetric chest expansion, decreased BS at bases  Abd: Soft per haydee  Extr: wwp  Skin: no rash  Neuro: sedated  Lines: no phlebitis   FH: Non-contributory  Social Hx: Non-contributory    TESTS & MEASUREMENTS:                        10.9   10.53 )-----------( 326      ( 14 Feb 2025 11:55 )             33.1     02-14    139  |  109  |  45[H]  ----------------------------<  110[H]  4.4   |  21  |  0.9    Ca    7.8[L]      14 Feb 2025 05:00  Mg     2.2     02-14    TPro  4.6[L]  /  Alb  2.5[L]  /  TBili  1.1  /  DBili  0.8[H]  /  AST  24  /  ALT  <5  /  AlkPhos  92  02-14      LIVER FUNCTIONS - ( 14 Feb 2025 11:55 )  Alb: 2.5 g/dL / Pro: 4.6 g/dL / ALK PHOS: 92 U/L / ALT: <5 U/L / AST: 24 U/L / GGT: x           Urinalysis Basic - ( 14 Feb 2025 05:00 )    Color: x / Appearance: x / SG: x / pH: x  Gluc: 110 mg/dL / Ketone: x  / Bili: x / Urobili: x   Blood: x / Protein: x / Nitrite: x   Leuk Esterase: x / RBC: x / WBC x   Sq Epi: x / Non Sq Epi: x / Bacteria: x        Culture - Fungal, Bronchial (collected 02-11-25 @ 11:53)  Source: Bronchial  Preliminary Report (02-14-25 @ 07:48):    Few Yeast    Culture - Acid Fast - Bronchial w/Smear (collected 02-11-25 @ 11:53)  Source: Bronchial  Preliminary Report (02-12-25 @ 23:07):    Culture is being performed.    Culture - Bronchial (collected 02-11-25 @ 11:53)  Source: Lavage  Gram Stain (02-11-25 @ 23:43):    No Squamous epithelial cells seen per low power field    Few polymorphonuclear leukocytes seen per low power field    No organisms seen per oil power field  Preliminary Report (02-12-25 @ 13:33):    Commensal halina consistent with body site    Culture - Body Fluid with Gram Stain (collected 02-10-25 @ 16:22)  Source: Bile  Gram Stain (02-12-25 @ 23:54):    No polymorphonuclear cells seen per low power field    No organisms seen per oil power field  Preliminary Report (02-12-25 @ 23:54):    Rare Candida albicans    Referred to Mycology    Culture - Fungal, Body Fluid (collected 02-10-25 @ 16:22)  Source: Bile  Preliminary Report (02-14-25 @ 14:12):    Rare Candida albicans  Organism: Candida albicans (02-14-25 @ 14:12)  Organism: Candida albicans (02-14-25 @ 14:12)      Method Type: YSTMIC      -  Fluconazole: S <=0.5 Fluconazole: Susceptibility depends on achieving the maximum possible blood level. Doses higher than the standard dosing amount (6mg/kg/day) may be needed in adults with normal renal function and body habitus.  This test was developed and its performance characteristics determined by [facility] [place]  It has not been cleared or approved by the U.[place]. Food and Drug Administration. S - Susceptible; SDD - Susceptible Dose Dependent; I - Intermediate; R - Resistant; N/I - No Interpretation Available    Culture - Blood (collected 02-08-25 @ 17:24)  Source: .Blood BLOOD  Final Report (02-14-25 @ 01:01):    No growth at 5 days    Culture - Blood (collected 02-08-25 @ 04:58)  Source: .Blood BLOOD  Final Report (02-13-25 @ 17:00):    No growth at 5 days    Culture - Acid Fast - Bronchial w/Smear (collected 02-04-25 @ 09:29)  Source: Bronch Wash  Preliminary Report (02-12-25 @ 23:06):    No acid-fast bacilli isolated at 1 week. ****Acid-fast cultures are held    for 6 weeks.****    Culture - Bronchial (collected 02-04-25 @ 09:29)  Source: Bronch Wash  Gram Stain (02-05-25 @ 07:26):    Few-moderate polymorphonuclear leukocytes seen per low power field    No Squamous epithelial cells seen per low power field    No organisms seen per oil power field  Final Report (02-06-25 @ 11:36):    Commensal halina consistent with body site    Culture - Sputum (collected 02-04-25 @ 09:00)  Source: ET Tube ET Tube  Gram Stain (02-04-25 @ 19:37):    Numerous polymorphonuclear leukocytes per low power field    Rare Squamous epithelial cells per low power field    Rare Yeast like cells per oil power field  Final Report (02-05-25 @ 22:23):    Commensal halina consistent with body site    Culture - Blood (collected 02-02-25 @ 04:54)  Source: .Blood BLOOD  Final Report (02-07-25 @ 12:00):    No growth at 5 days    Culture - Blood (collected 01-31-25 @ 17:22)  Source: .Blood BLOOD  Final Report (02-05-25 @ 23:07):    No growth at 5 days    Culture - Blood (collected 01-31-25 @ 17:22)  Source: .Blood BLOOD  Final Report (02-05-25 @ 23:07):    No growth at 5 days    Urinalysis with Rflx Culture (collected 01-29-25 @ 16:48)    Culture - Blood (collected 01-29-25 @ 10:09)  Source: .Blood BLOOD  Gram Stain (01-30-25 @ 15:40):    Growth in aerobic bottle: Gram Positive Cocci in Clusters    Growth in anaerobic bottle: Gram Positive Cocci in Clusters  Final Report (02-01-25 @ 07:51):    Growth in aerobic and anaerobic bottles: Staphylococcus aureus    Direct identification is available within approximately 3-5    hours either by Blood Panel Multiplexed PCR or Direct    MALDI-TOF. Details: [email or web address]  Organism: Blood Culture PCR  Staphylococcus aureus (02-01-25 @ 07:51)  Organism: Staphylococcus aureus (02-01-25 @ 07:51)      Method Type: KARTIK      -  Ceftaroline: S <=0.5      -  Clindamycin: R <=0.25 This isolate is presumed to be clindamycin resistant based on detection of inducible resistance. Clindamycin may still be effective in some patients.      -  Erythromycin: R >4      -  Gentamicin: S <=4 Should not be used as monotherapy      -  Oxacillin: S <=0.25 Oxacillin predicts susceptibility for dicloxacillin, methicillin, and nafcillin      -  Rifampin: S <=1 Should not be used as monotherapy      -  Tetracycline: S <=4      -  Trimethoprim/Sulfamethoxazole: S <=0.5/9.5      -  Vancomycin: S 1  Organism: Blood Culture PCR (02-01-25 @ 07:51)      Method Type: PCR      -  Methicillin SENSITIVE Staphylococcus aureus (MSSA): Detec Any isolate of Staphylococcus aureus from a blood culture is NOT considered a contaminant.    Culture - Blood (collected 01-29-25 @ 10:09)  Source: .Blood BLOOD  Gram Stain (02-01-25 @ 00:35):    Growth in aerobic bottle: Gram Positive Cocci in Clusters    Growth in anaerobic bottle: Gram Positive Cocci in Clusters  Final Report (02-01-25 @ 16:44):    Growth in aerobic and anaerobic bottles: Staphylococcus aureus    See previous culture [id number]            INFECTIOUS DISEASES TESTING  Procalcitonin: 0.62 (02-11-25 @ 08:30)  MRSA PCR Result.: Negative (02-10-25 @ 16:22)  MRSA PCR Result.: Negative (01-30-25 @ 13:40)  Procalcitonin: 6.93 (01-30-25 @ 07:26)  Legionella Antigen, Urine: Negative (01-29-25 @ 15:49)  Rapid RVP Result: Detected (01-29-25 @ 10:30)      INFLAMMATORY MARKERS      RADIOLOGY & ADDITIONAL TESTS:  I have personally reviewed the last available Chest xray  CXR      CT      CARDIOLOGY TESTING      MEDICATIONS  buMETAnide Injectable 2  carbidopa/levodopa  25/250 2  cefepime   IVPB 2000  chlorhexidine 0.12% Liquid 15  chlorhexidine 2% Cloths 1  dexMEDEtomidine Infusion 0.5  dextrose 5%. 1000  dextrose 5%. 1000  dextrose 50% Injectable 25  dextrose 50% Injectable 12.5  dextrose 50% Injectable 25  glucagon  Injectable 1  insulin lispro (ADMELOG) corrective regimen sliding scale   insulin lispro Injectable (ADMELOG) 4  metroNIDAZOLE    Tablet 500  nafcillin  IVPB 2  nafcillin  IVPB   pantoprazole   Suspension 40  polyethylene glycol 3350 17  rasagiline Tablet 1  senna 2      WEIGHT  Weight (kg): 51.1 (02-05-25 @ 13:52)  Creatinine: 0.9 mg/dL (02-14-25 @ 05:00)      ANTIBIOTICS:  cefepime   IVPB 2000 milliGRAM(s) IV Intermittent every 8 hours  metroNIDAZOLE    Tablet 500 milliGRAM(s) Oral every 12 hours  nafcillin  IVPB      nafcillin  IVPB 2 Gram(s) IV Intermittent every 4 hours      All available historical records have been reviewed

## 2025-02-14 NOTE — PROGRESS NOTE ADULT - ASSESSMENT
ASSESSMENT  69-year-old male past medical history of hypertension, Parkinson's, CAD presents for shortness of breath and cough of 1 day duration. History was obtained from patient's wife who noted that the patient has been having decreased po intake, activity, and unintentional weight loss for some time. Howver, yesterday he started having cough and shortness of breath. Found to have Flu, PNA and MSSA bacteremia     IMPRESSION  #Fevers, resolving     2/11 bronch  Commensal halina consistent with body site,   Few Yeast- colonizer    2/8 BCX NGTD     CXR stable opacities   #Intubated on mechanical ventilation , septic shock requiring pressors   #Pulmonary artery thrombus on CTA    2/ 2 CTA There is a focal filling defect within the main pulmonary artery at site of the ductus arteriosus suspicious for thrombus.  Increased opacities/debris within the central bronchi with occlusion of the right lower lobe central bronchi. There is increased consolidation   within the right lower lobe.  #Suspected cholecystitis     2/10 biliary fluid NG,   Rare Candida albicans- suspect colonizer   RUQ GB sludge     2/5 Successful placement of 8Fr perc haydee tube. 250cc of dark bile aspirated from GB.  #Influenza +, MSSA bacteremia , suspect MSSA superimposed PNA    2/4 bronch   No organisms seen per oil power field    2/ 2 BCX NGTD     1/31 BCX NGTD     1/29 BCX MSSA 3/4 bottles    MRSA PCR Result.: Negative (01-30-25 @ 13:40)    Procalcitonin: 6.93 ng/mL (01-30-25 @ 07:26)    Legionella Antigen, Urine: Negative (01-29-25 @ 15:49)    Streptococcus pneumoniae Ag, Ur Result: Negative (01-29-25 @ 15:49)    CT Multifocal bilateral consolidative opacities as above, suggestive of multifocal pneumonia in the appropriate clinical context.  #Lactic acidosis  #Hypernatremia   #Severe protein calorie malnutrition  BMI (kg/m2): 17  #PD  #Immunodeficiency secondary to Senescence which could results in poor clinical outcomes  #Abx allergy: Allergy Status Unknown  #LYNNE , resolved  Creatinine: 0.9 mg/dL (02-12-25 @ 05:30)    Height (cm): 175.3 (01-30-25 @ 13:00)  Weight (kg): 52.2 (01-29-25 @ 10:14)    RECOMMENDATIONS  - Trend fever curve  - Nafcillin 2g q4h IV to target MSSA in blood (on 2/17, once off cefepime, can change to Cefazolin 2g q8h IV for ease of dosing to complete course)    - cefepime   IVPB 2000 milliGRAM(s) IV Intermittent every 12 hours E/D 2/16 x 7 days post perc haydee  - metroNIDAZOLE  500mg TID PO E/D 2/16  - TTE no vegetations , guidelines recommend MARY as community acquired bacteremia once stable , otherwise 6 weeks empiric therapy from date of cleared BCX E/D 3/13  - Trend WBC, Cr  - Grave prognosis, GOC     If any questions, please send a message or call on [employer] Teams  Please continue to update ID with any pertinent new laboratory, radiographic findings, or change in clinical status

## 2025-02-14 NOTE — PROGRESS NOTE ADULT - SUBJECTIVE AND OBJECTIVE BOX
SUBJECTIVE / OVERNIGHT EVENTS  Intubated & sedated. Hb drop to 6.6, with hemodilution, unit of blood given    MEDICATIONS  buMETAnide Injectable 2 milliGRAM(s) IV Push two times a day  carbidopa/levodopa  25/250 2 Tablet(s) Oral three times a day  cefepime   IVPB 2000 milliGRAM(s) IV Intermittent every 8 hours  chlorhexidine 0.12% Liquid 15 milliLiter(s) Oral Mucosa every 12 hours  chlorhexidine 2% Cloths 1 Application(s) Topical <User Schedule>  dexMEDEtomidine Infusion 0.5 MICROgram(s)/kG/Hr IV Continuous <Continuous>  dextrose 5%. 1000 milliLiter(s) IV Continuous <Continuous>  dextrose 5%. 1000 milliLiter(s) IV Continuous <Continuous>  dextrose 50% Injectable 25 Gram(s) IV Push once  dextrose 50% Injectable 12.5 Gram(s) IV Push once  dextrose 50% Injectable 25 Gram(s) IV Push once  glucagon  Injectable 1 milliGRAM(s) IntraMuscular once  insulin lispro (ADMELOG) corrective regimen sliding scale   SubCutaneous every 6 hours  insulin lispro Injectable (ADMELOG) 4 Unit(s) SubCutaneous every 6 hours  metroNIDAZOLE    Tablet 500 milliGRAM(s) Oral every 12 hours  nafcillin  IVPB 2 Gram(s) IV Intermittent every 4 hours  nafcillin  IVPB      pantoprazole   Suspension 40 milliGRAM(s) Oral daily  polyethylene glycol 3350 17 Gram(s) Oral every 12 hours  rasagiline Tablet 1 milliGRAM(s) Oral <User Schedule>  senna 2 Tablet(s) Oral at bedtime    acetaminophen     Tablet .. 650 milliGRAM(s) Oral every 6 hours PRN Temp greater or equal to 38C (100.4F), Mild Pain (1 - 3)  dextrose Oral Gel 15 Gram(s) Oral once PRN Blood Glucose LESS THAN 70 milliGRAM(s)/deciliter  fentaNYL    Injectable 25 MICROGram(s) IV Push every 2 hours PRN Aggitation    VITALS /  EXAM    T(C): 36.8 (02-14-25 @ 08:00), Max: 36.8 (02-14-25 @ 04:00)  HR: 108 (02-14-25 @ 09:00) (60 - 108)  BP: 146/69 (02-14-25 @ 09:00) (74/44 - 146/69)  RR: 42 (02-14-25 @ 09:00) (7 - 44)  SpO2: 97% (02-14-25 @ 09:00) (97% - 100%)  POCT Blood Glucose.: 131 mg/dL (02-14-25 @ 05:00)  POCT Blood Glucose.: 141 mg/dL (02-14-25 @ 00:28)  POCT Blood Glucose.: 169 mg/dL (02-13-25 @ 17:16)  POCT Blood Glucose.: 110 mg/dL (02-13-25 @ 12:12)    GENERAL: NAD, well-developed  CHEST/LUNG: Clear to auscultation bilaterally; No wheezes, rales or rhonchi  HEART: Regular rate and rhythm; No murmurs, rubs, or gallops  ABDOMEN: Soft, Nontender, Nondistended; Bowel sounds present, no masses.  EXTREMITIES:  2+ Peripheral Pulses, No clubbing, cyanosis, or edema    I's & O's     02-13-25 @ 07:01  -  02-14-25 @ 07:00  --------------------------------------------------------  IN:    Dexmedetomidine: 256.9 mL    Enteral Tube Flush: 175 mL    Free Water: 75 mL    IV PiggyBack: 550 mL    Jevity: 900 mL    PRBCs (Packed Red Blood Cells): 300 mL  Total IN: 2256.9 mL    OUT:    Drain (mL): 15 mL    Indwelling Catheter - Urethral (mL): 1200 mL  Total OUT: 1215 mL    Total NET: 1041.9 mL      02-14-25 @ 07:01  -  02-14-25 @ 10:52  --------------------------------------------------------  IN:    Dexmedetomidine: 23 mL  Total IN: 23 mL    OUT:    Indwelling Catheter - Urethral (mL): 60 mL  Total OUT: 60 mL    Total NET: -37 mL        LABS             6.6    5.28  )-----------( 272      ( 02-14-25 @ 05:00 )             20.8     139  |  109  |  45  -------------------------<  110   02-14-25 @ 05:00  4.4  |  21  |  0.9    Ca      7.8     02-14-25 @ 05:00  Mg     2.2     02-14-25 @ 05:00    TPro  4.2  /  Alb  2.2  /  TBili  0.7  /  DBili  x   /  AST  25  /  ALT  <5  /  AlkPhos  105  /  GGT  x     02-14-25 @ 05:00                    Urinalysis Basic - ( 14 Feb 2025 05:00 )    Color: x / Appearance: x / SG: x / pH: x  Gluc: 110 mg/dL / Ketone: x  / Bili: x / Urobili: x   Blood: x / Protein: x / Nitrite: x   Leuk Esterase: x / RBC: x / WBC x   Sq Epi: x / Non Sq Epi: x / Bacteria: x      MICRO / IMAGING / CARDIOLOGY  Telemetry: Reviewed   EKG: Reviewed    CULTURES    Culture - Fungal, Bronchial (collected 02-11-25 @ 11:53)  Source: Bronchial  Preliminary Report:    Few Yeast    Culture - Acid Fast - Bronchial w/Smear (collected 02-11-25 @ 11:53)  Source: Bronchial  Preliminary Report:    Culture is being performed.    Culture - Bronchial (collected 02-11-25 @ 11:53)  Source: Lavage  Gram Stain:    No Squamous epithelial cells seen per low power field    Few polymorphonuclear leukocytes seen per low power field    No organisms seen per oil power field  Preliminary Report:    Commensal halina consistent with body site    Culture - Body Fluid with Gram Stain (collected 02-10-25 @ 16:22)  Source: Bile  Gram Stain:    No polymorphonuclear cells seen per low power field    No organisms seen per oil power field  Preliminary Report:    Rare Candida albicans    Referred to Mycology    Culture - Fungal, Body Fluid (collected 02-10-25 @ 16:22)  Source: Bile  Preliminary Report:    Rare Yeast Identification and susceptibility to follow.      IMAGING  PACS Image:  (02-13-25 @ 08:59)  PACS Image:  (02-12-25 @ 22:03)    CARDIOLOGY

## 2025-02-15 LAB
ANION GAP SERPL CALC-SCNC: 9 MMOL/L — SIGNIFICANT CHANGE UP (ref 7–14)
BASOPHILS # BLD AUTO: 0.02 K/UL — SIGNIFICANT CHANGE UP (ref 0–0.2)
BASOPHILS NFR BLD AUTO: 0.3 % — SIGNIFICANT CHANGE UP (ref 0–1)
BUN SERPL-MCNC: 45 MG/DL — HIGH (ref 10–20)
CALCIUM SERPL-MCNC: 7.8 MG/DL — LOW (ref 8.4–10.4)
CHLORIDE SERPL-SCNC: 109 MMOL/L — SIGNIFICANT CHANGE UP (ref 98–110)
CO2 SERPL-SCNC: 25 MMOL/L — SIGNIFICANT CHANGE UP (ref 17–32)
CREAT SERPL-MCNC: 0.9 MG/DL — SIGNIFICANT CHANGE UP (ref 0.7–1.5)
CULTURE RESULTS: ABNORMAL
EGFR: 92 ML/MIN/1.73M2 — SIGNIFICANT CHANGE UP
EGFR: 92 ML/MIN/1.73M2 — SIGNIFICANT CHANGE UP
EOSINOPHIL # BLD AUTO: 0.04 K/UL — SIGNIFICANT CHANGE UP (ref 0–0.7)
EOSINOPHIL NFR BLD AUTO: 0.5 % — SIGNIFICANT CHANGE UP (ref 0–8)
GAS PNL BLDA: SIGNIFICANT CHANGE UP
GLUCOSE BLDC GLUCOMTR-MCNC: 107 MG/DL — HIGH (ref 70–99)
GLUCOSE BLDC GLUCOMTR-MCNC: 148 MG/DL — HIGH (ref 70–99)
GLUCOSE BLDC GLUCOMTR-MCNC: 150 MG/DL — HIGH (ref 70–99)
GLUCOSE BLDC GLUCOMTR-MCNC: 169 MG/DL — HIGH (ref 70–99)
GLUCOSE BLDC GLUCOMTR-MCNC: 85 MG/DL — SIGNIFICANT CHANGE UP (ref 70–99)
GLUCOSE SERPL-MCNC: 72 MG/DL — SIGNIFICANT CHANGE UP (ref 70–99)
HAPTOGLOB SERPL-MCNC: 176 MG/DL — SIGNIFICANT CHANGE UP (ref 34–200)
HCT VFR BLD CALC: 30.6 % — LOW (ref 42–52)
HGB BLD-MCNC: 10 G/DL — LOW (ref 14–18)
IMM GRANULOCYTES NFR BLD AUTO: 0.9 % — HIGH (ref 0.1–0.3)
LYMPHOCYTES # BLD AUTO: 0.56 K/UL — LOW (ref 1.2–3.4)
LYMPHOCYTES # BLD AUTO: 7.5 % — LOW (ref 20.5–51.1)
MAGNESIUM SERPL-MCNC: 2.1 MG/DL — SIGNIFICANT CHANGE UP (ref 1.8–2.4)
MCHC RBC-ENTMCNC: 30 PG — SIGNIFICANT CHANGE UP (ref 27–31)
MCHC RBC-ENTMCNC: 32.7 G/DL — SIGNIFICANT CHANGE UP (ref 32–37)
MCV RBC AUTO: 91.9 FL — SIGNIFICANT CHANGE UP (ref 80–94)
MONOCYTES # BLD AUTO: 0.41 K/UL — SIGNIFICANT CHANGE UP (ref 0.1–0.6)
MONOCYTES NFR BLD AUTO: 5.5 % — SIGNIFICANT CHANGE UP (ref 1.7–9.3)
NEUTROPHILS # BLD AUTO: 6.32 K/UL — SIGNIFICANT CHANGE UP (ref 1.4–6.5)
NEUTROPHILS NFR BLD AUTO: 85.3 % — HIGH (ref 42.2–75.2)
NRBC BLD AUTO-RTO: 0 /100 WBCS — SIGNIFICANT CHANGE UP (ref 0–0)
PLATELET # BLD AUTO: 289 K/UL — SIGNIFICANT CHANGE UP (ref 130–400)
PMV BLD: 11.1 FL — HIGH (ref 7.4–10.4)
POTASSIUM SERPL-MCNC: 3.6 MMOL/L — SIGNIFICANT CHANGE UP (ref 3.5–5)
POTASSIUM SERPL-SCNC: 3.6 MMOL/L — SIGNIFICANT CHANGE UP (ref 3.5–5)
RBC # BLD: 3.33 M/UL — LOW (ref 4.7–6.1)
RBC # FLD: 17.2 % — HIGH (ref 11.5–14.5)
SODIUM SERPL-SCNC: 143 MMOL/L — SIGNIFICANT CHANGE UP (ref 135–146)
SPECIMEN SOURCE: SIGNIFICANT CHANGE UP
WBC # BLD: 7.42 K/UL — SIGNIFICANT CHANGE UP (ref 4.8–10.8)
WBC # FLD AUTO: 7.42 K/UL — SIGNIFICANT CHANGE UP (ref 4.8–10.8)

## 2025-02-15 PROCEDURE — 71045 X-RAY EXAM CHEST 1 VIEW: CPT | Mod: 26

## 2025-02-15 PROCEDURE — 99291 CRITICAL CARE FIRST HOUR: CPT

## 2025-02-15 RX ORDER — SODIUM CHLORIDE 9 G/1000ML
500 INJECTION, SOLUTION INTRAVENOUS ONCE
Refills: 0 | Status: DISCONTINUED | OUTPATIENT
Start: 2025-02-15 | End: 2025-02-15

## 2025-02-15 RX ORDER — BUMETANIDE 1 MG/1
2 TABLET ORAL DAILY
Refills: 0 | Status: DISCONTINUED | OUTPATIENT
Start: 2025-02-16 | End: 2025-02-25

## 2025-02-15 RX ORDER — FENTANYL CITRATE-0.9 % NACL/PF 100MCG/2ML
0.5 SYRINGE (ML) INTRAVENOUS
Qty: 2500 | Refills: 0 | Status: DISCONTINUED | OUTPATIENT
Start: 2025-02-15 | End: 2025-02-21

## 2025-02-15 RX ORDER — NOREPINEPHRINE BITARTRATE 8 MG
0.05 SOLUTION INTRAVENOUS
Qty: 8 | Refills: 0 | Status: DISCONTINUED | OUTPATIENT
Start: 2025-02-15 | End: 2025-02-15

## 2025-02-15 RX ADMIN — Medication 15 MILLILITER(S): at 05:47

## 2025-02-15 RX ADMIN — Medication 25 MICROGRAM(S): at 02:02

## 2025-02-15 RX ADMIN — Medication 40 MILLIGRAM(S): at 11:37

## 2025-02-15 RX ADMIN — NAFCILLIN 200 GRAM(S): 2 INJECTION, SOLUTION INTRAVENOUS at 05:47

## 2025-02-15 RX ADMIN — POLYETHYLENE GLYCOL 3350 17 GRAM(S): 17 POWDER, FOR SOLUTION ORAL at 05:47

## 2025-02-15 RX ADMIN — Medication 500 MILLIGRAM(S): at 05:46

## 2025-02-15 RX ADMIN — Medication 25 MICROGRAM(S): at 08:50

## 2025-02-15 RX ADMIN — BUMETANIDE 2 MILLIGRAM(S): 1 TABLET ORAL at 05:46

## 2025-02-15 RX ADMIN — NAFCILLIN 200 GRAM(S): 2 INJECTION, SOLUTION INTRAVENOUS at 21:36

## 2025-02-15 RX ADMIN — CEFEPIME 100 MILLIGRAM(S): 2 INJECTION, POWDER, FOR SOLUTION INTRAVENOUS at 14:01

## 2025-02-15 RX ADMIN — POLYETHYLENE GLYCOL 3350 17 GRAM(S): 17 POWDER, FOR SOLUTION ORAL at 17:26

## 2025-02-15 RX ADMIN — Medication 2 TABLET(S): at 21:37

## 2025-02-15 RX ADMIN — RASAGILINE 1 MILLIGRAM(S): 0.5 TABLET ORAL at 07:07

## 2025-02-15 RX ADMIN — DEXMEDETOMIDINE HYDROCHLORIDE IN SODIUM CHLORIDE 6.39 MICROGRAM(S)/KG/HR: 4 INJECTION INTRAVENOUS at 06:31

## 2025-02-15 RX ADMIN — Medication 40 MILLIEQUIVALENT(S): at 08:43

## 2025-02-15 RX ADMIN — INSULIN LISPRO 4 UNIT(S): 100 INJECTION, SOLUTION INTRAVENOUS; SUBCUTANEOUS at 00:29

## 2025-02-15 RX ADMIN — Medication 2.56 MICROGRAM(S)/KG/HR: at 10:00

## 2025-02-15 RX ADMIN — Medication 2 TABLET(S): at 14:02

## 2025-02-15 RX ADMIN — NAFCILLIN 200 GRAM(S): 2 INJECTION, SOLUTION INTRAVENOUS at 14:01

## 2025-02-15 RX ADMIN — NAFCILLIN 200 GRAM(S): 2 INJECTION, SOLUTION INTRAVENOUS at 17:27

## 2025-02-15 RX ADMIN — Medication 25 MICROGRAM(S): at 09:05

## 2025-02-15 RX ADMIN — Medication 25 MICROGRAM(S): at 04:50

## 2025-02-15 RX ADMIN — CEFEPIME 100 MILLIGRAM(S): 2 INJECTION, POWDER, FOR SOLUTION INTRAVENOUS at 21:36

## 2025-02-15 RX ADMIN — Medication 25 MICROGRAM(S): at 00:00

## 2025-02-15 RX ADMIN — Medication 1 APPLICATION(S): at 05:49

## 2025-02-15 RX ADMIN — Medication 25 MICROGRAM(S): at 01:49

## 2025-02-15 RX ADMIN — Medication 15 MILLILITER(S): at 17:26

## 2025-02-15 RX ADMIN — NAFCILLIN 200 GRAM(S): 2 INJECTION, SOLUTION INTRAVENOUS at 02:08

## 2025-02-15 RX ADMIN — Medication 2 TABLET(S): at 05:46

## 2025-02-15 RX ADMIN — Medication 25 MICROGRAM(S): at 04:26

## 2025-02-15 RX ADMIN — INSULIN LISPRO 4 UNIT(S): 100 INJECTION, SOLUTION INTRAVENOUS; SUBCUTANEOUS at 17:27

## 2025-02-15 RX ADMIN — INSULIN LISPRO 2: 100 INJECTION, SOLUTION INTRAVENOUS; SUBCUTANEOUS at 17:27

## 2025-02-15 RX ADMIN — CEFEPIME 100 MILLIGRAM(S): 2 INJECTION, POWDER, FOR SOLUTION INTRAVENOUS at 05:46

## 2025-02-15 RX ADMIN — Medication 500 MILLIGRAM(S): at 17:26

## 2025-02-15 RX ADMIN — NAFCILLIN 200 GRAM(S): 2 INJECTION, SOLUTION INTRAVENOUS at 10:31

## 2025-02-15 NOTE — CHART NOTE - NSCHARTNOTEFT_GEN_A_CORE
Wife is agreeable to tracheostomy and PEG tube procedure. Consulted CT Surgery was told to recall on Monday.

## 2025-02-15 NOTE — PROGRESS NOTE ADULT - ASSESSMENT
IMPRESSION:    Acute Hypoxic Respiratory Failure   Toxic Metabolic Encephalopathy  CAP likely aspiration   Influenza A treated   MSSA bacteremia repeat CX -  Sepsis POA  LYNNE improving  cholecystitis sp percutaneous haydee  Periventricular bleed  Suspected Mural thrombus in PA  Right thigh hematoma.    HO Parkinson's Disease   HO CAD    PLAN:    CNS:  SAT. Continue Anti parkinson's. CTH stable.      HEENT: Oral care.  ETT duration: 13 days.     PULMONARY:  No change in vent settings.  Monitor airway pressures.  Remains with significant secretions.  Family agreeable to trach and peg. Recall CT surgery on Monday    CARDIOVASCULAR:  Avoid overload.  NO MARY per cardiology. Volume contraction muna tolerated     GI: GI prophylaxis.  NGT feeds as tolerated. S/P perc cholecystostomy. Bowel regimen     RENAL: Follow up lytes.  Mars in place for retention. Kidney function stable.     INFECTIOUS DISEASE: Cultures noted.  SP Tamiflu.  ABX per ID.  Duration per ID.     HEMATOLOGICAL: Monitor CBC and Coags. CTA noted with no active bleed.  keep Hg > 7         ENDOCRINE:  Follow up FS.  Insulin protocol if needed.    MUSCULOSKELETAL: Bed rest.  Off loading. Wound care for DTIs.     Full code  MICU  palliative care follow up   very poor prognosis

## 2025-02-15 NOTE — PROGRESS NOTE ADULT - ASSESSMENT
IMPRESSION:    Acute Hypoxic Respiratory Failure   Toxic Metabolic Encephalopathy  CAP likely aspiration   Influenza A treated   MSSA bacteremia repeat CX -  Sepsis POA  LYNNE improving  cholecystitis sp percutaneous haydee  Periventricular bleed  Suspected Mural thrombus in PA  Right thigh hematoma.    HO Parkinson's Disease   HO CAD    PLAN:    CNS:  SAT. Continue Anti parkinson's. CTH stable.      HEENT: Oral care.  ETT duration: 13 days.     PULMONARY:  No change in vent settings.  Monitor airway pressures.  Remains with significant secretions.  Will likely need trach and PEG.  Awaiting family's decision     CARDIOVASCULAR:  Avoid overload.  NO MARY per cardiology. Volume contraction muna tolerated     GI: GI prophylaxis.  NGT feeds as tolerated. S/P perc cholecystostomy. Bowel regimen     RENAL: Follow up lytes.  Mars in place for retention. Kidney function stable.     INFECTIOUS DISEASE: Cultures noted.  SP Tamiflu.  ABX per ID.  Duration per ID.     HEMATOLOGICAL: Monitor CBC and Coags. CTA noted with no active bleed.  keep Hg > 7         ENDOCRINE:  Follow up FS.  Insulin protocol if needed.    MUSCULOSKELETAL: Bed rest.  Off loading. Wound care for DTIs.     Full code  MICU  palliative care follow up   very poor prognosis

## 2025-02-15 NOTE — PROGRESS NOTE ADULT - SUBJECTIVE AND OBJECTIVE BOX
Patient is a 69y old  Male who presents with a chief complaint of penumonia (14 Feb 2025 14:39)        Over Night Events:  remains on MV.  off Pressors.  Sedated.          ROS:     All ROS are negative except HPI         PHYSICAL EXAM    ICU Vital Signs Last 24 Hrs  T(C): 36.9 (15 Feb 2025 08:00), Max: 37.4 (15 Feb 2025 00:00)  T(F): 98.4 (15 Feb 2025 08:00), Max: 99.4 (15 Feb 2025 00:00)  HR: 89 (15 Feb 2025 08:00) (62 - 109)  BP: 101/59 (15 Feb 2025 07:00) (76/51 - 146/69)  BP(mean): 75 (15 Feb 2025 07:00) (60 - 99)  ABP: --  ABP(mean): --  RR: 25 (15 Feb 2025 08:00) (15 - 51)  SpO2: 100% (15 Feb 2025 08:00) (96% - 100%)    O2 Parameters below as of 15 Feb 2025 08:00  Patient On (Oxygen Delivery Method): ventilator    O2 Concentration (%): 40        CONSTITUTIONAL:  Ill appearing  NAD    ENT:   Airway patent,   Mouth with normal mucosa.       EYES:   Pupils equal,   Round and reactive to light.    CARDIAC:   Normal rate,   Regular rhythm.        RESPIRATORY:   No wheezing  Bilateral BS  Normal chest expansion  Not tachypneic,  No use of accessory muscles    GASTROINTESTINAL:  Abdomen soft,   Non-tender,   No guarding,   + BS    MUSCULOSKELETAL:   Range of motion is limited,  No clubbing, cyanosis      SKIN:   Skin normal color for race,   Warm and dry   No evidence of rash.        02-14-25 @ 07:01  -  02-15-25 @ 07:00  --------------------------------------------------------  IN:    Dexmedetomidine: 342.1 mL    Enteral Tube Flush: 150 mL    Free Water: 75 mL    IV PiggyBack: 100 mL    IV PiggyBack: 650 mL    Jevity: 950 mL  Total IN: 2267.1 mL    OUT:    Drain (mL): 610 mL    Indwelling Catheter - Urethral (mL): 2870 mL  Total OUT: 3480 mL    Total NET: -1212.9 mL      02-15-25 @ 07:01  -  02-15-25 @ 08:41  --------------------------------------------------------  IN:    Dexmedetomidine: 15.3 mL  Total IN: 15.3 mL    OUT:    Indwelling Catheter - Urethral (mL): 375 mL  Total OUT: 375 mL    Total NET: -359.7 mL          LABS:                            10.0   7.42  )-----------( 289      ( 15 Feb 2025 04:50 )             30.6                                               02-15    143  |  109  |  45[H]  ----------------------------<  72  3.6   |  25  |  0.9    Ca    7.8[L]      15 Feb 2025 04:50  Mg     2.1     02-15    TPro  4.6[L]  /  Alb  2.5[L]  /  TBili  1.1  /  DBili  0.8[H]  /  AST  24  /  ALT  <5  /  AlkPhos  92  02-14                                             Urinalysis Basic - ( 15 Feb 2025 04:50 )    Color: x / Appearance: x / SG: x / pH: x  Gluc: 72 mg/dL / Ketone: x  / Bili: x / Urobili: x   Blood: x / Protein: x / Nitrite: x   Leuk Esterase: x / RBC: x / WBC x   Sq Epi: x / Non Sq Epi: x / Bacteria: x                                                  LIVER FUNCTIONS - ( 14 Feb 2025 11:55 )  Alb: 2.5 g/dL / Pro: 4.6 g/dL / ALK PHOS: 92 U/L / ALT: <5 U/L / AST: 24 U/L / GGT: x                                                                                               Mode: AC/ CMV (Assist Control/ Continuous Mandatory Ventilation)  RR (machine): 16  TV (machine): 440  FiO2: 40  PEEP: 5  ITime: 1  MAP: 11  PIP: 13                                      ABG - ( 15 Feb 2025 03:44 )  pH, Arterial: 7.38  pH, Blood: x     /  pCO2: 49    /  pO2: 132   / HCO3: 29    / Base Excess: 3.2   /  SaO2: 99.3                MEDICATIONS  (STANDING):  buMETAnide Injectable 2 milliGRAM(s) IV Push two times a day  carbidopa/levodopa  25/250 2 Tablet(s) Oral three times a day  cefepime   IVPB 2000 milliGRAM(s) IV Intermittent every 8 hours  chlorhexidine 0.12% Liquid 15 milliLiter(s) Oral Mucosa every 12 hours  chlorhexidine 2% Cloths 1 Application(s) Topical <User Schedule>  dexMEDEtomidine Infusion 0.5 MICROgram(s)/kG/Hr (6.39 mL/Hr) IV Continuous <Continuous>  dextrose 5%. 1000 milliLiter(s) (100 mL/Hr) IV Continuous <Continuous>  dextrose 5%. 1000 milliLiter(s) (50 mL/Hr) IV Continuous <Continuous>  dextrose 50% Injectable 25 Gram(s) IV Push once  dextrose 50% Injectable 12.5 Gram(s) IV Push once  dextrose 50% Injectable 25 Gram(s) IV Push once  glucagon  Injectable 1 milliGRAM(s) IntraMuscular once  insulin lispro (ADMELOG) corrective regimen sliding scale   SubCutaneous every 6 hours  insulin lispro Injectable (ADMELOG) 4 Unit(s) SubCutaneous every 6 hours  metroNIDAZOLE    Tablet 500 milliGRAM(s) Oral every 12 hours  nafcillin  IVPB 2 Gram(s) IV Intermittent every 4 hours  nafcillin  IVPB      pantoprazole   Suspension 40 milliGRAM(s) Oral daily  polyethylene glycol 3350 17 Gram(s) Oral every 12 hours  potassium chloride   Powder 40 milliEquivalent(s) Oral once  rasagiline Tablet 1 milliGRAM(s) Oral <User Schedule>  senna 2 Tablet(s) Oral at bedtime    MEDICATIONS  (PRN):  acetaminophen     Tablet .. 650 milliGRAM(s) Oral every 6 hours PRN Temp greater or equal to 38C (100.4F), Mild Pain (1 - 3)  dextrose Oral Gel 15 Gram(s) Oral once PRN Blood Glucose LESS THAN 70 milliGRAM(s)/deciliter  fentaNYL    Injectable 25 MICROGram(s) IV Push every 2 hours PRN Aggitation      New X-rays reviewed:                                                                                  ECHO

## 2025-02-16 LAB
ANION GAP SERPL CALC-SCNC: 7 MMOL/L — SIGNIFICANT CHANGE UP (ref 7–14)
BASOPHILS # BLD AUTO: 0.03 K/UL — SIGNIFICANT CHANGE UP (ref 0–0.2)
BASOPHILS NFR BLD AUTO: 0.4 % — SIGNIFICANT CHANGE UP (ref 0–1)
BUN SERPL-MCNC: 47 MG/DL — HIGH (ref 10–20)
CALCIUM SERPL-MCNC: 7.8 MG/DL — LOW (ref 8.4–10.4)
CHLORIDE SERPL-SCNC: 111 MMOL/L — HIGH (ref 98–110)
CO2 SERPL-SCNC: 25 MMOL/L — SIGNIFICANT CHANGE UP (ref 17–32)
CREAT SERPL-MCNC: 0.9 MG/DL — SIGNIFICANT CHANGE UP (ref 0.7–1.5)
EGFR: 92 ML/MIN/1.73M2 — SIGNIFICANT CHANGE UP
EGFR: 92 ML/MIN/1.73M2 — SIGNIFICANT CHANGE UP
EOSINOPHIL # BLD AUTO: 0.05 K/UL — SIGNIFICANT CHANGE UP (ref 0–0.7)
EOSINOPHIL NFR BLD AUTO: 0.7 % — SIGNIFICANT CHANGE UP (ref 0–8)
GAS PNL BLDA: SIGNIFICANT CHANGE UP
GLUCOSE BLDC GLUCOMTR-MCNC: 120 MG/DL — HIGH (ref 70–99)
GLUCOSE BLDC GLUCOMTR-MCNC: 156 MG/DL — HIGH (ref 70–99)
GLUCOSE BLDC GLUCOMTR-MCNC: 172 MG/DL — HIGH (ref 70–99)
GLUCOSE BLDC GLUCOMTR-MCNC: 205 MG/DL — HIGH (ref 70–99)
GLUCOSE BLDC GLUCOMTR-MCNC: 43 MG/DL — CRITICAL LOW (ref 70–99)
GLUCOSE SERPL-MCNC: 199 MG/DL — HIGH (ref 70–99)
GRAM STN FLD: ABNORMAL
HCT VFR BLD CALC: 32.3 % — LOW (ref 42–52)
HGB BLD-MCNC: 10.2 G/DL — LOW (ref 14–18)
IMM GRANULOCYTES NFR BLD AUTO: 0.8 % — HIGH (ref 0.1–0.3)
LYMPHOCYTES # BLD AUTO: 0.6 K/UL — LOW (ref 1.2–3.4)
LYMPHOCYTES # BLD AUTO: 8 % — LOW (ref 20.5–51.1)
MAGNESIUM SERPL-MCNC: 2.1 MG/DL — SIGNIFICANT CHANGE UP (ref 1.8–2.4)
MCHC RBC-ENTMCNC: 30.1 PG — SIGNIFICANT CHANGE UP (ref 27–31)
MCHC RBC-ENTMCNC: 31.6 G/DL — LOW (ref 32–37)
MCV RBC AUTO: 95.3 FL — HIGH (ref 80–94)
MONOCYTES # BLD AUTO: 0.53 K/UL — SIGNIFICANT CHANGE UP (ref 0.1–0.6)
MONOCYTES NFR BLD AUTO: 7.1 % — SIGNIFICANT CHANGE UP (ref 1.7–9.3)
NEUTROPHILS # BLD AUTO: 6.2 K/UL — SIGNIFICANT CHANGE UP (ref 1.4–6.5)
NEUTROPHILS NFR BLD AUTO: 83 % — HIGH (ref 42.2–75.2)
NRBC BLD AUTO-RTO: 0 /100 WBCS — SIGNIFICANT CHANGE UP (ref 0–0)
PLATELET # BLD AUTO: 287 K/UL — SIGNIFICANT CHANGE UP (ref 130–400)
PMV BLD: 10.6 FL — HIGH (ref 7.4–10.4)
POTASSIUM SERPL-MCNC: 4 MMOL/L — SIGNIFICANT CHANGE UP (ref 3.5–5)
POTASSIUM SERPL-SCNC: 4 MMOL/L — SIGNIFICANT CHANGE UP (ref 3.5–5)
RBC # BLD: 3.39 M/UL — LOW (ref 4.7–6.1)
RBC # FLD: 17 % — HIGH (ref 11.5–14.5)
SODIUM SERPL-SCNC: 143 MMOL/L — SIGNIFICANT CHANGE UP (ref 135–146)
WBC # BLD: 7.47 K/UL — SIGNIFICANT CHANGE UP (ref 4.8–10.8)
WBC # FLD AUTO: 7.47 K/UL — SIGNIFICANT CHANGE UP (ref 4.8–10.8)

## 2025-02-16 PROCEDURE — 99291 CRITICAL CARE FIRST HOUR: CPT

## 2025-02-16 PROCEDURE — 71045 X-RAY EXAM CHEST 1 VIEW: CPT | Mod: 26

## 2025-02-16 RX ORDER — INSULIN LISPRO 100 U/ML
2 INJECTION, SOLUTION INTRAVENOUS; SUBCUTANEOUS EVERY 6 HOURS
Refills: 0 | Status: DISCONTINUED | OUTPATIENT
Start: 2025-02-16 | End: 2025-02-25

## 2025-02-16 RX ORDER — DEXTROSE 50 % IN WATER 50 %
25 SYRINGE (ML) INTRAVENOUS ONCE
Refills: 0 | Status: COMPLETED | OUTPATIENT
Start: 2025-02-16 | End: 2025-02-16

## 2025-02-16 RX ORDER — NOREPINEPHRINE BITARTRATE 8 MG
0.05 SOLUTION INTRAVENOUS
Qty: 8 | Refills: 0 | Status: DISCONTINUED | OUTPATIENT
Start: 2025-02-16 | End: 2025-02-23

## 2025-02-16 RX ORDER — PROPOFOL 10 MG/ML
5 INJECTION, EMULSION INTRAVENOUS
Qty: 1000 | Refills: 0 | Status: DISCONTINUED | OUTPATIENT
Start: 2025-02-16 | End: 2025-02-23

## 2025-02-16 RX ADMIN — INSULIN LISPRO 2: 100 INJECTION, SOLUTION INTRAVENOUS; SUBCUTANEOUS at 17:31

## 2025-02-16 RX ADMIN — BUMETANIDE 2 MILLIGRAM(S): 1 TABLET ORAL at 05:40

## 2025-02-16 RX ADMIN — Medication 2 TABLET(S): at 21:11

## 2025-02-16 RX ADMIN — NAFCILLIN 200 GRAM(S): 2 INJECTION, SOLUTION INTRAVENOUS at 21:08

## 2025-02-16 RX ADMIN — Medication 2 TABLET(S): at 21:10

## 2025-02-16 RX ADMIN — NAFCILLIN 200 GRAM(S): 2 INJECTION, SOLUTION INTRAVENOUS at 05:39

## 2025-02-16 RX ADMIN — Medication 25 MICROGRAM(S): at 01:28

## 2025-02-16 RX ADMIN — RASAGILINE 1 MILLIGRAM(S): 0.5 TABLET ORAL at 05:40

## 2025-02-16 RX ADMIN — Medication 500 MILLIGRAM(S): at 05:39

## 2025-02-16 RX ADMIN — Medication 25 MICROGRAM(S): at 01:13

## 2025-02-16 RX ADMIN — Medication 2 TABLET(S): at 13:30

## 2025-02-16 RX ADMIN — INSULIN LISPRO 4: 100 INJECTION, SOLUTION INTRAVENOUS; SUBCUTANEOUS at 05:41

## 2025-02-16 RX ADMIN — NAFCILLIN 200 GRAM(S): 2 INJECTION, SOLUTION INTRAVENOUS at 01:19

## 2025-02-16 RX ADMIN — NAFCILLIN 200 GRAM(S): 2 INJECTION, SOLUTION INTRAVENOUS at 17:33

## 2025-02-16 RX ADMIN — Medication 15 MILLILITER(S): at 17:30

## 2025-02-16 RX ADMIN — Medication 40 MILLIGRAM(S): at 11:46

## 2025-02-16 RX ADMIN — POLYETHYLENE GLYCOL 3350 17 GRAM(S): 17 POWDER, FOR SOLUTION ORAL at 17:29

## 2025-02-16 RX ADMIN — Medication 25 GRAM(S): at 12:26

## 2025-02-16 RX ADMIN — Medication 500 MILLIGRAM(S): at 17:29

## 2025-02-16 RX ADMIN — NAFCILLIN 200 GRAM(S): 2 INJECTION, SOLUTION INTRAVENOUS at 13:30

## 2025-02-16 RX ADMIN — INSULIN LISPRO 2 UNIT(S): 100 INJECTION, SOLUTION INTRAVENOUS; SUBCUTANEOUS at 17:32

## 2025-02-16 RX ADMIN — Medication 1 APPLICATION(S): at 05:39

## 2025-02-16 RX ADMIN — Medication 2 TABLET(S): at 05:39

## 2025-02-16 RX ADMIN — INSULIN LISPRO 4 UNIT(S): 100 INJECTION, SOLUTION INTRAVENOUS; SUBCUTANEOUS at 05:42

## 2025-02-16 RX ADMIN — CEFEPIME 100 MILLIGRAM(S): 2 INJECTION, POWDER, FOR SOLUTION INTRAVENOUS at 05:38

## 2025-02-16 RX ADMIN — POLYETHYLENE GLYCOL 3350 17 GRAM(S): 17 POWDER, FOR SOLUTION ORAL at 05:39

## 2025-02-16 RX ADMIN — Medication 15 MILLILITER(S): at 05:39

## 2025-02-16 RX ADMIN — NAFCILLIN 200 GRAM(S): 2 INJECTION, SOLUTION INTRAVENOUS at 10:46

## 2025-02-16 NOTE — PROGRESS NOTE ADULT - ASSESSMENT
Acute Hypoxic Respiratory Failure   Toxic Metabolic Encephalopathy  CAP likely aspiration   Influenza A treated   MSSA bacteremia repeat CX -  Sepsis POA  LYNNE improving  cholecystitis sp percutaneous haydee  Periventricular bleed  Suspected Mural thrombus in PA  Right thigh hematoma.    HO Parkinson's Disease   HO CAD    PLAN:    CNS:  SAT. Continue Anti parkinson's medications. CTH stable.      HEENT: Oral care.  ETT duration: 14 days.     PULMONARY:  No change in vent settings.  Monitor airway pressures.  Remains with significant secretions. f/u CT Surgery for trach/peg    CARDIOVASCULAR:  Avoid overload.  NO MARY per cardiology. Volume contraction as tolerated     GI: GI prophylaxis. NG tube feeds as tolerated. S/P perc cholecystostomy. Bowel regimen     RENAL: Follow up lytes.  Mars in place for retention. Kidney function stable.     INFECTIOUS DISEASE: Cultures noted.  SP Tamiflu.  ABX per ID.  Duration per ID.     HEMATOLOGICAL: Monitor CBC and Coags. CTA noted with no active bleed.  keep Hg > 7         ENDOCRINE:  Follow up FS.  Insulin protocol if needed.    MUSCULOSKELETAL: Bed rest.  Off loading. Wound care for DTIs.     Full code  MICU

## 2025-02-16 NOTE — PROGRESS NOTE ADULT - SUBJECTIVE AND OBJECTIVE BOX
Patient is a 69y old Male who presents with a chief complaint of penumonia (15 Feb 2025 16:53)    Today is hospital day     Overnight events/Interval History:  - Hypotensive overnight resolved by decreasing sedation medication.  - Patient intubated, sedated    ROS:  - All ROS is negative unless indicated in HPI    Code Status: full    ALLERGIES:  Allergy Status Unknown    MEDICATIONS:  STANDING MEDICATIONS  buMETAnide Injectable 2 milliGRAM(s) IV Push daily  carbidopa/levodopa  25/250 2 Tablet(s) Oral three times a day  cefepime   IVPB 2000 milliGRAM(s) IV Intermittent every 8 hours  chlorhexidine 0.12% Liquid 15 milliLiter(s) Oral Mucosa every 12 hours  chlorhexidine 2% Cloths 1 Application(s) Topical <User Schedule>  dexMEDEtomidine Infusion 0.5 MICROgram(s)/kG/Hr IV Continuous <Continuous>  dextrose 5%. 1000 milliLiter(s) IV Continuous <Continuous>  dextrose 5%. 1000 milliLiter(s) IV Continuous <Continuous>  dextrose 50% Injectable 25 Gram(s) IV Push once  dextrose 50% Injectable 12.5 Gram(s) IV Push once  dextrose 50% Injectable 25 Gram(s) IV Push once  fentaNYL   Infusion 0.5 MICROgram(s)/kG/Hr IV Continuous <Continuous>  glucagon  Injectable 1 milliGRAM(s) IntraMuscular once  insulin lispro (ADMELOG) corrective regimen sliding scale   SubCutaneous every 6 hours  insulin lispro Injectable (ADMELOG) 4 Unit(s) SubCutaneous every 6 hours  metroNIDAZOLE    Tablet 500 milliGRAM(s) Oral every 12 hours  nafcillin  IVPB      nafcillin  IVPB 2 Gram(s) IV Intermittent every 4 hours  pantoprazole   Suspension 40 milliGRAM(s) Oral daily  polyethylene glycol 3350 17 Gram(s) Oral every 12 hours  rasagiline Tablet 1 milliGRAM(s) Oral <User Schedule>  senna 2 Tablet(s) Oral at bedtime    PRN MEDICATIONS  acetaminophen     Tablet .. 650 milliGRAM(s) Oral every 6 hours PRN  dextrose Oral Gel 15 Gram(s) Oral once PRN  fentaNYL    Injectable 25 MICROGram(s) IV Push every 2 hours PRN      VITALS LAST 24H:  Vital Signs Last 24 Hrs  T(C): 36.9 (15 Feb 2025 20:00), Max: 37.3 (15 Feb 2025 16:00)  T(F): 98.4 (15 Feb 2025 20:00), Max: 99.1 (15 Feb 2025 16:00)  HR: 68 (15 Feb 2025 23:00) (62 - 101)  BP: 104/58 (15 Feb 2025 23:00) (69/43 - 159/70)  BP(mean): 78 (15 Feb 2025 23:00) (52 - 101)  RR: 7 (15 Feb 2025 23:00) (7 - 41)  SpO2: 100% (15 Feb 2025 23:00) (100% - 100%)    Parameters below as of 15 Feb 2025 22:00  Patient On (Oxygen Delivery Method): ventilator    O2 Concentration (%): 40    PHYSICAL EXAM:  GENERAL: NAD  HEENT:  intubated  CHEST/LUNG: Clear to auscultation bilaterally; normal respiratory effort  HEART: Regular rate and rhythm  ABDOMEN: Soft, nontender, nondistended, +percutaneous cholecystotomy   EXTREMITIES:  No lower extremity edemas bilaterally  NERVOUS SYSTEM:  sedated    LABS:                        10.0   7.42  )-----------( 289      ( 15 Feb 2025 04:50 )             30.6     02-15    143  |  109  |  45[H]  ----------------------------<  72  3.6   |  25  |  0.9    Ca    7.8[L]      15 Feb 2025 04:50  Mg     2.1     02-15    TPro  4.6[L]  /  Alb  2.5[L]  /  TBili  1.1  /  DBili  0.8[H]  /  AST  24  /  ALT  <5  /  AlkPhos  92  02-14      Urinalysis Basic - ( 15 Feb 2025 04:50 )    Color: x / Appearance: x / SG: x / pH: x  Gluc: 72 mg/dL / Ketone: x  / Bili: x / Urobili: x   Blood: x / Protein: x / Nitrite: x   Leuk Esterase: x / RBC: x / WBC x   Sq Epi: x / Non Sq Epi: x / Bacteria: x      ABG - ( 15 Feb 2025 03:44 )  pH, Arterial: 7.38  pH, Blood: x     /  pCO2: 49    /  pO2: 132   / HCO3: 29    / Base Excess: 3.2   /  SaO2: 99.3                      RADIOLOGY:  CXR      CT

## 2025-02-16 NOTE — PROGRESS NOTE ADULT - SUBJECTIVE AND OBJECTIVE BOX
Patient is a 69y old  Male who presents with a chief complaint of penumonia (16 Feb 2025 00:05)        Over Night Events:  remains critically ill on MV> Off pressors.  Sedated.         ROS:     All ROS are negative except HPI         PHYSICAL EXAM    ICU Vital Signs Last 24 Hrs  T(C): 36.6 (16 Feb 2025 08:00), Max: 37.3 (15 Feb 2025 16:00)  T(F): 97.8 (16 Feb 2025 08:00), Max: 99.1 (15 Feb 2025 16:00)  HR: 75 (16 Feb 2025 08:00) (62 - 101)  BP: 105/56 (16 Feb 2025 08:00) (69/43 - 159/70)  BP(mean): 75 (16 Feb 2025 08:00) (52 - 101)  ABP: --  ABP(mean): --  RR: 16 (16 Feb 2025 08:00) (4 - 43)  SpO2: 100% (16 Feb 2025 08:00) (96% - 100%)    O2 Parameters below as of 16 Feb 2025 08:00  Patient On (Oxygen Delivery Method): ventilator    O2 Concentration (%): 40        CONSTITUTIONAL:  Ill appearing  NAD    ENT:   Airway patent,   Mouth with normal mucosa.       EYES:   Pupils equal,   Round and reactive to light.    CARDIAC:   Normal rate,   Regular rhythm.        RESPIRATORY:   No wheezing  Bilateral BS  Normal chest expansion  Not tachypneic,  No use of accessory muscles    GASTROINTESTINAL:  Abdomen soft,   Non-tender,   No guarding,   + BS    MUSCULOSKELETAL:   No clubbing, cyanosis    NEUROLOGICAL:   Sedated     SKIN:   Skin normal color for race,   Warm and dry  No evidence of rash.        02-15-25 @ 07:01  -  02-16-25 @ 07:00  --------------------------------------------------------  IN:    Dexmedetomidine: 424.9 mL    Enteral Tube Flush: 60 mL    FentaNYL: 152.3 mL    Free Water: 225 mL    IV PiggyBack: 400 mL    IV PiggyBack: 400 mL    Jevity: 900 mL    Norepinephrine: 6.8 mL  Total IN: 2569 mL    OUT:    Drain (mL): 1025 mL    Indwelling Catheter - Urethral (mL): 2280 mL  Total OUT: 3305 mL    Total NET: -736 mL      02-16-25 @ 07:01  -  02-16-25 @ 08:28  --------------------------------------------------------  IN:    Dexmedetomidine: 19.2 mL    FentaNYL: 12.8 mL  Total IN: 32 mL    OUT:    Indwelling Catheter - Urethral (mL): 390 mL    Norepinephrine: 0 mL  Total OUT: 390 mL    Total NET: -358 mL          LABS:                            10.2   7.47  )-----------( 287      ( 16 Feb 2025 05:18 )             32.3                                               02-16    143  |  111[H]  |  47[H]  ----------------------------<  199[H]  4.0   |  25  |  0.9    Ca    7.8[L]      16 Feb 2025 05:18  Mg     2.1     02-16    TPro  4.6[L]  /  Alb  2.5[L]  /  TBili  1.1  /  DBili  0.8[H]  /  AST  24  /  ALT  <5  /  AlkPhos  92  02-14                                             Urinalysis Basic - ( 16 Feb 2025 05:18 )    Color: x / Appearance: x / SG: x / pH: x  Gluc: 199 mg/dL / Ketone: x  / Bili: x / Urobili: x   Blood: x / Protein: x / Nitrite: x   Leuk Esterase: x / RBC: x / WBC x   Sq Epi: x / Non Sq Epi: x / Bacteria: x                                                  LIVER FUNCTIONS - ( 14 Feb 2025 11:55 )  Alb: 2.5 g/dL / Pro: 4.6 g/dL / ALK PHOS: 92 U/L / ALT: <5 U/L / AST: 24 U/L / GGT: x                                                                                               Mode: AC/ CMV (Assist Control/ Continuous Mandatory Ventilation)  RR (machine): 16  TV (machine): 440  FiO2: 40  PEEP: 8  ITime: 1  MAP: 12  PIP: 22                                      ABG - ( 16 Feb 2025 03:16 )  pH, Arterial: 7.33  pH, Blood: x     /  pCO2: 55    /  pO2: 117   / HCO3: 29    / Base Excess: 2.3   /  SaO2: 98.9                MEDICATIONS  (STANDING):  buMETAnide Injectable 2 milliGRAM(s) IV Push daily  carbidopa/levodopa  25/250 2 Tablet(s) Oral three times a day  chlorhexidine 0.12% Liquid 15 milliLiter(s) Oral Mucosa every 12 hours  chlorhexidine 2% Cloths 1 Application(s) Topical <User Schedule>  dexMEDEtomidine Infusion 0.5 MICROgram(s)/kG/Hr (6.39 mL/Hr) IV Continuous <Continuous>  dextrose 5%. 1000 milliLiter(s) (100 mL/Hr) IV Continuous <Continuous>  dextrose 5%. 1000 milliLiter(s) (50 mL/Hr) IV Continuous <Continuous>  dextrose 50% Injectable 25 Gram(s) IV Push once  dextrose 50% Injectable 12.5 Gram(s) IV Push once  dextrose 50% Injectable 25 Gram(s) IV Push once  fentaNYL   Infusion 0.5 MICROgram(s)/kG/Hr (2.56 mL/Hr) IV Continuous <Continuous>  glucagon  Injectable 1 milliGRAM(s) IntraMuscular once  insulin lispro (ADMELOG) corrective regimen sliding scale   SubCutaneous every 6 hours  insulin lispro Injectable (ADMELOG) 4 Unit(s) SubCutaneous every 6 hours  metroNIDAZOLE    Tablet 500 milliGRAM(s) Oral every 12 hours  nafcillin  IVPB      nafcillin  IVPB 2 Gram(s) IV Intermittent every 4 hours  norepinephrine Infusion 0.05 MICROgram(s)/kG/Min (4.79 mL/Hr) IV Continuous <Continuous>  pantoprazole   Suspension 40 milliGRAM(s) Oral daily  polyethylene glycol 3350 17 Gram(s) Oral every 12 hours  propofol Infusion 5 MICROgram(s)/kG/Min (1.53 mL/Hr) IV Continuous <Continuous>  rasagiline Tablet 1 milliGRAM(s) Oral <User Schedule>  senna 2 Tablet(s) Oral at bedtime    MEDICATIONS  (PRN):  acetaminophen     Tablet .. 650 milliGRAM(s) Oral every 6 hours PRN Temp greater or equal to 38C (100.4F), Mild Pain (1 - 3)  dextrose Oral Gel 15 Gram(s) Oral once PRN Blood Glucose LESS THAN 70 milliGRAM(s)/deciliter  fentaNYL    Injectable 25 MICROGram(s) IV Push every 2 hours PRN Aggitation      New X-rays reviewed:                                                                                  ECHO

## 2025-02-16 NOTE — PROGRESS NOTE ADULT - ASSESSMENT
IMPRESSION:    Acute Hypoxic Respiratory Failure   Toxic Metabolic Encephalopathy  CAP likely aspiration   Influenza A treated   MSSA bacteremia repeat CX -  Sepsis POA  LYNNE improving  cholecystitis sp percutaneous haydee  Periventricular bleed  Suspected Mural thrombus in [place]  Right thigh hematoma.    HO Parkinson's Disease   HO CAD    PLAN:    CNS:  SAT. Continue Anti parkinson's.     HEENT: Oral care.  ETT duration: 14 days.  CTS or trach and PEG      PULMONARY:  No change in vent settings.  Monitor airway pressures.  Remains with significant secretions.  Will likely need trach and PEG.  Awaiting family's decision     CARDIOVASCULAR:  Avoid overload.  Volume contraction muna tolerated     GI: GI prophylaxis.  NGT feeds as tolerated. S/P perc cholecystostomy. Bowel regimen     RENAL: Follow up lytes.  Mars in place for retention. Kidney function stable.     INFECTIOUS DISEASE: Cultures noted.  SP Tamiflu.  ABX per ID.  Duration per ID.     HEMATOLOGICAL: Monitor CBC and Coags. CTA noted with no active bleed.  keep Hg > 7         ENDOCRINE:  Follow up FS.  Insulin protocol if needed.    MUSCULOSKELETAL: Bed rest.  Off loading. Wound care for DTIs.     Full code  MICU  palliative care follow up   very poor prognosis

## 2025-02-17 LAB
-  LEVOFLOXACIN: SIGNIFICANT CHANGE UP
-  MINOCYCLINE: SIGNIFICANT CHANGE UP
-  TRIMETHOPRIM/SULFAMETHOXAZOLE: SIGNIFICANT CHANGE UP
ALBUMIN SERPL ELPH-MCNC: 2.2 G/DL — LOW (ref 3.5–5.2)
ALP SERPL-CCNC: 83 U/L — SIGNIFICANT CHANGE UP (ref 30–115)
ALT FLD-CCNC: <5 U/L — SIGNIFICANT CHANGE UP (ref 0–41)
ANION GAP SERPL CALC-SCNC: 10 MMOL/L — SIGNIFICANT CHANGE UP (ref 7–14)
AST SERPL-CCNC: 24 U/L — SIGNIFICANT CHANGE UP (ref 0–41)
BASE EXCESS BLDA CALC-SCNC: 4.3 MMOL/L — HIGH (ref -2–3)
BASOPHILS # BLD AUTO: 0.02 K/UL — SIGNIFICANT CHANGE UP (ref 0–0.2)
BASOPHILS NFR BLD AUTO: 0.3 % — SIGNIFICANT CHANGE UP (ref 0–1)
BILIRUB SERPL-MCNC: 0.7 MG/DL — SIGNIFICANT CHANGE UP (ref 0.2–1.2)
BUN SERPL-MCNC: 46 MG/DL — HIGH (ref 10–20)
CALCIUM SERPL-MCNC: 7.7 MG/DL — LOW (ref 8.4–10.5)
CHLORIDE SERPL-SCNC: 108 MMOL/L — SIGNIFICANT CHANGE UP (ref 98–110)
CO2 SERPL-SCNC: 22 MMOL/L — SIGNIFICANT CHANGE UP (ref 17–32)
CREAT SERPL-MCNC: 1 MG/DL — SIGNIFICANT CHANGE UP (ref 0.7–1.5)
CULTURE RESULTS: ABNORMAL
EGFR: 81 ML/MIN/1.73M2 — SIGNIFICANT CHANGE UP
EGFR: 81 ML/MIN/1.73M2 — SIGNIFICANT CHANGE UP
EOSINOPHIL # BLD AUTO: 0.06 K/UL — SIGNIFICANT CHANGE UP (ref 0–0.7)
EOSINOPHIL NFR BLD AUTO: 0.8 % — SIGNIFICANT CHANGE UP (ref 0–8)
GAS PNL BLDA: SIGNIFICANT CHANGE UP
GAS PNL BLDA: SIGNIFICANT CHANGE UP
GLUCOSE BLDC GLUCOMTR-MCNC: 125 MG/DL — HIGH (ref 70–99)
GLUCOSE BLDC GLUCOMTR-MCNC: 140 MG/DL — HIGH (ref 70–99)
GLUCOSE BLDC GLUCOMTR-MCNC: 170 MG/DL — HIGH (ref 70–99)
GLUCOSE BLDC GLUCOMTR-MCNC: 67 MG/DL — LOW (ref 70–99)
GLUCOSE SERPL-MCNC: 192 MG/DL — HIGH (ref 70–99)
HCO3 BLDA-SCNC: 32 MMOL/L — HIGH (ref 21–28)
HCT VFR BLD CALC: 33.2 % — LOW (ref 42–52)
HGB BLD-MCNC: 10.3 G/DL — LOW (ref 14–18)
IMM GRANULOCYTES NFR BLD AUTO: 1.1 % — HIGH (ref 0.1–0.3)
LYMPHOCYTES # BLD AUTO: 0.64 K/UL — LOW (ref 1.2–3.4)
LYMPHOCYTES # BLD AUTO: 8.6 % — LOW (ref 20.5–51.1)
MCHC RBC-ENTMCNC: 30 PG — SIGNIFICANT CHANGE UP (ref 27–31)
MCHC RBC-ENTMCNC: 31 G/DL — LOW (ref 32–37)
MCV RBC AUTO: 96.8 FL — HIGH (ref 80–94)
METHOD TYPE: SIGNIFICANT CHANGE UP
METHOD TYPE: SIGNIFICANT CHANGE UP
MONOCYTES # BLD AUTO: 0.51 K/UL — SIGNIFICANT CHANGE UP (ref 0.1–0.6)
MONOCYTES NFR BLD AUTO: 6.9 % — SIGNIFICANT CHANGE UP (ref 1.7–9.3)
NEUTROPHILS # BLD AUTO: 6.12 K/UL — SIGNIFICANT CHANGE UP (ref 1.4–6.5)
NEUTROPHILS NFR BLD AUTO: 82.3 % — HIGH (ref 42.2–75.2)
NRBC BLD AUTO-RTO: 0 /100 WBCS — SIGNIFICANT CHANGE UP (ref 0–0)
ORGANISM # SPEC MICROSCOPIC CNT: ABNORMAL
ORGANISM # SPEC MICROSCOPIC CNT: ABNORMAL
ORGANISM # SPEC MICROSCOPIC CNT: SIGNIFICANT CHANGE UP
PCO2 BLDA: 59 MMHG — HIGH (ref 35–48)
PH BLDA: 7.34 — LOW (ref 7.35–7.45)
PLATELET # BLD AUTO: 241 K/UL — SIGNIFICANT CHANGE UP (ref 130–400)
PMV BLD: 10.8 FL — HIGH (ref 7.4–10.4)
PO2 BLDA: 144 MMHG — HIGH (ref 83–108)
POTASSIUM SERPL-MCNC: 4.1 MMOL/L — SIGNIFICANT CHANGE UP (ref 3.5–5)
POTASSIUM SERPL-SCNC: 4.1 MMOL/L — SIGNIFICANT CHANGE UP (ref 3.5–5)
PROT SERPL-MCNC: 4.5 G/DL — LOW (ref 6–8)
RBC # BLD: 3.43 M/UL — LOW (ref 4.7–6.1)
RBC # FLD: 17 % — HIGH (ref 11.5–14.5)
SAO2 % BLDA: 99.2 % — HIGH (ref 94–98)
SODIUM SERPL-SCNC: 140 MMOL/L — SIGNIFICANT CHANGE UP (ref 135–146)
SPECIMEN SOURCE: SIGNIFICANT CHANGE UP
VIRUS SPEC CULT: SIGNIFICANT CHANGE UP
WBC # BLD: 7.43 K/UL — SIGNIFICANT CHANGE UP (ref 4.8–10.8)
WBC # FLD AUTO: 7.43 K/UL — SIGNIFICANT CHANGE UP (ref 4.8–10.8)

## 2025-02-17 PROCEDURE — 93970 EXTREMITY STUDY: CPT | Mod: 26

## 2025-02-17 PROCEDURE — 99231 SBSQ HOSP IP/OBS SF/LOW 25: CPT | Mod: FS

## 2025-02-17 PROCEDURE — 71045 X-RAY EXAM CHEST 1 VIEW: CPT | Mod: 26,76

## 2025-02-17 PROCEDURE — 99291 CRITICAL CARE FIRST HOUR: CPT

## 2025-02-17 RX ORDER — CEFAZOLIN SODIUM IN 0.9 % NACL 3 G/100 ML
2000 INTRAVENOUS SOLUTION, PIGGYBACK (ML) INTRAVENOUS EVERY 8 HOURS
Refills: 0 | Status: DISCONTINUED | OUTPATIENT
Start: 2025-02-17 | End: 2025-02-26

## 2025-02-17 RX ADMIN — POLYETHYLENE GLYCOL 3350 17 GRAM(S): 17 POWDER, FOR SOLUTION ORAL at 17:37

## 2025-02-17 RX ADMIN — Medication 100 MILLIGRAM(S): at 11:46

## 2025-02-17 RX ADMIN — Medication 15 MILLILITER(S): at 17:37

## 2025-02-17 RX ADMIN — Medication 100 MILLIGRAM(S): at 18:26

## 2025-02-17 RX ADMIN — Medication 2 TABLET(S): at 21:51

## 2025-02-17 RX ADMIN — INSULIN LISPRO 2: 100 INJECTION, SOLUTION INTRAVENOUS; SUBCUTANEOUS at 06:24

## 2025-02-17 RX ADMIN — Medication 15 MILLILITER(S): at 06:28

## 2025-02-17 RX ADMIN — BUMETANIDE 2 MILLIGRAM(S): 1 TABLET ORAL at 06:28

## 2025-02-17 RX ADMIN — NAFCILLIN 200 GRAM(S): 2 INJECTION, SOLUTION INTRAVENOUS at 01:49

## 2025-02-17 RX ADMIN — Medication 2 TABLET(S): at 13:55

## 2025-02-17 RX ADMIN — INSULIN LISPRO 2 UNIT(S): 100 INJECTION, SOLUTION INTRAVENOUS; SUBCUTANEOUS at 06:24

## 2025-02-17 RX ADMIN — Medication 1 APPLICATION(S): at 07:22

## 2025-02-17 RX ADMIN — Medication 2 TABLET(S): at 06:29

## 2025-02-17 RX ADMIN — POLYETHYLENE GLYCOL 3350 17 GRAM(S): 17 POWDER, FOR SOLUTION ORAL at 06:24

## 2025-02-17 RX ADMIN — Medication 40 MILLIGRAM(S): at 11:46

## 2025-02-17 RX ADMIN — NAFCILLIN 200 GRAM(S): 2 INJECTION, SOLUTION INTRAVENOUS at 06:29

## 2025-02-17 RX ADMIN — RASAGILINE 1 MILLIGRAM(S): 0.5 TABLET ORAL at 06:30

## 2025-02-17 NOTE — CONSULT NOTE ADULT - SUBJECTIVE AND OBJECTIVE BOX
THORACIC SURGERY CONSULT NOTE      HPI:  69-year-old male past medical history of hypertension, Parkinson's, CAD presents for shortness of breath and cough of 1 day duration. History was obtained from patient's wife who noted that the patient has been having decreased po intake, activity, and unintentional weight loss for some time.    In ED pt was found to be hypoxic and in respiratory distress.  Work up showed AHRF and sepsis 2/2 to mutilobar PNA.    Acute Hypoxic Respiratory Failure likely 2/2 CAP/aspiration and flu  #Toxic Metabolic Encephalopathy likely 2/2 CAP/aspiration and flu  - patient inc WOB, worsening alkalosis, obtunded-intubated for airway protection   -2/11 bronch: showed copious amounts of secretions and poor cough.  Daily discussions by ICU team with wife regarding trach vs extubation & change in code status  2/17: CT surgery consulted for trach & peg> f/u           PAST MEDICAL & SURGICAL HISTORY:  Parkinson disease      CAD (coronary artery disease)      History of basal cell carcinoma (BCC) excision        Allergy Status Unknown    Home Medications:  aspirin 81 mg oral tablet: 1 tab(s) orally once a day (29 Jan 2025 15:28)  Crexont 87.5 mg-350 mg oral capsule, extended release: 2 cap(s) orally 3 times a day (29 Jan 2025 15:28)  rasagiline 1 mg oral tablet: 1 tab(s) orally once a day (29 Jan 2025 15:28)    No permtinent family history of PVD    REVIEW OF SYSTEMS:  GENERAL:                                         see HPI  SKIN:                                                 negative  OPTHALMOLOGIC:                          negative  ENMT:                                               negative  RESPIRATORY AND THORAX:        see HPI  CARDIOVASCULAR:                         see HPI  GASTROINTESTINAL:                       negative  NEPHROLOGY:                                  negative  MUSCULOSKELETAL:                       see HPI  NEUROLOGIC:                                   see HPI  PSYCHIATRIC:                                    negative  HEMATOLOGY/LYMPHATICS:         negative  ENDOCRINE:                                     negative  ALLERGIC/IMMUNOLOGIC:            negative    12 point ROS otherwise normal except as stated in HPI    PHYSICAL EXAM  Vital Signs Last 24 Hrs  T(C): 36.9 (17 Feb 2025 17:45), Max: 36.9 (17 Feb 2025 12:00)  T(F): 98.4 (17 Feb 2025 17:45), Max: 98.4 (17 Feb 2025 12:00)  HR: 102 (17 Feb 2025 18:00) (63 - 115)  BP: 142/65 (17 Feb 2025 18:00) (78/49 - 155/96)  BP(mean): 95 (17 Feb 2025 18:00) (59 - 121)  RR: 18 (17 Feb 2025 18:00) (8 - 29)  SpO2: 100% (17 Feb 2025 18:00) (91% - 100%)    Parameters below as of 17 Feb 2025 17:45  Patient On (Oxygen Delivery Method): ventilator    O2 Concentration (%): 40    Appearance: Normal	  HEENT:   Normal oral mucosa, PERRL, EOMI	  Neck: Supple, - JVD  Cardiovascular: 90s-100s HR  Respiratory: b/l equal chest rise. No respiratory distress, intubated on vent 40/8.  Gastrointestinal:  Soft, Non-tender, perc cholecystostomy tube, draining bilious  Skin: No rashes, No ecchymoses, No cyanosis  Extremities: Edema of b/l UE noted    MEDICATIONS:   MEDICATIONS  (STANDING):  buMETAnide Injectable 2 milliGRAM(s) IV Push daily  carbidopa/levodopa  25/250 2 Tablet(s) Oral three times a day  ceFAZolin   IVPB 2000 milliGRAM(s) IV Intermittent every 8 hours  chlorhexidine 0.12% Liquid 15 milliLiter(s) Oral Mucosa every 12 hours  chlorhexidine 2% Cloths 1 Application(s) Topical <User Schedule>  dexMEDEtomidine Infusion 0.5 MICROgram(s)/kG/Hr (6.39 mL/Hr) IV Continuous <Continuous>  dextrose 5%. 1000 milliLiter(s) (50 mL/Hr) IV Continuous <Continuous>  dextrose 5%. 1000 milliLiter(s) (100 mL/Hr) IV Continuous <Continuous>  dextrose 50% Injectable 12.5 Gram(s) IV Push once  dextrose 50% Injectable 25 Gram(s) IV Push once  dextrose 50% Injectable 25 Gram(s) IV Push once  fentaNYL   Infusion 0.5 MICROgram(s)/kG/Hr (2.56 mL/Hr) IV Continuous <Continuous>  glucagon  Injectable 1 milliGRAM(s) IntraMuscular once  insulin lispro (ADMELOG) corrective regimen sliding scale   SubCutaneous every 6 hours  insulin lispro Injectable (ADMELOG) 2 Unit(s) SubCutaneous every 6 hours  norepinephrine Infusion 0.05 MICROgram(s)/kG/Min (4.79 mL/Hr) IV Continuous <Continuous>  pantoprazole   Suspension 40 milliGRAM(s) Oral daily  polyethylene glycol 3350 17 Gram(s) Oral every 12 hours  propofol Infusion 5 MICROgram(s)/kG/Min (1.53 mL/Hr) IV Continuous <Continuous>  rasagiline Tablet 1 milliGRAM(s) Oral <User Schedule>  senna 2 Tablet(s) Oral at bedtime    MEDICATIONS  (PRN):  acetaminophen     Tablet .. 650 milliGRAM(s) Oral every 6 hours PRN Temp greater or equal to 38C (100.4F), Mild Pain (1 - 3)  dextrose Oral Gel 15 Gram(s) Oral once PRN Blood Glucose LESS THAN 70 milliGRAM(s)/deciliter  fentaNYL    Injectable 25 MICROGram(s) IV Push every 2 hours PRN Aggitation      LAB/STUDIES:                        10.3   7.43  )-----------( 241      ( 17 Feb 2025 05:29 )             33.2     02-17    140  |  108  |  46[H]  ----------------------------<  192[H]  4.1   |  22  |  1.0    Ca    7.7[L]      17 Feb 2025 05:29  Mg     2.1     02-16    TPro  4.5[L]  /  Alb  2.2[L]  /  TBili  0.7  /  DBili  x   /  AST  24  /  ALT  <5  /  AlkPhos  83  02-17      LIVER FUNCTIONS - ( 17 Feb 2025 05:29 )  Alb: 2.2 g/dL / Pro: 4.5 g/dL / ALK PHOS: 83 U/L / ALT: <5 U/L / AST: 24 U/L / GGT: x             Urinalysis Basic - ( 17 Feb 2025 05:29 )    Color: x / Appearance: x / SG: x / pH: x  Gluc: 192 mg/dL / Ketone: x  / Bili: x / Urobili: x   Blood: x / Protein: x / Nitrite: x   Leuk Esterase: x / RBC: x / WBC x   Sq Epi: x / Non Sq Epi: x / Bacteria: x              ABG - ( 17 Feb 2025 11:43 )  pH, Arterial: 7.34  pH, Blood: x     /  pCO2: 59    /  pO2: 144   / HCO3: 32    / Base Excess: 4.3   /  SaO2: 99.2            ASSESSMENT:69-year-old male past medical history of hypertension, Parkinson's, CAD presents for shortness of breath and cough of 1 day duration. History was obtained from patient's wife who noted that the patient has been having decreased po intake, activity, and unintentional weight loss for some time.    In ED pt was found to be hypoxic and in respiratory distress.  Work up showed AHRF and sepsis 2/2 to mutilobar PNA.    Acute Hypoxic Respiratory Failure likely 2/2 CAP/aspiration and flu  #Toxic Metabolic Encephalopathy likely 2/2 CAP/aspiration and flu  - patient inc WOB, worsening alkalosis, obtunded-intubated for airway protection   -2/11 bronch: showed copious amounts of secretions and poor cough.  Daily discussions by ICU team with wife regarding trach vs extubation & change in code status  2/17: CT surgery consulted for trach & peg      PLAN:   - Unable to reach wife over the phone for consent, please call [phone], when wife by bedside for consent  - Added-on for tomorrow Trach and Peg  - Please Preop (CBC, BMP, Mg, Phos, Active Type and Screen, Coag Panel, NPO after midnight, w/ IVF)  - Attempting to book the case for 2/19 if the add-on does not go tomorrow      SPECTRA [id number]

## 2025-02-17 NOTE — PROGRESS NOTE ADULT - ASSESSMENT
[age]-year-[age] male past medical history of hypertension, Parkinson's, CAD seen at bedside for evaluation of sacral wound    ASSESSMENT/PLAN:  #Sacral wound  - Patient would benefit from bedside debridement- will obtain consent and plan for possibly this week   - LWC: wash with soap and water, apply medihoney and cover wtih allevyn BID and PRN for soiling   - Offloading/ positional changes  - Prompt attention to soiled linens   -nutritional optimization  - Rest of care and management as per primary team

## 2025-02-17 NOTE — PROGRESS NOTE ADULT - SUBJECTIVE AND OBJECTIVE BOX
Pt is a 69-year-old male past medical history of hypertension, Parkinson's, CAD presents for shortness of breath and cough of 1 day duration. History was obtained from patient's wife who noted that the patient has been having decreased po intake, activity, and unintentional weight loss for some time. Howver, yesterday he started having cough and shortness of breath. Pt was admitted to MICU for AHRF and sepsis 2/2 to mutilobar PNA.   -----------------------  Evaluated by burn team for sacral ulcer. Seen at bedside with attending on rounds.     Allergies  Allergy Status Unknown    PAST MEDICAL & SURGICAL HISTORY:  Parkinson disease  CAD (coronary artery disease)  History of basal cell carcinoma (BCC) excision    Vital Signs Last 24 Hrs  T(C): 36.9 (17 Feb 2025 12:00), Max: 36.9 (17 Feb 2025 12:00)  T(F): 98.4 (17 Feb 2025 12:00), Max: 98.4 (17 Feb 2025 12:00)  HR: 72 (17 Feb 2025 12:00) (63 - 105)  BP: 93/53 (17 Feb 2025 12:00) (81/52 - 175/76)  BP(mean): 68 (17 Feb 2025 12:00) (62 - 111)  RR: 15 (17 Feb 2025 12:00) (6 - 27)  SpO2: 99% (17 Feb 2025 12:00) (93% - 100%)    Parameters below as of 17 Feb 2025 12:00  Patient On (Oxygen Delivery Method): ventilator    O2 Concentration (%): 40    LABS:                        10.3   7.43  )-----------( 241      ( 17 Feb 2025 05:29 )             33.2     PHYSICAL EXAM:  GENERAL: Laying in bed, +on vent   HEART/LUNG:  Not in acute cardiopulmonary distress. No retractions, on vent   SKIN/WOUND: Full thickness sacral wound ~5x4cm with central dark necrotic eschar with pink and moist dermis at periphery of wound. +palpable bone No active bleeding, no surrounding erythema, no gross purulence, no malodor.

## 2025-02-17 NOTE — PROGRESS NOTE ADULT - ASSESSMENT
69-year-old male past medical history of hypertension, Parkinson's, CAD presents for shortness of breath and cough of 1 day duration. History was obtained from patient's wife who noted that the patient has been having decreased po intake, activity, and unintentional weight loss for some time. However yesterday he started having cough and shortness of breath. Admitted to MICU for AHRF and sepsis 2/2 to mutilobar PNA.    #Acute Hypoxic Respiratory Failure likely 2/2 CAP/aspiration and flu  #Toxic Metabolic Encephalopathy likely 2/2 CAP/aspiration and flu  #Influenza A positive, treated  #MSSA bacteremia- resolved  #Sepsis POA  - Aspiration precautions.    - patient inc WOB, worsening alkalosis, obtunded-intubated for airway protection   -ID: if patient decompensates, dc cefepime/metro, start patricia 1g   - cardio: patient critically sick, not a candidate for surgery therefore no MARY, can c/w abx for IE as per ID  - s/p bronch, f/u cultures   - ID: tamiflu 30 mg daily: last dose on 2/10, s/p cefepime & metro (end date 2/16). nafcillin>>> now on cefazolin 2g q8h for MSSA bact  as per ID (6w)  -2/11 bronch: showed copious amounts of secretions and poor cough.  Will need trach: ongoing daily discussions with wife regarding trach vs extubation & change in code status  2/17: CT surgery consulted for trach & peg> f/u     #PE on CTA  #large R groin intramuscular hematoma, stable  #Periventricular bleed on CTH, stable  - CTH. noted with right periventricular bleed, stable.   - Neuro on board  - Continue Anti parkinson's meds.    - CTA chest to r/o PE: focal filling defect within main pulm artery   - repeat CT head: stable intraventricular hemorrhage >> repeat on 2/9 after on full AC for PE: stable  -CT pelvis:  Large intramuscular hematoma in the medial right thigh measuring approximately 9.8 x 6.4 x 17 cm.  -holding AC  -2/12: Hb drop to 6.8> got 1 unit> up to 9.5 .  CTA stable & negative for any active extravasation   2/14: 1 unit prbc  hb stable    #hypoglycemia,   -decr insulin & adjust as needed    #Elevated Bilirubin-improving   #Leukocytosis-improving   #Cholangitis vs cholecystitis s/o perc haydee  - F/u RUQ US: distended GB with sludge, GB polyp   - IR: perc haydee 2/5, drained 250 cc dark bile, 450 cc output overnight. F/u GB drainage cultures: few candida, likely coloniz.    #hypernatremia, resolved  -free water with feeds  -stopped d5    #Constipation, improved  - senna and miralax  - c/w lactulose   -2/10: kub: distented bowels. having BM    #Possible Mural thrombus in PA  #CAD  - Avoid overload  - C/w LR at 50  - TTE: EF 56%, G1DD, mild-mod MR, mod TR, mild AL, mild pHTN  - Cardio recs appreciated       - Patient is poor candidate for MARY       - full endocarditis abx course of nafcillin    #MISC  - DVT ppx: SCDs, holding  - GI ppx: not needed  - Activity: as tolerated  - Diet: NPO w/ tube feed  - full code    plan: repeat duplex (off AC). f/u CT sx

## 2025-02-17 NOTE — PROGRESS NOTE ADULT - SUBJECTIVE AND OBJECTIVE BOX
WILLIAMTIFFANIEGARETT BAXTER  69y, Male  Allergy: Allergy Status Unknown      LOS  19d    CHIEF COMPLAINT: penumonia (17 Feb 2025 10:37)      INTERVAL EVENTS/HPI  - T(F): , Max: 98.4 (02-17-25 @ 12:00)  - WBC Count: 7.43 (02-17-25 @ 05:29)  WBC Count: 7.47 (02-16-25 @ 05:18)     - Creatinine: 1.0 (02-17-25 @ 05:29)  Creatinine: 0.9 (02-16-25 @ 05:18)     -   -   -     ROS  cannot obtain secondary to patient's sedation and/or mental status    VITALS:  T(F): 98.4, Max: 98.4 (02-17-25 @ 12:00)  HR: 72  BP: 93/53  RR: 15Vital Signs Last 24 Hrs  T(C): 36.9 (17 Feb 2025 12:00), Max: 36.9 (17 Feb 2025 12:00)  T(F): 98.4 (17 Feb 2025 12:00), Max: 98.4 (17 Feb 2025 12:00)  HR: 72 (17 Feb 2025 12:00) (63 - 105)  BP: 93/53 (17 Feb 2025 12:00) (81/52 - 175/76)  BP(mean): 68 (17 Feb 2025 12:00) (62 - 111)  RR: 15 (17 Feb 2025 12:00) (6 - 27)  SpO2: 99% (17 Feb 2025 12:00) (93% - 100%)    Parameters below as of 17 Feb 2025 12:00  Patient On (Oxygen Delivery Method): ventilator    O2 Concentration (%): 40    PHYSICAL EXAM:  General: intubated  HEENT: NCAT  Neck: supple  CV: RRR  Lungs: symmetric chest expansion, decreased BS at bases  Abd: Soft  Extr: wwp  Skin: no rash  Neuro: sedated  Lines: no phlebitis     FH: Non-contributory  Social Hx: Non-contributory    TESTS & MEASUREMENTS:                        10.3   7.43  )-----------( 241      ( 17 Feb 2025 05:29 )             33.2     02-17    140  |  108  |  46[H]  ----------------------------<  192[H]  4.1   |  22  |  1.0    Ca    7.7[L]      17 Feb 2025 05:29  Mg     2.1     02-16    TPro  4.5[L]  /  Alb  2.2[L]  /  TBili  0.7  /  DBili  x   /  AST  24  /  ALT  <5  /  AlkPhos  83  02-17      LIVER FUNCTIONS - ( 17 Feb 2025 05:29 )  Alb: 2.2 g/dL / Pro: 4.5 g/dL / ALK PHOS: 83 U/L / ALT: <5 U/L / AST: 24 U/L / GGT: x           Urinalysis Basic - ( 17 Feb 2025 05:29 )    Color: x / Appearance: x / SG: x / pH: x  Gluc: 192 mg/dL / Ketone: x  / Bili: x / Urobili: x   Blood: x / Protein: x / Nitrite: x   Leuk Esterase: x / RBC: x / WBC x   Sq Epi: x / Non Sq Epi: x / Bacteria: x        Culture - Fungal, Bronchial (collected 02-11-25 @ 11:53)  Source: Bronchial  Preliminary Report (02-14-25 @ 07:48):    Few Yeast    Culture - Acid Fast - Bronchial w/Smear (collected 02-11-25 @ 11:53)  Source: Bronchial  Preliminary Report (02-12-25 @ 23:07):    Culture is being performed.    Culture - Bronchial (collected 02-11-25 @ 11:53)  Source: Lavage  Gram Stain (02-16-25 @ 20:34):    No Squamous epithelial cells seen per low power field    Few polymorphonuclear leukocytes seen per low power field    No organisms seen per oil power field  Preliminary Report (02-16-25 @ 20:34):    Few Stenotrophomonas maltophilia    Commensal halina consistent with body site    Culture - Body Fluid with Gram Stain (collected 02-10-25 @ 16:22)  Source: Bile  Gram Stain (02-12-25 @ 23:54):    No polymorphonuclear cells seen per low power field    No organisms seen per oil power field  Final Report (02-15-25 @ 16:56):    Rare Candida albicans    See previous culture [id number] for susceptibility    Culture - Fungal, Body Fluid (collected 02-10-25 @ 16:22)  Source: Bile  Preliminary Report (02-14-25 @ 14:12):    Rare Candida albicans  Organism: Candida albicans (02-14-25 @ 14:12)  Organism: Candida albicans (02-14-25 @ 14:12)      Method Type: YSTMIC      -  Fluconazole: S <=0.5 Fluconazole: Susceptibility depends on achieving the maximum possible blood level. Doses higher than the standard dosing amount (6mg/kg/day) may be needed in adults with normal renal function and body habitus.  This test was developed and its performance characteristics determined by [facility] [place]  It has not been cleared or approved by the U.[place]. Food and Drug Administration. S - Susceptible; SDD - Susceptible Dose Dependent; I - Intermediate; R - Resistant; N/I - No Interpretation Available    Culture - Blood (collected 02-08-25 @ 17:24)  Source: .Blood BLOOD  Final Report (02-14-25 @ 01:01):    No growth at 5 days    Culture - Blood (collected 02-08-25 @ 04:58)  Source: .Blood BLOOD  Final Report (02-13-25 @ 17:00):    No growth at 5 days    Culture - Viral, General (collected 02-04-25 @ 09:29)    Culture - Acid Fast - Bronchial w/Smear (collected 02-04-25 @ 09:29)  Source: Bronch Wash  Preliminary Report (02-12-25 @ 23:06):    No acid-fast bacilli isolated at 1 week. ****Acid-fast cultures are held    for 6 weeks.****    Culture - Bronchial (collected 02-04-25 @ 09:29)  Source: Bronch Wash  Gram Stain (02-05-25 @ 07:26):    Few-moderate polymorphonuclear leukocytes seen per low power field    No Squamous epithelial cells seen per low power field    No organisms seen per oil power field  Final Report (02-06-25 @ 11:36):    Commensal halina consistent with body site    Culture - Sputum (collected 02-04-25 @ 09:00)  Source: ET Tube ET Tube  Gram Stain (02-04-25 @ 19:37):    Numerous polymorphonuclear leukocytes per low power field    Rare Squamous epithelial cells per low power field    Rare Yeast like cells per oil power field  Final Report (02-05-25 @ 22:23):    Commensal halina consistent with body site    Culture - Blood (collected 02-02-25 @ 04:54)  Source: .Blood BLOOD  Final Report (02-07-25 @ 12:00):    No growth at 5 days    Culture - Blood (collected 01-31-25 @ 17:22)  Source: .Blood BLOOD  Final Report (02-05-25 @ 23:07):    No growth at 5 days    Culture - Blood (collected 01-31-25 @ 17:22)  Source: .Blood BLOOD  Final Report (02-05-25 @ 23:07):    No growth at 5 days    Urinalysis with Rflx Culture (collected 01-29-25 @ 16:48)    Culture - Blood (collected 01-29-25 @ 10:09)  Source: .Blood BLOOD  Gram Stain (01-30-25 @ 15:40):    Growth in aerobic bottle: Gram Positive Cocci in Clusters    Growth in anaerobic bottle: Gram Positive Cocci in Clusters  Final Report (02-01-25 @ 07:51):    Growth in aerobic and anaerobic bottles: Staphylococcus aureus    Direct identification is available within approximately 3-5    hours either by Blood Panel Multiplexed PCR or Direct    MALDI-TOF. Details: [email or web address]  Organism: Blood Culture PCR  Staphylococcus aureus (02-01-25 @ 07:51)  Organism: Staphylococcus aureus (02-01-25 @ 07:51)      Method Type: KARTIK      -  Ceftaroline: S <=0.5      -  Clindamycin: R <=0.25 This isolate is presumed to be clindamycin resistant based on detection of inducible resistance. Clindamycin may still be effective in some patients.      -  Erythromycin: R >4      -  Gentamicin: S <=4 Should not be used as monotherapy      -  Oxacillin: S <=0.25 Oxacillin predicts susceptibility for dicloxacillin, methicillin, and nafcillin      -  Rifampin: S <=1 Should not be used as monotherapy      -  Tetracycline: S <=4      -  Trimethoprim/Sulfamethoxazole: S <=0.5/9.5      -  Vancomycin: S 1  Organism: Blood Culture PCR (02-01-25 @ 07:51)      Method Type: PCR      -  Methicillin SENSITIVE Staphylococcus aureus (MSSA): Detec Any isolate of Staphylococcus aureus from a blood culture is NOT considered a contaminant.    Culture - Blood (collected 01-29-25 @ 10:09)  Source: .Blood BLOOD  Gram Stain (02-01-25 @ 00:35):    Growth in aerobic bottle: Gram Positive Cocci in Clusters    Growth in anaerobic bottle: Gram Positive Cocci in Clusters  Final Report (02-01-25 @ 16:44):    Growth in aerobic and anaerobic bottles: Staphylococcus aureus    See previous culture [id number]            INFECTIOUS DISEASES TESTING  Procalcitonin: 0.62 (02-11-25 @ 08:30)  MRSA PCR Result.: Negative (02-10-25 @ 16:22)  MRSA PCR Result.: Negative (01-30-25 @ 13:40)  Procalcitonin: 6.93 (01-30-25 @ 07:26)  Legionella Antigen, Urine: Negative (01-29-25 @ 15:49)  Rapid RVP Result: Detected (01-29-25 @ 10:30)      INFLAMMATORY MARKERS      RADIOLOGY & ADDITIONAL TESTS:  I have personally reviewed the last available Chest xray  CXR  Xray Chest 1 View AP/PA:   ACC: [id number] EXAM:  XR CHEST 1 VIEW   ORDERED BY: MADDIE TOBIN     PROCEDURE DATE:  02/17/2025          INTERPRETATION:  CLINICAL HISTORY: Line placement    COMPARISON: February 17, 2025.    TECHNIQUE: Portable frontal chest radiograph.    FINDINGS:    Support devices: Endotracheal tube and feeding tube in place    Cardiac/mediastinum/hilum: Stable.    Lung parenchyma/Pleura: Bilateral opacities, right greater than left,   overall unchanged. No pneumothorax.    Skeleton/soft tissues: Stable.      IMPRESSION:    Bilateral opacities, right greater than left, unchanged.    --- End of Report ---            ELLIOT LANDAU MD; Attending Radiologist  This document has been electronically signed. Feb 17 2025  1:05PM (02-17-25 @ 09:56)      CT      CARDIOLOGY TESTING      MEDICATIONS  buMETAnide Injectable 2  carbidopa/levodopa  25/250 2  ceFAZolin   IVPB 2000  chlorhexidine 0.12% Liquid 15  chlorhexidine 2% Cloths 1  dexMEDEtomidine Infusion 0.5  dextrose 5%. 1000  dextrose 5%. 1000  dextrose 50% Injectable 12.5  dextrose 50% Injectable 25  dextrose 50% Injectable 25  fentaNYL   Infusion 0.5  glucagon  Injectable 1  insulin lispro (ADMELOG) corrective regimen sliding scale   insulin lispro Injectable (ADMELOG) 2  norepinephrine Infusion 0.05  pantoprazole   Suspension 40  polyethylene glycol 3350 17  propofol Infusion 5  rasagiline Tablet 1  senna 2      WEIGHT  Weight (kg): 51.1 (02-05-25 @ 13:52)  Creatinine: 1.0 mg/dL (02-17-25 @ 05:29)      ANTIBIOTICS:  ceFAZolin   IVPB 2000 milliGRAM(s) IV Intermittent every 8 hours      All available historical records have been reviewed

## 2025-02-17 NOTE — PROGRESS NOTE ADULT - SUBJECTIVE AND OBJECTIVE BOX
SUBJECTIVE / OVERNIGHT EVENTS  Patient intubated & sedated. on-off on levophed for hypotension from sedation. now off    MEDICATIONS  buMETAnide Injectable 2 milliGRAM(s) IV Push daily  carbidopa/levodopa  25/250 2 Tablet(s) Oral three times a day  ceFAZolin   IVPB 2000 milliGRAM(s) IV Intermittent every 8 hours  chlorhexidine 0.12% Liquid 15 milliLiter(s) Oral Mucosa every 12 hours  chlorhexidine 2% Cloths 1 Application(s) Topical <User Schedule>  dexMEDEtomidine Infusion 0.5 MICROgram(s)/kG/Hr IV Continuous <Continuous>  dextrose 5%. 1000 milliLiter(s) IV Continuous <Continuous>  dextrose 5%. 1000 milliLiter(s) IV Continuous <Continuous>  dextrose 50% Injectable 12.5 Gram(s) IV Push once  dextrose 50% Injectable 25 Gram(s) IV Push once  dextrose 50% Injectable 25 Gram(s) IV Push once  fentaNYL   Infusion 0.5 MICROgram(s)/kG/Hr IV Continuous <Continuous>  glucagon  Injectable 1 milliGRAM(s) IntraMuscular once  insulin lispro (ADMELOG) corrective regimen sliding scale   SubCutaneous every 6 hours  insulin lispro Injectable (ADMELOG) 2 Unit(s) SubCutaneous every 6 hours  norepinephrine Infusion 0.05 MICROgram(s)/kG/Min IV Continuous <Continuous>  pantoprazole   Suspension 40 milliGRAM(s) Oral daily  polyethylene glycol 3350 17 Gram(s) Oral every 12 hours  propofol Infusion 5 MICROgram(s)/kG/Min IV Continuous <Continuous>  rasagiline Tablet 1 milliGRAM(s) Oral <User Schedule>  senna 2 Tablet(s) Oral at bedtime    acetaminophen     Tablet .. 650 milliGRAM(s) Oral every 6 hours PRN Temp greater or equal to 38C (100.4F), Mild Pain (1 - 3)  dextrose Oral Gel 15 Gram(s) Oral once PRN Blood Glucose LESS THAN 70 milliGRAM(s)/deciliter  fentaNYL    Injectable 25 MICROGram(s) IV Push every 2 hours PRN Aggitation    VITALS /  EXAM    T(C): 36.2 (02-17-25 @ 08:00), Max: 37.2 (02-16-25 @ 12:00)  HR: 77 (02-17-25 @ 09:11) (63 - 115)  BP: 102/56 (02-17-25 @ 08:53) (81/52 - 175/76)  RR: 21 (02-17-25 @ 08:53) (6 - 41)  SpO2: 100% (02-17-25 @ 09:11) (93% - 100%)  POCT Blood Glucose.: 170 mg/dL (02-17-25 @ 06:21)  POCT Blood Glucose.: 140 mg/dL (02-17-25 @ 01:18)  POCT Blood Glucose.: 156 mg/dL (02-16-25 @ 17:19)  POCT Blood Glucose.: 120 mg/dL (02-16-25 @ 13:29)  POCT Blood Glucose.: 172 mg/dL (02-16-25 @ 12:16)  POCT Blood Glucose.: 43 mg/dL (02-16-25 @ 12:04)    GENERAL: NAD  CHEST/LUNG: Clear to auscultation bilaterally  HEART: No murmurs, rubs, or gallops  ABDOMEN: Soft, Nontender, Nondistended  EXTREMITIES:  No clubbing, cyanosis, or edema    I's & O's     02-16-25 @ 07:01  -  02-17-25 @ 07:00  --------------------------------------------------------  IN:    Dexmedetomidine: 416.6 mL    Enteral Tube Flush: 280 mL    FentaNYL: 252 mL    Free Water: 225 mL    IV PiggyBack: 1050 mL    Jevity: 900 mL    Norepinephrine: 5.3 mL  Total IN: 3128.8 mL    OUT:    Drain (mL): 775 mL    Indwelling Catheter - Urethral (mL): 1725 mL    Propofol: 0 mL  Total OUT: 2500 mL    Total NET: 628.8 mL      02-17-25 @ 07:01  -  02-17-25 @ 10:37  --------------------------------------------------------  IN:    Dexmedetomidine: 38.2 mL    FentaNYL: 30.6 mL  Total IN: 68.8 mL    OUT:    Indwelling Catheter - Urethral (mL): 480 mL  Total OUT: 480 mL    Total NET: -411.2 mL        LABS             10.3   7.43  )-----------( 241      ( 02-17-25 @ 05:29 )             33.2     140  |  108  |  46  -------------------------<  192   02-17-25 @ 05:29  4.1  |  22  |  1.0    Ca      7.7     02-17-25 @ 05:29  Mg     2.1     02-16-25 @ 05:18    TPro  4.5  /  Alb  2.2  /  TBili  0.7  /  DBili  x   /  AST  24  /  ALT  <5  /  AlkPhos  83  /  GGT  x     02-17-25 @ 05:29                    Urinalysis Basic - ( 17 Feb 2025 05:29 )    Color: x / Appearance: x / SG: x / pH: x  Gluc: 192 mg/dL / Ketone: x  / Bili: x / Urobili: x   Blood: x / Protein: x / Nitrite: x   Leuk Esterase: x / RBC: x / WBC x   Sq Epi: x / Non Sq Epi: x / Bacteria: x      MICRO / IMAGING / CARDIOLOGY  Telemetry: Reviewed   EKG: Reviewed    CULTURES    IMAGING  PACS Image:  (02-17-25 @ 06:07)  PACS Image:  (02-16-25 @ 06:43)    CARDIOLOGY

## 2025-02-17 NOTE — PROGRESS NOTE ADULT - SUBJECTIVE AND OBJECTIVE BOX
Patient is a 69y old  Male who presents with a chief complaint of penumonia (16 Feb 2025 08:27)        Over Night Events:  on MV>  Of pressors.  Sedated         ROS:     All ROS are negative except HPI         PHYSICAL EXAM    ICU Vital Signs Last 24 Hrs  T(C): 36.2 (17 Feb 2025 08:00), Max: 37.2 (16 Feb 2025 12:00)  T(F): 97.1 (17 Feb 2025 08:00), Max: 99 (16 Feb 2025 12:00)  HR: 77 (17 Feb 2025 08:53) (63 - 115)  BP: 102/56 (17 Feb 2025 08:53) (81/52 - 175/76)  BP(mean): 72 (17 Feb 2025 08:53) (62 - 127)  ABP: --  ABP(mean): --  RR: 21 (17 Feb 2025 08:53) (6 - 41)  SpO2: 100% (17 Feb 2025 08:53) (93% - 100%)    O2 Parameters below as of 17 Feb 2025 08:00  Patient On (Oxygen Delivery Method): ventilator    O2 Concentration (%): 40        CONSTITUTIONAL:    NAD    ENT:   Airway patent,   Mouth with normal mucosa.   ET    EYES:   Pupils equal,   Round and reactive to light.    CARDIAC:   Normal rate,   Regular rhythm.    No edema    RESPIRATORY:   No wheezing  Bilateral BS  Normal chest expansion  Not tachypneic,  No use of accessory muscles    GASTROINTESTINAL:  Abdomen soft,   Non-tender,   No guarding,   + BS    MUSCULOSKELETAL:   No clubbing, cyanosis    NEUROLOGICAL:   Sedated     SKIN:   Skin normal color for race,   Warm and dry   No evidence of rash.        02-16-25 @ 07:01  -  02-17-25 @ 07:00  --------------------------------------------------------  IN:    Dexmedetomidine: 416.6 mL    Enteral Tube Flush: 280 mL    FentaNYL: 252 mL    Free Water: 225 mL    IV PiggyBack: 1050 mL    Jevity: 900 mL    Norepinephrine: 5.3 mL  Total IN: 3128.8 mL    OUT:    Drain (mL): 775 mL    Indwelling Catheter - Urethral (mL): 1725 mL    Propofol: 0 mL  Total OUT: 2500 mL    Total NET: 628.8 mL      02-17-25 @ 07:01  -  02-17-25 @ 09:07  --------------------------------------------------------  IN:    Dexmedetomidine: 38.2 mL    FentaNYL: 30.6 mL  Total IN: 68.8 mL    OUT:    Indwelling Catheter - Urethral (mL): 480 mL  Total OUT: 480 mL    Total NET: -411.2 mL          LABS:                            10.3   7.43  )-----------( 241      ( 17 Feb 2025 05:29 )             33.2                                               02-17    140  |  108  |  46[H]  ----------------------------<  192[H]  4.1   |  22  |  1.0    Ca    7.7[L]      17 Feb 2025 05:29  Mg     2.1     02-16    TPro  4.5[L]  /  Alb  2.2[L]  /  TBili  0.7  /  DBili  x   /  AST  24  /  ALT  <5  /  AlkPhos  83  02-17                                             Urinalysis Basic - ( 17 Feb 2025 05:29 )    Color: x / Appearance: x / SG: x / pH: x  Gluc: 192 mg/dL / Ketone: x  / Bili: x / Urobili: x   Blood: x / Protein: x / Nitrite: x   Leuk Esterase: x / RBC: x / WBC x   Sq Epi: x / Non Sq Epi: x / Bacteria: x                                                  LIVER FUNCTIONS - ( 17 Feb 2025 05:29 )  Alb: 2.2 g/dL / Pro: 4.5 g/dL / ALK PHOS: 83 U/L / ALT: <5 U/L / AST: 24 U/L / GGT: x                                                                                               Mode: AC/ CMV (Assist Control/ Continuous Mandatory Ventilation)  RR (machine): 18  TV (machine): 440  FiO2: 40  PEEP: 8  ITime: 1  MAP: 12  PIP: 22                                      ABG - ( 17 Feb 2025 03:59 )  pH, Arterial: 7.27  pH, Blood: x     /  pCO2: 63    /  pO2: 127   / HCO3: 29    / Base Excess: 0.7   /  SaO2: 99.5                MEDICATIONS  (STANDING):  buMETAnide Injectable 2 milliGRAM(s) IV Push daily  carbidopa/levodopa  25/250 2 Tablet(s) Oral three times a day  chlorhexidine 0.12% Liquid 15 milliLiter(s) Oral Mucosa every 12 hours  chlorhexidine 2% Cloths 1 Application(s) Topical <User Schedule>  dexMEDEtomidine Infusion 0.5 MICROgram(s)/kG/Hr (6.39 mL/Hr) IV Continuous <Continuous>  dextrose 5%. 1000 milliLiter(s) (50 mL/Hr) IV Continuous <Continuous>  dextrose 5%. 1000 milliLiter(s) (100 mL/Hr) IV Continuous <Continuous>  dextrose 50% Injectable 12.5 Gram(s) IV Push once  dextrose 50% Injectable 25 Gram(s) IV Push once  dextrose 50% Injectable 25 Gram(s) IV Push once  fentaNYL   Infusion 0.5 MICROgram(s)/kG/Hr (2.56 mL/Hr) IV Continuous <Continuous>  glucagon  Injectable 1 milliGRAM(s) IntraMuscular once  insulin lispro (ADMELOG) corrective regimen sliding scale   SubCutaneous every 6 hours  insulin lispro Injectable (ADMELOG) 2 Unit(s) SubCutaneous every 6 hours  nafcillin  IVPB 2 Gram(s) IV Intermittent every 4 hours  nafcillin  IVPB      norepinephrine Infusion 0.05 MICROgram(s)/kG/Min (4.79 mL/Hr) IV Continuous <Continuous>  pantoprazole   Suspension 40 milliGRAM(s) Oral daily  polyethylene glycol 3350 17 Gram(s) Oral every 12 hours  propofol Infusion 5 MICROgram(s)/kG/Min (1.53 mL/Hr) IV Continuous <Continuous>  rasagiline Tablet 1 milliGRAM(s) Oral <User Schedule>  senna 2 Tablet(s) Oral at bedtime    MEDICATIONS  (PRN):  acetaminophen     Tablet .. 650 milliGRAM(s) Oral every 6 hours PRN Temp greater or equal to 38C (100.4F), Mild Pain (1 - 3)  dextrose Oral Gel 15 Gram(s) Oral once PRN Blood Glucose LESS THAN 70 milliGRAM(s)/deciliter  fentaNYL    Injectable 25 MICROGram(s) IV Push every 2 hours PRN Aggitation      New X-rays reviewed:                                                                                  ECHO

## 2025-02-17 NOTE — PROGRESS NOTE ADULT - ASSESSMENT
ASSESSMENT  69-year-old male past medical history of hypertension, Parkinson's, CAD presents for shortness of breath and cough of 1 day duration. History was obtained from patient's wife who noted that the patient has been having decreased po intake, activity, and unintentional weight loss for some time. Howver, yesterday he started having cough and shortness of breath. 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  Increased opacities/debris within the central bronchi with occlusion of the right lower lobe central bronchi. There is increased consolidation   within the right lower lobe.  #Suspected cholecystitis     2/10 biliary fluid NG,   Rare Candida albicans- suspect colonizer   RUQ GB sludge     2/5 Successful placement of 8Fr perc haydee tube. 250cc of dark bile aspirated from GB.  #Influenza +, MSSA bacteremia , suspect MSSA superimposed PNA    2/4 bronch   No organisms seen per oil power field    2/ 2 BCX NGTD     1/31 BCX NGTD     1/29 BCX MSSA 3/4 bottles    MRSA PCR Result.: Negative (01-30-25 @ 13:40)    Procalcitonin: 6.93 ng/mL (01-30-25 @ 07:26)    Legionella Antigen, Urine: Negative (01-29-25 @ 15:49)    Streptococcus pneumoniae Ag, Ur Result: Negative (01-29-25 @ 15:49)    CT Multifocal bilateral consolidative opacities as above, suggestive of multifocal pneumonia in the appropriate clinical context.  #Lactic acidosis  #Hypernatremia   #Severe protein calorie malnutrition  BMI (kg/m2): 17  #PD  #Immunodeficiency secondary to Senescence which could results in poor clinical outcomes  #Abx allergy: Allergy Status Unknown  #LYNNE , resolved  Creatinine: 0.9 mg/dL (02-12-25 @ 05:30)    Height (cm): 175.3 (01-30-25 @ 13:00)  Weight (kg): 52.2 (01-29-25 @ 10:14)    RECOMMENDATIONS  - Stenotrophomonas now in bronch cx from 2/11, suspect colonizer- CXR stable R opacity, 40% fiO2, no fever, No leukocytosis . Monitor. If any fever or worsening resp status, ADD PO levaquin 750mg daily   - Cefazolin 2g q8h IV  - TTE no vegetations , guidelines recommend MARY as community acquired bacteremia once stable , otherwise 6 weeks empiric therapy for MSSA bacteremia from date of cleared BCX E/D 3/13  - Trend WBC, Cr  - Grave prognosis, GOC     If any questions, please send a message or call on [employer] Teams  Please continue to update ID with any pertinent new laboratory, radiographic findings, or change in clinical status

## 2025-02-17 NOTE — PROGRESS NOTE ADULT - ASSESSMENT
IMPRESSION:    Acute Hypoxic Respiratory Failure   Toxic Metabolic Encephalopathy  CAP likely aspiration   Influenza A treated   MSSA bacteremia repeat CX -  Sepsis POA  LYNNE improving  cholecystitis sp percutaneous haydee  Periventricular bleed  Suspected Mural thrombus in PA  Right thigh hematoma.    HO Parkinson's Disease   HO CAD    PLAN:    CNS:  SAT. Continue Anti parkinson's.     HEENT: Oral care.  ETT duration: 15 days.  CTS or trach and PEG      PULMONARY:  No change in vent settings.  Monitor airway pressures.  Remains with significant secretions.  Awaiting trach.  CTS follow up     CARDIOVASCULAR:  Avoid overload.  Volume contraction muna tolerated     GI: GI prophylaxis.  NGT feeds as tolerated. S/P perc cholecystostomy. Bowel regimen     RENAL: Follow up lytes.  Mars in place for retention. Kidney function stable.     INFECTIOUS DISEASE: Cultures noted.  SP Tamiflu.  ABX per ID.  Ancef for now Duration per ID.     HEMATOLOGICAL: Monitor CBC and Coags. CTA noted with no active bleed.  keep Hg > 7.  LE venous duplex     ENDOCRINE:  Follow up FS.  Insulin protocol if needed.    MUSCULOSKELETAL: Bed rest.  Off loading. Wound care for DTIs.     Full code  MICU  palliative care follow up   very poor prognosis

## 2025-02-18 LAB
ALBUMIN SERPL ELPH-MCNC: 2.4 G/DL — LOW (ref 3.5–5.2)
ALP SERPL-CCNC: 90 U/L — SIGNIFICANT CHANGE UP (ref 30–115)
ALT FLD-CCNC: <5 U/L — SIGNIFICANT CHANGE UP (ref 0–41)
ANION GAP SERPL CALC-SCNC: 8 MMOL/L — SIGNIFICANT CHANGE UP (ref 7–14)
AST SERPL-CCNC: 18 U/L — SIGNIFICANT CHANGE UP (ref 0–41)
BASOPHILS # BLD AUTO: 0.03 K/UL — SIGNIFICANT CHANGE UP (ref 0–0.2)
BASOPHILS NFR BLD AUTO: 0.3 % — SIGNIFICANT CHANGE UP (ref 0–1)
BILIRUB SERPL-MCNC: 0.7 MG/DL — SIGNIFICANT CHANGE UP (ref 0.2–1.2)
BUN SERPL-MCNC: 44 MG/DL — HIGH (ref 10–20)
CALCIUM SERPL-MCNC: 7.8 MG/DL — LOW (ref 8.4–10.5)
CHLORIDE SERPL-SCNC: 107 MMOL/L — SIGNIFICANT CHANGE UP (ref 98–110)
CO2 SERPL-SCNC: 27 MMOL/L — SIGNIFICANT CHANGE UP (ref 17–32)
CREAT SERPL-MCNC: 0.9 MG/DL — SIGNIFICANT CHANGE UP (ref 0.7–1.5)
EGFR: 92 ML/MIN/1.73M2 — SIGNIFICANT CHANGE UP
EGFR: 92 ML/MIN/1.73M2 — SIGNIFICANT CHANGE UP
EOSINOPHIL # BLD AUTO: 0.05 K/UL — SIGNIFICANT CHANGE UP (ref 0–0.7)
EOSINOPHIL NFR BLD AUTO: 0.6 % — SIGNIFICANT CHANGE UP (ref 0–8)
GAS PNL BLDA: SIGNIFICANT CHANGE UP
GLUCOSE BLDC GLUCOMTR-MCNC: 129 MG/DL — HIGH (ref 70–99)
GLUCOSE BLDC GLUCOMTR-MCNC: 140 MG/DL — HIGH (ref 70–99)
GLUCOSE BLDC GLUCOMTR-MCNC: 145 MG/DL — HIGH (ref 70–99)
GLUCOSE BLDC GLUCOMTR-MCNC: 167 MG/DL — HIGH (ref 70–99)
GLUCOSE SERPL-MCNC: 150 MG/DL — HIGH (ref 70–99)
HCT VFR BLD CALC: 32.5 % — LOW (ref 42–52)
HGB BLD-MCNC: 10.3 G/DL — LOW (ref 14–18)
IMM GRANULOCYTES NFR BLD AUTO: 0.6 % — HIGH (ref 0.1–0.3)
LYMPHOCYTES # BLD AUTO: 1.38 K/UL — SIGNIFICANT CHANGE UP (ref 1.2–3.4)
LYMPHOCYTES # BLD AUTO: 15.7 % — LOW (ref 20.5–51.1)
MAGNESIUM SERPL-MCNC: 2.1 MG/DL — SIGNIFICANT CHANGE UP (ref 1.8–2.4)
MCHC RBC-ENTMCNC: 30.4 PG — SIGNIFICANT CHANGE UP (ref 27–31)
MCHC RBC-ENTMCNC: 31.7 G/DL — LOW (ref 32–37)
MCV RBC AUTO: 95.9 FL — HIGH (ref 80–94)
MONOCYTES # BLD AUTO: 0.69 K/UL — HIGH (ref 0.1–0.6)
MONOCYTES NFR BLD AUTO: 7.9 % — SIGNIFICANT CHANGE UP (ref 1.7–9.3)
NEUTROPHILS # BLD AUTO: 6.58 K/UL — HIGH (ref 1.4–6.5)
NEUTROPHILS NFR BLD AUTO: 74.9 % — SIGNIFICANT CHANGE UP (ref 42.2–75.2)
NRBC BLD AUTO-RTO: 0 /100 WBCS — SIGNIFICANT CHANGE UP (ref 0–0)
PLATELET # BLD AUTO: 282 K/UL — SIGNIFICANT CHANGE UP (ref 130–400)
PMV BLD: 10.7 FL — HIGH (ref 7.4–10.4)
POTASSIUM SERPL-MCNC: 3.9 MMOL/L — SIGNIFICANT CHANGE UP (ref 3.5–5)
POTASSIUM SERPL-SCNC: 3.9 MMOL/L — SIGNIFICANT CHANGE UP (ref 3.5–5)
PROT SERPL-MCNC: 4.7 G/DL — LOW (ref 6–8)
RBC # BLD: 3.39 M/UL — LOW (ref 4.7–6.1)
RBC # FLD: 17.3 % — HIGH (ref 11.5–14.5)
SODIUM SERPL-SCNC: 142 MMOL/L — SIGNIFICANT CHANGE UP (ref 135–146)
WBC # BLD: 8.78 K/UL — SIGNIFICANT CHANGE UP (ref 4.8–10.8)
WBC # FLD AUTO: 8.78 K/UL — SIGNIFICANT CHANGE UP (ref 4.8–10.8)

## 2025-02-18 PROCEDURE — 71045 X-RAY EXAM CHEST 1 VIEW: CPT | Mod: 26,77

## 2025-02-18 PROCEDURE — 71045 X-RAY EXAM CHEST 1 VIEW: CPT | Mod: 26

## 2025-02-18 PROCEDURE — 99291 CRITICAL CARE FIRST HOUR: CPT

## 2025-02-18 RX ORDER — ENOXAPARIN SODIUM 100 MG/ML
40 INJECTION SUBCUTANEOUS EVERY 24 HOURS
Refills: 0 | Status: DISCONTINUED | OUTPATIENT
Start: 2025-02-18 | End: 2025-02-18

## 2025-02-18 RX ORDER — ENOXAPARIN SODIUM 100 MG/ML
40 INJECTION SUBCUTANEOUS EVERY 24 HOURS
Refills: 0 | Status: DISCONTINUED | OUTPATIENT
Start: 2025-02-18 | End: 2025-02-28

## 2025-02-18 RX ADMIN — POLYETHYLENE GLYCOL 3350 17 GRAM(S): 17 POWDER, FOR SOLUTION ORAL at 05:37

## 2025-02-18 RX ADMIN — Medication 15 MILLILITER(S): at 17:40

## 2025-02-18 RX ADMIN — Medication 1 APPLICATION(S): at 05:38

## 2025-02-18 RX ADMIN — Medication 100 MILLIGRAM(S): at 05:10

## 2025-02-18 RX ADMIN — Medication 2.56 MICROGRAM(S)/KG/HR: at 12:25

## 2025-02-18 RX ADMIN — Medication 2 TABLET(S): at 14:34

## 2025-02-18 RX ADMIN — ENOXAPARIN SODIUM 40 MILLIGRAM(S): 100 INJECTION SUBCUTANEOUS at 17:34

## 2025-02-18 RX ADMIN — Medication 40 MILLIGRAM(S): at 11:42

## 2025-02-18 RX ADMIN — Medication 100 MILLIGRAM(S): at 22:12

## 2025-02-18 RX ADMIN — POLYETHYLENE GLYCOL 3350 17 GRAM(S): 17 POWDER, FOR SOLUTION ORAL at 17:34

## 2025-02-18 RX ADMIN — Medication 2 TABLET(S): at 05:37

## 2025-02-18 RX ADMIN — RASAGILINE 1 MILLIGRAM(S): 0.5 TABLET ORAL at 05:38

## 2025-02-18 RX ADMIN — BUMETANIDE 2 MILLIGRAM(S): 1 TABLET ORAL at 05:37

## 2025-02-18 RX ADMIN — Medication 2 TABLET(S): at 22:12

## 2025-02-18 RX ADMIN — Medication 650 MILLIGRAM(S): at 17:33

## 2025-02-18 RX ADMIN — Medication 15 MILLILITER(S): at 05:38

## 2025-02-18 RX ADMIN — Medication 100 MILLIGRAM(S): at 10:21

## 2025-02-18 NOTE — PROGRESS NOTE ADULT - ASSESSMENT
ASSESSMENT:69-year-old male past medical history of hypertension, Parkinson's, CAD presents for shortness of breath and cough of 1 day duration. History was obtained from patient's wife who noted that the patient has been having decreased po intake, activity, and unintentional weight loss for some time.    In ED pt was found to be hypoxic and in respiratory distress.  Work up showed AHRF and sepsis 2/2 to mutilobar PNA.    Acute Hypoxic Respiratory Failure likely 2/2 CAP/aspiration and flu  #Toxic Metabolic Encephalopathy likely 2/2 CAP/aspiration and flu  - patient inc WOB, worsening alkalosis, obtunded-intubated for airway protection   -2/11 bronch: showed copious amounts of secretions and poor cough.  Daily discussions by ICU team with wife regarding trach vs extubation & change in code status  2/17: CT surgery consulted for trach & peg      PLAN:   - Unable to reach wife over the phone for consent, please call [phone], when wife by bedside for consent  - Added-on for today Trach and Peg  - Please Preop (CBC, BMP, Mg, Phos, Active Type and Screen, Coag Panel)  - Attempting to book the case for 2/19 if the add-on does not go today

## 2025-02-18 NOTE — PROGRESS NOTE ADULT - ASSESSMENT
IMPRESSION:    Acute Hypoxic Respiratory Failure remains intubated  Toxic Metabolic Encephalopathy  CAP likely aspiration   Influenza A treated   MSSA bacteremia repeat CX -  Sepsis POA  cholecystitis sp percutaneous haydee  Periventricular bleed  Suspected Mural thrombus in PA  Right thigh hematoma.    HO Parkinson's Disease   HO CAD    PLAN:    CNS:  SAT. Continue Anti parkinson's.     HEENT: Oral care.  ETT duration: 15 days.  CTS or trach and PEG      PULMONARY:  No change in vent settings.  Monitor airway pressures.  Remains with significant secretions.  Awaiting trach.  CTS follow up on 40%    CARDIOVASCULAR:  Avoid overload.  Volume contraction muna tolerated     GI: GI prophylaxis.  NGT feeds as tolerated. S/P perc cholecystostomy. Bowel regimen     RENAL: Follow up lytes.  Mars in place for retention. Kidney function stable.     INFECTIOUS DISEASE: Cultures noted.  SP Tamiflu.  ABX per ID.  Ancef for now Duration per ID.     HEMATOLOGICAL: Monitor CBC and Coags. CTA noted with no active bleed.  keep Hg > 7.  Le DOPPLER -    ENDOCRINE:  Follow up FS.  Insulin protocol if needed.    MUSCULOSKELETAL: Bed rest.  Off loading. Wound care for DTIs.     Full code  MICU  palliative care follow up   very poor prognosis

## 2025-02-18 NOTE — PROGRESS NOTE ADULT - SUBJECTIVE AND OBJECTIVE BOX
Over Night Events: events noted, remains critically ill , still intubated, ventilated, on fentanyl 4, precedex, 0.5, propofol  PHYSICAL EXAM    ICU Vital Signs Last 24 Hrs  T(C): 36.8 (18 Feb 2025 08:00), Max: 37.5 (18 Feb 2025 04:00)  T(F): 98.2 (18 Feb 2025 08:00), Max: 99.5 (18 Feb 2025 04:00)  HR: 100 (18 Feb 2025 08:00) (64 - 115)  BP: 117/74 (18 Feb 2025 08:00) (78/49 - 158/62)  BP(mean): 88 (18 Feb 2025 08:00) (59 - 121)  RR: 36 (18 Feb 2025 08:00) (10 - 61)  SpO2: 100% (18 Feb 2025 08:00) (91% - 100%)    O2 Parameters below as of 18 Feb 2025 08:00  Patient On (Oxygen Delivery Method): ventilator            General: ill looking  ETT  Lungs: Bilateral RHONCHI  Cardiovascular: FRAKNLYN 2.6  Abdomen: Soft, Positive BS  not following commands    02-17-25 @ 07:01  -  02-18-25 @ 07:00  --------------------------------------------------------  IN:    Dexmedetomidine: 476.7 mL    Enteral Tube Flush: 350 mL    FentaNYL: 464.1 mL    IV PiggyBack: 100 mL    Jevity: 600 mL    Norepinephrine: 59.4 mL    Propofol: 20.6 mL  Total IN: 2070.8 mL    OUT:    Drain (mL): 325 mL    Indwelling Catheter - Urethral (mL): 1420 mL  Total OUT: 1745 mL    Total NET: 325.8 mL          LABS:                          10.3   8.78  )-----------( 282      ( 18 Feb 2025 04:37 )             32.5                                               02-18    142  |  107  |  44[H]  ----------------------------<  150[H]  3.9   |  27  |  0.9    Ca    7.8[L]      18 Feb 2025 04:37  Mg     2.1     02-18    TPro  4.7[L]  /  Alb  2.4[L]  /  TBili  0.7  /  DBili  x   /  AST  18  /  ALT  <5  /  AlkPhos  90  02-18                                             Urinalysis Basic - ( 18 Feb 2025 04:37 )    Color: x / Appearance: x / SG: x / pH: x  Gluc: 150 mg/dL / Ketone: x  / Bili: x / Urobili: x   Blood: x / Protein: x / Nitrite: x   Leuk Esterase: x / RBC: x / WBC x   Sq Epi: x / Non Sq Epi: x / Bacteria: x                                                  LIVER FUNCTIONS - ( 18 Feb 2025 04:37 )  Alb: 2.4 g/dL / Pro: 4.7 g/dL / ALK PHOS: 90 U/L / ALT: <5 U/L / AST: 18 U/L / GGT: x                                                                                               Mode: AC/ CMV (Assist Control/ Continuous Mandatory Ventilation)  RR (machine): 18  TV (machine): 480  FiO2: 40  PEEP: 8  ITime: 1  MAP: 13  PIP: 27                                      ABG - ( 18 Feb 2025 04:36 )  pH, Arterial: 7.36  pH, Blood: x     /  pCO2: 50    /  pO2: 122   / HCO3: 34    / Base Excess: 2.1   /  SaO2: 99.0                MEDICATIONS  (STANDING):  buMETAnide Injectable 2 milliGRAM(s) IV Push daily  carbidopa/levodopa  25/250 2 Tablet(s) Oral three times a day  ceFAZolin   IVPB 2000 milliGRAM(s) IV Intermittent every 8 hours  chlorhexidine 0.12% Liquid 15 milliLiter(s) Oral Mucosa every 12 hours  chlorhexidine 2% Cloths 1 Application(s) Topical <User Schedule>  dexMEDEtomidine Infusion 0.5 MICROgram(s)/kG/Hr (6.39 mL/Hr) IV Continuous <Continuous>  dextrose 5%. 1000 milliLiter(s) (50 mL/Hr) IV Continuous <Continuous>  dextrose 5%. 1000 milliLiter(s) (100 mL/Hr) IV Continuous <Continuous>  dextrose 50% Injectable 12.5 Gram(s) IV Push once  dextrose 50% Injectable 25 Gram(s) IV Push once  dextrose 50% Injectable 25 Gram(s) IV Push once  fentaNYL   Infusion 0.5 MICROgram(s)/kG/Hr (2.56 mL/Hr) IV Continuous <Continuous>  glucagon  Injectable 1 milliGRAM(s) IntraMuscular once  insulin lispro (ADMELOG) corrective regimen sliding scale   SubCutaneous every 6 hours  insulin lispro Injectable (ADMELOG) 2 Unit(s) SubCutaneous every 6 hours  norepinephrine Infusion 0.05 MICROgram(s)/kG/Min (4.79 mL/Hr) IV Continuous <Continuous>  pantoprazole   Suspension 40 milliGRAM(s) Oral daily  polyethylene glycol 3350 17 Gram(s) Oral every 12 hours  propofol Infusion 5 MICROgram(s)/kG/Min (1.53 mL/Hr) IV Continuous <Continuous>  rasagiline Tablet 1 milliGRAM(s) Oral <User Schedule>  senna 2 Tablet(s) Oral at bedtime    MEDICATIONS  (PRN):  acetaminophen     Tablet .. 650 milliGRAM(s) Oral every 6 hours PRN Temp greater or equal to 38C (100.4F), Mild Pain (1 - 3)  dextrose Oral Gel 15 Gram(s) Oral once PRN Blood Glucose LESS THAN 70 milliGRAM(s)/deciliter  fentaNYL    Injectable 25 MICROGram(s) IV Push every 2 hours PRN Aggitation    cxr noted   Over Night Events: events noted, remains critically ill , still intubated, ventilated, on fentanyl 4, precedex, 0.5, propofol    PHYSICAL EXAM    ICU Vital Signs Last 24 Hrs  T(C): 36.8 (18 Feb 2025 08:00), Max: 37.5 (18 Feb 2025 04:00)  T(F): 98.2 (18 Feb 2025 08:00), Max: 99.5 (18 Feb 2025 04:00)  HR: 100 (18 Feb 2025 08:00) (64 - 115)  BP: 117/74 (18 Feb 2025 08:00) (78/49 - 158/62)  BP(mean): 88 (18 Feb 2025 08:00) (59 - 121)  RR: 36 (18 Feb 2025 08:00) (10 - 61)  SpO2: 100% (18 Feb 2025 08:00) (91% - 100%)    O2 Parameters below as of 18 Feb 2025 08:00  Patient On (Oxygen Delivery Method): ventilator            General: ill looking  ETT  Lungs: Bilateral RHONCHI  Cardiovascular: FRANKLYN 2.6  Abdomen: Soft, Positive BS  not following commands    02-17-25 @ 07:01  -  02-18-25 @ 07:00  --------------------------------------------------------  IN:    Dexmedetomidine: 476.7 mL    Enteral Tube Flush: 350 mL    FentaNYL: 464.1 mL    IV PiggyBack: 100 mL    Jevity: 600 mL    Norepinephrine: 59.4 mL    Propofol: 20.6 mL  Total IN: 2070.8 mL    OUT:    Drain (mL): 325 mL    Indwelling Catheter - Urethral (mL): 1420 mL  Total OUT: 1745 mL    Total NET: 325.8 mL          LABS:                          10.3   8.78  )-----------( 282      ( 18 Feb 2025 04:37 )             32.5                                               02-18    142  |  107  |  44[H]  ----------------------------<  150[H]  3.9   |  27  |  0.9    Ca    7.8[L]      18 Feb 2025 04:37  Mg     2.1     02-18    TPro  4.7[L]  /  Alb  2.4[L]  /  TBili  0.7  /  DBili  x   /  AST  18  /  ALT  <5  /  AlkPhos  90  02-18                                             Urinalysis Basic - ( 18 Feb 2025 04:37 )    Color: x / Appearance: x / SG: x / pH: x  Gluc: 150 mg/dL / Ketone: x  / Bili: x / Urobili: x   Blood: x / Protein: x / Nitrite: x   Leuk Esterase: x / RBC: x / WBC x   Sq Epi: x / Non Sq Epi: x / Bacteria: x                                                  LIVER FUNCTIONS - ( 18 Feb 2025 04:37 )  Alb: 2.4 g/dL / Pro: 4.7 g/dL / ALK PHOS: 90 U/L / ALT: <5 U/L / AST: 18 U/L / GGT: x                                                                                               Mode: AC/ CMV (Assist Control/ Continuous Mandatory Ventilation)  RR (machine): 18  TV (machine): 480  FiO2: 40  PEEP: 8  ITime: 1  MAP: 13  PIP: 27                                      ABG - ( 18 Feb 2025 04:36 )  pH, Arterial: 7.36  pH, Blood: x     /  pCO2: 50    /  pO2: 122   / HCO3: 34    / Base Excess: 2.1   /  SaO2: 99.0                MEDICATIONS  (STANDING):  buMETAnide Injectable 2 milliGRAM(s) IV Push daily  carbidopa/levodopa  25/250 2 Tablet(s) Oral three times a day  ceFAZolin   IVPB 2000 milliGRAM(s) IV Intermittent every 8 hours  chlorhexidine 0.12% Liquid 15 milliLiter(s) Oral Mucosa every 12 hours  chlorhexidine 2% Cloths 1 Application(s) Topical <User Schedule>  dexMEDEtomidine Infusion 0.5 MICROgram(s)/kG/Hr (6.39 mL/Hr) IV Continuous <Continuous>  dextrose 5%. 1000 milliLiter(s) (50 mL/Hr) IV Continuous <Continuous>  dextrose 5%. 1000 milliLiter(s) (100 mL/Hr) IV Continuous <Continuous>  dextrose 50% Injectable 12.5 Gram(s) IV Push once  dextrose 50% Injectable 25 Gram(s) IV Push once  dextrose 50% Injectable 25 Gram(s) IV Push once  fentaNYL   Infusion 0.5 MICROgram(s)/kG/Hr (2.56 mL/Hr) IV Continuous <Continuous>  glucagon  Injectable 1 milliGRAM(s) IntraMuscular once  insulin lispro (ADMELOG) corrective regimen sliding scale   SubCutaneous every 6 hours  insulin lispro Injectable (ADMELOG) 2 Unit(s) SubCutaneous every 6 hours  norepinephrine Infusion 0.05 MICROgram(s)/kG/Min (4.79 mL/Hr) IV Continuous <Continuous>  pantoprazole   Suspension 40 milliGRAM(s) Oral daily  polyethylene glycol 3350 17 Gram(s) Oral every 12 hours  propofol Infusion 5 MICROgram(s)/kG/Min (1.53 mL/Hr) IV Continuous <Continuous>  rasagiline Tablet 1 milliGRAM(s) Oral <User Schedule>  senna 2 Tablet(s) Oral at bedtime    MEDICATIONS  (PRN):  acetaminophen     Tablet .. 650 milliGRAM(s) Oral every 6 hours PRN Temp greater or equal to 38C (100.4F), Mild Pain (1 - 3)  dextrose Oral Gel 15 Gram(s) Oral once PRN Blood Glucose LESS THAN 70 milliGRAM(s)/deciliter  fentaNYL    Injectable 25 MICROGram(s) IV Push every 2 hours PRN Aggitation    cxr noted

## 2025-02-18 NOTE — PROGRESS NOTE ADULT - ASSESSMENT
69-year-old male past medical history of hypertension, Parkinson's, CAD presents for shortness of breath and cough of 1 day duration. History was obtained from patient's wife who noted that the patient has been having decreased po intake, activity, and unintentional weight loss for some time. However yesterday he started having cough and shortness of breath. Admitted to MICU for AHRF and sepsis 2/2 to mutilobar PNA.    #Acute Hypoxic Respiratory Failure likely 2/2 CAP/aspiration and flu  #Toxic Metabolic Encephalopathy likely 2/2 CAP/aspiration and flu  #Influenza A positive, treated  #MSSA bacteremia- resolved  #Sepsis POA  - Aspiration precautions.    - patient inc WOB, worsening alkalosis, obtunded-intubated for airway protection   -ID: if patient decompensates, dc cefepime/metro, start patricia 1g   - cardio: patient critically sick, not a candidate for surgery therefore no MARY, can c/w abx for IE as per ID  - s/p bronch, f/u cultures   - ID: tamiflu 30 mg daily: last dose on 2/10, s/p cefepime & metro (end date 2/16). nafcillin>>> now on cefazolin 2g q8h for MSSA bact  as per ID (6w)  -2/11 bronch: showed copious amounts of secretions and poor cough.  Will need trach: ongoing daily discussions with wife regarding trach vs extubation & change in code status  2/17: CT surgery consulted for trach & peg>possible add on 2/18   Stenotrophomonas now in bronch cx from 2/11, suspect colonizer, if fever/ incr wbc would add levaquin per ID      #PE on CTA  #large R groin intramuscular hematoma, stable  #Periventricular bleed on CTH, stable  - CTH. noted with right periventricular bleed, stable.   - Neuro on board  - Continue Anti parkinson's meds.    - CTA chest to r/o PE: focal filling defect within main pulm artery   - repeat CT head: stable intraventricular hemorrhage >> repeat on 2/9 after on full AC for PE: stable  -CT pelvis:  Large intramuscular hematoma in the medial right thigh measuring approximately 9.8 x 6.4 x 17 cm.  -holding AC  -2/12: Hb drop to 6.8> got 1 unit> up to 9.5 .  CTA stable & negative for any active extravasation   2/14: 1 unit prbc  hb stable. will restart proph AC today. duplex -ve 2/17    #hypoglycemia,   -decr insulin & adjust as needed    #Elevated Bilirubin-improving   #Leukocytosis-improving   #Cholangitis vs cholecystitis s/o perc haydee  - F/u RUQ US: distended GB with sludge, GB polyp   - IR: perc haydee 2/5, . GB drainage cultures: few candida, likely coloniz per id.      #Constipation, improved  - senna and miralax  - c/w lactulose   -2/10: kub: distented bowels. having BM    #Possible Mural thrombus in PA  #CAD  - Avoid overload  - C/w LR at 50  - TTE: EF 56%, G1DD, mild-mod MR, mod TR, mild OK, mild pHTN  - Cardio recs appreciated       - Patient is poor candidate for MARY       - full endocarditis abx course of nafcillin/ cefazolin    #MISC  - DVT ppx: SCDs, holding  - GI ppx: not needed  - Activity: as tolerated  - Diet: NPO w/ tube feed  - full code    plan: repeat duplex (off AC). f/u CT sx

## 2025-02-18 NOTE — PROGRESS NOTE ADULT - SUBJECTIVE AND OBJECTIVE BOX
SUBJECTIVE / OVERNIGHT EVENTS  Patient intubated & sedated, on fentanyl-precedex-fentanyl    MEDICATIONS  buMETAnide Injectable 2 milliGRAM(s) IV Push daily  carbidopa/levodopa  25/250 2 Tablet(s) Oral three times a day  ceFAZolin   IVPB 2000 milliGRAM(s) IV Intermittent every 8 hours  chlorhexidine 0.12% Liquid 15 milliLiter(s) Oral Mucosa every 12 hours  chlorhexidine 2% Cloths 1 Application(s) Topical <User Schedule>  dexMEDEtomidine Infusion 0.5 MICROgram(s)/kG/Hr IV Continuous <Continuous>  dextrose 5%. 1000 milliLiter(s) IV Continuous <Continuous>  dextrose 5%. 1000 milliLiter(s) IV Continuous <Continuous>  dextrose 50% Injectable 12.5 Gram(s) IV Push once  dextrose 50% Injectable 25 Gram(s) IV Push once  dextrose 50% Injectable 25 Gram(s) IV Push once  enoxaparin Injectable 40 milliGRAM(s) SubCutaneous every 24 hours  fentaNYL   Infusion 0.5 MICROgram(s)/kG/Hr IV Continuous <Continuous>  glucagon  Injectable 1 milliGRAM(s) IntraMuscular once  insulin lispro (ADMELOG) corrective regimen sliding scale   SubCutaneous every 6 hours  insulin lispro Injectable (ADMELOG) 2 Unit(s) SubCutaneous every 6 hours  norepinephrine Infusion 0.05 MICROgram(s)/kG/Min IV Continuous <Continuous>  pantoprazole   Suspension 40 milliGRAM(s) Oral daily  polyethylene glycol 3350 17 Gram(s) Oral every 12 hours  propofol Infusion 5 MICROgram(s)/kG/Min IV Continuous <Continuous>  rasagiline Tablet 1 milliGRAM(s) Oral <User Schedule>  senna 2 Tablet(s) Oral at bedtime    acetaminophen     Tablet .. 650 milliGRAM(s) Oral every 6 hours PRN Temp greater or equal to 38C (100.4F), Mild Pain (1 - 3)  dextrose Oral Gel 15 Gram(s) Oral once PRN Blood Glucose LESS THAN 70 milliGRAM(s)/deciliter  fentaNYL    Injectable 25 MICROGram(s) IV Push every 2 hours PRN Aggitation    VITALS /  EXAM    T(C): 36.8 (02-18-25 @ 08:00), Max: 37.5 (02-18-25 @ 04:00)  HR: 92 (02-18-25 @ 09:00) (64 - 115)  BP: 107/61 (02-18-25 @ 09:00) (78/49 - 158/62)  RR: 22 (02-18-25 @ 09:00) (10 - 61)  SpO2: 100% (02-18-25 @ 09:00) (91% - 100%)  POCT Blood Glucose.: 140 mg/dL (02-18-25 @ 05:36)  POCT Blood Glucose.: 167 mg/dL (02-17-25 @ 23:59)  POCT Blood Glucose.: 125 mg/dL (02-17-25 @ 17:21)  POCT Blood Glucose.: 67 mg/dL (02-17-25 @ 11:40)    GENERAL: NAD, sedated  CHEST/LUNG: Clear to auscultation bilaterally  HEART:  No murmurs, rubs, or gallops  ABDOMEN: Soft, Nontender, Nondistended  EXTREMITIES: bl edema    I's & O's     02-17-25 @ 07:01  -  02-18-25 @ 07:00  --------------------------------------------------------  IN:    Dexmedetomidine: 476.7 mL    Enteral Tube Flush: 350 mL    FentaNYL: 464.1 mL    IV PiggyBack: 100 mL    Jevity: 600 mL    Norepinephrine: 59.4 mL    Propofol: 20.6 mL  Total IN: 2070.8 mL    OUT:    Drain (mL): 325 mL    Indwelling Catheter - Urethral (mL): 1420 mL  Total OUT: 1745 mL    Total NET: 325.8 mL        LABS             10.3   8.78  )-----------( 282      ( 02-18-25 @ 04:37 )             32.5     142  |  107  |  44  -------------------------<  150   02-18-25 @ 04:37  3.9  |  27  |  0.9    Ca      7.8     02-18-25 @ 04:37  Mg     2.1     02-18-25 @ 04:37    TPro  4.7  /  Alb  2.4  /  TBili  0.7  /  DBili  x   /  AST  18  /  ALT  <5  /  AlkPhos  90  /  GGT  x     02-18-25 @ 04:37        Urinalysis Basic - ( 18 Feb 2025 04:37 )    Color: x / Appearance: x / SG: x / pH: x  Gluc: 150 mg/dL / Ketone: x  / Bili: x / Urobili: x   Blood: x / Protein: x / Nitrite: x   Leuk Esterase: x / RBC: x / WBC x   Sq Epi: x / Non Sq Epi: x / Bacteria: x      MICRO / IMAGING / CARDIOLOGY  Telemetry: Reviewed   EKG: Reviewed    CULTURES    IMAGING  PACS Image:  (02-17-25 @ 13:29)  PACS Image:  (02-17-25 @ 09:56)  PACS Image:  (02-17-25 @ 09:55)  PACS Image:  (02-17-25 @ 06:07)    CARDIOLOGY

## 2025-02-18 NOTE — PROGRESS NOTE ADULT - SUBJECTIVE AND OBJECTIVE BOX
THORACIC SURGERY PROGRESS NOTE     GARETT ESCAMILLA  [address] [place] [address] day :20d    Surgical Attending: Dr. Alcala  24 hour events:    PHYSICAL EXAM:  Appearance: Normal	  HEENT:   Normal oral mucosa, PERRL, EOMI	  Neck: Supple, - JVD  Cardiovascular: 90s-100s HR  Respiratory: b/l equal chest rise. No respiratory distress, intubated on vent 40/8.  Gastrointestinal:  Soft, Non-tender, perc cholecystostomy tube, draining bilious  Skin: No rashes, No ecchymoses, No cyanosis  Extremities: Edema of b/l UE noted      T(F): 98.8 (02-17-25 @ 20:00), Max: 98.8 (02-17-25 @ 20:00)  HR: 82 (02-17-25 @ 23:00) (63 - 115)  BP: 114/59 (02-17-25 @ 23:00) (78/49 - 158/62)  ABP: --  ABP(mean): --  RR: 18 (02-17-25 @ 23:00) (9 - 29)  SpO2: 100% (02-17-25 @ 23:00) (91% - 100%)    IN'S / OUT's:    02-16-25 @ 07:01  -  02-17-25 @ 07:00  --------------------------------------------------------  IN:    Dexmedetomidine: 416.6 mL    Enteral Tube Flush: 280 mL    FentaNYL: 252 mL    Free Water: 225 mL    IV PiggyBack: 1050 mL    Jevity: 900 mL    Norepinephrine: 5.3 mL  Total IN: 3128.8 mL    OUT:    Drain (mL): 775 mL    Indwelling Catheter - Urethral (mL): 1725 mL    Propofol: 0 mL  Total OUT: 2500 mL    Total NET: 628.8 mL      02-17-25 @ 07:01  -  02-18-25 @ 00:24  --------------------------------------------------------  IN:    Dexmedetomidine: 324.7 mL    Enteral Tube Flush: 350 mL    FentaNYL: 300.9 mL    IV PiggyBack: 100 mL    Jevity: 600 mL    Norepinephrine: 59.4 mL  Total IN: 1735 mL    OUT:    Drain (mL): 125 mL    Indwelling Catheter - Urethral (mL): 670 mL  Total OUT: 795 mL    Total NET: 940 mL          MEDICATIONS  (STANDING):  buMETAnide Injectable 2 milliGRAM(s) IV Push daily  carbidopa/levodopa  25/250 2 Tablet(s) Oral three times a day  ceFAZolin   IVPB 2000 milliGRAM(s) IV Intermittent every 8 hours  chlorhexidine 0.12% Liquid 15 milliLiter(s) Oral Mucosa every 12 hours  chlorhexidine 2% Cloths 1 Application(s) Topical <User Schedule>  dexMEDEtomidine Infusion 0.5 MICROgram(s)/kG/Hr (6.39 mL/Hr) IV Continuous <Continuous>  dextrose 5%. 1000 milliLiter(s) (100 mL/Hr) IV Continuous <Continuous>  dextrose 5%. 1000 milliLiter(s) (50 mL/Hr) IV Continuous <Continuous>  dextrose 50% Injectable 25 Gram(s) IV Push once  dextrose 50% Injectable 12.5 Gram(s) IV Push once  dextrose 50% Injectable 25 Gram(s) IV Push once  fentaNYL   Infusion 0.5 MICROgram(s)/kG/Hr (2.56 mL/Hr) IV Continuous <Continuous>  glucagon  Injectable 1 milliGRAM(s) IntraMuscular once  insulin lispro (ADMELOG) corrective regimen sliding scale   SubCutaneous every 6 hours  insulin lispro Injectable (ADMELOG) 2 Unit(s) SubCutaneous every 6 hours  norepinephrine Infusion 0.05 MICROgram(s)/kG/Min (4.79 mL/Hr) IV Continuous <Continuous>  pantoprazole   Suspension 40 milliGRAM(s) Oral daily  polyethylene glycol 3350 17 Gram(s) Oral every 12 hours  propofol Infusion 5 MICROgram(s)/kG/Min (1.53 mL/Hr) IV Continuous <Continuous>  rasagiline Tablet 1 milliGRAM(s) Oral <User Schedule>  senna 2 Tablet(s) Oral at bedtime    MEDICATIONS  (PRN):  acetaminophen     Tablet .. 650 milliGRAM(s) Oral every 6 hours PRN Temp greater or equal to 38C (100.4F), Mild Pain (1 - 3)  dextrose Oral Gel 15 Gram(s) Oral once PRN Blood Glucose LESS THAN 70 milliGRAM(s)/deciliter  fentaNYL    Injectable 25 MICROGram(s) IV Push every 2 hours PRN Aggitation      LABS  Labs:  CAPILLARY BLOOD GLUCOSE      POCT Blood Glucose.: 167 mg/dL (17 Feb 2025 23:59)  POCT Blood Glucose.: 125 mg/dL (17 Feb 2025 17:21)  POCT Blood Glucose.: 67 mg/dL (17 Feb 2025 11:40)  POCT Blood Glucose.: 170 mg/dL (17 Feb 2025 06:21)  POCT Blood Glucose.: 140 mg/dL (17 Feb 2025 01:18)                          10.3   7.43  )-----------( 241      ( 17 Feb 2025 05:29 )             33.2       Auto Immature Granulocyte %: 1.1 % (02-17-25 @ 05:29)    02-17    140  |  108  |  46[H]  ----------------------------<  192[H]  4.1   |  22  |  1.0      Calcium: 7.7 mg/dL (02-17-25 @ 05:29)      LFTs:             4.5  | 0.7  | 24       ------------------[83      ( 17 Feb 2025 05:29 )  2.2  | x    | <5          Lipase:x      Amylase:x         Blood Gas Arterial, Lactate: 1.0 mmol/L (02-17-25 @ 11:43)  Blood Gas Arterial, Lactate: 0.7 mmol/L (02-17-25 @ 03:59)  Blood Gas Arterial, Lactate: 0.6 mmol/L (02-16-25 @ 03:16)  Blood Gas Arterial, Lactate: 0.7 mmol/L (02-15-25 @ 03:44)    ABG - ( 17 Feb 2025 11:43 )  pH: 7.34  /  pCO2: 59    /  pO2: 144   / HCO3: 32    / Base Excess: 4.3   /  SaO2: 99.2            ABG - ( 17 Feb 2025 03:59 )  pH: 7.27  /  pCO2: 63    /  pO2: 127   / HCO3: 29    / Base Excess: 0.7   /  SaO2: 99.5            ABG - ( 16 Feb 2025 03:16 )  pH: 7.33  /  pCO2: 55    /  pO2: 117   / HCO3: 29    / Base Excess: 2.3   /  SaO2: 98.9              Coags:            Urinalysis Basic - ( 17 Feb 2025 05:29 )    Color: x / Appearance: x / SG: x / pH: x  Gluc: 192 mg/dL / Ketone: x  / Bili: x / Urobili: x   Blood: x / Protein: x / Nitrite: x   Leuk Esterase: x / RBC: x / WBC x   Sq Epi: x / Non Sq Epi: x / Bacteria: x            RADIOLOGY & ADDITIONAL TESTS:

## 2025-02-18 NOTE — CHART NOTE - NSCHARTNOTEFT_GEN_A_CORE
Registered Dietitian Follow-Up     Patient Profile Reviewed                           Yes [x]   No []     Nutrition History Previously Obtained        Yes []  No []       Pertinent Subjective Information:      Pertinent Medical Information:   # Acute Hypoxic Respiratory Failure likely  CAP/aspiration and flu  # Influenza A positive, treated  # MSSA bacteremia- resolved  # Sepsis POA  : CT surgery consulted for trach & peg>possible add on   # Elevated Bilirubin-improving   # Leukocytosis-improving   # Cholangitis vs cholecystitis s/o perc haydee  # Constipation, improved    Diet order: Diet, NPO:   NPO for Procedure/Test     NPO Start Date: 2025,   NPO Start Time: 00:01 (25 @ 00:28) [Active]  Diet, NPO with Tube Feed:   Tube Feeding Modality: Nasogastric  Peptamen A.F. Formula (PEPTAMENAFRTH)  Total Volume for 24 Hours (mL): 990  Continuous  Starting Tube Feed Rate {mL per Hour}: 20  Increase Tube Feed Rate by (mL): 10     Every 4 hours  Until Goal Tube Feed Rate (mL per Hour): 55  Tube Feed Duration (in Hours): 18  Tube Feed Start Time: 17:00  Free Water Flush  Free Water Flush Instructions:  noted with free water flush order of 300 mL q4hrs (25 @ 16:53) [Pending Verification By Attending]    Diet, NPO with Tube Feed:   Tube Feeding Modality: Nasogastric  Peptamen A.F. Formula (PEPTAMENAFRTH)  [Verification By Attending]  Diet, NPO with Tube Feed:   Tube Feeding Modality: Nasogastric  Jevity 1.2 Zaid (JEVITY1.2RTH)  Total Volume for 24 Hours (mL): 900  Continuous  Starting Tube Feed Rate {mL per Hour}: 50  Until Goal Tube Feed Rate (mL per Hour): 50  Tube Feed Duration (in Hours): 18  Tube Feed Start Time: 12:00  Free Water Flush   Total Volume per Flush (mL): 75   Frequency: Every 6 Hours   Total Daily Volume of Flush (mL): 300 (25 @ 13:53) [Active]    Anthropometrics:  Weight (kg): 51.1 (25 @ 10:19)  Height: 175.3 cm  BMI: 16.6  IBW: 72.7 KG    Daily Weight in k.4 (02-18), Weight in k.5 (-17), Weight in k.5 (-16), Weight in k (-15), Weight in k.4 (-14), Weight in k.7 (-13), Weight in k.6 (-12)    MEDICATIONS  (STANDING):  buMETAnide Injectable 2 milliGRAM(s) IV Push daily  carbidopa/levodopa  25/250 2 Tablet(s) Oral three times a day  ceFAZolin   IVPB 2000 milliGRAM(s) IV Intermittent every 8 hours  chlorhexidine 0.12% Liquid 15 milliLiter(s) Oral Mucosa every 12 hours  chlorhexidine 2% Cloths 1 Application(s) Topical <User Schedule>  dexMEDEtomidine Infusion 0.5 MICROgram(s)/kG/Hr (6.39 mL/Hr) IV Continuous <Continuous>  dextrose 5%. 1000 milliLiter(s) (50 mL/Hr) IV Continuous <Continuous>  dextrose 5%. 1000 milliLiter(s) (100 mL/Hr) IV Continuous <Continuous>  dextrose 50% Injectable 12.5 Gram(s) IV Push once  dextrose 50% Injectable 25 Gram(s) IV Push once  dextrose 50% Injectable 25 Gram(s) IV Push once  enoxaparin Injectable 40 milliGRAM(s) SubCutaneous every 24 hours  fentaNYL   Infusion 0.5 MICROgram(s)/kG/Hr (2.56 mL/Hr) IV Continuous <Continuous>  glucagon  Injectable 1 milliGRAM(s) IntraMuscular once  insulin lispro (ADMELOG) corrective regimen sliding scale   SubCutaneous every 6 hours  insulin lispro Injectable (ADMELOG) 2 Unit(s) SubCutaneous every 6 hours  norepinephrine Infusion 0.05 MICROgram(s)/kG/Min (4.79 mL/Hr) IV Continuous <Continuous>  pantoprazole   Suspension 40 milliGRAM(s) Oral daily  polyethylene glycol 3350 17 Gram(s) Oral every 12 hours  propofol Infusion 5 MICROgram(s)/kG/Min (1.53 mL/Hr) IV Continuous <Continuous>  rasagiline Tablet 1 milliGRAM(s) Oral <User Schedule>  senna 2 Tablet(s) Oral at bedtime    MEDICATIONS  (PRN):  acetaminophen     Tablet .. 650 milliGRAM(s) Oral every 6 hours PRN Temp greater or equal to 38C (100.4F), Mild Pain (1 - 3)  dextrose Oral Gel 15 Gram(s) Oral once PRN Blood Glucose LESS THAN 70 milliGRAM(s)/deciliter  fentaNYL    Injectable 25 MICROGram(s) IV Push every 2 hours PRN Aggitation    Pertinent Labs:  @ 04:37: Na 142, BUN 44[H], Cr 0.9, [H], K+ 3.9, Phos --, Mg 2.1, Alk Phos 90, ALT/SGPT <5, AST/SGOT 18, HbA1c --    Finger Sticks:  POCT Blood Glucose.: 145 mg/dL ( @ 11:10)  POCT Blood Glucose.: 140 mg/dL ( @ 05:36)  POCT Blood Glucose.: 167 mg/dL ( @ 23:59)  POCT Blood Glucose.: 125 mg/dL ( @ 17:21)    Physical Findings:  - Appearance: endotracheal tube  - GI function: last BM on   - Tubes: NGT  - Oral/Mouth cavity: NPO w/ TF  - Skin: DTI to sacrum, stage II to left buttocks, DTI to left heel  - Edema: edema 2+ to left arm; right arm     Nutrition Requirements:  Weight Used: 51.1 kg dosing weight - estimated needs with consideration for age, BMI, critical care setting, PI     Estimated Energy Needs    Continue []  Adjust [x]  ENERGY: 1277 - 1533 kcal/day (25-30 kcal/kg) vs PSE 1454.06 kcal/day (Tmax 37 C, Ve 8.9)     Estimated Protein Needs    Continue [x]  Adjust []  PROTEIN: 61 - 77 gm/day (1.2 - 1.5 gm/kg)      Estimated Fluid Needs        Continue [x]  Adjust []  FLUID: 1 mL/kcal     Nutrient Intake:  Current EN regimen provides:  900 mL total volume, 1080 kcal, 50 gm Protein, 729 mL free water from formula + additional water flushes of 300 mL q6hrs     [x] Previous Nutrition Diagnosis: Severe PCM             [x] Ongoing          [] Resolved    Nutrition Education: Deferred     Nutrition Intervention: TF regimen     Goal/Expected Outcome: EN to meet >85% and <105% of estimated nutrient needs within 3-5 days      Indicator/Monitoring:   EN tolerance, BM, GI s/s, weight, labs, skin status, NFPF     Recommendation:  1. Recommend to change EN regimen to the following:  bolus feeds of Peptamen AF formula as patient with NGT (to maintain aspiration precautions)  Recommend to start feeds at 120 mL q6hrs and advance feeds q4hrs to goal rate of 240 mL q6hrs  This will provide: 960 mL total volume, 1152 kcal, 75 gm Protein, 777.6 mL free water from formula + recommend 50 mL water flushes pre/post feeds + noted with free water flush order of 300 mL q4hrs = 2977.6 mL total water (or per Team). TF regimen meets >85% of estimated needs.   2. Maintain aspiration precautions  3. Adjust insulin regimen prn    Per previous RD note on :  *Addendum:  Discussed with Resident Physician - Team prefers continuous feeds, would recommend the following regimen:  18hr continuous feeds of Peptamen AF formula starting at 20 mL and advancing 10 mL q4hrs until goal rate of 55 mL x 18hrs is reached.  This will provide: 990 mL total volume, 1188 kcal, 77.2 gm Protein, 801.9 mL free water from formula + noted with flush order for 300 mL q4hrs = 2601.9 mL total water     High nutrition risk, RD to f/u    RD to remain available: Arline Owen, MACARIO [phone] or via Teams

## 2025-02-19 LAB
ALBUMIN SERPL ELPH-MCNC: 2.6 G/DL — LOW (ref 3.5–5.2)
ALP SERPL-CCNC: 105 U/L — SIGNIFICANT CHANGE UP (ref 30–115)
ALT FLD-CCNC: <5 U/L — SIGNIFICANT CHANGE UP (ref 0–41)
ANION GAP SERPL CALC-SCNC: 9 MMOL/L — SIGNIFICANT CHANGE UP (ref 7–14)
APTT BLD: 39.6 SEC — HIGH (ref 27–39.2)
AST SERPL-CCNC: 20 U/L — SIGNIFICANT CHANGE UP (ref 0–41)
BASOPHILS # BLD AUTO: 0.02 K/UL — SIGNIFICANT CHANGE UP (ref 0–0.2)
BASOPHILS NFR BLD AUTO: 0.2 % — SIGNIFICANT CHANGE UP (ref 0–1)
BILIRUB SERPL-MCNC: 1.1 MG/DL — SIGNIFICANT CHANGE UP (ref 0.2–1.2)
BUN SERPL-MCNC: 40 MG/DL — HIGH (ref 10–20)
CALCIUM SERPL-MCNC: 8.1 MG/DL — LOW (ref 8.4–10.5)
CHLORIDE SERPL-SCNC: 110 MMOL/L — SIGNIFICANT CHANGE UP (ref 98–110)
CO2 SERPL-SCNC: 27 MMOL/L — SIGNIFICANT CHANGE UP (ref 17–32)
CREAT SERPL-MCNC: 0.9 MG/DL — SIGNIFICANT CHANGE UP (ref 0.7–1.5)
EGFR: 92 ML/MIN/1.73M2 — SIGNIFICANT CHANGE UP
EGFR: 92 ML/MIN/1.73M2 — SIGNIFICANT CHANGE UP
EOSINOPHIL # BLD AUTO: 0.01 K/UL — SIGNIFICANT CHANGE UP (ref 0–0.7)
EOSINOPHIL NFR BLD AUTO: 0.1 % — SIGNIFICANT CHANGE UP (ref 0–8)
GAS PNL BLDA: SIGNIFICANT CHANGE UP
GLUCOSE BLDC GLUCOMTR-MCNC: 114 MG/DL — HIGH (ref 70–99)
GLUCOSE BLDC GLUCOMTR-MCNC: 88 MG/DL — SIGNIFICANT CHANGE UP (ref 70–99)
GLUCOSE BLDC GLUCOMTR-MCNC: 91 MG/DL — SIGNIFICANT CHANGE UP (ref 70–99)
GLUCOSE BLDC GLUCOMTR-MCNC: 99 MG/DL — SIGNIFICANT CHANGE UP (ref 70–99)
GLUCOSE SERPL-MCNC: 100 MG/DL — HIGH (ref 70–99)
HCT VFR BLD CALC: 34.1 % — LOW (ref 42–52)
HGB BLD-MCNC: 10.7 G/DL — LOW (ref 14–18)
IMM GRANULOCYTES NFR BLD AUTO: 0.5 % — HIGH (ref 0.1–0.3)
INR BLD: 1.17 RATIO — SIGNIFICANT CHANGE UP (ref 0.65–1.3)
LYMPHOCYTES # BLD AUTO: 0.99 K/UL — LOW (ref 1.2–3.4)
LYMPHOCYTES # BLD AUTO: 12.2 % — LOW (ref 20.5–51.1)
MAGNESIUM SERPL-MCNC: 2.4 MG/DL — SIGNIFICANT CHANGE UP (ref 1.8–2.4)
MCHC RBC-ENTMCNC: 29.7 PG — SIGNIFICANT CHANGE UP (ref 27–31)
MCHC RBC-ENTMCNC: 31.4 G/DL — LOW (ref 32–37)
MCV RBC AUTO: 94.7 FL — HIGH (ref 80–94)
MONOCYTES # BLD AUTO: 0.6 K/UL — SIGNIFICANT CHANGE UP (ref 0.1–0.6)
MONOCYTES NFR BLD AUTO: 7.4 % — SIGNIFICANT CHANGE UP (ref 1.7–9.3)
NEUTROPHILS # BLD AUTO: 6.46 K/UL — SIGNIFICANT CHANGE UP (ref 1.4–6.5)
NEUTROPHILS NFR BLD AUTO: 79.6 % — HIGH (ref 42.2–75.2)
NRBC BLD AUTO-RTO: 0 /100 WBCS — SIGNIFICANT CHANGE UP (ref 0–0)
PLATELET # BLD AUTO: 295 K/UL — SIGNIFICANT CHANGE UP (ref 130–400)
PMV BLD: 10.2 FL — SIGNIFICANT CHANGE UP (ref 7.4–10.4)
POTASSIUM SERPL-MCNC: 3.5 MMOL/L — SIGNIFICANT CHANGE UP (ref 3.5–5)
POTASSIUM SERPL-SCNC: 3.5 MMOL/L — SIGNIFICANT CHANGE UP (ref 3.5–5)
PROT SERPL-MCNC: 5 G/DL — LOW (ref 6–8)
PROTHROM AB SERPL-ACNC: 13.9 SEC — HIGH (ref 9.95–12.87)
RBC # BLD: 3.6 M/UL — LOW (ref 4.7–6.1)
RBC # FLD: 17.7 % — HIGH (ref 11.5–14.5)
SODIUM SERPL-SCNC: 146 MMOL/L — SIGNIFICANT CHANGE UP (ref 135–146)
WBC # BLD: 8.12 K/UL — SIGNIFICANT CHANGE UP (ref 4.8–10.8)
WBC # FLD AUTO: 8.12 K/UL — SIGNIFICANT CHANGE UP (ref 4.8–10.8)

## 2025-02-19 PROCEDURE — 99291 CRITICAL CARE FIRST HOUR: CPT

## 2025-02-19 PROCEDURE — 71045 X-RAY EXAM CHEST 1 VIEW: CPT | Mod: 26

## 2025-02-19 PROCEDURE — 71045 X-RAY EXAM CHEST 1 VIEW: CPT | Mod: 26,77

## 2025-02-19 RX ORDER — SODIUM CHLORIDE 9 G/1000ML
1000 INJECTION, SOLUTION INTRAVENOUS
Refills: 0 | Status: DISCONTINUED | OUTPATIENT
Start: 2025-02-19 | End: 2025-02-20

## 2025-02-19 RX ADMIN — Medication 100 MILLIGRAM(S): at 19:20

## 2025-02-19 RX ADMIN — BUMETANIDE 2 MILLIGRAM(S): 1 TABLET ORAL at 05:29

## 2025-02-19 RX ADMIN — Medication 2 TABLET(S): at 21:04

## 2025-02-19 RX ADMIN — Medication 1 APPLICATION(S): at 05:21

## 2025-02-19 RX ADMIN — Medication 2.56 MICROGRAM(S)/KG/HR: at 16:08

## 2025-02-19 RX ADMIN — Medication 100 MILLIGRAM(S): at 03:11

## 2025-02-19 RX ADMIN — Medication 15 MILLILITER(S): at 19:20

## 2025-02-19 RX ADMIN — Medication 2 TABLET(S): at 13:52

## 2025-02-19 RX ADMIN — Medication 15 MILLILITER(S): at 05:38

## 2025-02-19 RX ADMIN — Medication 100 MILLIGRAM(S): at 10:11

## 2025-02-19 RX ADMIN — POLYETHYLENE GLYCOL 3350 17 GRAM(S): 17 POWDER, FOR SOLUTION ORAL at 19:26

## 2025-02-19 RX ADMIN — ENOXAPARIN SODIUM 40 MILLIGRAM(S): 100 INJECTION SUBCUTANEOUS at 19:26

## 2025-02-19 RX ADMIN — Medication 40 MILLIGRAM(S): at 13:52

## 2025-02-19 NOTE — PROGRESS NOTE ADULT - SUBJECTIVE AND OBJECTIVE BOX
Patient is a 69y old  Male who presents with a chief complaint of penumonia (19 Feb 2025 07:42)        Over Night Events:  Remains on Mv. off pressors.      ROS:     All pertinent ROS are negative except HPI         PHYSICAL EXAM    ICU Vital Signs Last 24 Hrs  T(C): 37.2 (19 Feb 2025 04:00), Max: 37.4 (18 Feb 2025 16:00)  T(F): 99 (19 Feb 2025 04:00), Max: 99.4 (18 Feb 2025 16:00)  HR: 85 (19 Feb 2025 07:35) (72 - 133)  BP: 129/68 (19 Feb 2025 07:00) (64/35 - 189/93)  BP(mean): 93 (19 Feb 2025 07:00) (44 - 133)  ABP: --  ABP(mean): --  RR: 13 (19 Feb 2025 07:00) (8 - 48)  SpO2: 100% (19 Feb 2025 07:35) (98% - 100%)    O2 Parameters below as of 18 Feb 2025 16:00  Patient On (Oxygen Delivery Method): ventilator            CONSTITUTIONAL:  NAD    ENT:   ET tube      EYES:   Pupils equal,   Round and reactive to light.    CARDIAC:   Normal rate,   Regular rhythm.      RESPIRATORY:   No wheezing  Bilateral BS  Normal chest expansion  Not tachypneic,  No use of accessory muscles    GASTROINTESTINAL:  Abdomen soft,   Non-tender,   No guarding,   + BS      MUSCULOSKELETAL:   No clubbing, cyanosis    NEUROLOGICAL:   Sedated  Does not follow commands off sedation    SKIN:   Skin normal color for race,         02-18-25 @ 07:01  -  02-19-25 @ 07:00  --------------------------------------------------------  IN:    Dexmedetomidine: 241.7 mL    FentaNYL: 376.8 mL    IV PiggyBack: 50 mL    IV PiggyBack: 50 mL    Lactated Ringers: 540 mL    Norepinephrine: 80.6 mL    Propofol: 70.7 mL  Total IN: 1409.8 mL    OUT:    Drain (mL): 100 mL    Indwelling Catheter - Urethral (mL): 1550 mL  Total OUT: 1650 mL    Total NET: -240.2 mL          LABS:                            10.7   8.12  )-----------( 295      ( 19 Feb 2025 05:11 )             34.1                                               02-19    146  |  110  |  40[H]  ----------------------------<  100[H]  3.5   |  27  |  0.9    Ca    8.1[L]      19 Feb 2025 05:11  Mg     2.4     02-19    TPro  5.0[L]  /  Alb  2.6[L]  /  TBili  1.1  /  DBili  x   /  AST  20  /  ALT  <5  /  AlkPhos  105  02-19      PT/INR - ( 19 Feb 2025 05:11 )   PT: 13.90 sec;   INR: 1.17 ratio         PTT - ( 19 Feb 2025 05:11 )  PTT:39.6 sec                                       Urinalysis Basic - ( 19 Feb 2025 05:11 )    Color: x / Appearance: x / SG: x / pH: x  Gluc: 100 mg/dL / Ketone: x  / Bili: x / Urobili: x   Blood: x / Protein: x / Nitrite: x   Leuk Esterase: x / RBC: x / WBC x   Sq Epi: x / Non Sq Epi: x / Bacteria: x                                                  LIVER FUNCTIONS - ( 19 Feb 2025 05:11 )  Alb: 2.6 g/dL / Pro: 5.0 g/dL / ALK PHOS: 105 U/L / ALT: <5 U/L / AST: 20 U/L / GGT: x                                                                                               Mode: AC/ CMV (Assist Control/ Continuous Mandatory Ventilation)  RR (machine): 18  TV (machine): 480  FiO2: 40  PEEP: 8  ITime: 1  MAP: 12  PIP: 21                                      ABG - ( 19 Feb 2025 04:16 )  pH, Arterial: 7.38  pH, Blood: x     /  pCO2: 46    /  pO2: 151   / HCO3: 27    / Base Excess: 1.6   /  SaO2: 99.7                MEDICATIONS  (STANDING):  buMETAnide Injectable 2 milliGRAM(s) IV Push daily  carbidopa/levodopa  25/250 2 Tablet(s) Oral three times a day  ceFAZolin   IVPB 2000 milliGRAM(s) IV Intermittent every 8 hours  chlorhexidine 0.12% Liquid 15 milliLiter(s) Oral Mucosa every 12 hours  chlorhexidine 2% Cloths 1 Application(s) Topical <User Schedule>  dexMEDEtomidine Infusion 0.5 MICROgram(s)/kG/Hr (6.39 mL/Hr) IV Continuous <Continuous>  dextrose 5%. 1000 milliLiter(s) (50 mL/Hr) IV Continuous <Continuous>  dextrose 5%. 1000 milliLiter(s) (100 mL/Hr) IV Continuous <Continuous>  dextrose 50% Injectable 12.5 Gram(s) IV Push once  dextrose 50% Injectable 25 Gram(s) IV Push once  dextrose 50% Injectable 25 Gram(s) IV Push once  enoxaparin Injectable 40 milliGRAM(s) SubCutaneous every 24 hours  fentaNYL   Infusion 0.5 MICROgram(s)/kG/Hr (2.56 mL/Hr) IV Continuous <Continuous>  glucagon  Injectable 1 milliGRAM(s) IntraMuscular once  insulin lispro (ADMELOG) corrective regimen sliding scale   SubCutaneous every 6 hours  insulin lispro Injectable (ADMELOG) 2 Unit(s) SubCutaneous every 6 hours  lactated ringers. 1000 milliLiter(s) (90 mL/Hr) IV Continuous <Continuous>  norepinephrine Infusion 0.05 MICROgram(s)/kG/Min (4.79 mL/Hr) IV Continuous <Continuous>  pantoprazole   Suspension 40 milliGRAM(s) Oral daily  polyethylene glycol 3350 17 Gram(s) Oral every 12 hours  propofol Infusion 5 MICROgram(s)/kG/Min (1.53 mL/Hr) IV Continuous <Continuous>  rasagiline Tablet 1 milliGRAM(s) Oral <User Schedule>  senna 2 Tablet(s) Oral at bedtime    MEDICATIONS  (PRN):  acetaminophen     Tablet .. 650 milliGRAM(s) Oral every 6 hours PRN Temp greater or equal to 38C (100.4F), Mild Pain (1 - 3)  dextrose Oral Gel 15 Gram(s) Oral once PRN Blood Glucose LESS THAN 70 milliGRAM(s)/deciliter  fentaNYL    Injectable 25 MICROGram(s) IV Push every 2 hours PRN Aggitation      New X-rays reviewed:                                                                                  ECHO    CXR interpreted by me:       Patient is a 69y old  Male who presents with a chief complaint of penumonia (19 Feb 2025 07:42)        Over Night Events:  Remains on MV. off pressors. CT sx reviewed      PHYSICAL EXAM    ICU Vital Signs Last 24 Hrs  T(C): 37.2 (19 Feb 2025 04:00), Max: 37.4 (18 Feb 2025 16:00)  T(F): 99 (19 Feb 2025 04:00), Max: 99.4 (18 Feb 2025 16:00)  HR: 85 (19 Feb 2025 07:35) (72 - 133)  BP: 129/68 (19 Feb 2025 07:00) (64/35 - 189/93)  BP(mean): 93 (19 Feb 2025 07:00) (44 - 133)  RR: 13 (19 Feb 2025 07:00) (8 - 48)  SpO2: 100% (19 Feb 2025 07:35) (98% - 100%)    O2 Parameters below as of 18 Feb 2025 16:00  Patient On (Oxygen Delivery Method): ventilator            CONSTITUTIONAL:  Ill looking  Cachectic    ENT:   ET tube      CARDIAC:   FRANKLYN 2/6    RESPIRATORY:   bl rhonchi    GASTROINTESTINAL:  Abdomen soft,   Non-tender,   No guarding,   + BS      MUSCULOSKELETAL:   No clubbing, cyanosis    NEUROLOGICAL:   Sedated  Does not follow commands off sedation          02-18-25 @ 07:01  -  02-19-25 @ 07:00  --------------------------------------------------------  IN:    Dexmedetomidine: 241.7 mL    FentaNYL: 376.8 mL    IV PiggyBack: 50 mL    IV PiggyBack: 50 mL    Lactated Ringers: 540 mL    Norepinephrine: 80.6 mL    Propofol: 70.7 mL  Total IN: 1409.8 mL    OUT:    Drain (mL): 100 mL    Indwelling Catheter - Urethral (mL): 1550 mL  Total OUT: 1650 mL    Total NET: -240.2 mL          LABS:                            10.7   8.12  )-----------( 295      ( 19 Feb 2025 05:11 )             34.1                                               02-19    146  |  110  |  40[H]  ----------------------------<  100[H]  3.5   |  27  |  0.9    Ca    8.1[L]      19 Feb 2025 05:11  Mg     2.4     02-19    TPro  5.0[L]  /  Alb  2.6[L]  /  TBili  1.1  /  DBili  x   /  AST  20  /  ALT  <5  /  AlkPhos  105  02-19      PT/INR - ( 19 Feb 2025 05:11 )   PT: 13.90 sec;   INR: 1.17 ratio         PTT - ( 19 Feb 2025 05:11 )  PTT:39.6 sec                                       Urinalysis Basic - ( 19 Feb 2025 05:11 )    Color: x / Appearance: x / SG: x / pH: x  Gluc: 100 mg/dL / Ketone: x  / Bili: x / Urobili: x   Blood: x / Protein: x / Nitrite: x   Leuk Esterase: x / RBC: x / WBC x   Sq Epi: x / Non Sq Epi: x / Bacteria: x                                                  LIVER FUNCTIONS - ( 19 Feb 2025 05:11 )  Alb: 2.6 g/dL / Pro: 5.0 g/dL / ALK PHOS: 105 U/L / ALT: <5 U/L / AST: 20 U/L / GGT: x                                                                                               Mode: AC/ CMV (Assist Control/ Continuous Mandatory Ventilation)  RR (machine): 18  TV (machine): 480  FiO2: 40  PEEP: 8  ITime: 1  MAP: 12  PIP: 21                                      ABG - ( 19 Feb 2025 04:16 )  pH, Arterial: 7.38  pH, Blood: x     /  pCO2: 46    /  pO2: 151   / HCO3: 27    / Base Excess: 1.6   /  SaO2: 99.7                MEDICATIONS  (STANDING):  buMETAnide Injectable 2 milliGRAM(s) IV Push daily  carbidopa/levodopa  25/250 2 Tablet(s) Oral three times a day  ceFAZolin   IVPB 2000 milliGRAM(s) IV Intermittent every 8 hours  chlorhexidine 0.12% Liquid 15 milliLiter(s) Oral Mucosa every 12 hours  chlorhexidine 2% Cloths 1 Application(s) Topical <User Schedule>  dexMEDEtomidine Infusion 0.5 MICROgram(s)/kG/Hr (6.39 mL/Hr) IV Continuous <Continuous>  dextrose 5%. 1000 milliLiter(s) (50 mL/Hr) IV Continuous <Continuous>  dextrose 5%. 1000 milliLiter(s) (100 mL/Hr) IV Continuous <Continuous>  dextrose 50% Injectable 12.5 Gram(s) IV Push once  dextrose 50% Injectable 25 Gram(s) IV Push once  dextrose 50% Injectable 25 Gram(s) IV Push once  enoxaparin Injectable 40 milliGRAM(s) SubCutaneous every 24 hours  fentaNYL   Infusion 0.5 MICROgram(s)/kG/Hr (2.56 mL/Hr) IV Continuous <Continuous>  glucagon  Injectable 1 milliGRAM(s) IntraMuscular once  insulin lispro (ADMELOG) corrective regimen sliding scale   SubCutaneous every 6 hours  insulin lispro Injectable (ADMELOG) 2 Unit(s) SubCutaneous every 6 hours  lactated ringers. 1000 milliLiter(s) (90 mL/Hr) IV Continuous <Continuous>  norepinephrine Infusion 0.05 MICROgram(s)/kG/Min (4.79 mL/Hr) IV Continuous <Continuous>  pantoprazole   Suspension 40 milliGRAM(s) Oral daily  polyethylene glycol 3350 17 Gram(s) Oral every 12 hours  propofol Infusion 5 MICROgram(s)/kG/Min (1.53 mL/Hr) IV Continuous <Continuous>  rasagiline Tablet 1 milliGRAM(s) Oral <User Schedule>  senna 2 Tablet(s) Oral at bedtime    MEDICATIONS  (PRN):  acetaminophen     Tablet .. 650 milliGRAM(s) Oral every 6 hours PRN Temp greater or equal to 38C (100.4F), Mild Pain (1 - 3)  dextrose Oral Gel 15 Gram(s) Oral once PRN Blood Glucose LESS THAN 70 milliGRAM(s)/deciliter  fentaNYL    Injectable 25 MICROGram(s) IV Push every 2 hours PRN Aggitation

## 2025-02-19 NOTE — PROGRESS NOTE ADULT - SUBJECTIVE AND OBJECTIVE BOX
THORACIC SURGERY PROGRESS NOTE     Patient: GARETT ESCAMILLA , 69y ([birth year])Male   MRN: [id number]  Location: [address]  Visit: 01-29-25 Inpatient  Date: 02-19-25 @ 07:42        Admitted :01-29-25 (21d)  LOS: 21d    Procedure/Dx/Injuries: Pneumonia    Acute respiratory failure with hypoxia    Pneumonia    Parkinson disease    Encounter for palliative care        Events of past 24 hours:   Patient seen and examined at bedside.  Levo 0.01. No acute events overnight. Afebrile, VSS.  >>> <<<>>> <<<>>> <<<>>> <<<>>> <<<>>> <<<>>> <<<>>> <<<>>> <<<>>> <<<    Vitals:   T(F): 99 (02-19-25 @ 04:00), Max: 99.4 (02-18-25 @ 16:00)  HR: 88 (02-19-25 @ 07:00)  BP: 129/68 (02-19-25 @ 07:00)  RR: 13 (02-19-25 @ 07:00)  SpO2: 100% (02-19-25 @ 07:00)  Mode: AC/ CMV (Assist Control/ Continuous Mandatory Ventilation), RR (machine): 18, TV (machine): 480, FiO2: 40, PEEP: 8, ITime: 1.1, MAP: 13, PIP: 24    PHYSICAL EXAM:  Appearance: Intubated   HEENT: Normal oral mucosa, EOMI	  Cardiovascular: Normal rate   Respiratory: B/L equal chest rise. No respiratory distress, intubated on vent 40/8.  Gastrointestinal: Soft, Non-tender, perc cholecystostomy tube, draining bilious  >>> <<<>>> <<<>>> <<<>>> <<<>>> <<<>>> <<<>>> <<<>>> <<<>>> <<<>>> <<<   Is & Os:   Diet, NPO after Midnight:      NPO Start Date: 18-Feb-2025,   NPO Start Time: 23:59  Diet, NPO after Midnight:      NPO Start Date: 18-Feb-2025,   NPO Start Time: 23:59  Diet, NPO with Tube Feed:   Tube Feeding Modality: Nasogastric  Peptamen A.F. Formula (PEPTAMENAFRTH)  Total Volume for 24 Hours (mL): 960  Bolus  Total Volume of Bolus (mL):  240  Total # of Feeds: 4  Tube Feed Frequency: Every 6 hours   Tube Feed Start Time: 18:00  Bolus Feed Rate (mL per Hour): 120   Bolus Feed Duration (in Hours): 1  Bolus Feed Instructions:  Start feeds at 120 mL and advance as tolerated to goal rate of 240 mL q6hrs  Free Water Flush  Free Water Flush Instructions:  50 mL water flushes pre/post feeds + noted with free water flush order of 300 mL q4hrs    Fluids: lactated ringers.: Solution, 1000 milliLiter(s) infuse at 90 mL/Hr      02-18-25 @ 07:01  -  02-19-25 @ 07:00  --------------------------------------------------------  IN:    Dexmedetomidine: 241.7 mL    FentaNYL: 376.8 mL    IV PiggyBack: 50 mL    IV PiggyBack: 50 mL    Lactated Ringers: 540 mL    Norepinephrine: 80.6 mL    Propofol: 70.7 mL  Total IN: 1409.8 mL    OUT:    Drain (mL): 100 mL    Indwelling Catheter - Urethral (mL): 1550 mL  Total OUT: 1650 mL    Total NET: -240.2 mL      >>> <<<>>> <<<>>> <<<>>> <<<>>> <<<>>> <<<>>> <<<>>> <<<>>> <<<>>> <<<    MEDICATIONS  (STANDING):  buMETAnide Injectable 2 milliGRAM(s) IV Push daily  carbidopa/levodopa  25/250 2 Tablet(s) Oral three times a day  ceFAZolin   IVPB 2000 milliGRAM(s) IV Intermittent every 8 hours  chlorhexidine 0.12% Liquid 15 milliLiter(s) Oral Mucosa every 12 hours  chlorhexidine 2% Cloths 1 Application(s) Topical <User Schedule>  dexMEDEtomidine Infusion 0.5 MICROgram(s)/kG/Hr (6.39 mL/Hr) IV Continuous <Continuous>  dextrose 5%. 1000 milliLiter(s) (50 mL/Hr) IV Continuous <Continuous>  dextrose 5%. 1000 milliLiter(s) (100 mL/Hr) IV Continuous <Continuous>  dextrose 50% Injectable 12.5 Gram(s) IV Push once  dextrose 50% Injectable 25 Gram(s) IV Push once  dextrose 50% Injectable 25 Gram(s) IV Push once  enoxaparin Injectable 40 milliGRAM(s) SubCutaneous every 24 hours  fentaNYL   Infusion 0.5 MICROgram(s)/kG/Hr (2.56 mL/Hr) IV Continuous <Continuous>  glucagon  Injectable 1 milliGRAM(s) IntraMuscular once  insulin lispro (ADMELOG) corrective regimen sliding scale   SubCutaneous every 6 hours  insulin lispro Injectable (ADMELOG) 2 Unit(s) SubCutaneous every 6 hours  lactated ringers. 1000 milliLiter(s) (90 mL/Hr) IV Continuous <Continuous>  norepinephrine Infusion 0.05 MICROgram(s)/kG/Min (4.79 mL/Hr) IV Continuous <Continuous>  pantoprazole   Suspension 40 milliGRAM(s) Oral daily  polyethylene glycol 3350 17 Gram(s) Oral every 12 hours  propofol Infusion 5 MICROgram(s)/kG/Min (1.53 mL/Hr) IV Continuous <Continuous>  rasagiline Tablet 1 milliGRAM(s) Oral <User Schedule>  senna 2 Tablet(s) Oral at bedtime    MEDICATIONS  (PRN):  acetaminophen     Tablet .. 650 milliGRAM(s) Oral every 6 hours PRN Temp greater or equal to 38C (100.4F), Mild Pain (1 - 3)  dextrose Oral Gel 15 Gram(s) Oral once PRN Blood Glucose LESS THAN 70 milliGRAM(s)/deciliter  fentaNYL    Injectable 25 MICROGram(s) IV Push every 2 hours PRN Aggitation      DVT PROPHYLAXIS: enoxaparin Injectable 40 milliGRAM(s) SubCutaneous every 24 hours    GI PROPHYLAXIS: pantoprazole   Suspension 40 milliGRAM(s) Oral daily    ANTIBIOTICS:  ceFAZolin   IVPB 2000 milliGRAM(s)      >>> <<<>>> <<<>>> <<<>>> <<<>>> <<<>>> <<<>>> <<<>>> <<<>>> <<<>>> <<<        LAB/STUDIES:  Labs:  CAPILLARY BLOOD GLUCOSE      POCT Blood Glucose.: 91 mg/dL (19 Feb 2025 05:18)  POCT Blood Glucose.: 99 mg/dL (19 Feb 2025 00:29)  POCT Blood Glucose.: 129 mg/dL (18 Feb 2025 17:01)  POCT Blood Glucose.: 145 mg/dL (18 Feb 2025 11:10)                          10.7   8.12  )-----------( 295      ( 19 Feb 2025 05:11 )             34.1       Auto Immature Granulocyte %: 0.5 % (02-19-25 @ 05:11)    02-19    146  |  110  |  40[H]  ----------------------------<  100[H]  3.5   |  27  |  0.9      Calcium: 8.1 mg/dL (02-19-25 @ 05:11)      LFTs:             5.0  | 1.1  | 20       ------------------[105     ( 19 Feb 2025 05:11 )  2.6  | x    | <5            Blood Gas Arterial, Lactate: 0.6 mmol/L (02-19-25 @ 04:16)  Blood Gas Arterial, Lactate: 0.7 mmol/L (02-18-25 @ 04:36)  Blood Gas Arterial, Lactate: 1.0 mmol/L (02-17-25 @ 11:43)  Blood Gas Arterial, Lactate: 0.7 mmol/L (02-17-25 @ 03:59)    ABG - ( 19 Feb 2025 04:16 )  pH: 7.38  /  pCO2: 46    /  pO2: 151   / HCO3: 27    / Base Excess: 1.6   /  SaO2: 99.7            ABG - ( 18 Feb 2025 04:36 )  pH: 7.36  /  pCO2: 50    /  pO2: 122   / HCO3: 34    / Base Excess: 2.1   /  SaO2: 99.0            ABG - ( 17 Feb 2025 11:43 )  pH: 7.34  /  pCO2: 59    /  pO2: 144   / HCO3: 32    / Base Excess: 4.3   /  SaO2: 99.2              Coags:     13.90  ----< 1.17    ( 19 Feb 2025 05:11 )     39.6                Urinalysis Basic - ( 19 Feb 2025 05:11 )    Color: x / Appearance: x / SG: x / pH: x  Gluc: 100 mg/dL / Ketone: x  / Bili: x / Urobili: x   Blood: x / Protein: x / Nitrite: x   Leuk Esterase: x / RBC: x / WBC x   Sq Epi: x / Non Sq Epi: x / Bacteria: x              >>> <<<>>> <<<>>> <<<>>> <<<>>> <<<>>> <<<>>> <<<>>> <<<>>> <<<>>> <<<  ASSESSMENT:  69y M c/s for trach and PEG.    PLAN:  - Added on for trach and PEG today   - Keep NPO  - Rest per primary team       [employer] SPECTRA [id number]

## 2025-02-19 NOTE — PROGRESS NOTE ADULT - ASSESSMENT
IMPRESSION:    Acute Hypoxic Respiratory Failure remains intubated  Toxic Metabolic Encephalopathy  CAP likely aspiration   Influenza A treated   MSSA bacteremia repeat CX -  Sepsis POA  cholecystitis sp percutaneous haydee  Periventricular bleed  Suspected Mural thrombus in PA  Right thigh hematoma.    HO Parkinson's Disease   HO CAD    PLAN:    CNS:  SAT. Continue Anti parkinson's.     HEENT: Oral care.  ETT duration: 15 days.  CTS for trach and PEG      PULMONARY: Advance ET tube. No change in vent settings.  Monitor airway pressures.  Remains with significant secretions.  Awaiting trach.  CTS follow up on 40%    CARDIOVASCULAR:  Avoid overload.  Volume contraction muna tolerated     GI: GI prophylaxis.  NGT feeds as tolerated. S/P perc cholecystostomy. Bowel regimen     RENAL: Follow up lytes.  Mars in place for retention. Kidney function stable.     INFECTIOUS DISEASE: Cultures noted.  SP Tamiflu.  ABX per ID.  Ancef for now Duration per ID.     HEMATOLOGICAL: Monitor CBC and Coags. CTA noted with no active bleed.  keep Hg > 7.  Le DOPPLER -    ENDOCRINE:  Follow up FS.  Insulin protocol if needed.    MUSCULOSKELETAL: Bed rest.  Off loading. Wound care for DTIs.     Full code  MICU  palliative care follow up   very poor prognosis     IMPRESSION:    Acute Hypoxic Respiratory Failure remains intubated  Toxic Metabolic Encephalopathy  CAP likely aspiration   Influenza A treated   MSSA bacteremia repeat CX -  Sepsis POA  cholecystitis sp percutaneous haydee  Periventricular bleed  Suspected Mural thrombus in PA  Right thigh hematoma.    HO Parkinson's Disease   HO CAD    PLAN:    CNS:  SAT. Continue Anti parkinson's. head CT noted    HEENT: Oral care.  ETT duration: 15 days.  CTS for trach and PEG      PULMONARY: Advance ET tube. No change in vent settings.  Monitor airway pressures.  Remains with significant secretions.  Awaiting trach.  CTS follow up on 40%    CARDIOVASCULAR:  Avoid overload.  Volume contraction muna tolerated     GI: GI prophylaxis.  NGT feeds as tolerated. S/P perc cholecystostomy. Bowel regimen     RENAL: Follow up lytes.  Mars in place for retention. Kidney function stable.     INFECTIOUS DISEASE: Cultures noted.  SP Tamiflu.  ABX per ID.  Ancef for now Duration per ID.     HEMATOLOGICAL: Monitor CBC and Coags. CTA noted with no active bleed.  keep Hg > 7.  Le DOPPLER -    ENDOCRINE:  Follow up FS.  Insulin protocol if needed.    MUSCULOSKELETAL: Bed rest.  Off loading. Wound care for DTIs.     Full code  MICU  palliative care follow up   very poor prognosis

## 2025-02-19 NOTE — PROGRESS NOTE ADULT - ASSESSMENT
69-year-old male past medical history of hypertension, Parkinson's, CAD presents for shortness of breath and cough of 1 day duration. History was obtained from patient's wife who noted that the patient has been having decreased po intake, activity, and unintentional weight loss for some time. However yesterday he started having cough and shortness of breath. Admitted to MICU for AHRF and sepsis 2/2 to mutilobar PNA.    #Acute Hypoxic Respiratory Failure likely 2/2 CAP/aspiration and flu  #Toxic Metabolic Encephalopathy likely 2/2 CAP/aspiration and flu  #Influenza A positive, treated  #MSSA bacteremia- resolved  #Sepsis POA  - Aspiration precautions.    - patient inc WOB, worsening alkalosis, obtunded-intubated for airway protection   -ID: if patient decompensates, dc cefepime/metro, start patricia 1g   - cardio: patient critically sick, not a candidate for surgery therefore no MARY, can c/w abx for IE as per ID  - s/p bronch, f/u cultures   - ID: tamiflu 30 mg daily: last dose on 2/10, s/p cefepime & metro (end date 2/16). nafcillin>>> now on cefazolin 2g q8h for MSSA bact  as per ID (6w)  -2/11 bronch: showed copious amounts of secretions and poor cough.  Will need trach: ongoing daily discussions with wife regarding trach vs extubation & change in code status   CT surgery consulted for trach & peg>add on 2/19, pt NPO   Stenotrophomonas now in bronch cx from 2/11, suspect colonizer, if fever/ incr wbc would add levaquin per ID      #PE on CTA  #large R groin intramuscular hematoma, stable  #Periventricular bleed on CTH, stable  - CTH. noted with right periventricular bleed, stable.   - Neuro on board  - Continue Anti parkinson's meds.    - CTA chest to r/o PE: focal filling defect within main pulm artery   - repeat CT head: stable intraventricular hemorrhage >> repeat on 2/9 after on full AC for PE: stable  -CT pelvis:  Large intramuscular hematoma in the medial right thigh measuring approximately 9.8 x 6.4 x 17 cm.  -holding AC  -2/12: Hb drop to 6.8> got 1 unit> up to 9.5 .  CTA stable & negative for any active extravasation   2/14: 1 unit prbc  hb stable. restart proph AC 2/18. duplex -ve 2/17    #hypoglycemia, imroved  -decr insulin & adjust as needed    #Elevated Bilirubin-improving   #Leukocytosis-improving   #Cholangitis vs cholecystitis s/o perc haydee  - F/u RUQ US: distended GB with sludge, GB polyp   - IR: perc haydee 2/5, . GB drainage cultures: few candida, likely coloniz per id.    #Constipation, improved  - senna and miralax  - c/w lactulose   -2/10: kub: distented bowels. having BM    #Possible Mural thrombus in PA  #CAD  - Avoid overload  - C/w LR at 50  - TTE: EF 56%, G1DD, mild-mod MR, mod TR, mild MI, mild pHTN  - Cardio recs appreciated       - Patient is poor candidate for MARY       - full endocarditis abx course of nafcillin/ cefazolin    #MISC  - DVT ppx: SCDs  - GI ppx: not needed  - Activity: as tolerated  - Diet: NPO w/ tube feed  - full code

## 2025-02-19 NOTE — CHART NOTE - NSCHARTNOTEFT_GEN_A_CORE
Pre-Op Note    Patient: GARETT ESCAMILLA  MRN: [id number]  69y Male  Location: [address] 011   25 @ 16:51    Admit Diagnosis: Pneumonia due to infectious organism        Procedure: Tracheostomy and percutaneous endoscopic gastrostomy tube placement  Consent in Chart: Yes  Diet: Diet, NPO after Midnight:      NPO Start Date: 2025,   NPO Start Time: 23:59  Except Medications (25 @ 13:24)  Diet, NPO after Midnight:      NPO Start Date: 2025,   NPO Start Time: 23:59 (25 @ 17:46)    Fluids: dextrose 5%. 1000 milliLiter(s) (50 mL/Hr) IV Continuous <Continuous>  dextrose 5%. 1000 milliLiter(s) (100 mL/Hr) IV Continuous <Continuous>  lactated ringers. 1000 milliLiter(s) (90 mL/Hr) IV Continuous <Continuous>    EK Lead ECG:   Ventricular Rate 91 BPM    Atrial Rate 91 BPM    P-R Interval 158 ms    QRS Duration 74 ms    Q-T Interval 366 ms    QTC Calculation(Bazett) 450 ms    P Axis 77 degrees    R Axis 64 degrees    T Axis 68 degrees    Diagnosis Line Normal sinus rhythm  Biatrial enlargement  Abnormal ECG    Confirmed by LILLIE LUND, [facility] ([id number]) on 2025 12:29:13 PM (25 @ 10:39)    CXR:  Xray Chest 1 View- PORTABLE-Urgent:   ACC: [id number] EXAM:  XR CHEST PORTABLE URGENT 1V   ORDERED BY: MADDIE TOBIN     PROCEDURE DATE:  2025      INTERPRETATION:  CLINICAL HISTORY: Intubated     Frontal view chest    Comparison: Chest x-rayFebruary 2025, 4:49 PM      Findings/  impression:    Rotated to the right    Support devices: ET tube tip approximately 6 cm above minh. Feeding   tube passed beyond EG junction, satisfactory position.    Cardiac/ Mediastinum: Stable    Lungs/ Pleura: Diminishing right greater than left basilar   opacities/right effusion.    Skeletal/ soft tissues: Stable      --- End of Report ---      VERO SHAHID MD; Attending Radiologist  This document has been electronically signed. 2025 11:51AM (25 @ 10:48)      Vitals:  T(F): 97.8 (25 @ 12:00), Max: 99 (25 @ 20:00)  HR: 86 (25 @ 15:00) (84 - 133)  BP: 109/61 (25 @ 15:00) (77/50 - 189/93)  RR: 18 (25 @ 15:00) (8 - 48)  SpO2: 100% (25 @ 15:00) (99% - 100%)    Pre-OP Labs:  CAPILLARY BLOOD GLUCOSE      POCT Blood Glucose.: 114 mg/dL (2025 12:26)  POCT Blood Glucose.: 91 mg/dL (2025 05:18)  POCT Blood Glucose.: 99 mg/dL (2025 00:29)  POCT Blood Glucose.: 129 mg/dL (2025 17:01)                          10.7   8.12  )-----------( 295      ( 2025 05:11 )             34.1         146  |  110  |  40[H]  ----------------------------<  100[H]  3.5   |  27  |  0.9    Ca    8.1[L]      2025 05:11  Mg     2.4         TPro  5.0[L]  /  Alb  2.6[L]  /  TBili  1.1  /  DBili  x   /  AST  20  /  ALT  <5  /  AlkPhos  105      PT/INR - ( 2025 05:11 )   PT: 13.90 sec;   INR: 1.17 ratio         PTT - ( 2025 05:11 )  PTT:39.6 sec  Urinalysis Basic - ( 2025 05:11 )    Color: x / Appearance: x / SG: x / pH: x  Gluc: 100 mg/dL / Ketone: x  / Bili: x / Urobili: x   Blood: x / Protein: x / Nitrite: x   Leuk Esterase: x / RBC: x / WBC x   Sq Epi: x / Non Sq Epi: x / Bacteria: x    Type & Screen: ABO RH Interpretation: O POS (25 @ 04:37)    COVID: SARS-CoV-2: [place] (2025 10:30)      Patient is booked for trach and peg for tomorrow. Please have patient pre op overnight.   - NPO @MN, holding tube feeds at MN   - Labs: CBC, BMP, Mag, Phos, T/S, Coags  - CXR, EKG completed on admission

## 2025-02-20 LAB
ALBUMIN SERPL ELPH-MCNC: 2.4 G/DL — LOW (ref 3.5–5.2)
ALP SERPL-CCNC: 102 U/L — SIGNIFICANT CHANGE UP (ref 30–115)
ALT FLD-CCNC: <5 U/L — SIGNIFICANT CHANGE UP (ref 0–41)
ANION GAP SERPL CALC-SCNC: 11 MMOL/L — SIGNIFICANT CHANGE UP (ref 7–14)
ANION GAP SERPL CALC-SCNC: 9 MMOL/L — SIGNIFICANT CHANGE UP (ref 7–14)
APTT BLD: SIGNIFICANT CHANGE UP SEC (ref 27–39.2)
AST SERPL-CCNC: 23 U/L — SIGNIFICANT CHANGE UP (ref 0–41)
BASOPHILS # BLD AUTO: 0.03 K/UL — SIGNIFICANT CHANGE UP (ref 0–0.2)
BASOPHILS NFR BLD AUTO: 0.4 % — SIGNIFICANT CHANGE UP (ref 0–1)
BILIRUB SERPL-MCNC: 1.5 MG/DL — HIGH (ref 0.2–1.2)
BUN SERPL-MCNC: 37 MG/DL — HIGH (ref 10–20)
BUN SERPL-MCNC: 39 MG/DL — HIGH (ref 10–20)
CALCIUM SERPL-MCNC: 7.8 MG/DL — LOW (ref 8.4–10.4)
CALCIUM SERPL-MCNC: 7.9 MG/DL — LOW (ref 8.4–10.4)
CHLORIDE SERPL-SCNC: 110 MMOL/L — SIGNIFICANT CHANGE UP (ref 98–110)
CHLORIDE SERPL-SCNC: 111 MMOL/L — HIGH (ref 98–110)
CO2 SERPL-SCNC: 26 MMOL/L — SIGNIFICANT CHANGE UP (ref 17–32)
CO2 SERPL-SCNC: 27 MMOL/L — SIGNIFICANT CHANGE UP (ref 17–32)
CREAT SERPL-MCNC: 0.9 MG/DL — SIGNIFICANT CHANGE UP (ref 0.7–1.5)
CREAT SERPL-MCNC: 1 MG/DL — SIGNIFICANT CHANGE UP (ref 0.7–1.5)
EGFR: 81 ML/MIN/1.73M2 — SIGNIFICANT CHANGE UP
EGFR: 81 ML/MIN/1.73M2 — SIGNIFICANT CHANGE UP
EGFR: 92 ML/MIN/1.73M2 — SIGNIFICANT CHANGE UP
EGFR: 92 ML/MIN/1.73M2 — SIGNIFICANT CHANGE UP
EOSINOPHIL # BLD AUTO: 0.06 K/UL — SIGNIFICANT CHANGE UP (ref 0–0.7)
EOSINOPHIL NFR BLD AUTO: 0.8 % — SIGNIFICANT CHANGE UP (ref 0–8)
GAS PNL BLDA: SIGNIFICANT CHANGE UP
GLUCOSE BLDC GLUCOMTR-MCNC: 69 MG/DL — LOW (ref 70–99)
GLUCOSE BLDC GLUCOMTR-MCNC: 86 MG/DL — SIGNIFICANT CHANGE UP (ref 70–99)
GLUCOSE BLDC GLUCOMTR-MCNC: 89 MG/DL — SIGNIFICANT CHANGE UP (ref 70–99)
GLUCOSE SERPL-MCNC: 75 MG/DL — SIGNIFICANT CHANGE UP (ref 70–99)
GLUCOSE SERPL-MCNC: 86 MG/DL — SIGNIFICANT CHANGE UP (ref 70–99)
HCT VFR BLD CALC: 31.9 % — LOW (ref 42–52)
HGB BLD-MCNC: 10.3 G/DL — LOW (ref 14–18)
IMM GRANULOCYTES NFR BLD AUTO: 0.8 % — HIGH (ref 0.1–0.3)
INR BLD: 1 RATIO — SIGNIFICANT CHANGE UP (ref 0.65–1.3)
LYMPHOCYTES # BLD AUTO: 0.63 K/UL — LOW (ref 1.2–3.4)
LYMPHOCYTES # BLD AUTO: 8.6 % — LOW (ref 20.5–51.1)
MAGNESIUM SERPL-MCNC: 2.2 MG/DL — SIGNIFICANT CHANGE UP (ref 1.8–2.4)
MCHC RBC-ENTMCNC: 30.2 PG — SIGNIFICANT CHANGE UP (ref 27–31)
MCHC RBC-ENTMCNC: 32.3 G/DL — SIGNIFICANT CHANGE UP (ref 32–37)
MCV RBC AUTO: 93.5 FL — SIGNIFICANT CHANGE UP (ref 80–94)
MONOCYTES # BLD AUTO: 0.48 K/UL — SIGNIFICANT CHANGE UP (ref 0.1–0.6)
MONOCYTES NFR BLD AUTO: 6.6 % — SIGNIFICANT CHANGE UP (ref 1.7–9.3)
NEUTROPHILS # BLD AUTO: 6.06 K/UL — SIGNIFICANT CHANGE UP (ref 1.4–6.5)
NEUTROPHILS NFR BLD AUTO: 82.8 % — HIGH (ref 42.2–75.2)
NRBC BLD AUTO-RTO: 0 /100 WBCS — SIGNIFICANT CHANGE UP (ref 0–0)
PHOSPHATE SERPL-MCNC: 2.7 MG/DL — SIGNIFICANT CHANGE UP (ref 2.1–4.9)
PLATELET # BLD AUTO: 286 K/UL — SIGNIFICANT CHANGE UP (ref 130–400)
PMV BLD: 10.3 FL — SIGNIFICANT CHANGE UP (ref 7.4–10.4)
POTASSIUM SERPL-MCNC: 2.9 MMOL/L — LOW (ref 3.5–5)
POTASSIUM SERPL-MCNC: 4.2 MMOL/L — SIGNIFICANT CHANGE UP (ref 3.5–5)
POTASSIUM SERPL-SCNC: 2.9 MMOL/L — LOW (ref 3.5–5)
POTASSIUM SERPL-SCNC: 4.2 MMOL/L — SIGNIFICANT CHANGE UP (ref 3.5–5)
PROT SERPL-MCNC: 4.5 G/DL — LOW (ref 6–8)
PROTHROM AB SERPL-ACNC: 11.8 SEC — SIGNIFICANT CHANGE UP (ref 9.95–12.87)
RBC # BLD: 3.41 M/UL — LOW (ref 4.7–6.1)
RBC # FLD: 17.9 % — HIGH (ref 11.5–14.5)
SODIUM SERPL-SCNC: 146 MMOL/L — SIGNIFICANT CHANGE UP (ref 135–146)
SODIUM SERPL-SCNC: 148 MMOL/L — HIGH (ref 135–146)
VIRUS SPEC CULT: SIGNIFICANT CHANGE UP
WBC # BLD: 7.32 K/UL — SIGNIFICANT CHANGE UP (ref 4.8–10.8)
WBC # FLD AUTO: 7.32 K/UL — SIGNIFICANT CHANGE UP (ref 4.8–10.8)

## 2025-02-20 PROCEDURE — 31600 PLANNED TRACHEOSTOMY: CPT

## 2025-02-20 PROCEDURE — 43246 EGD PLACE GASTROSTOMY TUBE: CPT

## 2025-02-20 PROCEDURE — 99291 CRITICAL CARE FIRST HOUR: CPT

## 2025-02-20 PROCEDURE — 71045 X-RAY EXAM CHEST 1 VIEW: CPT | Mod: 26

## 2025-02-20 RX ORDER — SODIUM CHLORIDE 9 G/1000ML
1000 INJECTION, SOLUTION INTRAVENOUS
Refills: 0 | Status: DISCONTINUED | OUTPATIENT
Start: 2025-02-20 | End: 2025-02-21

## 2025-02-20 RX ADMIN — POLYETHYLENE GLYCOL 3350 17 GRAM(S): 17 POWDER, FOR SOLUTION ORAL at 17:47

## 2025-02-20 RX ADMIN — Medication 100 MILLIGRAM(S): at 11:51

## 2025-02-20 RX ADMIN — Medication 2 TABLET(S): at 05:20

## 2025-02-20 RX ADMIN — Medication 1 APPLICATION(S): at 05:21

## 2025-02-20 RX ADMIN — DEXMEDETOMIDINE HYDROCHLORIDE IN SODIUM CHLORIDE 6.39 MICROGRAM(S)/KG/HR: 4 INJECTION INTRAVENOUS at 18:17

## 2025-02-20 RX ADMIN — Medication 100 MILLIGRAM(S): at 02:30

## 2025-02-20 RX ADMIN — Medication 50 MILLIEQUIVALENT(S): at 08:34

## 2025-02-20 RX ADMIN — Medication 15 MILLILITER(S): at 17:47

## 2025-02-20 RX ADMIN — Medication 40 MILLIEQUIVALENT(S): at 16:05

## 2025-02-20 RX ADMIN — Medication 2 TABLET(S): at 21:13

## 2025-02-20 RX ADMIN — BUMETANIDE 2 MILLIGRAM(S): 1 TABLET ORAL at 05:20

## 2025-02-20 RX ADMIN — ENOXAPARIN SODIUM 40 MILLIGRAM(S): 100 INJECTION SUBCUTANEOUS at 17:47

## 2025-02-20 RX ADMIN — Medication 40 MILLIGRAM(S): at 11:51

## 2025-02-20 RX ADMIN — Medication 2 TABLET(S): at 14:06

## 2025-02-20 RX ADMIN — Medication 100 MILLIGRAM(S): at 19:00

## 2025-02-20 RX ADMIN — Medication 40 MILLIEQUIVALENT(S): at 11:51

## 2025-02-20 RX ADMIN — Medication 15 MILLILITER(S): at 05:20

## 2025-02-20 RX ADMIN — RASAGILINE 1 MILLIGRAM(S): 0.5 TABLET ORAL at 05:40

## 2025-02-20 NOTE — CHART NOTE - NSCHARTNOTEFT_GEN_A_CORE
S/P 9 Portex and endoscopic 18 Fr Gastrojejunostomy tube placement.    -Trach sutures and GJ sutures out in 10 days (3/2)  -J to gravity until 2/21 AM  -Ok for meds through G tube today, please flush after each administration  -Ok for feeds via J tube 2/21 AM    Thoracic Surgery [id number]

## 2025-02-20 NOTE — PROGRESS NOTE ADULT - ASSESSMENT
IMPRESSION:    Acute Hypoxic Respiratory Failure remains intubated  Toxic Metabolic Encephalopathy  CAP likely aspiration   Influenza A treated   MSSA bacteremia repeat CX -  Sepsis POA  cholecystitis sp percutaneous haydee  Periventricular bleed  Suspected Mural thrombus in PA  Right thigh hematoma.    HO Parkinson's Disease   HO CAD    PLAN:    CNS:  SAT. Continue Anti parkinson's.     HEENT: Oral care.   CTS for trach and PEG  this morning    PULMONARY: No change in vent settings.  Monitor airway pressures.  Remains with significant secretions.  Awaiting trach.  CTS follow up on 40%    CARDIOVASCULAR:  Avoid overload.  Volume contraction muna tolerated     GI: GI prophylaxis.  NGT feeds as tolerated. S/P perc cholecystostomy. Bowel regimen     RENAL: Follow up lytes.  Mars in place for retention. Kidney function stable.     INFECTIOUS DISEASE: Cultures noted.  SP Tamiflu.  ABX per ID.  Ancef for now Duration per ID.     HEMATOLOGICAL: Monitor CBC and Coags. CTA noted with no active bleed.  keep Hg > 7.  Le DOPPLER -    ENDOCRINE:  Follow up FS.  Insulin protocol if needed.    MUSCULOSKELETAL: Bed rest.  Off loading. Wound care for DTIs.     Full code  MICU  palliative care follow up   very poor prognosis

## 2025-02-20 NOTE — PROGRESS NOTE ADULT - SUBJECTIVE AND OBJECTIVE BOX
SUBJECTIVE / OVERNIGHT EVENTS  Patient intubated & sedated. plan for trach & peg today    MEDICATIONS  buMETAnide Injectable 2 milliGRAM(s) IV Push daily  carbidopa/levodopa  25/250 2 Tablet(s) Oral three times a day  ceFAZolin   IVPB 2000 milliGRAM(s) IV Intermittent every 8 hours  chlorhexidine 0.12% Liquid 15 milliLiter(s) Oral Mucosa every 12 hours  chlorhexidine 2% Cloths 1 Application(s) Topical <User Schedule>  dexMEDEtomidine Infusion 0.5 MICROgram(s)/kG/Hr IV Continuous <Continuous>  dextrose 5%. 1000 milliLiter(s) IV Continuous <Continuous>  dextrose 5%. 1000 milliLiter(s) IV Continuous <Continuous>  dextrose 50% Injectable 12.5 Gram(s) IV Push once  dextrose 50% Injectable 25 Gram(s) IV Push once  dextrose 50% Injectable 25 Gram(s) IV Push once  enoxaparin Injectable 40 milliGRAM(s) SubCutaneous every 24 hours  fentaNYL   Infusion 0.5 MICROgram(s)/kG/Hr IV Continuous <Continuous>  glucagon  Injectable 1 milliGRAM(s) IntraMuscular once  insulin lispro (ADMELOG) corrective regimen sliding scale   SubCutaneous every 6 hours  insulin lispro Injectable (ADMELOG) 2 Unit(s) SubCutaneous every 6 hours  lactated ringers. 1000 milliLiter(s) IV Continuous <Continuous>  norepinephrine Infusion 0.05 MICROgram(s)/kG/Min IV Continuous <Continuous>  pantoprazole   Suspension 40 milliGRAM(s) Oral daily  polyethylene glycol 3350 17 Gram(s) Oral every 12 hours  potassium chloride   Powder 40 milliEquivalent(s) Enteral Tube every 4 hours  propofol Infusion 5 MICROgram(s)/kG/Min IV Continuous <Continuous>  rasagiline Tablet 1 milliGRAM(s) Oral <User Schedule>  senna 2 Tablet(s) Oral at bedtime    acetaminophen     Tablet .. 650 milliGRAM(s) Oral every 6 hours PRN Temp greater or equal to 38C (100.4F), Mild Pain (1 - 3)  dextrose Oral Gel 15 Gram(s) Oral once PRN Blood Glucose LESS THAN 70 milliGRAM(s)/deciliter  fentaNYL    Injectable 25 MICROGram(s) IV Push every 2 hours PRN Aggitation    VITALS /  EXAM    T(C): 36.8 (02-20-25 @ 07:13), Max: 36.8 (02-20-25 @ 00:00)  HR: 75 (02-20-25 @ 08:00) (75 - 97)  BP: 100/61 (02-20-25 @ 08:00) (90/50 - 149/72)  RR: 18 (02-20-25 @ 08:00) (1 - 25)  SpO2: 100% (02-20-25 @ 08:00) (100% - 100%)  POCT Blood Glucose.: 86 mg/dL (02-20-25 @ 05:57)  POCT Blood Glucose.: 88 mg/dL (02-19-25 @ 23:41)  POCT Blood Glucose.: 114 mg/dL (02-19-25 @ 12:26)    GENERAL: NAD, well-developed  CHEST/LUNG: Clear to auscultation bilaterally; No wheezes, rales or rhonchi  HEART: Regular rate and rhythm; No murmurs, rubs, or gallops  ABDOMEN: Soft, Nontender, Nondistended; Bowel sounds present, no masses.  EXTREMITIES:  2+ Peripheral Pulses, No clubbing, cyanosis, or edema    I's & O's     02-19-25 @ 07:01  -  02-20-25 @ 07:00  --------------------------------------------------------  IN:    Dexmedetomidine: 128 mL    FentaNYL: 352.3 mL    IV PiggyBack: 100 mL    Lactated Ringers: 1890 mL    Peptamen A.F.: 80 mL    Propofol: 90 mL  Total IN: 2640.3 mL    OUT:    Drain (mL): 30 mL    Indwelling Catheter - Urethral (mL): 1645 mL  Total OUT: 1675 mL    Total NET: 965.3 mL        LABS             10.3   7.32  )-----------( 286      ( 02-20-25 @ 05:35 )             31.9     146  |  110  |  39  -------------------------<  86   02-20-25 @ 05:35  2.9  |  27  |  0.9    Ca      7.9     02-20-25 @ 05:35  Mg     2.2     02-20-25 @ 05:35    TPro  4.5  /  Alb  2.4  /  TBili  1.5  /  DBili  x   /  AST  23  /  ALT  <5  /  AlkPhos  102  /  GGT  x     02-20-25 @ 05:35    PT/INR - ( 02-19-25 @ 05:11 )   PT: 13.90 sec[H];   INR: 1.17 ratio  PTT - ( 02-19-25 @ 05:11 )  PTT:39.6 sec      Urinalysis Basic - ( 20 Feb 2025 05:35 )    Color: x / Appearance: x / SG: x / pH: x  Gluc: 86 mg/dL / Ketone: x  / Bili: x / Urobili: x   Blood: x / Protein: x / Nitrite: x   Leuk Esterase: x / RBC: x / WBC x   Sq Epi: x / Non Sq Epi: x / Bacteria: x      MICRO / IMAGING / CARDIOLOGY  Telemetry: Reviewed   EKG: Reviewed    CULTURES    IMAGING  PACS Image:  (02-20-25 @ 06:44)  PACS Image:  (02-19-25 @ 18:46)  PACS Image:  (02-19-25 @ 10:48)  PACS Image:  (02-18-25 @ 17:04)    CARDIOLOGY

## 2025-02-20 NOTE — PROGRESS NOTE ADULT - SUBJECTIVE AND OBJECTIVE BOX
Patient is a 69y old  Male who presents with a chief complaint of penumonia (19 Feb 2025 10:37)        Over Night Events:  On MV. Off pressors.      ROS:     All pertinent ROS are negative except HPI         PHYSICAL EXAM    ICU Vital Signs Last 24 Hrs  T(C): 36.8 (20 Feb 2025 07:13), Max: 36.8 (20 Feb 2025 00:00)  T(F): 98.3 (20 Feb 2025 07:00), Max: 98.3 (20 Feb 2025 04:00)  HR: 75 (20 Feb 2025 08:00) (75 - 97)  BP: 100/61 (20 Feb 2025 08:00) (90/50 - 149/72)  BP(mean): 76 (20 Feb 2025 08:00) (67 - 98)    RR: 18 (20 Feb 2025 08:00) (1 - 25)  SpO2: 100% (20 Feb 2025 08:00) (100% - 100%)    O2 Parameters below as of 20 Feb 2025 08:00  Patient On (Oxygen Delivery Method): ventilator    O2 Concentration (%): 40        CONSTITUTIONAL:  NAD    ENT:   ET tube    EYES:   Pupils equal,   Round and reactive to light.    CARDIAC:   Normal rate,   Regular rhythm.            RESPIRATORY:   No wheezing  Bilateral BS  Normal chest expansion  Not tachypneic,  No use of accessory muscles    GASTROINTESTINAL:  Abdomen soft,   Non-tender,   No guarding,   + BS      MUSCULOSKELETAL:   No clubbing, cyanosis    NEUROLOGICAL:   Sedated    SKIN:   Skin normal color for race,         02-19-25 @ 07:01  -  02-20-25 @ 07:00  --------------------------------------------------------  IN:    Dexmedetomidine: 128 mL    FentaNYL: 352.3 mL    IV PiggyBack: 100 mL    Lactated Ringers: 1890 mL    Peptamen A.F.: 80 mL    Propofol: 90 mL  Total IN: 2640.3 mL    OUT:    Drain (mL): 30 mL    Indwelling Catheter - Urethral (mL): 1645 mL  Total OUT: 1675 mL    Total NET: 965.3 mL          LABS:                            10.3   7.32  )-----------( 286      ( 20 Feb 2025 05:35 )             31.9                                               02-20    146  |  110  |  39[H]  ----------------------------<  86  2.9[L]   |  27  |  0.9    Ca    7.9[L]      20 Feb 2025 05:35  Mg     2.2     02-20    TPro  4.5[L]  /  Alb  2.4[L]  /  TBili  1.5[H]  /  DBili  x   /  AST  23  /  ALT  <5  /  AlkPhos  102  02-20      PT/INR - ( 19 Feb 2025 05:11 )   PT: 13.90 sec;   INR: 1.17 ratio         PTT - ( 19 Feb 2025 05:11 )  PTT:39.6 sec                                       Urinalysis Basic - ( 20 Feb 2025 05:35 )    Color: x / Appearance: x / SG: x / pH: x  Gluc: 86 mg/dL / Ketone: x  / Bili: x / Urobili: x   Blood: x / Protein: x / Nitrite: x   Leuk Esterase: x / RBC: x / WBC x   Sq Epi: x / Non Sq Epi: x / Bacteria: x                                                  LIVER FUNCTIONS - ( 20 Feb 2025 05:35 )  Alb: 2.4 g/dL / Pro: 4.5 g/dL / ALK PHOS: 102 U/L / ALT: <5 U/L / AST: 23 U/L / GGT: x                                                                                               Mode: AC/ CMV (Assist Control/ Continuous Mandatory Ventilation)  RR (machine): 18  TV (machine): 480  FiO2: 40  PEEP: 8  ITime: 1  MAP: 12  PIP: 21                                      ABG - ( 20 Feb 2025 03:22 )  pH, Arterial: 7.41  pH, Blood: x     /  pCO2: 45    /  pO2: 136   / HCO3: 28    / Base Excess: 3.3   /  SaO2: 100.0               MEDICATIONS  (STANDING):  buMETAnide Injectable 2 milliGRAM(s) IV Push daily  carbidopa/levodopa  25/250 2 Tablet(s) Oral three times a day  ceFAZolin   IVPB 2000 milliGRAM(s) IV Intermittent every 8 hours  chlorhexidine 0.12% Liquid 15 milliLiter(s) Oral Mucosa every 12 hours  chlorhexidine 2% Cloths 1 Application(s) Topical <User Schedule>  dexMEDEtomidine Infusion 0.5 MICROgram(s)/kG/Hr (6.39 mL/Hr) IV Continuous <Continuous>  dextrose 5%. 1000 milliLiter(s) (50 mL/Hr) IV Continuous <Continuous>  dextrose 5%. 1000 milliLiter(s) (100 mL/Hr) IV Continuous <Continuous>  dextrose 50% Injectable 12.5 Gram(s) IV Push once  dextrose 50% Injectable 25 Gram(s) IV Push once  dextrose 50% Injectable 25 Gram(s) IV Push once  enoxaparin Injectable 40 milliGRAM(s) SubCutaneous every 24 hours  fentaNYL   Infusion 0.5 MICROgram(s)/kG/Hr (2.56 mL/Hr) IV Continuous <Continuous>  glucagon  Injectable 1 milliGRAM(s) IntraMuscular once  insulin lispro (ADMELOG) corrective regimen sliding scale   SubCutaneous every 6 hours  insulin lispro Injectable (ADMELOG) 2 Unit(s) SubCutaneous every 6 hours  lactated ringers. 1000 milliLiter(s) (90 mL/Hr) IV Continuous <Continuous>  norepinephrine Infusion 0.05 MICROgram(s)/kG/Min (4.79 mL/Hr) IV Continuous <Continuous>  pantoprazole   Suspension 40 milliGRAM(s) Oral daily  polyethylene glycol 3350 17 Gram(s) Oral every 12 hours  propofol Infusion 5 MICROgram(s)/kG/Min (1.53 mL/Hr) IV Continuous <Continuous>  rasagiline Tablet 1 milliGRAM(s) Oral <User Schedule>  senna 2 Tablet(s) Oral at bedtime    MEDICATIONS  (PRN):  acetaminophen     Tablet .. 650 milliGRAM(s) Oral every 6 hours PRN Temp greater or equal to 38C (100.4F), Mild Pain (1 - 3)  dextrose Oral Gel 15 Gram(s) Oral once PRN Blood Glucose LESS THAN 70 milliGRAM(s)/deciliter  fentaNYL    Injectable 25 MICROGram(s) IV Push every 2 hours PRN Aggitation      New X-rays reviewed:                                                                                  ECHO    CXR interpreted by me:       Patient is a 69y old  Male who presents with a chief complaint of penumonia (19 Feb 2025 10:37)        Over Night Events:  On MV. Off pressors. for trach        PHYSICAL EXAM    ICU Vital Signs Last 24 Hrs  T(C): 36.8 (20 Feb 2025 07:13), Max: 36.8 (20 Feb 2025 00:00)  T(F): 98.3 (20 Feb 2025 07:00), Max: 98.3 (20 Feb 2025 04:00)  HR: 75 (20 Feb 2025 08:00) (75 - 97)  BP: 100/61 (20 Feb 2025 08:00) (90/50 - 149/72)  BP(mean): 76 (20 Feb 2025 08:00) (67 - 98)    RR: 18 (20 Feb 2025 08:00) (1 - 25)  SpO2: 100% (20 Feb 2025 08:00) (100% - 100%)    O2 Parameters below as of 20 Feb 2025 08:00  Patient On (Oxygen Delivery Method): ventilator    O2 Concentration (%): 40        CONSTITUTIONAL:  ILL Looking  Cachectic    ENT:   ET tube        CARDIAC:   FRANKLYN 2.6          RESPIRATORY:   BL rhonchi    GASTROINTESTINAL:  Abdomen soft,   Non-tender,   No guarding,   + BS      MUSCULOSKELETAL:   No clubbing, cyanosis    NEUROLOGICAL:   Sedated  not following commands        02-19-25 @ 07:01  -  02-20-25 @ 07:00  --------------------------------------------------------  IN:    Dexmedetomidine: 128 mL    FentaNYL: 352.3 mL    IV PiggyBack: 100 mL    Lactated Ringers: 1890 mL    Peptamen A.F.: 80 mL    Propofol: 90 mL  Total IN: 2640.3 mL    OUT:    Drain (mL): 30 mL    Indwelling Catheter - Urethral (mL): 1645 mL  Total OUT: 1675 mL    Total NET: 965.3 mL          LABS:                            10.3   7.32  )-----------( 286      ( 20 Feb 2025 05:35 )             31.9                                               02-20    146  |  110  |  39[H]  ----------------------------<  86  2.9[L]   |  27  |  0.9    Ca    7.9[L]      20 Feb 2025 05:35  Mg     2.2     02-20    TPro  4.5[L]  /  Alb  2.4[L]  /  TBili  1.5[H]  /  DBili  x   /  AST  23  /  ALT  <5  /  AlkPhos  102  02-20      PT/INR - ( 19 Feb 2025 05:11 )   PT: 13.90 sec;   INR: 1.17 ratio         PTT - ( 19 Feb 2025 05:11 )  PTT:39.6 sec                                       Urinalysis Basic - ( 20 Feb 2025 05:35 )    Color: x / Appearance: x / SG: x / pH: x  Gluc: 86 mg/dL / Ketone: x  / Bili: x / Urobili: x   Blood: x / Protein: x / Nitrite: x   Leuk Esterase: x / RBC: x / WBC x   Sq Epi: x / Non Sq Epi: x / Bacteria: x                                                  LIVER FUNCTIONS - ( 20 Feb 2025 05:35 )  Alb: 2.4 g/dL / Pro: 4.5 g/dL / ALK PHOS: 102 U/L / ALT: <5 U/L / AST: 23 U/L / GGT: x                                                                                               Mode: AC/ CMV (Assist Control/ Continuous Mandatory Ventilation)  RR (machine): 18  TV (machine): 480  FiO2: 40  PEEP: 8  ITime: 1  MAP: 12  PIP: 21                                      ABG - ( 20 Feb 2025 03:22 )  pH, Arterial: 7.41  pH, Blood: x     /  pCO2: 45    /  pO2: 136   / HCO3: 28    / Base Excess: 3.3   /  SaO2: 100.0               MEDICATIONS  (STANDING):  buMETAnide Injectable 2 milliGRAM(s) IV Push daily  carbidopa/levodopa  25/250 2 Tablet(s) Oral three times a day  ceFAZolin   IVPB 2000 milliGRAM(s) IV Intermittent every 8 hours  chlorhexidine 0.12% Liquid 15 milliLiter(s) Oral Mucosa every 12 hours  chlorhexidine 2% Cloths 1 Application(s) Topical <User Schedule>  dexMEDEtomidine Infusion 0.5 MICROgram(s)/kG/Hr (6.39 mL/Hr) IV Continuous <Continuous>  dextrose 5%. 1000 milliLiter(s) (50 mL/Hr) IV Continuous <Continuous>  dextrose 5%. 1000 milliLiter(s) (100 mL/Hr) IV Continuous <Continuous>  dextrose 50% Injectable 12.5 Gram(s) IV Push once  dextrose 50% Injectable 25 Gram(s) IV Push once  dextrose 50% Injectable 25 Gram(s) IV Push once  enoxaparin Injectable 40 milliGRAM(s) SubCutaneous every 24 hours  fentaNYL   Infusion 0.5 MICROgram(s)/kG/Hr (2.56 mL/Hr) IV Continuous <Continuous>  glucagon  Injectable 1 milliGRAM(s) IntraMuscular once  insulin lispro (ADMELOG) corrective regimen sliding scale   SubCutaneous every 6 hours  insulin lispro Injectable (ADMELOG) 2 Unit(s) SubCutaneous every 6 hours  lactated ringers. 1000 milliLiter(s) (90 mL/Hr) IV Continuous <Continuous>  norepinephrine Infusion 0.05 MICROgram(s)/kG/Min (4.79 mL/Hr) IV Continuous <Continuous>  pantoprazole   Suspension 40 milliGRAM(s) Oral daily  polyethylene glycol 3350 17 Gram(s) Oral every 12 hours  propofol Infusion 5 MICROgram(s)/kG/Min (1.53 mL/Hr) IV Continuous <Continuous>  rasagiline Tablet 1 milliGRAM(s) Oral <User Schedule>  senna 2 Tablet(s) Oral at bedtime    MEDICATIONS  (PRN):  acetaminophen     Tablet .. 650 milliGRAM(s) Oral every 6 hours PRN Temp greater or equal to 38C (100.4F), Mild Pain (1 - 3)  dextrose Oral Gel 15 Gram(s) Oral once PRN Blood Glucose LESS THAN 70 milliGRAM(s)/deciliter  fentaNYL    Injectable 25 MICROGram(s) IV Push every 2 hours PRN Aggitation      cxr noted

## 2025-02-20 NOTE — PRE-OP CHECKLIST - TUBE FEEDING HELD SINCE
[facility] Emergency Department  [address]  [facility] [address]  Phone:  [phone]  Fax:  [phone]                                    Mihir Waldron   MRN: [id number]    Department:  [facility] Emergency Department   Date of Visit:  1/27/2020           After Visit Summary Signature Page    I have received my discharge instructions, and my questions have been answered. I have discussed any challenges I see with this plan with the nurse or doctor.    ..........................................................................................................................................  Patient/Patient Representative Signature      ..........................................................................................................................................  Patient Representative Print Name and Relationship to Patient    ..................................................               ................................................  Date                                   Time    ..........................................................................................................................................  Reviewed by Signature/Title    ...................................................              ..............................................  Date                                               Time          22EPIC Rev 08/18       
18-Feb-2025 00:01
20-Feb-2025 00:00

## 2025-02-20 NOTE — PRE-ANESTHESIA EVALUATION ADULT - NSANTHOSAYNRD_GEN_A_CORE
No. CARINE screening performed.  STOP BANG Legend: 0-2 = LOW Risk; 3-4 = INTERMEDIATE Risk; 5-8 = HIGH Risk

## 2025-02-20 NOTE — PROGRESS NOTE ADULT - ASSESSMENT
69-year-old male past medical history of hypertension, Parkinson's, CAD presents for shortness of breath and cough of 1 day duration. History was obtained from patient's wife who noted that the patient has been having decreased po intake, activity, and unintentional weight loss for some time. However yesterday he started having cough and shortness of breath. Admitted to MICU for AHRF and sepsis 2/2 to mutilobar PNA.    #Acute Hypoxic Respiratory Failure likely 2/2 CAP/aspiration and flu  #Toxic Metabolic Encephalopathy likely 2/2 CAP/aspiration and flu  #Influenza A positive, treated  #MSSA bacteremia- resolved  #Sepsis POA  - Aspiration precautions.    - patient inc WOB, worsening alkalosis, obtunded-intubated for airway protection   -ID: if patient decompensates, dc cefepime/metro, start patricia 1g   - cardio: patient critically sick, not a candidate for surgery therefore no MARY, can c/w abx for IE as per ID  - s/p bronch, f/u cultures   - ID: tamiflu 30 mg daily: last dose on 2/10, s/p cefepime & metro (end date 2/16). nafcillin>>> now on cefazolin 2g q8h for MSSA bact  as per ID (6w)  -2/11 bronch: showed copious amounts of secretions and poor cough.  Will need trach: ongoing daily discussions with wife regarding trach vs extubation & change in code status   CT surgery consulted for trach & peg   Stenotrophomonas now in bronch cx from 2/11, suspect colonizer, if fever/ incr wbc would add levaquin per ID  2/20>> pt got trach & peg today    #PE on CTA  #large R groin intramuscular hematoma, stable  #Periventricular bleed on CTH, stable  - CTH. noted with right periventricular bleed, stable.   - Neuro on board  - Continue Anti parkinson's meds.    - CTA chest to r/o PE: focal filling defect within main pulm artery   - repeat CT head: stable intraventricular hemorrhage >> repeat on 2/9 after on full AC for PE: stable  -CT pelvis:  Large intramuscular hematoma in the medial right thigh measuring approximately 9.8 x 6.4 x 17 cm.  -holding AC  -2/12: Hb drop to 6.8> got 1 unit> up to 9.5 .  CTA stable & negative for any active extravasation   2/14: 1 unit prbc  hb stable. restart proph AC 2/18. duplex -ve 2/17    #hypoglycemia, imroved  -decr insulin & adjust as needed    #Elevated Bilirubin-improving   #Leukocytosis-improving   #Cholangitis vs cholecystitis s/o perc haydee  - F/u RUQ US: distended GB with sludge, GB polyp   - IR: perc haydee 2/5, . GB drainage cultures: few candida, likely coloniz per id.    #Constipation, improved  - senna and miralax  - c/w lactulose   -2/10: kub: distented bowels. having BM    #Possible Mural thrombus in PA  #CAD  - Avoid overload  - C/w LR at 50  - TTE: EF 56%, G1DD, mild-mod MR, mod TR, mild SC, mild pHTN  - Cardio recs appreciated       - Patient is poor candidate for MARY       - full endocarditis abx course of nafcillin/ cefazolin    #MISC  - DVT ppx: SCDs  - GI ppx: not needed  - Activity: as tolerated  - Diet: start diet tmrw thr J tube  - full code

## 2025-02-20 NOTE — PRE-ANESTHESIA EVALUATION ADULT - NSANTHPMHFT_GEN_ALL_CORE
AHRF 2/2 multilobar PNA CAP/Aspiration/Flu+   Periventricular brain bleed  HFpEF
HTN, Parkinson's, CAD

## 2025-02-20 NOTE — PRE-ANESTHESIA EVALUATION ADULT - NSRADCARDRESULTSFT_GEN_ALL_CORE
PE on CTA  (Mural thrombus in PA).  large R groin intramuscular hematoma, stable.  Periventricular bleed on CTH, stable. Cholangitis vs cholecystitis s/o perc haydee.

## 2025-02-20 NOTE — CHART NOTE - NSCHARTNOTEFT_GEN_A_CORE
Thoracic Surgery Post-Op Note    CC:  Pre-Op Dx: Pneumonia due to infectious organism    Acute respiratory failure with hypoxia    Pneumonia      Procedure: Bronchoscopy, flexible, with tracheostomy tube replacement    Transoral endoscopic creation of a gastrojejunostomy      Surgeon: Dr. Alcala    Subjective:  69-year-old male past medical history of hypertension, Parkinson's, CAD s/p trach and pegj, 4 hr post op check.      Vital Signs Last 24 Hrs  T(C): 36.8 (20 Feb 2025 07:13), Max: 36.8 (20 Feb 2025 00:00)  T(F): 98.3 (20 Feb 2025 07:00), Max: 98.3 (20 Feb 2025 04:00)  HR: 75 (20 Feb 2025 08:00) (75 - 96)  BP: 100/61 (20 Feb 2025 08:00) (90/50 - 149/72)  BP(mean): 76 (20 Feb 2025 08:00) (67 - 98)  RR: 18 (20 Feb 2025 08:00) (1 - 25)  SpO2: 100% (20 Feb 2025 08:00) (100% - 100%)    Parameters below as of 20 Feb 2025 08:00  Patient On (Oxygen Delivery Method): ventilator    O2 Concentration (%): 40    PHYSICAL EXAM:  General: NAD, intubated via trach  HEENT: Trach collar, trach tube intact, old dry blood around the trach, no active bleeding  Cardiac: RRR S1, S2,  Respiratory: Normal respiratory effort, on mechanical vent via trach  Abdomen: Soft, non-distended, non-tender, 18Fr GJ tube intact, J to gravity  Skin: Warm/dry, normal color,         LABS:                        10.3   7.32  )-----------( 286      ( 20 Feb 2025 05:35 )             31.9     02-20    146  |  110  |  39[H]  ----------------------------<  86  2.9[L]   |  27  |  0.9    Ca    7.9[L]      20 Feb 2025 05:35  Phos  2.7     02-20  Mg     2.2     02-20    TPro  4.5[L]  /  Alb  2.4[L]  /  TBili  1.5[H]  /  DBili  x   /  AST  23  /  ALT  <5  /  AlkPhos  102  02-20    PT/INR - ( 20 Feb 2025 11:09 )   PT: TNP sec;   INR: TNP ratio         PTT - ( 20 Feb 2025 11:09 )  PTT:TNP sec  CAPILLARY BLOOD GLUCOSE      POCT Blood Glucose.: 89 mg/dL (20 Feb 2025 14:09)  POCT Blood Glucose.: 86 mg/dL (20 Feb 2025 05:57)  POCT Blood Glucose.: 88 mg/dL (19 Feb 2025 23:41)    LIVER FUNCTIONS - ( 20 Feb 2025 05:35 )  Alb: 2.4 g/dL / Pro: 4.5 g/dL / ALK PHOS: 102 U/L / ALT: <5 U/L / AST: 23 U/L / GGT: x           Urinalysis Basic - ( 20 Feb 2025 05:35 )    Color: x / Appearance: x / SG: x / pH: x  Gluc: 86 mg/dL / Ketone: x  / Bili: x / Urobili: x   Blood: x / Protein: x / Nitrite: x   Leuk Esterase: x / RBC: x / WBC x   Sq Epi: x / Non Sq Epi: x / Bacteria: x        Radiology and Additional Studies:    Assessment: 69-year-old male past medical history of hypertension, Parkinson's, CAD s/p trach and pegj,    Plan:  G tube can be used for medications today, please flush after each use  J tube can be used for feeds only, starting tomorrow  Trach sutures out 3/2/25  L apical pneumothorax stable on repeat CXR

## 2025-02-20 NOTE — PRE-ANESTHESIA EVALUATION ADULT - LAST ECHOCARDIOGRAM
TTE: EF 56%, G1DD, mild-mod MR, mod TR, mild RI, mild pHTN
TTE: EF 56%, G1DD, mild-mod MR, mod TR, mild MS, mild pHTN

## 2025-02-20 NOTE — PROGRESS NOTE ADULT - SUBJECTIVE AND OBJECTIVE BOX
THORACIC SURGERY PROGRESS NOTE    Patient: GARETT ESCAMILLA , 69y ([birth year])Male   MRN: [id number]  Location: [address]  Visit: 01-29-25 Inpatient  Date: 02-20-25 @ 10:28    Hospital Day #:23    Events of past 24 hours: pending OR for trach and peg. vent settings 40/8. consent in chart     PAST MEDICAL & SURGICAL HISTORY:  Parkinson disease      CAD (coronary artery disease)      History of basal cell carcinoma (BCC) excision          Vitals:   T(F): 98.3 (02-20-25 @ 07:00), Max: 98.3 (02-20-25 @ 04:00)  HR: 75 (02-20-25 @ 08:00)  BP: 100/61 (02-20-25 @ 08:00)  RR: 18 (02-20-25 @ 08:00)  SpO2: 100% (02-20-25 @ 08:00)  Mode: AC/ CMV (Assist Control/ Continuous Mandatory Ventilation), RR (machine): 18, TV (machine): 480, FiO2: 40, PEEP: 8, ITime: 1, MAP: 12, PIP: 19    Diet, NPO after Midnight:      NPO Start Date: 19-Feb-2025,   NPO Start Time: 23:59  Except Medications  Diet, NPO with Tube Feed:   Tube Feeding Modality: Nasogastric  Peptamen A.F. Formula (PEPTAMENAFRTH)  Total Volume for 24 Hours (mL): 990  Continuous  Starting Tube Feed Rate mL per Hour: 20  Increase Tube Feed Rate by (mL): 10     Every 4 hours  Until Goal Tube Feed Rate (mL per Hour): 55  Tube Feed Duration (in Hours): 18  Tube Feed Start Time: 17:00  Free Water Flush  Free Water Flush Instructions:  noted with free water flush order of 300 mL q4hrs      Fluids:     I & O's:    02-19-25 @ 07:01  -  02-20-25 @ 07:00  --------------------------------------------------------  IN:    Dexmedetomidine: 128 mL    FentaNYL: 352.3 mL    IV PiggyBack: 100 mL    Lactated Ringers: 1890 mL    Peptamen A.F.: 80 mL    Propofol: 90 mL  Total IN: 2640.3 mL    OUT:    Drain (mL): 30 mL    Indwelling Catheter - Urethral (mL): 1645 mL  Total OUT: 1675 mL    Total NET: 965.3 mL    PHYSICAL EXAM:  General: NAD, intubated  HEENT: NCAT,  Cardiac: RRR S1, S2,  Respiratory: Normal respiratory effort, on mechanical vent   Abdomen: Soft, non-distended, non-tender,   Skin: Warm/dry, normal color,     MEDICATIONS  (STANDING):  buMETAnide Injectable 2 milliGRAM(s) IV Push daily  carbidopa/levodopa  25/250 2 Tablet(s) Oral three times a day  ceFAZolin   IVPB 2000 milliGRAM(s) IV Intermittent every 8 hours  chlorhexidine 0.12% Liquid 15 milliLiter(s) Oral Mucosa every 12 hours  chlorhexidine 2% Cloths 1 Application(s) Topical <User Schedule>  dexMEDEtomidine Infusion 0.5 MICROgram(s)/kG/Hr (6.39 mL/Hr) IV Continuous <Continuous>  dextrose 5%. 1000 milliLiter(s) (50 mL/Hr) IV Continuous <Continuous>  dextrose 5%. 1000 milliLiter(s) (100 mL/Hr) IV Continuous <Continuous>  dextrose 50% Injectable 12.5 Gram(s) IV Push once  dextrose 50% Injectable 25 Gram(s) IV Push once  dextrose 50% Injectable 25 Gram(s) IV Push once  enoxaparin Injectable 40 milliGRAM(s) SubCutaneous every 24 hours  fentaNYL   Infusion 0.5 MICROgram(s)/kG/Hr (2.56 mL/Hr) IV Continuous <Continuous>  glucagon  Injectable 1 milliGRAM(s) IntraMuscular once  insulin lispro (ADMELOG) corrective regimen sliding scale   SubCutaneous every 6 hours  insulin lispro Injectable (ADMELOG) 2 Unit(s) SubCutaneous every 6 hours  lactated ringers. 1000 milliLiter(s) (90 mL/Hr) IV Continuous <Continuous>  norepinephrine Infusion 0.05 MICROgram(s)/kG/Min (4.79 mL/Hr) IV Continuous <Continuous>  pantoprazole   Suspension 40 milliGRAM(s) Oral daily  polyethylene glycol 3350 17 Gram(s) Oral every 12 hours  propofol Infusion 5 MICROgram(s)/kG/Min (1.53 mL/Hr) IV Continuous <Continuous>  rasagiline Tablet 1 milliGRAM(s) Oral <User Schedule>  senna 2 Tablet(s) Oral at bedtime    MEDICATIONS  (PRN):  acetaminophen     Tablet .. 650 milliGRAM(s) Oral every 6 hours PRN Temp greater or equal to 38C (100.4F), Mild Pain (1 - 3)  dextrose Oral Gel 15 Gram(s) Oral once PRN Blood Glucose LESS THAN 70 milliGRAM(s)/deciliter  fentaNYL    Injectable 25 MICROGram(s) IV Push every 2 hours PRN Aggitation      DVT PROPHYLAXIS: enoxaparin Injectable 40 milliGRAM(s) SubCutaneous every 24 hours    GI PROPHYLAXIS: pantoprazole   Suspension 40 milliGRAM(s) Oral daily    ANTICOAGULATION:   ANTIBIOTICS:  ceFAZolin   IVPB 2000 milliGRAM(s)            LAB/STUDIES:  Labs:  CAPILLARY BLOOD GLUCOSE      POCT Blood Glucose.: 86 mg/dL (20 Feb 2025 05:57)  POCT Blood Glucose.: 88 mg/dL (19 Feb 2025 23:41)  POCT Blood Glucose.: 114 mg/dL (19 Feb 2025 12:26)                          10.3   7.32  )-----------( 286      ( 20 Feb 2025 05:35 )             31.9       Auto Immature Granulocyte %: 0.8 % (02-20-25 @ 05:35)    02-20    146  |  110  |  39[H]  ----------------------------<  86  2.9[L]   |  27  |  0.9      Calcium: 7.9 mg/dL (02-20-25 @ 05:35)      LFTs:             4.5  | 1.5  | 23       ------------------[102     ( 20 Feb 2025 05:35 )  2.4  | x    | <5          Lipase:x      Amylase:x         Blood Gas Arterial, Lactate: 0.7 mmol/L (02-20-25 @ 03:22)  Blood Gas Arterial, Lactate: 0.6 mmol/L (02-19-25 @ 04:16)  Blood Gas Arterial, Lactate: 0.7 mmol/L (02-18-25 @ 04:36)  Blood Gas Arterial, Lactate: 1.0 mmol/L (02-17-25 @ 11:43)    ABG - ( 20 Feb 2025 03:22 )  pH: 7.41  /  pCO2: 45    /  pO2: 136   / HCO3: 28    / Base Excess: 3.3   /  SaO2: 100.0           ABG - ( 19 Feb 2025 04:16 )  pH: 7.38  /  pCO2: 46    /  pO2: 151   / HCO3: 27    / Base Excess: 1.6   /  SaO2: 99.7            ABG - ( 18 Feb 2025 04:36 )  pH: 7.36  /  pCO2: 50    /  pO2: 122   / HCO3: 34    / Base Excess: 2.1   /  SaO2: 99.0              Coags:     13.90  ----< 1.17    ( 19 Feb 2025 05:11 )     39.6                Urinalysis Basic - ( 20 Feb 2025 05:35 )    Color: x / Appearance: x / SG: x / pH: x  Gluc: 86 mg/dL / Ketone: x  / Bili: x / Urobili: x   Blood: x / Protein: x / Nitrite: x   Leuk Esterase: x / RBC: x / WBC x   Sq Epi: x / Non Sq Epi: x / Bacteria: x                IMAGING:

## 2025-02-20 NOTE — PROGRESS NOTE ADULT - ASSESSMENT
ASSESSMENT:  69y M c/s trach and peg     PLAN:  - Pending OR for trach and peg today   - Pre op   - Consent in chart  - NPO, IVF  - Care as per primary team       GREEN TEAM SPECTRA: [id number]

## 2025-02-21 LAB
ALBUMIN SERPL ELPH-MCNC: 2.2 G/DL — LOW (ref 3.5–5.2)
ALP SERPL-CCNC: 103 U/L — SIGNIFICANT CHANGE UP (ref 30–115)
ALT FLD-CCNC: <5 U/L — SIGNIFICANT CHANGE UP (ref 0–41)
ANION GAP SERPL CALC-SCNC: 9 MMOL/L — SIGNIFICANT CHANGE UP (ref 7–14)
AST SERPL-CCNC: 24 U/L — SIGNIFICANT CHANGE UP (ref 0–41)
BASOPHILS # BLD AUTO: 0.01 K/UL — SIGNIFICANT CHANGE UP (ref 0–0.2)
BASOPHILS NFR BLD AUTO: 0.1 % — SIGNIFICANT CHANGE UP (ref 0–1)
BILIRUB SERPL-MCNC: 1.8 MG/DL — HIGH (ref 0.2–1.2)
BUN SERPL-MCNC: 34 MG/DL — HIGH (ref 10–20)
CALCIUM SERPL-MCNC: 7.8 MG/DL — LOW (ref 8.4–10.4)
CHLORIDE SERPL-SCNC: 110 MMOL/L — SIGNIFICANT CHANGE UP (ref 98–110)
CO2 SERPL-SCNC: 25 MMOL/L — SIGNIFICANT CHANGE UP (ref 17–32)
CREAT SERPL-MCNC: 0.9 MG/DL — SIGNIFICANT CHANGE UP (ref 0.7–1.5)
EGFR: 92 ML/MIN/1.73M2 — SIGNIFICANT CHANGE UP
EGFR: 92 ML/MIN/1.73M2 — SIGNIFICANT CHANGE UP
EOSINOPHIL # BLD AUTO: 0.02 K/UL — SIGNIFICANT CHANGE UP (ref 0–0.7)
EOSINOPHIL NFR BLD AUTO: 0.2 % — SIGNIFICANT CHANGE UP (ref 0–8)
GAS PNL BLDA: SIGNIFICANT CHANGE UP
GLUCOSE BLDC GLUCOMTR-MCNC: 109 MG/DL — HIGH (ref 70–99)
GLUCOSE BLDC GLUCOMTR-MCNC: 120 MG/DL — HIGH (ref 70–99)
GLUCOSE BLDC GLUCOMTR-MCNC: 133 MG/DL — HIGH (ref 70–99)
GLUCOSE BLDC GLUCOMTR-MCNC: 70 MG/DL — SIGNIFICANT CHANGE UP (ref 70–99)
GLUCOSE BLDC GLUCOMTR-MCNC: 90 MG/DL — SIGNIFICANT CHANGE UP (ref 70–99)
GLUCOSE BLDC GLUCOMTR-MCNC: 97 MG/DL — SIGNIFICANT CHANGE UP (ref 70–99)
GLUCOSE SERPL-MCNC: 121 MG/DL — HIGH (ref 70–99)
HCT VFR BLD CALC: 32.3 % — LOW (ref 42–52)
HGB BLD-MCNC: 10.3 G/DL — LOW (ref 14–18)
IMM GRANULOCYTES NFR BLD AUTO: 0.6 % — HIGH (ref 0.1–0.3)
LYMPHOCYTES # BLD AUTO: 0.47 K/UL — LOW (ref 1.2–3.4)
LYMPHOCYTES # BLD AUTO: 4.7 % — LOW (ref 20.5–51.1)
MAGNESIUM SERPL-MCNC: 1.9 MG/DL — SIGNIFICANT CHANGE UP (ref 1.8–2.4)
MCHC RBC-ENTMCNC: 30.6 PG — SIGNIFICANT CHANGE UP (ref 27–31)
MCHC RBC-ENTMCNC: 31.9 G/DL — LOW (ref 32–37)
MCV RBC AUTO: 95.8 FL — HIGH (ref 80–94)
MONOCYTES # BLD AUTO: 0.52 K/UL — SIGNIFICANT CHANGE UP (ref 0.1–0.6)
MONOCYTES NFR BLD AUTO: 5.3 % — SIGNIFICANT CHANGE UP (ref 1.7–9.3)
NEUTROPHILS # BLD AUTO: 8.82 K/UL — HIGH (ref 1.4–6.5)
NEUTROPHILS NFR BLD AUTO: 89.1 % — HIGH (ref 42.2–75.2)
NRBC BLD AUTO-RTO: 0 /100 WBCS — SIGNIFICANT CHANGE UP (ref 0–0)
PLATELET # BLD AUTO: 231 K/UL — SIGNIFICANT CHANGE UP (ref 130–400)
PMV BLD: 10.6 FL — HIGH (ref 7.4–10.4)
POTASSIUM SERPL-MCNC: 3.7 MMOL/L — SIGNIFICANT CHANGE UP (ref 3.5–5)
POTASSIUM SERPL-SCNC: 3.7 MMOL/L — SIGNIFICANT CHANGE UP (ref 3.5–5)
PROT SERPL-MCNC: 4.2 G/DL — LOW (ref 6–8)
RBC # BLD: 3.37 M/UL — LOW (ref 4.7–6.1)
RBC # FLD: 18.7 % — HIGH (ref 11.5–14.5)
SODIUM SERPL-SCNC: 144 MMOL/L — SIGNIFICANT CHANGE UP (ref 135–146)
WBC # BLD: 9.9 K/UL — SIGNIFICANT CHANGE UP (ref 4.8–10.8)
WBC # FLD AUTO: 9.9 K/UL — SIGNIFICANT CHANGE UP (ref 4.8–10.8)

## 2025-02-21 PROCEDURE — 99233 SBSQ HOSP IP/OBS HIGH 50: CPT

## 2025-02-21 PROCEDURE — 71045 X-RAY EXAM CHEST 1 VIEW: CPT | Mod: 26

## 2025-02-21 RX ORDER — LIDOCAINE HCL/EPINEPHRINE/PF 1 %-1:200K
2 AMPUL (ML) INJECTION ONCE
Refills: 0 | Status: DISCONTINUED | OUTPATIENT
Start: 2025-02-21 | End: 2025-03-05

## 2025-02-21 RX ORDER — DEXTROSE 50 % IN WATER 50 %
25 SYRINGE (ML) INTRAVENOUS ONCE
Refills: 0 | Status: COMPLETED | OUTPATIENT
Start: 2025-02-21 | End: 2025-02-21

## 2025-02-21 RX ORDER — MIDAZOLAM IN 0.9 % SOD.CHLORID 1 MG/ML
4 PLASTIC BAG, INJECTION (ML) INTRAVENOUS ONCE
Refills: 0 | Status: DISCONTINUED | OUTPATIENT
Start: 2025-02-21 | End: 2025-02-21

## 2025-02-21 RX ORDER — METHADONE HCL 10 MG
5 TABLET ORAL EVERY 8 HOURS
Refills: 0 | Status: DISCONTINUED | OUTPATIENT
Start: 2025-02-21 | End: 2025-02-26

## 2025-02-21 RX ORDER — SODIUM CHLORIDE 9 G/1000ML
1000 INJECTION, SOLUTION INTRAVENOUS
Refills: 0 | Status: DISCONTINUED | OUTPATIENT
Start: 2025-02-21 | End: 2025-02-23

## 2025-02-21 RX ADMIN — POLYETHYLENE GLYCOL 3350 17 GRAM(S): 17 POWDER, FOR SOLUTION ORAL at 05:18

## 2025-02-21 RX ADMIN — POLYETHYLENE GLYCOL 3350 17 GRAM(S): 17 POWDER, FOR SOLUTION ORAL at 17:44

## 2025-02-21 RX ADMIN — SODIUM CHLORIDE 35 MILLILITER(S): 9 INJECTION, SOLUTION INTRAVENOUS at 17:44

## 2025-02-21 RX ADMIN — Medication 100 MILLIGRAM(S): at 19:16

## 2025-02-21 RX ADMIN — RASAGILINE 1 MILLIGRAM(S): 0.5 TABLET ORAL at 05:32

## 2025-02-21 RX ADMIN — Medication 100 MILLIGRAM(S): at 11:26

## 2025-02-21 RX ADMIN — BUMETANIDE 2 MILLIGRAM(S): 1 TABLET ORAL at 05:17

## 2025-02-21 RX ADMIN — Medication 1 APPLICATION(S): at 05:18

## 2025-02-21 RX ADMIN — PROPOFOL 1.53 MICROGRAM(S)/KG/MIN: 10 INJECTION, EMULSION INTRAVENOUS at 14:18

## 2025-02-21 RX ADMIN — INSULIN LISPRO 2 UNIT(S): 100 INJECTION, SOLUTION INTRAVENOUS; SUBCUTANEOUS at 11:40

## 2025-02-21 RX ADMIN — INSULIN LISPRO 2 UNIT(S): 100 INJECTION, SOLUTION INTRAVENOUS; SUBCUTANEOUS at 18:17

## 2025-02-21 RX ADMIN — Medication 5 MILLIGRAM(S): at 21:04

## 2025-02-21 RX ADMIN — Medication 5 MILLIGRAM(S): at 13:53

## 2025-02-21 RX ADMIN — Medication 40 MILLIGRAM(S): at 11:32

## 2025-02-21 RX ADMIN — Medication 100 MILLIGRAM(S): at 02:49

## 2025-02-21 RX ADMIN — Medication 4 MILLIGRAM(S): at 17:44

## 2025-02-21 RX ADMIN — Medication 2 TABLET(S): at 21:05

## 2025-02-21 RX ADMIN — Medication 2 TABLET(S): at 13:53

## 2025-02-21 RX ADMIN — SODIUM CHLORIDE 35 MILLILITER(S): 9 INJECTION, SOLUTION INTRAVENOUS at 11:27

## 2025-02-21 RX ADMIN — ENOXAPARIN SODIUM 40 MILLIGRAM(S): 100 INJECTION SUBCUTANEOUS at 17:44

## 2025-02-21 RX ADMIN — Medication 2 TABLET(S): at 05:17

## 2025-02-21 RX ADMIN — Medication 15 MILLILITER(S): at 17:44

## 2025-02-21 RX ADMIN — Medication 15 MILLILITER(S): at 05:18

## 2025-02-21 RX ADMIN — Medication 25 GRAM(S): at 02:09

## 2025-02-21 NOTE — PROGRESS NOTE ADULT - ASSESSMENT
69-year-old male past medical history of hypertension, Parkinson's, CAD presents for shortness of breath and cough of 1 day duration. History was obtained from patient's wife who noted that the patient has been having decreased po intake, activity, and unintentional weight loss for some time. However yesterday he started having cough and shortness of breath. Admitted to MICU for AHRF and sepsis 2/2 to mutilobar PNA.    #Acute Hypoxic Respiratory Failure likely 2/2 CAP/aspiration and flu  #Toxic Metabolic Encephalopathy likely 2/2 CAP/aspiration and flu  #Influenza A positive, treated  #MSSA bacteremia- resolved  #Sepsis POA  - Aspiration precautions.    - patient inc WOB, worsening alkalosis, obtunded-intubated for airway protection   -ID: if patient decompensates, dc cefepime/metro, start patricia 1g   - cardio: patient critically sick, not a candidate for surgery therefore no MARY, can c/w abx for IE as per ID  - s/p bronch, f/u cultures   - ID: tamiflu 30 mg daily: last dose on 2/10, s/p cefepime & metro (end date 2/16). nafcillin>>> now on cefazolin 2g q8h for MSSA bact  as per ID (6w)  -2/11 bronch: showed copious amounts of secretions and poor cough.  Will need trach: ongoing daily discussions with wife regarding trach vs extubation & change in code status   CT surgery consulted for trach & peg   Stenotrophomonas now in bronch cx from 2/11, suspect colonizer, if fever/ incr wbc would add levaquin per ID  2/20>> pt got trach & peg. minimal pneumothorax noted on initial cxr   2/21: starting trickle feeds, weaning off sedation>> will start methadone & versed pushes if needed>> DG to SDU once off sedation    #PE on CTA  #large R groin intramuscular hematoma, stable  #Periventricular bleed on CTH, stable  - CTH. noted with right periventricular bleed, stable.   - Neuro on board  - Continue Anti parkinson's meds.    - CTA chest to r/o PE: focal filling defect within main pulm artery   - repeat CT head: stable intraventricular hemorrhage >> repeat on 2/9 after on full AC for PE: stable  -CT pelvis:  Large intramuscular hematoma in the medial right thigh measuring approximately 9.8 x 6.4 x 17 cm.  -holding AC  -2/12: Hb drop to 6.8> got 1 unit> up to 9.5 .  CTA stable & negative for any active extravasation   2/14: 1 unit prbc  hb stable. restart proph AC 2/18. duplex -ve 2/17    #Elevated Bilirubin-improving   #Leukocytosis-improving   #Cholangitis vs cholecystitis s/o perc haydee  - F/u RUQ US: distended GB with sludge, GB polyp   - IR: perc haydee 2/5, . GB drainage cultures: few candida, likely coloniz per id.    #Constipation, improved  - senna and miralax  - c/w lactulose   -2/10: kub: distented bowels. having BM    #Possible Mural thrombus in PA  #CAD  - Avoid overload  - C/w LR at 50  - TTE: EF 56%, G1DD, mild-mod MR, mod TR, mild PA, mild pHTN  - Cardio recs appreciated       - Patient is poor candidate for MARY       - full endocarditis abx course of nafcillin/ cefazolin    #MISC  - DVT ppx: SCDs  - GI ppx: not needed  - Activity: as tolerated  - Diet: start diet tmrw thr J tube  - full code

## 2025-02-21 NOTE — PROGRESS NOTE ADULT - ASSESSMENT
IMPRESSION:    Acute Hypoxic Respiratory Failure SP TRAC  Toxic Metabolic Encephalopathy  CAP likely aspiration   Influenza A treated   MSSA bacteremia repeat CX -  Sepsis POA  cholecystitis sp percutaneous haydee  Periventricular bleed  Suspected Mural thrombus in PA  Right thigh hematoma.    HO Parkinson's Disease   HO CAD    PLAN:    CNS:  SAT. Continue Anti parkinson's. DC SEDATION,    HEENT: Oral care.   CTS for trach and PEG  this morning    PULMONARY: No change in vent settings.  Monitor airway pressures.  Remains with significant secretions.  sp trac    CARDIOVASCULAR:  Avoid overload.  Volume contraction muna tolerated     GI: GI prophylaxis.  NGT feeds as tolerated. S/P perc cholecystostomy. Bowel regimen     RENAL: Follow up lytes.  Mars in place for retention. Kidney function stable.     INFECTIOUS DISEASE: Cultures noted.  SP Tamiflu.  ABX per ID.  Ancef for now Duration per ID.     HEMATOLOGICAL: Monitor CBC and Coags. CTA noted with no active bleed.  keep Hg > 7.  Le DOPPLER -    ENDOCRINE:  Follow up FS.  Insulin protocol if needed.    MUSCULOSKELETAL: Bed rest.  Off loading. Wound care for DTIs.     Full code  SDU when off sedation  palliative care follow up   very poor prognosis     IMPRESSION:    Acute Hypoxic Respiratory Failure SP TRAC  Toxic Metabolic Encephalopathy  CAP likely aspiration   ? L ptx  Influenza A treated   MSSA bacteremia repeat CX -  Sepsis POA  cholecystitis sp percutaneous haydee  Periventricular bleed  Suspected Mural thrombus in PA  Right thigh hematoma.    HO Parkinson's Disease   HO CAD    PLAN:    CNS:  SAT. Continue Anti parkinson's. DC SEDATION,    HEENT: Oral care.   CTS for trach and PEG  this morning    PULMONARY: No change in vent settings.  Monitor airway pressures.  Remains with significant secretions.  sp trac    CARDIOVASCULAR:  Avoid overload.  Volume contraction muna tolerated     GI: GI prophylaxis.  NGT feeds as tolerated. S/P perc cholecystostomy. Bowel regimen     RENAL: Follow up lytes.  Mars in place for retention. Kidney function stable.     INFECTIOUS DISEASE: Cultures noted.  SP Tamiflu.  ABX per ID.  Ancef for now Duration per ID.     HEMATOLOGICAL: Monitor CBC and Coags. CTA noted with no active bleed.  keep Hg > 7.  Le DOPPLER -    ENDOCRINE:  Follow up FS.  Insulin protocol if needed.    MUSCULOSKELETAL: Bed rest.  Off loading. Wound care for DTIs.     Full code  SDU when off sedation  palliative care follow up   very poor prognosis

## 2025-02-21 NOTE — PROGRESS NOTE ADULT - SUBJECTIVE AND OBJECTIVE BOX
SUBJECTIVE / OVERNIGHT EVENTS  Patient slept well overnight. No acute complaints this AM. Patient does not report fevers, chills, CP, SOB, or n/v/d    MEDICATIONS  buMETAnide Injectable 2 milliGRAM(s) IV Push daily  carbidopa/levodopa  25/250 2 Tablet(s) Oral three times a day  ceFAZolin   IVPB 2000 milliGRAM(s) IV Intermittent every 8 hours  chlorhexidine 0.12% Liquid 15 milliLiter(s) Oral Mucosa every 12 hours  chlorhexidine 2% Cloths 1 Application(s) Topical <User Schedule>  dexMEDEtomidine Infusion 0.5 MICROgram(s)/kG/Hr IV Continuous <Continuous>  dextrose 5% + lactated ringers. 1000 milliLiter(s) IV Continuous <Continuous>  dextrose 5%. 1000 milliLiter(s) IV Continuous <Continuous>  dextrose 5%. 1000 milliLiter(s) IV Continuous <Continuous>  dextrose 50% Injectable 25 Gram(s) IV Push once  dextrose 50% Injectable 12.5 Gram(s) IV Push once  dextrose 50% Injectable 25 Gram(s) IV Push once  enoxaparin Injectable 40 milliGRAM(s) SubCutaneous every 24 hours  fentaNYL   Infusion 0.5 MICROgram(s)/kG/Hr IV Continuous <Continuous>  glucagon  Injectable 1 milliGRAM(s) IntraMuscular once  insulin lispro (ADMELOG) corrective regimen sliding scale   SubCutaneous every 6 hours  insulin lispro Injectable (ADMELOG) 2 Unit(s) SubCutaneous every 6 hours  norepinephrine Infusion 0.05 MICROgram(s)/kG/Min IV Continuous <Continuous>  pantoprazole   Suspension 40 milliGRAM(s) Oral daily  polyethylene glycol 3350 17 Gram(s) Oral every 12 hours  propofol Infusion 5 MICROgram(s)/kG/Min IV Continuous <Continuous>  rasagiline Tablet 1 milliGRAM(s) Oral <User Schedule>  senna 2 Tablet(s) Oral at bedtime    acetaminophen     Tablet .. 650 milliGRAM(s) Oral every 6 hours PRN Temp greater or equal to 38C (100.4F), Mild Pain (1 - 3)  dextrose Oral Gel 15 Gram(s) Oral once PRN Blood Glucose LESS THAN 70 milliGRAM(s)/deciliter    VITALS /  EXAM    T(C): 37 (02-21-25 @ 08:00), Max: 37.1 (02-21-25 @ 04:00)  HR: 80 (02-21-25 @ 09:00) (63 - 100)  BP: 131/59 (02-21-25 @ 09:00) (82/58 - 131/59)  RR: 27 (02-21-25 @ 09:00) (17 - 32)  SpO2: 100% (02-21-25 @ 09:00) (92% - 100%)  POCT Blood Glucose.: 109 mg/dL (02-21-25 @ 04:05)  POCT Blood Glucose.: 70 mg/dL (02-21-25 @ 01:54)  POCT Blood Glucose.: 90 mg/dL (02-21-25 @ 01:19)  POCT Blood Glucose.: 69 mg/dL (02-20-25 @ 22:47)  POCT Blood Glucose.: 89 mg/dL (02-20-25 @ 14:09)    GENERAL: NAD, intubated & sedated  CHEST/LUNG: Clear to auscultation   HEART: Regular rate and rhythm  ABDOMEN: Soft, Nontender, Nondistended  EXTREMITIES:  No clubbing, cyanosis, or edema    I's & O's     02-20-25 @ 07:01  -  02-21-25 @ 07:00  --------------------------------------------------------  IN:    Dexmedetomidine: 76.8 mL    dextrose 5% + lactated ringers: 600 mL    Enteral Tube Flush: 100 mL    FentaNYL: 210 mL    Lactated Ringers: 885 mL    Propofol: 72 mL  Total IN: 1943.8 mL    OUT:    Indwelling Catheter - Urethral (mL): 1745 mL  Total OUT: 1745 mL    Total NET: 198.8 mL      02-21-25 @ 07:01  -  02-21-25 @ 10:20  --------------------------------------------------------  IN:    dextrose 5% + lactated ringers: 225 mL  Total IN: 225 mL    OUT:  Total OUT: 0 mL    Total NET: 225 mL        LABS             10.3   9.90  )-----------( 231      ( 02-21-25 @ 04:53 )             32.3     144  |  110  |  34  -------------------------<  121   02-21-25 @ 04:53  3.7  |  25  |  0.9    Ca      7.8     02-21-25 @ 04:53  Phos   2.7     02-20-25 @ 11:09  Mg     1.9     02-21-25 @ 04:53    TPro  4.2  /  Alb  2.2  /  TBili  1.8  /  DBili  x   /  AST  24  /  ALT  <5  /  AlkPhos  103  /  GGT  x     02-21-25 @ 04:53    PT/INR - ( 02-20-25 @ 11:09 )   PT: TNP sec;   INR: TNP ratio  PTT - ( 02-20-25 @ 11:09 )  PTT:TNP sec          Urinalysis Basic - ( 21 Feb 2025 04:53 )    Color: x / Appearance: x / SG: x / pH: x  Gluc: 121 mg/dL / Ketone: x  / Bili: x / Urobili: x   Blood: x / Protein: x / Nitrite: x   Leuk Esterase: x / RBC: x / WBC x   Sq Epi: x / Non Sq Epi: x / Bacteria: x      MICRO / IMAGING / CARDIOLOGY  Telemetry: Reviewed   EKG: Reviewed    CULTURES    IMAGING  PACS Image:  (02-21-25 @ 06:34)  PACS Image:  (02-20-25 @ 12:58)  PACS Image:  (02-20-25 @ 11:22)  PACS Image:  (02-20-25 @ 06:44)  PACS Image:  (02-19-25 @ 18:46)  PACS Image:  (02-19-25 @ 10:48)    CARDIOLOGY

## 2025-02-21 NOTE — PROGRESS NOTE ADULT - SUBJECTIVE AND OBJECTIVE BOX
Over Night Events: events noted, vent dependant, on fentanyl, precedex, propofol, sp trac    PHYSICAL EXAM    ICU Vital Signs Last 24 Hrs  T(C): 37 (21 Feb 2025 08:00), Max: 37.1 (21 Feb 2025 04:00)  T(F): 98.6 (21 Feb 2025 08:00), Max: 98.7 (21 Feb 2025 04:00)  HR: 83 (21 Feb 2025 08:34) (63 - 100)  BP: 117/58 (21 Feb 2025 08:34) (82/58 - 130/60)  BP(mean): 79 (21 Feb 2025 08:34) (63 - 86)  RR: 32 (21 Feb 2025 08:34) (17 - 32)  SpO2: 100% (21 Feb 2025 08:34) (92% - 100%)    O2 Parameters below as of 21 Feb 2025 08:34  Patient On (Oxygen Delivery Method): ventilator    O2 Concentration (%): 40        General: ill lookin  trac  Lungs: Bilateral rhonchi  Cardiovascular: FR[place]LYN 2.6  Abdomen: Soft, Positive BS  Extremities: No clubbing   Neurological: Non focal       02-20-25 @ 07:01  -  02-21-25 @ 07:00  --------------------------------------------------------  IN:    Dexmedetomidine: 76.8 mL    dextrose 5% + lactated ringers: 600 mL    Enteral Tube Flush: 100 mL    FentaNYL: 210 mL    Lactated Ringers: 885 mL    Propofol: 72 mL  Total IN: 1943.8 mL    OUT:    Indwelling Catheter - Urethral (mL): 1745 mL  Total OUT: 1745 mL    Total NET: 198.8 mL          LABS:                          10.3   9.90  )-----------( 231      ( 21 Feb 2025 04:53 )             32.3                                               02-21    144  |  110  |  34[H]  ----------------------------<  121[H]  3.7   |  25  |  0.9    Ca    7.8[L]      21 Feb 2025 04:53  Phos  2.7     02-20  Mg     1.9     02-21    TPro  4.2[L]  /  Alb  2.2[L]  /  TBili  1.8[H]  /  DBili  x   /  AST  24  /  ALT  <5  /  AlkPhos  103  02-21      PT/INR - ( 20 Feb 2025 11:09 )   PT: TNP sec;   INR: TNP ratio         PTT - ( 20 Feb 2025 11:09 )  PTT:TNP sec                                       Urinalysis Basic - ( 21 Feb 2025 04:53 )    Color: x / Appearance: x / SG: x / pH: x  Gluc: 121 mg/dL / Ketone: x  / Bili: x / Urobili: x   Blood: x / Protein: x / Nitrite: x   Leuk Esterase: x / RBC: x / WBC x   Sq Epi: x / Non Sq Epi: x / Bacteria: x                                                  LIVER FUNCTIONS - ( 21 Feb 2025 04:53 )  Alb: 2.2 g/dL / Pro: 4.2 g/dL / ALK PHOS: 103 U/L / ALT: <5 U/L / AST: 24 U/L / GGT: x                                                                                               Mode: AC/ CMV (Assist Control/ Continuous Mandatory Ventilation)  RR (machine): 18  TV (machine): 480  FiO2: 40  PEEP: 8  ITime: 1  MAP: 12  PIP: 20                                      ABG - ( 21 Feb 2025 03:40 )  pH, Arterial: 7.37  pH, Blood: x     /  pCO2: 46    /  pO2: 115   / HCO3: 27    / Base Excess: 0.8   /  SaO2: 99.3                MEDICATIONS  (STANDING):  buMETAnide Injectable 2 milliGRAM(s) IV Push daily  carbidopa/levodopa  25/250 2 Tablet(s) Oral three times a day  ceFAZolin   IVPB 2000 milliGRAM(s) IV Intermittent every 8 hours  chlorhexidine 0.12% Liquid 15 milliLiter(s) Oral Mucosa every 12 hours  chlorhexidine 2% Cloths 1 Application(s) Topical <User Schedule>  dexMEDEtomidine Infusion 0.5 MICROgram(s)/kG/Hr (6.39 mL/Hr) IV Continuous <Continuous>  dextrose 5% + lactated ringers. 1000 milliLiter(s) (75 mL/Hr) IV Continuous <Continuous>  dextrose 5%. 1000 milliLiter(s) (50 mL/Hr) IV Continuous <Continuous>  dextrose 5%. 1000 milliLiter(s) (100 mL/Hr) IV Continuous <Continuous>  dextrose 50% Injectable 25 Gram(s) IV Push once  dextrose 50% Injectable 12.5 Gram(s) IV Push once  dextrose 50% Injectable 25 Gram(s) IV Push once  enoxaparin Injectable 40 milliGRAM(s) SubCutaneous every 24 hours  fentaNYL   Infusion 0.5 MICROgram(s)/kG/Hr (2.56 mL/Hr) IV Continuous <Continuous>  glucagon  Injectable 1 milliGRAM(s) IntraMuscular once  insulin lispro (ADMELOG) corrective regimen sliding scale   SubCutaneous every 6 hours  insulin lispro Injectable (ADMELOG) 2 Unit(s) SubCutaneous every 6 hours  norepinephrine Infusion 0.05 MICROgram(s)/kG/Min (4.79 mL/Hr) IV Continuous <Continuous>  pantoprazole   Suspension 40 milliGRAM(s) Oral daily  polyethylene glycol 3350 17 Gram(s) Oral every 12 hours  propofol Infusion 5 MICROgram(s)/kG/Min (1.53 mL/Hr) IV Continuous <Continuous>  rasagiline Tablet 1 milliGRAM(s) Oral <User Schedule>  senna 2 Tablet(s) Oral at bedtime    MEDICATIONS  (PRN):  acetaminophen     Tablet .. 650 milliGRAM(s) Oral every 6 hours PRN Temp greater or equal to 38C (100.4F), Mild Pain (1 - 3)  dextrose Oral Gel 15 Gram(s) Oral once PRN Blood Glucose LESS THAN 70 milliGRAM(s)/deciliter

## 2025-02-21 NOTE — PROGRESS NOTE ADULT - ASSESSMENT
ASSESSMENT:  69-year-old male past medical history of hypertension, Parkinson's, CAD s/p trach and pegj    PLAN:  - G tube for meds   - May start trickle feeds through J tube   - Trach sutures to be removed 3/2   - Care as per primary team   - Recall as needed       GREEN TEAM SPECTRA: [id number]

## 2025-02-21 NOTE — PROGRESS NOTE ADULT - SUBJECTIVE AND OBJECTIVE BOX
GENERAL SURGERY PROGRESS NOTE    Patient: GARETT ESCAMILLA , 69y ([birth year])Male   MRN: [id number]  Location: [address]  Visit: 01-29-25 Inpatient  Date: 02-21-25 @ 07:39    Hospital Day #: 24  Post-Op Day #: 1    Procedure/Dx/Injuries: s/p trach and peg     Events of past 24 hours: small left apical PTX stable.     PAST MEDICAL & SURGICAL HISTORY:  Parkinson disease      CAD (coronary artery disease)      History of basal cell carcinoma (BCC) excision          Vitals:   T(F): 98.7 (02-21-25 @ 04:00), Max: 98.7 (02-21-25 @ 04:00)  HR: 81 (02-21-25 @ 07:00)  BP: 113/64 (02-21-25 @ 07:00)  RR: 18 (02-21-25 @ 07:00)  SpO2: 100% (02-21-25 @ 07:00)  Mode: AC/ CMV (Assist Control/ Continuous Mandatory Ventilation), RR (machine): 18, TV (machine): 480, FiO2: 40, PEEP: 8, ITime: 1, MAP: 12, PIP: 26    Diet, NPO after Midnight:      NPO Start Date: 19-Feb-2025,   NPO Start Time: 23:59  Except Medications  Diet, NPO with Tube Feed:   Tube Feeding Modality: Nasogastric  Peptamen A.F. Formula (PEPTAMENAFRTH)  Total Volume for 24 Hours (mL): 990  Continuous  Starting Tube Feed Rate mL per Hour: 20  Increase Tube Feed Rate by (mL): 10     Every 4 hours  Until Goal Tube Feed Rate (mL per Hour): 55  Tube Feed Duration (in Hours): 18  Tube Feed Start Time: 17:00  Free Water Flush  Free Water Flush Instructions:  noted with free water flush order of 300 mL q4hrs      Fluids:     I & O's:    02-20-25 @ 07:01  -  02-21-25 @ 07:00  --------------------------------------------------------  IN:    Dexmedetomidine: 76.8 mL    dextrose 5% + lactated ringers: 600 mL    Enteral Tube Flush: 100 mL    FentaNYL: 210 mL    Lactated Ringers: 885 mL    Propofol: 72 mL  Total IN: 1943.8 mL    OUT:    Indwelling Catheter - Urethral (mL): 1745 mL  Total OUT: 1745 mL    Total NET: 198.8 mL        PHYSICAL EXAM:  General: NAD, intubated via trach  HEENT: Trach collar, trach tube intact, old dry blood around the trach, no active bleeding  Cardiac: RRR S1, S2,  Respiratory: Normal respiratory effort, on mechanical vent via trach  Abdomen: Soft, non-distended, non-tender, 18Fr GJ tube intact, J to gravity  Skin: Warm/dry, normal color,     MEDICATIONS  (STANDING):  buMETAnide Injectable 2 milliGRAM(s) IV Push daily  carbidopa/levodopa  25/250 2 Tablet(s) Oral three times a day  ceFAZolin   IVPB 2000 milliGRAM(s) IV Intermittent every 8 hours  chlorhexidine 0.12% Liquid 15 milliLiter(s) Oral Mucosa every 12 hours  chlorhexidine 2% Cloths 1 Application(s) Topical <User Schedule>  dexMEDEtomidine Infusion 0.5 MICROgram(s)/kG/Hr (6.39 mL/Hr) IV Continuous <Continuous>  dextrose 5% + lactated ringers. 1000 milliLiter(s) (75 mL/Hr) IV Continuous <Continuous>  dextrose 5%. 1000 milliLiter(s) (50 mL/Hr) IV Continuous <Continuous>  dextrose 5%. 1000 milliLiter(s) (100 mL/Hr) IV Continuous <Continuous>  dextrose 50% Injectable 25 Gram(s) IV Push once  dextrose 50% Injectable 12.5 Gram(s) IV Push once  dextrose 50% Injectable 25 Gram(s) IV Push once  enoxaparin Injectable 40 milliGRAM(s) SubCutaneous every 24 hours  fentaNYL   Infusion 0.5 MICROgram(s)/kG/Hr (2.56 mL/Hr) IV Continuous <Continuous>  glucagon  Injectable 1 milliGRAM(s) IntraMuscular once  insulin lispro (ADMELOG) corrective regimen sliding scale   SubCutaneous every 6 hours  insulin lispro Injectable (ADMELOG) 2 Unit(s) SubCutaneous every 6 hours  norepinephrine Infusion 0.05 MICROgram(s)/kG/Min (4.79 mL/Hr) IV Continuous <Continuous>  pantoprazole   Suspension 40 milliGRAM(s) Oral daily  polyethylene glycol 3350 17 Gram(s) Oral every 12 hours  propofol Infusion 5 MICROgram(s)/kG/Min (1.53 mL/Hr) IV Continuous <Continuous>  rasagiline Tablet 1 milliGRAM(s) Oral <User Schedule>  senna 2 Tablet(s) Oral at bedtime    MEDICATIONS  (PRN):  acetaminophen     Tablet .. 650 milliGRAM(s) Oral every 6 hours PRN Temp greater or equal to 38C (100.4F), Mild Pain (1 - 3)  dextrose Oral Gel 15 Gram(s) Oral once PRN Blood Glucose LESS THAN 70 milliGRAM(s)/deciliter      DVT PROPHYLAXIS: enoxaparin Injectable 40 milliGRAM(s) SubCutaneous every 24 hours    GI PROPHYLAXIS: pantoprazole   Suspension 40 milliGRAM(s) Oral daily    ANTICOAGULATION:   ANTIBIOTICS:  ceFAZolin   IVPB 2000 milliGRAM(s)            LAB/STUDIES:  Labs:  CAPILLARY BLOOD GLUCOSE      POCT Blood Glucose.: 109 mg/dL (21 Feb 2025 04:05)  POCT Blood Glucose.: 70 mg/dL (21 Feb 2025 01:54)  POCT Blood Glucose.: 90 mg/dL (21 Feb 2025 01:19)  POCT Blood Glucose.: 69 mg/dL (20 Feb 2025 22:47)  POCT Blood Glucose.: 89 mg/dL (20 Feb 2025 14:09)                          10.3   9.90  )-----------( 231      ( 21 Feb 2025 04:53 )             32.3       Auto Immature Granulocyte %: 0.6 % (02-21-25 @ 04:53)    02-21    144  |  110  |  34[H]  ----------------------------<  121[H]  3.7   |  25  |  0.9      Calcium: 7.8 mg/dL (02-21-25 @ 04:53)      LFTs:             4.2  | 1.8  | 24       ------------------[103     ( 21 Feb 2025 04:53 )  2.2  | x    | <5          Lipase:x      Amylase:x         Blood Gas Arterial, Lactate: 0.8 mmol/L (02-21-25 @ 03:40)  Blood Gas Arterial, Lactate: 0.7 mmol/L (02-20-25 @ 03:22)  Blood Gas Arterial, Lactate: 0.6 mmol/L (02-19-25 @ 04:16)    ABG - ( 21 Feb 2025 03:40 )  pH: 7.37  /  pCO2: 46    /  pO2: 115   / HCO3: 27    / Base Excess: 0.8   /  SaO2: 99.3            ABG - ( 20 Feb 2025 03:22 )  pH: 7.41  /  pCO2: 45    /  pO2: 136   / HCO3: 28    / Base Excess: 3.3   /  SaO2: 100.0           ABG - ( 19 Feb 2025 04:16 )  pH: 7.38  /  pCO2: 46    /  pO2: 151   / HCO3: 27    / Base Excess: 1.6   /  SaO2: 99.7              Coags:     TNP    ----< TNP     ( 20 Feb 2025 11:09 )     TNP                 Urinalysis Basic - ( 21 Feb 2025 04:53 )    Color: x / Appearance: x / SG: x / pH: x  Gluc: 121 mg/dL / Ketone: x  / Bili: x / Urobili: x   Blood: x / Protein: x / Nitrite: x   Leuk Esterase: x / RBC: x / WBC x   Sq Epi: x / Non Sq Epi: x / Bacteria: x                IMAGING:

## 2025-02-22 LAB
ALBUMIN SERPL ELPH-MCNC: 2.3 G/DL — LOW (ref 3.5–5.2)
ALP SERPL-CCNC: 101 U/L — SIGNIFICANT CHANGE UP (ref 30–115)
ALT FLD-CCNC: <5 U/L — SIGNIFICANT CHANGE UP (ref 0–41)
ANION GAP SERPL CALC-SCNC: 9 MMOL/L — SIGNIFICANT CHANGE UP (ref 7–14)
AST SERPL-CCNC: 21 U/L — SIGNIFICANT CHANGE UP (ref 0–41)
BASOPHILS # BLD AUTO: 0.01 K/UL — SIGNIFICANT CHANGE UP (ref 0–0.2)
BASOPHILS NFR BLD AUTO: 0.1 % — SIGNIFICANT CHANGE UP (ref 0–1)
BILIRUB SERPL-MCNC: 1.4 MG/DL — HIGH (ref 0.2–1.2)
BUN SERPL-MCNC: 34 MG/DL — HIGH (ref 10–20)
CALCIUM SERPL-MCNC: 8.2 MG/DL — LOW (ref 8.4–10.5)
CHLORIDE SERPL-SCNC: 110 MMOL/L — SIGNIFICANT CHANGE UP (ref 98–110)
CO2 SERPL-SCNC: 26 MMOL/L — SIGNIFICANT CHANGE UP (ref 17–32)
CREAT SERPL-MCNC: 0.9 MG/DL — SIGNIFICANT CHANGE UP (ref 0.7–1.5)
EGFR: 92 ML/MIN/1.73M2 — SIGNIFICANT CHANGE UP
EGFR: 92 ML/MIN/1.73M2 — SIGNIFICANT CHANGE UP
EOSINOPHIL # BLD AUTO: 0.01 K/UL — SIGNIFICANT CHANGE UP (ref 0–0.7)
EOSINOPHIL NFR BLD AUTO: 0.1 % — SIGNIFICANT CHANGE UP (ref 0–8)
GAS PNL BLDA: SIGNIFICANT CHANGE UP
GLUCOSE BLDC GLUCOMTR-MCNC: 123 MG/DL — HIGH (ref 70–99)
GLUCOSE BLDC GLUCOMTR-MCNC: 133 MG/DL — HIGH (ref 70–99)
GLUCOSE BLDC GLUCOMTR-MCNC: 141 MG/DL — HIGH (ref 70–99)
GLUCOSE SERPL-MCNC: 125 MG/DL — HIGH (ref 70–99)
HCT VFR BLD CALC: 31.8 % — LOW (ref 42–52)
HGB BLD-MCNC: 10.2 G/DL — LOW (ref 14–18)
IMM GRANULOCYTES NFR BLD AUTO: 0.6 % — HIGH (ref 0.1–0.3)
LYMPHOCYTES # BLD AUTO: 0.53 K/UL — LOW (ref 1.2–3.4)
LYMPHOCYTES # BLD AUTO: 4.6 % — LOW (ref 20.5–51.1)
MAGNESIUM SERPL-MCNC: 1.9 MG/DL — SIGNIFICANT CHANGE UP (ref 1.8–2.4)
MCHC RBC-ENTMCNC: 29.9 PG — SIGNIFICANT CHANGE UP (ref 27–31)
MCHC RBC-ENTMCNC: 32.1 G/DL — SIGNIFICANT CHANGE UP (ref 32–37)
MCV RBC AUTO: 93.3 FL — SIGNIFICANT CHANGE UP (ref 80–94)
MONOCYTES # BLD AUTO: 0.58 K/UL — SIGNIFICANT CHANGE UP (ref 0.1–0.6)
MONOCYTES NFR BLD AUTO: 5 % — SIGNIFICANT CHANGE UP (ref 1.7–9.3)
NEUTROPHILS # BLD AUTO: 10.38 K/UL — HIGH (ref 1.4–6.5)
NEUTROPHILS NFR BLD AUTO: 89.6 % — HIGH (ref 42.2–75.2)
NRBC BLD AUTO-RTO: 0 /100 WBCS — SIGNIFICANT CHANGE UP (ref 0–0)
PLATELET # BLD AUTO: 257 K/UL — SIGNIFICANT CHANGE UP (ref 130–400)
PMV BLD: 10.7 FL — HIGH (ref 7.4–10.4)
POTASSIUM SERPL-MCNC: 3.3 MMOL/L — LOW (ref 3.5–5)
POTASSIUM SERPL-SCNC: 3.3 MMOL/L — LOW (ref 3.5–5)
PROT SERPL-MCNC: 4.6 G/DL — LOW (ref 6–8)
RBC # BLD: 3.41 M/UL — LOW (ref 4.7–6.1)
RBC # FLD: 18.6 % — HIGH (ref 11.5–14.5)
SODIUM SERPL-SCNC: 145 MMOL/L — SIGNIFICANT CHANGE UP (ref 135–146)
WBC # BLD: 11.58 K/UL — HIGH (ref 4.8–10.8)
WBC # FLD AUTO: 11.58 K/UL — HIGH (ref 4.8–10.8)

## 2025-02-22 PROCEDURE — 71045 X-RAY EXAM CHEST 1 VIEW: CPT | Mod: 26

## 2025-02-22 PROCEDURE — 99233 SBSQ HOSP IP/OBS HIGH 50: CPT

## 2025-02-22 PROCEDURE — 11046 DBRDMT MUSC&/FSCA EA ADDL: CPT

## 2025-02-22 PROCEDURE — 11043 DBRDMT MUSC&/FSCA 1ST 20/<: CPT

## 2025-02-22 RX ORDER — MIDAZOLAM IN 0.9 % SOD.CHLORID 1 MG/ML
1 PLASTIC BAG, INJECTION (ML) INTRAVENOUS ONCE
Refills: 0 | Status: DISCONTINUED | OUTPATIENT
Start: 2025-02-22 | End: 2025-02-23

## 2025-02-22 RX ADMIN — Medication 5 MILLIGRAM(S): at 21:39

## 2025-02-22 RX ADMIN — Medication 100 MILLIGRAM(S): at 05:03

## 2025-02-22 RX ADMIN — INSULIN LISPRO 2 UNIT(S): 100 INJECTION, SOLUTION INTRAVENOUS; SUBCUTANEOUS at 17:38

## 2025-02-22 RX ADMIN — Medication 5 MILLIGRAM(S): at 05:05

## 2025-02-22 RX ADMIN — Medication 2 TABLET(S): at 21:39

## 2025-02-22 RX ADMIN — RASAGILINE 1 MILLIGRAM(S): 0.5 TABLET ORAL at 05:15

## 2025-02-22 RX ADMIN — POLYETHYLENE GLYCOL 3350 17 GRAM(S): 17 POWDER, FOR SOLUTION ORAL at 17:29

## 2025-02-22 RX ADMIN — POLYETHYLENE GLYCOL 3350 17 GRAM(S): 17 POWDER, FOR SOLUTION ORAL at 05:03

## 2025-02-22 RX ADMIN — ENOXAPARIN SODIUM 40 MILLIGRAM(S): 100 INJECTION SUBCUTANEOUS at 17:29

## 2025-02-22 RX ADMIN — INSULIN LISPRO 2 UNIT(S): 100 INJECTION, SOLUTION INTRAVENOUS; SUBCUTANEOUS at 12:06

## 2025-02-22 RX ADMIN — Medication 5 MILLIGRAM(S): at 13:33

## 2025-02-22 RX ADMIN — Medication 15 MILLILITER(S): at 05:15

## 2025-02-22 RX ADMIN — Medication 20 MILLIEQUIVALENT(S): at 09:22

## 2025-02-22 RX ADMIN — Medication 100 MILLIGRAM(S): at 18:50

## 2025-02-22 RX ADMIN — Medication 1 APPLICATION(S): at 05:03

## 2025-02-22 RX ADMIN — Medication 100 MILLIGRAM(S): at 12:03

## 2025-02-22 RX ADMIN — Medication 15 MILLILITER(S): at 17:29

## 2025-02-22 RX ADMIN — BUMETANIDE 2 MILLIGRAM(S): 1 TABLET ORAL at 05:03

## 2025-02-22 RX ADMIN — Medication 40 MILLIGRAM(S): at 12:03

## 2025-02-22 RX ADMIN — Medication 2 TABLET(S): at 05:04

## 2025-02-22 RX ADMIN — Medication 2 TABLET(S): at 13:33

## 2025-02-22 NOTE — PROGRESS NOTE ADULT - ASSESSMENT
· Assessment	  69-year-old male past medical history of hypertension, Parkinson's, CAD presents for shortness of breath and cough of 1 day duration. History was obtained from patient's wife who noted that the patient has been having decreased po intake, activity, and unintentional weight loss for some time. However yesterday he started having cough and shortness of breath. Admitted to MICU for AHRF and sepsis 2/2 to mutilobar PNA.    #Acute Hypoxic Respiratory Failure likely 2/2 CAP/aspiration and flu  #Toxic Metabolic Encephalopathy likely 2/2 CAP/aspiration and flu  #Influenza A positive, treated  #MSSA bacteremia- resolved  #Sepsis POA  - Aspiration precautions.    - patient inc WOB, worsening alkalosis, obtunded-intubated for airway protection   -ID: if patient decompensates, dc cefepime/metro, start patricia 1g   - cardio: patient critically sick, not a candidate for surgery therefore no MARY, can c/w abx for IE as per ID  - s/p bronch, f/u cultures   - ID: tamiflu 30 mg daily: last dose on 2/10, s/p cefepime & metro (end date 2/16). nafcillin>>> now on cefazolin 2g q8h for MSSA bact  as per ID (6w)  -2/11 bronch: showed copious amounts of secretions and poor cough.  Will need trach: ongoing daily discussions with wife regarding trach vs extubation & change in code status   CT surgery consulted for trach & peg   Stenotrophomonas now in bronch cx from 2/11, suspect colonizer, if fever/ incr wbc would add levaquin per ID  2/20>> pt got trach & peg. minimal pneumothorax noted on initial cxr   2/21: starting trickle feeds, weaning off sedation>> will start methadone & versed pushes if needed>> DG to SDU once off sedation    #PE on CTA  #large R groin intramuscular hematoma, stable  #Periventricular bleed on CTH, stable  - CTH. noted with right periventricular bleed, stable.   - Neuro on board  - Continue Anti parkinson's meds.    - CTA chest to r/o PE: focal filling defect within main pulm artery   - repeat CT head: stable intraventricular hemorrhage >> repeat on 2/9 after on full AC for PE: stable  -CT pelvis:  Large intramuscular hematoma in the medial right thigh measuring approximately 9.8 x 6.4 x 17 cm.  -holding AC  -2/12: Hb drop to 6.8> got 1 unit> up to 9.5 .  CTA stable & negative for any active extravasation   2/14: 1 unit prbc  hb stable. restart proph AC 2/18. duplex -ve 2/17    #Elevated Bilirubin-improving   #Leukocytosis-improving   #Cholangitis vs cholecystitis s/o perc haydee  - F/u RUQ US: distended GB with sludge, GB polyp   - IR: perc haydee 2/5, . GB drainage cultures: few candida, likely coloniz per id.    #Constipation, improved  - senna and miralax  - c/w lactulose   -2/10: kub: distented bowels. having BM    #Possible Mural thrombus in PA  #CAD  - Avoid overload  - C/w LR at 50  - TTE: EF 56%, G1DD, mild-mod MR, mod TR, mild VT, mild pHTN  - Cardio recs appreciated       - Patient is poor candidate for MARY       - full endocarditis abx course of nafcillin/ cefazolin    #MISC  - DVT ppx: SCDs  - GI ppx: not needed  - Activity: as tolerated  - Diet: start diet tmrw thr J tube  - full code

## 2025-02-22 NOTE — BRIEF OPERATIVE NOTE - NSICDXBRIEFPREOP_GEN_ALL_CORE_FT
PRE-OP DIAGNOSIS:  Acute respiratory failure with hypoxia 20-Feb-2025 10:36:05  Jesse Cortez  
PRE-OP DIAGNOSIS:  Sacral pressure sore 22-Feb-2025 11:10:10  Bravo Eason

## 2025-02-22 NOTE — PROGRESS NOTE ADULT - SUBJECTIVE AND OBJECTIVE BOX
SUBJECTIVE/OVERNIGHT EVENTS  Today is hospital day 24d. This morning patient was seen and examined at bedside, resting comfortably in bed. No acute or major events overnight.        MEDICATIONS  STANDING MEDICATIONS  buMETAnide Injectable 2 milliGRAM(s) IV Push daily  carbidopa/levodopa  25/250 2 Tablet(s) Oral three times a day  ceFAZolin   IVPB 2000 milliGRAM(s) IV Intermittent every 8 hours  chlorhexidine 0.12% Liquid 15 milliLiter(s) Oral Mucosa every 12 hours  chlorhexidine 2% Cloths 1 Application(s) Topical <User Schedule>  dexMEDEtomidine Infusion 0.5 MICROgram(s)/kG/Hr IV Continuous <Continuous>  dextrose 5% + lactated ringers. 1000 milliLiter(s) IV Continuous <Continuous>  dextrose 5%. 1000 milliLiter(s) IV Continuous <Continuous>  dextrose 5%. 1000 milliLiter(s) IV Continuous <Continuous>  dextrose 50% Injectable 25 Gram(s) IV Push once  dextrose 50% Injectable 12.5 Gram(s) IV Push once  dextrose 50% Injectable 25 Gram(s) IV Push once  enoxaparin Injectable 40 milliGRAM(s) SubCutaneous every 24 hours  glucagon  Injectable 1 milliGRAM(s) IntraMuscular once  insulin lispro (ADMELOG) corrective regimen sliding scale   SubCutaneous every 6 hours  insulin lispro Injectable (ADMELOG) 2 Unit(s) SubCutaneous every 6 hours  lidocaine 1%/epinephrine 1:100,000 Inj 2 Vial(s) Local Injection once  methadone    Tablet 5 milliGRAM(s) Oral every 8 hours  norepinephrine Infusion 0.05 MICROgram(s)/kG/Min IV Continuous <Continuous>  pantoprazole   Suspension 40 milliGRAM(s) Oral daily  polyethylene glycol 3350 17 Gram(s) Oral every 12 hours  propofol Infusion 5 MICROgram(s)/kG/Min IV Continuous <Continuous>  rasagiline Tablet 1 milliGRAM(s) Oral <User Schedule>  senna 2 Tablet(s) Oral at bedtime    PRN MEDICATIONS  acetaminophen     Tablet .. 650 milliGRAM(s) Oral every 6 hours PRN  dextrose Oral Gel 15 Gram(s) Oral once PRN    VITALS  T(F): 99.4 (02-21-25 @ 20:00), Max: 101.1 (02-21-25 @ 12:00)  HR: 89 (02-21-25 @ 23:00) (65 - 116)  BP: 115/56 (02-21-25 @ 23:00) (75/47 - 134/63)  RR: 26 (02-21-25 @ 23:00) (18 - 46)  SpO2: 100% (02-21-25 @ 23:00) (98% - 100%)  POCT Blood Glucose.: 97 mg/dL (02-21-25 @ 22:57)  POCT Blood Glucose.: 120 mg/dL (02-21-25 @ 18:16)  POCT Blood Glucose.: 133 mg/dL (02-21-25 @ 11:36)  POCT Blood Glucose.: 109 mg/dL (02-21-25 @ 04:05)  POCT Blood Glucose.: 70 mg/dL (02-21-25 @ 01:54)  POCT Blood Glucose.: 90 mg/dL (02-21-25 @ 01:19)        GENERAL: NAD, intubated & sedated  CHEST/LUNG: Clear to auscultation   HEART: Regular rate and rhythm  ABDOMEN: Soft, Nontender, Nondistended  EXTREMITIES:  No clubbing, cyanosis, or edema             LABS             10.3   9.90  )-----------( 231      ( 02-21-25 @ 04:53 )             32.3     144  |  110  |  34  -------------------------<  121   02-21-25 @ 04:53  3.7  |  25  |  0.9    Ca      7.8     02-21-25 @ 04:53  Phos   2.7     02-20-25 @ 11:09  Mg     1.9     02-21-25 @ 04:53    TPro  4.2  /  Alb  2.2  /  TBili  1.8  /  DBili  x   /  AST  24  /  ALT  <5  /  AlkPhos  103  /  GGT  x     02-21-25 @ 04:53    PT/INR - ( 02-20-25 @ 11:09 )   PT: TNP sec;   INR: TNP ratio  PTT - ( 02-20-25 @ 11:09 )  PTT:TNP sec      Urinalysis Basic - ( 21 Feb 2025 04:53 )    Color: x / Appearance: x / SG: x / pH: x  Gluc: 121 mg/dL / Ketone: x  / Bili: x / Urobili: x   Blood: x / Protein: x / Nitrite: x   Leuk Esterase: x / RBC: x / WBC x   Sq Epi: x / Non Sq Epi: x / Bacteria: x      ABG - ( 21 Feb 2025 03:40 )  pH, Arterial: 7.37  pH, Blood: x     /  pCO2: 46    /  pO2: 115   / HCO3: 27    / Base Excess: 0.8   /  SaO2: 99.3                IMAGING      ASSESSMENT AND PLAN:

## 2025-02-22 NOTE — PROGRESS NOTE ADULT - SUBJECTIVE AND OBJECTIVE BOX
Over Night Events: events noted, sp trach vent dependant, low grade fever    PHYSICAL EXAM    ICU Vital Signs Last 24 Hrs  T(C): 37.3 (22 Feb 2025 04:00), Max: 38.4 (21 Feb 2025 12:00)  T(F): 99.1 (22 Feb 2025 04:00), Max: 101.1 (21 Feb 2025 12:00)  HR: 93 (22 Feb 2025 06:00) (80 - 116)  BP: 108/57 (22 Feb 2025 06:00) (75/47 - 140/71)  BP(mean): 74 (22 Feb 2025 06:00) (56 - 99)  RR: 24 (22 Feb 2025 06:00) (18 - 46)  SpO2: 100% (22 Feb 2025 06:00) (98% - 100%)    O2 Parameters below as of 22 Feb 2025 06:00  Patient On (Oxygen Delivery Method): ventilator    O2 Concentration (%): 40        General: ill looking, trac  cachectic  Lungs: Bilateral rhonchi  Cardiovascular: Regular   Abdomen: Soft, Positive BS  not following commands      02-21-25 @ 07:01  -  02-22-25 @ 07:00  --------------------------------------------------------  IN:    Dexmedetomidine: 50.1 mL    dextrose 5% + lactated ringers: 225 mL    dextrose 5% + lactated ringers: 420 mL    Enteral Tube Flush: 300 mL    IV PiggyBack: 50 mL    Peptamen A.F.: 220 mL  Total IN: 1265.1 mL    OUT:    Drain (mL): 180 mL    Indwelling Catheter - Urethral (mL): 1290 mL  Total OUT: 1470 mL    Total NET: -204.9 mL          LABS:                          10.2   11.58 )-----------( 257      ( 22 Feb 2025 04:30 )             31.8                                               02-22    145  |  110  |  34[H]  ----------------------------<  125[H]  3.3[L]   |  26  |  0.9    Ca    8.2[L]      22 Feb 2025 04:30  Phos  2.7     02-20  Mg     1.9     02-22    TPro  4.6[L]  /  Alb  2.3[L]  /  TBili  1.4[H]  /  DBili  x   /  AST  21  /  ALT  <5  /  AlkPhos  101  02-22      PT/INR - ( 20 Feb 2025 11:09 )   PT: TNP sec;   INR: TNP ratio         PTT - ( 20 Feb 2025 11:09 )  PTT:TNP sec                                       Urinalysis Basic - ( 22 Feb 2025 04:30 )    Color: x / Appearance: x / SG: x / pH: x  Gluc: 125 mg/dL / Ketone: x  / Bili: x / Urobili: x   Blood: x / Protein: x / Nitrite: x   Leuk Esterase: x / RBC: x / WBC x   Sq Epi: x / Non Sq Epi: x / Bacteria: x                                                  LIVER FUNCTIONS - ( 22 Feb 2025 04:30 )  Alb: 2.3 g/dL / Pro: 4.6 g/dL / ALK PHOS: 101 U/L / ALT: <5 U/L / AST: 21 U/L / GGT: x                                                                                               Mode: AC/ CMV (Assist Control/ Continuous Mandatory Ventilation)  RR (machine): 18  TV (machine): 480  FiO2: 40  PEEP: 8  ITime: 1  MAP: 13  PIP: 18                                      ABG - ( 22 Feb 2025 03:49 )  pH, Arterial: 7.43  pH, Blood: x     /  pCO2: 41    /  pO2: 141   / HCO3: 27    / Base Excess: 2.6   /  SaO2: 100.0               MEDICATIONS  (STANDING):  buMETAnide Injectable 2 milliGRAM(s) IV Push daily  carbidopa/levodopa  25/250 2 Tablet(s) Oral three times a day  ceFAZolin   IVPB 2000 milliGRAM(s) IV Intermittent every 8 hours  chlorhexidine 0.12% Liquid 15 milliLiter(s) Oral Mucosa every 12 hours  chlorhexidine 2% Cloths 1 Application(s) Topical <User Schedule>  dexMEDEtomidine Infusion 0.5 MICROgram(s)/kG/Hr (6.39 mL/Hr) IV Continuous <Continuous>  dextrose 5% + lactated ringers. 1000 milliLiter(s) (35 mL/Hr) IV Continuous <Continuous>  dextrose 5%. 1000 milliLiter(s) (100 mL/Hr) IV Continuous <Continuous>  dextrose 5%. 1000 milliLiter(s) (50 mL/Hr) IV Continuous <Continuous>  dextrose 50% Injectable 25 Gram(s) IV Push once  dextrose 50% Injectable 12.5 Gram(s) IV Push once  dextrose 50% Injectable 25 Gram(s) IV Push once  enoxaparin Injectable 40 milliGRAM(s) SubCutaneous every 24 hours  glucagon  Injectable 1 milliGRAM(s) IntraMuscular once  insulin lispro (ADMELOG) corrective regimen sliding scale   SubCutaneous every 6 hours  insulin lispro Injectable (ADMELOG) 2 Unit(s) SubCutaneous every 6 hours  lidocaine 1%/epinephrine 1:100,000 Inj 2 Vial(s) Local Injection once  methadone    Tablet 5 milliGRAM(s) Oral every 8 hours  norepinephrine Infusion 0.05 MICROgram(s)/kG/Min (4.79 mL/Hr) IV Continuous <Continuous>  pantoprazole   Suspension 40 milliGRAM(s) Oral daily  polyethylene glycol 3350 17 Gram(s) Oral every 12 hours  propofol Infusion 5 MICROgram(s)/kG/Min (1.53 mL/Hr) IV Continuous <Continuous>  rasagiline Tablet 1 milliGRAM(s) Oral <User Schedule>  senna 2 Tablet(s) Oral at bedtime    MEDICATIONS  (PRN):  acetaminophen     Tablet .. 650 milliGRAM(s) Oral every 6 hours PRN Temp greater or equal to 38C (100.4F), Mild Pain (1 - 3)  dextrose Oral Gel 15 Gram(s) Oral once PRN Blood Glucose LESS THAN 70 milliGRAM(s)/deciliter    cxr noted   Over Night Events: events noted, sp trach vent dependant, low grade fever, on precedex    PHYSICAL EXAM    ICU Vital Signs Last 24 Hrs  T(C): 37.3 (22 Feb 2025 04:00), Max: 38.4 (21 Feb 2025 12:00)  T(F): 99.1 (22 Feb 2025 04:00), Max: 101.1 (21 Feb 2025 12:00)  HR: 93 (22 Feb 2025 06:00) (80 - 116)  BP: 108/57 (22 Feb 2025 06:00) (75/47 - 140/71)  BP(mean): 74 (22 Feb 2025 06:00) (56 - 99)  RR: 24 (22 Feb 2025 06:00) (18 - 46)  SpO2: 100% (22 Feb 2025 06:00) (98% - 100%)    O2 Parameters below as of 22 Feb 2025 06:00  Patient On (Oxygen Delivery Method): ventilator    O2 Concentration (%): 40        General: ill looking, trac  cachectic  Lungs: Bilateral rhonchi  Cardiovascular: Regular   Abdomen: Soft, Positive BS  not following commands      02-21-25 @ 07:01  -  02-22-25 @ 07:00  --------------------------------------------------------  IN:    Dexmedetomidine: 50.1 mL    dextrose 5% + lactated ringers: 225 mL    dextrose 5% + lactated ringers: 420 mL    Enteral Tube Flush: 300 mL    IV PiggyBack: 50 mL    Peptamen A.F.: 220 mL  Total IN: 1265.1 mL    OUT:    Drain (mL): 180 mL    Indwelling Catheter - Urethral (mL): 1290 mL  Total OUT: 1470 mL    Total NET: -204.9 mL          LABS:                          10.2   11.58 )-----------( 257      ( 22 Feb 2025 04:30 )             31.8                                               02-22    145  |  110  |  34[H]  ----------------------------<  125[H]  3.3[L]   |  26  |  0.9    Ca    8.2[L]      22 Feb 2025 04:30  Phos  2.7     02-20  Mg     1.9     02-22    TPro  4.6[L]  /  Alb  2.3[L]  /  TBili  1.4[H]  /  DBili  x   /  AST  21  /  ALT  <5  /  AlkPhos  101  02-22      PT/INR - ( 20 Feb 2025 11:09 )   PT: TNP sec;   INR: TNP ratio         PTT - ( 20 Feb 2025 11:09 )  PTT:TNP sec                                       Urinalysis Basic - ( 22 Feb 2025 04:30 )    Color: x / Appearance: x / SG: x / pH: x  Gluc: 125 mg/dL / Ketone: x  / Bili: x / Urobili: x   Blood: x / Protein: x / Nitrite: x   Leuk Esterase: x / RBC: x / WBC x   Sq Epi: x / Non Sq Epi: x / Bacteria: x                                                  LIVER FUNCTIONS - ( 22 Feb 2025 04:30 )  Alb: 2.3 g/dL / Pro: 4.6 g/dL / ALK PHOS: 101 U/L / ALT: <5 U/L / AST: 21 U/L / GGT: x                                                                                               Mode: AC/ CMV (Assist Control/ Continuous Mandatory Ventilation)  RR (machine): 18  TV (machine): 480  FiO2: 40  PEEP: 8  ITime: 1  MAP: 13  PIP: 18                                      ABG - ( 22 Feb 2025 03:49 )  pH, Arterial: 7.43  pH, Blood: x     /  pCO2: 41    /  pO2: 141   / HCO3: 27    / Base Excess: 2.6   /  SaO2: 100.0               MEDICATIONS  (STANDING):  buMETAnide Injectable 2 milliGRAM(s) IV Push daily  carbidopa/levodopa  25/250 2 Tablet(s) Oral three times a day  ceFAZolin   IVPB 2000 milliGRAM(s) IV Intermittent every 8 hours  chlorhexidine 0.12% Liquid 15 milliLiter(s) Oral Mucosa every 12 hours  chlorhexidine 2% Cloths 1 Application(s) Topical <User Schedule>  dexMEDEtomidine Infusion 0.5 MICROgram(s)/kG/Hr (6.39 mL/Hr) IV Continuous <Continuous>  dextrose 5% + lactated ringers. 1000 milliLiter(s) (35 mL/Hr) IV Continuous <Continuous>  dextrose 5%. 1000 milliLiter(s) (100 mL/Hr) IV Continuous <Continuous>  dextrose 5%. 1000 milliLiter(s) (50 mL/Hr) IV Continuous <Continuous>  dextrose 50% Injectable 25 Gram(s) IV Push once  dextrose 50% Injectable 12.5 Gram(s) IV Push once  dextrose 50% Injectable 25 Gram(s) IV Push once  enoxaparin Injectable 40 milliGRAM(s) SubCutaneous every 24 hours  glucagon  Injectable 1 milliGRAM(s) IntraMuscular once  insulin lispro (ADMELOG) corrective regimen sliding scale   SubCutaneous every 6 hours  insulin lispro Injectable (ADMELOG) 2 Unit(s) SubCutaneous every 6 hours  lidocaine 1%/epinephrine 1:100,000 Inj 2 Vial(s) Local Injection once  methadone    Tablet 5 milliGRAM(s) Oral every 8 hours  norepinephrine Infusion 0.05 MICROgram(s)/kG/Min (4.79 mL/Hr) IV Continuous <Continuous>  pantoprazole   Suspension 40 milliGRAM(s) Oral daily  polyethylene glycol 3350 17 Gram(s) Oral every 12 hours  propofol Infusion 5 MICROgram(s)/kG/Min (1.53 mL/Hr) IV Continuous <Continuous>  rasagiline Tablet 1 milliGRAM(s) Oral <User Schedule>  senna 2 Tablet(s) Oral at bedtime    MEDICATIONS  (PRN):  acetaminophen     Tablet .. 650 milliGRAM(s) Oral every 6 hours PRN Temp greater or equal to 38C (100.4F), Mild Pain (1 - 3)  dextrose Oral Gel 15 Gram(s) Oral once PRN Blood Glucose LESS THAN 70 milliGRAM(s)/deciliter    cxr noted

## 2025-02-22 NOTE — PROGRESS NOTE ADULT - ASSESSMENT
IMPRESSION:    Acute Hypoxic Respiratory Failure SP TRAC  Toxic Metabolic Encephalopathy  CAP likely aspiration   ? L ptx  Influenza A treated   MSSA bacteremia repeat CX -  Sepsis POA  cholecystitis sp percutaneous haydee  Periventricular bleed  Suspected Mural thrombus in PA  Right thigh hematoma.    HO Parkinson's Disease   HO CAD    PLAN:    CNS:  SAT. Continue Anti parkinson's. methadone, seroquel, monitor QT    HEENT: Oral care.   CTS for trach and PEG  this morning    PULMONARY: No change in vent settings.  Monitor airway pressures.  Remains with significant secretions.  sp trac, Keep Sao2 92 to 96%    CARDIOVASCULAR:  Avoid overload.  hold bumex    GI: GI prophylaxis. feeding S/P perc cholecystostomy. Bowel regimen     RENAL: Follow up lytes.  Mars in place for retention. Kidney function stable.     INFECTIOUS DISEASE: Cultures noted.  SP Tamiflu.  ABX per ID.  Ancef for now Duration per ID.     HEMATOLOGICAL: Monitor CBC and Coags. CTA noted with no active bleed.  keep Hg > 7.  Le DOPPLER -    ENDOCRINE:  Follow up FS.  Insulin protocol if needed.    MUSCULOSKELETAL: Bed rest.  Off loading. Wound care for DTIs.     Full code  SDU when off sedation  palliative care follow up   very poor prognosis     IMPRESSION:    Acute Hypoxic Respiratory Failure SP TRAC  Toxic Metabolic Encephalopathy  CAP likely aspiration   ? L ptx  Influenza A treated   MSSA bacteremia repeat CX -  Sepsis POA  cholecystitis sp percutaneous haydee  Periventricular bleed  Suspected Mural thrombus in PA  Right thigh hematoma.    HO Parkinson's Disease   HO CAD    PLAN:    CNS:  SAT. Continue Anti parkinson's. methadone,  monitor QT, dc precedex    HEENT: Oral care.   CTS for trach and PEG  this morning    PULMONARY: No change in vent settings.  Monitor airway pressures.  Remains with significant secretions.  sp trac, Keep Sao2 92 to 96%    CARDIOVASCULAR:  Avoid overload.  hold bumex    GI: GI prophylaxis. feeding S/P perc cholecystostomy. Bowel regimen     RENAL: Follow up lytes.  Mars in place for retention. Kidney function stable.     INFECTIOUS DISEASE: Cultures noted.  SP Tamiflu.  ABX per ID.  Ancef for now Duration per ID.     HEMATOLOGICAL: Monitor CBC and Coags. CTA noted with no active bleed.  keep Hg > 7.  Le DOPPLER -    ENDOCRINE:  Follow up FS.  Insulin protocol if needed.    MUSCULOSKELETAL: Bed rest.  Off loading. Wound care for DTIs.     Full code  SDU when off sedation  palliative care follow up   very poor prognosis

## 2025-02-22 NOTE — BRIEF OPERATIVE NOTE - NSICDXBRIEFPROCEDURE_GEN_ALL_CORE_FT
PROCEDURES:  Bronchoscopy, flexible, with tracheostomy tube replacement 20-Feb-2025 10:35:03  Jesse Cortez  Transoral endoscopic creation of a gastrojejunostomy 20-Feb-2025 10:35:25  Jesse Cortez  
PROCEDURES:  Selective debridement 22-Feb-2025 11:09:12 excisional debridement with scalpel sacral pressure sore Bravo Eason

## 2025-02-22 NOTE — BRIEF OPERATIVE NOTE - NSICDXBRIEFPOSTOP_GEN_ALL_CORE_FT
POST-OP DIAGNOSIS:  Sacral pressure sore 22-Feb-2025 11:10:23  Bravo Eason  
POST-OP DIAGNOSIS:  Acute respiratory failure with hypoxia 20-Feb-2025 10:36:09  Jesse Cortez

## 2025-02-23 LAB
ALBUMIN SERPL ELPH-MCNC: 2.3 G/DL — LOW (ref 3.5–5.2)
ALP SERPL-CCNC: 119 U/L — HIGH (ref 30–115)
ALT FLD-CCNC: <5 U/L — SIGNIFICANT CHANGE UP (ref 0–41)
ANION GAP SERPL CALC-SCNC: 10 MMOL/L — SIGNIFICANT CHANGE UP (ref 7–14)
AST SERPL-CCNC: 18 U/L — SIGNIFICANT CHANGE UP (ref 0–41)
BASOPHILS # BLD AUTO: 0.01 K/UL — SIGNIFICANT CHANGE UP (ref 0–0.2)
BASOPHILS NFR BLD AUTO: 0.1 % — SIGNIFICANT CHANGE UP (ref 0–1)
BILIRUB SERPL-MCNC: 1 MG/DL — SIGNIFICANT CHANGE UP (ref 0.2–1.2)
BUN SERPL-MCNC: 34 MG/DL — HIGH (ref 10–20)
CALCIUM SERPL-MCNC: 7.7 MG/DL — LOW (ref 8.4–10.5)
CHLORIDE SERPL-SCNC: 112 MMOL/L — HIGH (ref 98–110)
CO2 SERPL-SCNC: 27 MMOL/L — SIGNIFICANT CHANGE UP (ref 17–32)
CREAT SERPL-MCNC: 0.9 MG/DL — SIGNIFICANT CHANGE UP (ref 0.7–1.5)
EGFR: 92 ML/MIN/1.73M2 — SIGNIFICANT CHANGE UP
EGFR: 92 ML/MIN/1.73M2 — SIGNIFICANT CHANGE UP
EOSINOPHIL # BLD AUTO: 0 K/UL — SIGNIFICANT CHANGE UP (ref 0–0.7)
EOSINOPHIL NFR BLD AUTO: 0 % — SIGNIFICANT CHANGE UP (ref 0–8)
GAS PNL BLDA: SIGNIFICANT CHANGE UP
GLUCOSE BLDC GLUCOMTR-MCNC: 102 MG/DL — HIGH (ref 70–99)
GLUCOSE BLDC GLUCOMTR-MCNC: 122 MG/DL — HIGH (ref 70–99)
GLUCOSE BLDC GLUCOMTR-MCNC: 134 MG/DL — HIGH (ref 70–99)
GLUCOSE BLDC GLUCOMTR-MCNC: 141 MG/DL — HIGH (ref 70–99)
GLUCOSE BLDC GLUCOMTR-MCNC: 173 MG/DL — HIGH (ref 70–99)
GLUCOSE SERPL-MCNC: 165 MG/DL — HIGH (ref 70–99)
HCT VFR BLD CALC: 32.4 % — LOW (ref 42–52)
HGB BLD-MCNC: 10.2 G/DL — LOW (ref 14–18)
IMM GRANULOCYTES NFR BLD AUTO: 0.5 % — HIGH (ref 0.1–0.3)
LYMPHOCYTES # BLD AUTO: 0.53 K/UL — LOW (ref 1.2–3.4)
LYMPHOCYTES # BLD AUTO: 6.7 % — LOW (ref 20.5–51.1)
MAGNESIUM SERPL-MCNC: 2.1 MG/DL — SIGNIFICANT CHANGE UP (ref 1.8–2.4)
MCHC RBC-ENTMCNC: 30.1 PG — SIGNIFICANT CHANGE UP (ref 27–31)
MCHC RBC-ENTMCNC: 31.5 G/DL — LOW (ref 32–37)
MCV RBC AUTO: 95.6 FL — HIGH (ref 80–94)
MONOCYTES # BLD AUTO: 0.43 K/UL — SIGNIFICANT CHANGE UP (ref 0.1–0.6)
MONOCYTES NFR BLD AUTO: 5.4 % — SIGNIFICANT CHANGE UP (ref 1.7–9.3)
NEUTROPHILS # BLD AUTO: 6.89 K/UL — HIGH (ref 1.4–6.5)
NEUTROPHILS NFR BLD AUTO: 87.3 % — HIGH (ref 42.2–75.2)
NRBC BLD AUTO-RTO: 0 /100 WBCS — SIGNIFICANT CHANGE UP (ref 0–0)
PLATELET # BLD AUTO: 244 K/UL — SIGNIFICANT CHANGE UP (ref 130–400)
PMV BLD: 10.6 FL — HIGH (ref 7.4–10.4)
POTASSIUM SERPL-MCNC: 3.4 MMOL/L — LOW (ref 3.5–5)
POTASSIUM SERPL-SCNC: 3.4 MMOL/L — LOW (ref 3.5–5)
PROT SERPL-MCNC: 4.4 G/DL — LOW (ref 6–8)
RBC # BLD: 3.39 M/UL — LOW (ref 4.7–6.1)
RBC # FLD: 19.1 % — HIGH (ref 11.5–14.5)
SODIUM SERPL-SCNC: 149 MMOL/L — HIGH (ref 135–146)
WBC # BLD: 7.9 K/UL — SIGNIFICANT CHANGE UP (ref 4.8–10.8)
WBC # FLD AUTO: 7.9 K/UL — SIGNIFICANT CHANGE UP (ref 4.8–10.8)

## 2025-02-23 PROCEDURE — 93010 ELECTROCARDIOGRAM REPORT: CPT

## 2025-02-23 PROCEDURE — ZZZZZ: CPT

## 2025-02-23 PROCEDURE — 99233 SBSQ HOSP IP/OBS HIGH 50: CPT

## 2025-02-23 PROCEDURE — 71045 X-RAY EXAM CHEST 1 VIEW: CPT | Mod: 26

## 2025-02-23 RX ORDER — QUETIAPINE FUMARATE 25 MG/1
25 TABLET ORAL DAILY
Refills: 0 | Status: DISCONTINUED | OUTPATIENT
Start: 2025-02-23 | End: 2025-02-25

## 2025-02-23 RX ORDER — MIDAZOLAM IN 0.9 % SOD.CHLORID 1 MG/ML
2 PLASTIC BAG, INJECTION (ML) INTRAVENOUS EVERY 4 HOURS
Refills: 0 | Status: DISCONTINUED | OUTPATIENT
Start: 2025-02-23 | End: 2025-02-26

## 2025-02-23 RX ADMIN — Medication 100 MILLIGRAM(S): at 13:32

## 2025-02-23 RX ADMIN — Medication 650 MILLIGRAM(S): at 12:26

## 2025-02-23 RX ADMIN — Medication 40 MILLIEQUIVALENT(S): at 17:26

## 2025-02-23 RX ADMIN — Medication 15 MILLILITER(S): at 05:39

## 2025-02-23 RX ADMIN — INSULIN LISPRO 2 UNIT(S): 100 INJECTION, SOLUTION INTRAVENOUS; SUBCUTANEOUS at 05:41

## 2025-02-23 RX ADMIN — Medication 15 MILLILITER(S): at 17:26

## 2025-02-23 RX ADMIN — Medication 5 MILLIGRAM(S): at 05:39

## 2025-02-23 RX ADMIN — POLYETHYLENE GLYCOL 3350 17 GRAM(S): 17 POWDER, FOR SOLUTION ORAL at 05:39

## 2025-02-23 RX ADMIN — BUMETANIDE 2 MILLIGRAM(S): 1 TABLET ORAL at 05:40

## 2025-02-23 RX ADMIN — Medication 100 MILLIGRAM(S): at 18:24

## 2025-02-23 RX ADMIN — Medication 2 TABLET(S): at 22:02

## 2025-02-23 RX ADMIN — Medication 2 TABLET(S): at 14:35

## 2025-02-23 RX ADMIN — Medication 100 MILLIGRAM(S): at 04:10

## 2025-02-23 RX ADMIN — Medication 40 MILLIEQUIVALENT(S): at 14:34

## 2025-02-23 RX ADMIN — Medication 2 TABLET(S): at 05:39

## 2025-02-23 RX ADMIN — Medication 1 APPLICATION(S): at 05:40

## 2025-02-23 RX ADMIN — ENOXAPARIN SODIUM 40 MILLIGRAM(S): 100 INJECTION SUBCUTANEOUS at 17:26

## 2025-02-23 RX ADMIN — Medication 5 MILLIGRAM(S): at 22:02

## 2025-02-23 RX ADMIN — INSULIN LISPRO 2: 100 INJECTION, SOLUTION INTRAVENOUS; SUBCUTANEOUS at 05:40

## 2025-02-23 RX ADMIN — POLYETHYLENE GLYCOL 3350 17 GRAM(S): 17 POWDER, FOR SOLUTION ORAL at 17:27

## 2025-02-23 RX ADMIN — QUETIAPINE FUMARATE 25 MILLIGRAM(S): 25 TABLET ORAL at 13:35

## 2025-02-23 RX ADMIN — Medication 40 MILLIGRAM(S): at 14:00

## 2025-02-23 RX ADMIN — Medication 5 MILLIGRAM(S): at 14:35

## 2025-02-23 RX ADMIN — Medication 650 MILLIGRAM(S): at 13:30

## 2025-02-23 RX ADMIN — RASAGILINE 1 MILLIGRAM(S): 0.5 TABLET ORAL at 09:02

## 2025-02-23 NOTE — PHARMACOTHERAPY INTERVENTION NOTE - INTERVENTION TYPE RECOOMEND
Therapy Recommended - Alternative treatment
Therapy duplication
Timing/Frequency of Administration Recommended
Dose Optimization/Non-renal Dose Adjustment
Therapy Recommended - Additional therapy
Therapy Recommended - Additional therapy
Therapy Recommended - Alternative treatment
Timing/Frequency of Administration Recommended
Dose Optimization/Non-renal Dose Adjustment
Dose Optimization/Non-renal Dose Adjustment
Therapy Discontinuation Recommended - No indication
Timing/Frequency of Administration Recommended
Therapy Discontinuation Recommended - No indication

## 2025-02-23 NOTE — PROGRESS NOTE ADULT - ASSESSMENT
IMPRESSION:    Acute Hypoxic Respiratory Failure SP TRAC  Toxic Metabolic Encephalopathy  CAP likely aspiration   ? L ptx  Influenza A treated   MSSA bacteremia repeat CX -  Sepsis POA  cholecystitis sp percutaneous haydee  Periventricular bleed  Suspected Mural thrombus in PA  Right thigh hematoma.    HO Parkinson's Disease   HO CAD    PLAN:    CNS:  SAT. Continue Anti parkinson's. methadone,  monitor QT, dc precedex    HEENT: Oral care.   CTS for trach and PEG  this morning    PULMONARY: No change in vent settings.  Monitor airway pressures.  Remains with significant secretions.  sp trac, Keep Sao2 92 to 96%    CARDIOVASCULAR:  Avoid overload.     GI: GI prophylaxis. feeding S/P perc cholecystostomy. Bowel regimen     RENAL: Follow up lytes.  Mars in place for retention. Kidney function stable.     INFECTIOUS DISEASE: Cultures noted.  SP Tamiflu.  ABX per ID.  Ancef     HEMATOLOGICAL: Monitor CBC and Coags. CTA noted with no active bleed.  keep Hg > 7.  Le DOPPLER -    ENDOCRINE:  Follow up FS.  Insulin protocol if needed.    MUSCULOSKELETAL: Bed rest.  Off loading. Wound care for DTIs. burn noted    Full code  SDU when off sedation  palliative care follow up   very poor prognosis

## 2025-02-23 NOTE — PROGRESS NOTE ADULT - ASSESSMENT
ASSESSMENT:  69-year-old male past medical history of hypertension, Parkinson's, CAD s/p trach and pegj    PLAN:  - G tube for meds   - May start trickle feeds through J tube   - Trach sutures to be removed 3/2   - Care as per primary team   - Recall as needed       GREEN TEAM SPECTRA: [id number]     ASSESSMENT:  69-year-old male past medical history of hypertension, Parkinson's, CAD s/p trach and pegj    PLAN:  - G tube for meds   - May start trickle feeds through J tube   - Care as per primary team   - Recall as needed       GREEN TEAM SPECTRA: [id number]

## 2025-02-23 NOTE — PROGRESS NOTE ADULT - ASSESSMENT
69-year-old male past medical history of hypertension, Parkinson's, CAD presents for shortness of breath and cough of 1 day duration. History was obtained from patient's wife who noted that the patient has been having decreased po intake, activity, and unintentional weight loss for some time. However yesterday he started having cough and shortness of breath. Admitted to MICU for AHRF and sepsis 2/2 to mutilobar PNA.    #Acute Hypoxic Respiratory Failure likely 2/2 CAP/aspiration and flu  #Toxic Metabolic Encephalopathy likely 2/2 CAP/aspiration and flu  #Influenza A positive, treated  #MSSA bacteremia- resolved  #Sepsis POA  - Aspiration precautions.    - patient inc WOB, worsening alkalosis, obtunded-intubated for airway protection   -ID: if patient decompensates, dc cefepime/metro, start patricia 1g   - cardio: patient critically sick, not a candidate for surgery therefore no MARY, can c/w abx for IE as per ID  - s/p bronch, f/u cultures   - ID: tamiflu 30 mg daily: last dose on 2/10, s/p cefepime & metro (end date 2/16). nafcillin>>> now on cefazolin 2g q8h for MSSA bact  as per ID (6w)  -2/11 bronch: showed copious amounts of secretions and poor cough.  Will need trach: ongoing daily discussions with wife regarding trach vs extubation & change in code status   CT surgery consulted for trach & peg   Stenotrophomonas now in bronch cx from 2/11, suspect colonizer, if fever/ incr wbc would add levaquin per ID  2/20>> pt got trach & peg. minimal pneumothorax noted on initial cxr   2/21: starting trickle feeds  2/23: weaning off sedation>>on methadone & versed pushes if needed>> DG to SDU once off sedation    #PE on CTA  #large R groin intramuscular hematoma, stable  #Periventricular bleed on CTH, stable  - CTH. noted with right periventricular bleed, stable.   - Neuro on board  - Continue Anti parkinson's meds.    - CTA chest to r/o PE: focal filling defect within main pulm artery   - repeat CT head: stable intraventricular hemorrhage >> repeat on 2/9 after on full AC for PE: stable  -CT pelvis:  Large intramuscular hematoma in the medial right thigh measuring approximately 9.8 x 6.4 x 17 cm.  -holding AC  -2/12: Hb drop to 6.8> got 1 unit> up to 9.5 .  CTA stable & negative for any active extravasation   2/14: 1 unit prbc  hb stable. restart proph AC 2/18. duplex -ve 2/17    #Elevated Bilirubin-improving   #Leukocytosis-improving   #Cholangitis vs cholecystitis s/o perc haydee  - F/u RUQ US: distended GB with sludge, GB polyp   - IR: perc haydee 2/5, . GB drainage cultures: few candida, likely coloniz per id.    #Constipation, improved  - senna and miralax  - c/w lactulose   -2/10: kub: distented bowels. having BM    #Possible Mural thrombus in PA  #CAD  - Avoid overload  - C/w LR at 50  - TTE: EF 56%, G1DD, mild-mod MR, mod TR, mild WV, mild pHTN  - Cardio recs appreciated       - Patient is poor candidate for MARY       - full endocarditis abx course of nafcillin/ cefazolin    #MISC  - DVT ppx: SCDs  - GI ppx: not needed  - Activity: as tolerated  - Diet:  J tube cont feeds  - full code

## 2025-02-23 NOTE — PROGRESS NOTE ADULT - SUBJECTIVE AND OBJECTIVE BOX
Over Night Events: events noted, vent dependant, afebrile, burn noted    PHYSICAL EXAM    ICU Vital Signs Last 24 Hrs  T(C): 36.7 (23 Feb 2025 04:00), Max: 37.7 (22 Feb 2025 20:00)  T(F): 98 (23 Feb 2025 04:00), Max: 99.8 (22 Feb 2025 20:00)  HR: 92 (23 Feb 2025 06:00) (78 - 107)  BP: 143/75 (23 Feb 2025 06:00) (97/56 - 146/78)  BP(mean): 103 (23 Feb 2025 06:00) (71 - 106)  RR: 20 (23 Feb 2025 06:00) (18 - 38)  SpO2: 100% (23 Feb 2025 06:00) (99% - 100%)    O2 Parameters below as of 22 Feb 2025 20:00  Patient On (Oxygen Delivery Method): ventilator    O2 Concentration (%): 40        General: ill looking  trac  Lungs: Bilateral rhonchi  Cardiovascular: FRANKLYN 2.6  Abdomen: Soft, Positive BS  Extremities: No clubbing   Not following commands      02-22-25 @ 07:01  -  02-23-25 @ 07:00  --------------------------------------------------------  IN:    Dexmedetomidine: 40.5 mL    dextrose 5% + lactated ringers: 385 mL    Enteral Tube Flush: 225 mL    Free Water: 100 mL    IV PiggyBack: 100 mL    Peptamen A.F.: 500 mL  Total IN: 1350.5 mL    OUT:    Drain (mL): 390 mL    Indwelling Catheter - Urethral (mL): 1600 mL  Total OUT: 1990 mL    Total NET: -639.5 mL          LABS:                          10.2   7.90  )-----------( 244      ( 23 Feb 2025 04:30 )             32.4                                               02-23    149[H]  |  112[H]  |  34[H]  ----------------------------<  165[H]  3.4[L]   |  27  |  0.9    Ca    7.7[L]      23 Feb 2025 04:30  Mg     2.1     02-23    TPro  4.4[L]  /  Alb  2.3[L]  /  TBili  1.0  /  DBili  x   /  AST  18  /  ALT  <5  /  AlkPhos  119[H]  02-23                                             Urinalysis Basic - ( 23 Feb 2025 04:30 )    Color: x / Appearance: x / SG: x / pH: x  Gluc: 165 mg/dL / Ketone: x  / Bili: x / Urobili: x   Blood: x / Protein: x / Nitrite: x   Leuk Esterase: x / RBC: x / WBC x   Sq Epi: x / Non Sq Epi: x / Bacteria: x                                                  LIVER FUNCTIONS - ( 23 Feb 2025 04:30 )  Alb: 2.3 g/dL / Pro: 4.4 g/dL / ALK PHOS: 119 U/L / ALT: <5 U/L / AST: 18 U/L / GGT: x                                                                                               Mode: AC/ CMV (Assist Control/ Continuous Mandatory Ventilation)  RR (machine): 18  TV (machine): 480  FiO2: 40  PEEP: 8  ITime: 1  MAP: 8  PIP: 16                                      ABG - ( 23 Feb 2025 04:00 )  pH, Arterial: 7.47  pH, Blood: x     /  pCO2: 40    /  pO2: 87    / HCO3: 29    / Base Excess: 5.0   /  SaO2: 98.4                MEDICATIONS  (STANDING):  buMETAnide Injectable 2 milliGRAM(s) IV Push daily  carbidopa/levodopa  25/250 2 Tablet(s) Oral three times a day  ceFAZolin   IVPB 2000 milliGRAM(s) IV Intermittent every 8 hours  chlorhexidine 0.12% Liquid 15 milliLiter(s) Oral Mucosa every 12 hours  chlorhexidine 2% Cloths 1 Application(s) Topical <User Schedule>  dexMEDEtomidine Infusion 0.5 MICROgram(s)/kG/Hr (6.39 mL/Hr) IV Continuous <Continuous>  dextrose 5% + lactated ringers. 1000 milliLiter(s) (35 mL/Hr) IV Continuous <Continuous>  dextrose 5%. 1000 milliLiter(s) (100 mL/Hr) IV Continuous <Continuous>  dextrose 5%. 1000 milliLiter(s) (50 mL/Hr) IV Continuous <Continuous>  dextrose 50% Injectable 25 Gram(s) IV Push once  dextrose 50% Injectable 12.5 Gram(s) IV Push once  dextrose 50% Injectable 25 Gram(s) IV Push once  enoxaparin Injectable 40 milliGRAM(s) SubCutaneous every 24 hours  glucagon  Injectable 1 milliGRAM(s) IntraMuscular once  insulin lispro (ADMELOG) corrective regimen sliding scale   SubCutaneous every 6 hours  insulin lispro Injectable (ADMELOG) 2 Unit(s) SubCutaneous every 6 hours  lidocaine 1%/epinephrine 1:100,000 Inj 2 Vial(s) Local Injection once  methadone    Tablet 5 milliGRAM(s) Oral every 8 hours  norepinephrine Infusion 0.05 MICROgram(s)/kG/Min (4.79 mL/Hr) IV Continuous <Continuous>  pantoprazole   Suspension 40 milliGRAM(s) Oral daily  polyethylene glycol 3350 17 Gram(s) Oral every 12 hours  propofol Infusion 5 MICROgram(s)/kG/Min (1.53 mL/Hr) IV Continuous <Continuous>  rasagiline Tablet 1 milliGRAM(s) Oral <User Schedule>  senna 2 Tablet(s) Oral at bedtime    MEDICATIONS  (PRN):  acetaminophen     Tablet .. 650 milliGRAM(s) Oral every 6 hours PRN Temp greater or equal to 38C (100.4F), Mild Pain (1 - 3)  dextrose Oral Gel 15 Gram(s) Oral once PRN Blood Glucose LESS THAN 70 milliGRAM(s)/deciliter  midazolam Injectable 1 milliGRAM(s) IV Push once PRN sedation           Over Night Events: events noted, vent dependant, afebrile, burn noted, precedex 0.3    PHYSICAL EXAM    ICU Vital Signs Last 24 Hrs  T(C): 36.7 (23 Feb 2025 04:00), Max: 37.7 (22 Feb 2025 20:00)  T(F): 98 (23 Feb 2025 04:00), Max: 99.8 (22 Feb 2025 20:00)  HR: 92 (23 Feb 2025 06:00) (78 - 107)  BP: 143/75 (23 Feb 2025 06:00) (97/56 - 146/78)  BP(mean): 103 (23 Feb 2025 06:00) (71 - 106)  RR: 20 (23 Feb 2025 06:00) (18 - 38)  SpO2: 100% (23 Feb 2025 06:00) (99% - 100%)    O2 Parameters below as of 22 Feb 2025 20:00  Patient On (Oxygen Delivery Method): ventilator    O2 Concentration (%): 40        General: ill looking  trac  Lungs: Bilateral rhonchi  Cardiovascular: FRANKLYN 2.6  Abdomen: Soft, Positive BS  Extremities: No clubbing   Not following commands      02-22-25 @ 07:01  -  02-23-25 @ 07:00  --------------------------------------------------------  IN:    Dexmedetomidine: 40.5 mL    dextrose 5% + lactated ringers: 385 mL    Enteral Tube Flush: 225 mL    Free Water: 100 mL    IV PiggyBack: 100 mL    Peptamen A.F.: 500 mL  Total IN: 1350.5 mL    OUT:    Drain (mL): 390 mL    Indwelling Catheter - Urethral (mL): 1600 mL  Total OUT: 1990 mL    Total NET: -639.5 mL          LABS:                          10.2   7.90  )-----------( 244      ( 23 Feb 2025 04:30 )             32.4                                               02-23    149[H]  |  112[H]  |  34[H]  ----------------------------<  165[H]  3.4[L]   |  27  |  0.9    Ca    7.7[L]      23 Feb 2025 04:30  Mg     2.1     02-23    TPro  4.4[L]  /  Alb  2.3[L]  /  TBili  1.0  /  DBili  x   /  AST  18  /  ALT  <5  /  AlkPhos  119[H]  02-23                                             Urinalysis Basic - ( 23 Feb 2025 04:30 )    Color: x / Appearance: x / SG: x / pH: x  Gluc: 165 mg/dL / Ketone: x  / Bili: x / Urobili: x   Blood: x / Protein: x / Nitrite: x   Leuk Esterase: x / RBC: x / WBC x   Sq Epi: x / Non Sq Epi: x / Bacteria: x                                                  LIVER FUNCTIONS - ( 23 Feb 2025 04:30 )  Alb: 2.3 g/dL / Pro: 4.4 g/dL / ALK PHOS: 119 U/L / ALT: <5 U/L / AST: 18 U/L / GGT: x                                                                                               Mode: AC/ CMV (Assist Control/ Continuous Mandatory Ventilation)  RR (machine): 18  TV (machine): 480  FiO2: 40  PEEP: 8  ITime: 1  MAP: 8  PIP: 16                                      ABG - ( 23 Feb 2025 04:00 )  pH, Arterial: 7.47  pH, Blood: x     /  pCO2: 40    /  pO2: 87    / HCO3: 29    / Base Excess: 5.0   /  SaO2: 98.4                MEDICATIONS  (STANDING):  buMETAnide Injectable 2 milliGRAM(s) IV Push daily  carbidopa/levodopa  25/250 2 Tablet(s) Oral three times a day  ceFAZolin   IVPB 2000 milliGRAM(s) IV Intermittent every 8 hours  chlorhexidine 0.12% Liquid 15 milliLiter(s) Oral Mucosa every 12 hours  chlorhexidine 2% Cloths 1 Application(s) Topical <User Schedule>  dexMEDEtomidine Infusion 0.5 MICROgram(s)/kG/Hr (6.39 mL/Hr) IV Continuous <Continuous>  dextrose 5% + lactated ringers. 1000 milliLiter(s) (35 mL/Hr) IV Continuous <Continuous>  dextrose 5%. 1000 milliLiter(s) (100 mL/Hr) IV Continuous <Continuous>  dextrose 5%. 1000 milliLiter(s) (50 mL/Hr) IV Continuous <Continuous>  dextrose 50% Injectable 25 Gram(s) IV Push once  dextrose 50% Injectable 12.5 Gram(s) IV Push once  dextrose 50% Injectable 25 Gram(s) IV Push once  enoxaparin Injectable 40 milliGRAM(s) SubCutaneous every 24 hours  glucagon  Injectable 1 milliGRAM(s) IntraMuscular once  insulin lispro (ADMELOG) corrective regimen sliding scale   SubCutaneous every 6 hours  insulin lispro Injectable (ADMELOG) 2 Unit(s) SubCutaneous every 6 hours  lidocaine 1%/epinephrine 1:100,000 Inj 2 Vial(s) Local Injection once  methadone    Tablet 5 milliGRAM(s) Oral every 8 hours  norepinephrine Infusion 0.05 MICROgram(s)/kG/Min (4.79 mL/Hr) IV Continuous <Continuous>  pantoprazole   Suspension 40 milliGRAM(s) Oral daily  polyethylene glycol 3350 17 Gram(s) Oral every 12 hours  propofol Infusion 5 MICROgram(s)/kG/Min (1.53 mL/Hr) IV Continuous <Continuous>  rasagiline Tablet 1 milliGRAM(s) Oral <User Schedule>  senna 2 Tablet(s) Oral at bedtime    MEDICATIONS  (PRN):  acetaminophen     Tablet .. 650 milliGRAM(s) Oral every 6 hours PRN Temp greater or equal to 38C (100.4F), Mild Pain (1 - 3)  dextrose Oral Gel 15 Gram(s) Oral once PRN Blood Glucose LESS THAN 70 milliGRAM(s)/deciliter  midazolam Injectable 1 milliGRAM(s) IV Push once PRN sedation

## 2025-02-23 NOTE — PROGRESS NOTE ADULT - SUBJECTIVE AND OBJECTIVE BOX
SUBJECTIVE / OVERNIGHT EVENTS  s/p trach & peg, no events, weaning sedation    MEDICATIONS  buMETAnide Injectable 2 milliGRAM(s) IV Push daily  carbidopa/levodopa  25/250 2 Tablet(s) Oral three times a day  ceFAZolin   IVPB 2000 milliGRAM(s) IV Intermittent every 8 hours  chlorhexidine 0.12% Liquid 15 milliLiter(s) Oral Mucosa every 12 hours  chlorhexidine 2% Cloths 1 Application(s) Topical <User Schedule>  dexMEDEtomidine Infusion 0.5 MICROgram(s)/kG/Hr IV Continuous <Continuous>  dextrose 5% + lactated ringers. 1000 milliLiter(s) IV Continuous <Continuous>  dextrose 5%. 1000 milliLiter(s) IV Continuous <Continuous>  dextrose 5%. 1000 milliLiter(s) IV Continuous <Continuous>  dextrose 50% Injectable 25 Gram(s) IV Push once  dextrose 50% Injectable 12.5 Gram(s) IV Push once  dextrose 50% Injectable 25 Gram(s) IV Push once  enoxaparin Injectable 40 milliGRAM(s) SubCutaneous every 24 hours  glucagon  Injectable 1 milliGRAM(s) IntraMuscular once  insulin lispro (ADMELOG) corrective regimen sliding scale   SubCutaneous every 6 hours  insulin lispro Injectable (ADMELOG) 2 Unit(s) SubCutaneous every 6 hours  lidocaine 1%/epinephrine 1:100,000 Inj 2 Vial(s) Local Injection once  methadone    Tablet 5 milliGRAM(s) Oral every 8 hours  norepinephrine Infusion 0.05 MICROgram(s)/kG/Min IV Continuous <Continuous>  pantoprazole   Suspension 40 milliGRAM(s) Oral daily  polyethylene glycol 3350 17 Gram(s) Oral every 12 hours  propofol Infusion 5 MICROgram(s)/kG/Min IV Continuous <Continuous>  rasagiline Tablet 1 milliGRAM(s) Oral <User Schedule>  senna 2 Tablet(s) Oral at bedtime    acetaminophen     Tablet .. 650 milliGRAM(s) Oral every 6 hours PRN Temp greater or equal to 38C (100.4F), Mild Pain (1 - 3)  dextrose Oral Gel 15 Gram(s) Oral once PRN Blood Glucose LESS THAN 70 milliGRAM(s)/deciliter  midazolam Injectable 1 milliGRAM(s) IV Push once PRN sedation    VITALS /  EXAM    T(C): 37.7 (02-22-25 @ 20:00), Max: 37.7 (02-22-25 @ 20:00)  HR: 84 (02-23-25 @ 00:00) (78 - 107)  BP: 136/74 (02-23-25 @ 00:00) (97/56 - 140/71)  RR: 19 (02-23-25 @ 00:00) (18 - 38)  SpO2: 100% (02-23-25 @ 00:00) (99% - 100%)  POCT Blood Glucose.: 122 mg/dL (02-23-25 @ 00:19)  POCT Blood Glucose.: 141 mg/dL (02-22-25 @ 17:35)  POCT Blood Glucose.: 133 mg/dL (02-22-25 @ 12:01)  POCT Blood Glucose.: 123 mg/dL (02-22-25 @ 04:15)    GENERAL: NAD,   CHEST/LUNG: Clear to auscultation bilaterally; No wheezes, rales or rhonchi  HEART: Regular rate and rhythm; No murmurs, rubs, or gallops  ABDOMEN: Soft, Nontender, Nondistended; Bowel sounds present, no masses.  EXTREMITIES:  2+ Peripheral Pulses, No clubbing, cyanosis, or edema    I's & O's     02-21-25 @ 07:01  -  02-22-25 @ 07:00  --------------------------------------------------------  IN:    Dexmedetomidine: 50.1 mL    dextrose 5% + lactated ringers: 225 mL    dextrose 5% + lactated ringers: 420 mL    Enteral Tube Flush: 300 mL    IV PiggyBack: 150 mL    Peptamen A.F.: 220 mL  Total IN: 1365.1 mL    OUT:    Drain (mL): 180 mL    Indwelling Catheter - Urethral (mL): 1290 mL  Total OUT: 1470 mL    Total NET: -104.9 mL      02-22-25 @ 07:01  -  02-23-25 @ 01:05  --------------------------------------------------------  IN:    Dexmedetomidine: 15.2 mL    dextrose 5% + lactated ringers: 140 mL    Enteral Tube Flush: 150 mL    Free Water: 50 mL    IV PiggyBack: 100 mL    Peptamen A.F.: 200 mL  Total IN: 655.2 mL    OUT:    Drain (mL): 190 mL    Indwelling Catheter - Urethral (mL): 970 mL  Total OUT: 1160 mL    Total NET: -504.8 mL        LABS             10.2   11.58 )-----------( 257      ( 02-22-25 @ 04:30 )             31.8     145  |  110  |  34  -------------------------<  125   02-22-25 @ 04:30  3.3  |  26  |  0.9    Ca      8.2     02-22-25 @ 04:30  Mg     1.9     02-22-25 @ 04:30    TPro  4.6  /  Alb  2.3  /  TBili  1.4  /  DBili  x   /  AST  21  /  ALT  <5  /  AlkPhos  101  /  GGT  x     02-22-25 @ 04:30                    Urinalysis Basic - ( 22 Feb 2025 04:30 )    Color: x / Appearance: x / SG: x / pH: x  Gluc: 125 mg/dL / Ketone: x  / Bili: x / Urobili: x   Blood: x / Protein: x / Nitrite: x   Leuk Esterase: x / RBC: x / WBC x   Sq Epi: x / Non Sq Epi: x / Bacteria: x      MICRO / IMAGING / CARDIOLOGY  Telemetry: Reviewed   EKG: Reviewed    CULTURES    IMAGING  PACS Image:  (02-22-25 @ 06:32)  PACS Image:  (02-21-25 @ 06:34)    CARDIOLOGY

## 2025-02-23 NOTE — PHARMACOTHERAPY INTERVENTION NOTE - OUTCOME
accepted

## 2025-02-23 NOTE — PHARMACOTHERAPY INTERVENTION NOTE - COMMENTS
Klor 40meq enteral tube q2h x2 doses, recommended changing to q4h x2 doses
d/w team, off levophed & propofol drips, d/c  -d/c prn losartan  -d/c lactulose
fentanyl drip, recommended adjusting titration to CPOT<3  
heparin drip @ 650 units/hr, pt specific aPTT 29.8, d/w med team, adjusted to 850 units/hr
recommended adding pantoprazole 40mg og daily (GI ppx)  -adding bowel regimen w/ Miralax & senna
wean fentanyl drip, start methadone, recommended 5mg via PEG q8h 
SCr 0.8, CrCl~ 62, recommended changing oseltamivir to 75mg og q12h (x7 more days)
SCr 2, CrCl~25, recommended changing cefepime 1g IV q12h  -oseltamivir 30mg og daily x5 more days start 2/6 (pt received 75mg earlier today)
enoxaparin 40mg sc q24h, pt going for procedure, d/w med team, adjusted start to 18:00
heparin 14 units/hr, d/w team, changed to 1400 units/hr
levophed & propofol infusions off, d/w med team, d/c
recommended d/c fentanyl drip, now on fentanyl IVP prn
K 3-d/w team, Klor 40mEq og q4h x2 doses  -BG 240s, Lispro q6h added
SCr 1.1, CrCl ~45, recommended increasing cefepime 2g IV q12h  -oseltamivir 30mg og to q12h x5 more doses
SCr 0.7, CrCl ~71, recommended changing cefepime 2g IV to q8h
fentanyl 25mcg IV q3h prn agitation, recommended changing to prn CPOT<3
no BM, recommended changing lactulose to 20g og q6h until BM
metronidazole 500mg enteral q12h, possible percutaneous cholecystostomy , recommended changing to 500mg IV q8h
per ID recommendation-end date for cefepime & flagyl 2/16, d/w team, adjusted end date

## 2025-02-23 NOTE — PHARMACOTHERAPY INTERVENTION NOTE - INTERVENTION CATEGORIES
ASP
Therapy
Therapy
ASP
ASP
Therapy
ASP
ASP
Therapy
ASP
Therapy

## 2025-02-23 NOTE — PROGRESS NOTE ADULT - SUBJECTIVE AND OBJECTIVE BOX
SURGERY PROGRESS NOTE    Patient: GARETT ESCAMILLA , 69y ([birth year])Male   MRN: [id number]  Location: [address]  Visit: 01-29-25 Inpatient  Date: 02-23-25 @ 23:00    Hospital Day #: 26  Post-Op Day #: 3    Procedure/Dx/Injuries:  s/p tach and PEG    Events of past 24 hours:  Clots found around the trach. Surgicel was placed, will monitor. Trach suture was taken out.     PAST MEDICAL & SURGICAL HISTORY:  Parkinson disease      CAD (coronary artery disease)      History of basal cell carcinoma (BCC) excision          Vitals:   T(F): 100.1 (02-23-25 @ 22:00), Max: 100.7 (02-23-25 @ 20:00)  HR: 107 (02-23-25 @ 22:00)  BP: 153/86 (02-23-25 @ 22:00)  RR: 24 (02-23-25 @ 22:00)  SpO2: 99% (02-23-25 @ 22:00)  Mode: AC/ CMV (Assist Control/ Continuous Mandatory Ventilation), RR (machine): 18, TV (machine): 480, FiO2: 40, PEEP: 8, ITime: 1, MAP: 11, PIP: 19    Diet, NPO with Tube Feed:   Tube Feeding Modality: Jejunostomy  Peptamen A.F. Formula (PEPTAMENAFRTH)  Total Volume for 24 Hours (mL): 900  Continuous  Starting Tube Feed Rate mL per Hour: 50  Until Goal Tube Feed Rate (mL per Hour): 50  Tube Feed Duration (in Hours): 18  Tube Feed Start Time: 11:00  Free Water Flush  Free Water Flush Instructions:  free water 75 q6h      Fluids:     I & O's:    02-22-25 @ 07:01  -  02-23-25 @ 07:00  --------------------------------------------------------  IN:    Dexmedetomidine: 40.5 mL    dextrose 5% + lactated ringers: 385 mL    Enteral Tube Flush: 225 mL    Free Water: 100 mL    IV PiggyBack: 100 mL    Peptamen A.F.: 500 mL  Total IN: 1350.5 mL    OUT:    Drain (mL): 390 mL    Indwelling Catheter - Urethral (mL): 1600 mL  Total OUT: 1990 mL    Total NET: -639.5 mL    PHYSICAL EXAM:  General: NAD, calm   HEENT: Track in place, on vent 40/8  Cardiac: RRR  Respiratory: On vent 40/8, b/l chest rise and fall, normal respiratory effort  Abdomen: Soft, non-distended, non-tender, PEG tube in place  Skin: Warm/dry, normal color, no jaundice    MEDICATIONS  (STANDING):  buMETAnide Injectable 2 milliGRAM(s) IV Push daily  carbidopa/levodopa  25/250 2 Tablet(s) Oral three times a day  ceFAZolin   IVPB 2000 milliGRAM(s) IV Intermittent every 8 hours  chlorhexidine 0.12% Liquid 15 milliLiter(s) Oral Mucosa every 12 hours  chlorhexidine 2% Cloths 1 Application(s) Topical <User Schedule>  dextrose 5%. 1000 milliLiter(s) (50 mL/Hr) IV Continuous <Continuous>  dextrose 5%. 1000 milliLiter(s) (100 mL/Hr) IV Continuous <Continuous>  dextrose 50% Injectable 12.5 Gram(s) IV Push once  dextrose 50% Injectable 25 Gram(s) IV Push once  dextrose 50% Injectable 25 Gram(s) IV Push once  enoxaparin Injectable 40 milliGRAM(s) SubCutaneous every 24 hours  glucagon  Injectable 1 milliGRAM(s) IntraMuscular once  insulin lispro (ADMELOG) corrective regimen sliding scale   SubCutaneous every 6 hours  insulin lispro Injectable (ADMELOG) 2 Unit(s) SubCutaneous every 6 hours  lidocaine 1%/epinephrine 1:100,000 Inj 2 Vial(s) Local Injection once  methadone    Tablet 5 milliGRAM(s) Oral every 8 hours  pantoprazole   Suspension 40 milliGRAM(s) Oral daily  polyethylene glycol 3350 17 Gram(s) Oral every 12 hours  QUEtiapine 25 milliGRAM(s) Oral daily  rasagiline Tablet 1 milliGRAM(s) Oral <User Schedule>  senna 2 Tablet(s) Oral at bedtime    MEDICATIONS  (PRN):  acetaminophen     Tablet .. 650 milliGRAM(s) Oral every 6 hours PRN Temp greater or equal to 38C (100.4F), Mild Pain (1 - 3)  dextrose Oral Gel 15 Gram(s) Oral once PRN Blood Glucose LESS THAN 70 milliGRAM(s)/deciliter  midazolam Injectable 2 milliGRAM(s) IV Push every 4 hours PRN Agitation ; to wean off Precedex      DVT PROPHYLAXIS: enoxaparin Injectable 40 milliGRAM(s) SubCutaneous every 24 hours    GI PROPHYLAXIS: pantoprazole   Suspension 40 milliGRAM(s) Oral daily    ANTICOAGULATION:   ANTIBIOTICS:  ceFAZolin   IVPB 2000 milliGRAM(s)            LAB/STUDIES:  Labs:  CAPILLARY BLOOD GLUCOSE      POCT Blood Glucose.: 134 mg/dL (23 Feb 2025 17:32)  POCT Blood Glucose.: 102 mg/dL (23 Feb 2025 14:24)  POCT Blood Glucose.: 173 mg/dL (23 Feb 2025 05:23)  POCT Blood Glucose.: 122 mg/dL (23 Feb 2025 00:19)                          10.2   7.90  )-----------( 244      ( 23 Feb 2025 04:30 )             32.4       Auto Immature Granulocyte %: 0.5 % (02-23-25 @ 04:30)    02-23    149[H]  |  112[H]  |  34[H]  ----------------------------<  165[H]  3.4[L]   |  27  |  0.9      Calcium: 7.7 mg/dL (02-23-25 @ 04:30)      LFTs:             4.4  | 1.0  | 18       ------------------[119     ( 23 Feb 2025 04:30 )  2.3  | x    | <5          Lipase:x      Amylase:x         Blood Gas Arterial, Lactate: 0.7 mmol/L (02-23-25 @ 04:00)  Blood Gas Arterial, Lactate: 0.9 mmol/L (02-22-25 @ 03:49)  Blood Gas Arterial, Lactate: 0.8 mmol/L (02-21-25 @ 03:40)    ABG - ( 23 Feb 2025 04:00 )  pH: 7.47  /  pCO2: 40    /  pO2: 87    / HCO3: 29    / Base Excess: 5.0   /  SaO2: 98.4            ABG - ( 22 Feb 2025 03:49 )  pH: 7.43  /  pCO2: 41    /  pO2: 141   / HCO3: 27    / Base Excess: 2.6   /  SaO2: 100.0           ABG - ( 21 Feb 2025 03:40 )  pH: 7.37  /  pCO2: 46    /  pO2: 115   / HCO3: 27    / Base Excess: 0.8   /  SaO2: 99.3              Coags:            Urinalysis Basic - ( 23 Feb 2025 04:30 )    Color: x / Appearance: x / SG: x / pH: x  Gluc: 165 mg/dL / Ketone: x  / Bili: x / Urobili: x   Blood: x / Protein: x / Nitrite: x   Leuk Esterase: x / RBC: x / WBC x   Sq Epi: x / Non Sq Epi: x / Bacteria: x                IMAGING:      ACCESS/ DEVICES:  [ ] Peripheral IV  [ ] Central Venous Line	[ ] R	[ ] L	[ ] IJ	[ ] Fem	[ ] SC	Placed:   [ ] Arterial Line		[ ] R	[ ] L	[ ] Fem	[ ] Rad	[ ] Ax	Placed:   [ ] PICC:					[ ] Mediport  [ ] Urinary Catheter,  Date Placed:   [ ] Chest tube: [ ] Right, [ ] Left  [ ] BRYNN/Adam Drains

## 2025-02-24 LAB
ALBUMIN SERPL ELPH-MCNC: 2.5 G/DL — LOW (ref 3.5–5.2)
ALP SERPL-CCNC: 146 U/L — HIGH (ref 30–115)
ALT FLD-CCNC: <5 U/L — SIGNIFICANT CHANGE UP (ref 0–41)
ANION GAP SERPL CALC-SCNC: 8 MMOL/L — SIGNIFICANT CHANGE UP (ref 7–14)
AST SERPL-CCNC: 20 U/L — SIGNIFICANT CHANGE UP (ref 0–41)
BASOPHILS # BLD AUTO: 0.01 K/UL — SIGNIFICANT CHANGE UP (ref 0–0.2)
BASOPHILS NFR BLD AUTO: 0.1 % — SIGNIFICANT CHANGE UP (ref 0–1)
BILIRUB SERPL-MCNC: 1 MG/DL — SIGNIFICANT CHANGE UP (ref 0.2–1.2)
BUN SERPL-MCNC: 33 MG/DL — HIGH (ref 10–20)
CALCIUM SERPL-MCNC: 8.1 MG/DL — LOW (ref 8.4–10.5)
CHLORIDE SERPL-SCNC: 110 MMOL/L — SIGNIFICANT CHANGE UP (ref 98–110)
CO2 SERPL-SCNC: 28 MMOL/L — SIGNIFICANT CHANGE UP (ref 17–32)
CREAT SERPL-MCNC: 0.8 MG/DL — SIGNIFICANT CHANGE UP (ref 0.7–1.5)
EGFR: 95 ML/MIN/1.73M2 — SIGNIFICANT CHANGE UP
EGFR: 95 ML/MIN/1.73M2 — SIGNIFICANT CHANGE UP
EOSINOPHIL # BLD AUTO: 0.01 K/UL — SIGNIFICANT CHANGE UP (ref 0–0.7)
EOSINOPHIL NFR BLD AUTO: 0.1 % — SIGNIFICANT CHANGE UP (ref 0–8)
GAS PNL BLDA: SIGNIFICANT CHANGE UP
GLUCOSE BLDC GLUCOMTR-MCNC: 103 MG/DL — HIGH (ref 70–99)
GLUCOSE BLDC GLUCOMTR-MCNC: 109 MG/DL — HIGH (ref 70–99)
GLUCOSE BLDC GLUCOMTR-MCNC: 130 MG/DL — HIGH (ref 70–99)
GLUCOSE BLDC GLUCOMTR-MCNC: 83 MG/DL — SIGNIFICANT CHANGE UP (ref 70–99)
GLUCOSE SERPL-MCNC: 120 MG/DL — HIGH (ref 70–99)
HCT VFR BLD CALC: 33.8 % — LOW (ref 42–52)
HGB BLD-MCNC: 10.7 G/DL — LOW (ref 14–18)
IMM GRANULOCYTES NFR BLD AUTO: 0.5 % — HIGH (ref 0.1–0.3)
LACTATE SERPL-SCNC: 1.2 MMOL/L — SIGNIFICANT CHANGE UP (ref 0.7–2)
LYMPHOCYTES # BLD AUTO: 0.49 K/UL — LOW (ref 1.2–3.4)
LYMPHOCYTES # BLD AUTO: 5.9 % — LOW (ref 20.5–51.1)
MAGNESIUM SERPL-MCNC: 2 MG/DL — SIGNIFICANT CHANGE UP (ref 1.8–2.4)
MCHC RBC-ENTMCNC: 30.4 PG — SIGNIFICANT CHANGE UP (ref 27–31)
MCHC RBC-ENTMCNC: 31.7 G/DL — LOW (ref 32–37)
MCV RBC AUTO: 96 FL — HIGH (ref 80–94)
MONOCYTES # BLD AUTO: 0.45 K/UL — SIGNIFICANT CHANGE UP (ref 0.1–0.6)
MONOCYTES NFR BLD AUTO: 5.4 % — SIGNIFICANT CHANGE UP (ref 1.7–9.3)
NEUTROPHILS # BLD AUTO: 7.33 K/UL — HIGH (ref 1.4–6.5)
NEUTROPHILS NFR BLD AUTO: 88 % — HIGH (ref 42.2–75.2)
NRBC BLD AUTO-RTO: 0 /100 WBCS — SIGNIFICANT CHANGE UP (ref 0–0)
PHOSPHATE SERPL-MCNC: 1.4 MG/DL — LOW (ref 2.1–4.9)
PLATELET # BLD AUTO: 272 K/UL — SIGNIFICANT CHANGE UP (ref 130–400)
PMV BLD: 10.8 FL — HIGH (ref 7.4–10.4)
POTASSIUM SERPL-MCNC: 4.4 MMOL/L — SIGNIFICANT CHANGE UP (ref 3.5–5)
POTASSIUM SERPL-SCNC: 4.4 MMOL/L — SIGNIFICANT CHANGE UP (ref 3.5–5)
PROT SERPL-MCNC: 5.2 G/DL — LOW (ref 6–8)
RBC # BLD: 3.52 M/UL — LOW (ref 4.7–6.1)
RBC # FLD: 19.3 % — HIGH (ref 11.5–14.5)
SODIUM SERPL-SCNC: 146 MMOL/L — SIGNIFICANT CHANGE UP (ref 135–146)
WBC # BLD: 8.33 K/UL — SIGNIFICANT CHANGE UP (ref 4.8–10.8)
WBC # FLD AUTO: 8.33 K/UL — SIGNIFICANT CHANGE UP (ref 4.8–10.8)

## 2025-02-24 PROCEDURE — 99232 SBSQ HOSP IP/OBS MODERATE 35: CPT

## 2025-02-24 PROCEDURE — 71045 X-RAY EXAM CHEST 1 VIEW: CPT | Mod: 26

## 2025-02-24 RX ADMIN — Medication 5 MILLIGRAM(S): at 22:10

## 2025-02-24 RX ADMIN — INSULIN LISPRO 2 UNIT(S): 100 INJECTION, SOLUTION INTRAVENOUS; SUBCUTANEOUS at 05:17

## 2025-02-24 RX ADMIN — Medication 650 MILLIGRAM(S): at 14:23

## 2025-02-24 RX ADMIN — Medication 40 MILLIGRAM(S): at 12:09

## 2025-02-24 RX ADMIN — Medication 5 MILLIGRAM(S): at 14:24

## 2025-02-24 RX ADMIN — Medication 650 MILLIGRAM(S): at 00:59

## 2025-02-24 RX ADMIN — Medication 650 MILLIGRAM(S): at 00:02

## 2025-02-24 RX ADMIN — POLYETHYLENE GLYCOL 3350 17 GRAM(S): 17 POWDER, FOR SOLUTION ORAL at 17:43

## 2025-02-24 RX ADMIN — Medication 2 TABLET(S): at 15:45

## 2025-02-24 RX ADMIN — Medication 2 TABLET(S): at 22:56

## 2025-02-24 RX ADMIN — RASAGILINE 1 MILLIGRAM(S): 0.5 TABLET ORAL at 05:22

## 2025-02-24 RX ADMIN — INSULIN LISPRO 0: 100 INJECTION, SOLUTION INTRAVENOUS; SUBCUTANEOUS at 00:00

## 2025-02-24 RX ADMIN — Medication 2 TABLET(S): at 22:10

## 2025-02-24 RX ADMIN — Medication 5 MILLIGRAM(S): at 05:22

## 2025-02-24 RX ADMIN — Medication 100 MILLIGRAM(S): at 02:14

## 2025-02-24 RX ADMIN — Medication 15 MILLILITER(S): at 05:21

## 2025-02-24 RX ADMIN — Medication 100 MILLIGRAM(S): at 12:09

## 2025-02-24 RX ADMIN — POLYETHYLENE GLYCOL 3350 17 GRAM(S): 17 POWDER, FOR SOLUTION ORAL at 05:22

## 2025-02-24 RX ADMIN — BUMETANIDE 2 MILLIGRAM(S): 1 TABLET ORAL at 05:22

## 2025-02-24 RX ADMIN — Medication 2 TABLET(S): at 05:22

## 2025-02-24 RX ADMIN — Medication 1 APPLICATION(S): at 05:31

## 2025-02-24 RX ADMIN — ENOXAPARIN SODIUM 40 MILLIGRAM(S): 100 INJECTION SUBCUTANEOUS at 17:43

## 2025-02-24 RX ADMIN — QUETIAPINE FUMARATE 25 MILLIGRAM(S): 25 TABLET ORAL at 12:09

## 2025-02-24 RX ADMIN — Medication 15 MILLILITER(S): at 17:43

## 2025-02-24 RX ADMIN — Medication 100 MILLIGRAM(S): at 20:08

## 2025-02-24 RX ADMIN — INSULIN LISPRO 2 UNIT(S): 100 INJECTION, SOLUTION INTRAVENOUS; SUBCUTANEOUS at 00:00

## 2025-02-24 NOTE — CHART NOTE - NSCHARTNOTEFT_GEN_A_CORE
Prior to transferring patient to CEU, patient had an episode of aspiration on tube feeds.  Patient desatted to low 80s.  Suction was performed and O2 sat improved to 100%.  Tube feeds held for now. Aspiration precautions.  Patient covered with Abx.  Consider repeat CXR in morning (AM CXR was taken before aspiration episode).  Okay to proceed with downgrade.

## 2025-02-24 NOTE — PROGRESS NOTE ADULT - ASSESSMENT
ASSESSMENT:  69-year-old male past medical history of hypertension, Parkinson's, CAD s/p trach and pegj    PLAN:  - G tube for meds   - May start trickle feeds through J tube   - Care as per primary team   - Recall as needed     GREEN TEAM SPECTRA: [id number]

## 2025-02-24 NOTE — PROGRESS NOTE ADULT - SUBJECTIVE AND OBJECTIVE BOX
Patient is a 69y old  Male who presents with a chief complaint of penumonia (24 Feb 2025 08:06)        Over Night Events: no acute events noted, connected to vent         ROS:     All ROS are negative except HPI         PHYSICAL EXAM    ICU Vital Signs Last 24 Hrs  T(C): 37.3 (24 Feb 2025 08:00), Max: 38.6 (24 Feb 2025 01:00)  T(F): 99.2 (24 Feb 2025 08:00), Max: 101.5 (24 Feb 2025 01:00)  HR: 86 (24 Feb 2025 08:51) (64 - 113)  BP: 125/66 (24 Feb 2025 08:00) (90/53 - 158/83)  BP(mean): 90 (24 Feb 2025 08:00) (66 - 114)  ABP: --  ABP(mean): --  RR: 24 (24 Feb 2025 08:00) (18 - 25)  SpO2: 100% (24 Feb 2025 08:51) (95% - 100%)    O2 Parameters below as of 24 Feb 2025 07:00  Patient On (Oxygen Delivery Method): ventilator    O2 Concentration (%): 40        CONSTITUTIONAL:  Ill appearing    ENT:   Airway patent,   Trach     EYES:   Pupils equal,   Round and reactive to light.    CARDIAC:   Normal rate,   Regular rhythm.      RESPIRATORY:   No wheezing  Bilateral BS, Ronchi BL     GASTROINTESTINAL:  Abdomen soft,   Non-tender,     NEUROLOGICAL:   does not follow commands     SKIN:   sacral ulcer   Warm and dry and intact.       02-23-25 @ 07:01  -  02-24-25 @ 07:00  --------------------------------------------------------  IN:    Dexmedetomidine: 9.9 mL    dextrose 5% + lactated ringers: 35 mL    Enteral Tube Flush: 150 mL    Free Water: 1350 mL    Peptamen A.F.: 1000 mL  Total IN: 2544.9 mL    OUT:    Drain (mL): 370 mL    Indwelling Catheter - Urethral (mL): 1730 mL  Total OUT: 2100 mL    Total NET: 444.9 mL          LABS:                            10.7   8.33  )-----------( 272      ( 24 Feb 2025 05:15 )             33.8                                               02-24    146  |  110  |  33[H]  ----------------------------<  120[H]  4.4   |  28  |  0.8    Ca    8.1[L]      24 Feb 2025 05:15  Phos  1.4     02-24  Mg     2.0     02-24    TPro  5.2[L]  /  Alb  2.5[L]  /  TBili  1.0  /  DBili  x   /  AST  20  /  ALT  <5  /  AlkPhos  146[H]  02-24                                             Urinalysis Basic - ( 24 Feb 2025 05:15 )    Color: x / Appearance: x / SG: x / pH: x  Gluc: 120 mg/dL / Ketone: x  / Bili: x / Urobili: x   Blood: x / Protein: x / Nitrite: x   Leuk Esterase: x / RBC: x / WBC x   Sq Epi: x / Non Sq Epi: x / Bacteria: x                                                  LIVER FUNCTIONS - ( 24 Feb 2025 05:15 )  Alb: 2.5 g/dL / Pro: 5.2 g/dL / ALK PHOS: 146 U/L / ALT: <5 U/L / AST: 20 U/L / GGT: x                                                                                               Mode: AC/ CMV (Assist Control/ Continuous Mandatory Ventilation)  RR (machine): 18  TV (machine): 480  FiO2: 40  PEEP: 8  ITime: 1.5  MAP: 13  PIP: 23                                      ABG - ( 24 Feb 2025 04:12 )  pH, Arterial: 7.47  pH, Blood: x     /  pCO2: 41    /  pO2: 157   / HCO3: 30    / Base Excess: 5.6   /  SaO2: 99.4                MEDICATIONS  (STANDING):  buMETAnide Injectable 2 milliGRAM(s) IV Push daily  carbidopa/levodopa  25/250 2 Tablet(s) Oral three times a day  ceFAZolin   IVPB 2000 milliGRAM(s) IV Intermittent every 8 hours  chlorhexidine 0.12% Liquid 15 milliLiter(s) Oral Mucosa every 12 hours  chlorhexidine 2% Cloths 1 Application(s) Topical <User Schedule>  dextrose 5%. 1000 milliLiter(s) (100 mL/Hr) IV Continuous <Continuous>  dextrose 5%. 1000 milliLiter(s) (50 mL/Hr) IV Continuous <Continuous>  dextrose 50% Injectable 25 Gram(s) IV Push once  dextrose 50% Injectable 12.5 Gram(s) IV Push once  dextrose 50% Injectable 25 Gram(s) IV Push once  enoxaparin Injectable 40 milliGRAM(s) SubCutaneous every 24 hours  glucagon  Injectable 1 milliGRAM(s) IntraMuscular once  insulin lispro (ADMELOG) corrective regimen sliding scale   SubCutaneous every 6 hours  insulin lispro Injectable (ADMELOG) 2 Unit(s) SubCutaneous every 6 hours  levoFLOXacin  Tablet 750 milliGRAM(s) Oral every 24 hours  lidocaine 1%/epinephrine 1:100,000 Inj 2 Vial(s) Local Injection once  methadone    Tablet 5 milliGRAM(s) Oral every 8 hours  pantoprazole   Suspension 40 milliGRAM(s) Oral daily  polyethylene glycol 3350 17 Gram(s) Oral every 12 hours  QUEtiapine 25 milliGRAM(s) Oral daily  rasagiline Tablet 1 milliGRAM(s) Oral <User Schedule>  senna 2 Tablet(s) Oral at bedtime    MEDICATIONS  (PRN):  acetaminophen     Tablet .. 650 milliGRAM(s) Oral every 6 hours PRN Temp greater or equal to 38C (100.4F), Mild Pain (1 - 3)  dextrose Oral Gel 15 Gram(s) Oral once PRN Blood Glucose LESS THAN 70 milliGRAM(s)/deciliter  midazolam Injectable 2 milliGRAM(s) IV Push every 4 hours PRN Agitation ; to wean off Precedex      New X-rays reviewed:                                                                                  ECHO         Patient is a 69y old  Male who presents with a chief complaint of penumonia (24 Feb 2025 08:06)        Over Night Events: no acute events noted, vent dependant, episode of aspiration        PHYSICAL EXAM    ICU Vital Signs Last 24 Hrs  T(C): 37.3 (24 Feb 2025 08:00), Max: 38.6 (24 Feb 2025 01:00)  T(F): 99.2 (24 Feb 2025 08:00), Max: 101.5 (24 Feb 2025 01:00)  HR: 86 (24 Feb 2025 08:51) (64 - 113)  BP: 125/66 (24 Feb 2025 08:00) (90/53 - 158/83)  BP(mean): 90 (24 Feb 2025 08:00) (66 - 114)  RR: 24 (24 Feb 2025 08:00) (18 - 25)  SpO2: 100% (24 Feb 2025 08:51) (95% - 100%)    O2 Parameters below as of 24 Feb 2025 07:00  Patient On (Oxygen Delivery Method): ventilator    O2 Concentration (%): 40        CONSTITUTIONAL:  Ill appearing    ENT:   Airway patent,   Trach     CARDIAC:   Normal rate,   Regular rhythm.      RESPIRATORY:   bl rhonchi    GASTROINTESTINAL:  Abdomen soft,   Non-tender,     NEUROLOGICAL:   does not follow commands     SKIN:   sacral ulcer   Warm and dry and intact.       02-23-25 @ 07:01  -  02-24-25 @ 07:00  --------------------------------------------------------  IN:    Dexmedetomidine: 9.9 mL    dextrose 5% + lactated ringers: 35 mL    Enteral Tube Flush: 150 mL    Free Water: 1350 mL    Peptamen A.F.: 1000 mL  Total IN: 2544.9 mL    OUT:    Drain (mL): 370 mL    Indwelling Catheter - Urethral (mL): 1730 mL  Total OUT: 2100 mL    Total NET: 444.9 mL          LABS:                            10.7   8.33  )-----------( 272      ( 24 Feb 2025 05:15 )             33.8                                               02-24    146  |  110  |  33[H]  ----------------------------<  120[H]  4.4   |  28  |  0.8    Ca    8.1[L]      24 Feb 2025 05:15  Phos  1.4     02-24  Mg     2.0     02-24    TPro  5.2[L]  /  Alb  2.5[L]  /  TBili  1.0  /  DBili  x   /  AST  20  /  ALT  <5  /  AlkPhos  146[H]  02-24                                             Urinalysis Basic - ( 24 Feb 2025 05:15 )    Color: x / Appearance: x / SG: x / pH: x  Gluc: 120 mg/dL / Ketone: x  / Bili: x / Urobili: x   Blood: x / Protein: x / Nitrite: x   Leuk Esterase: x / RBC: x / WBC x   Sq Epi: x / Non Sq Epi: x / Bacteria: x                                                  LIVER FUNCTIONS - ( 24 Feb 2025 05:15 )  Alb: 2.5 g/dL / Pro: 5.2 g/dL / ALK PHOS: 146 U/L / ALT: <5 U/L / AST: 20 U/L / GGT: x                                                                                               Mode: AC/ CMV (Assist Control/ Continuous Mandatory Ventilation)  RR (machine): 18  TV (machine): 480  FiO2: 40  PEEP: 8  ITime: 1.5  MAP: 13  PIP: 23                                      ABG - ( 24 Feb 2025 04:12 )  pH, Arterial: 7.47  pH, Blood: x     /  pCO2: 41    /  pO2: 157   / HCO3: 30    / Base Excess: 5.6   /  SaO2: 99.4                MEDICATIONS  (STANDING):  buMETAnide Injectable 2 milliGRAM(s) IV Push daily  carbidopa/levodopa  25/250 2 Tablet(s) Oral three times a day  ceFAZolin   IVPB 2000 milliGRAM(s) IV Intermittent every 8 hours  chlorhexidine 0.12% Liquid 15 milliLiter(s) Oral Mucosa every 12 hours  chlorhexidine 2% Cloths 1 Application(s) Topical <User Schedule>  dextrose 5%. 1000 milliLiter(s) (100 mL/Hr) IV Continuous <Continuous>  dextrose 5%. 1000 milliLiter(s) (50 mL/Hr) IV Continuous <Continuous>  dextrose 50% Injectable 25 Gram(s) IV Push once  dextrose 50% Injectable 12.5 Gram(s) IV Push once  dextrose 50% Injectable 25 Gram(s) IV Push once  enoxaparin Injectable 40 milliGRAM(s) SubCutaneous every 24 hours  glucagon  Injectable 1 milliGRAM(s) IntraMuscular once  insulin lispro (ADMELOG) corrective regimen sliding scale   SubCutaneous every 6 hours  insulin lispro Injectable (ADMELOG) 2 Unit(s) SubCutaneous every 6 hours  levoFLOXacin  Tablet 750 milliGRAM(s) Oral every 24 hours  lidocaine 1%/epinephrine 1:100,000 Inj 2 Vial(s) Local Injection once  methadone    Tablet 5 milliGRAM(s) Oral every 8 hours  pantoprazole   Suspension 40 milliGRAM(s) Oral daily  polyethylene glycol 3350 17 Gram(s) Oral every 12 hours  QUEtiapine 25 milliGRAM(s) Oral daily  rasagiline Tablet 1 milliGRAM(s) Oral <User Schedule>  senna 2 Tablet(s) Oral at bedtime    MEDICATIONS  (PRN):  acetaminophen     Tablet .. 650 milliGRAM(s) Oral every 6 hours PRN Temp greater or equal to 38C (100.4F), Mild Pain (1 - 3)  dextrose Oral Gel 15 Gram(s) Oral once PRN Blood Glucose LESS THAN 70 milliGRAM(s)/deciliter  midazolam Injectable 2 milliGRAM(s) IV Push every 4 hours PRN Agitation ; to wean off Precedex

## 2025-02-24 NOTE — PROGRESS NOTE ADULT - SUBJECTIVE AND OBJECTIVE BOX
GENERAL SURGERY PROGRESS NOTE    Patient: GARETT ESCAMILLA , 69y ([birth year])Male   MRN: [id number]  Location: [address]  Visit: 01-29-25 Inpatient  Date: 02-24-25 @ 08:07    Hospital Day #:27  Post-Op Day #: 4    Procedure/Dx/Injuries: s/p trach and peg     Events of past 24 hours: Clots found around the trach. Surgicel was placed, will monitor. Trach suture was taken out.     PAST MEDICAL & SURGICAL HISTORY:  Parkinson disease      CAD (coronary artery disease)      History of basal cell carcinoma (BCC) excision          Vitals:   T(F): 99.8 (02-24-25 @ 07:00), Max: 101.5 (02-24-25 @ 01:00)  HR: 100 (02-24-25 @ 07:00)  BP: 116/69 (02-24-25 @ 07:00)  RR: 22 (02-24-25 @ 07:00)  SpO2: 99% (02-24-25 @ 07:00)  Mode: AC/ CMV (Assist Control/ Continuous Mandatory Ventilation), RR (machine): 18, TV (machine): 480, FiO2: 40, PEEP: 8, ITime: 1, MAP: 11, PIP: 18    Diet, NPO:   Except Medications      Fluids:     I & O's:    02-23-25 @ 07:01  -  02-24-25 @ 07:00  --------------------------------------------------------  IN:    Dexmedetomidine: 9.9 mL    dextrose 5% + lactated ringers: 35 mL    Enteral Tube Flush: 150 mL    Free Water: 1350 mL    Peptamen A.F.: 1000 mL  Total IN: 2544.9 mL    OUT:    Drain (mL): 370 mL    Indwelling Catheter - Urethral (mL): 1730 mL  Total OUT: 2100 mL    Total NET: 444.9 mL         PHYSICAL EXAM:  General: NAD, calm   HEENT: Track in place, on vent 40/8  Cardiac: RRR  Respiratory: On vent 40/8, b/l chest rise and fall, normal respiratory effort  Abdomen: Soft, non-distended, non-tender, PEG tube in place  Skin: Warm/dry, normal color, no jaundice       MEDICATIONS  (STANDING):  buMETAnide Injectable 2 milliGRAM(s) IV Push daily  carbidopa/levodopa  25/250 2 Tablet(s) Oral three times a day  ceFAZolin   IVPB 2000 milliGRAM(s) IV Intermittent every 8 hours  chlorhexidine 0.12% Liquid 15 milliLiter(s) Oral Mucosa every 12 hours  chlorhexidine 2% Cloths 1 Application(s) Topical <User Schedule>  dextrose 5%. 1000 milliLiter(s) (50 mL/Hr) IV Continuous <Continuous>  dextrose 5%. 1000 milliLiter(s) (100 mL/Hr) IV Continuous <Continuous>  dextrose 50% Injectable 25 Gram(s) IV Push once  dextrose 50% Injectable 12.5 Gram(s) IV Push once  dextrose 50% Injectable 25 Gram(s) IV Push once  enoxaparin Injectable 40 milliGRAM(s) SubCutaneous every 24 hours  glucagon  Injectable 1 milliGRAM(s) IntraMuscular once  insulin lispro (ADMELOG) corrective regimen sliding scale   SubCutaneous every 6 hours  insulin lispro Injectable (ADMELOG) 2 Unit(s) SubCutaneous every 6 hours  levoFLOXacin  Tablet 750 milliGRAM(s) Oral every 24 hours  lidocaine 1%/epinephrine 1:100,000 Inj 2 Vial(s) Local Injection once  methadone    Tablet 5 milliGRAM(s) Oral every 8 hours  pantoprazole   Suspension 40 milliGRAM(s) Oral daily  polyethylene glycol 3350 17 Gram(s) Oral every 12 hours  QUEtiapine 25 milliGRAM(s) Oral daily  rasagiline Tablet 1 milliGRAM(s) Oral <User Schedule>  senna 2 Tablet(s) Oral at bedtime    MEDICATIONS  (PRN):  acetaminophen     Tablet .. 650 milliGRAM(s) Oral every 6 hours PRN Temp greater or equal to 38C (100.4F), Mild Pain (1 - 3)  dextrose Oral Gel 15 Gram(s) Oral once PRN Blood Glucose LESS THAN 70 milliGRAM(s)/deciliter  midazolam Injectable 2 milliGRAM(s) IV Push every 4 hours PRN Agitation ; to wean off Precedex      DVT PROPHYLAXIS: enoxaparin Injectable 40 milliGRAM(s) SubCutaneous every 24 hours    GI PROPHYLAXIS: pantoprazole   Suspension 40 milliGRAM(s) Oral daily    ANTICOAGULATION:   ANTIBIOTICS:  ceFAZolin   IVPB 2000 milliGRAM(s)  levoFLOXacin  Tablet 750 milliGRAM(s)            LAB/STUDIES:  Labs:  CAPILLARY BLOOD GLUCOSE      POCT Blood Glucose.: 130 mg/dL (24 Feb 2025 05:14)  POCT Blood Glucose.: 141 mg/dL (23 Feb 2025 23:58)  POCT Blood Glucose.: 134 mg/dL (23 Feb 2025 17:32)  POCT Blood Glucose.: 102 mg/dL (23 Feb 2025 14:24)                          10.7   8.33  )-----------( 272      ( 24 Feb 2025 05:15 )             33.8       Auto Immature Granulocyte %: 0.5 % (02-24-25 @ 05:15)    02-24    146  |  110  |  33[H]  ----------------------------<  120[H]  4.4   |  28  |  0.8      Calcium: 8.1 mg/dL (02-24-25 @ 05:15)      LFTs:             5.2  | 1.0  | 20       ------------------[146     ( 24 Feb 2025 05:15 )  2.5  | x    | <5          Lipase:x      Amylase:x         Lactate, Blood: 1.2 mmol/L (02-24-25 @ 05:15)  Blood Gas Arterial, Lactate: 0.9 mmol/L (02-24-25 @ 04:12)  Blood Gas Arterial, Lactate: 0.7 mmol/L (02-23-25 @ 04:00)  Blood Gas Arterial, Lactate: 0.9 mmol/L (02-22-25 @ 03:49)    ABG - ( 24 Feb 2025 04:12 )  pH: 7.47  /  pCO2: 41    /  pO2: 157   / HCO3: 30    / Base Excess: 5.6   /  SaO2: 99.4            ABG - ( 23 Feb 2025 04:00 )  pH: 7.47  /  pCO2: 40    /  pO2: 87    / HCO3: 29    / Base Excess: 5.0   /  SaO2: 98.4            ABG - ( 22 Feb 2025 03:49 )  pH: 7.43  /  pCO2: 41    /  pO2: 141   / HCO3: 27    / Base Excess: 2.6   /  SaO2: 100.0             Coags:            Urinalysis Basic - ( 24 Feb 2025 05:15 )    Color: x / Appearance: x / SG: x / pH: x  Gluc: 120 mg/dL / Ketone: x  / Bili: x / Urobili: x   Blood: x / Protein: x / Nitrite: x   Leuk Esterase: x / RBC: x / WBC x   Sq Epi: x / Non Sq Epi: x / Bacteria: x

## 2025-02-24 NOTE — PROGRESS NOTE ADULT - ASSESSMENT
IMPRESSION:    Acute Hypoxic Respiratory Failure Sp Trach/PEG   Toxic Metabolic Encephalopathy  CAP likely aspiration   L ptx  Influenza A treated   MSSA bacteremia repeat CX -  Sepsis POA  cholecystitis sp percutaneous chol  Periventricular bleed  Suspected Mural thrombus in PA  Right thigh hematoma.    HO Parkinson's Disease   HO CAD    PLAN:    CNS:  SAT. Continue Anti parkinson's. Methadone, monitor QT, Off Precedex    HEENT: Oral care. s/p trach and PEG    PULMONARY:  Monitor airway pressures. Remains with significant secretions. s/p trac, Keep Sao2 92 to 96%, repeat CXR this am     CARDIOVASCULAR:  Avoid overload.     GI: GI prophylaxis. S/P perc cholecystostomy. Bowel regimen G tube for meds , start trickle feeds through J tube    RENAL: Follow up lytes. Mars in place for retention. Kidney function stable.     INFECTIOUS DISEASE: Cultures noted. SP Tamiflu. ABX per ID. Complete course of ancef and levaquin, cultures noted     HEMATOLOGICAL: Monitor CBC and coags. CTA noted with no active bleed. Goal Hb > 7.  LE DOPPLER -    ENDOCRINE:  Follow up FS.  Insulin protocol if needed.    MUSCULOSKELETAL: Bed rest.  Off loading. Wound care for DTIs. Burn eval noted     Full code  palliative care follow up   very poor prognosis  SDU      IMPRESSION:    Acute Hypoxic Respiratory Failure Sp Trach/PEG   Toxic Metabolic Encephalopathy  aspiration   L ptx  Influenza A treated   MSSA bacteremia repeat CX -  Sepsis POA  cholecystitis sp percutaneous chol  Periventricular bleed  Suspected Mural thrombus in PA  Right thigh hematoma.    HO Parkinson's Disease   HO CAD    PLAN:    CNS:  SAT. Continue Anti parkinson's. Methadone, monitor QT, Off Precedex    HEENT: Oral care. s/p trach and PEG    PULMONARY:  Monitor airway pressures. Remains with significant secretions. s/p trac, Keep Sao2 92 to 96%, repeat CXR this am     CARDIOVASCULAR:  Avoid overload.     GI: GI prophylaxis. S/P perc cholecystostomy. Bowel regimen G tube for meds , start trickle feeds through J tube    RENAL: Follow up lytes. Mars in place for retention. Kidney function stable.     INFECTIOUS DISEASE: Cultures noted. SP Tamiflu. ABX per ID. Complete course of ancef and levaquin, cultures noted     HEMATOLOGICAL: Monitor CBC and coags. CTA noted with no active bleed. Goal Hb > 7.  LE DOPPLER -    ENDOCRINE:  Follow up FS.  Insulin protocol if needed.    MUSCULOSKELETAL: Bed rest.  Off loading. Wound care for DTIs. Burn eval noted     Full code  palliative care follow up   very poor prognosis  SDU

## 2025-02-25 LAB
ALBUMIN SERPL ELPH-MCNC: 2.8 G/DL — LOW (ref 3.5–5.2)
ALP SERPL-CCNC: 143 U/L — HIGH (ref 30–115)
ALT FLD-CCNC: <5 U/L — SIGNIFICANT CHANGE UP (ref 0–41)
ANION GAP SERPL CALC-SCNC: 14 MMOL/L — SIGNIFICANT CHANGE UP (ref 7–14)
AST SERPL-CCNC: 20 U/L — SIGNIFICANT CHANGE UP (ref 0–41)
BASOPHILS # BLD AUTO: 0.01 K/UL — SIGNIFICANT CHANGE UP (ref 0–0.2)
BASOPHILS NFR BLD AUTO: 0.1 % — SIGNIFICANT CHANGE UP (ref 0–1)
BILIRUB SERPL-MCNC: 1 MG/DL — SIGNIFICANT CHANGE UP (ref 0.2–1.2)
BUN SERPL-MCNC: 41 MG/DL — HIGH (ref 10–20)
CALCIUM SERPL-MCNC: 8.2 MG/DL — LOW (ref 8.4–10.5)
CHLORIDE SERPL-SCNC: 110 MMOL/L — SIGNIFICANT CHANGE UP (ref 98–110)
CK SERPL-CCNC: 38 U/L — SIGNIFICANT CHANGE UP (ref 0–225)
CO2 SERPL-SCNC: 27 MMOL/L — SIGNIFICANT CHANGE UP (ref 17–32)
CREAT SERPL-MCNC: 1.3 MG/DL — SIGNIFICANT CHANGE UP (ref 0.7–1.5)
EGFR: 59 ML/MIN/1.73M2 — LOW
EGFR: 59 ML/MIN/1.73M2 — LOW
EOSINOPHIL # BLD AUTO: 0.01 K/UL — SIGNIFICANT CHANGE UP (ref 0–0.7)
EOSINOPHIL NFR BLD AUTO: 0.1 % — SIGNIFICANT CHANGE UP (ref 0–8)
GAS PNL BLDA: SIGNIFICANT CHANGE UP
GLUCOSE BLDC GLUCOMTR-MCNC: 130 MG/DL — HIGH (ref 70–99)
GLUCOSE BLDC GLUCOMTR-MCNC: 67 MG/DL — LOW (ref 70–99)
GLUCOSE SERPL-MCNC: 66 MG/DL — LOW (ref 70–99)
HCT VFR BLD CALC: 30.7 % — LOW (ref 42–52)
HGB BLD-MCNC: 9.5 G/DL — LOW (ref 14–18)
IMM GRANULOCYTES NFR BLD AUTO: 0.5 % — HIGH (ref 0.1–0.3)
LYMPHOCYTES # BLD AUTO: 0.82 K/UL — LOW (ref 1.2–3.4)
LYMPHOCYTES # BLD AUTO: 8.7 % — LOW (ref 20.5–51.1)
MAGNESIUM SERPL-MCNC: 2.1 MG/DL — SIGNIFICANT CHANGE UP (ref 1.8–2.4)
MCHC RBC-ENTMCNC: 30.4 PG — SIGNIFICANT CHANGE UP (ref 27–31)
MCHC RBC-ENTMCNC: 30.9 G/DL — LOW (ref 32–37)
MCV RBC AUTO: 98.1 FL — HIGH (ref 80–94)
MONOCYTES # BLD AUTO: 0.58 K/UL — SIGNIFICANT CHANGE UP (ref 0.1–0.6)
MONOCYTES NFR BLD AUTO: 6.2 % — SIGNIFICANT CHANGE UP (ref 1.7–9.3)
NEUTROPHILS # BLD AUTO: 7.91 K/UL — HIGH (ref 1.4–6.5)
NEUTROPHILS NFR BLD AUTO: 84.4 % — HIGH (ref 42.2–75.2)
NRBC BLD AUTO-RTO: 0 /100 WBCS — SIGNIFICANT CHANGE UP (ref 0–0)
PLATELET # BLD AUTO: 241 K/UL — SIGNIFICANT CHANGE UP (ref 130–400)
PMV BLD: 10.5 FL — HIGH (ref 7.4–10.4)
POTASSIUM SERPL-MCNC: 4.6 MMOL/L — SIGNIFICANT CHANGE UP (ref 3.5–5)
POTASSIUM SERPL-SCNC: 4.6 MMOL/L — SIGNIFICANT CHANGE UP (ref 3.5–5)
PROT SERPL-MCNC: 5.1 G/DL — LOW (ref 6–8)
RBC # BLD: 3.13 M/UL — LOW (ref 4.7–6.1)
RBC # FLD: 19.4 % — HIGH (ref 11.5–14.5)
SODIUM SERPL-SCNC: 151 MMOL/L — HIGH (ref 135–146)
WBC # BLD: 9.38 K/UL — SIGNIFICANT CHANGE UP (ref 4.8–10.8)
WBC # FLD AUTO: 9.38 K/UL — SIGNIFICANT CHANGE UP (ref 4.8–10.8)

## 2025-02-25 PROCEDURE — 99233 SBSQ HOSP IP/OBS HIGH 50: CPT

## 2025-02-25 PROCEDURE — 99232 SBSQ HOSP IP/OBS MODERATE 35: CPT

## 2025-02-25 PROCEDURE — 95816 EEG AWAKE AND DROWSY: CPT | Mod: 26

## 2025-02-25 PROCEDURE — 70450 CT HEAD/BRAIN W/O DYE: CPT | Mod: 26

## 2025-02-25 RX ORDER — DEXTROSE 50 % IN WATER 50 %
25 SYRINGE (ML) INTRAVENOUS ONCE
Refills: 0 | Status: COMPLETED | OUTPATIENT
Start: 2025-02-25 | End: 2025-02-25

## 2025-02-25 RX ORDER — INSULIN LISPRO 100 U/ML
INJECTION, SOLUTION INTRAVENOUS; SUBCUTANEOUS EVERY 6 HOURS
Refills: 0 | Status: DISCONTINUED | OUTPATIENT
Start: 2025-02-25 | End: 2025-03-05

## 2025-02-25 RX ORDER — SODIUM CHLORIDE 9 G/1000ML
1000 INJECTION, SOLUTION INTRAVENOUS
Refills: 0 | Status: DISCONTINUED | OUTPATIENT
Start: 2025-02-25 | End: 2025-02-26

## 2025-02-25 RX ORDER — PIPERACILLIN-TAZO-DEXTROSE,ISO 3.375G/5
3.38 IV SOLUTION, PIGGYBACK PREMIX FROZEN(ML) INTRAVENOUS EVERY 12 HOURS
Refills: 0 | Status: DISCONTINUED | OUTPATIENT
Start: 2025-02-25 | End: 2025-02-26

## 2025-02-25 RX ADMIN — POLYETHYLENE GLYCOL 3350 17 GRAM(S): 17 POWDER, FOR SOLUTION ORAL at 17:48

## 2025-02-25 RX ADMIN — Medication 2 TABLET(S): at 05:08

## 2025-02-25 RX ADMIN — Medication 5 MILLIGRAM(S): at 05:08

## 2025-02-25 RX ADMIN — Medication 5 MILLIGRAM(S): at 13:18

## 2025-02-25 RX ADMIN — Medication 25 GRAM(S): at 06:38

## 2025-02-25 RX ADMIN — Medication 2 TABLET(S): at 21:58

## 2025-02-25 RX ADMIN — Medication 1 APPLICATION(S): at 05:09

## 2025-02-25 RX ADMIN — QUETIAPINE FUMARATE 25 MILLIGRAM(S): 25 TABLET ORAL at 11:47

## 2025-02-25 RX ADMIN — RASAGILINE 1 MILLIGRAM(S): 0.5 TABLET ORAL at 06:10

## 2025-02-25 RX ADMIN — SODIUM CHLORIDE 75 MILLILITER(S): 9 INJECTION, SOLUTION INTRAVENOUS at 09:23

## 2025-02-25 RX ADMIN — Medication 15 MILLILITER(S): at 17:48

## 2025-02-25 RX ADMIN — Medication 2 TABLET(S): at 13:18

## 2025-02-25 RX ADMIN — Medication 100 MILLIGRAM(S): at 18:55

## 2025-02-25 RX ADMIN — Medication 100 MILLIGRAM(S): at 11:47

## 2025-02-25 RX ADMIN — POLYETHYLENE GLYCOL 3350 17 GRAM(S): 17 POWDER, FOR SOLUTION ORAL at 05:09

## 2025-02-25 RX ADMIN — ENOXAPARIN SODIUM 40 MILLIGRAM(S): 100 INJECTION SUBCUTANEOUS at 17:49

## 2025-02-25 RX ADMIN — Medication 25 GRAM(S): at 17:50

## 2025-02-25 RX ADMIN — Medication 5 MILLIGRAM(S): at 21:58

## 2025-02-25 RX ADMIN — Medication 15 MILLILITER(S): at 05:08

## 2025-02-25 RX ADMIN — Medication 100 MILLIGRAM(S): at 05:08

## 2025-02-25 RX ADMIN — BUMETANIDE 2 MILLIGRAM(S): 1 TABLET ORAL at 05:09

## 2025-02-25 RX ADMIN — Medication 40 MILLIGRAM(S): at 12:58

## 2025-02-25 NOTE — CONSULT NOTE ADULT - CONSULT REQUESTED DATE/TIME
17-Feb-2025 18:12
29-Jan-2025 11:56
01-Feb-2025 13:38
02-Feb-2025 17:27
25-Feb-2025 14:00
30-Jan-2025 12:10
31-Jan-2025 11:40
01-Feb-2025 17:27
04-Feb-2025 14:05
13-Feb-2025 10:45
31-Jan-2025

## 2025-02-25 NOTE — CONSULT NOTE ADULT - ATTENDING COMMENTS
Pt w/ longstanding IPD on large doses of Sinemet currently admitted for acute hypoxic respiratory failure w/ prolonged hospitalization s/p trach/PEG currently remains encephalopathic with significant dyskinesias in all limbs possibly secondary to reinitiation of dopaminergic medications recently.  Recommend decrease Sinemet to 25/250 TID for now and d/c rasagiline.  Clarify with family patient's movement specialist and baseline function since risks of dopaminergics outweight benefits at this time.

## 2025-02-25 NOTE — ADVANCED PRACTICE NURSE CONSULT - RECOMMEDATIONS
Cleanse wound to sacrococcygeal region with Vashe wound cleanser  Pat dry, apply Medihoney, cover with Xeroform and abdominal pad twice a day and prn for soiling.   Recommend follow up with Burn for possible debridement    Cleanse wound to left heel with soap and water  Pat dry, apply Cavilon skin prep twice a day, prn for soiling  Offload heels    Maintain pressure injury prevention.   Keep skin clean.   Maintain incontinence care.   Monitor skin for changes and notify provider 
Cleanse wound to sacrococcygeal region/left buttock with soap and water.   Pat dry, apply triad twice a day and prn for soiling. Avoid Allevyn to area    Cleanse wound to left heel with soap and water  Pat dry, apply Cavilon skin prep twice a day, prn for soiling  Offload heels    Maintain pressure injury prevention.   Keep skin clean.   Maintain incontinence care.   Monitor skin for changes and notify provider   Case discussed with primary RN bedside
Cleanse wound to left heel with soap and water.   Pat dry, apply Xeroform, wrap with Kerlix twice a day and prn for soiling.     Maintain pressure injury prevention.   Keep skin clean.   Maintain incontinence care.   Monitor skin for changes and notify provider

## 2025-02-25 NOTE — PROGRESS NOTE ADULT - SUBJECTIVE AND OBJECTIVE BOX
SUBJECTIVE/OVERNIGHT EVENTS  Today is hospital day 27d. This morning patient was seen and examined at bedside. Pt has had facial twitching since he arrived on the unit yesterday - blinking and twitching around the mouth and chewing motions.     PMH    Pneumonia due to infectious organism    Handoff    MEWS Score    Parkinson disease    CAD (coronary artery disease)    History of basal cell carcinoma (BCC) excision    Acute respiratory failure with hypoxia    Sacral pressure sore    Acute respiratory failure with hypoxia    Sacral pressure sore    Pneumonia    Acute respiratory failure with hypoxia    Pneumonia    Parkinson disease    Encounter for palliative care    Bronchoscopy, flexible, with tracheostomy tube replacement    Transoral endoscopic creation of a gastrojejunostomy    Selective debridement    SOB    90+    Acute respiratory failure with hypoxia    [email or web address]        MEDICATIONS  STANDING MEDICATIONS  carbidopa/levodopa  25/250 2 Tablet(s) Oral three times a day  ceFAZolin   IVPB 2000 milliGRAM(s) IV Intermittent every 8 hours  chlorhexidine 0.12% Liquid 15 milliLiter(s) Oral Mucosa every 12 hours  chlorhexidine 2% Cloths 1 Application(s) Topical <User Schedule>  dextrose 5%. 1000 milliLiter(s) IV Continuous <Continuous>  dextrose 5%. 1000 milliLiter(s) IV Continuous <Continuous>  dextrose 5%. 1000 milliLiter(s) IV Continuous <Continuous>  dextrose 50% Injectable 25 Gram(s) IV Push once  dextrose 50% Injectable 12.5 Gram(s) IV Push once  dextrose 50% Injectable 25 Gram(s) IV Push once  enoxaparin Injectable 40 milliGRAM(s) SubCutaneous every 24 hours  glucagon  Injectable 1 milliGRAM(s) IntraMuscular once  insulin lispro (ADMELOG) corrective regimen sliding scale   SubCutaneous every 6 hours  lidocaine 1%/epinephrine 1:100,000 Inj 2 Vial(s) Local Injection once  methadone    Tablet 5 milliGRAM(s) Oral every 8 hours  pantoprazole  Injectable 40 milliGRAM(s) IV Push daily  piperacillin/tazobactam IVPB.. 3.375 Gram(s) IV Intermittent every 12 hours  polyethylene glycol 3350 17 Gram(s) Oral every 12 hours  QUEtiapine 25 milliGRAM(s) Oral daily  rasagiline Tablet 1 milliGRAM(s) Oral <User Schedule>  senna 2 Tablet(s) Oral at bedtime    PRN MEDICATIONS  acetaminophen     Tablet .. 650 milliGRAM(s) Oral every 6 hours PRN  dextrose Oral Gel 15 Gram(s) Oral once PRN  midazolam Injectable 2 milliGRAM(s) IV Push every 4 hours PRN    VITALS  T(F): 98.7 (02-25-25 @ 08:00), Max: 100.5 (02-24-25 @ 14:20)  HR: 66 (02-25-25 @ 08:10) (66 - 109)  BP: 114/73 (02-25-25 @ 08:00) (97/54 - 133/70)  RR: 20 (02-25-25 @ 08:00) (20 - 20)  SpO2: 98% (02-25-25 @ 08:10) (96% - 100%)  POCT Blood Glucose.: 93 mg/dL (02-25-25 @ 11:45)  POCT Blood Glucose.: 130 mg/dL (02-25-25 @ 07:53)  POCT Blood Glucose.: 67 mg/dL (02-25-25 @ 06:13)  POCT Blood Glucose.: 83 mg/dL (02-24-25 @ 23:35)  POCT Blood Glucose.: 103 mg/dL (02-24-25 @ 18:51)    PHYSICAL EXAM  GENERAL: cachectic, ill appearing   NECK: trach in place, dried blood on dressing   HEART: Regular rate and rhythm  LUNGS: no adventitious sounds appreciated   ABDOMEN: Soft, nontender, nondistended, PEG in place   EXTREMITIES: SCDs in place   NERVOUS SYSTEM:  awake, not alert, does not follow commands     LABS             9.5    9.38  )-----------( 241      ( 02-25-25 @ 06:01 )             30.7     151  |  110  |  41  -------------------------<  66   02-25-25 @ 06:01  4.6  |  27  |  1.3    Ca      8.2     02-25-25 @ 06:01  Phos   1.4     02-24-25 @ 05:15  Mg     2.1     02-25-25 @ 06:01    TPro  5.1  /  Alb  2.8  /  TBili  1.0  /  DBili  x   /  AST  20  /  ALT  <5  /  AlkPhos  143  /  GGT  x     02-25-25 @ 06:01        Urinalysis Basic - ( 25 Feb 2025 06:01 )    Color: x / Appearance: x / SG: x / pH: x  Gluc: 66 mg/dL / Ketone: x  / Bili: x / Urobili: x   Blood: x / Protein: x / Nitrite: x   Leuk Esterase: x / RBC: x / WBC x   Sq Epi: x / Non Sq Epi: x / Bacteria: x      ABG - ( 24 Feb 2025 04:12 )  pH, Arterial: 7.47  pH, Blood: x     /  pCO2: 41    /  pO2: 157   / HCO3: 30    / Base Excess: 5.6   /  SaO2: 99.4

## 2025-02-25 NOTE — PROGRESS NOTE ADULT - ASSESSMENT
GARETT ESCAMILLA (69y Male) with PMH of                # Prophylactic measures   - DVT PPx:  - GI PPx:  - Diet:   - Activity:     Plan of care discussed with house staff, patient and family member.     Physician Handoff    60 minutes spent on total encounter. The necessity of the time spent during the encounter on this date of service was due to: time spent on review of labs, imaging studies, old records, obtaining history, personally examining patient, entering orders for medications/tests/etc, discussions with other health care providers, documentation in electronic health records, independent interpretation of labs, imaging/procedure results and care coordination.           GARETT ESCAMILLA (69y Male) with PMH of    # Encephalopathy of unclear etiology (seizure, metabolic, septic, delirium, uncontrolled parkinson's)      # Acute hypoxic respiratory failure - intubated - trache 02/20      # Septic shock -- off vasopressors now  # Multifocal Pneumonia   # Bacteremia  - BAL Cx 02/11: Stenotrophomonas -- colonizer as per ID  - Blood Cx 01/29: MSSA  - TTE no vegetations  - C/w Ancef as per ID    # Cholecystitis   - S/p cholecystostomy tube placement by IR on 02/05  - Biliary fluid culture 02/10 - candida albicans       # Influenza A infection 01/29  - S/p Tamiflu     # Sacrococcygeal decubitus ulcer   # Left heel DTI   - Appreciate wound care consult   - S/p debridement 02/22 - no specimen sent for pathology/culture    # Hypernatremia   - D5 @50 cc/hr for now along with free water flushed in PEG tube     # Dysphagia s/p GJ tube 02/20   - Meds via G tube   - Tube feeding via J tube start at low rate due to aspiration event -- advance as tolerated   - Nutrition consult     # Urinary retention   - Maintain Mars for now.       # Parkinson's Disease       # Prophylactic measures   - DVT PPx: Lovenox  - GI PPx: Pantoprazole   - Diet: Tube feeding start at low rate due to aspiration event -- advance as tolerated   - Activity: Bed bound for now. Plan to mobilize to chair once more stable.     Plan of care discussed with house staff, and family member.     Physician Handoff  - F/u CT head, EEG, CK, procal, ABG, lactic acid    60 minutes spent on total encounter. The necessity of the time spent during the encounter on this date of service was due to: time spent on review of labs, imaging studies, old records, obtaining history, personally examining patient, entering orders for medications/tests/etc, discussions with other health care providers, documentation in electronic health records, independent interpretation of labs, imaging/procedure results and care coordination.           GARETT ESCAMILLA (69y Male) with PMH of hypertension, Parkinson's, CAD presents for shortness of breath and cough of 1 day duration. He was intubated for acute hypoxic respiratory failure with subsequent trache/PEG placement on 02/20.     I assumed the care of patient on 07:30 AM on 02/25/25    Pertinent events  - 02/24: Aspiration event with fever spike. CXR shows interval worsening of right basilar opacity.   - 02/25: Increasingly disoriented as per family.     # Encephalopathy of unclear etiology (differentials: seizure, metabolic, septic, delirium, uncontrolled parkinson's)  # Parkinson's Disease   # Acute IVH of right occipital horn - resolved   - CT head 01/31: Trace acute intraventricular hemorrhage is noted layering within the right occipital horn  - CTA head and neck 01/31: Moderate stenosis of the right P2 segment of the PCA. Intracranial vessels are otherwise patent and unremarkable. No evidence of carotid or vertebral artery stenosis.  - CT head 02/25:  Nearly resolved trace intraventricular hemorrhage in the right occipital horn compared to the prior CT head from 2/9/2025.  - Suspicion of seizures given increased disorientation as reported by family members.  EEG, CK, prolactin, lactic acid  - C/w carbidopa/levodopa and rasagiline   - Discontinue Levofloxacin as it can lower seizure threshold. Levofloxacin was started on 02/24 after aspiration event.   - Unclear indication of Seroquel in setting of Parkinson's disease. Discontinue it for now until medication reconciliation is complete.   - Consult Neurology     # Acute hypoxic respiratory failure - intubated - trache 02/20  # Septic shock -- off vasopressors   - Vent management as per pulmonary team     # Multifocal Pneumonia - aspiration vs VAP  - BAL Cx 02/11: Stenotrophomonas -- colonizer as per ID  - CXR 02/24: Stable bilateral opacities, right greater than left. No pneumothorax  - T Max: 100.5 (24 Feb 2025 14:20)  - Start Zosyn for now due to suspicion of new aspiration event on 02/24  - Follow blood cultures. Send tracheal aspirate cultures.     # Bacteremia  - Blood Cx 01/29: MSSA  - TTE no vegetations  - C/w Ancef as per ID    # Cholecystitis   - S/p cholecystostomy tube placement by IR on 02/05  - Biliary fluid culture 02/10 - candida albicans   - Cultures reviewed by ID    # Influenza A infection 01/29  - S/p Tamiflu     # Pulmonary Embolism   # Pulmonary Hypertension   - CTA thorax 02/02: There is a focal filling defect within the main pulmonary artery at site of the ductus arteriosus suspicious for thrombus  - No DVT on duplex.   - ECHO: EF of 56%.  Mildly increased LV wall thickness. Grade I diastolic dysfunction. Moderate tricuspid regurgitation. Estimated pulmonary artery systolic pressure is 41.4 mmHg assuming a right atrial pressure of 3 mmHg, which is consistent with mild pulmonary hypertension.  - He was not started on therapeutic AC due to IVH present on admission.  - Since CT head from 02/25 shows resolution of IVH, disuses with NSGY if therapeutic AC can be initiated.     # CAD  - Documented history of CAD  - ASA could be held in setting of IVH  - ECHO: EF of 56%.  Mildly increased LV wall thickness. Grade I diastolic dysfunction. Moderate tricuspid regurgitation. Estimated pulmonary artery systolic pressure is 41.4 mmHg assuming a right atrial pressure of 3 mmHg, which is consistent with mild pulmonary hypertension.  - Since CT head from 02/25 shows resolution of IVH, disuses with NSGY if ASA can be initiated.     # Right hip hematoma   - CT pelvis 02/09: Large intramuscular hematoma in the medial right thigh measuring approximately 9.8 x 6.4 x 17 cm  - Stable on repeat imaging on 02/12    # Sacrococcygeal decubitus ulcer   # Left heel DTI   - Appreciate wound care and burns consult   - S/p debridement 02/22 - no specimen sent for pathology/culture    # Hypernatremia   - D5 @75 cc/hr for now along with free water flushed in PEG tube     # Dysphagia s/p GJ tube 02/20   - Meds via G tube   - Tube feeding via J tube start at low rate due to aspiration event -- advance as tolerated   - Nutrition consult     # DM  - Insulin Sliding Scale     # Urinary retention   - Maintain Mars for now.     # Need for medication reconciliation  - His outpatient prescriptions have not been resumed due to acuity of ICU stay. Will need medication reconciliation especially for Aspirin, Stain, BP meds, and neuro/psych meds.    # Prophylactic measures   - DVT PPx: Lovenox  - GI PPx: Pantoprazole   - Diet: Tube feeding  - Activity: Bed bound for now. Plan to mobilize to chair once more stable.     Plan of care discussed with house staff, and family member.     Physician Handoff  - F/u EEG, CK, procal, ABG, lactic acid  - F/u Neurology consult  - F/u NSGY clearance for therapeutic AC  - Medication reconciliation     60 minutes spent on total encounter. The necessity of the time spent during the encounter on this date of service was due to: time spent on review of labs, imaging studies, old records, obtaining history, personally examining patient, entering orders for medications/tests/etc, discussions with other health care providers, documentation in electronic health records, independent interpretation of labs, imaging/procedure results and care coordination.

## 2025-02-25 NOTE — CHART NOTE - NSCHARTNOTEFT_GEN_A_CORE
Registered Dietitian Follow-Up     Patient Profile Reviewed                           Yes [x]   No []     Pertinent Subjective Information:  Feeds were held for possible aspiration yesterday, but are planned to be restarted today.      Pertinent Medical Interventions:  #Acute Hypoxic Respiratory Failure likely 2/2 CAP/aspiration and flu  #Toxic Metabolic Encephalopathy likely 2/2 CAP/aspiration and flu  #Influenza A positive, treated  #MSSA bacteremia- resolved  #Sepsis POA  #Possible aspiration event on   - Aspiration precautions.    : pt noted to have had an aspiration event and  AM after feeds were started and pt began to desat with improvement w suctioning -- started on levaquin  : pt had seizure like activity so levaquin was dc'ed (can lower seizure threshold) and started on zosyn instead     #Hypernatremia  target Na 145  #Periventricular bleed on CTH, stable  #Cholangitis vs cholecystitis s/o perc haydee  #Constipation, improved  -senna and miralax - c/w lactulose  -2/10: KUB: distended bowels, having BM    Diet order:   Diet, NPO with Tube Feed:   Tube Feeding Modality: Jejunostomy  Peptamen A.F. Formula (PEPTAMENAFRTH)  Total Volume for 24 Hours (mL): 800  Intermittent  Starting Tube Feed Rate {mL per Hour}: 20  Increase Tube Feed Rate by (mL): 10    Every hour  Until Goal Tube Feed Rate (mL per Hour): 100  Tube Feeding Hours ON: 2  Tube Feeding OFF (Hours): 4  Tube Feed Start Time: 12:00  Free Water Flush  Free Water Flush Instructions:  free water 50 q6h (25 @ 08:28) [Active]    Anthropometrics:  Height (cm): 175.3 (25 @ 07:13)  Weight (kg): 51.1 (25 @ 07:13)  BMI (kg/m2): 16.6 (25 @ 07:13)  IBW: 72.7 kg     Daily Weight in k.6 (), Weight in k.6 (), Weight in k.8 (), Weight in k (), Weight in k.2 (), Weight in k.6 (), Weight in k.5 ()    51.1 kg () vs 65.6 kg ()  28.4% weight gain x 5 days?    MEDICATIONS  (STANDING):  carbidopa/levodopa  25/250 2 Tablet(s) Oral three times a day  ceFAZolin   IVPB 2000 milliGRAM(s) IV Intermittent every 8 hours  chlorhexidine 0.12% Liquid 15 milliLiter(s) Oral Mucosa every 12 hours  chlorhexidine 2% Cloths 1 Application(s) Topical <User Schedule>  dextrose 5%. 1000 milliLiter(s) (75 mL/Hr) IV Continuous <Continuous>  dextrose 5%. 1000 milliLiter(s) (50 mL/Hr) IV Continuous <Continuous>  dextrose 5%. 1000 milliLiter(s) (100 mL/Hr) IV Continuous <Continuous>  dextrose 50% Injectable 25 Gram(s) IV Push once  dextrose 50% Injectable 12.5 Gram(s) IV Push once  dextrose 50% Injectable 25 Gram(s) IV Push once  enoxaparin Injectable 40 milliGRAM(s) SubCutaneous every 24 hours  glucagon  Injectable 1 milliGRAM(s) IntraMuscular once  insulin lispro (ADMELOG) corrective regimen sliding scale   SubCutaneous every 6 hours  lidocaine 1%/epinephrine 1:100,000 Inj 2 Vial(s) Local Injection once  methadone    Tablet 5 milliGRAM(s) Oral every 8 hours  pantoprazole  Injectable 40 milliGRAM(s) IV Push daily  piperacillin/tazobactam IVPB.. 3.375 Gram(s) IV Intermittent every 12 hours  polyethylene glycol 3350 17 Gram(s) Oral every 12 hours  rasagiline Tablet 1 milliGRAM(s) Oral <User Schedule>  senna 2 Tablet(s) Oral at bedtime    MEDICATIONS  (PRN):  acetaminophen     Tablet .. 650 milliGRAM(s) Oral every 6 hours PRN Temp greater or equal to 38C (100.4F), Mild Pain (1 - 3)  dextrose Oral Gel 15 Gram(s) Oral once PRN Blood Glucose LESS THAN 70 milliGRAM(s)/deciliter  midazolam Injectable 2 milliGRAM(s) IV Push every 4 hours PRN Agitation ; to wean off Precedex    Pertinent Labs:  @ 06:01: Na 151[H], BUN 41[H], Cr 1.3, BG 66[L], K+ 4.6, Phos --, Mg 2.1, Alk Phos 143[H], ALT/SGPT <5, AST/SGOT 20, HbA1c --    Finger Sticks:  POCT Blood Glucose.: 93 mg/dL ( @ 11:45)  POCT Blood Glucose.: 130 mg/dL ( @ 07:53)  POCT Blood Glucose.: 67 mg/dL ( @ 06:13)  POCT Blood Glucose.: 83 mg/dL ( @ 23:35)  POCT Blood Glucose.: 103 mg/dL ( @ 18:51)    Physical Findings:  - Appearance: trached; disoriented x 4  - GI function: last BM    - Tubes: + G-J   - Oral/Mouth cavity: NPO with EN via J-tube  - Skin: Per WOCN note : evolved DTI to L heel; Unstageable pressure injury to sacrum per WOCN note   - Edema: 2+ edema - left arm, right arm, left hand, right hand     Nutrition Requirements:  Weight Used: 51.1 kg () - dosing weight - estimated needs with consideration for age, BMI, evolved DTI to L heel,      Estimated Energy Needs    Continue []  Adjust [x]  1788 - 2044 kcal/day (35-40 kcal/kg)      Estimated Protein Needs    Continue []  Adjust [x]  76 - 102 gm/day (1.5 - 2.0 gm/kg)      Estimated Fluid Needs        Continue [x]  Adjust []  1 mL/kcal      Nutrient Intake: Current EN regimen provides:  2400 mL total volume, 2880 kcal, 187 gm Protein, 1944 mL free water from formula + 50 mL water flushes q6hrs = 2144 mL total water      [x] Previous Nutrition Diagnosis:  Severe PCM             [x] Ongoing          [] Resolved    Nutrition Education:  deferred     Nutrition Intervention:  EN recs, coordination of care     Goal/Expected Outcome:   EN to meet >85% and <105% of estimated needs within 3-5 days      Indicator/Monitoring:   EN tolerance, weight, labs, skin status, NFPF, BM, GI s/s      Recommendation:   1) As patient is now on stepdown unit, would recommend the following EN regimen:  Would recommend continuous 24hr feeds via J-Port of G-J with Glucerna 1.2 formula   Recommend to start feeds at 20 mL and advance 10 mL q4hrs until goal rate of 70 mL x 24 hrs is reached.    This will provide:   1680 mL total volume, 2016 kcal, 101 gm Protein, 1360.8 mL free water from formula + recommend 100 mL water flushes q6hrs = 1760.8 mL total water (or per Team)     Meds only via G-port     2) Monitor electrolytes, BG, renal profile   -adjust insulin regimen PRN   3) Monitor BM, GI s/s - continue bowel regimen as ordered     Patient is at high nutrition risk, RD to f/u   RD to remain available: Dayanna Jeffers, MACARIO [phone] or via Teams

## 2025-02-25 NOTE — CONSULT NOTE ADULT - ASSESSMENT
68yo M with PMH of hypertension, CAD, Parkinson's, admitted for acute hypoxic respiratory failure 2/2 multilobar pneumonia with subsequent trache/PEG placement on 02/20.  Course c/b trace IVH R occipital horn on CTH 01/31, CTH on 02/25 showing its near resolution.  Neurology consulted for dyskinetic movements of arms, neck, and face; rEEG with regular spike and polyspike wave discharges right Frontal/Frontopolar but no seizures suggesting an irritated focus and more prone to seizures, thus would be prudent to start on AEDs.  He is on a high home dose of Parkinsons medications and has been on a likewise high dose here, which is likely the cause of his dyskinetic movements.    Recommendations:  - start on Keppra 500mg BID  - stop Rasigiline 1mg qD  - lower Sinemet to 25/250 ONE tablet TID  - please obtain name of pt's neurologist and parkinsons' history/when diagnosed from wife if able    Case discussed with attending Dr. Harden.

## 2025-02-25 NOTE — CONSULT NOTE ADULT - CONSULT REASON
Patient with rising bilirubin and alk phos, RUQ sono showed distended GB with sludge. Repeat CTAP done as per IR, shows distended GB.
AMS
Distended GB
flu, PNA
Trach and Peg c/s
Eval for MARY
IVH
r/o seizure
sacral owund
sob
GOC

## 2025-02-25 NOTE — PROGRESS NOTE ADULT - SUBJECTIVE AND OBJECTIVE BOX
HPI:  69-year-old male past medical history of hypertension, Parkinson's, CAD presents for shortness of breath and cough of 1 day duration. History was obtained from patient's wife who noted that the patient has been having decreased po intake, activity, and unintentional weight loss for some time. Howver, yesterday he started having cough and shortness of breath.   Denies any sick contacts or choking on food.      ITEMS NOT CHECKED ARE NOT PRESENT    SOCIAL HISTORY:   Significant other/partner[x ]  Children[ ]  Scientologist/Spirituality:  Substance hx:  [ ]   Tobacco hx:  [ ]   Alcohol hx: [ ]   Living Situation: [ ]Home  [ ]Long term care  [ ]Rehab [ ]Other  Home Services: [ ] HHA [ ] Visting RN [ ] Hospice  Occupation:  Home Opioid hx:  [ ] Y [ ] N [ ] I-Stop Reference No:     ADVANCE DIRECTIVES:    [x ] Full Code [ ] DNR  MOLST  [ ]  Living Will  [ ]   DECISION MAKER(s):  [ ] Health Care Proxy(s)  [x ] Surrogate(s)  [ ] Guardian           Name(s): Phone Number(s): wife    BASELINE (I)ADL(s) (prior to admission):  [place]: [ ]Total  [ ] Moderate [ ]Dependent  Palliative Performance Status Version 2:         %    http://npcrc.org/files/news/palliative_performance_scale_ppsv2.pdf    Allergies    Allergy Status Unknown    Intolerances    MEDICATIONS  (STANDING):  carbidopa/levodopa  25/250 2 Tablet(s) Oral three times a day  ceFAZolin   IVPB 2000 milliGRAM(s) IV Intermittent every 8 hours  chlorhexidine 0.12% Liquid 15 milliLiter(s) Oral Mucosa every 12 hours  chlorhexidine 2% Cloths 1 Application(s) Topical <User Schedule>  dextrose 5%. 1000 milliLiter(s) (75 mL/Hr) IV Continuous <Continuous>  dextrose 5%. 1000 milliLiter(s) (50 mL/Hr) IV Continuous <Continuous>  dextrose 5%. 1000 milliLiter(s) (100 mL/Hr) IV Continuous <Continuous>  dextrose 50% Injectable 25 Gram(s) IV Push once  dextrose 50% Injectable 12.5 Gram(s) IV Push once  dextrose 50% Injectable 25 Gram(s) IV Push once  enoxaparin Injectable 40 milliGRAM(s) SubCutaneous every 24 hours  glucagon  Injectable 1 milliGRAM(s) IntraMuscular once  insulin lispro (ADMELOG) corrective regimen sliding scale   SubCutaneous every 6 hours  lidocaine 1%/epinephrine 1:100,000 Inj 2 Vial(s) Local Injection once  methadone    Tablet 5 milliGRAM(s) Oral every 8 hours  pantoprazole  Injectable 40 milliGRAM(s) IV Push daily  piperacillin/tazobactam IVPB.. 3.375 Gram(s) IV Intermittent every 12 hours  polyethylene glycol 3350 17 Gram(s) Oral every 12 hours  QUEtiapine 25 milliGRAM(s) Oral daily  rasagiline Tablet 1 milliGRAM(s) Oral <User Schedule>  senna 2 Tablet(s) Oral at bedtime    MEDICATIONS  (PRN):  acetaminophen     Tablet .. 650 milliGRAM(s) Oral every 6 hours PRN Temp greater or equal to 38C (100.4F), Mild Pain (1 - 3)  dextrose Oral Gel 15 Gram(s) Oral once PRN Blood Glucose LESS THAN 70 milliGRAM(s)/deciliter  midazolam Injectable 2 milliGRAM(s) IV Push every 4 hours PRN Agitation ; to wean off Precedex      PRESENT SYMPTOMS: [x ]Unable to obtain due to poor mentation   Source if other than patient:  [ ]Family   [ ]Team     Pain: [ ]yes [ ]no  QOL impact -   Location -                    Aggravating factors -  Alleviating factors -   Quality -  Radiation -  Timing -   Severity (0-10 scale):  Minimal acceptable level (0-10 scale):     CPOT:  2  https://www.[email or web address]/getattachment/[email or web address]/Critical-Care-Pain-Observation-Tool-(CPOT)    PAIN AD Score:   http://geriatrictoolkit.[place].[facility]/cog/painad.pdf     Dyspnea:                           [ ]None[ ]Mild [ ]Moderate [ ]Severe     Respiratory Distress Observation Scale (RDOS): 2  A score of 0 to 2 signifies little or no respiratory distress, 3 signifies mild distress, scores 4 to 6 indicate moderate distress, and scores greater than 7 signify severe distress  [email or web address]/sites/default/files/PDFS/485343-pfavdvlsnqa-isfkkkyt-zmsdvjiumxk-afvoc.pdf    Anxiety:                             [ ]None[ ]Mild [ ]Moderate [ ]Severe   Fatigue:                             [ ]None[ ]Mild [ ]Moderate [ ]Severe   Nausea:                             [ ]None[ ]Mild [ ]Moderate [ ]Severe   Loss of appetite:              [ ]None[ ]Mild [ ]Moderate [ ]Severe   Constipation:                    [ ]None[ ]Mild [ ]Moderate [ ]Severe    Other Symptoms:  [ ]All other review of systems negative     Palliative Performance Status Version 2:         %    http://[email or web address]/files/news/palliative_performance_scale_[email or web address].pdf  PHYSICAL EXAM:    Vital Signs Last 24 Hrs  T(C): 37.1 (25 Feb 2025 08:00), Max: 38.1 (24 Feb 2025 14:20)  T(F): 98.7 (25 Feb 2025 08:00), Max: 100.5 (24 Feb 2025 14:20)  HR: 66 (25 Feb 2025 08:10) (66 - 109)  BP: 114/73 (25 Feb 2025 08:00) (97/54 - 133/70)  BP(mean): 90 (25 Feb 2025 08:00) (69 - 90)  RR: 20 (25 Feb 2025 08:00) (20 - 20)  SpO2: 98% (25 Feb 2025 08:10) (96% - 100%)    Parameters below as of 24 Feb 2025 20:00  Patient On (Oxygen Delivery Method): ventilator        GENERAL:  [ ] No acute distress [ ]Lethargic  [ ]Unarousable  [ ]Verbal  [x ]Non-Verbal [ ]Cachexia    BEHAVIORAL/PSYCH:  [ ]Alert and Oriented x  [ ] Anxiety [ ] Delirium [ ] Agitation [x ] Calm   EYES: [x ] No scleral icterus [ ] Scleral icterus [ ] Closed  ENMT:  [ ]Dry mouth  [x ]No external oral lesions [ ] No external ear or nose lesions  CARDIOVASCULAR:  [ ]Regular [ ]Irregular [ ]Tachy [x ]Not Tachy  [ ][place] [ ] Edema [ ] No edema  PULMONARY:  [ ]Tachypnea  [ ]Audible excessive secretions [ ] No labored breathing [x ] mildly labored breathing [ ] labored breathing  GASTROINTESTINAL: [ ]Soft  [ ]Distended  [x ]Not distended [ ]Non tender [ ]Tender  MUSCULOSKELETAL: [ ]No clubbing [ ] clubbing  [x ] No cyanosis [ ] cyanosis  NEUROLOGIC: [ ]No focal deficits  [ ]Follows commands  [ ]Does not follow commands  [ x]Cognitive impairment  [ ]Dysphagia  [ ]Dysarthria  [ ]Paresis   SKIN: [ ] Jaundiced [x ] Non-jaundiced [ ]Rash [ ]No Rash [ ] Warm [ ] Dry  MISC/LINES: [ ] ET tube [x ] Trach [ ]NGT/OGT [ x]PEG [ ]Mars    CRITICAL CARE:  [ ] Shock Present  [ ]Septic [ ]Cardiogenic [ ]Neurologic [ ]Hypovolemic  [ ]  Vasopressors [ ]  Inotropes   [ ]Respiratory failure present [x ]Mechanical ventilation [ ]Non-invasive ventilatory support [ ]High flow  [ ]Acute  [ ]Chronic [ ]Hypoxic  [ ]Hypercarbic [ ]Other  [ ]Other organ failure   LABS: I have reviewed daily labs                          9.5    9.38  )-----------( 241      ( 25 Feb 2025 06:01 )             30.7     02-25    151[H]  |  110  |  41[H]  ----------------------------<  66[L]  4.6   |  27  |  1.3    Ca    8.2[L]      25 Feb 2025 06:01  Phos  1.4     02-24  Mg     2.1     02-25    TPro  5.1[L]  /  Alb  2.8[L]  /  TBili  1.0  /  DBili  x   /  AST  20  /  ALT  <5  /  AlkPhos  143[H]  02-25      Urinalysis Basic - ( 25 Feb 2025 06:01 )    Color: x / Appearance: x / SG: x / pH: x  Gluc: 66 mg/dL / Ketone: x  / Bili: x / Urobili: x   Blood: x / Protein: x / Nitrite: x   Leuk Esterase: x / RBC: x / WBC x   Sq Epi: x / Non Sq Epi: x / Bacteria: x            RADIOLOGY & ADDITIONAL STUDIES: I have reviewed new imaging      < from: CT Head No Cont (02.25.25 @ 12:49) >  IMPRESSION:    Nearly resolved trace intraventricular hemorrhage in the right occipital   horn compared to the prior CT head from 2/9/2025.    Stable mild chronic microvascular ischemic changes.    --- End of Report ---    < end of copied text >    PROTEIN CALORIE MALNUTRITION PRESENT: [ ]mild [ ]moderate [ ]severe [ ]underweight [ ]morbid obesity  https://www.andeal.org/vault/2440/web/files/ONC/Table_Clinical%20Characteristics%20to%20Document%20Malnutrition-White%20JV%20et%20al%202012.pdf      Weight (kg): 52.2 (01-29-25 @ 10:14)    [ ]PPSV2 < or = to 30% [ ]significant weight loss  [ ]poor nutritional intake  [ ]anasarca      [ ]Artificial Nutrition      Palliative Care Spiritual/Emotional Screening Tool Question  Severity (0-4):                    OR                    [ x] Unable to determine. Will assess at later time if appropriate.  Score of 2 or greater indicates recommendation of Chaplaincy and/or SW referral  Chaplaincy Referral: [ ] Yes [ ] Refused [ ] Following     Caregiver [place]:  [ ] Yes [ ] No    OR    [x ] Unable to determine. Will assess at later time if appropriate.  Social Work Referral [ ]  Patient and Family Centered Care Referral [ ]    Anticipatory Grief Present: [ ] Yes [ ] No    OR     [ x] Unable to determine. Will assess at later time if appropriate.  Social Work Referral [ ]  Patient and Family Centered Care Referral [ ]    REFERRALS:   [ ]Chaplaincy  [ ]Hospice  [ ]Child Life  [ ]Social Work  [ ]Case management [ ]Holistic Therapy     Palliative care education provided to patient and/or family

## 2025-02-25 NOTE — EEG REPORT - NS EEG TEXT BOX
[facility] Neurology  Inpatient Routine-EEG Report      Patient Name:	GARETT ESCAMILLA    :	1955  MRN:	-  Study Date/Time:	2025, 1:27:50 PM  Referred by:	-    Brief Clinical History:  GARETT ESCAMILLA is a 69 year old -; study performed to investigate for seizures or markers of epilepsy.   Diagnosis Code: R40.4 Transient alteration of awareness    Patient Medication:  SENNA    FENTANYL    VERSED    TYLENOL    SINEMET    ANCEF    LOVENOX    INSULIN    DOLOPHINE    PROTONIX    ZOSYN    MIRALAX    AZILECT      Acquisition Details:  Electroencephalography was acquired using a minimum of 21 channels on an [employer] Neu[employer] system v 9.3.1 with electrode placement according to the standard International 10-20 system following ACNS (American Clinical Neurophysiology Society) guidelines.  Anterior temporal T1 and T2 electrodes were utilized whenever possible.   The [employer] automated spike & seizure detections were all reviewed in detail, in addition to the entire raw EEG.    Findings:  Background:  continuous.   Voltage:  Normal (20uV)  Organization:  Rudimentary  Posterior Dominant Rhythm:  7-8 Hz Symmetric  Variability:  Yes	Reactivity:  Yes  Sleep:  Absent.  Focal abnormalities:  Abundant (50-89%)  Interictal Activity:  Regular spike and polyspike wave discharges    Location:  Right Frontal/Frontopolar  Focal Slowing:  None  Generalized Slowing:  Moderate  Events:  1)	No electrographic seizures or significant clinical events.  Provocations:  1)	Hyperventilation: was not performed.  2)	Photic stimulation: was not performed.  Impression:  Abnormal due to generalized slowing as above  Abnormal due to epileptiform discharges as above    Clinical Correlation:  Findings consistent with diffuse electrocerebral dysfunction secondary to nonspecific etiology. Potential for FOCAL seizure    Garrett López MD  Attending Neurologist, Division of Epilepsy

## 2025-02-25 NOTE — PROGRESS NOTE ADULT - SUBJECTIVE AND OBJECTIVE BOX
Over Night Events: events noted, vent dependant, low grade fever, CXR reviewed    PHYSICAL EXAM    ICU Vital Signs Last 24 Hrs  T(C): 37.1 (25 Feb 2025 08:00), Max: 38.1 (24 Feb 2025 14:20)  T(F): 98.7 (25 Feb 2025 08:00), Max: 100.5 (24 Feb 2025 14:20)  HR: 93 (25 Feb 2025 08:00) (86 - 109)  BP: 114/73 (25 Feb 2025 08:00) (97/54 - 133/70)  BP(mean): 90 (25 Feb 2025 08:00) (69 - 104)  RR: 20 (25 Feb 2025 08:00) (19 - 20)  SpO2: 97% (25 Feb 2025 08:00) (96% - 100%)    O2 Parameters below as of 24 Feb 2025 20:00  Patient On (Oxygen Delivery Method): ventilator            General: ill looking  trac  Lungs: Bilateral rhonchi  Cardiovascular: FRANKLYN 2.6  Abdomen: Soft, Positive BS  not following commands      02-24-25 @ 07:01  -  02-25-25 @ 07:00  --------------------------------------------------------  IN:    IV PiggyBack: 100 mL  Total IN: 100 mL    OUT:    Drain (mL): 250 mL    Indwelling Catheter - Urethral (mL): 1100 mL  Total OUT: 1350 mL    Total NET: -1250 mL          LABS:                          9.5    9.38  )-----------( 241      ( 25 Feb 2025 06:01 )             30.7                                               02-25    151[H]  |  110  |  41[H]  ----------------------------<  66[L]  4.6   |  27  |  1.3    Ca    8.2[L]      25 Feb 2025 06:01  Phos  1.4     02-24  Mg     2.1     02-25    TPro  5.1[L]  /  Alb  2.8[L]  /  TBili  1.0  /  DBili  x   /  AST  20  /  ALT  x   /  AlkPhos  143[H]  02-25                                             Urinalysis Basic - ( 25 Feb 2025 06:01 )    Color: x / Appearance: x / SG: x / pH: x  Gluc: 66 mg/dL / Ketone: x  / Bili: x / Urobili: x   Blood: x / Protein: x / Nitrite: x   Leuk Esterase: x / RBC: x / WBC x   Sq Epi: x / Non Sq Epi: x / Bacteria: x                                                  LIVER FUNCTIONS - ( 25 Feb 2025 06:01 )  Alb: 2.8 g/dL / Pro: 5.1 g/dL / ALK PHOS: 143 U/L / ALT: x     / AST: 20 U/L / GGT: x                                                                                               Mode: AC/ CMV (Assist Control/ Continuous Mandatory Ventilation)  RR (machine): 18  TV (machine): 480  FiO2: 40  PEEP: 8  ITime: 1.5  MAP: 14  PIP: 22                                      ABG - ( 24 Feb 2025 04:12 )  pH, Arterial: 7.47  pH, Blood: x     /  pCO2: 41    /  pO2: 157   / HCO3: 30    / Base Excess: 5.6   /  SaO2: 99.4                MEDICATIONS  (STANDING):  buMETAnide Injectable 2 milliGRAM(s) IV Push daily  carbidopa/levodopa  25/250 2 Tablet(s) Oral three times a day  ceFAZolin   IVPB 2000 milliGRAM(s) IV Intermittent every 8 hours  chlorhexidine 0.12% Liquid 15 milliLiter(s) Oral Mucosa every 12 hours  chlorhexidine 2% Cloths 1 Application(s) Topical <User Schedule>  dextrose 5%. 1000 milliLiter(s) (50 mL/Hr) IV Continuous <Continuous>  dextrose 5%. 1000 milliLiter(s) (100 mL/Hr) IV Continuous <Continuous>  dextrose 50% Injectable 25 Gram(s) IV Push once  dextrose 50% Injectable 12.5 Gram(s) IV Push once  dextrose 50% Injectable 25 Gram(s) IV Push once  enoxaparin Injectable 40 milliGRAM(s) SubCutaneous every 24 hours  glucagon  Injectable 1 milliGRAM(s) IntraMuscular once  insulin lispro (ADMELOG) corrective regimen sliding scale   SubCutaneous every 6 hours  insulin lispro Injectable (ADMELOG) 2 Unit(s) SubCutaneous every 6 hours  levoFLOXacin  Tablet 750 milliGRAM(s) Oral every 24 hours  lidocaine 1%/epinephrine 1:100,000 Inj 2 Vial(s) Local Injection once  methadone    Tablet 5 milliGRAM(s) Oral every 8 hours  pantoprazole   Suspension 40 milliGRAM(s) Oral daily  polyethylene glycol 3350 17 Gram(s) Oral every 12 hours  QUEtiapine 25 milliGRAM(s) Oral daily  rasagiline Tablet 1 milliGRAM(s) Oral <User Schedule>  senna 2 Tablet(s) Oral at bedtime    MEDICATIONS  (PRN):  acetaminophen     Tablet .. 650 milliGRAM(s) Oral every 6 hours PRN Temp greater or equal to 38C (100.4F), Mild Pain (1 - 3)  dextrose Oral Gel 15 Gram(s) Oral once PRN Blood Glucose LESS THAN 70 milliGRAM(s)/deciliter  midazolam Injectable 2 milliGRAM(s) IV Push every 4 hours PRN Agitation ; to wean off Precedex      CXR noted

## 2025-02-25 NOTE — CONSULT NOTE ADULT - PROVIDER SPECIALTY LIST ADULT
Burn
Cardiology
Critical Care
Intervent Radiology
NSICU
Neurology
Neurology
Thoracic Surgery
Surgery
Infectious Disease
Palliative Care

## 2025-02-25 NOTE — CONSULT NOTE ADULT - SUBJECTIVE AND OBJECTIVE BOX
NEUROLOGY CONSULT NOTE    70yo M with PMH of hypertension, CAD, Parkinson's (on Sinemet 25/250 TID, rasigiline 1mg qAM), admitted for acute hypoxic respiratory failure 2/2 multilobar pneumonia with subsequent trache/PEG placement on 02/20.  Course c/b trace IVH R occipital horn on CTH 01/31, CTH on 02/25 showing its near resolution.  Patient has been encephalopathic and lethargic throughout his hospital course but per wife with some improvement and calm in recent days though without communicating.  Neurology consulted for repetitive blinking and facial twitching as well a rhythmic chewing and twitching movement of the mouth which has begun since yesterday; he is awake during this time and will briefly track but does not follow commands.      VITAL SIGNS:  Vital Signs Last 24 Hrs  T(C): 37.1 (25 Feb 2025 08:00), Max: 37.3 (24 Feb 2025 16:00)  T(F): 98.7 (25 Feb 2025 08:00), Max: 99.2 (24 Feb 2025 16:00)  HR: 66 (25 Feb 2025 08:10) (66 - 109)  BP: 114/73 (25 Feb 2025 08:00) (97/54 - 133/70)  BP(mean): 90 (25 Feb 2025 08:00) (69 - 90)  RR: 20 (25 Feb 2025 08:00) (20 - 20)  SpO2: 98% (25 Feb 2025 08:10) (96% - 100%)    Parameters below as of 24 Feb 2025 20:00  Patient On (Oxygen Delivery Method): ventilator      I&O's Summary    24 Feb 2025 07:01  -  25 Feb 2025 07:00  --------------------------------------------------------  IN: 100 mL / OUT: 1350 mL / NET: -1250 mL    25 Feb 2025 07:01  -  25 Feb 2025 14:32  --------------------------------------------------------  IN: 460 mL / OUT: 0 mL / NET: 460 mL        PHYSICAL EXAM:        MEDICATIONS:  MEDICATIONS  (STANDING):  carbidopa/levodopa  25/250 2 Tablet(s) Oral three times a day  ceFAZolin   IVPB 2000 milliGRAM(s) IV Intermittent every 8 hours  chlorhexidine 0.12% Liquid 15 milliLiter(s) Oral Mucosa every 12 hours  chlorhexidine 2% Cloths 1 Application(s) Topical <User Schedule>  dextrose 5%. 1000 milliLiter(s) (75 mL/Hr) IV Continuous <Continuous>  dextrose 5%. 1000 milliLiter(s) (50 mL/Hr) IV Continuous <Continuous>  dextrose 5%. 1000 milliLiter(s) (100 mL/Hr) IV Continuous <Continuous>  dextrose 50% Injectable 25 Gram(s) IV Push once  dextrose 50% Injectable 12.5 Gram(s) IV Push once  dextrose 50% Injectable 25 Gram(s) IV Push once  enoxaparin Injectable 40 milliGRAM(s) SubCutaneous every 24 hours  glucagon  Injectable 1 milliGRAM(s) IntraMuscular once  insulin lispro (ADMELOG) corrective regimen sliding scale   SubCutaneous every 6 hours  lidocaine 1%/epinephrine 1:100,000 Inj 2 Vial(s) Local Injection once  methadone    Tablet 5 milliGRAM(s) Oral every 8 hours  pantoprazole  Injectable 40 milliGRAM(s) IV Push daily  piperacillin/tazobactam IVPB.. 3.375 Gram(s) IV Intermittent every 12 hours  polyethylene glycol 3350 17 Gram(s) Oral every 12 hours  rasagiline Tablet 1 milliGRAM(s) Oral <User Schedule>  senna 2 Tablet(s) Oral at bedtime    MEDICATIONS  (PRN):  acetaminophen     Tablet .. 650 milliGRAM(s) Oral every 6 hours PRN Temp greater or equal to 38C (100.4F), Mild Pain (1 - 3)  dextrose Oral Gel 15 Gram(s) Oral once PRN Blood Glucose LESS THAN 70 milliGRAM(s)/deciliter  midazolam Injectable 2 milliGRAM(s) IV Push every 4 hours PRN Agitation ; to wean off Precedex      ALLERGIES:  Allergies    Allergy Status Unknown    Intolerances        LABS:                        9.5    9.38  )-----------( 241      ( 25 Feb 2025 06:01 )             30.7     02-25    151[H]  |  110  |  41[H]  ----------------------------<  66[L]  4.6   |  27  |  1.3    Ca    8.2[L]      25 Feb 2025 06:01  Phos  1.4     02-24  Mg     2.1     02-25    TPro  5.1[L]  /  Alb  2.8[L]  /  TBili  1.0  /  DBili  x   /  AST  20  /  ALT  <5  /  AlkPhos  143[H]  02-25      Urinalysis Basic - ( 25 Feb 2025 06:01 )    Color: x / Appearance: x / SG: x / pH: x  Gluc: 66 mg/dL / Ketone: x  / Bili: x / Urobili: x   Blood: x / Protein: x / Nitrite: x   Leuk Esterase: x / RBC: x / WBC x   Sq Epi: x / Non Sq Epi: x / Bacteria: x      CAPILLARY BLOOD GLUCOSE      POCT Blood Glucose.: 93 mg/dL (25 Feb 2025 11:45)      RADIOLOGY & ADDITIONAL TESTS: Reviewed.   NEUROLOGY CONSULT NOTE    70yo M with PMH of hypertension, CAD, Parkinson's (on Sinemet 25/250 TID, rasigiline 1mg qAM), admitted for acute hypoxic respiratory failure 2/2 multilobar pneumonia with subsequent trache/PEG placement on 02/20.  Course c/b trace IVH R occipital horn on CTH 01/31, CTH on 02/25 showing its near resolution.  Patient has been encephalopathic and lethargic throughout his hospital course but with some improvement and calm in recent days though without communicating.  Neurology consulted for repetitive blinking and facial twitching as well a rhythmic chewing and twitching movement of the mouth which has begun since yesterday; he is awake during this time and will briefly track but does not follow commands.  Primary team concerned for seizures given his movements and ordered routine EEG, which showed regular spike and polyspike wave discharges right Frontal/Frontopolar but no seizures.    At home he is on Crexont (87.5/350) -- (as 2 tablets thus 175/700) ER TID + Rasagiline 1mg qD  Here he has been on Sinemet (25/250) -- (as 2 tablets thus 50/500) TID + Rasagiline 1mg qD      VITAL SIGNS:  Vital Signs Last 24 Hrs  T(C): 37.1 (25 Feb 2025 08:00), Max: 37.3 (24 Feb 2025 16:00)  T(F): 98.7 (25 Feb 2025 08:00), Max: 99.2 (24 Feb 2025 16:00)  HR: 66 (25 Feb 2025 08:10) (66 - 109)  BP: 114/73 (25 Feb 2025 08:00) (97/54 - 133/70)  BP(mean): 90 (25 Feb 2025 08:00) (69 - 90)  RR: 20 (25 Feb 2025 08:00) (20 - 20)  SpO2: 98% (25 Feb 2025 08:10) (96% - 100%)    Parameters below as of 24 Feb 2025 20:00  Patient On (Oxygen Delivery Method): ventilator      I&O's Summary    24 Feb 2025 07:01  -  25 Feb 2025 07:00  --------------------------------------------------------  IN: 100 mL / OUT: 1350 mL / NET: -1250 mL    25 Feb 2025 07:01  -  25 Feb 2025 14:32  --------------------------------------------------------  IN: 460 mL / OUT: 0 mL / NET: 460 mL        PHYSICAL EXAM:  Neurological Exam:   Mental status: Awake, does not follow commands, resists eye opening, resists moving of any limbs, stiffening to touch.  Cranial nerves: Pupils equally round and reactive to light, face grossly symmetric.  Motor:  Dyskinetic movements diffusely in b/l UE with repetitive blinking, mouth and neck dyskinesias.  Sensation: Stiffens and withdraws/resists to touch.  Gait: deferred        MEDICATIONS:  MEDICATIONS  (STANDING):  carbidopa/levodopa  25/250 2 Tablet(s) Oral three times a day  ceFAZolin   IVPB 2000 milliGRAM(s) IV Intermittent every 8 hours  chlorhexidine 0.12% Liquid 15 milliLiter(s) Oral Mucosa every 12 hours  chlorhexidine 2% Cloths 1 Application(s) Topical <User Schedule>  dextrose 5%. 1000 milliLiter(s) (75 mL/Hr) IV Continuous <Continuous>  dextrose 5%. 1000 milliLiter(s) (50 mL/Hr) IV Continuous <Continuous>  dextrose 5%. 1000 milliLiter(s) (100 mL/Hr) IV Continuous <Continuous>  dextrose 50% Injectable 25 Gram(s) IV Push once  dextrose 50% Injectable 12.5 Gram(s) IV Push once  dextrose 50% Injectable 25 Gram(s) IV Push once  enoxaparin Injectable 40 milliGRAM(s) SubCutaneous every 24 hours  glucagon  Injectable 1 milliGRAM(s) IntraMuscular once  insulin lispro (ADMELOG) corrective regimen sliding scale   SubCutaneous every 6 hours  lidocaine 1%/epinephrine 1:100,000 Inj 2 Vial(s) Local Injection once  methadone    Tablet 5 milliGRAM(s) Oral every 8 hours  pantoprazole  Injectable 40 milliGRAM(s) IV Push daily  piperacillin/tazobactam IVPB.. 3.375 Gram(s) IV Intermittent every 12 hours  polyethylene glycol 3350 17 Gram(s) Oral every 12 hours  rasagiline Tablet 1 milliGRAM(s) Oral <User Schedule>  senna 2 Tablet(s) Oral at bedtime    MEDICATIONS  (PRN):  acetaminophen     Tablet .. 650 milliGRAM(s) Oral every 6 hours PRN Temp greater or equal to 38C (100.4F), Mild Pain (1 - 3)  dextrose Oral Gel 15 Gram(s) Oral once PRN Blood Glucose LESS THAN 70 milliGRAM(s)/deciliter  midazolam Injectable 2 milliGRAM(s) IV Push every 4 hours PRN Agitation ; to wean off Precedex      ALLERGIES:  Allergies    Allergy Status Unknown    Intolerances        LABS:                        9.5    9.38  )-----------( 241      ( 25 Feb 2025 06:01 )             30.7     02-25    151[H]  |  110  |  41[H]  ----------------------------<  66[L]  4.6   |  27  |  1.3    Ca    8.2[L]      25 Feb 2025 06:01  Phos  1.4     02-24  Mg     2.1     02-25    TPro  5.1[L]  /  Alb  2.8[L]  /  TBili  1.0  /  DBili  x   /  AST  20  /  ALT  <5  /  AlkPhos  143[H]  02-25      Urinalysis Basic - ( 25 Feb 2025 06:01 )    Color: x / Appearance: x / SG: x / pH: x  Gluc: 66 mg/dL / Ketone: x  / Bili: x / Urobili: x   Blood: x / Protein: x / Nitrite: x   Leuk Esterase: x / RBC: x / WBC x   Sq Epi: x / Non Sq Epi: x / Bacteria: x      CAPILLARY BLOOD GLUCOSE      POCT Blood Glucose.: 93 mg/dL (25 Feb 2025 11:45)      RADIOLOGY & ADDITIONAL TESTS: Reviewed.

## 2025-02-25 NOTE — ADVANCED PRACTICE NURSE CONSULT - REASON FOR CONSULT
Follow Up Pressure Injury Assessment and Treatment Recommendation
Pressure Injury Assessment and Treatment Recommendation
Follow Up Pressure Injury Assessment and Treatment Recommendation

## 2025-02-25 NOTE — PROGRESS NOTE ADULT - ASSESSMENT
IMPRESSION:    Acute Hypoxic Respiratory Failure Sp Trach/PEG   Toxic Metabolic Encephalopathy  aspiration   L ptx  Influenza A treated   MSSA bacteremia repeat CX -  Sepsis POA  cholecystitis sp percutaneous chol  Periventricular bleed  Suspected Mural thrombus in PA  Right thigh hematoma.    HO Parkinson's Disease   HO CAD    PLAN:    CNS:  SAT. Continue Anti parkinson's. Methadone dec to q 12, seroquel 25    HEENT: Oral care. s/p trach and PEG    PULMONARY:  Monitor airway pressures. Remains with significant secretions. s/p trac, Keep Sao2 92 to 96%, repeat CXR noted    CARDIOVASCULAR:  Avoid overload.     GI: GI prophylaxis. S/P perc cholecystostomy. Bowel regimen G tube for meds , feeds through J tube    RENAL: Follow up lytes. Mars in place for retention. Kidney function stable.     INFECTIOUS DISEASE: Cultures noted. SP Tamiflu. ABX per ID. Complete course of ancef and levaquin, cultures noted     HEMATOLOGICAL: Monitor CBC and coags. CTA noted with no active bleed. Goal Hb > 7.  LE DOPPLER -    ENDOCRINE:  Follow up FS.  Insulin protocol if needed.    MUSCULOSKELETAL: Bed rest.  Off loading. Wound care for DTIs. Burn eval noted     Full code  palliative care follow up   very poor prognosis  vent unit

## 2025-02-25 NOTE — CONSULT NOTE ADULT - TIME BILLING
above.  Total time spent includes but is not limited to personal review of patient chart, available results, collateral information (if necessary) and examination of patient at bedside and time spent formulating assessment and recommendations independent of teaching time.
Review of imaging and chart; obtaining history; examination of patient; discussion and coordination of care.

## 2025-02-25 NOTE — PROGRESS NOTE ADULT - SUBJECTIVE AND OBJECTIVE BOX
SUBJECTIVE:   GARETT ESCAMILLA (69y Male) was seen at bedside. Disoriented. Patient's wife was updated at bedside.     REVIEW OF SYSTEM: -- unable to obtain due to underlying mental status.     PHYSICAL EXAM:  General: bed-bound, ill-appearing, clotted blood noted in oral cavity   Lungs:  bilateral clear air entry, no wheezing, no crackles   Heart: S1, S2, no murmurs   Abdomen: soft, non tender, non distended  Neuro: Disoriented. Not responsive to verbal stimuli. Tremulous. AAOx0. Moves extremities.  Extremity: No edema, cyanosis.  Devices: Trache, PEG, Mars with clean gifty urine in bag, Cholecystostomy tube with bilious discharge      Vital Signs Last 24 Hrs  T(C): 37.1 (25 Feb 2025 08:00), Max: 38.1 (24 Feb 2025 14:20)  T(F): 98.7 (25 Feb 2025 08:00), Max: 100.5 (24 Feb 2025 14:20)  HR: 66 (25 Feb 2025 08:10) (66 - 109)  BP: 114/73 (25 Feb 2025 08:00) (97/54 - 133/70)  BP(mean): 90 (25 Feb 2025 08:00) (69 - 90)  RR: 20 (25 Feb 2025 08:00) (20 - 20)  SpO2: 98% (25 Feb 2025 08:10) (96% - 100%)    Parameters below as of 24 Feb 2025 20:00  Patient On (Oxygen Delivery Method): ventilator    CAPILLARY BLOOD GLUCOSE  POCT Blood Glucose.: 93 mg/dL (25 Feb 2025 11:45)  POCT Blood Glucose.: 130 mg/dL (25 Feb 2025 07:53)  POCT Blood Glucose.: 67 mg/dL (25 Feb 2025 06:13)  POCT Blood Glucose.: 83 mg/dL (24 Feb 2025 23:35)  POCT Blood Glucose.: 103 mg/dL (24 Feb 2025 18:51)    MEDICATIONS  (STANDING):  carbidopa/levodopa  25/250 2 Tablet(s) Oral three times a day  ceFAZolin   IVPB 2000 milliGRAM(s) IV Intermittent every 8 hours  chlorhexidine 0.12% Liquid 15 milliLiter(s) Oral Mucosa every 12 hours  chlorhexidine 2% Cloths 1 Application(s) Topical <User Schedule>  dextrose 5%. 1000 milliLiter(s) (75 mL/Hr) IV Continuous <Continuous>  dextrose 5%. 1000 milliLiter(s) (50 mL/Hr) IV Continuous <Continuous>  dextrose 5%. 1000 milliLiter(s) (100 mL/Hr) IV Continuous <Continuous>  dextrose 50% Injectable 25 Gram(s) IV Push once  dextrose 50% Injectable 12.5 Gram(s) IV Push once  dextrose 50% Injectable 25 Gram(s) IV Push once  enoxaparin Injectable 40 milliGRAM(s) SubCutaneous every 24 hours  glucagon  Injectable 1 milliGRAM(s) IntraMuscular once  insulin lispro (ADMELOG) corrective regimen sliding scale   SubCutaneous every 6 hours  lidocaine 1%/epinephrine 1:100,000 Inj 2 Vial(s) Local Injection once  methadone    Tablet 5 milliGRAM(s) Oral every 8 hours  pantoprazole  Injectable 40 milliGRAM(s) IV Push daily  piperacillin/tazobactam IVPB.. 3.375 Gram(s) IV Intermittent every 12 hours  polyethylene glycol 3350 17 Gram(s) Oral every 12 hours  QUEtiapine 25 milliGRAM(s) Oral daily  rasagiline Tablet 1 milliGRAM(s) Oral <User Schedule>  senna 2 Tablet(s) Oral at bedtime    MEDICATIONS  (PRN):  acetaminophen     Tablet .. 650 milliGRAM(s) Oral every 6 hours PRN Temp greater or equal to 38C (100.4F), Mild Pain (1 - 3)  dextrose Oral Gel 15 Gram(s) Oral once PRN Blood Glucose LESS THAN 70 milliGRAM(s)/deciliter  midazolam Injectable 2 milliGRAM(s) IV Push every 4 hours PRN Agitation ; to wean off Precedex

## 2025-02-25 NOTE — PROGRESS NOTE ADULT - ASSESSMENT
69-year-old male past medical history of hypertension, Parkinson's, CAD presents for shortness of breath and cough found to have PNA and respiratory failure. Palliative consulted to assist with GOC.     Patient presently lethargic. Not responsive to verbal stimuli. Tremulous. Patient s/p trach/PEG 2/20. Had head CT ordered and performed today. EEG pending.         Plan:  - symptoms per primary team  - continue current medical management  - ongoing GOC    Palliative care will continue to follow.   Please call [phone] with questions or concerns 24/7.

## 2025-02-25 NOTE — PROGRESS NOTE ADULT - ASSESSMENT
69-year-old male past medical history of hypertension, Parkinson's, CAD presents for shortness of breath and cough of 1 day duration. History was obtained from patient's wife who noted that the patient has been having decreased po intake, activity, and unintentional weight loss for some time. However yesterday he started having cough and shortness of breath. Admitted to MICU for AHRF and sepsis 2/2 to mutilobar PNA.    #Acute Hypoxic Respiratory Failure likely 2/2 CAP/aspiration and flu  #Toxic Metabolic Encephalopathy likely 2/2 CAP/aspiration and flu  #Influenza A positive, treated  #MSSA bacteremia- resolved  #Sepsis POA  #Possible aspiration event on 2/24  - Aspiration precautions.    - patient inc WOB, worsening alkalosis, obtunded-intubated for airway protection   -ID: if patient decompensates, dc cefepime/metro, start patricia 1g   - cardio: patient critically sick, not a candidate for surgery therefore no MARY, can c/w abx for IE as per ID  - s/p bronch, f/u cultures   - ID: tamiflu 30 mg daily: last dose on 2/10, s/p cefepime & metro (end date 2/16). nafcillin>>> now on cefazolin 2g q8h for MSSA bact  as per ID (6w)  -2/11 bronch: showed copious amounts of secretions and poor cough.  Will need trach: ongoing daily discussions with wife regarding trach vs extubation & change in code status   CT surgery consulted for trach & peg   Stenotrophomonas now in bronch cx from 2/11, suspect colonizer, if fever/ incr wbc would add levaquin per ID  2/20>> pt got trach & peg. minimal pneumothorax noted on initial cxr   2/21: starting trickle feeds  2/23: weaning off sedation>>on methadone & versed pushes if needed>> DG to SDU once off sedation  2/24: pt noted to have had an aspiration event and 2/24 AM after feeds were started and pt began to desat with improvement w suctioning -- started on levaquin  2/25: pt had seizure like activity so levaquin was dc'ed (can lower seizure threshold) and started on zosyn instead   2/25: dc'ed bumex     #Hypernatremia   - 2/24: started D50, target Na 145    #Seizure like activity  - pt came down to CEU with facial twitching, repetitive blinking, and rhythmic chewing motions  - EEG f/u  - f/u neuro consult  - f/u prolactin, CK    #PE on CTA  #large R groin intramuscular hematoma, stable  #Periventricular bleed on CTH, stable  - CTH. noted with right periventricular bleed, stable.   - Neuro on board  - Continue Anti parkinson's meds.    - CTA chest to r/o PE: focal filling defect within main pulm artery   - repeat CT head: stable intraventricular hemorrhage >> repeat on 2/9 after on full AC for PE: stable  -CT pelvis:  Large intramuscular hematoma in the medial right thigh measuring approximately 9.8 x 6.4 x 17 cm.  -holding AC  -2/12: Hb drop to 6.8> got 1 unit> up to 9.5 .  CTA stable & negative for any active extravasation   2/14: 1 unit prbc  hb stable. restart proph AC 2/18. duplex -ve 2/17  - CTH repeat 2/25 - almost complete resolution of periventricular bleed     #Elevated Bilirubin-improving   #Leukocytosis-improving   #Cholangitis vs cholecystitis s/o perc haydee  - F/u RUQ US: distended GB with sludge, GB polyp   - IR: perc haydee 2/5, . GB drainage cultures: few candida, likely coloniz per id.    #Constipation, improved  - senna and miralax  - c/w lactulose   -2/10: kub: distented bowels. having BM    #Possible Mural thrombus in PA  #CAD  - Avoid overload  - C/w LR at 50  - TTE: EF 56%, G1DD, mild-mod MR, mod TR, mild NY, mild pHTN  - Cardio recs appreciated       - Patient is poor candidate for MARY       - full endocarditis abx course of nafcillin/ cefazolin    #MISC  - DVT ppx: SCDs  - GI ppx: not needed  - Activity: as tolerated  - Diet:  J tube cont feeds  - full code

## 2025-02-26 LAB
ALBUMIN SERPL ELPH-MCNC: 2.3 G/DL — LOW (ref 3.5–5.2)
ALP SERPL-CCNC: 114 U/L — SIGNIFICANT CHANGE UP (ref 30–115)
ALT FLD-CCNC: <5 U/L — SIGNIFICANT CHANGE UP (ref 0–41)
ANION GAP SERPL CALC-SCNC: 10 MMOL/L — SIGNIFICANT CHANGE UP (ref 7–14)
ANION GAP SERPL CALC-SCNC: 10 MMOL/L — SIGNIFICANT CHANGE UP (ref 7–14)
AST SERPL-CCNC: 18 U/L — SIGNIFICANT CHANGE UP (ref 0–41)
BASOPHILS # BLD AUTO: 0 K/UL — SIGNIFICANT CHANGE UP (ref 0–0.2)
BASOPHILS # BLD AUTO: 0.01 K/UL — SIGNIFICANT CHANGE UP (ref 0–0.2)
BASOPHILS NFR BLD AUTO: 0 % — SIGNIFICANT CHANGE UP (ref 0–1)
BASOPHILS NFR BLD AUTO: 0.1 % — SIGNIFICANT CHANGE UP (ref 0–1)
BILIRUB SERPL-MCNC: 0.8 MG/DL — SIGNIFICANT CHANGE UP (ref 0.2–1.2)
BUN SERPL-MCNC: 42 MG/DL — HIGH (ref 10–20)
BUN SERPL-MCNC: 45 MG/DL — HIGH (ref 10–20)
CALCIUM SERPL-MCNC: 7.4 MG/DL — LOW (ref 8.4–10.5)
CALCIUM SERPL-MCNC: 7.8 MG/DL — LOW (ref 8.4–10.4)
CHLORIDE SERPL-SCNC: 106 MMOL/L — SIGNIFICANT CHANGE UP (ref 98–110)
CHLORIDE SERPL-SCNC: 108 MMOL/L — SIGNIFICANT CHANGE UP (ref 98–110)
CO2 SERPL-SCNC: 27 MMOL/L — SIGNIFICANT CHANGE UP (ref 17–32)
CO2 SERPL-SCNC: 28 MMOL/L — SIGNIFICANT CHANGE UP (ref 17–32)
CREAT SERPL-MCNC: 1.2 MG/DL — SIGNIFICANT CHANGE UP (ref 0.7–1.5)
CREAT SERPL-MCNC: 1.3 MG/DL — SIGNIFICANT CHANGE UP (ref 0.7–1.5)
EGFR: 59 ML/MIN/1.73M2 — LOW
EGFR: 59 ML/MIN/1.73M2 — LOW
EGFR: 65 ML/MIN/1.73M2 — SIGNIFICANT CHANGE UP
EGFR: 65 ML/MIN/1.73M2 — SIGNIFICANT CHANGE UP
EOSINOPHIL # BLD AUTO: 0.01 K/UL — SIGNIFICANT CHANGE UP (ref 0–0.7)
EOSINOPHIL # BLD AUTO: 0.01 K/UL — SIGNIFICANT CHANGE UP (ref 0–0.7)
EOSINOPHIL NFR BLD AUTO: 0.1 % — SIGNIFICANT CHANGE UP (ref 0–8)
EOSINOPHIL NFR BLD AUTO: 0.2 % — SIGNIFICANT CHANGE UP (ref 0–8)
GLUCOSE BLDC GLUCOMTR-MCNC: 124 MG/DL — HIGH (ref 70–99)
GLUCOSE BLDC GLUCOMTR-MCNC: 95 MG/DL — SIGNIFICANT CHANGE UP (ref 70–99)
GLUCOSE SERPL-MCNC: 117 MG/DL — HIGH (ref 70–99)
GLUCOSE SERPL-MCNC: 120 MG/DL — HIGH (ref 70–99)
HCT VFR BLD CALC: 25.9 % — LOW (ref 42–52)
HCT VFR BLD CALC: 26.2 % — LOW (ref 42–52)
HGB BLD-MCNC: 8.1 G/DL — LOW (ref 14–18)
HGB BLD-MCNC: 8.3 G/DL — LOW (ref 14–18)
IMM GRANULOCYTES NFR BLD AUTO: 0.5 % — HIGH (ref 0.1–0.3)
IMM GRANULOCYTES NFR BLD AUTO: 0.6 % — HIGH (ref 0.1–0.3)
LYMPHOCYTES # BLD AUTO: 0.52 K/UL — LOW (ref 1.2–3.4)
LYMPHOCYTES # BLD AUTO: 0.55 K/UL — LOW (ref 1.2–3.4)
LYMPHOCYTES # BLD AUTO: 7.3 % — LOW (ref 20.5–51.1)
LYMPHOCYTES # BLD AUTO: 8.4 % — LOW (ref 20.5–51.1)
MAGNESIUM SERPL-MCNC: 2 MG/DL — SIGNIFICANT CHANGE UP (ref 1.8–2.4)
MCHC RBC-ENTMCNC: 29.9 PG — SIGNIFICANT CHANGE UP (ref 27–31)
MCHC RBC-ENTMCNC: 30.1 PG — SIGNIFICANT CHANGE UP (ref 27–31)
MCHC RBC-ENTMCNC: 31.3 G/DL — LOW (ref 32–37)
MCHC RBC-ENTMCNC: 31.7 G/DL — LOW (ref 32–37)
MCV RBC AUTO: 94.9 FL — HIGH (ref 80–94)
MCV RBC AUTO: 95.6 FL — HIGH (ref 80–94)
MONOCYTES # BLD AUTO: 0.44 K/UL — SIGNIFICANT CHANGE UP (ref 0.1–0.6)
MONOCYTES # BLD AUTO: 0.45 K/UL — SIGNIFICANT CHANGE UP (ref 0.1–0.6)
MONOCYTES NFR BLD AUTO: 6.3 % — SIGNIFICANT CHANGE UP (ref 1.7–9.3)
MONOCYTES NFR BLD AUTO: 6.7 % — SIGNIFICANT CHANGE UP (ref 1.7–9.3)
NEUTROPHILS # BLD AUTO: 5.51 K/UL — SIGNIFICANT CHANGE UP (ref 1.4–6.5)
NEUTROPHILS # BLD AUTO: 6.07 K/UL — SIGNIFICANT CHANGE UP (ref 1.4–6.5)
NEUTROPHILS NFR BLD AUTO: 84.2 % — HIGH (ref 42.2–75.2)
NEUTROPHILS NFR BLD AUTO: 85.6 % — HIGH (ref 42.2–75.2)
NRBC BLD AUTO-RTO: 0 /100 WBCS — SIGNIFICANT CHANGE UP (ref 0–0)
NRBC BLD AUTO-RTO: 0 /100 WBCS — SIGNIFICANT CHANGE UP (ref 0–0)
PLATELET # BLD AUTO: 183 K/UL — SIGNIFICANT CHANGE UP (ref 130–400)
PLATELET # BLD AUTO: 201 K/UL — SIGNIFICANT CHANGE UP (ref 130–400)
PMV BLD: 10.4 FL — SIGNIFICANT CHANGE UP (ref 7.4–10.4)
PMV BLD: 11.3 FL — HIGH (ref 7.4–10.4)
POTASSIUM SERPL-MCNC: 3.4 MMOL/L — LOW (ref 3.5–5)
POTASSIUM SERPL-MCNC: 4.1 MMOL/L — SIGNIFICANT CHANGE UP (ref 3.5–5)
POTASSIUM SERPL-SCNC: 3.4 MMOL/L — LOW (ref 3.5–5)
POTASSIUM SERPL-SCNC: 4.1 MMOL/L — SIGNIFICANT CHANGE UP (ref 3.5–5)
PROLACTIN SERPL-MCNC: 30.3 NG/ML — HIGH (ref 4.1–18.4)
PROT SERPL-MCNC: 4.6 G/DL — LOW (ref 6–8)
RBC # BLD: 2.71 M/UL — LOW (ref 4.7–6.1)
RBC # BLD: 2.76 M/UL — LOW (ref 4.7–6.1)
RBC # FLD: 18.6 % — HIGH (ref 11.5–14.5)
RBC # FLD: 19 % — HIGH (ref 11.5–14.5)
SODIUM SERPL-SCNC: 143 MMOL/L — SIGNIFICANT CHANGE UP (ref 135–146)
SODIUM SERPL-SCNC: 146 MMOL/L — SIGNIFICANT CHANGE UP (ref 135–146)
WBC # BLD: 6.54 K/UL — SIGNIFICANT CHANGE UP (ref 4.8–10.8)
WBC # BLD: 7.1 K/UL — SIGNIFICANT CHANGE UP (ref 4.8–10.8)
WBC # FLD AUTO: 6.54 K/UL — SIGNIFICANT CHANGE UP (ref 4.8–10.8)
WBC # FLD AUTO: 7.1 K/UL — SIGNIFICANT CHANGE UP (ref 4.8–10.8)

## 2025-02-26 PROCEDURE — 99223 1ST HOSP IP/OBS HIGH 75: CPT

## 2025-02-26 PROCEDURE — 99232 SBSQ HOSP IP/OBS MODERATE 35: CPT

## 2025-02-26 PROCEDURE — 99233 SBSQ HOSP IP/OBS HIGH 50: CPT

## 2025-02-26 RX ORDER — SODIUM CHLORIDE 9 G/1000ML
1000 INJECTION, SOLUTION INTRAVENOUS
Refills: 0 | Status: DISCONTINUED | OUTPATIENT
Start: 2025-02-26 | End: 2025-02-27

## 2025-02-26 RX ORDER — PIPERACILLIN-TAZO-DEXTROSE,ISO 3.375G/5
3.38 IV SOLUTION, PIGGYBACK PREMIX FROZEN(ML) INTRAVENOUS EVERY 8 HOURS
Refills: 0 | Status: DISCONTINUED | OUTPATIENT
Start: 2025-02-26 | End: 2025-03-02

## 2025-02-26 RX ORDER — QUETIAPINE FUMARATE 25 MG/1
25 TABLET ORAL ONCE
Refills: 0 | Status: COMPLETED | OUTPATIENT
Start: 2025-02-26 | End: 2025-02-26

## 2025-02-26 RX ORDER — CARBIDOPA/LEVODOPA 25MG-100MG
1 TABLET ORAL EVERY 8 HOURS
Refills: 0 | Status: DISCONTINUED | OUTPATIENT
Start: 2025-02-26 | End: 2025-03-05

## 2025-02-26 RX ORDER — LEVETIRACETAM 10 MG/ML
500 INJECTION, SOLUTION INTRAVENOUS
Refills: 0 | Status: DISCONTINUED | OUTPATIENT
Start: 2025-02-26 | End: 2025-03-05

## 2025-02-26 RX ORDER — METHADONE HCL 10 MG
5 TABLET ORAL EVERY 12 HOURS
Refills: 0 | Status: DISCONTINUED | OUTPATIENT
Start: 2025-02-26 | End: 2025-02-27

## 2025-02-26 RX ADMIN — SODIUM CHLORIDE 100 MILLILITER(S): 9 INJECTION, SOLUTION INTRAVENOUS at 21:16

## 2025-02-26 RX ADMIN — Medication 25 GRAM(S): at 22:29

## 2025-02-26 RX ADMIN — Medication 100 MILLIGRAM(S): at 02:54

## 2025-02-26 RX ADMIN — Medication 5 MILLIGRAM(S): at 17:22

## 2025-02-26 RX ADMIN — POLYETHYLENE GLYCOL 3350 17 GRAM(S): 17 POWDER, FOR SOLUTION ORAL at 05:18

## 2025-02-26 RX ADMIN — Medication 15 MILLILITER(S): at 17:22

## 2025-02-26 RX ADMIN — QUETIAPINE FUMARATE 25 MILLIGRAM(S): 25 TABLET ORAL at 01:40

## 2025-02-26 RX ADMIN — Medication 15 MILLILITER(S): at 05:19

## 2025-02-26 RX ADMIN — Medication 20 MILLIEQUIVALENT(S): at 10:26

## 2025-02-26 RX ADMIN — SODIUM CHLORIDE 75 MILLILITER(S): 9 INJECTION, SOLUTION INTRAVENOUS at 07:41

## 2025-02-26 RX ADMIN — LEVETIRACETAM 500 MILLIGRAM(S): 10 INJECTION, SOLUTION INTRAVENOUS at 17:22

## 2025-02-26 RX ADMIN — Medication 2 TABLET(S): at 05:18

## 2025-02-26 RX ADMIN — Medication 5 MILLIGRAM(S): at 05:19

## 2025-02-26 RX ADMIN — RASAGILINE 1 MILLIGRAM(S): 0.5 TABLET ORAL at 05:19

## 2025-02-26 RX ADMIN — Medication 1 TABLET(S): at 22:29

## 2025-02-26 RX ADMIN — POLYETHYLENE GLYCOL 3350 17 GRAM(S): 17 POWDER, FOR SOLUTION ORAL at 17:22

## 2025-02-26 RX ADMIN — ENOXAPARIN SODIUM 40 MILLIGRAM(S): 100 INJECTION SUBCUTANEOUS at 17:22

## 2025-02-26 RX ADMIN — INSULIN LISPRO 1: 100 INJECTION, SOLUTION INTRAVENOUS; SUBCUTANEOUS at 06:40

## 2025-02-26 RX ADMIN — Medication 40 MILLIGRAM(S): at 11:18

## 2025-02-26 RX ADMIN — Medication 1 TABLET(S): at 13:20

## 2025-02-26 RX ADMIN — Medication 1 APPLICATION(S): at 05:18

## 2025-02-26 RX ADMIN — Medication 100 MILLIGRAM(S): at 10:30

## 2025-02-26 RX ADMIN — Medication 25 GRAM(S): at 05:18

## 2025-02-26 RX ADMIN — SODIUM CHLORIDE 100 MILLILITER(S): 9 INJECTION, SOLUTION INTRAVENOUS at 10:27

## 2025-02-26 RX ADMIN — Medication 2 TABLET(S): at 22:30

## 2025-02-26 NOTE — PROGRESS NOTE ADULT - SUBJECTIVE AND OBJECTIVE BOX
SUBJECTIVE/OVERNIGHT EVENTS  Today is hospital day 28d. This morning patient was seen and examined at bedside. Had two bowel movements overnight.     PMH    Pneumonia due to infectious organism    Handoff    MEWS Score    Parkinson disease    CAD (coronary artery disease)    History of basal cell carcinoma (BCC) excision    Acute respiratory failure with hypoxia    Sacral pressure sore    Acute respiratory failure with hypoxia    Sacral pressure sore    Pneumonia    Acute respiratory failure with hypoxia    Pneumonia    Parkinson disease    Encounter for palliative care    Bronchoscopy, flexible, with tracheostomy tube replacement    Transoral endoscopic creation of a gastrojejunostomy    Selective debridement    SOB    90+    Acute respiratory failure with hypoxia    [email or web address]        MEDICATIONS  STANDING MEDICATIONS  carbidopa/levodopa  25/250 1 Tablet(s) Oral every 8 hours  ceFAZolin   IVPB 2000 milliGRAM(s) IV Intermittent every 8 hours  chlorhexidine 0.12% Liquid 15 milliLiter(s) Oral Mucosa every 12 hours  chlorhexidine 2% Cloths 1 Application(s) Topical <User Schedule>  dextrose 5%. 1000 milliLiter(s) IV Continuous <Continuous>  dextrose 5%. 1000 milliLiter(s) IV Continuous <Continuous>  dextrose 5%. 1000 milliLiter(s) IV Continuous <Continuous>  dextrose 50% Injectable 25 Gram(s) IV Push once  dextrose 50% Injectable 12.5 Gram(s) IV Push once  dextrose 50% Injectable 25 Gram(s) IV Push once  enoxaparin Injectable 40 milliGRAM(s) SubCutaneous every 24 hours  glucagon  Injectable 1 milliGRAM(s) IntraMuscular once  insulin lispro (ADMELOG) corrective regimen sliding scale   SubCutaneous every 6 hours  levETIRAcetam 500 milliGRAM(s) Oral two times a day  lidocaine 1%/epinephrine 1:100,000 Inj 2 Vial(s) Local Injection once  methadone    Tablet 5 milliGRAM(s) Oral every 12 hours  pantoprazole  Injectable 40 milliGRAM(s) IV Push daily  piperacillin/tazobactam IVPB.. 3.375 Gram(s) IV Intermittent every 12 hours  polyethylene glycol 3350 17 Gram(s) Oral every 12 hours  potassium chloride   Powder 20 milliEquivalent(s) Oral once  senna 2 Tablet(s) Oral at bedtime    PRN MEDICATIONS  acetaminophen     Tablet .. 650 milliGRAM(s) Oral every 6 hours PRN  dextrose Oral Gel 15 Gram(s) Oral once PRN    VITALS  T(F): 98.6 (02-26-25 @ 07:30), Max: 98.6 (02-25-25 @ 16:00)  HR: 82 (02-26-25 @ 07:30) (80 - 111)  BP: 144/62 (02-26-25 @ 07:30) (115/77 - 147/96)  RR: 18 (02-26-25 @ 07:30) (18 - 20)  SpO2: 99% (02-26-25 @ 07:30) (98% - 100%)  POCT Blood Glucose.: 156 mg/dL (02-26-25 @ 06:15)  POCT Blood Glucose.: 114 mg/dL (02-26-25 @ 00:31)  POCT Blood Glucose.: 117 mg/dL (02-25-25 @ 18:41)  POCT Blood Glucose.: 93 mg/dL (02-25-25 @ 11:45)    PHYSICAL EXAM  GENERAL: cachectic, ill appearing   NECK: trach in place, dried blood on dressing   HEART: Regular rate and rhythm  LUNGS: no adventitious sounds appreciated   ABDOMEN: Soft, nontender, nondistended, PEG in place   EXTREMITIES: SCDs in place   NERVOUS SYSTEM:  awake, not alert, does not follow commands     LABS             8.1    6.54  )-----------( 183      ( 02-26-25 @ 04:35 )             25.9     146  |  108  |  45  -------------------------<  120   02-26-25 @ 04:35  3.4  |  28  |  1.3    Ca      7.8     02-26-25 @ 04:35  Mg     2.0     02-26-25 @ 04:35    TPro  4.6  /  Alb  2.3  /  TBili  0.8  /  DBili  x   /  AST  18  /  ALT  <5  /  AlkPhos  114  /  GGT  x     02-26-25 @ 04:35        Urinalysis Basic - ( 26 Feb 2025 04:35 )    Color: x / Appearance: x / SG: x / pH: x  Gluc: 120 mg/dL / Ketone: x  / Bili: x / Urobili: x   Blood: x / Protein: x / Nitrite: x   Leuk Esterase: x / RBC: x / WBC x   Sq Epi: x / Non Sq Epi: x / Bacteria: x      ABG - ( 25 Feb 2025 16:19 )  pH, Arterial: 7.55  pH, Blood: x     /  pCO2: 34    /  pO2: 138   / HCO3: 30    / Base Excess: 7.0   /  SaO2: 100.0               Culture - Blood (collected 24 Feb 2025 05:15)  Source: .Blood None  Preliminary Report (25 Feb 2025 15:01):    No growth at 24 hours

## 2025-02-26 NOTE — PROGRESS NOTE ADULT - ASSESSMENT
ASSESSMENT  69-year-old male past medical history of hypertension, Parkinson's, CAD presents for shortness of breath and cough of 1 day duration. History was obtained from patient's wife who noted that the patient has been having decreased po intake, activity, and unintentional weight loss for some time. Howver, yesterday he started having cough and shortness of breath. Found to have Flu, PNA and MSSA bacteremia     IMPRESSION  #Fever    2/24 tm 101.5    2/24 BCX NGTD     2/11 bronch    Few Stenotrophomonas maltophilia   Few Yeast- colonizer    2/8 BCX NGTD     CXR stable opacities   #Intubated on mechanical ventilation , septic shock requiring pressors   #trace IVH R occipital horn on CTH 01/31  #Pulmonary artery thrombus on CTA    2/ 2 CTA There is a focal filling defect within the main pulmonary artery at site of the ductus arteriosus suspicious for thrombus.  Increased opacities/debris within the central bronchi with occlusion of the right lower lobe central bronchi. There is increased consolidation   within the right lower lobe.  #Suspected cholecystitis     2/10 biliary fluid NG,   Rare Candida albicans- suspect colonizer   RUQ GB sludge     2/5 Successful placement of 8Fr perc haydee tube. 250cc of dark bile aspirated from GB.  #Influenza +, MSSA bacteremia , suspect MSSA superimposed PNA    2/4 bronch   No organisms seen per oil power field    2/ 2 BCX NGTD     1/31 BCX NGTD     1/29 BCX MSSA 3/4 bottles    MRSA PCR Result.: Negative (01-30-25 @ 13:40)    Procalcitonin: 6.93 ng/mL (01-30-25 @ 07:26)    Legionella Antigen, Urine: Negative (01-29-25 @ 15:49)    Streptococcus pneumoniae Ag, Ur Result: Negative (01-29-25 @ 15:49)    CT Multifocal bilateral consolidative opacities as above, suggestive of multifocal pneumonia in the appropriate clinical context.  #Lactic acidosis  #Hypernatremia   #Severe protein calorie malnutrition  BMI (kg/m2): 17  #PD  #Immunodeficiency secondary to Senescence which could results in poor clinical outcomes  #Abx allergy: Allergy Status Unknown  #LYNNE , resolved  Creatinine: 0.9 mg/dL (02-12-25 @ 05:30)    Height (cm): 175.3 (01-30-25 @ 13:00)  Weight (kg): 52.2 (01-29-25 @ 10:14)    RECOMMENDATIONS  - This is a preliminary incomplete pended note, all final recommendations to follow after interview and examination of the patient.   - midline 2/24, leslie 2/19  - TTE no vegetations , guidelines recommend MARY as community acquired bacteremia once stable , otherwise 6 weeks empiric therapy for MSSA bacteremia from date of cleared BCX E/D 3/13/25  - Trend WBC, Cr  - Grave prognosis, GOC     If any questions, please send a message or call on [employer] Teams  Please continue to update ID with any pertinent new laboratory, radiographic findings, or change in clinical status ASSESSMENT  69-year-old male past medical history of hypertension, Parkinson's, CAD presents for shortness of breath and cough of 1 day duration. History was obtained from patient's wife who noted that the patient has been having decreased po intake, activity, and unintentional weight loss for some time. Howver, yesterday he started having cough and shortness of breath. Found to have Flu, PNA and MSSA bacteremia     IMPRESSION  #Fever    2/24 tm 101.5    2/24 BCX NGTD     2/11 bronch    Few Stenotrophomonas maltophilia   Few Yeast- colonizer    2/8 BCX NGTD     CXR stable opacities   #Intubated on mechanical ventilation , septic shock requiring pressors   #trace IVH R occipital horn on CTH 01/31  #Pulmonary artery thrombus on CTA    2/ 2 CTA There is a focal filling defect within the main pulmonary artery at site of the ductus arteriosus suspicious for thrombus.  Increased opacities/debris within the central bronchi with occlusion of the right lower lobe central bronchi. There is increased consolidation   within the right lower lobe.  #Suspected cholecystitis     2/10 biliary fluid NG,   Rare Candida albicans- suspect colonizer   RUQ GB sludge     2/5 Successful placement of 8Fr perc haydee tube. 250cc of dark bile aspirated from GB.  #Influenza +, MSSA bacteremia , suspect MSSA superimposed PNA    2/4 bronch   No organisms seen per oil power field    2/ 2 BCX NGTD     1/31 BCX NGTD     1/29 BCX MSSA 3/4 bottles    MRSA PCR Result.: Negative (01-30-25 @ 13:40)    Procalcitonin: 6.93 ng/mL (01-30-25 @ 07:26)    Legionella Antigen, Urine: Negative (01-29-25 @ 15:49)    Streptococcus pneumoniae Ag, Ur Result: Negative (01-29-25 @ 15:49)    CT Multifocal bilateral consolidative opacities as above, suggestive of multifocal pneumonia in the appropriate clinical context.  #Lactic acidosis  #Hypernatremia   #Severe protein calorie malnutrition  BMI (kg/m2): 17  #PD  #Immunodeficiency secondary to Senescence which could results in poor clinical outcomes  #Abx allergy: Allergy Status Unknown  #LYNNE , resolved  Creatinine: 1.3 mg/dL (02-26-25 @ 04:35)    Height (cm): 175.3 (01-30-25 @ 13:00)  Weight (kg): 52.2 (01-29-25 @ 10:14)    RECOMMENDATIONS  - INCREASE to zosyn 3.375 q8h IV, D/C cefazolin while on zosyn   - midline 2/24, leslie 2/19  - TTE no vegetations , guidelines recommend MARY as community acquired bacteremia once stable , otherwise 6 weeks empiric therapy for MSSA bacteremia from date of cleared BCX E/D 3/13/25  - Trend WBC, Cr  - Grave prognosis, GOC     If any questions, please send a message or call on [employer] Teams  Please continue to update ID with any pertinent new laboratory, radiographic findings, or change in clinical status

## 2025-02-26 NOTE — PROGRESS NOTE ADULT - SUBJECTIVE AND OBJECTIVE BOX
WILLIAMTIFFANIEGARETT BAXTER  69y, Male  Allergy: Allergy Status Unknown      LOS  28d    CHIEF COMPLAINT: pneumonia (26 Feb 2025 08:38)      INTERVAL EVENTS/HPI  - 2/24 101.5 T(F): , Max: 98.6 (02-25-25 @ 16:00), started on zosyn per primary team   - WBC Count: 6.54 (02-26-25 @ 04:35)  WBC Count: 9.38 (02-25-25 @ 06:01)     - Creatinine: 1.3 (02-26-25 @ 04:35)  Creatinine: 1.3 (02-25-25 @ 06:01)     -   -   -     ROS  cannot obtain secondary to patient's sedation and/or mental status    VITALS:  T(F): 98.6, Max: 98.6 (02-25-25 @ 16:00)  HR: 82  BP: 144/62  RR: 18Vital Signs Last 24 Hrs  T(C): 37 (26 Feb 2025 07:30), Max: 37 (25 Feb 2025 16:00)  T(F): 98.6 (26 Feb 2025 07:30), Max: 98.6 (25 Feb 2025 16:00)  HR: 82 (26 Feb 2025 07:30) (80 - 111)  BP: 144/62 (26 Feb 2025 07:30) (115/77 - 147/96)  BP(mean): 89 (26 Feb 2025 07:30) (89 - 107)  RR: 18 (26 Feb 2025 07:30) (18 - 20)  SpO2: 99% (26 Feb 2025 07:30) (98% - 100%)    Parameters below as of 26 Feb 2025 07:30  Patient On (Oxygen Delivery Method): ventilator        PHYSICAL EXAM:  ***    FH: Non-contributory  Social Hx: Non-contributory    TESTS & MEASUREMENTS:                        8.1    6.54  )-----------( 183      ( 26 Feb 2025 04:35 )             25.9     02-26    146  |  108  |  45[H]  ----------------------------<  120[H]  3.4[L]   |  28  |  1.3    Ca    7.8[L]      26 Feb 2025 04:35  Mg     2.0     02-26    TPro  4.6[L]  /  Alb  2.3[L]  /  TBili  0.8  /  DBili  x   /  AST  18  /  ALT  <5  /  AlkPhos  114  02-26      LIVER FUNCTIONS - ( 26 Feb 2025 04:35 )  Alb: 2.3 g/dL / Pro: 4.6 g/dL / ALK PHOS: 114 U/L / ALT: <5 U/L / AST: 18 U/L / GGT: x           Urinalysis Basic - ( 26 Feb 2025 04:35 )    Color: x / Appearance: x / SG: x / pH: x  Gluc: 120 mg/dL / Ketone: x  / Bili: x / Urobili: x   Blood: x / Protein: x / Nitrite: x   Leuk Esterase: x / RBC: x / WBC x   Sq Epi: x / Non Sq Epi: x / Bacteria: x        Culture - Blood (collected 02-24-25 @ 05:15)  Source: .Blood None  Preliminary Report (02-25-25 @ 15:01):    No growth at 24 hours    Culture - Viral, General (collected 02-11-25 @ 11:53)    Culture - Fungal, Bronchial (collected 02-11-25 @ 11:53)  Source: Bronchial  Preliminary Report (02-14-25 @ 07:48):    Few Yeast    Culture - Acid Fast - Bronchial w/Smear (collected 02-11-25 @ 11:53)  Source: Bronchial  Preliminary Report (02-19-25 @ 23:07):    No acid-fast bacilli isolated at 1 week. ****Acid-fast cultures are held    for 6 weeks.****    Culture - Bronchial (collected 02-11-25 @ 11:53)  Source: Lavage  Gram Stain (02-16-25 @ 20:34):    No Squamous epithelial cells seen per low power field    Few polymorphonuclear leukocytes seen per low power field    No organisms seen per oil power field  Final Report (02-17-25 @ 15:49):    Few Stenotrophomonas maltophilia    Commensal halina consistent with body site  Organism: Stenotrophomonas maltophilia  Stenotrophomonas maltophilia (02-17-25 @ 15:49)  Organism: Stenotrophomonas maltophilia (02-17-25 @ 15:49)      Method Type: KB      -  Minocycline: S  Organism: Stenotrophomonas maltophilia (02-17-25 @ 15:49)      Method Type: KARTIK      -  Levofloxacin: S 1      -  Trimethoprim/Sulfamethoxazole: S <=0.5/9.5    Culture - Body Fluid with Gram Stain (collected 02-10-25 @ 16:22)  Source: Bile  Gram Stain (02-12-25 @ 23:54):    No polymorphonuclear cells seen per low power field    No organisms seen per oil power field  Final Report (02-15-25 @ 16:56):    Rare Candida albicans    See previous culture [id number] for susceptibility    Culture - Fungal, Body Fluid (collected 02-10-25 @ 16:22)  Source: Bile  Preliminary Report (02-14-25 @ 14:12):    Rare Candida albicans  Organism: Candida albicans (02-14-25 @ 14:12)  Organism: Candida albicans (02-14-25 @ 14:12)      Method Type: YSTMIC      -  Fluconazole: S <=0.5 Fluconazole: Susceptibility depends on achieving the maximum possible blood level. Doses higher than the standard dosing amount (6mg/kg/day) may be needed in adults with normal renal function and body habitus.  This test was developed and its performance characteristics determined by [facility], [place], [place]  It has not been cleared or approved by the U.S. Food and Drug Administration. S - Susceptible; SDD - Susceptible Dose Dependent; I - Intermediate; R - Resistant; N/I - No Interpretation Available    Culture - Blood (collected 02-08-25 @ 17:24)  Source: .Blood BLOOD  Final Report (02-14-25 @ 01:01):    No growth at 5 days    Culture - Blood (collected 02-08-25 @ 04:58)  Source: .Blood BLOOD  Final Report (02-13-25 @ 17:00):    No growth at 5 days    Culture - Viral, General (collected 02-04-25 @ 09:29)    Culture - Acid Fast - Bronchial w/Smear (collected 02-04-25 @ 09:29)  Source: Bronch Wash  Preliminary Report (02-19-25 @ 23:06):    No acid-fast bacilli isolated at 2 weeks.    Culture - Bronchial (collected 02-04-25 @ 09:29)  Source: Bronch Wash  Gram Stain (02-05-25 @ 07:26):    Few-moderate polymorphonuclear leukocytes seen per low power field    No Squamous epithelial cells seen per low power field    No organisms seen per oil power field  Final Report (02-06-25 @ 11:36):    Commensal halina consistent with body site    Culture - Sputum (collected 02-04-25 @ 09:00)  Source: ET Tube ET Tube  Gram Stain (02-04-25 @ 19:37):    Numerous polymorphonuclear leukocytes per low power field    Rare Squamous epithelial cells per low power field    Rare Yeast like cells per oil power field  Final Report (02-05-25 @ 22:23):    Commensal halina consistent with body site    Culture - Blood (collected 02-02-25 @ 04:54)  Source: .Blood BLOOD  Final Report (02-07-25 @ 12:00):    No growth at 5 days    Culture - Blood (collected 01-31-25 @ 17:22)  Source: .Blood BLOOD  Final Report (02-05-25 @ 23:07):    No growth at 5 days    Culture - Blood (collected 01-31-25 @ 17:22)  Source: .Blood BLOOD  Final Report (02-05-25 @ 23:07):    No growth at 5 days    Urinalysis with Rflx Culture (collected 01-29-25 @ 16:48)    Culture - Blood (collected 01-29-25 @ 10:09)  Source: .Blood BLOOD  Gram Stain (01-30-25 @ 15:40):    Growth in aerobic bottle: Gram Positive Cocci in Clusters    Growth in anaerobic bottle: Gram Positive Cocci in Clusters  Final Report (02-01-25 @ 07:51):    Growth in aerobic and anaerobic bottles: Staphylococcus aureus    Direct identification is available within approximately 3-5    hours either by Blood Panel Multiplexed PCR or Direct    MALDI-TOF. Details: [email or web address]  Organism: Blood Culture PCR  Staphylococcus aureus (02-01-25 @ 07:51)  Organism: Staphylococcus aureus (02-01-25 @ 07:51)      Method Type: KARTIK      -  Ceftaroline: S <=0.5      -  Clindamycin: R <=0.25 This isolate is presumed to be clindamycin resistant based on detection of inducible resistance. Clindamycin may still be effective in some patients.      -  Erythromycin: R >4      -  Gentamicin: S <=4 Should not be used as monotherapy      -  Oxacillin: S <=0.25 Oxacillin predicts susceptibility for dicloxacillin, methicillin, and nafcillin      -  Rifampin: S <=1 Should not be used as monotherapy      -  Tetracycline: S <=4      -  Trimethoprim/Sulfamethoxazole: S <=0.5/9.5      -  Vancomycin: S 1  Organism: Blood Culture PCR (02-01-25 @ 07:51)      Method Type: PCR      -  Methicillin SENSITIVE Staphylococcus aureus (MSSA): Detec Any isolate of Staphylococcus aureus from a blood culture is NOT considered a contaminant.    Culture - Blood (collected 01-29-25 @ 10:09)  Source: .Blood BLOOD  Gram Stain (02-01-25 @ 00:35):    Growth in aerobic bottle: Gram Positive Cocci in Clusters    Growth in anaerobic bottle: Gram Positive Cocci in Clusters  Final Report (02-01-25 @ 16:44):    Growth in aerobic and anaerobic bottles: Staphylococcus aureus    See previous culture [id number]        Lactate, Blood: 1.2 mmol/L (02-24-25 @ 05:15)      INFECTIOUS DISEASES TESTING  Procalcitonin: 0.62 (02-11-25 @ 08:30)  MRSA PCR Result.: Negative (02-10-25 @ 16:22)  MRSA PCR Result.: Negative (01-30-25 @ 13:40)  Procalcitonin: 6.93 (01-30-25 @ 07:26)  Legionella Antigen, Urine: Negative (01-29-25 @ 15:49)  Rapid RVP Result: Detected (01-29-25 @ 10:30)      INFLAMMATORY MARKERS      RADIOLOGY & ADDITIONAL TESTS:  I have personally reviewed the last available Chest xray  CXR      CT      CARDIOLOGY TESTING  12 Lead ECG:   Ventricular Rate 98 BPM    Atrial Rate 98 BPM    P-R Interval 154 ms    QRS Duration 68 ms    Q-T Interval 342 ms    QTC Calculation(Bazett) 436 ms    P Axis 87 degrees    R Axis 81 degrees    T Axis 78 degrees    Diagnosis Line Normal sinus rhythm  Low voltage QRS  Borderline ECG    Confirmed by Kane Herrmann ([id number]) on 2/25/2025 9:16:05 AM (02-23-25 @ 22:53)      MEDICATIONS  carbidopa/levodopa  25/250 1  ceFAZolin   IVPB 2000  chlorhexidine 0.12% Liquid 15  chlorhexidine 2% Cloths 1  dextrose 5%. 1000  dextrose 5%. 1000  dextrose 5%. 1000  dextrose 50% Injectable 25  dextrose 50% Injectable 12.5  dextrose 50% Injectable 25  enoxaparin Injectable 40  glucagon  Injectable 1  insulin lispro (ADMELOG) corrective regimen sliding scale   levETIRAcetam 500  lidocaine 1%/epinephrine 1:100,000 Inj 2  methadone    Tablet 5  pantoprazole  Injectable 40  piperacillin/tazobactam IVPB.. 3.375  polyethylene glycol 3350 17  potassium chloride   Powder 20  senna 2      WEIGHT  Weight (kg): 51.1 (02-20-25 @ 07:13)  Creatinine: 1.3 mg/dL (02-26-25 @ 04:35)      ANTIBIOTICS:  ceFAZolin   IVPB 2000 milliGRAM(s) IV Intermittent every 8 hours  piperacillin/tazobactam IVPB.. 3.375 Gram(s) IV Intermittent every 12 hours      All available historical records have been reviewed   GARETT ESCAMILLA  69y, Male  Allergy: Allergy Status Unknown      LOS  28d    CHIEF COMPLAINT: pneumonia (26 Feb 2025 08:38)      INTERVAL EVENTS/HPI  - 2/24 101.5 T(F): , Max: 98.6 (02-25-25 @ 16:00), started on zosyn per primary team   - WBC Count: 6.54 (02-26-25 @ 04:35)  WBC Count: 9.38 (02-25-25 @ 06:01)     - Creatinine: 1.3 (02-26-25 @ 04:35)  Creatinine: 1.3 (02-25-25 @ 06:01)     -   -   -     ROS  cannot obtain secondary to patient's sedation and/or mental status    VITALS:  T(F): 98.6, Max: 98.6 (02-25-25 @ 16:00)  HR: 82  BP: 144/62  RR: 18Vital Signs Last 24 Hrs  T(C): 37 (26 Feb 2025 07:30), Max: 37 (25 Feb 2025 16:00)  T(F): 98.6 (26 Feb 2025 07:30), Max: 98.6 (25 Feb 2025 16:00)  HR: 82 (26 Feb 2025 07:30) (80 - 111)  BP: 144/62 (26 Feb 2025 07:30) (115/77 - 147/96)  BP(mean): 89 (26 Feb 2025 07:30) (89 - 107)  RR: 18 (26 Feb 2025 07:30) (18 - 20)  SpO2: 99% (26 Feb 2025 07:30) (98% - 100%)    Parameters below as of 26 Feb 2025 07:30  Patient On (Oxygen Delivery Method): ventilator        PHYSICAL EXAM:  Gen: trach/ vent, chronically ill appearing  HEENT: NCAT  CV: RRR  Lungs: Decreased BS at bases  Abd: Soft  Neuro: does not follow commands  Skin: no rash   Extremities: warm  Lines: clean, no phlebitis     FH: Non-contributory  Social Hx: Non-contributory    TESTS & MEASUREMENTS:                        8.1    6.54  )-----------( 183      ( 26 Feb 2025 04:35 )             25.9     02-26    146  |  108  |  45[H]  ----------------------------<  120[H]  3.4[L]   |  28  |  1.3    Ca    7.8[L]      26 Feb 2025 04:35  Mg     2.0     02-26    TPro  4.6[L]  /  Alb  2.3[L]  /  TBili  0.8  /  DBili  x   /  AST  18  /  ALT  <5  /  AlkPhos  114  02-26      LIVER FUNCTIONS - ( 26 Feb 2025 04:35 )  Alb: 2.3 g/dL / Pro: 4.6 g/dL / ALK PHOS: 114 U/L / ALT: <5 U/L / AST: 18 U/L / GGT: x           Urinalysis Basic - ( 26 Feb 2025 04:35 )    Color: x / Appearance: x / SG: x / pH: x  Gluc: 120 mg/dL / Ketone: x  / Bili: x / Urobili: x   Blood: x / Protein: x / Nitrite: x   Leuk Esterase: x / RBC: x / WBC x   Sq Epi: x / Non Sq Epi: x / Bacteria: x        Culture - Blood (collected 02-24-25 @ 05:15)  Source: .Blood None  Preliminary Report (02-25-25 @ 15:01):    No growth at 24 hours    Culture - Viral, General (collected 02-11-25 @ 11:53)    Culture - Fungal, Bronchial (collected 02-11-25 @ 11:53)  Source: Bronchial  Preliminary Report (02-14-25 @ 07:48):    Few Yeast    Culture - Acid Fast - Bronchial w/Smear (collected 02-11-25 @ 11:53)  Source: Bronchial  Preliminary Report (02-19-25 @ 23:07):    No acid-fast bacilli isolated at 1 week. ****Acid-fast cultures are held    for 6 weeks.****    Culture - Bronchial (collected 02-11-25 @ 11:53)  Source: Lavage  Gram Stain (02-16-25 @ 20:34):    No Squamous epithelial cells seen per low power field    Few polymorphonuclear leukocytes seen per low power field    No organisms seen per oil power field  Final Report (02-17-25 @ 15:49):    Few Stenotrophomonas maltophilia    Commensal halina consistent with body site  Organism: Stenotrophomonas maltophilia  Stenotrophomonas maltophilia (02-17-25 @ 15:49)  Organism: Stenotrophomonas maltophilia (02-17-25 @ 15:49)      Method Type: KB      -  Minocycline: S  Organism: Stenotrophomonas maltophilia (02-17-25 @ 15:49)      Method Type: KARTIK      -  Levofloxacin: S 1      -  Trimethoprim/Sulfamethoxazole: S <=0.5/9.5    Culture - Body Fluid with Gram Stain (collected 02-10-25 @ 16:22)  Source: Bile  Gram Stain (02-12-25 @ 23:54):    No polymorphonuclear cells seen per low power field    No organisms seen per oil power field  Final Report (02-15-25 @ 16:56):    Rare Candida albicans    See previous culture [id number] for susceptibility    Culture - Fungal, Body Fluid (collected 02-10-25 @ 16:22)  Source: Bile  Preliminary Report (02-14-25 @ 14:12):    Rare Candida albicans  Organism: Candida albicans (02-14-25 @ 14:12)  Organism: Candida albicans (02-14-25 @ 14:12)      Method Type: YSTMIC      -  Fluconazole: S <=0.5 Fluconazole: Susceptibility depends on achieving the maximum possible blood level. Doses higher than the standard dosing amount (6mg/kg/day) may be needed in adults with normal renal function and body habitus.  This test was developed and its performance characteristics determined by [facility], [place]  It has not been cleared or approved by the U.S. Food and Drug Administration. S - Susceptible; SDD - Susceptible Dose Dependent; I - Intermediate; R - Resistant; N/I - No Interpretation Available    Culture - Blood (collected 02-08-25 @ 17:24)  Source: .Blood BLOOD  Final Report (02-14-25 @ 01:01):    No growth at 5 days    Culture - Blood (collected 02-08-25 @ 04:58)  Source: .Blood BLOOD  Final Report (02-13-25 @ 17:00):    No growth at 5 days    Culture - Viral, General (collected 02-04-25 @ 09:29)    Culture - Acid Fast - Bronchial w/Smear (collected 02-04-25 @ 09:29)  Source: Bronch Wash  Preliminary Report (02-19-25 @ 23:06):    No acid-fast bacilli isolated at 2 weeks.    Culture - Bronchial (collected 02-04-25 @ 09:29)  Source: Bronch Wash  Gram Stain (02-05-25 @ 07:26):    Few-moderate polymorphonuclear leukocytes seen per low power field    No Squamous epithelial cells seen per low power field    No organisms seen per oil power field  Final Report (02-06-25 @ 11:36):    Commensal halina consistent with body site    Culture - Sputum (collected 02-04-25 @ 09:00)  Source: ET Tube ET Tube  Gram Stain (02-04-25 @ 19:37):    Numerous polymorphonuclear leukocytes per low power field    Rare Squamous epithelial cells per low power field    Rare Yeast like cells per oil power field  Final Report (02-05-25 @ 22:23):    Commensal halina consistent with body site    Culture - Blood (collected 02-02-25 @ 04:54)  Source: .Blood BLOOD  Final Report (02-07-25 @ 12:00):    No growth at 5 days    Culture - Blood (collected 01-31-25 @ 17:22)  Source: .Blood BLOOD  Final Report (02-05-25 @ 23:07):    No growth at 5 days    Culture - Blood (collected 01-31-25 @ 17:22)  Source: .Blood BLOOD  Final Report (02-05-25 @ 23:07):    No growth at 5 days    Urinalysis with Rflx Culture (collected 01-29-25 @ 16:48)    Culture - Blood (collected 01-29-25 @ 10:09)  Source: .Blood BLOOD  Gram Stain (01-30-25 @ 15:40):    Growth in aerobic bottle: Gram Positive Cocci in Clusters    Growth in anaerobic bottle: Gram Positive Cocci in Clusters  Final Report (02-01-25 @ 07:51):    Growth in aerobic and anaerobic bottles: Staphylococcus aureus    Direct identification is available within approximately 3-5    hours either by Blood Panel Multiplexed PCR or Direct    MALDI-TOF. Details: [email or web address]  Organism: Blood Culture PCR  Staphylococcus aureus (02-01-25 @ 07:51)  Organism: Staphylococcus aureus (02-01-25 @ 07:51)      Method Type: KARTIK      -  Ceftaroline: S <=0.5      -  Clindamycin: R <=0.25 This isolate is presumed to be clindamycin resistant based on detection of inducible resistance. Clindamycin may still be effective in some patients.      -  Erythromycin: R >4      -  Gentamicin: S <=4 Should not be used as monotherapy      -  Oxacillin: S <=0.25 Oxacillin predicts susceptibility for dicloxacillin, methicillin, and nafcillin      -  Rifampin: S <=1 Should not be used as monotherapy      -  Tetracycline: S <=4      -  Trimethoprim/Sulfamethoxazole: S <=0.5/9.5      -  Vancomycin: S 1  Organism: Blood Culture PCR (02-01-25 @ 07:51)      Method Type: PCR      -  Methicillin SENSITIVE Staphylococcus aureus (MSSA): Detec Any isolate of Staphylococcus aureus from a blood culture is NOT considered a contaminant.    Culture - Blood (collected 01-29-25 @ 10:09)  Source: .Blood BLOOD  Gram Stain (02-01-25 @ 00:35):    Growth in aerobic bottle: Gram Positive Cocci in Clusters    Growth in anaerobic bottle: Gram Positive Cocci in Clusters  Final Report (02-01-25 @ 16:44):    Growth in aerobic and anaerobic bottles: Staphylococcus aureus    See previous culture [id number]        Lactate, Blood: 1.2 mmol/L (02-24-25 @ 05:15)      INFECTIOUS DISEASES TESTING  Procalcitonin: 0.62 (02-11-25 @ 08:30)  MRSA PCR Result.: Negative (02-10-25 @ 16:22)  MRSA PCR Result.: Negative (01-30-25 @ 13:40)  Procalcitonin: 6.93 (01-30-25 @ 07:26)  Legionella Antigen, Urine: Negative (01-29-25 @ 15:49)  Rapid RVP Result: Detected (01-29-25 @ 10:30)      INFLAMMATORY MARKERS      RADIOLOGY & ADDITIONAL TESTS:  I have personally reviewed the last available Chest xray  CXR      CT      CARDIOLOGY TESTING  12 Lead ECG:   Ventricular Rate 98 BPM    Atrial Rate 98 BPM    P-R Interval 154 ms    QRS Duration 68 ms    Q-T Interval 342 ms    QTC Calculation(Bazett) 436 ms    P Axis 87 degrees    R Axis 81 degrees    T Axis 78 degrees    Diagnosis Line Normal sinus rhythm  Low voltage QRS  Borderline ECG    Confirmed by Kane Herrmann ([id number]) on 2/25/2025 9:16:05 AM (02-23-25 @ 22:53)      MEDICATIONS  carbidopa/levodopa  25/250 1  ceFAZolin   IVPB 2000  chlorhexidine 0.12% Liquid 15  chlorhexidine 2% Cloths 1  dextrose 5%. 1000  dextrose 5%. 1000  dextrose 5%. 1000  dextrose 50% Injectable 25  dextrose 50% Injectable 12.5  dextrose 50% Injectable 25  enoxaparin Injectable 40  glucagon  Injectable 1  insulin lispro (ADMELOG) corrective regimen sliding scale   levETIRAcetam 500  lidocaine 1%/epinephrine 1:100,000 Inj 2  methadone    Tablet 5  pantoprazole  Injectable 40  piperacillin/tazobactam IVPB.. 3.375  polyethylene glycol 3350 17  potassium chloride   Powder 20  senna 2      WEIGHT  Weight (kg): 51.1 (02-20-25 @ 07:13)  Creatinine: 1.3 mg/dL (02-26-25 @ 04:35)      ANTIBIOTICS:  ceFAZolin   IVPB 2000 milliGRAM(s) IV Intermittent every 8 hours  piperacillin/tazobactam IVPB.. 3.375 Gram(s) IV Intermittent every 12 hours      All available historical records have been reviewed

## 2025-02-26 NOTE — PROGRESS NOTE ADULT - ASSESSMENT
IMPRESSION:    Acute Hypoxic Respiratory Failure Sp Trach/PEG   Toxic Metabolic Encephalopathy  aspiration   Dyskenisia  L ptx  Influenza A treated   MSSA bacteremia repeat CX -  Sepsis POA  cholecystitis sp percutaneous chol  Periventricular bleed  Suspected Mural thrombus in PA  Right thigh hematoma.    HO Parkinson's Disease   HO CAD    PLAN:    CNS:  SAT. Continue Anti parkinson's per neuro. Methadone q 12, Neuro fup    HEENT: Oral care. s/p trach and PEG    PULMONARY:  Monitor airway pressures. Remains with significant secretions. s/p trac, Keep Sao2 92 to 96%, repeat CXR noted, dec     CARDIOVASCULAR:  Avoid overload.     GI: GI prophylaxis. S/P perc cholecystostomy. Bowel regimen G tube for meds , feeds through J tube    RENAL: Follow up lytes. Mars in place for retention. Kidney function stable.     INFECTIOUS DISEASE: Cultures noted. SP Tamiflu. ABX per ID.     HEMATOLOGICAL: Monitor CBC and coags. CTA noted with no active bleed. Goal Hb > 7.  LE DOPPLER -    ENDOCRINE:  Follow up FS.  Insulin protocol if needed.    MUSCULOSKELETAL: Bed rest.  Off loading. Wound care for DTIs. Burn eval noted     Full code  palliative care follow up   very poor prognosis  vent unit

## 2025-02-26 NOTE — PROGRESS NOTE ADULT - ASSESSMENT
69-year-old male past medical history of hypertension, Parkinson's, CAD presents for shortness of breath and cough of 1 day duration. History was obtained from patient's wife who noted that the patient has been having decreased po intake, activity, and unintentional weight loss for some time. started having cough and shortness of breath. Found to have Flu, PNA and MSSA bacteremia  He was intubated for acute hypoxic respiratory failure with subsequent trache/PEG placement on 02/20. He  was found to have trace IVH R occipital horn on CTH 01/31, Pulmonary artery thrombus on CTA : 2/ 2 CTA There is a focal filling defect within the main pulmonary artery at site of the ductus arteriosus suspicious for thrombus.  Increased opacities/debris within the central bronchi with occlusion of the right lower lobe central bronchi. There is increased consolidation   within the right lower lobe. In addition he was treated for suspected cholecystitis , on  2/5 Successful placement of 8Fr perc haydee tube. 250cc of dark bile aspirated from GB.  Pulmonary is following, s/p   2/4 bronch   No organisms seen per oil power field, pt was treated with broad spectrum ABx, downgraded to SDU on 2/25.     A/P   # Acute hypoxic respiratory failure / Multifocal Pneumonia : aspiration vs VAP/ Influenza A/  Septic shock / MSSA bacteremia / suspected cholecystitis   - pt was intubated , s/p  trache 02/20, needs CT Sx follow up to change packing around trach site   - Vent management as per pulmonary team   - daily weaning trials   - aggressive pulmonary toilet, chest vest, frequent suction   - BAL Cx 02/11: Stenotrophomonas -- colonizer as per ID  - CXR 02/24: Stable bilateral opacities, right greater than left. No pneumothorax  - ID follow up today, will f/u official recommendations   - Blood Cx 01/29: MSSA, repeat blood Cx is NTD   - TTE no vegetations  - C/w Ancef , started on Zocyn , ID f/u   - S/p cholecystostomy tube placement by IR on 02/05  - Biliary fluid culture 02/10 - candida albicans , likely colonizer   - monitor output from biliary drain, trend WBC   - pt completed course of Tamiflu     # Pulmonary Embolism / chronic diastolic CHF/  Pulmonary Hypertension   - CTA thorax 02/02: There is a focal filling defect within the main pulmonary artery at site of the ductus arteriosus suspicious for thrombus  - No DVT on duplex.   - ECHO: EF of 56%.  Mildly increased LV wall thickness. Grade I diastolic dysfunction. Moderate tricuspid regurgitation. Estimated pulmonary artery systolic pressure is 41.4 mmHg assuming a right atrial pressure of 3 mmHg, which is consistent with mild pulmonary hypertension.  - He was not started on therapeutic AC due to IVH present on admission.  - pt needs neuro surgical clearance for full AC after repeat imaging     # h/o CAD  - ASA  held in setting of IVH  - Since CT head from 02/25 shows resolution of IVH, needs neuro Sx f/u to clear for AC and ASA     # Right hip hematoma   - CT pelvis 02/09: Large intramuscular hematoma in the medial right thigh measuring approximately 9.8 x 6.4 x 17 cm  - Stable on repeat imaging on 02/12    # Sacrococcygeal decubitus ulcer   # Left heel DTI   - Appreciate wound care and burns consult   - S/p debridement 02/22 - no specimen sent for pathology/culture  - change position Q 2 hours, offload pressure points     # Hypernatremia   - resolved after IV hydration     # Dysphagia   - s/p GJ tube 02/20   - Meds via G tube   - on continuos tube feedings vit J tube   - Nutrition consult     # Parkinson's dementia /  Parkinson's Disease /  Acute IVH of right occipital horn   - CT head 01/31: Trace acute intraventricular hemorrhage is noted layering within the right occipital horn  - CTA head and neck 01/31: Moderate stenosis of the right P2 segment of the PCA. Intracranial vessels are otherwise patent and unremarkable. No evidence of carotid or vertebral artery stenosis.  - CT head 02/25:  Nearly resolved trace intraventricular hemorrhage in the right occipital horn compared to the prior CT head from 2/9/2025.  - Suspicion of seizures given increased disorientation as reported by family members.  EEG, CK, prolactin, lactic acid  - C/w carbidopa/levodopa and rasagiline   - Consult Neurology     # DM  - Insulin Sliding Scale     # Urinary retention   - monitor BUN/cr and urine output      # DVT PPx: Lovenox  - GI PPx: Pantoprazole   - Diet: Tube feeding    #Progress Note Handoff  Pending (specify): neuro Sx, CT Sx follow up ( to comment on AC, local trach care), decrease dose of Methadone taper to Q 12 hours, supportive care, reconcile home medications, ID follow up, daily weaning trials, supportive care, start chest vest, aggressive pulmonary toilet.     Disposition: SDU

## 2025-02-26 NOTE — PROGRESS NOTE ADULT - SUBJECTIVE AND OBJECTIVE BOX
Over Night Events: events noted, vent dependant, sp head CT/ Neuro/ EEG reviewed, afebrile    PHYSICAL EXAM    ICU Vital Signs Last 24 Hrs  T(C): 37 (26 Feb 2025 07:30), Max: 37 (25 Feb 2025 16:00)  T(F): 98.6 (26 Feb 2025 07:30), Max: 98.6 (25 Feb 2025 16:00)  HR: 82 (26 Feb 2025 07:30) (80 - 111)  BP: 144/62 (26 Feb 2025 07:30) (115/77 - 147/96)  BP(mean): 89 (26 Feb 2025 07:30) (89 - 107)  RR: 18 (26 Feb 2025 07:30) (18 - 20)  SpO2: 99% (26 Feb 2025 07:30) (98% - 100%)    O2 Parameters below as of 26 Feb 2025 07:30  Patient On (Oxygen Delivery Method): ventilator            General: ill looking  trac  Lungs: Bilateral rhonchi  Cardiovascular: FR[place] 2.6  Abdomen: Soft, Positive BS  Not following commands        02-25-25 @ 07:01  -  02-26-25 @ 07:00  --------------------------------------------------------  IN:    dextrose 5%: 1650 mL    Peptamen A.F.: 1110 mL  Total IN: 2760 mL    OUT:    Drain (mL): 150 mL    Indwelling Catheter - Urethral (mL): 970 mL  Total OUT: 1120 mL    Total NET: 1640 mL          LABS:                          8.1    6.54  )-----------( 183      ( 26 Feb 2025 04:35 )             25.9                                               02-26    146  |  108  |  45[H]  ----------------------------<  120[H]  3.4[L]   |  28  |  1.3    Ca    7.8[L]      26 Feb 2025 04:35  Mg     2.0     02-26    TPro  4.6[L]  /  Alb  2.3[L]  /  TBili  0.8  /  DBili  x   /  AST  18  /  ALT  <5  /  AlkPhos  114  02-26                                             Urinalysis Basic - ( 26 Feb 2025 04:35 )    Color: x / Appearance: x / SG: x / pH: x  Gluc: 120 mg/dL / Ketone: x  / Bili: x / Urobili: x   Blood: x / Protein: x / Nitrite: x   Leuk Esterase: x / RBC: x / WBC x   Sq Epi: x / Non Sq Epi: x / Bacteria: x                                                  LIVER FUNCTIONS - ( 26 Feb 2025 04:35 )  Alb: 2.3 g/dL / Pro: 4.6 g/dL / ALK PHOS: 114 U/L / ALT: <5 U/L / AST: 18 U/L / GGT: x                                                  Culture - Blood (collected 24 Feb 2025 05:15)  Source: .Blood None  Preliminary Report (25 Feb 2025 15:01):    No growth at 24 hours                                                   Mode: AC/ CMV (Assist Control/ Continuous Mandatory Ventilation)  RR (machine): 18  TV (machine): 450  FiO2: 40  PEEP: 8  ITime: 1  MAP: 12  PIP: 21                                      ABG - ( 25 Feb 2025 16:19 )  pH, Arterial: 7.55  pH, Blood: x     /  pCO2: 34    /  pO2: 138   / HCO3: 30    / Base Excess: 7.0   /  SaO2: 100.0               MEDICATIONS  (STANDING):  carbidopa/levodopa  25/250 1 Tablet(s) Oral every 8 hours  ceFAZolin   IVPB 2000 milliGRAM(s) IV Intermittent every 8 hours  chlorhexidine 0.12% Liquid 15 milliLiter(s) Oral Mucosa every 12 hours  chlorhexidine 2% Cloths 1 Application(s) Topical <User Schedule>  dextrose 5%. 1000 milliLiter(s) (50 mL/Hr) IV Continuous <Continuous>  dextrose 5%. 1000 milliLiter(s) (100 mL/Hr) IV Continuous <Continuous>  dextrose 50% Injectable 25 Gram(s) IV Push once  dextrose 50% Injectable 12.5 Gram(s) IV Push once  dextrose 50% Injectable 25 Gram(s) IV Push once  enoxaparin Injectable 40 milliGRAM(s) SubCutaneous every 24 hours  glucagon  Injectable 1 milliGRAM(s) IntraMuscular once  insulin lispro (ADMELOG) corrective regimen sliding scale   SubCutaneous every 6 hours  levETIRAcetam 500 milliGRAM(s) Oral two times a day  lidocaine 1%/epinephrine 1:100,000 Inj 2 Vial(s) Local Injection once  methadone    Tablet 5 milliGRAM(s) Oral every 12 hours  pantoprazole  Injectable 40 milliGRAM(s) IV Push daily  piperacillin/tazobactam IVPB.. 3.375 Gram(s) IV Intermittent every 12 hours  polyethylene glycol 3350 17 Gram(s) Oral every 12 hours  potassium chloride   Powder 20 milliEquivalent(s) Oral once  senna 2 Tablet(s) Oral at bedtime    MEDICATIONS  (PRN):  acetaminophen     Tablet .. 650 milliGRAM(s) Oral every 6 hours PRN Temp greater or equal to 38C (100.4F), Mild Pain (1 - 3)  dextrose Oral Gel 15 Gram(s) Oral once PRN Blood Glucose LESS THAN 70 milliGRAM(s)/deciliter  midazolam Injectable 2 milliGRAM(s) IV Push every 4 hours PRN Agitation ; to wean off Precedex

## 2025-02-26 NOTE — PROGRESS NOTE ADULT - SUBJECTIVE AND OBJECTIVE BOX
GARETT ESCAMILLA (69y Male) was seen at bedside, restless due to anxiety and tremor, following simple commands, trying to speak, unable to produce sounds, has copious amount of secretions.     PAST MEDICAL & SURGICAL HISTORY:  Parkinson disease  CAD (coronary artery disease)  History of basal cell carcinoma (BCC) excision      ICU Vital Signs Last 24 Hrs  T(C): 36.9 (26 Feb 2025 11:38), Max: 37 (25 Feb 2025 16:00)  T(F): 98.4 (26 Feb 2025 11:38), Max: 98.6 (25 Feb 2025 16:00)  HR: 94 (26 Feb 2025 11:38) (80 - 111)  BP: 122/91 (26 Feb 2025 11:38) (115/77 - 147/96)  BP(mean): 101 (26 Feb 2025 11:38) (89 - 107)  ABP: --  ABP(mean): --  RR: 18 (26 Feb 2025 11:38) (18 - 20)  SpO2: 98% (26 Feb 2025 11:38) (98% - 100%)    O2 Parameters below as of 26 Feb 2025 11:38  Patient On (Oxygen Delivery Method): ventilator      PHYSICAL EXAM:  General: frail elderly male with temporal muscle waisting   NECK: trach noted with dry blood, mucus, debris around trach noted   Lungs:  decreased BS b/l   Heart: S1, S2, no murmurs   Abdomen: soft, non tender, non distended, PEG tube in place ,  Cholecystostomy tube with bilious fluids in collecting bag   Neuro: follows simple commands,  Tremulous. AAOx0. Moves extremities.  Extremity: No edema, cyanosis, profound muscle atrophy noted      LABS:                         8.1    6.54  )-----------( 183      ( 26 Feb 2025 04:35 )             25.9     02-26    146  |  108  |  45[H]  ----------------------------<  120[H]  3.4[L]   |  28  |  1.3    Ca    7.8[L]      26 Feb 2025 04:35  Mg     2.0     02-26    TPro  4.6[L]  /  Alb  2.3[L]  /  TBili  0.8  /  DBili  x   /  AST  18  /  ALT  <5  /  AlkPhos  114  02-26    Culture - Blood in AM (02.24.25 @ 05:15)   Specimen Source: .Blood None  Culture Results:   No growth at 24 hoursCulture - Bronchial (02.11.25 @ 11:53)   Gram Stain:   No Squamous epithelial cells seen per low power field   Few polymorphonuclear leukocytes seen per low power field   No organisms seen per oil power field  - Levofloxacin: S 1  - Trimethoprim/Sulfamethoxazole: S <=0.5/9.5  - Minocycline: S  Specimen Source: Lavage  Culture Results:   Few Stenotrophomonas maltophilia   Commensal halina consistent with body site  Organism Identification: Stenotrophomonas maltophilia     RADIOLOGY:   < from: CT Head No Cont (02.25.25 @ 12:49) >    IMPRESSION:    Nearly resolved trace intraventricular hemorrhage in the right occipital   horn compared to the prior CT head from 2/9/2025.    Stable mild chronic microvascular ischemic changes.    < from: Xray Chest 1 View- PORTABLE-Routine (Xray Chest 1 View- PORTABLE-Routine in AM.) (02.24.25 @ 06:17) >  Impression:    Stable bilateral opacities, right greater than left. No pneumothorax.    < end of copied text >  < from: TTE Echo Complete w/o Contrast w/ Doppler (01.30.25 @ 11:43) >    Summary:   1. Normal global left ventricular systolic function.   2. LV Ejection Fraction by Aguilar's Method with a biplane EF of 56 %.   3. Mildly increased LV wall thickness.   4. Spectral Doppler shows impaired relaxation pattern of left   ventricular myocardial filling (Grade I diastolic dysfunction).   5. Normal left atrial size.   6. Normal right atrial size.   7. Mild to moderate mitral valve regurgitation.   8. Moderate tricuspid regurgitation.   9. Mild pulmonic valve regurgitation.  10. Estimated pulmonary artery systolic pressure is 41.4 mmHg assuming a   right atrial pressure of 3 mmHg, which is consistent with mild pulmonary   hypertension.    MEDICATIONS  (STANDING):  carbidopa/levodopa  25/250 1 Tablet(s) Oral every 8 hours  ceFAZolin   IVPB 2000 milliGRAM(s) IV Intermittent every 8 hours  chlorhexidine 0.12% Liquid 15 milliLiter(s) Oral Mucosa every 12 hours  chlorhexidine 2% Cloths 1 Application(s) Topical <User Schedule>  dextrose 5%. 1000 milliLiter(s) (100 mL/Hr) IV Continuous <Continuous>  dextrose 5%. 1000 milliLiter(s) (50 mL/Hr) IV Continuous <Continuous>  dextrose 5%. 1000 milliLiter(s) (100 mL/Hr) IV Continuous <Continuous>  dextrose 50% Injectable 25 Gram(s) IV Push once  dextrose 50% Injectable 12.5 Gram(s) IV Push once  dextrose 50% Injectable 25 Gram(s) IV Push once  enoxaparin Injectable 40 milliGRAM(s) SubCutaneous every 24 hours  glucagon  Injectable 1 milliGRAM(s) IntraMuscular once  insulin lispro (ADMELOG) corrective regimen sliding scale   SubCutaneous every 6 hours  levETIRAcetam 500 milliGRAM(s) Oral two times a day  lidocaine 1%/epinephrine 1:100,000 Inj 2 Vial(s) Local Injection once  methadone    Tablet 5 milliGRAM(s) Oral every 12 hours  pantoprazole  Injectable 40 milliGRAM(s) IV Push daily  piperacillin/tazobactam IVPB.. 3.375 Gram(s) IV Intermittent every 12 hours  polyethylene glycol 3350 17 Gram(s) Oral every 12 hours  senna 2 Tablet(s) Oral at bedtime    MEDICATIONS  (PRN):  acetaminophen     Tablet .. 650 milliGRAM(s) Oral every 6 hours PRN Temp greater or equal to 38C (100.4F), Mild Pain (1 - 3)  dextrose Oral Gel 15 Gram(s) Oral once PRN Blood Glucose LESS THAN 70 milliGRAM(s)/deciliter

## 2025-02-26 NOTE — PROGRESS NOTE ADULT - ASSESSMENT
69-year-old male past medical history of hypertension, Parkinson's, CAD presents for shortness of breath and cough of 1 day duration. History was obtained from patient's wife who noted that the patient has been having decreased po intake, activity, and unintentional weight loss for some time. However yesterday he started having cough and shortness of breath. Admitted to MICU for AHRF and sepsis 2/2 to mutilobar PNA.    #Acute Hypoxic Respiratory Failure likely 2/2 CAP/aspiration and flu  #Toxic Metabolic Encephalopathy likely 2/2 CAP/aspiration and flu  #Influenza A positive, treated  #MSSA bacteremia- resolved  #Sepsis POA  #Possible aspiration event on 2/24  - Aspiration precautions.    - patient inc WOB, worsening alkalosis, obtunded-intubated for airway protection   -ID: if patient decompensates, dc cefepime/metro, start patricia 1g   - cardio: patient critically sick, not a candidate for surgery therefore no MARY, can c/w abx for IE as per ID  - s/p bronch, f/u cultures   - ID: tamiflu 30 mg daily: last dose on 2/10, s/p cefepime & metro (end date 2/16). nafcillin>>> now on cefazolin 2g q8h for MSSA bact  as per ID (6w)  -2/11 bronch: showed copious amounts of secretions and poor cough.  Will need trach: ongoing daily discussions with wife regarding trach vs extubation & change in code status   CT surgery consulted for trach & peg   Stenotrophomonas now in bronch cx from 2/11, suspect colonizer, if fever/ incr wbc would add levaquin per ID  2/20>> pt got trach & peg. minimal pneumothorax noted on initial cxr   2/21: starting trickle feeds  2/23: weaning off sedation>>on methadone & versed pushes if needed>> DG to SDU once off sedation  2/24: pt noted to have had an aspiration event and 2/24 AM after feeds were started and pt began to desat with improvement w suctioning -- started on levaquin  2/25: pt had seizure like activity so levaquin was dc'ed (can lower seizure threshold) and started on zosyn instead   2/25: dc'ed bumex   2/25: will start weaning trials; chest percussion for increased secretions     #Hypernatremia   - 2/24: started D50, target Na 145    #Seizure like activity  - pt came down to CEU with facial twitching, repetitive blinking, and rhythmic chewing motions  - EEG --> potential for focal seizure   - neuro consult appreciated -- start on keppra 500 bid, dc rasagiline, decrease sinemet  - f/u prolactin, CK    #PE on CTA  #large R groin intramuscular hematoma, stable  #Periventricular bleed on CTH - resolving   - CTH. noted with right periventricular bleed, stable.   - Neuro on board  - Continue Anti parkinson's meds.    - CTA chest to r/o PE: focal filling defect within main pulm artery   - repeat CT head: stable intraventricular hemorrhage >> repeat on 2/9 after on full AC for PE: stable  -CT pelvis:  Large intramuscular hematoma in the medial right thigh measuring approximately 9.8 x 6.4 x 17 cm.  -holding AC  -2/12: Hb drop to 6.8> got 1 unit> up to 9.5 .  CTA stable & negative for any active extravasation   2/14: 1 unit prbc  hb stable. restart proph AC 2/18. duplex -ve 2/17  - CTH repeat 2/25 - almost complete resolution of periventricular bleed     #Elevated Bilirubin-improving   #Leukocytosis-improving   #Cholangitis vs cholecystitis s/o perc haydee  - F/u RUQ US: distended GB with sludge, GB polyp   - IR: perc haydee 2/5, . GB drainage cultures: few candida, likely coloniz per id.    #Constipation, improved  - senna and miralax  - c/w lactulose   -2/10: kub: distented bowels. having BM    #Possible Mural thrombus in PA  #CAD  - Avoid overload  - C/w LR at 50  - TTE: EF 56%, G1DD, mild-mod MR, mod TR, mild MO, mild pHTN  - Cardio recs appreciated       - Patient is poor candidate for MARY       - full endocarditis abx course of nafcillin/ cefazolin    #MISC  - DVT ppx: SCDs  - GI ppx: not needed  - Activity: as tolerated  - Diet:  J tube cont feeds  - full code

## 2025-02-27 LAB
ALBUMIN SERPL ELPH-MCNC: 2.4 G/DL — LOW (ref 3.5–5.2)
ALP SERPL-CCNC: 144 U/L — HIGH (ref 30–115)
ALT FLD-CCNC: 5 U/L — SIGNIFICANT CHANGE UP (ref 0–41)
ANION GAP SERPL CALC-SCNC: 9 MMOL/L — SIGNIFICANT CHANGE UP (ref 7–14)
AST SERPL-CCNC: 18 U/L — SIGNIFICANT CHANGE UP (ref 0–41)
BASOPHILS # BLD AUTO: 0.01 K/UL — SIGNIFICANT CHANGE UP (ref 0–0.2)
BASOPHILS NFR BLD AUTO: 0.1 % — SIGNIFICANT CHANGE UP (ref 0–1)
BILIRUB SERPL-MCNC: 0.7 MG/DL — SIGNIFICANT CHANGE UP (ref 0.2–1.2)
BUN SERPL-MCNC: 40 MG/DL — HIGH (ref 10–20)
CALCIUM SERPL-MCNC: 7.3 MG/DL — LOW (ref 8.4–10.5)
CHLORIDE SERPL-SCNC: 104 MMOL/L — SIGNIFICANT CHANGE UP (ref 98–110)
CO2 SERPL-SCNC: 27 MMOL/L — SIGNIFICANT CHANGE UP (ref 17–32)
CREAT SERPL-MCNC: 1.1 MG/DL — SIGNIFICANT CHANGE UP (ref 0.7–1.5)
EGFR: 72 ML/MIN/1.73M2 — SIGNIFICANT CHANGE UP
EGFR: 72 ML/MIN/1.73M2 — SIGNIFICANT CHANGE UP
EOSINOPHIL # BLD AUTO: 0.04 K/UL — SIGNIFICANT CHANGE UP (ref 0–0.7)
EOSINOPHIL NFR BLD AUTO: 0.6 % — SIGNIFICANT CHANGE UP (ref 0–8)
GLUCOSE BLDC GLUCOMTR-MCNC: 108 MG/DL — HIGH (ref 70–99)
GLUCOSE BLDC GLUCOMTR-MCNC: 114 MG/DL — HIGH (ref 70–99)
GLUCOSE BLDC GLUCOMTR-MCNC: 116 MG/DL — HIGH (ref 70–99)
GLUCOSE BLDC GLUCOMTR-MCNC: 136 MG/DL — HIGH (ref 70–99)
GLUCOSE BLDC GLUCOMTR-MCNC: 137 MG/DL — HIGH (ref 70–99)
GLUCOSE BLDC GLUCOMTR-MCNC: 162 MG/DL — HIGH (ref 70–99)
GLUCOSE SERPL-MCNC: 140 MG/DL — HIGH (ref 70–99)
HCT VFR BLD CALC: 28.3 % — LOW (ref 42–52)
HGB BLD-MCNC: 9 G/DL — LOW (ref 14–18)
IMM GRANULOCYTES NFR BLD AUTO: 0.6 % — HIGH (ref 0.1–0.3)
LYMPHOCYTES # BLD AUTO: 0.7 K/UL — LOW (ref 1.2–3.4)
LYMPHOCYTES # BLD AUTO: 10.1 % — LOW (ref 20.5–51.1)
MAGNESIUM SERPL-MCNC: 2.1 MG/DL — SIGNIFICANT CHANGE UP (ref 1.8–2.4)
MCHC RBC-ENTMCNC: 30.4 PG — SIGNIFICANT CHANGE UP (ref 27–31)
MCHC RBC-ENTMCNC: 31.8 G/DL — LOW (ref 32–37)
MCV RBC AUTO: 95.6 FL — HIGH (ref 80–94)
MONOCYTES # BLD AUTO: 0.37 K/UL — SIGNIFICANT CHANGE UP (ref 0.1–0.6)
MONOCYTES NFR BLD AUTO: 5.3 % — SIGNIFICANT CHANGE UP (ref 1.7–9.3)
NEUTROPHILS # BLD AUTO: 5.76 K/UL — SIGNIFICANT CHANGE UP (ref 1.4–6.5)
NEUTROPHILS NFR BLD AUTO: 83.3 % — HIGH (ref 42.2–75.2)
NRBC BLD AUTO-RTO: 0 /100 WBCS — SIGNIFICANT CHANGE UP (ref 0–0)
PLATELET # BLD AUTO: 167 K/UL — SIGNIFICANT CHANGE UP (ref 130–400)
PMV BLD: 11.5 FL — HIGH (ref 7.4–10.4)
POTASSIUM SERPL-MCNC: 3.8 MMOL/L — SIGNIFICANT CHANGE UP (ref 3.5–5)
POTASSIUM SERPL-SCNC: 3.8 MMOL/L — SIGNIFICANT CHANGE UP (ref 3.5–5)
PROT SERPL-MCNC: 4.5 G/DL — LOW (ref 6–8)
RBC # BLD: 2.96 M/UL — LOW (ref 4.7–6.1)
RBC # FLD: 18.7 % — HIGH (ref 11.5–14.5)
SODIUM SERPL-SCNC: 140 MMOL/L — SIGNIFICANT CHANGE UP (ref 135–146)
WBC # BLD: 6.92 K/UL — SIGNIFICANT CHANGE UP (ref 4.8–10.8)
WBC # FLD AUTO: 6.92 K/UL — SIGNIFICANT CHANGE UP (ref 4.8–10.8)

## 2025-02-27 PROCEDURE — 99232 SBSQ HOSP IP/OBS MODERATE 35: CPT

## 2025-02-27 PROCEDURE — 71045 X-RAY EXAM CHEST 1 VIEW: CPT | Mod: 26

## 2025-02-27 PROCEDURE — 99233 SBSQ HOSP IP/OBS HIGH 50: CPT

## 2025-02-27 PROCEDURE — 71045 X-RAY EXAM CHEST 1 VIEW: CPT | Mod: 26,77

## 2025-02-27 RX ORDER — B1/B2/B3/B5/B6/B12/VIT C/FOLIC 500-0.5 MG
1 TABLET ORAL
Refills: 0 | DISCHARGE

## 2025-02-27 RX ORDER — METHADONE HCL 10 MG
2.5 TABLET ORAL EVERY 12 HOURS
Refills: 0 | Status: DISCONTINUED | OUTPATIENT
Start: 2025-02-27 | End: 2025-03-05

## 2025-02-27 RX ADMIN — Medication 25 GRAM(S): at 05:17

## 2025-02-27 RX ADMIN — Medication 2 TABLET(S): at 22:52

## 2025-02-27 RX ADMIN — Medication 2.5 MILLIGRAM(S): at 17:31

## 2025-02-27 RX ADMIN — Medication 15 MILLILITER(S): at 05:17

## 2025-02-27 RX ADMIN — Medication 5 MILLIGRAM(S): at 05:17

## 2025-02-27 RX ADMIN — Medication 1 APPLICATION(S): at 05:17

## 2025-02-27 RX ADMIN — Medication 25 GRAM(S): at 22:52

## 2025-02-27 RX ADMIN — Medication 1 TABLET(S): at 05:17

## 2025-02-27 RX ADMIN — POLYETHYLENE GLYCOL 3350 17 GRAM(S): 17 POWDER, FOR SOLUTION ORAL at 17:31

## 2025-02-27 RX ADMIN — Medication 1 TABLET(S): at 13:22

## 2025-02-27 RX ADMIN — Medication 15 MILLILITER(S): at 17:30

## 2025-02-27 RX ADMIN — Medication 25 GRAM(S): at 13:21

## 2025-02-27 RX ADMIN — ENOXAPARIN SODIUM 40 MILLIGRAM(S): 100 INJECTION SUBCUTANEOUS at 17:30

## 2025-02-27 RX ADMIN — Medication 1 TABLET(S): at 22:50

## 2025-02-27 RX ADMIN — LEVETIRACETAM 500 MILLIGRAM(S): 10 INJECTION, SOLUTION INTRAVENOUS at 17:31

## 2025-02-27 RX ADMIN — Medication 40 MILLIGRAM(S): at 11:44

## 2025-02-27 RX ADMIN — LEVETIRACETAM 500 MILLIGRAM(S): 10 INJECTION, SOLUTION INTRAVENOUS at 05:17

## 2025-02-27 NOTE — PROGRESS NOTE ADULT - SUBJECTIVE AND OBJECTIVE BOX
Over Night Events: events noted, vent dependant, afebrile    PHYSICAL EXAM    ICU Vital Signs Last 24 Hrs  T(C): 36.7 (27 Feb 2025 07:48), Max: 36.9 (26 Feb 2025 11:38)  T(F): 98.1 (27 Feb 2025 07:48), Max: 98.4 (26 Feb 2025 11:38)  HR: 71 (27 Feb 2025 07:48) (65 - 118)  BP: 121/58 (27 Feb 2025 07:48) (107/58 - 133/63)  BP(mean): 84 (27 Feb 2025 07:48) (78 - 101)  RR: 18 (27 Feb 2025 07:48) (18 - 18)  SpO2: 94% (27 Feb 2025 07:48) (94% - 100%)    O2 Parameters below as of 27 Feb 2025 07:48  Patient On (Oxygen Delivery Method): ventilator            General: ill looking  trac  Lungs: Bilateral rhonchi  Cardiovascular: Regular   Abdomen: Soft, Positive BS  not following commands      02-26-25 @ 07:01  -  02-27-25 @ 07:00  --------------------------------------------------------  IN:    dextrose 5%: 75 mL    dextrose 5%: 2300 mL    Enteral Tube Flush: 100 mL    Glucerna: 1400 mL  Total IN: 3875 mL    OUT:    Indwelling Catheter - Urethral (mL): 600 mL  Total OUT: 600 mL    Total NET: 3275 mL          LABS:                          9.0    6.92  )-----------( 167      ( 27 Feb 2025 05:01 )             28.3                                               02-27    140  |  104  |  40[H]  ----------------------------<  140[H]  3.8   |  27  |  1.1    Ca    7.3[L]      27 Feb 2025 05:01  Mg     2.1     02-27    TPro  4.5[L]  /  Alb  2.4[L]  /  TBili  0.7  /  DBili  x   /  AST  18  /  ALT  5   /  AlkPhos  144[H]  02-27                                             Urinalysis Basic - ( 27 Feb 2025 05:01 )    Color: x / Appearance: x / SG: x / pH: x  Gluc: 140 mg/dL / Ketone: x  / Bili: x / Urobili: x   Blood: x / Protein: x / Nitrite: x   Leuk Esterase: x / RBC: x / WBC x   Sq Epi: x / Non Sq Epi: x / Bacteria: x                                                  LIVER FUNCTIONS - ( 27 Feb 2025 05:01 )  Alb: 2.4 g/dL / Pro: 4.5 g/dL / ALK PHOS: 144 U/L / ALT: 5 U/L / AST: 18 U/L / GGT: x                                                  Culture - Blood (collected 25 Feb 2025 10:57)  Source: .Blood None  Preliminary Report (26 Feb 2025 23:09):    No growth at 24 hours                                                   Mode: AC/ CMV (Assist Control/ Continuous Mandatory Ventilation)  RR (machine): 18  TV (machine): 450  FiO2: 40  PEEP: 8  ITime: 1  MAP: 12  PIP: 20                                      ABG - ( 25 Feb 2025 16:19 )  pH, Arterial: 7.55  pH, Blood: x     /  pCO2: 34    /  pO2: 138   / HCO3: 30    / Base Excess: 7.0   /  SaO2: 100.0               MEDICATIONS  (STANDING):  carbidopa/levodopa  25/250 1 Tablet(s) Oral every 8 hours  chlorhexidine 0.12% Liquid 15 milliLiter(s) Oral Mucosa every 12 hours  chlorhexidine 2% Cloths 1 Application(s) Topical <User Schedule>  dextrose 5%. 1000 milliLiter(s) (100 mL/Hr) IV Continuous <Continuous>  dextrose 5%. 1000 milliLiter(s) (100 mL/Hr) IV Continuous <Continuous>  dextrose 5%. 1000 milliLiter(s) (50 mL/Hr) IV Continuous <Continuous>  dextrose 50% Injectable 25 Gram(s) IV Push once  dextrose 50% Injectable 12.5 Gram(s) IV Push once  dextrose 50% Injectable 25 Gram(s) IV Push once  enoxaparin Injectable 40 milliGRAM(s) SubCutaneous every 24 hours  glucagon  Injectable 1 milliGRAM(s) IntraMuscular once  insulin lispro (ADMELOG) corrective regimen sliding scale   SubCutaneous every 6 hours  levETIRAcetam 500 milliGRAM(s) Oral two times a day  lidocaine 1%/epinephrine 1:100,000 Inj 2 Vial(s) Local Injection once  methadone    Tablet 5 milliGRAM(s) Oral every 12 hours  pantoprazole  Injectable 40 milliGRAM(s) IV Push daily  piperacillin/tazobactam IVPB.. 3.375 Gram(s) IV Intermittent every 8 hours  polyethylene glycol 3350 17 Gram(s) Oral every 12 hours  senna 2 Tablet(s) Oral at bedtime    MEDICATIONS  (PRN):  acetaminophen     Tablet .. 650 milliGRAM(s) Oral every 6 hours PRN Temp greater or equal to 38C (100.4F), Mild Pain (1 - 3)  dextrose Oral Gel 15 Gram(s) Oral once PRN Blood Glucose LESS THAN 70 milliGRAM(s)/deciliter

## 2025-02-27 NOTE — PROGRESS NOTE ADULT - SUBJECTIVE AND OBJECTIVE BOX
HPI:  69-year-old male past medical history of hypertension, Parkinson's, CAD presents for shortness of breath and cough of 1 day duration. History was obtained from patient's wife who noted that the patient has been having decreased po intake, activity, and unintentional weight loss for some time. Howver, yesterday he started having cough and shortness of breath.   Denies any sick contacts or choking on food.      ITEMS NOT CHECKED ARE NOT PRESENT    SOCIAL HISTORY:   Significant other/partner[x ]  Children[ ]  [employer]aritan/Spirituality:  Substance hx:  [ ]   Tobacco hx:  [ ]   Alcohol hx: [ ]   Living Situation: [ ]Home  [ ]Long term care  [ ]Rehab [ ]Other  Home Services: [ ] HHA [ ] Visting RN [ ] Hospice  Occupation:  Home Opioid hx:  [ ] Y [ ] N [ ] I-Stop Reference No:     ADVANCE DIRECTIVES:    [x ] Full Code [ ] DNR  MOLST  [ ]  Living Will  [ ]   DECISION MAKER(s):  [ ] Health Care Proxy(s)  [x ] Surrogate(s)  [ ] Guardian           Name(s): Phone Number(s): wife    BASELINE (I)ADL(s) (prior to admission):  [place]: [ ]Total  [ ] Moderate [ ]Dependent  Palliative Performance Status Version 2:         %    http://npcrc.org/files/news/palliative_performance_scale_ppsv2.pdf    Allergies    Allergy Status Unknown    Intolerances    MEDICATIONS  (STANDING):  carbidopa/levodopa  25/250 1 Tablet(s) Oral every 8 hours  chlorhexidine 0.12% Liquid 15 milliLiter(s) Oral Mucosa every 12 hours  chlorhexidine 2% Cloths 1 Application(s) Topical <User Schedule>  dextrose 5%. 1000 milliLiter(s) (100 mL/Hr) IV Continuous <Continuous>  dextrose 5%. 1000 milliLiter(s) (50 mL/Hr) IV Continuous <Continuous>  dextrose 50% Injectable 25 Gram(s) IV Push once  dextrose 50% Injectable 12.5 Gram(s) IV Push once  dextrose 50% Injectable 25 Gram(s) IV Push once  enoxaparin Injectable 40 milliGRAM(s) SubCutaneous every 24 hours  glucagon  Injectable 1 milliGRAM(s) IntraMuscular once  insulin lispro (ADMELOG) corrective regimen sliding scale   SubCutaneous every 6 hours  levETIRAcetam 500 milliGRAM(s) Oral two times a day  lidocaine 1%/epinephrine 1:100,000 Inj 2 Vial(s) Local Injection once  methadone    Tablet 2.5 milliGRAM(s) Oral every 12 hours  pantoprazole  Injectable 40 milliGRAM(s) IV Push daily  piperacillin/tazobactam IVPB.. 3.375 Gram(s) IV Intermittent every 8 hours  polyethylene glycol 3350 17 Gram(s) Oral every 12 hours  senna 2 Tablet(s) Oral at bedtime    MEDICATIONS  (PRN):  acetaminophen     Tablet .. 650 milliGRAM(s) Oral every 6 hours PRN Temp greater or equal to 38C (100.4F), Mild Pain (1 - 3)  dextrose Oral Gel 15 Gram(s) Oral once PRN Blood Glucose LESS THAN 70 milliGRAM(s)/deciliter      PRESENT SYMPTOMS: [x ]Unable to obtain due to poor mentation   Source if other than patient:  [ ]Family   [ ]Team     Pain: [ ]yes [ ]no  QOL impact -   Location -                    Aggravating factors -  Alleviating factors -   Quality -  Radiation -  Timing -   Severity (0-10 scale):  Minimal acceptable level (0-10 scale):     CPOT:  2  https://www.sccm.org/getattachment/[email or web address]-[email or web address]-5590b5057o6n/Critical-Care-Pain-Observation-Tool-(CPOT)    PAIN AD Score:   [email or web address].[email or web address]/cog/painad.pdf     Dyspnea:                           [ ]None[ ]Mild [ ]Moderate [ ]Severe     Respiratory Distress Observation Scale (RDOS): 2  A score of 0 to 2 signifies little or no respiratory distress, 3 signifies mild distress, scores 4 to 6 indicate moderate distress, and scores greater than 7 signify severe distress  [email or web address]-nyypgiophsd-[email or web address]-[email or web address]    Anxiety:                             [ ]None[ ]Mild [ ]Moderate [ ]Severe   Fatigue:                             [ ]None[ ]Mild [ ]Moderate [ ]Severe   Nausea:                             [ ]None[ ]Mild [ ]Moderate [ ]Severe   Loss of appetite:              [ ]None[ ]Mild [ ]Moderate [ ]Severe   Constipation:                    [ ]None[ ]Mild [ ]Moderate [ ]Severe    Other Symptoms:  [ ]All other review of systems negative     Palliative Performance Status Version 2:         %    http://npcrc.org/files/news/palliative_performance_scale_ppsv2.pdf  PHYSICAL EXAM:    Vital Signs Last 24 Hrs  T(C): 36.2 (27 Feb 2025 11:00), Max: 36.9 (26 Feb 2025 16:00)  T(F): 97.2 (27 Feb 2025 11:00), Max: 98.4 (26 Feb 2025 16:00)  HR: 85 (27 Feb 2025 11:00) (65 - 118)  BP: 110/61 (27 Feb 2025 11:00) (107/58 - 133/63)  BP(mean): 80 (27 Feb 2025 11:00) (78 - 89)  RR: 18 (27 Feb 2025 11:00) (18 - 18)  SpO2: 100% (27 Feb 2025 11:00) (94% - 100%)    Parameters below as of 27 Feb 2025 11:00  Patient On (Oxygen Delivery Method): ventilator      GENERAL:  [ ] No acute distress [ ]Lethargic  [ ]Unarousable  [ ]Verbal  [x ]Non-Verbal [ ]Cachexia    BEHAVIORAL/PSYCH:  [ ]Alert and Oriented x  [ ] Anxiety [ ] Delirium [ ] Agitation [x ] Calm   EYES: [x ] No scleral icterus [ ] Scleral icterus [ ] Closed  ENMT:  [ ]Dry mouth  [x ]No external oral lesions [ ] No external ear or nose lesions  CARDIOVASCULAR:  [ ]Regular [ ]Irregular [ ]Tachy [x ]Not Tachy  [ ][place] [ ] Edema [ ] No edema  PULMONARY:  [ ]Tachypnea  [ ]Audible excessive secretions [ ] No labored breathing [x ] mildly labored breathing [ ] labored breathing  GASTROINTESTINAL: [ ]Soft  [ ]Distended  [x ]Not distended [ ]Non tender [ ]Tender  MUSCULOSKELETAL: [ ]No clubbing [ ] clubbing  [x ] No cyanosis [ ] cyanosis  NEUROLOGIC: [ ]No focal deficits  [ ]Follows commands  [ ]Does not follow commands  [ x]Cognitive impairment  [ ]Dysphagia  [ ]Dysarthria  [ ]Paresis   SKIN: [ ] Jaundiced [x ] Non-jaundiced [ ]Rash [ ]No Rash [ ] Warm [ ] Dry  MISC/LINES: [ ] ET tube [x ] Trach [ ]NGT/OGT [ x]PEG [ ]Mars    CRITICAL CARE:  [ ] Shock Present  [ ]Septic [ ]Cardiogenic [ ]Neurologic [ ]Hypovolemic  [ ]  Vasopressors [ ]  Inotropes   [ ]Respiratory failure present [x ]Mechanical ventilation [ ]Non-invasive ventilatory support [ ]High flow  [ ]Acute  [ ]Chronic [ ]Hypoxic  [ ]Hypercarbic [ ]Other  [ ]Other organ failure   LABS: I have reviewed daily labs                          9.0    6.92  )-----------( 167      ( 27 Feb 2025 05:01 )             28.3     02-27    140  |  104  |  40[H]  ----------------------------<  140[H]  3.8   |  27  |  1.1    Ca    7.3[L]      27 Feb 2025 05:01  Mg     2.1     02-27    TPro  4.5[L]  /  Alb  2.4[L]  /  TBili  0.7  /  DBili  x   /  AST  18  /  ALT  5   /  AlkPhos  144[H]  02-27      Urinalysis Basic - ( 27 Feb 2025 05:01 )    Color: x / Appearance: x / SG: x / pH: x  Gluc: 140 mg/dL / Ketone: x  / Bili: x / Urobili: x   Blood: x / Protein: x / Nitrite: x   Leuk Esterase: x / RBC: x / WBC x   Sq Epi: x / Non Sq Epi: x / Bacteria: x          RADIOLOGY & ADDITIONAL STUDIES: I have reviewed new imaging      < from: CT Head No Cont (02.25.25 @ 12:49) >  IMPRESSION:    Nearly resolved trace intraventricular hemorrhage in the right occipital   horn compared to the prior CT head from 2/9/2025.    Stable mild chronic microvascular ischemic changes.    --- End of Report ---    < end of copied text >    PROTEIN CALORIE MALNUTRITION PRESENT: [ ]mild [ ]moderate [ ]severe [ ]underweight [ ]morbid obesity  https://www.andeal.org/vault/2440/web/files/ONC/Table_Clinical%20Characteristics%20to%20Document%20Malnutrition-White%20JV%20et%20al%202012.pdf      Weight (kg): 52.2 (01-29-25 @ 10:14)    [ ]PPSV2 < or = to 30% [ ]significant weight loss  [ ]poor nutritional intake  [ ]anasarca      [ ]Artificial Nutrition      Palliative Care Spiritual/Emotional Screening Tool Question  Severity (0-4):                    OR                    [ x] Unable to determine. Will assess at later time if appropriate.  Score of 2 or greater indicates recommendation of Chaplaincy and/or SW referral  Chaplaincy Referral: [ ] Yes [ ] Refused [ ] Following     Caregiver [place]:  [ ] Yes [ ] No    OR    [x ] Unable to determine. Will assess at later time if appropriate.  Social Work Referral [ ]  Patient and Family Centered Care Referral [ ]    Anticipatory Grief Present: [ ] Yes [ ] No    OR     [ x] Unable to determine. Will assess at later time if appropriate.  Social Work Referral [ ]  Patient and Family Centered Care Referral [ ]    REFERRALS:   [ ]Chaplaincy  [ ]Hospice  [ ]Child Life  [ ]Social Work  [ ]Case management [ ]Holistic Therapy     Palliative care education provided to patient and/or family

## 2025-02-27 NOTE — PROGRESS NOTE ADULT - ASSESSMENT
69-year-old male past medical history of hypertension, Parkinson's, CAD presents for shortness of breath and cough of 1 day duration. History was obtained from patient's wife who noted that the patient has been having decreased po intake, activity, and unintentional weight loss for some time. started having cough and shortness of breath. Found to have Flu, PNA and MSSA bacteremia  He was intubated for acute hypoxic respiratory failure with subsequent trache/PEG placement on 02/20. He  was found to have trace IVH R occipital horn on CTH 01/31, Pulmonary artery thrombus on CTA : 2/ 2 CTA There is a focal filling defect within the main pulmonary artery at site of the ductus arteriosus suspicious for thrombus.  Increased opacities/debris within the central bronchi with occlusion of the right lower lobe central bronchi. There is increased consolidation   within the right lower lobe. In addition he was treated for suspected cholecystitis , on  2/5 Successful placement of 8Fr perc haydee tube. 250cc of dark bile aspirated from GB.  Pulmonary is following, s/p   2/4 bronch   No organisms seen per oil power field, pt was treated with broad spectrum ABx, downgraded to SDU on 2/25.     A/P   # Acute hypoxic respiratory failure / Multifocal Pneumonia : aspiration vs VAP/ Influenza A/  Septic shock / MSSA bacteremia / suspected cholecystitis / iatrogenic pneumothorax   - pt was intubated , s/p  trach 02/20, c/w local trach care   - Vent management as per pulmonary team   - daily weaning trials   - aggressive pulmonary toilet, chest vest, frequent suction   - recent CXR noted ( pt has small pneumothorax), pulmonary aware, will monitor closely and repeat CXR in 6 hours    - BAL Cx 02/11: Stenotrophomonas -- colonizer as per ID  - ID followed up, recommendations noted:  - INCREASE to zosyn 3.375 q8h IV, D/C cefazolin while on zosyn   - midline 2/24, leslie 2/19  - TTE no vegetations , guidelines recommend MARY as community acquired bacteremia once stable , otherwise 6 weeks empiric therapy for MSSA bacteremia from date of cleared BCX E/D 3/13/25  - Blood Cx 01/29: MSSA, repeat blood Cx is NTD   - S/p cholecystostomy tube placement by IR on 02/05  - Biliary fluid culture 02/10 - candida albicans , likely colonizer   - monitor output from biliary drain, trend WBC   - pt completed course of Tamiflu     # Pulmonary Embolism / chronic diastolic CHF/  Pulmonary Hypertension   - CTA thorax 02/02: There is a focal filling defect within the main pulmonary artery at site of the ductus arteriosus suspicious for thrombus  - No DVT on duplex.   - ECHO: EF of 56%.  Mildly increased LV wall thickness. Grade I diastolic dysfunction. Moderate tricuspid regurgitation. Estimated pulmonary artery systolic pressure is 41.4 mmHg assuming a right atrial pressure of 3 mmHg, which is consistent with mild pulmonary hypertension.  - He was not started on therapeutic AC due to IVH present on admission.  - pt needs neuro surgical clearance for full AC after repeat imaging     # h/o CAD  - ASA  held in setting of IVH  - Since CT head from 02/25 shows resolution of IVH, needs neuro Sx f/u to clear for AC and ASA     # Right hip hematoma   - CT pelvis 02/09: Large intramuscular hematoma in the medial right thigh measuring approximately 9.8 x 6.4 x 17 cm  - Stable on repeat imaging on 02/12    # Sacrococcygeal decubitus ulcer   # Left heel DTI   - Appreciate wound care and burns consult   - S/p debridement 02/22 - no specimen sent for pathology/culture  - change position Q 2 hours, offload pressure points     # Dysphagia   - s/p GJ tube 02/20   - Meds via G tube   - on continuos tube feedings vit J tube   - Nutrition consult     # Parkinson's dementia /  Parkinson's Disease /  Acute IVH of right occipital horn   - CT head 01/31: Trace acute intraventricular hemorrhage is noted layering within the right occipital horn  - CTA head and neck 01/31: Moderate stenosis of the right P2 segment of the PCA. Intracranial vessels are otherwise patent and unremarkable. No evidence of carotid or vertebral artery stenosis.  - CT head 02/25:  Nearly resolved trace intraventricular hemorrhage in the right occipital horn compared to the prior CT head from 2/9/2025.  - Suspicion of seizures given increased disorientation as reported by family members.  EEG, CK, prolactin, lactic acid  - C/w carbidopa/levodopa and rasagiline   - Consult Neurology     # DM  - Insulin Sliding Scale     # Urinary retention   - monitor BUN/cr and urine output      # DVT PPx: Lovenox  - GI PPx: Pantoprazole   - Diet: Tube feeding    #Progress Note Handoff  Pending (specify): neuro Sx  follow up ( to comment on AC, local trach care),  decrease dose of Methadone to 2.5 mg Q 12 hours, supportive care, reconcile home medications ( wife brought home meds today), repeat CXR in 6 hours, f/u pulmonary recommendations, increase dose of Zosyn as per ID,  aggressive pulmonary toilet.     Disposition: SDU            69-year-old male past medical history of hypertension, Parkinson's, CAD presents for shortness of breath and cough of 1 day duration. History was obtained from patient's wife who noted that the patient has been having decreased po intake, activity, and unintentional weight loss for some time. started having cough and shortness of breath. Found to have Flu, PNA and MSSA bacteremia  He was intubated for acute hypoxic respiratory failure with subsequent trache/PEG placement on 02/20. He  was found to have trace IVH R occipital horn on CTH 01/31, Pulmonary artery thrombus on CTA : 2/ 2 CTA There is a focal filling defect within the main pulmonary artery at site of the ductus arteriosus suspicious for thrombus.  Increased opacities/debris within the central bronchi with occlusion of the right lower lobe central bronchi. There is increased consolidation   within the right lower lobe. In addition he was treated for suspected cholecystitis , on  2/5 Successful placement of 8Fr perc haydee tube. 250cc of dark bile aspirated from GB.  Pulmonary is following, s/p   2/4 bronch   No organisms seen per oil power field, pt was treated with broad spectrum ABx, downgraded to SDU on 2/25.     A/P   # Acute hypoxic respiratory failure / Multifocal Pneumonia : aspiration vs VAP/ Influenza A/  Septic shock / MSSA bacteremia / suspected cholecystitis / iatrogenic pneumothorax   - pt was intubated , s/p  trach 02/20, c/w local trach care   - Vent management as per pulmonary team   - daily weaning trials   - aggressive pulmonary toilet, chest vest, frequent suction   - recent CXR noted ( pt has small pneumothorax), pulmonary aware, will monitor closely and repeat CXR in 6 hours    - BAL Cx 02/11: Stenotrophomonas -- colonizer as per ID  - ID followed up, recommendations noted:  - INCREASE to zosyn 3.375 q8h IV, D/C cefazolin while on zosyn   - midline 2/24, leslie 2/19  - TTE no vegetations , guidelines recommend MARY as community acquired bacteremia once stable , otherwise 6 weeks empiric therapy for MSSA bacteremia from date of cleared BCX E/D 3/13/25  - Blood Cx 01/29: MSSA, repeat blood Cx is NTD   - S/p cholecystostomy tube placement by IR on 02/05  - Biliary fluid culture 02/10 - candida albicans , likely colonizer   - monitor output from biliary drain, trend WBC   - pt completed course of Tamiflu     # Pulmonary Embolism / chronic diastolic CHF/  Pulmonary Hypertension   - CTA thorax 02/02: There is a focal filling defect within the main pulmonary artery at site of the ductus arteriosus suspicious for thrombus  - No DVT on duplex.   - ECHO: EF of 56%.  Mildly increased LV wall thickness. Grade I diastolic dysfunction. Moderate tricuspid regurgitation. Estimated pulmonary artery systolic pressure is 41.4 mmHg assuming a right atrial pressure of 3 mmHg, which is consistent with mild pulmonary hypertension.  - He was not started on therapeutic AC due to IVH present on admission.  - pt needs neuro surgical clearance for full AC after repeat imaging     # h/o CAD  - ASA  held in setting of IVH  - Since CT head from 02/25 shows resolution of IVH, needs neuro Sx f/u to clear for AC and ASA     # Right hip hematoma   - CT pelvis 02/09: Large intramuscular hematoma in the medial right thigh measuring approximately 9.8 x 6.4 x 17 cm  - Stable on repeat imaging on 02/12    # Sacrococcygeal decubitus ulcer   # Left heel DTI   - Appreciate wound care and burns consult   - S/p debridement 02/22 - no specimen sent for pathology/culture  - change position Q 2 hours, offload pressure points     # Dysphagia   - s/p GJ tube 02/20   - Meds via G tube   - on continuos tube feedings vit J tube   - Nutrition consult     # Parkinson's dementia /  Parkinson's Disease /  Acute IVH of right occipital horn   - CT head 01/31: Trace acute intraventricular hemorrhage is noted layering within the right occipital horn  - CTA head and neck 01/31: Moderate stenosis of the right P2 segment of the PCA. Intracranial vessels are otherwise patent and unremarkable. No evidence of carotid or vertebral artery stenosis.  - CT head 02/25:  Nearly resolved trace intraventricular hemorrhage in the right occipital horn compared to the prior CT head from 2/9/2025.  - Suspicion of seizures given increased disorientation as reported by family members.  EEG, CK, prolactin, lactic acid  - C/w carbidopa/levodopa and rasagiline   - Consult Neurology     # DM  - Insulin Sliding Scale     # Urinary retention   - monitor BUN/cr and urine output      # DVT PPx: Lovenox  - GI PPx: Pantoprazole   - Diet: Tube feeding    #Progress Note Handoff  Pending (specify): neuro Sx  follow up ( to comment on AC, local trach care),  decrease dose of Methadone to 2.5 mg Q 12 hours, supportive care, reconcile home medications ( wife brought home meds today), repeat CXR in 6 hours, f/u pulmonary recommendations, increase dose of Zosyn as per ID,  aggressive pulmonary toilet.   Communication with family: I spoke with pt's wife at length today at the bedside, she agreed with a plan of care   Disposition: SDU

## 2025-02-27 NOTE — PROGRESS NOTE ADULT - SUBJECTIVE AND OBJECTIVE BOX
GARETT ESCAMILLA (69y Male) was seen at bedside, calm,  following simple commands, c/o copious amount of secretions, wife at the bedside.     PAST MEDICAL & SURGICAL HISTORY:  Parkinson disease  CAD (coronary artery disease)  History of basal cell carcinoma (BCC) excision      ICU Vital Signs Last 24 Hrs  T(C): 36.2 (27 Feb 2025 11:00), Max: 36.9 (26 Feb 2025 16:00)  T(F): 97.2 (27 Feb 2025 11:00), Max: 98.4 (26 Feb 2025 16:00)  HR: 85 (27 Feb 2025 11:00) (65 - 118)  BP: 110/61 (27 Feb 2025 11:00) (107/58 - 133/63)  BP(mean): 80 (27 Feb 2025 11:00) (78 - 89)  RR: 18 (27 Feb 2025 11:00) (18 - 18)  SpO2: 100% (27 Feb 2025 11:00) (94% - 100%)    Parameters below as of 27 Feb 2025 11:00  Patient On (Oxygen Delivery Method): ventilator      PHYSICAL EXAM:  General: frail elderly male with temporal muscle waisting   NECK: trach noted with dry blood, mucus, debris around trach noted   Lungs:  decreased BS b/l   Heart: S1, S2, no murmurs   Abdomen: soft, non tender, non distended, PEG tube in place ,  Cholecystostomy tube with bilious fluids in collecting bag   Neuro: follows simple commands,  Tremulous. AAOx0. Moves extremities.  Extremity: No edema, cyanosis, profound muscle atrophy noted      LABS:                                    9.0    6.92  )-----------( 167      ( 27 Feb 2025 05:01 )             28.3   02-27    140  |  104  |  40[H]  ----------------------------<  140[H]  3.8   |  27  |  1.1    Ca    7.3[L]      27 Feb 2025 05:01  Mg     2.1     02-27    TPro  4.5[L]  /  Alb  2.4[L]  /  TBili  0.7  /  DBili  x   /  AST  18  /  ALT  5   /  AlkPhos  144[H]  02-27      Culture - Blood in AM (02.24.25 @ 05:15)   Specimen Source: .Blood None  Culture Results:   No growth at 24 hoursCulture - Bronchial (02.11.25 @ 11:53)   Gram Stain:   No Squamous epithelial cells seen per low power field   Few polymorphonuclear leukocytes seen per low power field   No organisms seen per oil power field  - Levofloxacin: S 1  - Trimethoprim/Sulfamethoxazole: S <=0.5/9.5  - Minocycline: S  Specimen Source: Lavage  Culture Results:   Few Stenotrophomonas maltophilia   Commensal halina consistent with body site  Organism Identification: Stenotrophomonas maltophilia     RADIOLOGY:   < from: CT Head No Cont (02.25.25 @ 12:49) >    IMPRESSION:    Nearly resolved trace intraventricular hemorrhage in the right occipital   horn compared to the prior CT head from 2/9/2025.    Stable mild chronic microvascular ischemic changes.    < from: Xray Chest 1 View- PORTABLE-Routine (Xray Chest 1 View- PORTABLE-Routine in AM.) (02.24.25 @ 06:17) >  Impression:    Stable bilateral opacities, right greater than left. No pneumothorax.    < end of copied text >  < from: TTE Echo Complete w/o Contrast w/ Doppler (01.30.25 @ 11:43) >    Summary:   1. Normal global left ventricular systolic function.   2. LV Ejection Fraction by Aguilar's Method with a biplane EF of 56 %.   3. Mildly increased LV wall thickness.   4. Spectral Doppler shows impaired relaxation pattern of left   ventricular myocardial filling (Grade I diastolic dysfunction).   5. Normal left atrial size.   6. Normal right atrial size.   7. Mild to moderate mitral valve regurgitation.   8. Moderate tricuspid regurgitation.   9. Mild pulmonic valve regurgitation.  10. Estimated pulmonary artery systolic pressure is 41.4 mmHg assuming a   right atrial pressure of 3 mmHg, which is consistent with mild pulmonary   hypertension.    MEDICATIONS  (STANDING):  carbidopa/levodopa  25/250 1 Tablet(s) Oral every 8 hours  chlorhexidine 0.12% Liquid 15 milliLiter(s) Oral Mucosa every 12 hours  chlorhexidine 2% Cloths 1 Application(s) Topical <User Schedule>  dextrose 5%. 1000 milliLiter(s) (50 mL/Hr) IV Continuous <Continuous>  dextrose 5%. 1000 milliLiter(s) (100 mL/Hr) IV Continuous <Continuous>  dextrose 50% Injectable 25 Gram(s) IV Push once  dextrose 50% Injectable 12.5 Gram(s) IV Push once  dextrose 50% Injectable 25 Gram(s) IV Push once  enoxaparin Injectable 40 milliGRAM(s) SubCutaneous every 24 hours  glucagon  Injectable 1 milliGRAM(s) IntraMuscular once  insulin lispro (ADMELOG) corrective regimen sliding scale   SubCutaneous every 6 hours  levETIRAcetam 500 milliGRAM(s) Oral two times a day  lidocaine 1%/epinephrine 1:100,000 Inj 2 Vial(s) Local Injection once  methadone    Tablet 2.5 milliGRAM(s) Oral every 12 hours  pantoprazole  Injectable 40 milliGRAM(s) IV Push daily  piperacillin/tazobactam IVPB.. 3.375 Gram(s) IV Intermittent every 8 hours  polyethylene glycol 3350 17 Gram(s) Oral every 12 hours  senna 2 Tablet(s) Oral at bedtime    MEDICATIONS  (PRN):  acetaminophen     Tablet .. 650 milliGRAM(s) Oral every 6 hours PRN Temp greater or equal to 38C (100.4F), Mild Pain (1 - 3)  dextrose Oral Gel 15 Gram(s) Oral once PRN Blood Glucose LESS THAN 70 milliGRAM(s)/deciliter

## 2025-02-27 NOTE — PROGRESS NOTE ADULT - ASSESSMENT
69-year-old male past medical history of hypertension, Parkinson's, CAD presents for shortness of breath and cough found to have PNA and respiratory failure. Palliative consulted to assist with GOC.     Patient appears somnolent, arousable and responsive as per primary team. Tremulousness subsided and more stable medically.          Plan:  - symptoms per primary team  - continue current medical management  - signing off at this time as patient has plan of care in place    Please call [phone] with questions or concerns 24/7.

## 2025-02-27 NOTE — CHART NOTE - NSCHARTNOTEFT_GEN_A_CORE
Transfer Note    Transfer from: CEU   Transfer to: vent unit       Admitting History:   69-year-old male past medical history of hypertension, Parkinson's, CAD presents for shortness of breath and cough of 1 day duration. History was obtained from patient's wife who noted that the patient has been having decreased po intake, activity, and unintentional weight loss for some time. Howver, yesterday he started having cough and shortness of breath.   Denies any sick contacts or choking on food.    InED pt was found to be hypoxic and in respiratory distress. He was placed on FAcemask O2 then switched to AVAPS.  He is somnolent/lethargic and unable to follow commad. opens eyes slightly when on physical stimuli .    Work up showed AHRF and sepsis 2/2 to mutilobar PNA.      Interim Events:   No prior transfer notes to reference but was admitted to ICU 5T 1/31/25, then SDU 2/22/25  - s/p treatment for flu/PNA   - MSSSA bacteremia - on cefazolin   - trace intraventricular hemorrhage noted 1/31/25  - Found to have PE but not fully anticoagulated 2/2 ICH  - s/p trach and PEG 1/20/25   - s/p cholecystostomy 2/5/25     For Follow-Up:  - f/u ID recs and resolution of PNA/zozsyn end date     ASSESSMENT & PLAN:   69-year-old male past medical history of hypertension, Parkinson's, CAD presents for shortness of breath and cough of 1 day duration. History was obtained from patient's wife who noted that the patient has been having decreased po intake, activity, and unintentional weight loss for some time. started having cough and shortness of breath. Found to have Flu, PNA and MSSA bacteremia  He was intubated for acute hypoxic respiratory failure with subsequent trache/PEG placement on 02/20. He  was found to have trace IVH R occipital horn on CTH 01/31, Pulmonary artery thrombus on CTA : 2/ 2 CTA There is a focal filling defect within the main pulmonary artery at site of the ductus arteriosus suspicious for thrombus.  Increased opacities/debris within the central bronchi with occlusion of the right lower lobe central bronchi. There is increased consolidation within the right lower lobe.   In addition he was treated for suspected cholecystitis , on  2/5 Successful placement of 8Fr perc haydee tube. 250cc of dark bile aspirated from GB.  Pulmonary is following, s/p   2/4 bronch   No organisms seen per oil power field, pt was treated with broad spectrum ABx, downgraded to SDU on 2/25.     A/P   # Acute hypoxic respiratory failure / Multifocal Pneumonia : aspiration vs VAP/ Influenza A/  Septic shock / MSSA bacteremia / suspected cholecystitis   - pt was intubated , s/p  trache 02/20, needs CT Sx follow up to change packing around trach site   - Vent management as per pulmonary team   - daily weaning trials   - aggressive pulmonary toilet, chest vest, frequent suction   - BAL Cx 02/11: Stenotrophomonas -- colonizer as per ID  - CXR 02/24: Stable bilateral opacities, right greater than left. No pneumothorax  - ID follow up today, will f/u official recommendations   - Blood Cx 01/29: MSSA, repeat blood Cx is NTD   - TTE no vegetations  - C/w Ancef , started on Zocyn , ID f/u   - S/p cholecystostomy tube placement by IR on 02/05  - Biliary fluid culture 02/10 - candida albicans , likely colonizer   - monitor output from biliary drain, trend WBC   - pt completed course of Tamiflu     # Pulmonary Embolism / chronic diastolic CHF/  Pulmonary Hypertension   - CTA thorax 02/02: There is a focal filling defect within the main pulmonary artery at site of the ductus arteriosus suspicious for thrombus  - No DVT on duplex.   - ECHO: EF of 56%.  Mildly increased LV wall thickness. Grade I diastolic dysfunction. Moderate tricuspid regurgitation. Estimated pulmonary artery systolic pressure is 41.4 mmHg assuming a right atrial pressure of 3 mmHg, which is consistent with mild pulmonary hypertension.  - He was not started on therapeutic AC due to IVH present on admission.  - pt needs neuro surgical clearance for full AC after repeat imaging     # h/o CAD  - ASA  held in setting of IVH  - Since CT head from 02/25 shows resolution of IVH, needs neuro Sx f/u to clear for AC and ASA     # Right hip hematoma   - CT pelvis 02/09: Large intramuscular hematoma in the medial right thigh measuring approximately 9.8 x 6.4 x 17 cm  - Stable on repeat imaging on 02/12    # Sacrococcygeal decubitus ulcer   # Left heel DTI   - Appreciate wound care and burns consult   - S/p debridement 02/22 - no specimen sent for pathology/culture  - change position Q 2 hours, offload pressure points     # Hypernatremia   - resolved after IV hydration     # Dysphagia   - s/p GJ tube 02/20   - Meds via G tube   - on continuos tube feedings vit J tube   - Nutrition consult     # Parkinson's dementia /  Parkinson's Disease /  Acute IVH of right occipital horn   - CT head 01/31: Trace acute intraventricular hemorrhage is noted layering within the right occipital horn  - CTA head and neck 01/31: Moderate stenosis of the right P2 segment of the PCA. Intracranial vessels are otherwise patent and unremarkable. No evidence of carotid or vertebral artery stenosis.  - CT head 02/25:  Nearly resolved trace intraventricular hemorrhage in the right occipital horn compared to the prior CT head from 2/9/2025.  - Suspicion of seizures given increased disorientation as reported by family members.  EEG, CK, prolactin, lactic acid  - C/w carbidopa/levodopa and rasagiline   - Consult Neurology     # DM  - Insulin Sliding Scale     # Urinary retention   - monitor BUN/cr and urine output      # DVT PPx: Lovenox  - GI PPx: Pantoprazole   - Diet: Tube feeding    #Progress Note Handoff  Pending (specify): neuro Sx, CT Sx follow up ( to comment on AC, local trach care), decrease dose of Methadone taper to Q 12 hours, supportive care, reconcile home medications, ID follow up, daily weaning trials, supportive care, start chest vest, aggressive pulmonary toilet.               MEDICATIONS:  STANDING MEDICATIONS  carbidopa/levodopa  25/250 1 Tablet(s) Oral every 8 hours  chlorhexidine 0.12% Liquid 15 milliLiter(s) Oral Mucosa every 12 hours  chlorhexidine 2% Cloths 1 Application(s) Topical <User Schedule>  dextrose 5%. 1000 milliLiter(s) IV Continuous <Continuous>  dextrose 5%. 1000 milliLiter(s) IV Continuous <Continuous>  dextrose 5%. 1000 milliLiter(s) IV Continuous <Continuous>  dextrose 50% Injectable 25 Gram(s) IV Push once  dextrose 50% Injectable 12.5 Gram(s) IV Push once  dextrose 50% Injectable 25 Gram(s) IV Push once  enoxaparin Injectable 40 milliGRAM(s) SubCutaneous every 24 hours  glucagon  Injectable 1 milliGRAM(s) IntraMuscular once  insulin lispro (ADMELOG) corrective regimen sliding scale   SubCutaneous every 6 hours  levETIRAcetam 500 milliGRAM(s) Oral two times a day  lidocaine 1%/epinephrine 1:100,000 Inj 2 Vial(s) Local Injection once  methadone    Tablet 5 milliGRAM(s) Oral every 12 hours  pantoprazole  Injectable 40 milliGRAM(s) IV Push daily  piperacillin/tazobactam IVPB.. 3.375 Gram(s) IV Intermittent every 8 hours  polyethylene glycol 3350 17 Gram(s) Oral every 12 hours  senna 2 Tablet(s) Oral at bedtime    PRN MEDICATIONS  acetaminophen     Tablet .. 650 milliGRAM(s) Oral every 6 hours PRN  dextrose Oral Gel 15 Gram(s) Oral once PRN      VITAL SIGNS: Last 24 Hours  T(C): 36.7 (27 Feb 2025 07:48), Max: 36.9 (26 Feb 2025 11:38)  T(F): 98.1 (27 Feb 2025 07:48), Max: 98.4 (26 Feb 2025 11:38)  HR: 71 (27 Feb 2025 07:48) (65 - 118)  BP: 121/58 (27 Feb 2025 07:48) (107/58 - 133/63)  BP(mean): 84 (27 Feb 2025 07:48) (78 - 101)  RR: 18 (27 Feb 2025 07:48) (18 - 18)  SpO2: 94% (27 Feb 2025 07:48) (94% - 100%)    LABS:                        9.0    6.92  )-----------( 167      ( 27 Feb 2025 05:01 )             28.3     02-27    140  |  104  |  40[H]  ----------------------------<  140[H]  3.8   |  27  |  1.1    Ca    7.3[L]      27 Feb 2025 05:01  Mg     2.1     02-27    TPro  4.5[L]  /  Alb  2.4[L]  /  TBili  0.7  /  DBili  x   /  AST  18  /  ALT  5   /  AlkPhos  144[H]  02-27      Urinalysis Basic - ( 27 Feb 2025 05:01 )    Color: x / Appearance: x / SG: x / pH: x  Gluc: 140 mg/dL / Ketone: x  / Bili: x / Urobili: x   Blood: x / Protein: x / Nitrite: x   Leuk Esterase: x / RBC: x / WBC x   Sq Epi: x / Non Sq Epi: x / Bacteria: x      ABG - ( 25 Feb 2025 16:19 )  pH, Arterial: 7.55  pH, Blood: x     /  pCO2: 34    /  pO2: 138   / HCO3: 30    / Base Excess: 7.0   /  SaO2: 100.0               Culture - Blood (collected 25 Feb 2025 10:57)  Source: .Blood None  Preliminary Report (26 Feb 2025 23:09):    No growth at 24 hours Transfer Note    Transfer from: CEU   Transfer to: vent unit       Admitting History:   69-year-old male past medical history of hypertension, Parkinson's, CAD presents for shortness of breath and cough of 1 day duration. History was obtained from patient's wife who noted that the patient has been having decreased po intake, activity, and unintentional weight loss for some time. Howver, yesterday he started having cough and shortness of breath.   Denies any sick contacts or choking on food.    InED pt was found to be hypoxic and in respiratory distress. He was placed on FAcemask O2 then switched to AVAPS.  He is somnolent/lethargic and unable to follow commad. opens eyes slightly when on physical stimuli .    Work up showed AHRF and sepsis 2/2 to mutilobar PNA.      Interim Events:   No prior transfer notes to reference but was admitted to ICU 5T 1/31/25, then SDU 2/22/25  - s/p treatment for flu/PNA   - MSSSA bacteremia - on cefazolin   - trace intraventricular hemorrhage noted 1/31/25  - Found to have PE but not fully anticoagulated 2/2 ICH  - s/p trach and PEG 1/20/25   - s/p cholecystostomy 2/5/25     For Follow-Up:  - f/u ID recs and resolution of PNA/zozsyn end date     ASSESSMENT & PLAN:   69-year-old male past medical history of hypertension, Parkinson's, CAD presents for shortness of breath and cough of 1 day duration. History was obtained from patient's wife who noted that the patient has been having decreased po intake, activity, and unintentional weight loss for some time. started having cough and shortness of breath. Found to have Flu, PNA and MSSA bacteremia  He was intubated for acute hypoxic respiratory failure with subsequent trache/PEG placement on 02/20. He  was found to have trace IVH R occipital horn on CTH 01/31, Pulmonary artery thrombus on CTA: 2/2 CTA There is a focal filling defect within the main pulmonary artery at site of the ductus arteriosus suspicious for thrombus.  Increased opacities/debris within the central bronchi with occlusion of the right lower lobe central bronchi. There is increased consolidation within the right lower lobe.   In addition he was treated for suspected cholecystitis , on  2/5 Successful placement of 8Fr perc haydee tube. 250cc of dark bile aspirated from GB.  Pulmonary is following, s/p   2/4 bronch   No organisms seen per oil power field, pt was treated with broad spectrum ABx, downgraded to SDU on 2/25.     A/P   # Acute hypoxic respiratory failure / Multifocal Pneumonia : aspiration vs VAP/ Influenza A/  Septic shock / MSSA bacteremia / suspected cholecystitis   - pt was intubated , s/p  trache 02/20, needs CT Sx follow up to change packing around trach site   - Vent management as per pulmonary team   - daily weaning trials   - aggressive pulmonary toilet, chest vest, frequent suction   - BAL Cx 02/11: Stenotrophomonas -- colonizer as per ID  - CXR 02/24: Stable bilateral opacities, right greater than left. No pneumothorax  - ID follow up today, will f/u official recommendations   - Blood Cx 01/29: MSSA, repeat blood Cx is NTD   - TTE no vegetations  - C/w Ancef , started on Zocyn , ID f/u   - S/p cholecystostomy tube placement by IR on 02/05  - Biliary fluid culture 02/10 - candida albicans , likely colonizer   - monitor output from biliary drain, trend WBC   - pt completed course of Tamiflu     # Pulmonary Embolism / chronic diastolic CHF/  Pulmonary Hypertension   - CTA thorax 02/02: There is a focal filling defect within the main pulmonary artery at site of the ductus arteriosus suspicious for thrombus  - No DVT on duplex.   - ECHO: EF of 56%.  Mildly increased LV wall thickness. Grade I diastolic dysfunction. Moderate tricuspid regurgitation. Estimated pulmonary artery systolic pressure is 41.4 mmHg assuming a right atrial pressure of 3 mmHg, which is consistent with mild pulmonary hypertension.  - He was not started on therapeutic AC due to IVH present on admission.  - pt needs neuro surgical clearance for full AC after repeat imaging     # h/o CAD  - ASA  held in setting of IVH  - Since CT head from 02/25 shows resolution of IVH, needs neuro Sx f/u to clear for AC and ASA     # Right hip hematoma   - CT pelvis 02/09: Large intramuscular hematoma in the medial right thigh measuring approximately 9.8 x 6.4 x 17 cm  - Stable on repeat imaging on 02/12    # Sacrococcygeal decubitus ulcer   # Left heel DTI   - Appreciate wound care and burns consult   - S/p debridement 02/22 - no specimen sent for pathology/culture  - change position Q 2 hours, offload pressure points     # Hypernatremia   - resolved after IV hydration     # Dysphagia   - s/p GJ tube 02/20   - Meds via G tube   - on continuos tube feedings vit J tube   - Nutrition consult     # Parkinson's dementia /  Parkinson's Disease /  Acute IVH of right occipital horn   - CT head 01/31: Trace acute intraventricular hemorrhage is noted layering within the right occipital horn  - CTA head and neck 01/31: Moderate stenosis of the right P2 segment of the PCA. Intracranial vessels are otherwise patent and unremarkable. No evidence of carotid or vertebral artery stenosis.  - CT head 02/25:  Nearly resolved trace intraventricular hemorrhage in the right occipital horn compared to the prior CT head from 2/9/2025.  - Suspicion of seizures given increased disorientation as reported by family members.  EEG, CK, prolactin, lactic acid  - C/w carbidopa/levodopa and rasagiline   - Consult Neurology     # DM  - Insulin Sliding Scale     # Urinary retention   - monitor BUN/cr and urine output      # DVT PPx: Lovenox  - GI PPx: Pantoprazole   - Diet: Tube feeding    #Progress Note Handoff  Pending (specify): neuro Sx, CT Sx follow up ( to comment on AC, local trach care), decrease dose of Methadone taper to Q 12 hours, supportive care, reconcile home medications, ID follow up, daily weaning trials, supportive care, start chest vest, aggressive pulmonary toilet.               MEDICATIONS:  STANDING MEDICATIONS  carbidopa/levodopa  25/250 1 Tablet(s) Oral every 8 hours  chlorhexidine 0.12% Liquid 15 milliLiter(s) Oral Mucosa every 12 hours  chlorhexidine 2% Cloths 1 Application(s) Topical <User Schedule>  dextrose 5%. 1000 milliLiter(s) IV Continuous <Continuous>  dextrose 5%. 1000 milliLiter(s) IV Continuous <Continuous>  dextrose 5%. 1000 milliLiter(s) IV Continuous <Continuous>  dextrose 50% Injectable 25 Gram(s) IV Push once  dextrose 50% Injectable 12.5 Gram(s) IV Push once  dextrose 50% Injectable 25 Gram(s) IV Push once  enoxaparin Injectable 40 milliGRAM(s) SubCutaneous every 24 hours  glucagon  Injectable 1 milliGRAM(s) IntraMuscular once  insulin lispro (ADMELOG) corrective regimen sliding scale   SubCutaneous every 6 hours  levETIRAcetam 500 milliGRAM(s) Oral two times a day  lidocaine 1%/epinephrine 1:100,000 Inj 2 Vial(s) Local Injection once  methadone    Tablet 5 milliGRAM(s) Oral every 12 hours  pantoprazole  Injectable 40 milliGRAM(s) IV Push daily  piperacillin/tazobactam IVPB.. 3.375 Gram(s) IV Intermittent every 8 hours  polyethylene glycol 3350 17 Gram(s) Oral every 12 hours  senna 2 Tablet(s) Oral at bedtime    PRN MEDICATIONS  acetaminophen     Tablet .. 650 milliGRAM(s) Oral every 6 hours PRN  dextrose Oral Gel 15 Gram(s) Oral once PRN      VITAL SIGNS: Last 24 Hours  T(C): 36.7 (27 Feb 2025 07:48), Max: 36.9 (26 Feb 2025 11:38)  T(F): 98.1 (27 Feb 2025 07:48), Max: 98.4 (26 Feb 2025 11:38)  HR: 71 (27 Feb 2025 07:48) (65 - 118)  BP: 121/58 (27 Feb 2025 07:48) (107/58 - 133/63)  BP(mean): 84 (27 Feb 2025 07:48) (78 - 101)  RR: 18 (27 Feb 2025 07:48) (18 - 18)  SpO2: 94% (27 Feb 2025 07:48) (94% - 100%)    LABS:                        9.0    6.92  )-----------( 167      ( 27 Feb 2025 05:01 )             28.3     02-27    140  |  104  |  40[H]  ----------------------------<  140[H]  3.8   |  27  |  1.1    Ca    7.3[L]      27 Feb 2025 05:01  Mg     2.1     02-27    TPro  4.5[L]  /  Alb  2.4[L]  /  TBili  0.7  /  DBili  x   /  AST  18  /  ALT  5   /  AlkPhos  144[H]  02-27      Urinalysis Basic - ( 27 Feb 2025 05:01 )    Color: x / Appearance: x / SG: x / pH: x  Gluc: 140 mg/dL / Ketone: x  / Bili: x / Urobili: x   Blood: x / Protein: x / Nitrite: x   Leuk Esterase: x / RBC: x / WBC x   Sq Epi: x / Non Sq Epi: x / Bacteria: x      ABG - ( 25 Feb 2025 16:19 )  pH, Arterial: 7.55  pH, Blood: x     /  pCO2: 34    /  pO2: 138   / HCO3: 30    / Base Excess: 7.0   /  SaO2: 100.0               Culture - Blood (collected 25 Feb 2025 10:57)  Source: .Blood None  Preliminary Report (26 Feb 2025 23:09):    No growth at 24 hours Transfer Note    Transfer from: CEU   Transfer to: vent unit       Admitting History:   69-year-old male past medical history of hypertension, Parkinson's, CAD presents for shortness of breath and cough of 1 day duration. History was obtained from patient's wife who noted that the patient has been having decreased po intake, activity, and unintentional weight loss for some time. Howver, yesterday he started having cough and shortness of breath.   Denies any sick contacts or choking on food.    InED pt was found to be hypoxic and in respiratory distress. He was placed on FAcemask O2 then switched to AVAPS.  He is somnolent/lethargic and unable to follow commad. opens eyes slightly when on physical stimuli .    Work up showed AHRF and sepsis 2/2 to mutilobar PNA.      Interim Events:   No prior transfer notes to reference but was admitted to ICU 5T 1/31/25, then SDU 2/22/25  - s/p treatment for flu/PNA   - MSSSA bacteremia - on cefazolin   - trace intraventricular hemorrhage noted 1/31/25  - Found to have PE but not fully anticoagulated 2/2 ICH  - s/p trach and PEG 1/20/25   - s/p cholecystostomy 2/5/25     For Follow-Up:  - f/u ID recs and resolution of PNA/zozsyn end date   - Neuro or neurosx regarding clearance to start full AC for PE in setting of resolving intracranial bleed   - weaning trials     ASSESSMENT & PLAN:   69-year-old male past medical history of hypertension, Parkinson's, CAD presents for shortness of breath and cough of 1 day duration. History was obtained from patient's wife who noted that the patient has been having decreased po intake, activity, and unintentional weight loss for some time. started having cough and shortness of breath. Found to have Flu, PNA and MSSA bacteremia  He was intubated for acute hypoxic respiratory failure with subsequent trache/PEG placement on 02/20. He was found to have trace IVH R occipital horn on CTH 01/31, Pulmonary artery thrombus on CTA: 2/2 CTA There is a focal filling defect within the main pulmonary artery at site of the ductus arteriosus suspicious for thrombus. AC currently off 2/2 periventricular bleed seen on CTH. Repeat CTH on 2/25 showed resolving bleed. Follow up with neuro/neurosx is underway for clearance to restart full AC.   Increased opacities/debris within the central bronchi with occlusion of the right lower lobe central bronchi. There is increased consolidation within the right lower lobe.   In addition he was treated for suspected cholecystitis on 2/5 with successful placement of 8Fr perc haydee tube. 250cc of dark bile aspirated from GB. Drain remains in place with active drainage.   Pulmonary is following, s/p 2/4 bronch, no organisms seen per oil power field, pt was treated with broad spectrum ABx, downgraded to SDU on 2/25.     A/P   # Acute hypoxic respiratory failure / Multifocal Pneumonia : aspiration vs VAP/ Influenza A/  Septic shock / MSSA bacteremia / suspected cholecystitis   - pt was intubated , s/p  trache 02/20, needs CT Sx follow up to change packing around trach site   - Vent management as per pulmonary team   - daily weaning trials   - aggressive pulmonary toilet, chest vest, frequent suction   - BAL Cx 02/11: Stenotrophomonas -- colonizer as per ID  - CXR 02/24: Stable bilateral opacities, right greater than left. No pneumothorax  - ID follow up today, will f/u official recommendations   - Blood Cx 01/29: MSSA, repeat blood Cx is NTD   - TTE no vegetations  - C/w Ancef , started on Zocyn , ID f/u   - S/p cholecystostomy tube placement by IR on 02/05  - Biliary fluid culture 02/10 - candida albicans , likely colonizer   - monitor output from biliary drain, trend WBC   - pt completed course of Tamiflu  - tapered off of methadone - pt calm      # Pulmonary Embolism / chronic diastolic CHF/  Pulmonary Hypertension   - CTA thorax 02/02: There is a focal filling defect within the main pulmonary artery at site of the ductus arteriosus suspicious for thrombus  - No DVT on duplex.   - ECHO: EF of 56%.  Mildly increased LV wall thickness. Grade I diastolic dysfunction. Moderate tricuspid regurgitation. Estimated pulmonary artery systolic pressure is 41.4 mmHg assuming a right atrial pressure of 3 mmHg, which is consistent with mild pulmonary hypertension.  - He was not started on therapeutic AC due to IVH present on admission.  - pt needs neuro surgical clearance for full AC after repeat imaging     # h/o CAD  - ASA  held in setting of IVH  - Since CT head from 02/25 shows resolution of IVH, needs neuro Sx f/u to clear for AC and ASA     # Right hip hematoma   - CT pelvis 02/09: Large intramuscular hematoma in the medial right thigh measuring approximately 9.8 x 6.4 x 17 cm  - Stable on repeat imaging on 02/12    # Sacrococcygeal decubitus ulcer   # Left heel DTI   - Appreciate wound care and burns consult   - S/p debridement 02/22 - no specimen sent for pathology/culture  - change position Q 2 hours, offload pressure points     # Hypernatremia   - resolved after IV hydration     # Dysphagia   - s/p GJ tube 02/20   - Meds via G tube   - on continuos tube feedings vit J tube   - Nutrition consult     # Parkinson's dementia /  Parkinson's Disease /  Acute IVH of right occipital horn   - CT head 01/31: Trace acute intraventricular hemorrhage is noted layering within the right occipital horn  - CTA head and neck 01/31: Moderate stenosis of the right P2 segment of the PCA. Intracranial vessels are otherwise patent and unremarkable. No evidence of carotid or vertebral artery stenosis.  - CT head 02/25:  Nearly resolved trace intraventricular hemorrhage in the right occipital horn compared to the prior CT head from 2/9/2025.  - Suspicion of seizures given increased disorientation as reported by family members.  EEG, CK, prolactin, lactic acid  - Neuro started patient on keppra and decreased sinemet dosages, discontinued rasagiline -- pt had increased parkinsonisms 2/2 overmedication     # DM  - Insulin Sliding Scale     # Urinary retention   - monitor BUN/cr and urine output      # DVT PPx: Lovenox  - GI PPx: Pantoprazole   - Diet: Tube feeding    #Progress Note Handoff  Pending (specify): neuro Sx, (to comment on AC, local trach care), supportive care, ID follow up, daily weaning trials, start chest vest, aggressive pulmonary toilet              MEDICATIONS:  STANDING MEDICATIONS  carbidopa/levodopa  25/250 1 Tablet(s) Oral every 8 hours  chlorhexidine 0.12% Liquid 15 milliLiter(s) Oral Mucosa every 12 hours  chlorhexidine 2% Cloths 1 Application(s) Topical <User Schedule>  dextrose 5%. 1000 milliLiter(s) IV Continuous <Continuous>  dextrose 5%. 1000 milliLiter(s) IV Continuous <Continuous>  dextrose 5%. 1000 milliLiter(s) IV Continuous <Continuous>  dextrose 50% Injectable 25 Gram(s) IV Push once  dextrose 50% Injectable 12.5 Gram(s) IV Push once  dextrose 50% Injectable 25 Gram(s) IV Push once  enoxaparin Injectable 40 milliGRAM(s) SubCutaneous every 24 hours  glucagon  Injectable 1 milliGRAM(s) IntraMuscular once  insulin lispro (ADMELOG) corrective regimen sliding scale   SubCutaneous every 6 hours  levETIRAcetam 500 milliGRAM(s) Oral two times a day  lidocaine 1%/epinephrine 1:100,000 Inj 2 Vial(s) Local Injection once  methadone    Tablet 5 milliGRAM(s) Oral every 12 hours  pantoprazole  Injectable 40 milliGRAM(s) IV Push daily  piperacillin/tazobactam IVPB.. 3.375 Gram(s) IV Intermittent every 8 hours  polyethylene glycol 3350 17 Gram(s) Oral every 12 hours  senna 2 Tablet(s) Oral at bedtime    PRN MEDICATIONS  acetaminophen     Tablet .. 650 milliGRAM(s) Oral every 6 hours PRN  dextrose Oral Gel 15 Gram(s) Oral once PRN      VITAL SIGNS: Last 24 Hours  T(C): 36.7 (27 Feb 2025 07:48), Max: 36.9 (26 Feb 2025 11:38)  T(F): 98.1 (27 Feb 2025 07:48), Max: 98.4 (26 Feb 2025 11:38)  HR: 71 (27 Feb 2025 07:48) (65 - 118)  BP: 121/58 (27 Feb 2025 07:48) (107/58 - 133/63)  BP(mean): 84 (27 Feb 2025 07:48) (78 - 101)  RR: 18 (27 Feb 2025 07:48) (18 - 18)  SpO2: 94% (27 Feb 2025 07:48) (94% - 100%)    LABS:                        9.0    6.92  )-----------( 167      ( 27 Feb 2025 05:01 )             28.3     02-27    140  |  104  |  40[H]  ----------------------------<  140[H]  3.8   |  27  |  1.1    Ca    7.3[L]      27 Feb 2025 05:01  Mg     2.1     02-27    TPro  4.5[L]  /  Alb  2.4[L]  /  TBili  0.7  /  DBili  x   /  AST  18  /  ALT  5   /  AlkPhos  144[H]  02-27      Urinalysis Basic - ( 27 Feb 2025 05:01 )    Color: x / Appearance: x / SG: x / pH: x  Gluc: 140 mg/dL / Ketone: x  / Bili: x / Urobili: x   Blood: x / Protein: x / Nitrite: x   Leuk Esterase: x / RBC: x / WBC x   Sq Epi: x / Non Sq Epi: x / Bacteria: x      ABG - ( 25 Feb 2025 16:19 )  pH, Arterial: 7.55  pH, Blood: x     /  pCO2: 34    /  pO2: 138   / HCO3: 30    / Base Excess: 7.0   /  SaO2: 100.0               Culture - Blood (collected 25 Feb 2025 10:57)  Source: .Blood None  Preliminary Report (26 Feb 2025 23:09):    No growth at 24 hours

## 2025-02-27 NOTE — PROGRESS NOTE ADULT - SUBJECTIVE AND OBJECTIVE BOX
SUBJECTIVE/OVERNIGHT EVENTS  Today is hospital day 29d. This morning patient was seen and examined at bedside, resting comfortably in bed. No acute or major events overnight.    PMH    Pneumonia due to infectious organism    Handoff    MEWS Score    Parkinson disease    CAD (coronary artery disease)    History of basal cell carcinoma (BCC) excision    Acute respiratory failure with hypoxia    Sacral pressure sore    Acute respiratory failure with hypoxia    Sacral pressure sore    Pneumonia    Acute respiratory failure with hypoxia    Pneumonia    Parkinson disease    Encounter for palliative care    Bronchoscopy, flexible, with tracheostomy tube replacement    Transoral endoscopic creation of a gastrojejunostomy    Selective debridement    SOB    90+    Acute respiratory failure with hypoxia    [email or web address]        MEDICATIONS  STANDING MEDICATIONS  carbidopa/levodopa  25/250 1 Tablet(s) Oral every 8 hours  chlorhexidine 0.12% Liquid 15 milliLiter(s) Oral Mucosa every 12 hours  chlorhexidine 2% Cloths 1 Application(s) Topical <User Schedule>  dextrose 5%. 1000 milliLiter(s) IV Continuous <Continuous>  dextrose 5%. 1000 milliLiter(s) IV Continuous <Continuous>  dextrose 5%. 1000 milliLiter(s) IV Continuous <Continuous>  dextrose 50% Injectable 25 Gram(s) IV Push once  dextrose 50% Injectable 12.5 Gram(s) IV Push once  dextrose 50% Injectable 25 Gram(s) IV Push once  enoxaparin Injectable 40 milliGRAM(s) SubCutaneous every 24 hours  glucagon  Injectable 1 milliGRAM(s) IntraMuscular once  insulin lispro (ADMELOG) corrective regimen sliding scale   SubCutaneous every 6 hours  levETIRAcetam 500 milliGRAM(s) Oral two times a day  lidocaine 1%/epinephrine 1:100,000 Inj 2 Vial(s) Local Injection once  methadone    Tablet 5 milliGRAM(s) Oral every 12 hours  pantoprazole  Injectable 40 milliGRAM(s) IV Push daily  piperacillin/tazobactam IVPB.. 3.375 Gram(s) IV Intermittent every 8 hours  polyethylene glycol 3350 17 Gram(s) Oral every 12 hours  senna 2 Tablet(s) Oral at bedtime    PRN MEDICATIONS  acetaminophen     Tablet .. 650 milliGRAM(s) Oral every 6 hours PRN  dextrose Oral Gel 15 Gram(s) Oral once PRN    VITALS  T(F): 98.1 (02-27-25 @ 07:48), Max: 98.4 (02-26-25 @ 11:38)  HR: 71 (02-27-25 @ 07:48) (65 - 118)  BP: 121/58 (02-27-25 @ 07:48) (107/58 - 133/63)  RR: 18 (02-27-25 @ 07:48) (18 - 18)  SpO2: 94% (02-27-25 @ 07:48) (94% - 100%)  POCT Blood Glucose.: 137 mg/dL (02-27-25 @ 06:58)  POCT Blood Glucose.: 162 mg/dL (02-27-25 @ 05:43)  POCT Blood Glucose.: 136 mg/dL (02-27-25 @ 00:30)  POCT Blood Glucose.: 124 mg/dL (02-26-25 @ 17:28)  POCT Blood Glucose.: 95 mg/dL (02-26-25 @ 11:24)    PHYSICAL EXAM  GENERAL: NAD, ill-appearing, cachectic   HEAD: Atraumatic, normocephalic  EYES: EOMI, PERRL  HEART: Regular rate and rhythm  LUNGS: Unlabored respirations; trach to vent   ABDOMEN: Soft, nontender, nondistended, +BS  EXTREMITIES: 2+ peripheral pulses bilaterally. No clubbing, cyanosis, or edema  NERVOUS SYSTEM:  Alert, awake, more interactive than yesterday, answering questions by nodding/signaling     LABS             9.0    6.92  )-----------( 167      ( 02-27-25 @ 05:01 )             28.3     140  |  104  |  40  -------------------------<  140   02-27-25 @ 05:01  3.8  |  27  |  1.1    Ca      7.3     02-27-25 @ 05:01  Mg     2.1     02-27-25 @ 05:01    TPro  4.5  /  Alb  2.4  /  TBili  0.7  /  DBili  x   /  AST  18  /  ALT  5   /  AlkPhos  144  /  GGT  x     02-27-25 @ 05:01        Urinalysis Basic - ( 27 Feb 2025 05:01 )    Color: x / Appearance: x / SG: x / pH: x  Gluc: 140 mg/dL / Ketone: x  / Bili: x / Urobili: x   Blood: x / Protein: x / Nitrite: x   Leuk Esterase: x / RBC: x / WBC x   Sq Epi: x / Non Sq Epi: x / Bacteria: x      ABG - ( 25 Feb 2025 16:19 )  pH, Arterial: 7.55  pH, Blood: x     /  pCO2: 34    /  pO2: 138   / HCO3: 30    / Base Excess: 7.0   /  SaO2: 100.0         Culture - Blood (collected 25 Feb 2025 10:57)  Source: .Blood None  Preliminary Report (26 Feb 2025 23:09):    No growth at 24 hours

## 2025-02-27 NOTE — PROGRESS NOTE ADULT - ASSESSMENT
IMPRESSION:    Acute Hypoxic Respiratory Failure Sp Trach/PEG   Toxic Metabolic Encephalopathy  aspiration   Dyskinesia  L ptx  Influenza A treated   MSSA bacteremia repeat CX -  Sepsis POA  cholecystitis sp percutaneous chol  Periventricular bleed  Suspected Mural thrombus in PA  Right thigh hematoma.    HO Parkinson's Disease   HO CAD    PLAN:    CNS:  SAT. Continue Anti parkinson's per neuro. dec Methadone 2.5  q 12, Neuro fup    HEENT: Oral care. s/p trach and PEG    PULMONARY:  Monitor airway pressures. Remains with significant secretions. s/p trac, Keep Sao2 92 to 96%, repeat CXR noted, dec     CARDIOVASCULAR:  Avoid overload.     GI: GI prophylaxis. S/P perc cholecystostomy. Bowel regimen G tube for meds , feeds through J tube    RENAL: Follow up lytes. Mars in place for retention. Kidney function stable.     INFECTIOUS DISEASE: Cultures noted. SP Tamiflu. ABX per ID.     HEMATOLOGICAL: Monitor CBC and coags. CTA noted with no active bleed. Goal Hb > 7.  LE DOPPLER -    ENDOCRINE:  Follow up FS.  Insulin protocol if needed.    MUSCULOSKELETAL: Bed rest.  Off loading. Wound care for DTIs. Burn eval noted     Full code  palliative care follow up   very poor prognosis  vent unit

## 2025-02-27 NOTE — PROGRESS NOTE ADULT - ASSESSMENT
69-year-old male past medical history of hypertension, Parkinson's, CAD presents for shortness of breath and cough of 1 day duration. History was obtained from patient's wife who noted that the patient has been having decreased po intake, activity, and unintentional weight loss for some time. However yesterday he started having cough and shortness of breath. Admitted to MICU for AHRF and sepsis 2/2 to mutilobar PNA.    #Acute Hypoxic Respiratory Failure likely 2/2 CAP/aspiration and flu  #Toxic Metabolic Encephalopathy likely 2/2 CAP/aspiration and flu  #Influenza A positive, treated  #MSSA bacteremia- resolved  #Sepsis POA  #Possible aspiration event on 2/24  - Aspiration precautions.    - patient inc WOB, worsening alkalosis, obtunded-intubated for airway protection   -ID: if patient decompensates, dc cefepime/metro, start patricia 1g   - cardio: patient critically sick, not a candidate for surgery therefore no MARY, can c/w abx for IE as per ID  - s/p bronch, f/u cultures   - ID: tamiflu 30 mg daily: last dose on 2/10, s/p cefepime & metro (end date 2/16). nafcillin>>> now on cefazolin 2g q8h for MSSA bact  as per ID (6w)  -2/11 bronch: showed copious amounts of secretions and poor cough.  Will need trach: ongoing daily discussions with wife regarding trach vs extubation & change in code status   CT surgery consulted for trach & peg   Stenotrophomonas now in bronch cx from 2/11, suspect colonizer, if fever/ incr wbc would add levaquin per ID  2/20>> pt got trach & peg. minimal pneumothorax noted on initial cxr   2/21: starting trickle feeds  2/23: weaning off sedation>>on methadone & versed pushes if needed>> DG to SDU once off sedation  2/24: pt noted to have had an aspiration event and 2/24 AM after feeds were started and pt began to desat with improvement w suctioning -- started on levaquin  2/25: pt had seizure like activity so levaquin was dc'ed (can lower seizure threshold) and started on zosyn instead   2/25: dc'ed bumex   2/26: will start weaning trials; chest PT ordered for increased secretions   2/27: chest vest; CXR shows small pneumothorax likely iatrogenic     #Acute L sided pneumothorax   - CXR 2/27 - 2.3cm airgap   - no change in vent settings, will watch for now and follow with serial CXR q6h     #Hypernatremia   - 2/24: started D50, target Na 145    #Seizure like activity  - pt came down to CEU with facial twitching, repetitive blinking, and rhythmic chewing motions  - EEG --> potential for focal seizure   - neuro consult appreciated -- start on keppra 500 bid, dc rasagiline, decrease sinemet  - prolactin elevated CK nl    #PE on CTA  #large R groin intramuscular hematoma, stable  #Periventricular bleed on CTH - resolving   - CTH. noted with right periventricular bleed, stable.   - Neuro on board  - Continue Anti parkinson's meds.    - CTA chest to r/o PE: focal filling defect within main pulm artery   - repeat CT head: stable intraventricular hemorrhage >> repeat on 2/9 after on full AC for PE: stable  -CT pelvis:  Large intramuscular hematoma in the medial right thigh measuring approximately 9.8 x 6.4 x 17 cm.  -holding AC  -2/12: Hb drop to 6.8> got 1 unit> up to 9.5 .  CTA stable & negative for any active extravasation   2/14: 1 unit prbc  hb stable. restart proph AC 2/18. duplex -ve 2/17  - CTH repeat 2/25 - almost complete resolution of periventricular bleed     #Cholangitis vs cholecystitis s/p perc haydee  #Elevated Bilirubin-improving   #Leukocytosis-improving   - F/u RUQ US: distended GB with sludge, GB polyp   - IR: perc haydee 2/5, . GB drainage cultures: few candida, likely coloniz per id.    #Constipation, improved  - senna and miralax  - c/w lactulose   -2/10: kub: distented bowels. having BM    #Possible Mural thrombus in PA  #CAD  - Avoid overload  - TTE: EF 56%, G1DD, mild-mod MR, mod TR, mild NJ, mild pHTN  - Cardio recs appreciated       - Patient is poor candidate for MARY  - zosyn 3.375 q8h (dc'ed cefazolin while on zosyn)    #MISC  - DVT ppx: SCDs  - GI ppx: not needed  - Activity: as tolerated  - Diet:  J tube cont feeds  - full code   69-year-old male past medical history of hypertension, Parkinson's, CAD presents for shortness of breath and cough of 1 day duration. History was obtained from patient's wife who noted that the patient has been having decreased po intake, activity, and unintentional weight loss for some time. However yesterday he started having cough and shortness of breath. Admitted to MICU for AHRF and sepsis 2/2 to mutilobar PNA.    #Acute Hypoxic Respiratory Failure likely 2/2 CAP/aspiration and flu  #Toxic Metabolic Encephalopathy likely 2/2 CAP/aspiration and flu  #Influenza A positive, treated  #MSSA bacteremia- resolved  #Sepsis POA  #Possible aspiration event on 2/24  - Aspiration precautions.    - patient inc WOB, worsening alkalosis, obtunded-intubated for airway protection   -ID: if patient decompensates, dc cefepime/metro, start patricia 1g   - cardio: patient critically sick, not a candidate for surgery therefore no MARY, can c/w abx for IE as per ID  - s/p bronch, f/u cultures   - ID: tamiflu 30 mg daily: last dose on 2/10, s/p cefepime & metro (end date 2/16). nafcillin>>> now on cefazolin 2g q8h for MSSA bact  as per ID (6w)  -2/11 bronch: showed copious amounts of secretions and poor cough.  Will need trach: ongoing daily discussions with wife regarding trach vs extubation & change in code status   CT surgery consulted for trach & peg   Stenotrophomonas now in bronch cx from 2/11, suspect colonizer, if fever/ incr wbc would add levaquin per ID  2/20>> pt got trach & peg. minimal pneumothorax noted on initial cxr   2/21: starting trickle feeds  2/23: weaning off sedation>>on methadone & versed pushes if needed>> DG to SDU once off sedation  2/24: pt noted to have had an aspiration event and 2/24 AM after feeds were started and pt began to desat with improvement w suctioning -- started on levaquin  2/25: pt had seizure like activity so levaquin was dc'ed (can lower seizure threshold) and started on zosyn instead   2/25: dc'ed bumex   2/26: will start weaning trials; chest PT ordered for increased secretions   2/27: chest vest; CXR shows small pneumothorax likely iatrogenic     #Acute L sided pneumothorax   - CXR 2/27 - 2.3cm airgap   - no change in vent settings, will watch for now and follow with serial CXR q6h     #Hypernatremia - resolved   - D50    #Seizure like activity  - pt came down to CEU with facial twitching, repetitive blinking, and rhythmic chewing motions  - EEG --> potential for focal seizure   - neuro consult appreciated -- start on keppra 500 bid, dc rasagiline, decrease sinemet  - prolactin elevated CK nl    #PE on CTA  #large R groin intramuscular hematoma, stable  #Periventricular bleed on CTH - resolving   - CTH. noted with right periventricular bleed, stable.   - Neuro on board  - Continue Anti parkinson's meds.    - CTA chest to r/o PE: focal filling defect within main pulm artery   - repeat CT head: stable intraventricular hemorrhage >> repeat on 2/9 after on full AC for PE: stable  -CT pelvis:  Large intramuscular hematoma in the medial right thigh measuring approximately 9.8 x 6.4 x 17 cm.  -holding AC  -2/12: Hb drop to 6.8> got 1 unit> up to 9.5 .  CTA stable & negative for any active extravasation   2/14: 1 unit prbc  hb stable. restart proph AC 2/18. duplex -ve 2/17  - CTH repeat 2/25 - almost complete resolution of periventricular bleed     #Cholangitis vs cholecystitis s/p perc haydee  #Elevated Bilirubin-improving   #Leukocytosis-improving   - F/u RUQ US: distended GB with sludge, GB polyp   - IR: perc haydee 2/5, . GB drainage cultures: few candida, likely coloniz per id.    #Constipation, improved  - senna and miralax  - c/w lactulose   -2/10: kub: distented bowels. having BM    #Possible Mural thrombus in PA  #CAD  - Avoid overload  - TTE: EF 56%, G1DD, mild-mod MR, mod TR, mild [place], mild pHTN  - Cardio recs appreciated       - Patient is poor candidate for MARY  - zosyn 3.375 q8h (dc'ed cefazolin while on zosyn)    #MISC  - DVT ppx: SCDs  - GI ppx: not needed  - Activity: as tolerated  - Diet:  J tube cont feeds  - full code

## 2025-02-28 LAB
ALBUMIN SERPL ELPH-MCNC: 2.3 G/DL — LOW (ref 3.5–5.2)
ALP SERPL-CCNC: 198 U/L — HIGH (ref 30–115)
ALT FLD-CCNC: <5 U/L — SIGNIFICANT CHANGE UP (ref 0–41)
ANION GAP SERPL CALC-SCNC: 6 MMOL/L — LOW (ref 7–14)
AST SERPL-CCNC: 19 U/L — SIGNIFICANT CHANGE UP (ref 0–41)
BASOPHILS # BLD AUTO: 0.01 K/UL — SIGNIFICANT CHANGE UP (ref 0–0.2)
BASOPHILS NFR BLD AUTO: 0.2 % — SIGNIFICANT CHANGE UP (ref 0–1)
BILIRUB SERPL-MCNC: 0.8 MG/DL — SIGNIFICANT CHANGE UP (ref 0.2–1.2)
BLD GP AB SCN SERPL QL: SIGNIFICANT CHANGE UP
BUN SERPL-MCNC: 36 MG/DL — HIGH (ref 10–20)
CALCIUM SERPL-MCNC: 7.5 MG/DL — LOW (ref 8.4–10.5)
CHLORIDE SERPL-SCNC: 101 MMOL/L — SIGNIFICANT CHANGE UP (ref 98–110)
CO2 SERPL-SCNC: 31 MMOL/L — SIGNIFICANT CHANGE UP (ref 17–32)
CREAT SERPL-MCNC: 1 MG/DL — SIGNIFICANT CHANGE UP (ref 0.7–1.5)
EGFR: 81 ML/MIN/1.73M2 — SIGNIFICANT CHANGE UP
EGFR: 81 ML/MIN/1.73M2 — SIGNIFICANT CHANGE UP
EOSINOPHIL # BLD AUTO: 0.06 K/UL — SIGNIFICANT CHANGE UP (ref 0–0.7)
EOSINOPHIL NFR BLD AUTO: 1 % — SIGNIFICANT CHANGE UP (ref 0–8)
GLUCOSE BLDC GLUCOMTR-MCNC: 107 MG/DL — HIGH (ref 70–99)
GLUCOSE BLDC GLUCOMTR-MCNC: 113 MG/DL — HIGH (ref 70–99)
GLUCOSE BLDC GLUCOMTR-MCNC: 125 MG/DL — HIGH (ref 70–99)
GLUCOSE BLDC GLUCOMTR-MCNC: 136 MG/DL — HIGH (ref 70–99)
GLUCOSE BLDC GLUCOMTR-MCNC: 137 MG/DL — HIGH (ref 70–99)
GLUCOSE SERPL-MCNC: 114 MG/DL — HIGH (ref 70–99)
HCT VFR BLD CALC: 28.6 % — LOW (ref 42–52)
HGB BLD-MCNC: 9.2 G/DL — LOW (ref 14–18)
IMM GRANULOCYTES NFR BLD AUTO: 0.6 % — HIGH (ref 0.1–0.3)
LYMPHOCYTES # BLD AUTO: 0.66 K/UL — LOW (ref 1.2–3.4)
LYMPHOCYTES # BLD AUTO: 10.7 % — LOW (ref 20.5–51.1)
MAGNESIUM SERPL-MCNC: 2.2 MG/DL — SIGNIFICANT CHANGE UP (ref 1.8–2.4)
MCHC RBC-ENTMCNC: 30.4 PG — SIGNIFICANT CHANGE UP (ref 27–31)
MCHC RBC-ENTMCNC: 32.2 G/DL — SIGNIFICANT CHANGE UP (ref 32–37)
MCV RBC AUTO: 94.4 FL — HIGH (ref 80–94)
MONOCYTES # BLD AUTO: 0.25 K/UL — SIGNIFICANT CHANGE UP (ref 0.1–0.6)
MONOCYTES NFR BLD AUTO: 4.1 % — SIGNIFICANT CHANGE UP (ref 1.7–9.3)
NEUTROPHILS # BLD AUTO: 5.15 K/UL — SIGNIFICANT CHANGE UP (ref 1.4–6.5)
NEUTROPHILS NFR BLD AUTO: 83.4 % — HIGH (ref 42.2–75.2)
NRBC BLD AUTO-RTO: 0 /100 WBCS — SIGNIFICANT CHANGE UP (ref 0–0)
PLATELET # BLD AUTO: 189 K/UL — SIGNIFICANT CHANGE UP (ref 130–400)
PMV BLD: 11 FL — HIGH (ref 7.4–10.4)
POTASSIUM SERPL-MCNC: 3.5 MMOL/L — SIGNIFICANT CHANGE UP (ref 3.5–5)
POTASSIUM SERPL-SCNC: 3.5 MMOL/L — SIGNIFICANT CHANGE UP (ref 3.5–5)
PROT SERPL-MCNC: 4.6 G/DL — LOW (ref 6–8)
RBC # BLD: 3.03 M/UL — LOW (ref 4.7–6.1)
RBC # FLD: 18.2 % — HIGH (ref 11.5–14.5)
SODIUM SERPL-SCNC: 138 MMOL/L — SIGNIFICANT CHANGE UP (ref 135–146)
WBC # BLD: 6.17 K/UL — SIGNIFICANT CHANGE UP (ref 4.8–10.8)
WBC # FLD AUTO: 6.17 K/UL — SIGNIFICANT CHANGE UP (ref 4.8–10.8)

## 2025-02-28 PROCEDURE — 99232 SBSQ HOSP IP/OBS MODERATE 35: CPT

## 2025-02-28 PROCEDURE — 71045 X-RAY EXAM CHEST 1 VIEW: CPT | Mod: 26

## 2025-02-28 PROCEDURE — 71045 X-RAY EXAM CHEST 1 VIEW: CPT | Mod: 26,77

## 2025-02-28 PROCEDURE — 99233 SBSQ HOSP IP/OBS HIGH 50: CPT

## 2025-02-28 RX ORDER — ENOXAPARIN SODIUM 100 MG/ML
50 INJECTION SUBCUTANEOUS EVERY 12 HOURS
Refills: 0 | Status: DISCONTINUED | OUTPATIENT
Start: 2025-02-28 | End: 2025-03-05

## 2025-02-28 RX ADMIN — Medication 1 TABLET(S): at 13:46

## 2025-02-28 RX ADMIN — Medication 15 MILLILITER(S): at 17:41

## 2025-02-28 RX ADMIN — POLYETHYLENE GLYCOL 3350 17 GRAM(S): 17 POWDER, FOR SOLUTION ORAL at 17:40

## 2025-02-28 RX ADMIN — Medication 1 TABLET(S): at 06:03

## 2025-02-28 RX ADMIN — Medication 1 TABLET(S): at 21:37

## 2025-02-28 RX ADMIN — Medication 2 TABLET(S): at 21:36

## 2025-02-28 RX ADMIN — Medication 25 GRAM(S): at 21:37

## 2025-02-28 RX ADMIN — Medication 15 MILLILITER(S): at 06:04

## 2025-02-28 RX ADMIN — Medication 25 GRAM(S): at 06:04

## 2025-02-28 RX ADMIN — Medication 2.5 MILLIGRAM(S): at 06:03

## 2025-02-28 RX ADMIN — Medication 650 MILLIGRAM(S): at 22:06

## 2025-02-28 RX ADMIN — POLYETHYLENE GLYCOL 3350 17 GRAM(S): 17 POWDER, FOR SOLUTION ORAL at 06:03

## 2025-02-28 RX ADMIN — Medication 1 APPLICATION(S): at 06:04

## 2025-02-28 RX ADMIN — Medication 2.5 MILLIGRAM(S): at 17:40

## 2025-02-28 RX ADMIN — Medication 25 GRAM(S): at 13:46

## 2025-02-28 RX ADMIN — Medication 40 MILLIGRAM(S): at 11:21

## 2025-02-28 RX ADMIN — LEVETIRACETAM 500 MILLIGRAM(S): 10 INJECTION, SOLUTION INTRAVENOUS at 17:41

## 2025-02-28 RX ADMIN — LEVETIRACETAM 500 MILLIGRAM(S): 10 INJECTION, SOLUTION INTRAVENOUS at 06:04

## 2025-02-28 RX ADMIN — Medication 650 MILLIGRAM(S): at 21:36

## 2025-02-28 RX ADMIN — ENOXAPARIN SODIUM 50 MILLIGRAM(S): 100 INJECTION SUBCUTANEOUS at 17:40

## 2025-02-28 NOTE — PROGRESS NOTE ADULT - SUBJECTIVE AND OBJECTIVE BOX
Patient is a 69y old  Male who presents with a chief complaint of penumonia (27 Feb 2025 13:07)        Over Night Events:  no overnight events  has some chest tightness today   not in distress      ROS:     All ROS are negative except HPI         PHYSICAL EXAM    ICU Vital Signs Last 24 Hrs  T(C): 36.3 (28 Feb 2025 04:00), Max: 36.5 (27 Feb 2025 20:00)  T(F): 97.4 (28 Feb 2025 04:00), Max: 97.7 (27 Feb 2025 20:00)  HR: 75 (28 Feb 2025 04:00) (70 - 86)  BP: 94/53 (28 Feb 2025 04:00) (94/53 - 133/63)  BP(mean): 63 (28 Feb 2025 04:00) (63 - 90)  ABP: --  ABP(mean): --  RR: 18 (28 Feb 2025 04:00) (18 - 18)  SpO2: 99% (28 Feb 2025 04:00) (94% - 100%)    O2 Parameters below as of 27 Feb 2025 13:00  Patient On (Oxygen Delivery Method): ventilator            CONSTITUTIONAL:  ill appearing     ENT:   Airway patent,   Has a trache         CARDIAC:   Normal rate,   Regular rhythm.    No edema        RESPIRATORY:   No wheezing  No use of accessory muscles  Sounds rhonchi     GASTROINTESTINAL:  Abdomen soft,   Non-tender,   No guarding,   + BS        SKIN:   Skin normal color for race,   Warm and dry          02-27-25 @ 07:01  -  02-28-25 @ 07:00  --------------------------------------------------------  IN:    Enteral Tube Flush: 500 mL    Glucerna: 1680 mL    IV PiggyBack: 275 mL  Total IN: 2455 mL    OUT:    Drain (mL): 650 mL    Indwelling Catheter - Urethral (mL): 950 mL  Total OUT: 1600 mL    Total NET: 855 mL          LABS:                            9.2    6.17  )-----------( 189      ( 28 Feb 2025 06:07 )             28.6                                               02-28    138  |  101  |  36[H]  ----------------------------<  114[H]  3.5   |  31  |  1.0    Ca    7.5[L]      28 Feb 2025 06:07  Mg     2.2     02-28    TPro  4.6[L]  /  Alb  2.3[L]  /  TBili  0.8  /  DBili  x   /  AST  19  /  ALT  <5  /  AlkPhos  198[H]  02-28                                             Urinalysis Basic - ( 28 Feb 2025 06:07 )    Color: x / Appearance: x / SG: x / pH: x  Gluc: 114 mg/dL / Ketone: x  / Bili: x / Urobili: x   Blood: x / Protein: x / Nitrite: x   Leuk Esterase: x / RBC: x / WBC x   Sq Epi: x / Non Sq Epi: x / Bacteria: x                                                  LIVER FUNCTIONS - ( 28 Feb 2025 06:07 )  Alb: 2.3 g/dL / Pro: 4.6 g/dL / ALK PHOS: 198 U/L / ALT: <5 U/L / AST: 19 U/L / GGT: x                                                  Culture - Blood (collected 25 Feb 2025 10:57)  Source: .Blood None  Preliminary Report (27 Feb 2025 23:01):    No growth at 48 Hours                                                   Mode: AC/ CMV (Assist Control/ Continuous Mandatory Ventilation)  RR (machine): 18  TV (machine): 450  FiO2: 40  PEEP: 8  ITime: 1  MAP: 10  PIP: 21                                          MEDICATIONS  (STANDING):  carbidopa/levodopa  25/250 1 Tablet(s) Oral every 8 hours  chlorhexidine 0.12% Liquid 15 milliLiter(s) Oral Mucosa every 12 hours  chlorhexidine 2% Cloths 1 Application(s) Topical <User Schedule>  dextrose 5%. 1000 milliLiter(s) (100 mL/Hr) IV Continuous <Continuous>  dextrose 5%. 1000 milliLiter(s) (50 mL/Hr) IV Continuous <Continuous>  dextrose 50% Injectable 25 Gram(s) IV Push once  dextrose 50% Injectable 12.5 Gram(s) IV Push once  dextrose 50% Injectable 25 Gram(s) IV Push once  enoxaparin Injectable 40 milliGRAM(s) SubCutaneous every 24 hours  glucagon  Injectable 1 milliGRAM(s) IntraMuscular once  insulin lispro (ADMELOG) corrective regimen sliding scale   SubCutaneous every 6 hours  levETIRAcetam 500 milliGRAM(s) Oral two times a day  lidocaine 1%/epinephrine 1:100,000 Inj 2 Vial(s) Local Injection once  methadone    Tablet 2.5 milliGRAM(s) Oral every 12 hours  pantoprazole  Injectable 40 milliGRAM(s) IV Push daily  piperacillin/tazobactam IVPB.. 3.375 Gram(s) IV Intermittent every 8 hours  polyethylene glycol 3350 17 Gram(s) Oral every 12 hours  senna 2 Tablet(s) Oral at bedtime    MEDICATIONS  (PRN):  acetaminophen     Tablet .. 650 milliGRAM(s) Oral every 6 hours PRN Temp greater or equal to 38C (100.4F), Mild Pain (1 - 3)  dextrose Oral Gel 15 Gram(s) Oral once PRN Blood Glucose LESS THAN 70 milliGRAM(s)/deciliter

## 2025-02-28 NOTE — PROGRESS NOTE ADULT - ASSESSMENT
69-year-old male past medical history of hypertension, Parkinson's, CAD presents for shortness of breath and cough of 1 day duration. History was obtained from patient's wife who noted that the patient has been having decreased po intake, activity, and unintentional weight loss for some time. started having cough and shortness of breath. Found to have Flu, PNA and MSSA bacteremia  He was intubated for acute hypoxic respiratory failure with subsequent trache/PEG placement on 02/20. He  was found to have trace IVH R occipital horn on CTH 01/31, Pulmonary artery thrombus on CTA : 2/ 2 CTA There is a focal filling defect within the main pulmonary artery at site of the ductus arteriosus suspicious for thrombus.  Increased opacities/debris within the central bronchi with occlusion of the right lower lobe central bronchi. There is increased consolidation   within the right lower lobe. In addition he was treated for suspected cholecystitis , on  2/5 Successful placement of 8Fr perc haydee tube. 250cc of dark bile aspirated from GB.  Pulmonary is following, s/p   2/4 bronch   No organisms seen per oil power field, pt was treated with broad spectrum ABx, downgraded to SDU on 2/25.     A/P   # Acute hypoxic respiratory failure / Multifocal Pneumonia : aspiration vs VAP/ Influenza A/  Septic shock / MSSA bacteremia / suspected cholecystitis / iatrogenic pneumothorax   - pt was intubated , s/p  trach 02/20, c/w local trach care   - Vent management as per pulmonary team   - daily weaning trials   - aggressive pulmonary toilet, chest vest, frequent suction   - recent CXR noted ( pt has small pneumothorax), pulmonary aware, CXR is stable    - BAL Cx 02/11: Stenotrophomonas -- colonizer as per ID  - ID followed up, recommendations noted:  - c/w  zosyn 3.375 q8h IV  - midline 2/24, leslie 2/19  - TTE no vegetations , guidelines recommend MARY as community acquired bacteremia once stable , otherwise 6 weeks empiric therapy for MSSA bacteremia from date of cleared BCX E/D 3/13/25  - Blood Cx 01/29: MSSA, repeat blood Cx is NTD   - S/p cholecystostomy tube placement by IR on 02/05  - Biliary fluid culture 02/10 - candida albicans , likely colonizer   - monitor output from biliary drain, trend WBC   - pt completed course of Tamiflu     # Pulmonary Embolism / chronic diastolic CHF/  Pulmonary Hypertension   - CTA thorax 02/02: There is a focal filling defect within the main pulmonary artery at site of the ductus arteriosus suspicious for thrombus  - No DVT on duplex.   - ECHO: EF of 56%.  Mildly increased LV wall thickness. Grade I diastolic dysfunction. Moderate tricuspid regurgitation. Estimated pulmonary artery systolic pressure is 41.4 mmHg assuming a right atrial pressure of 3 mmHg, which is consistent with mild pulmonary hypertension.  - He was not started on therapeutic AC due to IVH present on admission.  - case d/w neurology attending today, recommendations noted:   - Can restart patient on ACs. Patient's intraventricular hemorrhage occurred >2 weeks ago. CTA head from 1/31 showed no AVM or aneurysm. Bleed likely idiopathic small intraventricular hemorrhage. Current benefits of restarting ACs outweigh the risks.   - start therapeutic Lovenox     # h/o CAD  - ASA  held in setting of IVH  - Since CT head from 02/25 shows resolution of IVH, needs neuro Sx f/u to clear for AC and ASA     # Right hip hematoma   - CT pelvis 02/09: Large intramuscular hematoma in the medial right thigh measuring approximately 9.8 x 6.4 x 17 cm  - Stable on repeat imaging on 02/12    # Sacrococcygeal decubitus ulcer   # Left heel DTI   - Appreciate wound care and burns consult   - S/p debridement 02/22 - no specimen sent for pathology/culture  - change position Q 2 hours, offload pressure points     # Dysphagia   - s/p GJ tube 02/20   - Meds via G tube   - on continuos tube feedings vit J tube   - Nutrition consult     # Parkinson's dementia /  Parkinson's Disease /  Acute IVH of right occipital horn   - CT head 01/31: Trace acute intraventricular hemorrhage is noted layering within the right occipital horn  - CTA head and neck 01/31: Moderate stenosis of the right P2 segment of the PCA. Intracranial vessels are otherwise patent and unremarkable. No evidence of carotid or vertebral artery stenosis.  - CT head 02/25:  Nearly resolved trace intraventricular hemorrhage in the right occipital horn compared to the prior CT head from 2/9/2025.  - Suspicion of seizures given increased disorientation as reported by family members.  EEG, CK, prolactin, lactic acid  - neurology is following, recommendations noted :   - Continue Keppra 500mg BID  - Continue Sinemet to 25/250 ONE tablet TID      # DM  - Insulin Sliding Scale     # Urinary retention   - monitor BUN/cr and urine output      # DVT PPx: Lovenox  - GI PPx: Pantoprazole   - Diet: Tube feeding    #Progress Note Handoff  Pending (specify):  start therapeutic Lovenox, supportive care, daily CXR , pulmonary for vent management, c/w low dose of Sinemet ( pt was on much higher dose at home),  plan transfer to vent unit today.    Communication with family: I spoke with pt's wife at length on 2/27  at the bedside, she agreed with a plan of care   Disposition: DGT vent unit

## 2025-02-28 NOTE — PROGRESS NOTE ADULT - SUBJECTIVE AND OBJECTIVE BOX
SUBJECTIVE/OVERNIGHT EVENTS  Today is hospital day 30d. This morning patient was seen and examined at bedside, resting comfortably in bed. No acute or major events overnight.    PMH    Pneumonia due to infectious organism    Handoff    MEWS Score    Parkinson disease    CAD (coronary artery disease)    History of basal cell carcinoma (BCC) excision    Acute respiratory failure with hypoxia    Sacral pressure sore    Acute respiratory failure with hypoxia    Sacral pressure sore    Pneumonia    Acute respiratory failure with hypoxia    Pneumonia    Parkinson disease    Encounter for palliative care    Bronchoscopy, flexible, with tracheostomy tube replacement    Transoral endoscopic creation of a gastrojejunostomy    Selective debridement    SOB    90+    Acute respiratory failure with hypoxia    [email or web address]        MEDICATIONS  STANDING MEDICATIONS  carbidopa/levodopa  25/250 1 Tablet(s) Oral every 8 hours  chlorhexidine 0.12% Liquid 15 milliLiter(s) Oral Mucosa every 12 hours  chlorhexidine 2% Cloths 1 Application(s) Topical <User Schedule>  dextrose 5%. 1000 milliLiter(s) IV Continuous <Continuous>  dextrose 5%. 1000 milliLiter(s) IV Continuous <Continuous>  dextrose 50% Injectable 25 Gram(s) IV Push once  dextrose 50% Injectable 12.5 Gram(s) IV Push once  dextrose 50% Injectable 25 Gram(s) IV Push once  enoxaparin Injectable 40 milliGRAM(s) SubCutaneous every 24 hours  glucagon  Injectable 1 milliGRAM(s) IntraMuscular once  insulin lispro (ADMELOG) corrective regimen sliding scale   SubCutaneous every 6 hours  levETIRAcetam 500 milliGRAM(s) Oral two times a day  lidocaine 1%/epinephrine 1:100,000 Inj 2 Vial(s) Local Injection once  methadone    Tablet 2.5 milliGRAM(s) Oral every 12 hours  pantoprazole  Injectable 40 milliGRAM(s) IV Push daily  piperacillin/tazobactam IVPB.. 3.375 Gram(s) IV Intermittent every 8 hours  polyethylene glycol 3350 17 Gram(s) Oral every 12 hours  senna 2 Tablet(s) Oral at bedtime    PRN MEDICATIONS  acetaminophen     Tablet .. 650 milliGRAM(s) Oral every 6 hours PRN  dextrose Oral Gel 15 Gram(s) Oral once PRN    VITALS  T(F): 97.4 (02-28-25 @ 07:04), Max: 97.7 (02-27-25 @ 20:00)  HR: 74 (02-28-25 @ 08:11) (70 - 86)  BP: 98/59 (02-28-25 @ 07:04) (94/53 - 133/63)  RR: 18 (02-28-25 @ 07:04) (18 - 18)  SpO2: 110% (02-28-25 @ 08:11) (94% - 110%)  POCT Blood Glucose.: 125 mg/dL (02-28-25 @ 05:20)  POCT Blood Glucose.: 108 mg/dL (02-27-25 @ 23:03)  POCT Blood Glucose.: 116 mg/dL (02-27-25 @ 17:36)  POCT Blood Glucose.: 114 mg/dL (02-27-25 @ 11:36)    PHYSICAL EXAM  GENERAL: NAD, ill-appearing, cachectic   HEAD: Atraumatic, normocephalic  EYES: EOMI, PERRL  HEART: Regular rate and rhythm  LUNGS: Unlabored respirations; trach to vent   ABDOMEN: Soft, nontender, nondistended, +BS; biliary drain in place   EXTREMITIES: 2+ peripheral pulses bilaterally. No clubbing, cyanosis, or edema  NERVOUS SYSTEM:  Alert, awake, more interactive than yesterday, answering questions by nodding/signaling     LABS             9.2    6.17  )-----------( 189      ( 02-28-25 @ 06:07 )             28.6     138  |  101  |  36  -------------------------<  114   02-28-25 @ 06:07  3.5  |  31  |  1.0    Ca      7.5     02-28-25 @ 06:07  Mg     2.2     02-28-25 @ 06:07    TPro  4.6  /  Alb  2.3  /  TBili  0.8  /  DBili  x   /  AST  19  /  ALT  <5  /  AlkPhos  198  /  GGT  x     02-28-25 @ 06:07        Urinalysis Basic - ( 28 Feb 2025 06:07 )    Color: x / Appearance: x / SG: x / pH: x  Gluc: 114 mg/dL / Ketone: x  / Bili: x / Urobili: x   Blood: x / Protein: x / Nitrite: x   Leuk Esterase: x / RBC: x / WBC x   Sq Epi: x / Non Sq Epi: x / Bacteria: x          Culture - Blood (collected 25 Feb 2025 10:57)  Source: .Blood None  Preliminary Report (27 Feb 2025 23:01):    No growth at 48 Hours

## 2025-02-28 NOTE — PROGRESS NOTE ADULT - SUBJECTIVE AND OBJECTIVE BOX
Over Night Events: events noted, afebrile, vent dependant    PHYSICAL EXAM    ICU Vital Signs Last 24 Hrs  T(C): 36.3 (28 Feb 2025 07:00), Max: 36.5 (27 Feb 2025 20:00)  T(F): 97.4 (28 Feb 2025 07:00), Max: 97.7 (27 Feb 2025 20:00)  HR: 74 (28 Feb 2025 07:00) (70 - 86)  BP: 98/59 (28 Feb 2025 07:00) (94/53 - 133/63)  BP(mean): 63 (28 Feb 2025 04:00) (63 - 90)  RR: 18 (28 Feb 2025 07:00) (18 - 18)  SpO2: 100% (28 Feb 2025 07:00) (94% - 100%)    O2 Parameters below as of 28 Feb 2025 07:00  Patient On (Oxygen Delivery Method): ventilator            General: ill looking  trac  Lungs: l side rhonchi  Cardiovascular: FRANKLYN 2.6  Abdomen: Soft, Positive BS  Not following commands    02-27-25 @ 07:01  -  02-28-25 @ 07:00  --------------------------------------------------------  IN:    Enteral Tube Flush: 500 mL    Glucerna: 1680 mL    IV PiggyBack: 275 mL  Total IN: 2455 mL    OUT:    Drain (mL): 650 mL    Indwelling Catheter - Urethral (mL): 950 mL  Total OUT: 1600 mL    Total NET: 855 mL          LABS:                          9.2    6.17  )-----------( 189      ( 28 Feb 2025 06:07 )             28.6                                               02-28    138  |  101  |  36[H]  ----------------------------<  114[H]  3.5   |  31  |  1.0    Ca    7.5[L]      28 Feb 2025 06:07  Mg     2.2     02-28    TPro  4.6[L]  /  Alb  2.3[L]  /  TBili  0.8  /  DBili  x   /  AST  19  /  ALT  <5  /  AlkPhos  198[H]  02-28                                             Urinalysis Basic - ( 28 Feb 2025 06:07 )    Color: x / Appearance: x / SG: x / pH: x  Gluc: 114 mg/dL / Ketone: x  / Bili: x / Urobili: x   Blood: x / Protein: x / Nitrite: x   Leuk Esterase: x / RBC: x / WBC x   Sq Epi: x / Non Sq Epi: x / Bacteria: x                                                  LIVER FUNCTIONS - ( 28 Feb 2025 06:07 )  Alb: 2.3 g/dL / Pro: 4.6 g/dL / ALK PHOS: 198 U/L / ALT: <5 U/L / AST: 19 U/L / GGT: x                                                  Culture - Blood (collected 25 Feb 2025 10:57)  Source: .Blood None  Preliminary Report (27 Feb 2025 23:01):    No growth at 48 Hours                                                   Mode: AC/ CMV (Assist Control/ Continuous Mandatory Ventilation)  RR (machine): 18  TV (machine): 450  FiO2: 40  PEEP: 8  ITime: 1  MAP: 10  PIP: 21                                          MEDICATIONS  (STANDING):  carbidopa/levodopa  25/250 1 Tablet(s) Oral every 8 hours  chlorhexidine 0.12% Liquid 15 milliLiter(s) Oral Mucosa every 12 hours  chlorhexidine 2% Cloths 1 Application(s) Topical <User Schedule>  dextrose 5%. 1000 milliLiter(s) (50 mL/Hr) IV Continuous <Continuous>  dextrose 5%. 1000 milliLiter(s) (100 mL/Hr) IV Continuous <Continuous>  dextrose 50% Injectable 25 Gram(s) IV Push once  dextrose 50% Injectable 25 Gram(s) IV Push once  dextrose 50% Injectable 12.5 Gram(s) IV Push once  enoxaparin Injectable 40 milliGRAM(s) SubCutaneous every 24 hours  glucagon  Injectable 1 milliGRAM(s) IntraMuscular once  insulin lispro (ADMELOG) corrective regimen sliding scale   SubCutaneous every 6 hours  levETIRAcetam 500 milliGRAM(s) Oral two times a day  lidocaine 1%/epinephrine 1:100,000 Inj 2 Vial(s) Local Injection once  methadone    Tablet 2.5 milliGRAM(s) Oral every 12 hours  pantoprazole  Injectable 40 milliGRAM(s) IV Push daily  piperacillin/tazobactam IVPB.. 3.375 Gram(s) IV Intermittent every 8 hours  polyethylene glycol 3350 17 Gram(s) Oral every 12 hours  senna 2 Tablet(s) Oral at bedtime    MEDICATIONS  (PRN):  acetaminophen     Tablet .. 650 milliGRAM(s) Oral every 6 hours PRN Temp greater or equal to 38C (100.4F), Mild Pain (1 - 3)  dextrose Oral Gel 15 Gram(s) Oral once PRN Blood Glucose LESS THAN 70 milliGRAM(s)/deciliter

## 2025-02-28 NOTE — PROGRESS NOTE ADULT - SUBJECTIVE AND OBJECTIVE BOX
GARETT ESCAMILLA  69y, Male  Allergy: Allergy Status Unknown      LOS  30d    CHIEF COMPLAINT: penumonia (28 Feb 2025 13:29)      INTERVAL EVENTS/HPI  - T(F): , Max: 97.7 (02-27-25 @ 20:00)  - WBC Count: 6.17 (02-28-25 @ 06:07)  WBC Count: 6.92 (02-27-25 @ 05:01)     - Creatinine: 1.0 (02-28-25 @ 06:07)  Creatinine: 1.1 (02-27-25 @ 05:01)     -   -   -     ROS  cannot obtain secondary to patient's sedation and/or mental status    VITALS:  T(F): 97.4, Max: 97.7 (02-27-25 @ 20:00)  HR: 79  BP: 98/59  RR: 18Vital Signs Last 24 Hrs  T(C): 36.3 (28 Feb 2025 07:04), Max: 36.5 (27 Feb 2025 20:00)  T(F): 97.4 (28 Feb 2025 07:04), Max: 97.7 (27 Feb 2025 20:00)  HR: 79 (28 Feb 2025 13:08) (70 - 86)  BP: 98/59 (28 Feb 2025 07:04) (94/53 - 113/65)  BP(mean): 63 (28 Feb 2025 04:00) (63 - 80)  RR: 18 (28 Feb 2025 07:04) (18 - 18)  SpO2: 105% (28 Feb 2025 13:08) (94% - 110%)    Parameters below as of 28 Feb 2025 07:04  Patient On (Oxygen Delivery Method): ventilator        PHYSICAL EXAM:  Gen: trach/ vent, chronically ill appearing  HEENT: NCAT  CV: RRR  Lungs: Decreased BS at bases  Abd: Soft  Neuro: awake  Skin: no rash   Extremities: warm  Lines: clean, no phlebitis     FH: Non-contributory  Social Hx: Non-contributory    TESTS & MEASUREMENTS:                        9.2    6.17  )-----------( 189      ( 28 Feb 2025 06:07 )             28.6     02-28    138  |  101  |  36[H]  ----------------------------<  114[H]  3.5   |  31  |  1.0    Ca    7.5[L]      28 Feb 2025 06:07  Mg     2.2     02-28    TPro  4.6[L]  /  Alb  2.3[L]  /  TBili  0.8  /  DBili  x   /  AST  19  /  ALT  <5  /  AlkPhos  198[H]  02-28      LIVER FUNCTIONS - ( 28 Feb 2025 06:07 )  Alb: 2.3 g/dL / Pro: 4.6 g/dL / ALK PHOS: 198 U/L / ALT: <5 U/L / AST: 19 U/L / GGT: x           Urinalysis Basic - ( 28 Feb 2025 06:07 )    Color: x / Appearance: x / SG: x / pH: x  Gluc: 114 mg/dL / Ketone: x  / Bili: x / Urobili: x   Blood: x / Protein: x / Nitrite: x   Leuk Esterase: x / RBC: x / WBC x   Sq Epi: x / Non Sq Epi: x / Bacteria: x        Culture - Blood (collected 02-25-25 @ 10:57)  Source: .Blood None  Preliminary Report (02-27-25 @ 23:01):    No growth at 48 Hours    Culture - Blood (collected 02-24-25 @ 05:15)  Source: .Blood None  Preliminary Report (02-27-25 @ 15:01):    No growth at 72 Hours    Culture - Viral, General (collected 02-11-25 @ 11:53)    Culture - Fungal, Bronchial (collected 02-11-25 @ 11:53)  Source: Bronchial  Preliminary Report (02-14-25 @ 07:48):    Few Yeast    Culture - Acid Fast - Bronchial w/Smear (collected 02-11-25 @ 11:53)  Source: Bronchial  Preliminary Report (02-26-25 @ 23:06):    No acid-fast bacilli isolated at 2 weeks.    Culture - Bronchial (collected 02-11-25 @ 11:53)  Source: Lavage  Gram Stain (02-16-25 @ 20:34):    No Squamous epithelial cells seen per low power field    Few polymorphonuclear leukocytes seen per low power field    No organisms seen per oil power field  Final Report (02-17-25 @ 15:49):    Few Stenotrophomonas maltophilia    Commensal halina consistent with body site  Organism: Stenotrophomonas maltophilia  Stenotrophomonas maltophilia (02-17-25 @ 15:49)  Organism: Stenotrophomonas maltophilia (02-17-25 @ 15:49)      Method Type: KB      -  Minocycline: S  Organism: Stenotrophomonas maltophilia (02-17-25 @ 15:49)      Method Type: KARTIK      -  Levofloxacin: S 1      -  Trimethoprim/Sulfamethoxazole: S <=0.5/9.5    Culture - Body Fluid with Gram Stain (collected 02-10-25 @ 16:22)  Source: Bile  Gram Stain (02-12-25 @ 23:54):    No polymorphonuclear cells seen per low power field    No organisms seen per oil power field  Final Report (02-15-25 @ 16:56):    Rare Candida albicans    See previous culture [id number] for susceptibility    Culture - Fungal, Body Fluid (collected 02-10-25 @ 16:22)  Source: Bile  Preliminary Report (02-14-25 @ 14:12):    Rare Candida albicans  Organism: Candida albicans (02-14-25 @ 14:12)  Organism: Candida albicans (02-14-25 @ 14:12)      Method Type: YSTMIC      -  Fluconazole: S <=0.5 Fluconazole: Susceptibility depends on achieving the maximum possible blood level. Doses higher than the standard dosing amount (6mg/kg/day) may be needed in adults with normal renal function and body habitus.  This test was developed and its performance characteristics determined by [facility], [place]  It has not been cleared or approved by the [place] Food and Drug Administration. S - Susceptible; SDD - Susceptible Dose Dependent; I - Intermediate; R - Resistant; N/I - No Interpretation Available    Culture - Blood (collected 02-08-25 @ 17:24)  Source: .Blood BLOOD  Final Report (02-14-25 @ 01:01):    No growth at 5 days    Culture - Blood (collected 02-08-25 @ 04:58)  Source: .Blood BLOOD  Final Report (02-13-25 @ 17:00):    No growth at 5 days    Culture - Viral, General (collected 02-04-25 @ 09:29)    Culture - Acid Fast - Bronchial w/Smear (collected 02-04-25 @ 09:29)  Source: Bronch Wash  Preliminary Report (02-26-25 @ 23:06):    No acid-fast bacilli isolated at 3 weeks.    Culture - Bronchial (collected 02-04-25 @ 09:29)  Source: Bronch Wash  Gram Stain (02-05-25 @ 07:26):    Few-moderate polymorphonuclear leukocytes seen per low power field    No Squamous epithelial cells seen per low power field    No organisms seen per oil power field  Final Report (02-06-25 @ 11:36):    Commensal halina consistent with body site    Culture - Sputum (collected 02-04-25 @ 09:00)  Source: ET Tube ET Tube  Gram Stain (02-04-25 @ 19:37):    Numerous polymorphonuclear leukocytes per low power field    Rare Squamous epithelial cells per low power field    Rare Yeast like cells per oil power field  Final Report (02-05-25 @ 22:23):    Commensal halina consistent with body site    Culture - Blood (collected 02-02-25 @ 04:54)  Source: .Blood BLOOD  Final Report (02-07-25 @ 12:00):    No growth at 5 days    Culture - Blood (collected 01-31-25 @ 17:22)  Source: .Blood BLOOD  Final Report (02-05-25 @ 23:07):    No growth at 5 days    Culture - Blood (collected 01-31-25 @ 17:22)  Source: .Blood BLOOD  Final Report (02-05-25 @ 23:07):    No growth at 5 days    Urinalysis with Rflx Culture (collected 01-29-25 @ 16:48)        Lactate, Blood: 1.2 mmol/L (02-24-25 @ 05:15)      INFECTIOUS DISEASES TESTING  Procalcitonin: 0.62 (02-11-25 @ 08:30)  MRSA PCR Result.: Negative (02-10-25 @ 16:22)  MRSA PCR Result.: Negative (01-30-25 @ 13:40)  Procalcitonin: 6.93 (01-30-25 @ 07:26)  Legionella Antigen, Urine: Negative (01-29-25 @ 15:49)  Rapid RVP Result: Detected (01-29-25 @ 10:30)      INFLAMMATORY MARKERS      RADIOLOGY & ADDITIONAL TESTS:  I have personally reviewed the last available Chest xray  CXR  Xray Chest 1 View- PORTABLE-Urgent:   ACC: [id number] EXAM:  XR CHEST PORTABLE URGENT 1V   ORDERED BY: MABEL SOTO     PROCEDURE DATE:  02/27/2025          INTERPRETATION:  CLINICAL HISTORY / REASON FOR EXAM: Shortness of breath.    COMPARISON: Chest radiograph from February 27, 2025.    TECHNIQUE/POSITIONING: Satisfactory. Single image, AP chest radiograph.    FINDINGS:    SUPPORT DEVICES: Tracheostomy tube overlies the mid trachea.    CARDIAC/MEDIASTINUM/HILUM: Unchanged cardiac silhouette.    LUNG PARENCHYMA/PLEURA: Stable left apical pneumothorax with airgap less   than 1 cm. Stable right basilar opacity.    SKELETON/SOFT TISSUES: Unchanged.      IMPRESSION:    Slightly decreased left pneumothorax with airgap of 0.9 cm.    --- End of Report ---            JENNIE JAIN MD; Attending Radiologist  This document has been electronically signed. Feb 27 2025  6:11PM (02-27-25 @ 14:39)      CT      CARDIOLOGY TESTING  12 Lead ECG:   Ventricular Rate 98 BPM    Atrial Rate 98 BPM    P-R Interval 154 ms    QRS Duration 68 ms    Q-T Interval 342 ms    QTC Calculation(Bazett) 436 ms    P Axis 87 degrees    R Axis 81 degrees    T Axis 78 degrees    Diagnosis Line Normal sinus rhythm  Low voltage QRS  Borderline ECG    Confirmed by Kane Herrmann ([id number]) on 2/25/2025 9:16:05 AM (02-23-25 @ 22:53)      MEDICATIONS  carbidopa/levodopa  25/250 1  chlorhexidine 0.12% Liquid 15  chlorhexidine 2% Cloths 1  dextrose 5%. 1000  dextrose 5%. 1000  dextrose 50% Injectable 25  dextrose 50% Injectable 12.5  dextrose 50% Injectable 25  enoxaparin Injectable 40  glucagon  Injectable 1  insulin lispro (ADMELOG) corrective regimen sliding scale   levETIRAcetam 500  lidocaine 1%/epinephrine 1:100,000 Inj 2  methadone    Tablet 2.5  pantoprazole  Injectable 40  piperacillin/tazobactam IVPB.. 3.375  polyethylene glycol 3350 17  senna 2      WEIGHT  Weight (kg): 51.1 (02-20-25 @ 07:13)  Creatinine: 1.0 mg/dL (02-28-25 @ 06:07)      ANTIBIOTICS:  piperacillin/tazobactam IVPB.. 3.375 Gram(s) IV Intermittent every 8 hours      All available historical records have been reviewed

## 2025-02-28 NOTE — PROGRESS NOTE ADULT - SUBJECTIVE AND OBJECTIVE BOX
GARETT ESCAMILLA (69y Male) was seen at bedside, pt is awake, answering questions, c/o chest congestion.     PAST MEDICAL & SURGICAL HISTORY:  Parkinson disease  CAD (coronary artery disease)  History of basal cell carcinoma (BCC) excision      Vital Signs Last 24 Hrs  T(C): 36.3 (28 Feb 2025 07:04), Max: 36.5 (27 Feb 2025 20:00)  T(F): 97.4 (28 Feb 2025 07:04), Max: 97.7 (27 Feb 2025 20:00)  HR: 79 (28 Feb 2025 13:08) (70 - 86)  BP: 98/59 (28 Feb 2025 07:04) (94/53 - 113/65)  BP(mean): 63 (28 Feb 2025 04:00) (63 - 80)  RR: 18 (28 Feb 2025 07:04) (18 - 18)  SpO2: 105% (28 Feb 2025 13:08) (94% - 110%)    Parameters below as of 28 Feb 2025 07:04  Patient On (Oxygen Delivery Method): ventilator      PHYSICAL EXAM:  General: frail elderly male with temporal muscle waisting   NECK: trach noted with dry blood, mucus, debris around trach noted   Lungs:  decreased BS b/l   Heart: S1, S2, no murmurs   Abdomen: soft, non tender, non distended, PEG tube in place ,  Cholecystostomy tube with bilious fluids in collecting bag   Neuro: follows simple commands,  Tremulous. AAOx0. Moves extremities.  Extremity: No edema, cyanosis, profound muscle atrophy noted      LABS:                                    9.2    6.17  )-----------( 189      ( 28 Feb 2025 06:07 )             28.6   02-28    138  |  101  |  36[H]  ----------------------------<  114[H]  3.5   |  31  |  1.0    Ca    7.5[L]      28 Feb 2025 06:07  Mg     2.2     02-28    TPro  4.6[L]  /  Alb  2.3[L]  /  TBili  0.8  /  DBili  x   /  AST  19  /  ALT  <5  /  AlkPhos  198[H]  02-28    Culture - Blood in AM (02.24.25 @ 05:15)   Specimen Source: .Blood None  Culture Results:   No growth at 24 hoursCulture - Bronchial (02.11.25 @ 11:53)   Gram Stain:   No Squamous epithelial cells seen per low power field   Few polymorphonuclear leukocytes seen per low power field   No organisms seen per oil power field  - Levofloxacin: S 1  - Trimethoprim/Sulfamethoxazole: S <=0.5/9.5  - Minocycline: S  Specimen Source: Lavage  Culture Results:   Few Stenotrophomonas maltophilia   Commensal halina consistent with body site  Organism Identification: Stenotrophomonas maltophilia     RADIOLOGY:   < from: CT Head No Cont (02.25.25 @ 12:49) >    IMPRESSION:    Nearly resolved trace intraventricular hemorrhage in the right occipital   horn compared to the prior CT head from 2/9/2025.    Stable mild chronic microvascular ischemic changes.    < from: Xray Chest 1 View- PORTABLE-Routine (Xray Chest 1 View- PORTABLE-Routine in AM.) (02.24.25 @ 06:17) >  Impression:    Stable bilateral opacities, right greater than left. No pneumothorax.    < end of copied text >  < from: TTE Echo Complete w/o Contrast w/ Doppler (01.30.25 @ 11:43) >    Summary:   1. Normal global left ventricular systolic function.   2. LV Ejection Fraction by Aguilar's Method with a biplane EF of 56 %.   3. Mildly increased LV wall thickness.   4. Spectral Doppler shows impaired relaxation pattern of left   ventricular myocardial filling (Grade I diastolic dysfunction).   5. Normal left atrial size.   6. Normal right atrial size.   7. Mild to moderate mitral valve regurgitation.   8. Moderate tricuspid regurgitation.   9. Mild pulmonic valve regurgitation.  10. Estimated pulmonary artery systolic pressure is 41.4 mmHg assuming a   right atrial pressure of 3 mmHg, which is consistent with mild pulmonary   hypertension.    MEDICATIONS  (STANDING):  carbidopa/levodopa  25/250 1 Tablet(s) Oral every 8 hours  chlorhexidine 0.12% Liquid 15 milliLiter(s) Oral Mucosa every 12 hours  chlorhexidine 2% Cloths 1 Application(s) Topical <User Schedule>  dextrose 5%. 1000 milliLiter(s) (100 mL/Hr) IV Continuous <Continuous>  dextrose 5%. 1000 milliLiter(s) (50 mL/Hr) IV Continuous <Continuous>  dextrose 50% Injectable 25 Gram(s) IV Push once  dextrose 50% Injectable 12.5 Gram(s) IV Push once  dextrose 50% Injectable 25 Gram(s) IV Push once  enoxaparin Injectable 40 milliGRAM(s) SubCutaneous every 24 hours  glucagon  Injectable 1 milliGRAM(s) IntraMuscular once  insulin lispro (ADMELOG) corrective regimen sliding scale   SubCutaneous every 6 hours  levETIRAcetam 500 milliGRAM(s) Oral two times a day  lidocaine 1%/epinephrine 1:100,000 Inj 2 Vial(s) Local Injection once  methadone    Tablet 2.5 milliGRAM(s) Oral every 12 hours  pantoprazole  Injectable 40 milliGRAM(s) IV Push daily  piperacillin/tazobactam IVPB.. 3.375 Gram(s) IV Intermittent every 8 hours  polyethylene glycol 3350 17 Gram(s) Oral every 12 hours  senna 2 Tablet(s) Oral at bedtime    MEDICATIONS  (PRN):  acetaminophen     Tablet .. 650 milliGRAM(s) Oral every 6 hours PRN Temp greater or equal to 38C (100.4F), Mild Pain (1 - 3)  dextrose Oral Gel 15 Gram(s) Oral once PRN Blood Glucose LESS THAN 70 milliGRAM(s)/deciliter

## 2025-02-28 NOTE — PROGRESS NOTE ADULT - ASSESSMENT
69-year-old male past medical history of hypertension, Parkinson's, CAD presents for shortness of breath and cough of 1 day duration. History was obtained from patient's wife who noted that the patient has been having decreased po intake, activity, and unintentional weight loss for some time. However yesterday he started having cough and shortness of breath. Admitted to MICU for AHRF and sepsis 2/2 to mutilobar PNA.    #Acute Hypoxic Respiratory Failure likely 2/2 CAP/aspiration and flu  #Toxic Metabolic Encephalopathy likely 2/2 CAP/aspiration and flu  #Influenza A positive, treated  #MSSA bacteremia- resolved  #Sepsis POA  #Possible aspiration event on 2/24  - Aspiration precautions.    - patient inc WOB, worsening alkalosis, obtunded-intubated for airway protection   -ID: if patient decompensates, dc cefepime/metro, start patricia 1g   - cardio: patient critically sick, not a candidate for surgery therefore no MARY, can c/w abx for IE as per ID  - s/p bronch, f/u cultures   - ID: tamiflu 30 mg daily: last dose on 2/10, s/p cefepime & metro (end date 2/16). nafcillin>>> now on cefazolin 2g q8h for MSSA bact  as per ID (6w)  -2/11 bronch: showed copious amounts of secretions and poor cough.  Will need trach: ongoing daily discussions with wife regarding trach vs extubation & change in code status   CT surgery consulted for trach & peg   Stenotrophomonas now in bronch cx from 2/11, suspect colonizer, if fever/ incr wbc would add levaquin per ID  2/20>> pt got trach & peg. minimal pneumothorax noted on initial cxr   2/21: starting trickle feeds  2/23: weaning off sedation>>on methadone & versed pushes if needed>> DG to SDU once off sedation  2/24: pt noted to have had an aspiration event and 2/24 AM after feeds were started and pt began to desat with improvement w suctioning -- started on levaquin  2/25: pt had seizure like activity so levaquin was dc'ed (can lower seizure threshold) and started on zosyn instead   2/25: dc'ed bumex   2/26: will start weaning trials; chest PT ordered for increased secretions   2/27: chest vest; CXR shows small pneumothorax likely iatrogenic     #Acute L sided pneumothorax   - CXR 2/27 - 2.3cm airgap -- size decreasing  - no change in vent settings, will watch for now and follow with serial CXR q6h     #Hypernatremia - resolved   - D50    #Parkinsonism 2/2 overmedication  - pt came down to CEU with facial twitching, repetitive blinking, and rhythmic chewing motions - resolved  - EEG --> potential for focal seizure   - neuro consult appreciated -- start on keppra 500 bid, dc rasagiline, decrease sinemet  - prolactin elevated CK nl    #PE within main pulm artery   #large R groin intramuscular hematoma, stable  #Periventricular bleed on CTH - resolving   - CTH. noted with right periventricular bleed, stable.   - CTA chest - PE: focal filling defect within main pulm artery   - repeat CT head: stable intraventricular hemorrhage >> repeat on 2/9 after on full AC for PE: stable  - CT pelvis: Large intramuscular hematoma in the medial right thigh measuring approximately 9.8 x 6.4 x 17 cm.  - duplex -ve 2/17  - restarted proph AC 2/18.  - CTH repeat 2/25 - almost complete resolution of periventricular bleed     #Cholangitis vs cholecystitis s/p perc haydee  #Elevated Bilirubin-improving   #Leukocytosis-improving   - RUQ US: distended GB with sludge, GB polyp   - IR: s/p perc haydee 2/5  - GB drainage cultures: few candida, likely coloniz per id.    #Constipation, improved  - senna and miralax  - c/w lactulose     #Possible Mural thrombus in PA  #CAD  - Avoid overload  - TTE: EF 56%, G1DD, mild-mod MR, mod TR, mild ND, mild pHTN  - Cardio recs appreciated       - Patient is poor candidate for MARY  - zosyn 3.375 q8h (dc'ed cefazolin while on zosyn)    #MISC  - DVT ppx: SCDs  - GI ppx: not needed  - Activity: as tolerated  - Diet:  J tube cont feeds  - full code

## 2025-02-28 NOTE — PROGRESS NOTE ADULT - ASSESSMENT
IMPRESSION:    Acute Hypoxic Respiratory Failure Sp Trach/PEG   Toxic Metabolic Encephalopathy  aspiration   Dyskinesia  L ptx  Influenza A treated   MSSA bacteremia repeat CX -  Sepsis POA  cholecystitis sp percutaneous chol  Periventricular bleed  Suspected Mural thrombus in PA  Right thigh hematoma.    HO Parkinson's Disease   HO CAD  Left Ptx. stable     PLAN:    CNS:  SAT. Continue Anti parkinson's per neuro. trial off methadone. Neuro fup    HEENT: Oral care. s/p trach and PEG    PULMONARY:  Monitor airway pressures. Remains with significant secretions. s/p trac, Keep Sao2 92 to 96%, c/w weaning trials. f/u XR     CARDIOVASCULAR:  Avoid overload.     GI: GI prophylaxis. S/P perc cholecystostomy. Bowel regimen G tube for meds , feeds through J tube    RENAL: Follow up lytes. Mars in place for retention. Kidney function stable.     INFECTIOUS DISEASE: Cultures noted. SP Tamiflu. ABX per ID.     HEMATOLOGICAL: Monitor CBC and coags. CTA noted with no active bleed. Goal Hb > 7.  LE DOPPLER -    ENDOCRINE:  Follow up FS.  Insulin protocol if needed.    MUSCULOSKELETAL: Bed rest.  Off loading. Wound care for DTIs. Burn eval noted     Full code  palliative care follow up   very poor prognosis  vent unit     IMPRESSION:    Acute Hypoxic Respiratory Failure Sp Trach/PEG   Toxic Metabolic Encephalopathy  aspiration   Dyskinesia  L ptx  Influenza A treated   MSSA bacteremia repeat CX -  Sepsis POA  cholecystitis sp percutaneous chol  Periventricular bleed  Suspected Mural thrombus in PA  Right thigh hematoma.    HO Parkinson's Disease   HO CAD  Left Ptx. stable     PLAN:    CNS:  SAT. Continue Anti parkinson's per neuro. trial off methadone. Neuro fup    HEENT: Oral care. s/p trach and PEG    PULMONARY:  Monitor airway pressures. Remains with significant secretions. s/p trac, Keep Sao2 92 to 96%, c/w weaning trials. Chest PT. f/u XR     CARDIOVASCULAR:  Avoid overload.     GI: GI prophylaxis. S/P perc cholecystostomy. Bowel regimen G tube for meds , feeds through J tube    RENAL: Follow up lytes. Mars in place for retention. Kidney function stable.     INFECTIOUS DISEASE: Cultures noted. SP Tamiflu. ABX per ID.     HEMATOLOGICAL: Monitor CBC and coags. CTA noted with no active bleed. Goal Hb > 7.  LE DOPPLER -    ENDOCRINE:  Follow up FS.  Insulin protocol if needed.    MUSCULOSKELETAL: Bed rest.  Off loading. Wound care for DTIs. Burn eval noted     Full code  palliative care follow up   very poor prognosis  vent unit

## 2025-02-28 NOTE — PROGRESS NOTE ADULT - ASSESSMENT
ASSESSMENT  69-year-old male past medical history of hypertension, Parkinson's, CAD presents for shortness of breath and cough of 1 day duration. History was obtained from patient's wife who noted that the patient has been having decreased po intake, activity, and unintentional weight loss for some time. Howver, yesterday he started having cough and shortness of breath. Found to have Flu, PNA and MSSA bacteremia     IMPRESSION  #Fever    2/24 tm 101.5    2/24 BCX NGTD     2/11 bronch    Few Stenotrophomonas maltophilia   Few Yeast- colonizer    2/8 BCX NGTD     CXR stable opacities   #Intubated on mechanical ventilation , septic shock requiring pressors   #trace IVH R occipital horn on CTH 01/31  #Pulmonary artery thrombus on CTA    2/ 2 CTA There is a focal filling defect within the main pulmonary artery at site of the ductus arteriosus suspicious for thrombus.  Increased opacities/debris within the central bronchi with occlusion of the right lower lobe central bronchi. There is increased consolidation   within the right lower lobe.  #Suspected cholecystitis     2/10 biliary fluid NG,   Rare Candida albicans- suspect colonizer   RUQ GB sludge     2/5 Successful placement of 8Fr perc haydee tube. 250cc of dark bile aspirated from GB.  #Influenza +, MSSA bacteremia , suspect MSSA superimposed PNA    2/4 bronch   No organisms seen per oil power field    2/ 2 BCX NGTD     1/31 BCX NGTD     1/29 BCX MSSA 3/4 bottles    MRSA PCR Result.: Negative (01-30-25 @ 13:40)    Procalcitonin: 6.93 ng/mL (01-30-25 @ 07:26)    Legionella Antigen, Urine: Negative (01-29-25 @ 15:49)    Streptococcus pneumoniae Ag, Ur Result: Negative (01-29-25 @ 15:49)    CT Multifocal bilateral consolidative opacities as above, suggestive of multifocal pneumonia in the appropriate clinical context.  #Lactic acidosis  #Hypernatremia   #Severe protein calorie malnutrition  BMI (kg/m2): 17  #PD  #Immunodeficiency secondary to Senescence which could results in poor clinical outcomes  #Abx allergy: Allergy Status Unknown  #LYNNE , resolved  Creatinine: 1.3 mg/dL (02-26-25 @ 04:35)    Height (cm): 175.3 (01-30-25 @ 13:00)  Weight (kg): 52.2 (01-29-25 @ 10:14)    RECOMMENDATIONS  - zosyn 3.375 q8h IV E/D 3/2 , then restart Cefazolin 2g q8h IV   - TTE no vegetations , guidelines recommend MARY as community acquired bacteremia once stable , otherwise 6 weeks empiric therapy for MSSA bacteremia from date of cleared BCX E/D 3/13/25  - Trend WBC, Cr  - Grave prognosis, GOC     If any questions, please send a message or call on [employer]  Please continue to update ID with any pertinent new laboratory, radiographic findings, or change in clinical status

## 2025-02-28 NOTE — PROGRESS NOTE ADULT - ATTENDING COMMENTS
Impression:   Patient seen and examined at bedside.  Clots found around the tracheostomy tube with slow venous bleeding.   Position of trach was readjusted due to patient's neck extension.   Will place surgicel around tracheostomy.   Will continue to follow.    Impression and plan above have been altered and are my own.    Myke Alcala MD  Thoracic Surgery
Discussed case with medicine team.    Visited pt at bedside. Unable to participate in ROS.    PE:  General; male, in bed, tired appearing  Head; NC/AT  Heart: RRR  Lungs; even chest rise  Abdomen: soft, non-tender, non-distended    A/P:  69 M with low suspicion for acute cholecystitis.     - GI follow up  - Trend LFTs  - If concerned for cholecystitis, recommend IR for percutaneous cholecystostomy tube
events noted, vent dependant, low grade fever, labs noted, plan as above
events noted, remains critically ill, still intubated, ventilated, not weanable, labs reviewed, hold bumex, for trach/ PEG, plan as above
events noted, remains critically ill, intubated, for trach, all over very poor prognosis, plan as above
Attending Statement: I have personally performed a face to face diagnostic evaluation on this patient. The patient is suffering from:  Acute Hypoxic Respiratory Failure , improved  Toxic Metabolic Encephalopathy  CAP likely aspiration   Influenza A  MSSA bacteremia   Sepsis POA  Periventricular bleed  Elevated Bilirubin  ?Mural thrombus in PA  I have made amendments to the documentation where necessary. I have personally seen and examined this patient.  I have fully participated in the care of this patient.  I have reviewed all pertinent clinical information, including history, physical exam, plan and note.
Pt w/ h/o IPD s/p acute respiratory failure in setting of b/l PNA with fulminant drug-induced dyskinesias after restarting home equivalent doses of levodopa likely overmedication possibly from weight changes during complicated hospital course currently with miminal parkinsonism on exam recommend continue Sinemet 25/250 TID and f/u with outside movement specialist Dr. Horta for further dose adjustment.
IMPRESSION:    Acute Hypoxic Respiratory Failure   Toxic Metabolic Encephalopathy  CAP likely aspiration   Influenza A treated   MSSA bacteremia repeat CX -  Sepsis POA  LYNNE improving  cholecystitis sp percutaneous haydee  Periventricular bleed  Suspected Mural thrombus in PA  Right thigh hematoma.    HO Parkinson's Disease   HO CAD    Plan as outlined above

## 2025-02-28 NOTE — PROGRESS NOTE ADULT - ASSESSMENT
70yo M with PMH of hypertension, CAD, Parkinson's, admitted for acute hypoxic respiratory failure 2/2 multilobar pneumonia with subsequent trache/PEG placement on 02/20.  Course c/b trace IVH R occipital horn on CTH 01/31, CTH on 02/25 showing its near resolution. Neurology consulted for dyskinetic movements of arms, neck, and face; rEEG with regular spike and polyspike wave discharges right Frontal/Frontopolar but no seizures suggesting an irritated focus and more prone to seizures, thus would be prudent to start on AEDs.  He is on a high home dose of Parkinsons medications and has been on a likewise high dose here, which is likely the cause of his dyskinetic movements- patient showed improvement with decreased dose.     Recommendations:  - Continue Keppra 500mg BID  - Continue Sinemet to 25/250 ONE tablet TID  - Can restart patient on ACs. Patient's intraventricular hemorrhage occurred >2 weeks ago. CTA head from 1/31 showed no AVM or aneurysm. Bleed likely idiopathic small intraventricular hemorrhage. Current benefits of restarting ACs outweigh the risks.       Case discussed with attending Dr. Harden.

## 2025-02-28 NOTE — PROGRESS NOTE ADULT - SUBJECTIVE AND OBJECTIVE BOX
Neurology Progress Note     GARETT ESCAMILLA 69y Male [id number]    Hospital Day: 30d     Subjective complaints:  Pt evaluated at bedside.     Patient's neurologist is Ba Sutherland. Prior to admission, patient was able to do things around the house and would drive sometimes if the wife was in the car with him.     Vital Signs  T(F): 97.4 (07:04), Max: 97.7 (20:00)  HR: 74 (08:11) (70 - 86)  BP: 98/59 (07:04) (94/53 - 133/63)  RR: 18 (07:04) (18 - 18)  SpO2: 110% (08:11) (94% - 110%)    Neurological Exam:   Mental status: Awake, alert, able to follow commands.   Cranial nerves:   - Eyes: PERRL, EOMI without nystagmus  - Face: Face grossly symmetric   - ENT: Hearing intact to voice  Motor: Decrease in dyskinetic movements in b/l UE.  strength 5/5 b/l UE, able to move b/l toes but not raise b/l LE.  Gait: Deferred    Labs:  WBC 6.17 /HGB 9.2 /MCV 94.4 /HCT 28.6 / / 02-28  WBC 6.92 /HGB 9.0 /MCV 95.6 /HCT 28.3 / / 02-27  WBC 7.10 /HGB 8.3 /MCV 94.9 /HCT 26.2 / / 02-26 02-28    138  |  101  |  36[H]  ----------------------------<  114[H]  3.5   |  31  |  1.0    Ca    7.5[L]      28 Feb 2025 06:07  Mg     2.2     02-28    TPro  4.6[L]  /  Alb  2.3[L]  /  TBili  0.8  /  DBili  x   /  AST  19  /  ALT  <5  /  AlkPhos  198[H]  02-28    LIVER FUNCTIONS - ( 28 Feb 2025 06:07 )  Alb: 2.3 g/dL / Pro: 4.6 g/dL / ALK PHOS: 198 U/L / ALT: <5 U/L / AST: 19 U/L / GGT: x             Urinalysis Basic - ( 28 Feb 2025 06:07 )    Color: x / Appearance: x / SG: x / pH: x  Gluc: 114 mg/dL / Ketone: x  / Bili: x / Urobili: x   Blood: x / Protein: x / Nitrite: x   Leuk Esterase: x / RBC: x / WBC x   Sq Epi: x / Non Sq Epi: x / Bacteria: x        Medications:  acetaminophen     Tablet .. 650 milliGRAM(s) Oral every 6 hours PRN  carbidopa/levodopa  25/250 1 Tablet(s) Oral every 8 hours  chlorhexidine 0.12% Liquid 15 milliLiter(s) Oral Mucosa every 12 hours  chlorhexidine 2% Cloths 1 Application(s) Topical <User Schedule>  dextrose 5%. 1000 milliLiter(s) IV Continuous <Continuous>  dextrose 5%. 1000 milliLiter(s) IV Continuous <Continuous>  dextrose 50% Injectable 25 Gram(s) IV Push once  dextrose 50% Injectable 12.5 Gram(s) IV Push once  dextrose 50% Injectable 25 Gram(s) IV Push once  dextrose Oral Gel 15 Gram(s) Oral once PRN  enoxaparin Injectable 40 milliGRAM(s) SubCutaneous every 24 hours  glucagon  Injectable 1 milliGRAM(s) IntraMuscular once  insulin lispro (ADMELOG) corrective regimen sliding scale   SubCutaneous every 6 hours  levETIRAcetam 500 milliGRAM(s) Oral two times a day  lidocaine 1%/epinephrine 1:100,000 Inj 2 Vial(s) Local Injection once  methadone    Tablet 2.5 milliGRAM(s) Oral every 12 hours  pantoprazole  Injectable 40 milliGRAM(s) IV Push daily  piperacillin/tazobactam IVPB.. 3.375 Gram(s) IV Intermittent every 8 hours  polyethylene glycol 3350 17 Gram(s) Oral every 12 hours  senna 2 Tablet(s) Oral at bedtime      Neuroimaging:  < from: CT Head No Cont (02.25.25 @ 12:49) >    IMPRESSION:    Nearly resolved trace intraventricular hemorrhage in the right occipital   horn compared to the prior CT head from 2/9/2025.    Stable mild chronic microvascular ischemic changes.    < end of copied text >

## 2025-02-28 NOTE — PROGRESS NOTE ADULT - ASSESSMENT
IMPRESSION:    Acute Hypoxic Respiratory Failure Sp Trach/PEG   Toxic Metabolic Encephalopathy  aspiration   Dyskinesia  small L ptx  Influenza A treated   MSSA bacteremia repeat CX -  Sepsis POA  cholecystitis sp percutaneous chol  Periventricular bleed  Suspected Mural thrombus in PA  Right thigh hematoma.    HO Parkinson's Disease   HO CAD  Left Ptx. stable     PLAN:    CNS:  SAT. Continue Anti parkinson's per neuro. trial off methadone. Neuro fup    HEENT: Oral care. s/p trach and PEG    PULMONARY:  Monitor airway pressures. Remains with significant secretions. s/p trac, Keep Sao2 92 to 96%, c/w weaning trials. Chest PT. f/u CXR  ON 40%, peak pressure 19    CARDIOVASCULAR:  Avoid overload.     GI: GI prophylaxis. S/P perc cholecystostomy. Bowel regimen G tube for meds , feeds through J tube    RENAL: Follow up lytes. Mars in place for retention. Kidney function stable.     INFECTIOUS DISEASE: Cultures noted. SP Tamiflu. ABX per ID.     HEMATOLOGICAL: Monitor CBC and coags. CTA noted with no active bleed. Goal Hb > 7.  LE DOPPLER -    ENDOCRINE:  Follow up FS.  Insulin protocol if needed.    MUSCULOSKELETAL: Bed rest.  Off loading. Wound care for DTIs. Burn eval noted     Full code  palliative care follow up   very poor prognosis  vent unit

## 2025-03-01 LAB
ALBUMIN SERPL ELPH-MCNC: 2.4 G/DL — LOW (ref 3.5–5.2)
ALP SERPL-CCNC: 235 U/L — HIGH (ref 30–115)
ALT FLD-CCNC: <5 U/L — SIGNIFICANT CHANGE UP (ref 0–41)
ANION GAP SERPL CALC-SCNC: 8 MMOL/L — SIGNIFICANT CHANGE UP (ref 7–14)
AST SERPL-CCNC: 19 U/L — SIGNIFICANT CHANGE UP (ref 0–41)
BASOPHILS # BLD AUTO: 0.01 K/UL — SIGNIFICANT CHANGE UP (ref 0–0.2)
BASOPHILS NFR BLD AUTO: 0.2 % — SIGNIFICANT CHANGE UP (ref 0–1)
BILIRUB SERPL-MCNC: 0.7 MG/DL — SIGNIFICANT CHANGE UP (ref 0.2–1.2)
BUN SERPL-MCNC: 37 MG/DL — HIGH (ref 10–20)
CALCIUM SERPL-MCNC: 7.3 MG/DL — LOW (ref 8.4–10.5)
CHLORIDE SERPL-SCNC: 104 MMOL/L — SIGNIFICANT CHANGE UP (ref 98–110)
CO2 SERPL-SCNC: 30 MMOL/L — SIGNIFICANT CHANGE UP (ref 17–32)
CREAT SERPL-MCNC: 0.8 MG/DL — SIGNIFICANT CHANGE UP (ref 0.7–1.5)
CULTURE RESULTS: SIGNIFICANT CHANGE UP
EGFR: 95 ML/MIN/1.73M2 — SIGNIFICANT CHANGE UP
EGFR: 95 ML/MIN/1.73M2 — SIGNIFICANT CHANGE UP
EOSINOPHIL # BLD AUTO: 0.03 K/UL — SIGNIFICANT CHANGE UP (ref 0–0.7)
EOSINOPHIL NFR BLD AUTO: 0.5 % — SIGNIFICANT CHANGE UP (ref 0–8)
GLUCOSE BLDC GLUCOMTR-MCNC: 122 MG/DL — HIGH (ref 70–99)
GLUCOSE BLDC GLUCOMTR-MCNC: 125 MG/DL — HIGH (ref 70–99)
GLUCOSE BLDC GLUCOMTR-MCNC: 126 MG/DL — HIGH (ref 70–99)
GLUCOSE BLDC GLUCOMTR-MCNC: 154 MG/DL — HIGH (ref 70–99)
GLUCOSE SERPL-MCNC: 121 MG/DL — HIGH (ref 70–99)
HCT VFR BLD CALC: 27.1 % — LOW (ref 42–52)
HGB BLD-MCNC: 8.6 G/DL — LOW (ref 14–18)
IMM GRANULOCYTES NFR BLD AUTO: 0.5 % — HIGH (ref 0.1–0.3)
LYMPHOCYTES # BLD AUTO: 0.88 K/UL — LOW (ref 1.2–3.4)
LYMPHOCYTES # BLD AUTO: 15.1 % — LOW (ref 20.5–51.1)
MAGNESIUM SERPL-MCNC: 2.4 MG/DL — SIGNIFICANT CHANGE UP (ref 1.8–2.4)
MCHC RBC-ENTMCNC: 30.1 PG — SIGNIFICANT CHANGE UP (ref 27–31)
MCHC RBC-ENTMCNC: 31.7 G/DL — LOW (ref 32–37)
MCV RBC AUTO: 94.8 FL — HIGH (ref 80–94)
MONOCYTES # BLD AUTO: 0.26 K/UL — SIGNIFICANT CHANGE UP (ref 0.1–0.6)
MONOCYTES NFR BLD AUTO: 4.5 % — SIGNIFICANT CHANGE UP (ref 1.7–9.3)
NEUTROPHILS # BLD AUTO: 4.6 K/UL — SIGNIFICANT CHANGE UP (ref 1.4–6.5)
NEUTROPHILS NFR BLD AUTO: 79.2 % — HIGH (ref 42.2–75.2)
NRBC BLD AUTO-RTO: 0 /100 WBCS — SIGNIFICANT CHANGE UP (ref 0–0)
PLATELET # BLD AUTO: 192 K/UL — SIGNIFICANT CHANGE UP (ref 130–400)
PMV BLD: 10.9 FL — HIGH (ref 7.4–10.4)
POTASSIUM SERPL-MCNC: 3.8 MMOL/L — SIGNIFICANT CHANGE UP (ref 3.5–5)
POTASSIUM SERPL-SCNC: 3.8 MMOL/L — SIGNIFICANT CHANGE UP (ref 3.5–5)
PROT SERPL-MCNC: 4.6 G/DL — LOW (ref 6–8)
RBC # BLD: 2.86 M/UL — LOW (ref 4.7–6.1)
RBC # FLD: 18.2 % — HIGH (ref 11.5–14.5)
SODIUM SERPL-SCNC: 142 MMOL/L — SIGNIFICANT CHANGE UP (ref 135–146)
SPECIMEN SOURCE: SIGNIFICANT CHANGE UP
WBC # BLD: 5.81 K/UL — SIGNIFICANT CHANGE UP (ref 4.8–10.8)
WBC # FLD AUTO: 5.81 K/UL — SIGNIFICANT CHANGE UP (ref 4.8–10.8)

## 2025-03-01 PROCEDURE — 99232 SBSQ HOSP IP/OBS MODERATE 35: CPT

## 2025-03-01 PROCEDURE — 71045 X-RAY EXAM CHEST 1 VIEW: CPT | Mod: 26

## 2025-03-01 PROCEDURE — 99233 SBSQ HOSP IP/OBS HIGH 50: CPT

## 2025-03-01 RX ADMIN — ENOXAPARIN SODIUM 50 MILLIGRAM(S): 100 INJECTION SUBCUTANEOUS at 17:23

## 2025-03-01 RX ADMIN — Medication 15 MILLILITER(S): at 05:11

## 2025-03-01 RX ADMIN — Medication 1 TABLET(S): at 05:10

## 2025-03-01 RX ADMIN — Medication 1 APPLICATION(S): at 05:10

## 2025-03-01 RX ADMIN — ENOXAPARIN SODIUM 50 MILLIGRAM(S): 100 INJECTION SUBCUTANEOUS at 05:10

## 2025-03-01 RX ADMIN — POLYETHYLENE GLYCOL 3350 17 GRAM(S): 17 POWDER, FOR SOLUTION ORAL at 05:10

## 2025-03-01 RX ADMIN — POLYETHYLENE GLYCOL 3350 17 GRAM(S): 17 POWDER, FOR SOLUTION ORAL at 17:22

## 2025-03-01 RX ADMIN — Medication 25 GRAM(S): at 05:29

## 2025-03-01 RX ADMIN — Medication 2.5 MILLIGRAM(S): at 17:22

## 2025-03-01 RX ADMIN — Medication 1 TABLET(S): at 13:44

## 2025-03-01 RX ADMIN — LEVETIRACETAM 500 MILLIGRAM(S): 10 INJECTION, SOLUTION INTRAVENOUS at 17:22

## 2025-03-01 RX ADMIN — Medication 40 MILLIGRAM(S): at 13:44

## 2025-03-01 RX ADMIN — Medication 2 TABLET(S): at 22:08

## 2025-03-01 RX ADMIN — Medication 25 GRAM(S): at 22:08

## 2025-03-01 RX ADMIN — LEVETIRACETAM 500 MILLIGRAM(S): 10 INJECTION, SOLUTION INTRAVENOUS at 05:10

## 2025-03-01 RX ADMIN — Medication 15 MILLILITER(S): at 17:31

## 2025-03-01 RX ADMIN — Medication 25 GRAM(S): at 13:44

## 2025-03-01 RX ADMIN — Medication 1 TABLET(S): at 22:08

## 2025-03-01 RX ADMIN — Medication 2.5 MILLIGRAM(S): at 05:10

## 2025-03-01 RX ADMIN — INSULIN LISPRO 1: 100 INJECTION, SOLUTION INTRAVENOUS; SUBCUTANEOUS at 06:32

## 2025-03-01 NOTE — CHART NOTE - NSCHARTNOTEFT_GEN_A_CORE
Pre-Op Note    Patient: GARETT ESCAMILLA  MRN: [id number]  69y Male  Location: [address]18 [address]  25 @ 09:01    Admit Diagnosis: Pneumonia due to infectious organism        Procedure: Bronc, EBUS, FNA  Consent in Chart: [ X ] Yes [  ] No  Diet:   NPO @MN on 3/3    EK Lead ECG:   Ventricular Rate 98 BPM    Atrial Rate 98 BPM    P-R Interval 154 ms    QRS Duration 68 ms    Q-T Interval 342 ms    QTC Calculation(Bazett) 436 ms    P Axis 87 degrees    R Axis 81 degrees    T Axis 78 degrees    Diagnosis Line Normal sinus rhythm  Low voltage QRS  Borderline ECG    Confirmed by Kane Herrmann ([id number]) on 2025 9:16:05 AM (25 @ 22:53)    CXR:  Xray Chest 1 View- PORTABLE-Routine:   ACC: [id number] EXAM:  XR CHEST PORTABLE ROUTINE 1V   ORDERED BY: LEENA JENNINGS     PROCEDURE DATE:  2025          INTERPRETATION:  CLINICAL HISTORY: Follow-up evaluation.    COMPARISON: Chest radiograph dated 2025.    TECHNIQUE: Portable frontal chest radiographs.    FINDINGS:    Support devices: Stable tracheostomy.    Cardiac/mediastinum/hilum: Stable.    Lung parenchyma/Pleura: Stable small left apical pneumothorax measuring   approximately 1 cm. Stable bilateral opacities/effusions, right greater   than left.    Skeleton/soft tissues: Stable.      IMPRESSION:    Stable small left apical pneumothorax measuring approximately 1 cm.   Stable bilateral opacities/effusions, right greater than left.    --- End of Report ---          INGRIS KHAN MD; Resident Radiologist  This document has been electronically signed.  VERO SHAHID MD; Attending Radiologist  This document has been electronically signed. 2025 11:07AM (25 @ 09:24)      Vitals:  T(F): 97.7 (25 @ 04:56), Max: 97.8 (25 @ 21:01)  HR: 77 (25 @ 04:56) (72 - 83)  BP: 118/71 (25 @ 04:56) (100/58 - 118/71)  RR: 20 (25 @ 04:56) (18 - 20)  SpO2: 100% (25 @ 04:56) (97% - 105%)    Pre-OP Labs:  CAPILLARY BLOOD GLUCOSE      POCT Blood Glucose.: 154 mg/dL (01 Mar 2025 06:09)  POCT Blood Glucose.: 136 mg/dL (2025 23:26)  POCT Blood Glucose.: 113 mg/dL (2025 18:58)  POCT Blood Glucose.: 137 mg/dL (2025 16:47)  POCT Blood Glucose.: 107 mg/dL (2025 12:09)                          9.2    6.17  )-----------( 189      ( 2025 06:07 )             28.6         138  |  101  |  36[H]  ----------------------------<  114[H]  3.5   |  31  |  1.0    Ca    7.5[L]      2025 06:07  Mg     2.2         TPro  4.6[L]  /  Alb  2.3[L]  /  TBili  0.8  /  DBili  x   /  AST  19  /  ALT  <5  /  AlkPhos  198[H]        Urinalysis Basic - ( 2025 06:07 )    Color: x / Appearance: x / SG: x / pH: x  Gluc: 114 mg/dL / Ketone: x  / Bili: x / Urobili: x   Blood: x / Protein: x / Nitrite: x   Leuk Esterase: x / RBC: x / WBC x   Sq Epi: x / Non Sq Epi: x / Bacteria: x        Type & Screen: ABO RH Interpretation: O POS (25 @ 06:07)        COVID: SARS-CoV-2: NotDetec (2025 10:30)      PLAN:  - Added on for OR Monday  - CBC, BMP, Mg, Phos, Coag, active type and screen (ordered for 3/3 AM)   - NPO @MN on 3/3 with IVF if not CI    GREEN TEAM SPECTRA: [id number]

## 2025-03-01 NOTE — PROGRESS NOTE ADULT - SUBJECTIVE AND OBJECTIVE BOX
Patient is a 69y old  Male who presents with a chief complaint of penumonia (01 Mar 2025 12:25)        Over Night Events:        ROS:     All ROS are negative except HPI   Unable to assess ROS: patient intubated.        PHYSICAL EXAM    ICU Vital Signs Last 24 Hrs  T(C): 36.6 (01 Mar 2025 13:44), Max: 36.6 (28 Feb 2025 21:01)  T(F): 97.8 (01 Mar 2025 13:44), Max: 97.8 (28 Feb 2025 21:01)  HR: 86 (01 Mar 2025 13:44) (77 - 86)  BP: 114/57 (01 Mar 2025 13:44) (100/58 - 118/71)  BP(mean): 88 (01 Mar 2025 04:56) (74 - 88)  ABP: --  ABP(mean): --  RR: 19 (01 Mar 2025 13:44) (18 - 20)  SpO2: 100% (01 Mar 2025 13:44) (100% - 100%)    O2 Parameters below as of 01 Mar 2025 04:56  Patient On (Oxygen Delivery Method): ventilator      General: ill looking  trac  Lungs: l side rhonchi  Cardiovascular: FRANKLYN 2.6  Abdomen: Soft, Positive BS  Not following commands      02-28-25 @ 07:01  -  03-01-25 @ 07:00  --------------------------------------------------------  IN:    Enteral Tube Flush: 300 mL    Glucerna: 1680 mL    IV PiggyBack: 100 mL  Total IN: 2080 mL    OUT:    Drain (mL): 550 mL    Indwelling Catheter - Urethral (mL): 1400 mL  Total OUT: 1950 mL    Total NET: 130 mL          LABS:                            8.6    5.81  )-----------( 192      ( 01 Mar 2025 09:14 )             27.1                                               03-01    142  |  104  |  37[H]  ----------------------------<  121[H]  3.8   |  30  |  0.8    Ca    7.3[L]      01 Mar 2025 09:14  Mg     2.4     03-01    TPro  4.6[L]  /  Alb  2.4[L]  /  TBili  0.7  /  DBili  x   /  AST  19  /  ALT  <5  /  AlkPhos  235[H]  03-01                                             Urinalysis Basic - ( 01 Mar 2025 09:14 )    Color: x / Appearance: x / SG: x / pH: x  Gluc: 121 mg/dL / Ketone: x  / Bili: x / Urobili: x   Blood: x / Protein: x / Nitrite: x   Leuk Esterase: x / RBC: x / WBC x   Sq Epi: x / Non Sq Epi: x / Bacteria: x                                                  LIVER FUNCTIONS - ( 01 Mar 2025 09:14 )  Alb: 2.4 g/dL / Pro: 4.6 g/dL / ALK PHOS: 235 U/L / ALT: <5 U/L / AST: 19 U/L / GGT: x                                                                                               Mode: AC/ CMV (Assist Control/ Continuous Mandatory Ventilation)  RR (machine): 18  TV (machine): 450  FiO2: 40  PEEP: 8  ITime: 0.8  MAP: 11  PIP: 20                                          MEDICATIONS  (STANDING):  carbidopa/levodopa  25/250 1 Tablet(s) Oral every 8 hours  chlorhexidine 0.12% Liquid 15 milliLiter(s) Oral Mucosa every 12 hours  chlorhexidine 2% Cloths 1 Application(s) Topical <User Schedule>  dextrose 5%. 1000 milliLiter(s) (100 mL/Hr) IV Continuous <Continuous>  dextrose 5%. 1000 milliLiter(s) (50 mL/Hr) IV Continuous <Continuous>  dextrose 50% Injectable 25 Gram(s) IV Push once  dextrose 50% Injectable 12.5 Gram(s) IV Push once  dextrose 50% Injectable 25 Gram(s) IV Push once  enoxaparin Injectable 50 milliGRAM(s) SubCutaneous every 12 hours  glucagon  Injectable 1 milliGRAM(s) IntraMuscular once  insulin lispro (ADMELOG) corrective regimen sliding scale   SubCutaneous every 6 hours  levETIRAcetam 500 milliGRAM(s) Oral two times a day  lidocaine 1%/epinephrine 1:100,000 Inj 2 Vial(s) Local Injection once  methadone    Tablet 2.5 milliGRAM(s) Oral every 12 hours  pantoprazole  Injectable 40 milliGRAM(s) IV Push daily  piperacillin/tazobactam IVPB.. 3.375 Gram(s) IV Intermittent every 8 hours  polyethylene glycol 3350 17 Gram(s) Oral every 12 hours  senna 2 Tablet(s) Oral at bedtime    MEDICATIONS  (PRN):  acetaminophen     Tablet .. 650 milliGRAM(s) Oral every 6 hours PRN Temp greater or equal to 38C (100.4F), Mild Pain (1 - 3)  dextrose Oral Gel 15 Gram(s) Oral once PRN Blood Glucose LESS THAN 70 milliGRAM(s)/deciliter      New X-rays reviewed:       ECHO reviewed    CXR interpreted by me:

## 2025-03-01 NOTE — PROGRESS NOTE ADULT - SUBJECTIVE AND OBJECTIVE BOX
GARETT ESCAMILLA  [facility] 018 B ([facility])      Patient was evaluated and examined  by bedside, remains on vent support, no fever, tolerating peg feedings         REVIEW OF SYSTEMS: unable to obtain, patient is confused         T(C): , Max: 36.6 (02-28-25 @ 21:01)  HR: 82 (03-01-25 @ 08:05)  BP: 118/71 (03-01-25 @ 04:56)  RR: 20 (03-01-25 @ 04:56)  SpO2: 100% (03-01-25 @ 08:05)  CAPILLARY BLOOD GLUCOSE      POCT Blood Glucose.: 122 mg/dL (01 Mar 2025 12:16)  POCT Blood Glucose.: 154 mg/dL (01 Mar 2025 06:09)  POCT Blood Glucose.: 136 mg/dL (28 Feb 2025 23:26)  POCT Blood Glucose.: 113 mg/dL (28 Feb 2025 18:58)  POCT Blood Glucose.: 137 mg/dL (28 Feb 2025 16:47)      PHYSICAL EXAM:  General: NAD, Awake, patient is laying comfortably in bed, cachectic   HEENT: AT, NC, trach present  Lungs: good breath sounds  B/L, no wheezing, no rhonchi  CVS: normal S1, S2, RRR, NO M/G/R  Abdomen: soft, bowel sounds present, non-tender, non-distended, peg present, right quadrant cholecystostomy  catheter present  : leslie present  Extremities: no edema, no clubbing, no cyanosis, positive peripheral pulses b/l  Neuro: persistent bed confinement status   Skin: no rash, no ecchymosis      LAB  CBC  Date: 03-01-25 @ 09:14  Mean cell Npktpaxoxh61.1  Mean cell Hemoglobin Conc31.7  Mean cell Volum 94.8  Platelet count-Automate 192  RBC Count 2.86  Red Cell Distrib Width18.2  WBC Count5.81  % Albumin, Urine--  Hematocrit 27.1  Hemoglobin 8.6  CBC  Date: 02-28-25 @ 06:07  Mean cell Fnaouwfgut17.4  Mean cell Hemoglobin Conc32.2  Mean cell Volum 94.4  Platelet count-Automate 189  RBC Count 3.03  Red Cell Distrib Width18.2  WBC Count6.17  % Albumin, Urine--  Hematocrit 28.6  Hemoglobin 9.2    BMP  03-01-25 @ 09:14  Blood Gas Arterial-Calcium,Ionized--  Blood Urea Nitrogen, Serum 37 mg/dL[H] [10 - 20]  Carbon Dioxide, Serum30 mmol/L [17 - 32]  Chloride, [id number] mmol/L [98 - 110]  Creatinie, Serum0.8 mg/dL [0.7 - 1.5]  Glucose, [id number] mg/dL[H] [70 - 99]  Potassium, Serum3.8 mmol/L [3.5 - 5.0]  Sodium, Serum 142 mmol/L [135 - 146]  BMP  02-28-25 @ 06:07  Blood Gas Arterial-Calcium,Ionized--  Blood Urea Nitrogen, Serum 36 mg/dL[H] [10 - 20]  Carbon Dioxide, Serum31 mmol/L [17 - 32]  Chloride, Blavz543 mmol/L [98 - 110]  Creatinie, Serum1.0 mg/dL [0.7 - 1.5]  Glucose, [id number] mg/dL[H] [70 - 99]  Potassium, Serum3.5 mmol/L [3.5 - 5.0]  Sodium, Serum 138 mmol/L [135 - 146]        Microbiology:    Culture - Blood (collected 02-25-25 @ 10:57)  Source: .Blood None  Preliminary Report (02-28-25 @ 23:01):    No growth at 72 Hours    Culture - Blood (collected 02-24-25 @ 05:15)  Source: .Blood None  Preliminary Report (02-28-25 @ 15:00):    No growth at 4 days      Medications:  acetaminophen     Tablet .. 650 milliGRAM(s) Oral every 6 hours PRN  carbidopa/levodopa  25/250 1 Tablet(s) Oral every 8 hours  chlorhexidine 0.12% Liquid 15 milliLiter(s) Oral Mucosa every 12 hours  chlorhexidine 2% Cloths 1 Application(s) Topical <User Schedule>  dextrose 5%. 1000 milliLiter(s) IV Continuous <Continuous>  dextrose 5%. 1000 milliLiter(s) IV Continuous <Continuous>  dextrose 50% Injectable 25 Gram(s) IV Push once  dextrose 50% Injectable 12.5 Gram(s) IV Push once  dextrose 50% Injectable 25 Gram(s) IV Push once  dextrose Oral Gel 15 Gram(s) Oral once PRN  enoxaparin Injectable 50 milliGRAM(s) SubCutaneous every 12 hours  glucagon  Injectable 1 milliGRAM(s) IntraMuscular once  insulin lispro (ADMELOG) corrective regimen sliding scale   SubCutaneous every 6 hours  levETIRAcetam 500 milliGRAM(s) Oral two times a day  lidocaine 1%/epinephrine 1:100,000 Inj 2 Vial(s) Local Injection once  methadone    Tablet 2.5 milliGRAM(s) Oral every 12 hours  pantoprazole  Injectable 40 milliGRAM(s) IV Push daily  piperacillin/tazobactam IVPB.. 3.375 Gram(s) IV Intermittent every 8 hours  polyethylene glycol 3350 17 Gram(s) Oral every 12 hours  senna 2 Tablet(s) Oral at bedtime        Assessment and Plan:  Patient is a 68 y/o  male past medical history of hypertension, Parkinson's, CAD presents for shortness of breath and cough of 1 day duration. Patient was dx. with  Flu, PNA and MSSA bacteremia  He was intubated for acute hypoxic respiratory failure with subsequent trache/PEG placement on 02/20. He  was found to have trace IVH R occipital horn on CTH 01/31, Pulmonary artery thrombus on CTA : 2/ 2 CTA There is a focal filling defect within the main pulmonary artery at site of the ductus arteriosus suspicious for thrombus.   In addition he was treated for suspected cholecystitis , on  2/5 Successful placement of 8Fr perc haydee tube. 250cc of dark bile aspirated from GB.  Pulmonary is following, s/p   2/4 bronch   No organisms seen per oil power field, pt was treated with broad spectrum ABx, downgraded to SDU on 2/25, than pt. was downgraded to vent unit on 3/1/25.     # Acute hypoxic respiratory failure / Multifocal Pneumonia : aspiration vs VAP/ Influenza A/  Septic shock / MSSA bacteremia / suspected cholecystitis / iatrogenic pneumothorax   - pt was intubated , s/p  trach 02/20, c/w local trach care   - Vent management as per pulmonary team   - aggressive pulmonary toilet, chest vest, frequent suction - pt completed course of Tamiflu  - recent CXR noted ( pt has small pneumothorax), pulmonary aware, CXR is stable    - BAL Cx 02/11: Stenotrophomonas -- colonizer as per ID  - ID followed up, recommendations noted: - c/w  zosyn 3.375 q8h IV till 3/2/25, than continue with IV Cefazolin .   - midline 2/24, leslie 2/19  - TTE no vegetations , guidelines recommend MARY as community acquired bacteremia once stable , otherwise 6 weeks empiric therapy for MSSA bacteremia from date of cleared BCX E/D 3/13/25  - Blood Cx 01/29: MSSA, repeat blood Cx is NTD   - S/p cholecystostomy tube placement by IR on 02/05  - Biliary fluid culture 02/10 - candida albicans , likely colonizer   - monitor output from biliary drain, trend WBC        # Pulmonary Embolism / chronic diastolic CHF/  Pulmonary Hypertension   - CTA thorax 02/02: There is a focal filling defect within the main pulmonary artery at site of the ductus arteriosus suspicious for thrombus  - No DVT on duplex.   - ECHO: EF of 56%.  Mildly increased LV wall thickness. Grade I diastolic dysfunction. Moderate tricuspid regurgitation. Estimated pulmonary artery systolic pressure is 41.4 mmHg assuming a right atrial pressure of 3 mmHg, which is consistent with mild pulmonary hypertension.  - He was not started on therapeutic AC due to IVH present on admission.  - case d/w neurology attending today, recommendations noted:   - Can restart patient on ACs. Patient's intraventricular hemorrhage occurred >2 weeks ago. CTA head from 1/31 showed no AVM or aneurysm. Bleed likely idiopathic small intraventricular hemorrhage. Current benefits of restarting ACs outweigh the risks.   - started on  therapeutic Lovenox     # h/o CAD  - ASA  held in setting of IVH  - Since CT head from 02/25 shows resolution of IVH, needs neuro Sx f/u to clear for AC and ASA     # Right hip hematoma   - CT pelvis 02/09: Large intramuscular hematoma in the medial right thigh measuring approximately 9.8 x 6.4 x 17 cm  - Stable on repeat imaging on 02/12    # Sacrococcygeal decubitus ulcer   # Left heel DTI   - Appreciate wound care and burns consult   - S/p debridement 02/22 - no specimen sent for pathology/culture  - change position Q 2 hours, offload pressure points     # Dysphagia   - s/p GJ tube 02/20   - Meds via G tube   - on continuos tube feedings vit J tube   - Nutrition consult     # Parkinson's dementia /  Parkinson's Disease /  Acute IVH of right occipital horn   - CT head 01/31: Trace acute intraventricular hemorrhage is noted layering within the right occipital horn  - CTA head and neck 01/31: Moderate stenosis of the right P2 segment of the PCA. Intracranial vessels are otherwise patent and unremarkable. No evidence of carotid or vertebral artery stenosis.  - CT head 02/25:  Nearly resolved trace intraventricular hemorrhage in the right occipital horn compared to the prior CT head from 2/9/2025.  - Suspicion of seizures given increased disorientation as reported by family members.  EEG, CK, prolactin, lactic acid  - neurology is following, recommendations noted :   - Continue Keppra 500mg BID  - Continue Sinemet to 25/250 ONE tablet TID      # DM- Insulin Sliding Scale     # Urinary retention - leslie present - monitor BUN/cr and urine output      # DVT PPx: Lovenox  - GI PPx: Pantoprazole   - Diet: Tube feeding    #Progress Note Handoff:   continue  therapeutic Lovenox, repeat  CXR in am ,  to reassess pneumothorax size  , pulmonary for vent management, IV abx. to change tomorrow.     Communication with family: yes, medical team  Disposition: patient remains medically active.     Total time spent to complete patient's bedside assessment, review medical chart, discuss medical plan of care with covering medical team was more than 55 minutes with >50% of time spent face to face with patient, discussion with patient/family and/or coordination of care

## 2025-03-02 LAB
ALBUMIN SERPL ELPH-MCNC: 2.2 G/DL — LOW (ref 3.5–5.2)
ALP SERPL-CCNC: 192 U/L — HIGH (ref 30–115)
ALT FLD-CCNC: <5 U/L — SIGNIFICANT CHANGE UP (ref 0–41)
ANION GAP SERPL CALC-SCNC: 6 MMOL/L — LOW (ref 7–14)
AST SERPL-CCNC: 16 U/L — SIGNIFICANT CHANGE UP (ref 0–41)
BASOPHILS # BLD AUTO: 0.01 K/UL — SIGNIFICANT CHANGE UP (ref 0–0.2)
BASOPHILS NFR BLD AUTO: 0.2 % — SIGNIFICANT CHANGE UP (ref 0–1)
BILIRUB SERPL-MCNC: 0.6 MG/DL — SIGNIFICANT CHANGE UP (ref 0.2–1.2)
BUN SERPL-MCNC: 34 MG/DL — HIGH (ref 10–20)
CALCIUM SERPL-MCNC: 7.1 MG/DL — LOW (ref 8.4–10.5)
CHLORIDE SERPL-SCNC: 105 MMOL/L — SIGNIFICANT CHANGE UP (ref 98–110)
CO2 SERPL-SCNC: 31 MMOL/L — SIGNIFICANT CHANGE UP (ref 17–32)
CREAT SERPL-MCNC: 0.9 MG/DL — SIGNIFICANT CHANGE UP (ref 0.7–1.5)
CULTURE RESULTS: SIGNIFICANT CHANGE UP
EGFR: 92 ML/MIN/1.73M2 — SIGNIFICANT CHANGE UP
EGFR: 92 ML/MIN/1.73M2 — SIGNIFICANT CHANGE UP
EOSINOPHIL # BLD AUTO: 0.01 K/UL — SIGNIFICANT CHANGE UP (ref 0–0.7)
EOSINOPHIL NFR BLD AUTO: 0.2 % — SIGNIFICANT CHANGE UP (ref 0–8)
GLUCOSE BLDC GLUCOMTR-MCNC: 122 MG/DL — HIGH (ref 70–99)
GLUCOSE BLDC GLUCOMTR-MCNC: 129 MG/DL — HIGH (ref 70–99)
GLUCOSE BLDC GLUCOMTR-MCNC: 133 MG/DL — HIGH (ref 70–99)
GLUCOSE BLDC GLUCOMTR-MCNC: 134 MG/DL — HIGH (ref 70–99)
GLUCOSE SERPL-MCNC: 138 MG/DL — HIGH (ref 70–99)
HCT VFR BLD CALC: 27 % — LOW (ref 42–52)
HGB BLD-MCNC: 8.5 G/DL — LOW (ref 14–18)
IMM GRANULOCYTES NFR BLD AUTO: 0.6 % — HIGH (ref 0.1–0.3)
LYMPHOCYTES # BLD AUTO: 0.94 K/UL — LOW (ref 1.2–3.4)
LYMPHOCYTES # BLD AUTO: 14.7 % — LOW (ref 20.5–51.1)
MAGNESIUM SERPL-MCNC: 2.6 MG/DL — HIGH (ref 1.8–2.4)
MCHC RBC-ENTMCNC: 29.9 PG — SIGNIFICANT CHANGE UP (ref 27–31)
MCHC RBC-ENTMCNC: 31.5 G/DL — LOW (ref 32–37)
MCV RBC AUTO: 95.1 FL — HIGH (ref 80–94)
MONOCYTES # BLD AUTO: 0.31 K/UL — SIGNIFICANT CHANGE UP (ref 0.1–0.6)
MONOCYTES NFR BLD AUTO: 4.9 % — SIGNIFICANT CHANGE UP (ref 1.7–9.3)
NEUTROPHILS # BLD AUTO: 5.08 K/UL — SIGNIFICANT CHANGE UP (ref 1.4–6.5)
NEUTROPHILS NFR BLD AUTO: 79.4 % — HIGH (ref 42.2–75.2)
NRBC BLD AUTO-RTO: 0 /100 WBCS — SIGNIFICANT CHANGE UP (ref 0–0)
PLATELET # BLD AUTO: 197 K/UL — SIGNIFICANT CHANGE UP (ref 130–400)
PMV BLD: 10.8 FL — HIGH (ref 7.4–10.4)
POTASSIUM SERPL-MCNC: 4.1 MMOL/L — SIGNIFICANT CHANGE UP (ref 3.5–5)
POTASSIUM SERPL-SCNC: 4.1 MMOL/L — SIGNIFICANT CHANGE UP (ref 3.5–5)
PROT SERPL-MCNC: 4.3 G/DL — LOW (ref 6–8)
RBC # BLD: 2.84 M/UL — LOW (ref 4.7–6.1)
RBC # FLD: 18.2 % — HIGH (ref 11.5–14.5)
SODIUM SERPL-SCNC: 142 MMOL/L — SIGNIFICANT CHANGE UP (ref 135–146)
SPECIMEN SOURCE: SIGNIFICANT CHANGE UP
WBC # BLD: 6.39 K/UL — SIGNIFICANT CHANGE UP (ref 4.8–10.8)
WBC # FLD AUTO: 6.39 K/UL — SIGNIFICANT CHANGE UP (ref 4.8–10.8)

## 2025-03-02 PROCEDURE — 71045 X-RAY EXAM CHEST 1 VIEW: CPT | Mod: 26

## 2025-03-02 PROCEDURE — 99233 SBSQ HOSP IP/OBS HIGH 50: CPT

## 2025-03-02 RX ORDER — CEFAZOLIN SODIUM IN 0.9 % NACL 3 G/100 ML
2000 INTRAVENOUS SOLUTION, PIGGYBACK (ML) INTRAVENOUS EVERY 8 HOURS
Refills: 0 | Status: DISCONTINUED | OUTPATIENT
Start: 2025-03-02 | End: 2025-03-05

## 2025-03-02 RX ADMIN — Medication 25 GRAM(S): at 05:51

## 2025-03-02 RX ADMIN — Medication 1 APPLICATION(S): at 05:54

## 2025-03-02 RX ADMIN — Medication 2 TABLET(S): at 21:28

## 2025-03-02 RX ADMIN — Medication 100 MILLIGRAM(S): at 21:28

## 2025-03-02 RX ADMIN — Medication 15 MILLILITER(S): at 05:53

## 2025-03-02 RX ADMIN — Medication 2.5 MILLIGRAM(S): at 17:16

## 2025-03-02 RX ADMIN — LEVETIRACETAM 500 MILLIGRAM(S): 10 INJECTION, SOLUTION INTRAVENOUS at 05:53

## 2025-03-02 RX ADMIN — ENOXAPARIN SODIUM 50 MILLIGRAM(S): 100 INJECTION SUBCUTANEOUS at 17:16

## 2025-03-02 RX ADMIN — POLYETHYLENE GLYCOL 3350 17 GRAM(S): 17 POWDER, FOR SOLUTION ORAL at 05:52

## 2025-03-02 RX ADMIN — Medication 40 MILLIGRAM(S): at 11:11

## 2025-03-02 RX ADMIN — Medication 1 TABLET(S): at 05:53

## 2025-03-02 RX ADMIN — POLYETHYLENE GLYCOL 3350 17 GRAM(S): 17 POWDER, FOR SOLUTION ORAL at 17:16

## 2025-03-02 RX ADMIN — Medication 1 TABLET(S): at 13:01

## 2025-03-02 RX ADMIN — Medication 100 MILLIGRAM(S): at 13:01

## 2025-03-02 RX ADMIN — LEVETIRACETAM 500 MILLIGRAM(S): 10 INJECTION, SOLUTION INTRAVENOUS at 17:16

## 2025-03-02 RX ADMIN — Medication 1 TABLET(S): at 21:28

## 2025-03-02 RX ADMIN — Medication 2.5 MILLIGRAM(S): at 05:53

## 2025-03-02 RX ADMIN — Medication 15 MILLILITER(S): at 17:16

## 2025-03-02 RX ADMIN — ENOXAPARIN SODIUM 50 MILLIGRAM(S): 100 INJECTION SUBCUTANEOUS at 05:52

## 2025-03-02 NOTE — PROGRESS NOTE ADULT - ASSESSMENT
Patient is a 70 y/o  male past medical history of hypertension, Parkinson's, CAD presents for shortness of breath and cough of 1 day duration. Patient was dx. with  Flu, PNA and MSSA bacteremia  He was intubated for acute hypoxic respiratory failure with subsequent trache/PEG placement on 02/20. He  was found to have trace IVH R occipital horn on CTH 01/31, Pulmonary artery thrombus on CTA : 2/ 2 CTA There is a focal filling defect within the main pulmonary artery at site of the ductus arteriosus suspicious for thrombus.   In addition he was treated for suspected cholecystitis , on  2/5 Successful placement of 8Fr perc haydee tube. 250cc of dark bile aspirated from GB.  Pulmonary is following, s/p   2/4 bronch   No organisms seen per oil power field, pt was treated with broad spectrum ABx, downgraded to SDU on 2/25, than pt. was downgraded to vent unit on 3/1/25.     # Acute hypoxic respiratory failure / Multifocal Pneumonia : aspiration vs VAP/ Influenza A/  Septic shock / MSSA bacteremia / suspected cholecystitis / iatrogenic pneumothorax   - pt was intubated , s/p  trach 02/20, c/w local trach care   - Vent management as per pulmonary team   - aggressive pulmonary toilet, chest vest, frequent suction - pt completed course of Tamiflu  - recent CXR noted ( pt has small pneumothorax), pulmonary aware, CXR is stable    - BAL Cx 02/11: Stenotrophomonas -- colonizer as per ID  - ID followed up, recommendations noted: - c/w  zosyn 3.375 q8h IV till 3/2/25, than continue with IV Cefazolin .   - midline 2/24, leslie 2/19  - TTE no vegetations , guidelines recommend MARY as community acquired bacteremia once stable , otherwise 6 weeks empiric therapy for MSSA bacteremia from date of cleared BCX E/D 3/13/25  - Blood Cx 01/29: MSSA, repeat blood Cx is NTD   - S/p cholecystostomy tube placement by IR on 02/05  - Biliary fluid culture 02/10 - candida albicans , likely colonizer   - monitor output from biliary drain, trend WBC        # Pulmonary Embolism / chronic diastolic CHF/  Pulmonary Hypertension   - CTA thorax 02/02: There is a focal filling defect within the main pulmonary artery at site of the ductus arteriosus suspicious for thrombus  - No DVT on duplex.   - ECHO: EF of 56%.  Mildly increased LV wall thickness. Grade I diastolic dysfunction. Moderate tricuspid regurgitation. Estimated pulmonary artery systolic pressure is 41.4 mmHg assuming a right atrial pressure of 3 mmHg, which is consistent with mild pulmonary hypertension.  - He was not started on therapeutic AC due to IVH present on admission.  - case d/w neurology attending today, recommendations noted:   - Can restart patient on ACs. Patient's intraventricular hemorrhage occurred >2 weeks ago. CTA head from 1/31 showed no AVM or aneurysm. Bleed likely idiopathic small intraventricular hemorrhage. Current benefits of restarting ACs outweigh the risks.   - started on  therapeutic Lovenox     # h/o CAD  - ASA  held in setting of IVH  - Since CT head from 02/25 shows resolution of IVH, needs neuro Sx f/u to clear for AC and ASA     # Right hip hematoma   - CT pelvis 02/09: Large intramuscular hematoma in the medial right thigh measuring approximately 9.8 x 6.4 x 17 cm  - Stable on repeat imaging on 02/12    # Sacrococcygeal decubitus ulcer   # Left heel DTI   - Appreciate wound care and burns consult   - S/p debridement 02/22 - no specimen sent for pathology/culture  - change position Q 2 hours, offload pressure points     # Dysphagia   - s/p GJ tube 02/20   - Meds via G tube   - on continuos tube feedings vit J tube   - Nutrition consult     # Parkinson's dementia /  Parkinson's Disease /  Acute IVH of right occipital horn   - CT head 01/31: Trace acute intraventricular hemorrhage is noted layering within the right occipital horn  - CTA head and neck 01/31: Moderate stenosis of the right P2 segment of the PCA. Intracranial vessels are otherwise patent and unremarkable. No evidence of carotid or vertebral artery stenosis.  - CT head 02/25:  Nearly resolved trace intraventricular hemorrhage in the right occipital horn compared to the prior CT head from 2/9/2025.  - Suspicion of seizures given increased disorientation as reported by family members.  EEG, CK, prolactin, lactic acid  - neurology is following, recommendations noted :   - Continue Keppra 500mg BID  - Continue Sinemet to 25/250 ONE tablet TID      # DM- Insulin Sliding Scale     # Urinary retention - leslie present - monitor BUN/cr and urine output      # DVT PPx: Lovenox  - GI PPx: Pantoprazole   - Diet: Tube feeding    #Progress Note Handoff:   continue  therapeutic Lovenox, repeat  CXR in am ,  to reassess pneumothorax size  , pulmonary for vent management, IV abx. to change tomorrow.     Communication with family: yes, medical team  Disposition: patient remains medically active.

## 2025-03-02 NOTE — PROGRESS NOTE ADULT - SUBJECTIVE AND OBJECTIVE BOX
24H events:    Patient is a 69y old Male who presents with a chief complaint of penumonia (01 Mar 2025 14:17)    Primary diagnosis of Pneumonia          Today is hospital day 32d.   Patient sleeping soundly     Code Status: Full    PAST MEDICAL & SURGICAL HISTORY  Parkinson disease    CAD (coronary artery disease)    History of basal cell carcinoma (BCC) excision      SOCIAL HISTORY:  Social History:      ALLERGIES:  Allergy Status Unknown    MEDICATIONS:  STANDING MEDICATIONS  carbidopa/levodopa  25/250 1 Tablet(s) Oral every 8 hours  chlorhexidine 0.12% Liquid 15 milliLiter(s) Oral Mucosa every 12 hours  chlorhexidine 2% Cloths 1 Application(s) Topical <User Schedule>  dextrose 5%. 1000 milliLiter(s) IV Continuous <Continuous>  dextrose 5%. 1000 milliLiter(s) IV Continuous <Continuous>  dextrose 50% Injectable 25 Gram(s) IV Push once  dextrose 50% Injectable 12.5 Gram(s) IV Push once  dextrose 50% Injectable 25 Gram(s) IV Push once  enoxaparin Injectable 50 milliGRAM(s) SubCutaneous every 12 hours  glucagon  Injectable 1 milliGRAM(s) IntraMuscular once  insulin lispro (ADMELOG) corrective regimen sliding scale   SubCutaneous every 6 hours  levETIRAcetam 500 milliGRAM(s) Oral two times a day  lidocaine 1%/epinephrine 1:100,000 Inj 2 Vial(s) Local Injection once  methadone    Tablet 2.5 milliGRAM(s) Oral every 12 hours  pantoprazole  Injectable 40 milliGRAM(s) IV Push daily  piperacillin/tazobactam IVPB.. 3.375 Gram(s) IV Intermittent every 8 hours  polyethylene glycol 3350 17 Gram(s) Oral every 12 hours  senna 2 Tablet(s) Oral at bedtime    PRN MEDICATIONS  acetaminophen     Tablet .. 650 milliGRAM(s) Oral every 6 hours PRN  dextrose Oral Gel 15 Gram(s) Oral once PRN    VITALS:   T(F): 99.2  HR: 81  BP: 110/64  RR: 18  SpO2: 100%      LABS:                        8.6    5.81  )-----------( 192      ( 01 Mar 2025 09:14 )             27.1     03-01    142  |  104  |  37[H]  ----------------------------<  121[H]  3.8   |  30  |  0.8    Ca    7.3[L]      01 Mar 2025 09:14  Mg     2.4     03-01    TPro  4.6[L]  /  Alb  2.4[L]  /  TBili  0.7  /  DBili  x   /  AST  19  /  ALT  <5  /  AlkPhos  235[H]  03-01      Urinalysis Basic - ( 01 Mar 2025 09:14 )    Color: x / Appearance: x / SG: x / pH: x  Gluc: 121 mg/dL / Ketone: x  / Bili: x / Urobili: x   Blood: x / Protein: x / Nitrite: x   Leuk Esterase: x / RBC: x / WBC x   Sq Epi: x / Non Sq Epi: x / Bacteria: x                RADIOLOGY:  CXR  Xray Chest 1 View- PORTABLE-Urgent:   ACC: [id number] EXAM:  XR CHEST PORTABLE URGENT 1V   ORDERED BY: MABEL SOTO     PROCEDURE DATE:  02/27/2025          INTERPRETATION:  CLINICAL HISTORY / REASON FOR EXAM: Shortness of breath.    COMPARISON: Chest radiograph from February 27, 2025.    TECHNIQUE/POSITIONING: Satisfactory. Single image, AP chest radiograph.    FINDINGS:    SUPPORT DEVICES: Tracheostomy tube overlies the mid trachea.    CARDIAC/MEDIASTINUM/HILUM: Unchanged cardiac silhouette.    LUNG PARENCHYMA/PLEURA: Stable left apical pneumothorax with airgap less   than 1 cm. Stable right basilar opacity.    SKELETON/SOFT TISSUES: Unchanged.      IMPRESSION:    Slightly decreased left pneumothorax with airgap of 0.9 cm.    --- End of Report ---            JENNIE JAIN MD; Attending Radiologist  This document has been electronically signed. Feb 27 2025  6:11PM (02-27-25 @ 14:39)      CT

## 2025-03-02 NOTE — PROGRESS NOTE ADULT - SUBJECTIVE AND OBJECTIVE BOX
GARETT ESCAMILLA  [facility] 3[facility] 018 B ([facility])    Patient was evaluated and examined  by bedside, remains on vent support, no active events over night , as per covering nurse report        REVIEW OF SYSTEMS: unable to obtain, patient is a poor historian         T(C): , Max: 37.3 (03-01-25 @ 19:42)  HR: 76 (03-02-25 @ 07:54)  BP: 131/75 (03-02-25 @ 05:35)  RR: 18 (03-02-25 @ 05:35)  SpO2: 100% (03-02-25 @ 07:54)  CAPILLARY BLOOD GLUCOSE      POCT Blood Glucose.: 133 mg/dL (02 Mar 2025 11:06)  POCT Blood Glucose.: 129 mg/dL (02 Mar 2025 06:30)  POCT Blood Glucose.: 122 mg/dL (02 Mar 2025 00:51)  POCT Blood Glucose.: 126 mg/dL (01 Mar 2025 21:16)  POCT Blood Glucose.: 125 mg/dL (01 Mar 2025 17:08)  POCT Blood Glucose.: 122 mg/dL (01 Mar 2025 12:16)      PHYSICAL EXAM:  General: NAD, Awake, patient is laying comfortably in bed, cachectic   HEENT: AT, NC, trach present  Lungs: good breath sounds  B/L, no wheezing, no rhonchi  CVS: normal S1, S2, RRR, NO M/G/R  Abdomen: soft, bowel sounds present, non-tender, non-distended, peg present, right quadrant cholecystostomy  catheter present  : leslie present  Extremities: no edema, no clubbing, no cyanosis, positive peripheral pulses b/l  Neuro: persistent bed confinement status   Skin: no rash, no ecchymosis          LAB  CBC  Date: 03-02-25 @ 08:17  Mean cell Xwqqjjvdxr50.9  Mean cell Hemoglobin Conc31.5  Mean cell Volum 95.1  Platelet count-Automate 197  RBC Count 2.84  Red Cell Distrib Width18.2  WBC Count6.39  % Albumin, Urine--  Hematocrit 27.0  Hemoglobin 8.5  CBC  Date: 03-01-25 @ 09:14  Mean cell Csmirfpltw00.1  Mean cell Hemoglobin Conc31.7  Mean cell Volum 94.8  Platelet count-Automate 192  RBC Count 2.86  Red Cell Distrib Width18.2  WBC Count5.81  % Albumin, Urine--  Hematocrit 27.1  Hemoglobin 8.6    BMP  03-02-25 @ 08:17  Blood Gas Arterial-Calcium,Ionized--  Blood Urea Nitrogen, Serum 34 mg/dL[H] [10 - 20]  Carbon Dioxide, Serum31 mmol/L [17 - 32]  Chloride, Ipddo392 mmol/L [98 - 110]  Creatinie, Serum0.9 mg/dL [0.7 - 1.5]  Glucose, Djfsx117 mg/dL[H] [70 - 99]  Potassium, Serum4.1 mmol/L [3.5 - 5.0]  Sodium, Serum 142 mmol/L [135 - 146]  BMP  03-01-25 @ 09:14  Blood Gas Arterial-Calcium,Ionized--  Blood Urea Nitrogen, Serum 37 mg/dL[H] [10 - 20]  Carbon Dioxide, Serum30 mmol/L [17 - 32]  Chloride, Fqaee989 mmol/L [98 - 110]  Creatinie, Serum0.8 mg/dL [0.7 - 1.5]  Glucose, Imvga897 mg/dL[H] [70 - 99]  Potassium, Serum3.8 mmol/L [3.5 - 5.0]  Sodium, Serum 142 mmol/L [135 - 146]        Microbiology:    Culture - Blood (collected 02-25-25 @ 10:57)  Source: .Blood None  Preliminary Report (03-01-25 @ 23:07):    No growth at 4 days    Culture - Blood (collected 02-24-25 @ 05:15)  Source: .Blood None  Final Report (03-01-25 @ 15:01):    No growth at 5 days      Medications:  acetaminophen     Tablet .. 650 milliGRAM(s) Oral every 6 hours PRN  carbidopa/levodopa  25/250 1 Tablet(s) Oral every 8 hours  ceFAZolin   IVPB 2000 milliGRAM(s) IV Intermittent every 8 hours  chlorhexidine 0.12% Liquid 15 milliLiter(s) Oral Mucosa every 12 hours  chlorhexidine 2% Cloths 1 Application(s) Topical <User Schedule>  dextrose 5%. 1000 milliLiter(s) IV Continuous <Continuous>  dextrose 5%. 1000 milliLiter(s) IV Continuous <Continuous>  dextrose 50% Injectable 25 Gram(s) IV Push once  dextrose 50% Injectable 12.5 Gram(s) IV Push once  dextrose 50% Injectable 25 Gram(s) IV Push once  dextrose Oral Gel 15 Gram(s) Oral once PRN  enoxaparin Injectable 50 milliGRAM(s) SubCutaneous every 12 hours  glucagon  Injectable 1 milliGRAM(s) IntraMuscular once  insulin lispro (ADMELOG) corrective regimen sliding scale   SubCutaneous every 6 hours  levETIRAcetam 500 milliGRAM(s) Oral two times a day  lidocaine 1%/epinephrine 1:100,000 Inj 2 Vial(s) Local Injection once  methadone    Tablet 2.5 milliGRAM(s) Oral every 12 hours  pantoprazole  Injectable 40 milliGRAM(s) IV Push daily  polyethylene glycol 3350 17 Gram(s) Oral every 12 hours  senna 2 Tablet(s) Oral at bedtime        Assessment and Plan:  Patient is a 70 y/o  male past medical history of hypertension, Parkinson's, CAD presents for shortness of breath and cough of 1 day duration. Patient was dx. with  Flu, PNA and MSSA bacteremia  He was intubated for acute hypoxic respiratory failure with subsequent trache/PEG placement on 02/20. He  was found to have trace IVH R occipital horn on CTH 01/31, Pulmonary artery thrombus on CTA : 2/ 2 CTA There is a focal filling defect within the main pulmonary artery at site of the ductus arteriosus suspicious for thrombus.   In addition he was treated for suspected cholecystitis , on  2/5 Successful placement of 8Fr perc haydee tube. 250cc of dark bile aspirated from GB.  Pulmonary is following, s/p   2/4 bronch   No organisms seen per oil power field, pt was treated with broad spectrum ABx, downgraded to SDU on 2/25, than pt. was downgraded to vent unit on 3/1/25.     # Acute hypoxic respiratory failure / Multifocal Pneumonia : aspiration vs VAP/ Influenza A/  Septic shock / MSSA bacteremia / suspected cholecystitis / iatrogenic pneumothorax   - pt was intubated , s/p  trach 02/20, c/w local trach care   - Vent management as per pulmonary team   - aggressive pulmonary toilet, chest vest, frequent suction - pt completed course of Tamiflu  - recent CXR noted ( pt has small pneumothorax), pulmonary aware, CXR is stable    - BAL Cx 02/11: Stenotrophomonas -- colonizer as per ID  - midline 2/24, leslie 2/19  - TTE no vegetations , guidelines recommend MARY as community acquired bacteremia once stable , otherwise 6 weeks empiric therapy for MSSA bacteremia from date of cleared BCX E/D 3/13/25  - Blood Cx 01/29: MSSA, repeat blood Cx is NTD   - S/p cholecystostomy tube placement by IR on 02/05  - Biliary fluid culture 02/10 - candida albicans , likely colonizer  - ID followed up, recommendations noted: - s/p  zosyn 3.375 q8h IV till 3/2/25, currently resumed on IV Cefazolin 2grams every 8 hours till 3/13/25 .   - monitor output from biliary drain, trend WBC        # Pulmonary Embolism / chronic diastolic CHF/  Pulmonary Hypertension   - CTA thorax 02/02: There is a focal filling defect within the main pulmonary artery at site of the ductus arteriosus suspicious for thrombus  - No DVT on duplex.   - ECHO: EF of 56%.  Mildly increased LV wall thickness. Grade I diastolic dysfunction. Moderate tricuspid regurgitation. Estimated pulmonary artery systolic pressure is 41.4 mmHg assuming a right atrial pressure of 3 mmHg, which is consistent with mild pulmonary hypertension.  - He was not started on therapeutic AC due to IVH present on admission.  - Neuro  recommendations: can restart patient on ACs. Patient's intraventricular hemorrhage occurred >2 weeks ago. CTA head from 1/31 showed no AVM or aneurysm. Bleed likely idiopathic small intraventricular hemorrhage. Current benefits of restarting ACs outweigh the risks.   - from 02/28/25- started on  therapeutic Lovenox     # h/o CAD  - ASA  held in setting of IVH  - Since CT head from 02/25 shows resolution of IVH, needs neuro Sx f/u to clear for AC and ASA     # Right hip hematoma   - CT pelvis 02/09: Large intramuscular hematoma in the medial right thigh measuring approximately 9.8 x 6.4 x 17 cm  - Stable on repeat imaging on 02/12    # Sacrococcygeal decubitus ulcer   # Left heel DTI   - Appreciate wound care and burns consult   - S/p debridement 02/22 - no specimen sent for pathology/culture  - change position Q 2 hours, offload pressure points     # Dysphagia - s/p GJ tube 02/20   - Meds via G tube   - on continuos tube feedings vit J tube       # Parkinson's dementia /  Parkinson's Disease /  Acute IVH of right occipital horn   - CT head 01/31: Trace acute intraventricular hemorrhage is noted layering within the right occipital horn  - CTA head and neck 01/31: Moderate stenosis of the right P2 segment of the PCA. Intracranial vessels are otherwise patent and unremarkable. No evidence of carotid or vertebral artery stenosis.  - CT head 02/25:  Nearly resolved trace intraventricular hemorrhage in the right occipital horn compared to the prior CT head from 2/9/2025.  - Suspicion of seizures given increased disorientation as reported by family members.  EEG, CK, prolactin, lactic acid  - neurology is following, recommendations noted :   - Continue Keppra 500mg BID  - Continue Sinemet to 25/250 ONE tablet TID      # DM- Insulin Sliding Scale     # Urinary retention - leslie present - monthly exchange.    # DVT PPx: Lovenox  - GI PPx: Pantoprazole   - Diet: Tube feeding    #Progress Note Handoff:   continue  therapeutic Lovenox, repeat  CXR in am ,  to reassess pneumothorax size  , pulmonary for vent management, IV abx. to changed to IV Cefazolin today.      Communication with family: current patient's plan of tx. and initiation of d/c planning d/w family by patient's bedside , they are in agreement with treatment plan.   Disposition: patient remains medically active.     Total time spent to complete patient's bedside assessment, review medical chart, discuss medical plan of care with covering medical team was more than 55 minutes with >50% of time spent face to face with patient, discussion with patient/family and/or coordination of care

## 2025-03-03 LAB
BLD GP AB SCN SERPL QL: SIGNIFICANT CHANGE UP
GLUCOSE BLDC GLUCOMTR-MCNC: 131 MG/DL — HIGH (ref 70–99)
GLUCOSE BLDC GLUCOMTR-MCNC: 132 MG/DL — HIGH (ref 70–99)

## 2025-03-03 PROCEDURE — 71045 X-RAY EXAM CHEST 1 VIEW: CPT | Mod: 26

## 2025-03-03 PROCEDURE — 99233 SBSQ HOSP IP/OBS HIGH 50: CPT

## 2025-03-03 PROCEDURE — 99232 SBSQ HOSP IP/OBS MODERATE 35: CPT

## 2025-03-03 RX ADMIN — Medication 2 TABLET(S): at 21:06

## 2025-03-03 RX ADMIN — Medication 1 TABLET(S): at 05:39

## 2025-03-03 RX ADMIN — Medication 15 MILLILITER(S): at 17:18

## 2025-03-03 RX ADMIN — LEVETIRACETAM 500 MILLIGRAM(S): 10 INJECTION, SOLUTION INTRAVENOUS at 17:19

## 2025-03-03 RX ADMIN — Medication 100 MILLIGRAM(S): at 21:06

## 2025-03-03 RX ADMIN — LEVETIRACETAM 500 MILLIGRAM(S): 10 INJECTION, SOLUTION INTRAVENOUS at 05:39

## 2025-03-03 RX ADMIN — Medication 100 MILLIGRAM(S): at 15:46

## 2025-03-03 RX ADMIN — Medication 2.5 MILLIGRAM(S): at 17:19

## 2025-03-03 RX ADMIN — POLYETHYLENE GLYCOL 3350 17 GRAM(S): 17 POWDER, FOR SOLUTION ORAL at 05:38

## 2025-03-03 RX ADMIN — Medication 1 TABLET(S): at 15:45

## 2025-03-03 RX ADMIN — ENOXAPARIN SODIUM 50 MILLIGRAM(S): 100 INJECTION SUBCUTANEOUS at 05:39

## 2025-03-03 RX ADMIN — POLYETHYLENE GLYCOL 3350 17 GRAM(S): 17 POWDER, FOR SOLUTION ORAL at 17:18

## 2025-03-03 RX ADMIN — Medication 40 MILLIGRAM(S): at 12:20

## 2025-03-03 RX ADMIN — Medication 2.5 MILLIGRAM(S): at 05:40

## 2025-03-03 RX ADMIN — Medication 1 APPLICATION(S): at 05:38

## 2025-03-03 RX ADMIN — Medication 100 MILLIGRAM(S): at 05:40

## 2025-03-03 RX ADMIN — Medication 1 TABLET(S): at 21:06

## 2025-03-03 RX ADMIN — Medication 15 MILLILITER(S): at 05:39

## 2025-03-03 RX ADMIN — ENOXAPARIN SODIUM 50 MILLIGRAM(S): 100 INJECTION SUBCUTANEOUS at 17:18

## 2025-03-03 NOTE — PROGRESS NOTE ADULT - ASSESSMENT
Patient is a 70 y/o  male past medical history of hypertension, Parkinson's, CAD presents for shortness of breath and cough of 1 day duration. Patient was dx. with  Flu, PNA and MSSA bacteremia  He was intubated for acute hypoxic respiratory failure with subsequent trache/PEG placement on 02/20. He  was found to have trace IVH R occipital horn on CTH 01/31, Pulmonary artery thrombus on CTA : 2/ 2 CTA There is a focal filling defect within the main pulmonary artery at site of the ductus arteriosus suspicious for thrombus.   In addition he was treated for suspected cholecystitis , on  2/5 Successful placement of 8Fr perc haydee tube. 250cc of dark bile aspirated from GB.  Pulmonary is following, s/p   2/4 bronch   No organisms seen per oil power field, pt was treated with broad spectrum ABx, downgraded to SDU on 2/25, than pt. was downgraded to vent unit on 3/1/25.     # Acute hypoxic respiratory failure / Multifocal Pneumonia : aspiration vs VAP/ Influenza A/  Septic shock / MSSA bacteremia / suspected cholecystitis / iatrogenic pneumothorax   - pt was intubated , s/p  trach 02/20, c/w local trach care   - Vent management as per pulmonary team   - aggressive pulmonary toilet, chest vest, frequent suction - pt completed course of Tamiflu  - recent CXR noted ( pt has small pneumothorax), pulmonary aware, CXR is stable    - BAL Cx 02/11: Stenotrophomonas -- colonizer as per ID  - ID followed up, recommendations noted: - c/w  zosyn 3.375 q8h IV till 3/2/25, than continue with IV Cefazolin .   - midline 2/24, leslie 2/19  - TTE no vegetations , guidelines recommend MARY as community acquired bacteremia once stable, otherwise 6 weeks empiric therapy for MSSA bacteremia from date of cleared BCX E/D 3/13/25. On Cefazolin per ID  - Blood Cx 01/29: MSSA, repeat blood Cx is NTD   - S/p cholecystostomy tube placement by IR on 02/05  - Biliary fluid culture 02/10 - candida albicans , likely colonizer   - monitor output from biliary drain, trend WBC     # Pulmonary Embolism / chronic diastolic CHF/  Pulmonary Hypertension   - CTA thorax 02/02: There is a focal filling defect within the main pulmonary artery at site of the ductus arteriosus suspicious for thrombus  - No DVT on duplex.   - ECHO: EF of 56%.  Mildly increased LV wall thickness. Grade I diastolic dysfunction. Moderate tricuspid regurgitation. Estimated pulmonary artery systolic pressure is 41.4 mmHg assuming a right atrial pressure of 3 mmHg, which is consistent with mild pulmonary hypertension.  - He was not started on therapeutic AC due to IVH present on admission.  - case d/w neurology attending today, recommendations noted:   - Can restart patient on ACs. Patient's intraventricular hemorrhage occurred >2 weeks ago. CTA head from 1/31 showed no AVM or aneurysm. Bleed likely idiopathic small intraventricular hemorrhage. Current benefits of restarting ACs outweigh the risks.   - C/w therapeutic Lovenox     # h/o CAD  - ASA  held in setting of IVH  - Since CT head from 02/25 shows resolution of IVH, needs neuro Sx f/u to clear for AC and ASA     # Right hip hematoma   - CT pelvis 02/09: Large intramuscular hematoma in the medial right thigh measuring approximately 9.8 x 6.4 x 17 cm  - Stable on repeat imaging on 02/12    # Sacrococcygeal decubitus ulcer   # Left heel DTI   - Appreciate wound care and burns consult   - S/p debridement 02/22 - no specimen sent for pathology/culture  - change position Q 2 hours, offload pressure points     # Dysphagia   - s/p GJ tube 02/20   - Meds via G tube   - on continuos tube feedings vit J tube   - Nutrition consult     # Parkinson's dementia /  Parkinson's Disease /  Acute IVH of right occipital horn   - CT head 01/31: Trace acute intraventricular hemorrhage is noted layering within the right occipital horn  - CTA head and neck 01/31: Moderate stenosis of the right P2 segment of the PCA. Intracranial vessels are otherwise patent and unremarkable. No evidence of carotid or vertebral artery stenosis.  - CT head 02/25:  Nearly resolved trace intraventricular hemorrhage in the right occipital horn compared to the prior CT head from 2/9/2025.  - Suspicion of seizures given increased disorientation as reported by family members.  EEG, CK, prolactin, lactic acid  - neurology is following, recommendations noted :   - Continue Keppra 500mg BID  - Continue Sinemet to 25/250 ONE tablet TID    # DM- Insulin Sliding Scale     # Urinary retention - leslie present    DVT PPx: Lovenox  GI PPx: Pantoprazole   Diet: Tube feeding  Activity: IAT  Code: Full    To-do: continue therapeutic Lovenox, repeat CXR in am, to reassess pneumothorax size, pulmonary for vent management, IV abx Cefazolin until 3/13.

## 2025-03-03 NOTE — PROGRESS NOTE ADULT - SUBJECTIVE AND OBJECTIVE BOX
Over Night Events: events noted, vent dependant, afebrile    PHYSICAL EXAM    ICU Vital Signs Last 24 Hrs  T(C): 36.6 (03 Mar 2025 05:42), Max: 36.8 (02 Mar 2025 19:50)  T(F): 97.8 (03 Mar 2025 05:42), Max: 98.2 (02 Mar 2025 19:50)  HR: 73 (03 Mar 2025 08:51) (70 - 89)  BP: 120/70 (03 Mar 2025 05:42) (120/66 - 148/79)  RR: 18 (03 Mar 2025 05:42) (18 - 20)  SpO2: 100% (03 Mar 2025 08:51) (100% - 100%)    O2 Parameters below as of 02 Mar 2025 12:53  Patient On (Oxygen Delivery Method): ventilator            General: ILL looking  trac  Lungs: Bl rhonchi  Cardiovascular: FRANKLYN 2.6  Abdomen: Soft, Positive BS  Not following commands      03-02-25 @ 07:01  -  03-03-25 @ 07:00  --------------------------------------------------------  IN:    Enteral Tube Flush: 200 mL    Free Water: 200 mL    Glucerna: 1680 mL  Total IN: 2080 mL    OUT:    Indwelling Catheter - Urethral (mL): 1000 mL  Total OUT: 1000 mL    Total NET: 1080 mL          LABS:                          8.5    6.39  )-----------( 197      ( 02 Mar 2025 08:17 )             27.0                                               03-02    142  |  105  |  34[H]  ----------------------------<  138[H]  4.1   |  31  |  0.9    Ca    7.1[L]      02 Mar 2025 08:17  Mg     2.6     03-02    TPro  4.3[L]  /  Alb  2.2[L]  /  TBili  0.6  /  DBili  x   /  AST  16  /  ALT  <5  /  AlkPhos  192[H]  03-02                                             Urinalysis Basic - ( 02 Mar 2025 08:17 )    Color: x / Appearance: x / SG: x / pH: x  Gluc: 138 mg/dL / Ketone: x  / Bili: x / Urobili: x   Blood: x / Protein: x / Nitrite: x   Leuk Esterase: x / RBC: x / WBC x   Sq Epi: x / Non Sq Epi: x / Bacteria: x                                                  LIVER FUNCTIONS - ( 02 Mar 2025 08:17 )  Alb: 2.2 g/dL / Pro: 4.3 g/dL / ALK PHOS: 192 U/L / ALT: <5 U/L / AST: 16 U/L / GGT: x                                                                                               Mode: AC/ CMV (Assist Control/ Continuous Mandatory Ventilation)  RR (machine): 18  TV (machine): 450  FiO2: 40  PEEP: 8  ITime: 1  MAP: 8  PIP: 16                                          MEDICATIONS  (STANDING):  carbidopa/levodopa  25/250 1 Tablet(s) Oral every 8 hours  ceFAZolin   IVPB 2000 milliGRAM(s) IV Intermittent every 8 hours  chlorhexidine 0.12% Liquid 15 milliLiter(s) Oral Mucosa every 12 hours  chlorhexidine 2% Cloths 1 Application(s) Topical <User Schedule>  dextrose 5%. 1000 milliLiter(s) (50 mL/Hr) IV Continuous <Continuous>  dextrose 5%. 1000 milliLiter(s) (100 mL/Hr) IV Continuous <Continuous>  dextrose 50% Injectable 25 Gram(s) IV Push once  dextrose 50% Injectable 25 Gram(s) IV Push once  dextrose 50% Injectable 12.5 Gram(s) IV Push once  enoxaparin Injectable 50 milliGRAM(s) SubCutaneous every 12 hours  glucagon  Injectable 1 milliGRAM(s) IntraMuscular once  insulin lispro (ADMELOG) corrective regimen sliding scale   SubCutaneous every 6 hours  levETIRAcetam 500 milliGRAM(s) Oral two times a day  lidocaine 1%/epinephrine 1:100,000 Inj 2 Vial(s) Local Injection once  methadone    Tablet 2.5 milliGRAM(s) Oral every 12 hours  pantoprazole  Injectable 40 milliGRAM(s) IV Push daily  polyethylene glycol 3350 17 Gram(s) Oral every 12 hours  senna 2 Tablet(s) Oral at bedtime    MEDICATIONS  (PRN):  acetaminophen     Tablet .. 650 milliGRAM(s) Oral every 6 hours PRN Temp greater or equal to 38C (100.4F), Mild Pain (1 - 3)  dextrose Oral Gel 15 Gram(s) Oral once PRN Blood Glucose LESS THAN 70 milliGRAM(s)/deciliter

## 2025-03-03 NOTE — PROGRESS NOTE ADULT - ASSESSMENT
IMPRESSION:    Acute Hypoxic Respiratory Failure Sp Trach/PEG   Toxic Metabolic Encephalopathy  aspiration   Dyskinesia  small L ptx  Influenza A treated   MSSA bacteremia repeat CX -  Sepsis POA  cholecystitis sp percutaneous chol  Periventricular bleed  Suspected Mural thrombus in PA  Right thigh hematoma.    HO Parkinson's Disease   HO CAD  Left Ptx. stable     PLAN:    CNS:  SAT. Continue Anti parkinson's per neuro. trial off methadone. Neuro fup    HEENT: Oral care. s/p trach and PEG    PULMONARY:  Monitor airway pressures. Remains with significant secretions. s/p trac, Keep Sao2 92 to 96%, c/w weaning trials. Chest PT. f/u CXR  ON 40%    CARDIOVASCULAR:  Avoid overload.     GI: GI prophylaxis. S/P perc cholecystostomy. Bowel regimen G tube for meds , feeds through J tube    RENAL: Follow up lytes. Mars in place for retention. Kidney function stable.     INFECTIOUS DISEASE: Cultures noted. SP Tamiflu. ABX per ID.     HEMATOLOGICAL: Monitor CBC and coags. CTA noted with no active bleed. Goal Hb > 7.  LE DOPPLER -    ENDOCRINE:  Follow up FS.  Insulin protocol if needed.    MUSCULOSKELETAL: Bed rest.  Off loading. Wound care for DTIs. Burn eval noted   spoke with team  Full code  palliative care follow up   very poor prognosis  vent unit

## 2025-03-03 NOTE — PROGRESS NOTE ADULT - SUBJECTIVE AND OBJECTIVE BOX
24H events:    Patient is a 69y old Male who presents with a chief complaint of penumonia (03 Mar 2025 11:53)    Primary diagnosis of Pneumonia          Today is hospital day 33d.   This morning patient was seen and examined at bedside. PEEP 8, Rate 18, FiO2 40%, Ppeak 18. Updated patient's wife regarding his status.   No acute or major events overnight.     Code Status: Full      PAST MEDICAL & SURGICAL HISTORY  Parkinson disease    CAD (coronary artery disease)    History of basal cell carcinoma (BCC) excision      SOCIAL HISTORY:  Social History:      ALLERGIES:  Allergy Status Unknown    MEDICATIONS:  STANDING MEDICATIONS  carbidopa/levodopa  25/250 1 Tablet(s) Oral every 8 hours  ceFAZolin   IVPB 2000 milliGRAM(s) IV Intermittent every 8 hours  chlorhexidine 0.12% Liquid 15 milliLiter(s) Oral Mucosa every 12 hours  chlorhexidine 2% Cloths 1 Application(s) Topical <User Schedule>  dextrose 5%. 1000 milliLiter(s) IV Continuous <Continuous>  dextrose 5%. 1000 milliLiter(s) IV Continuous <Continuous>  dextrose 50% Injectable 25 Gram(s) IV Push once  dextrose 50% Injectable 12.5 Gram(s) IV Push once  dextrose 50% Injectable 25 Gram(s) IV Push once  enoxaparin Injectable 50 milliGRAM(s) SubCutaneous every 12 hours  glucagon  Injectable 1 milliGRAM(s) IntraMuscular once  insulin lispro (ADMELOG) corrective regimen sliding scale   SubCutaneous every 6 hours  levETIRAcetam 500 milliGRAM(s) Oral two times a day  lidocaine 1%/epinephrine 1:100,000 Inj 2 Vial(s) Local Injection once  methadone    Tablet 2.5 milliGRAM(s) Oral every 12 hours  pantoprazole  Injectable 40 milliGRAM(s) IV Push daily  polyethylene glycol 3350 17 Gram(s) Oral every 12 hours  senna 2 Tablet(s) Oral at bedtime    PRN MEDICATIONS  acetaminophen     Tablet .. 650 milliGRAM(s) Oral every 6 hours PRN  dextrose Oral Gel 15 Gram(s) Oral once PRN    VITALS:   T(F): 97.5  HR: 90  BP: 111/67  RR: 19  SpO2: 100%    PHYSICAL EXAM:  General: NAD, Awake, patient is laying comfortably in bed, cachectic   HEENT: AT, NC, trach present  Lungs: good breath sounds  B/L, no wheezing, no rhonchi  CVS: normal S1, S2, RRR, NO M/G/R  Abdomen: soft, bowel sounds present, non-tender, non-distended, peg present, right quadrant cholecystostomy  catheter present  : leslie present  Extremities: no edema, no clubbing, no cyanosis, positive peripheral pulses b/l  Neuro: persistent bed confinement status   Skin: no rash, no ecchymosis    LABS:                        8.5    6.39  )-----------( 197      ( 02 Mar 2025 08:17 )             27.0     03-02    142  |  105  |  34[H]  ----------------------------<  138[H]  4.1   |  31  |  0.9    Ca    7.1[L]      02 Mar 2025 08:17  Mg     2.6     03-02    TPro  4.3[L]  /  Alb  2.2[L]  /  TBili  0.6  /  DBili  x   /  AST  16  /  ALT  <5  /  AlkPhos  192[H]  03-02      Urinalysis Basic - ( 02 Mar 2025 08:17 )    Color: x / Appearance: x / SG: x / pH: x  Gluc: 138 mg/dL / Ketone: x  / Bili: x / Urobili: x   Blood: x / Protein: x / Nitrite: x   Leuk Esterase: x / RBC: x / WBC x   Sq Epi: x / Non Sq Epi: x / Bacteria: x

## 2025-03-03 NOTE — PROGRESS NOTE ADULT - SUBJECTIVE AND OBJECTIVE BOX
GARETT ESCAMILLA 69y Male  MRN#: [id number]     Hospital Day: 33d      SUBJECTIVE  No acute events overnight, pt seen and examined this morning.                                          ----------------------------------------------------------  OBJECTIVE  PAST MEDICAL & SURGICAL HISTORY  Parkinson disease    CAD (coronary artery disease)    History of basal cell carcinoma (BCC) excision                                              -----------------------------------------------------------  ALLERGIES:  Allergy Status Unknown                                            ------------------------------------------------------------    HOME MEDICATIONS  Home Medications:  aspirin 81 mg oral tablet: 1 tab(s) orally once a day (29 Jan 2025 15:28)  Crexont 87.5 mg-350 mg oral capsule, extended release: 2 cap(s) orally 3 times a day (29 Jan 2025 15:28)  losartan 25 mg oral tablet: 1 tab(s) orally once a day Pt was taking this PRN (27 Feb 2025 10:01)  multivitamin: 1 tab(s) once a day (27 Feb 2025 10:02)  rasagiline 1 mg oral tablet: 1 tab(s) orally once a day (29 Jan 2025 15:28)                           MEDICATIONS:  STANDING MEDICATIONS  carbidopa/levodopa  25/250 1 Tablet(s) Oral every 8 hours  ceFAZolin   IVPB 2000 milliGRAM(s) IV Intermittent every 8 hours  chlorhexidine 0.12% Liquid 15 milliLiter(s) Oral Mucosa every 12 hours  chlorhexidine 2% Cloths 1 Application(s) Topical <User Schedule>  dextrose 5%. 1000 milliLiter(s) IV Continuous <Continuous>  dextrose 5%. 1000 milliLiter(s) IV Continuous <Continuous>  dextrose 50% Injectable 25 Gram(s) IV Push once  dextrose 50% Injectable 12.5 Gram(s) IV Push once  dextrose 50% Injectable 25 Gram(s) IV Push once  enoxaparin Injectable 50 milliGRAM(s) SubCutaneous every 12 hours  glucagon  Injectable 1 milliGRAM(s) IntraMuscular once  insulin lispro (ADMELOG) corrective regimen sliding scale   SubCutaneous every 6 hours  levETIRAcetam 500 milliGRAM(s) Oral two times a day  lidocaine 1%/epinephrine 1:100,000 Inj 2 Vial(s) Local Injection once  methadone    Tablet 2.5 milliGRAM(s) Oral every 12 hours  pantoprazole  Injectable 40 milliGRAM(s) IV Push daily  polyethylene glycol 3350 17 Gram(s) Oral every 12 hours  senna 2 Tablet(s) Oral at bedtime    PRN MEDICATIONS  acetaminophen     Tablet .. 650 milliGRAM(s) Oral every 6 hours PRN  dextrose Oral Gel 15 Gram(s) Oral once PRN                                            ------------------------------------------------------------  VITAL SIGNS: Last 24 Hours  T(C): 36.6 (03 Mar 2025 05:42), Max: 36.8 (02 Mar 2025 19:50)  T(F): 97.8 (03 Mar 2025 05:42), Max: 98.2 (02 Mar 2025 19:50)  HR: 73 (03 Mar 2025 08:51) (70 - 89)  BP: 120/70 (03 Mar 2025 05:42) (120/66 - 148/79)  BP(mean): --  RR: 18 (03 Mar 2025 05:42) (18 - 20)  SpO2: 100% (03 Mar 2025 08:51) (100% - 100%)      03-02-25 @ 07:01  -  03-03-25 @ 07:00  --------------------------------------------------------  IN: 2080 mL / OUT: 1000 mL / NET: 1080 mL                                             --------------------------------------------------------------  LABS:                        8.5    6.39  )-----------( 197      ( 02 Mar 2025 08:17 )             27.0     03-02    142  |  105  |  34[H]  ----------------------------<  138[H]  4.1   |  31  |  0.9    Ca    7.1[L]      02 Mar 2025 08:17  Mg     2.6     03-02    TPro  4.3[L]  /  Alb  2.2[L]  /  TBili  0.6  /  DBili  x   /  AST  16  /  ALT  <5  /  AlkPhos  192[H]  03-02      Urinalysis Basic - ( 02 Mar 2025 08:17 )    Color: x / Appearance: x / SG: x / pH: x  Gluc: 138 mg/dL / Ketone: x  / Bili: x / Urobili: x   Blood: x / Protein: x / Nitrite: x   Leuk Esterase: x / RBC: x / WBC x   Sq Epi: x / Non Sq Epi: x / Bacteria: x                                                            -------------------------------------------------------------  RADIOLOGY:  CXR      CT                                            --------------------------------------------------------------    PHYSICAL EXAM:  GENERAL: NAD, lying in bed, trach to vent   CHEST/LUNG: vent sounds  HEART: Regular rate and rhythm; No murmurs, rubs, or gallops  ABDOMEN: Soft, nontender, nondistended, +PEG

## 2025-03-03 NOTE — PROGRESS NOTE ADULT - ASSESSMENT
Patient is a 68 y/o  male past medical history of hypertension, Parkinson's, CAD presents for shortness of breath and cough of 1 day duration. Patient was dx. with  Flu, PNA and MSSA bacteremia  He was intubated for acute hypoxic respiratory failure with subsequent trache/PEG placement on 02/20. He  was found to have trace IVH R occipital horn on CTH 01/31, Pulmonary artery thrombus on CTA : 2/ 2 CTA There is a focal filling defect within the main pulmonary artery at site of the ductus arteriosus suspicious for thrombus.   In addition he was treated for suspected cholecystitis , on  2/5 Successful placement of 8Fr perc haydee tube. 250cc of dark bile aspirated from GB.  Pulmonary is following, s/p   2/4 bronch   No organisms seen per oil power field, pt was treated with broad spectrum ABx, downgraded to SDU on 2/25, than pt. was downgraded to vent unit on 3/1/25.     # Acute hypoxic respiratory failure / Multifocal Pneumonia : aspiration vs VAP/ Influenza A/  Septic shock / MSSA bacteremia / suspected cholecystitis / iatrogenic pneumothorax   - pt was intubated , s/p  trach 02/20, c/w local trach care   - Vent management as per pulmonary team   - aggressive pulmonary toilet, chest vest, frequent suction - pt completed course of Tamiflu  - recent CXR noted ( pt has small pneumothorax), pulmonary aware-conservative management, CXR is stable, daily CXR  - BAL Cx 02/11: Stenotrophomonas -- colonizer as per ID  - midline 2/24, leslie 2/19  - TTE no vegetations , guidelines recommend MARY as community acquired bacteremia once stable , otherwise 6 weeks empiric therapy for MSSA bacteremia from date of cleared BCX E/D 3/13/25  - Blood Cx 01/29: MSSA, repeat blood Cx is NTD   - S/p cholecystostomy tube placement by IR on 02/05  - Biliary fluid culture 02/10 - candida albicans , likely colonizer  - ID followed up, recommendations noted: - s/p  zosyn 3.375 q8h IV till 3/2/25, currently resumed on IV Cefazolin 2grams every 8 hours till 3/13/25 .   - monitor output from biliary drain, trend WBC   -repeat CXR, daily CXR for pneumothorax, vent setting stable on 40%        # Pulmonary Embolism / chronic diastolic CHF/  Pulmonary Hypertension   - CTA thorax 02/02: There is a focal filling defect within the main pulmonary artery at site of the ductus arteriosus suspicious for thrombus  - No DVT on duplex.   - ECHO: EF of 56%.  Mildly increased LV wall thickness. Grade I diastolic dysfunction. Moderate tricuspid regurgitation. Estimated pulmonary artery systolic pressure is 41.4 mmHg assuming a right atrial pressure of 3 mmHg, which is consistent with mild pulmonary hypertension.  - He was not started on therapeutic AC due to IVH present on admission.  - Neuro  recommendations: can restart patient on ACs. Patient's intraventricular hemorrhage occurred >2 weeks ago. CTA head from 1/31 showed no AVM or aneurysm. Bleed likely idiopathic small intraventricular hemorrhage. Current benefits of restarting ACs outweigh the risks.   - from 02/28/25- started on  therapeutic Lovenox     # h/o CAD  - ASA  held in setting of IVH  - Since CT head from 02/25 shows resolution of IVH, needs neuro Sx f/u to clear for AC and ASA     # Right hip hematoma   - CT pelvis 02/09: Large intramuscular hematoma in the medial right thigh measuring approximately 9.8 x 6.4 x 17 cm  - Stable on repeat imaging on 02/12    # Sacrococcygeal decubitus ulcer   # Left heel DTI   - Appreciate wound care and burns consult   - S/p debridement 02/22 - no specimen sent for pathology/culture  - change position Q 2 hours, offload pressure points     # Dysphagia - s/p GJ tube 02/20   - Meds via G tube   - on continuos tube feedings vit J tube       # Parkinson's dementia /  Parkinson's Disease /  Acute IVH of right occipital horn   - CT head 01/31: Trace acute intraventricular hemorrhage is noted layering within the right occipital horn  - CTA head and neck 01/31: Moderate stenosis of the right P2 segment of the PCA. Intracranial vessels are otherwise patent and unremarkable. No evidence of carotid or vertebral artery stenosis.  - CT head 02/25:  Nearly resolved trace intraventricular hemorrhage in the right occipital horn compared to the prior CT head from 2/9/2025.  - Suspicion of seizures given increased disorientation as reported by family members.  EEG, CK, prolactin, lactic acid  - neurology is following, recommendations noted :   - Continue Keppra 500mg BID  - Continue Sinemet to 25/250 ONE tablet TID      # DM- Insulin Sliding Scale     # Urinary retention - leslie present - monthly exchange.    # DVT PPx: Lovenox  - GI PPx: Pantoprazole   - Diet: Tube feeding    #Progress Note Handoff:   continue  therapeutic Lovenox, repeat  CXR in am ,  to reassess pneumothorax size  , pulmonary for vent management, IV abx IV Cefazolin till 3/13.      Communication with family: housestaff to update family today   Disposition: patient remains medically active.

## 2025-03-04 LAB
ALBUMIN SERPL ELPH-MCNC: 2.4 G/DL — LOW (ref 3.5–5.2)
ALP SERPL-CCNC: 216 U/L — HIGH (ref 30–115)
ALT FLD-CCNC: <5 U/L — SIGNIFICANT CHANGE UP (ref 0–41)
ANION GAP SERPL CALC-SCNC: 9 MMOL/L — SIGNIFICANT CHANGE UP (ref 7–14)
AST SERPL-CCNC: 17 U/L — SIGNIFICANT CHANGE UP (ref 0–41)
BASOPHILS # BLD AUTO: 0.02 K/UL — SIGNIFICANT CHANGE UP (ref 0–0.2)
BASOPHILS NFR BLD AUTO: 0.2 % — SIGNIFICANT CHANGE UP (ref 0–1)
BILIRUB SERPL-MCNC: 0.5 MG/DL — SIGNIFICANT CHANGE UP (ref 0.2–1.2)
BUN SERPL-MCNC: 33 MG/DL — HIGH (ref 10–20)
CALCIUM SERPL-MCNC: 7.4 MG/DL — LOW (ref 8.4–10.5)
CHLORIDE SERPL-SCNC: 105 MMOL/L — SIGNIFICANT CHANGE UP (ref 98–110)
CO2 SERPL-SCNC: 31 MMOL/L — SIGNIFICANT CHANGE UP (ref 17–32)
CREAT SERPL-MCNC: 0.7 MG/DL — SIGNIFICANT CHANGE UP (ref 0.7–1.5)
EGFR: 99 ML/MIN/1.73M2 — SIGNIFICANT CHANGE UP
EGFR: 99 ML/MIN/1.73M2 — SIGNIFICANT CHANGE UP
EOSINOPHIL # BLD AUTO: 0.02 K/UL — SIGNIFICANT CHANGE UP (ref 0–0.7)
EOSINOPHIL NFR BLD AUTO: 0.2 % — SIGNIFICANT CHANGE UP (ref 0–8)
GLUCOSE BLDC GLUCOMTR-MCNC: 116 MG/DL — HIGH (ref 70–99)
GLUCOSE BLDC GLUCOMTR-MCNC: 120 MG/DL — HIGH (ref 70–99)
GLUCOSE BLDC GLUCOMTR-MCNC: 137 MG/DL — HIGH (ref 70–99)
GLUCOSE SERPL-MCNC: 125 MG/DL — HIGH (ref 70–99)
HCT VFR BLD CALC: 29.1 % — LOW (ref 42–52)
HGB BLD-MCNC: 9.1 G/DL — LOW (ref 14–18)
IMM GRANULOCYTES NFR BLD AUTO: 0.9 % — HIGH (ref 0.1–0.3)
LYMPHOCYTES # BLD AUTO: 1.12 K/UL — LOW (ref 1.2–3.4)
LYMPHOCYTES # BLD AUTO: 13.7 % — LOW (ref 20.5–51.1)
MAGNESIUM SERPL-MCNC: 2.5 MG/DL — HIGH (ref 1.8–2.4)
MCHC RBC-ENTMCNC: 30.2 PG — SIGNIFICANT CHANGE UP (ref 27–31)
MCHC RBC-ENTMCNC: 31.3 G/DL — LOW (ref 32–37)
MCV RBC AUTO: 96.7 FL — HIGH (ref 80–94)
MONOCYTES # BLD AUTO: 0.49 K/UL — SIGNIFICANT CHANGE UP (ref 0.1–0.6)
MONOCYTES NFR BLD AUTO: 6 % — SIGNIFICANT CHANGE UP (ref 1.7–9.3)
NEUTROPHILS # BLD AUTO: 6.43 K/UL — SIGNIFICANT CHANGE UP (ref 1.4–6.5)
NEUTROPHILS NFR BLD AUTO: 79 % — HIGH (ref 42.2–75.2)
NRBC BLD AUTO-RTO: 0 /100 WBCS — SIGNIFICANT CHANGE UP (ref 0–0)
PLATELET # BLD AUTO: 254 K/UL — SIGNIFICANT CHANGE UP (ref 130–400)
PMV BLD: 10.6 FL — HIGH (ref 7.4–10.4)
POTASSIUM SERPL-MCNC: 4.4 MMOL/L — SIGNIFICANT CHANGE UP (ref 3.5–5)
POTASSIUM SERPL-SCNC: 4.4 MMOL/L — SIGNIFICANT CHANGE UP (ref 3.5–5)
PROT SERPL-MCNC: 4.7 G/DL — LOW (ref 6–8)
RBC # BLD: 3.01 M/UL — LOW (ref 4.7–6.1)
RBC # FLD: 18.1 % — HIGH (ref 11.5–14.5)
SODIUM SERPL-SCNC: 145 MMOL/L — SIGNIFICANT CHANGE UP (ref 135–146)
WBC # BLD: 8.15 K/UL — SIGNIFICANT CHANGE UP (ref 4.8–10.8)
WBC # FLD AUTO: 8.15 K/UL — SIGNIFICANT CHANGE UP (ref 4.8–10.8)

## 2025-03-04 PROCEDURE — 71045 X-RAY EXAM CHEST 1 VIEW: CPT | Mod: 26

## 2025-03-04 PROCEDURE — 99233 SBSQ HOSP IP/OBS HIGH 50: CPT

## 2025-03-04 PROCEDURE — 99232 SBSQ HOSP IP/OBS MODERATE 35: CPT

## 2025-03-04 RX ORDER — HYPROMELLOSE 0.4 %
1 DROPS OPHTHALMIC (EYE)
Refills: 0 | Status: DISCONTINUED | OUTPATIENT
Start: 2025-03-04 | End: 2025-03-05

## 2025-03-04 RX ADMIN — ENOXAPARIN SODIUM 50 MILLIGRAM(S): 100 INJECTION SUBCUTANEOUS at 05:15

## 2025-03-04 RX ADMIN — Medication 1 TABLET(S): at 05:16

## 2025-03-04 RX ADMIN — Medication 1 TABLET(S): at 13:00

## 2025-03-04 RX ADMIN — Medication 15 MILLILITER(S): at 05:16

## 2025-03-04 RX ADMIN — POLYETHYLENE GLYCOL 3350 17 GRAM(S): 17 POWDER, FOR SOLUTION ORAL at 17:26

## 2025-03-04 RX ADMIN — LEVETIRACETAM 500 MILLIGRAM(S): 10 INJECTION, SOLUTION INTRAVENOUS at 05:15

## 2025-03-04 RX ADMIN — Medication 2.5 MILLIGRAM(S): at 17:26

## 2025-03-04 RX ADMIN — Medication 1 APPLICATION(S): at 05:16

## 2025-03-04 RX ADMIN — ENOXAPARIN SODIUM 50 MILLIGRAM(S): 100 INJECTION SUBCUTANEOUS at 17:27

## 2025-03-04 RX ADMIN — Medication 100 MILLIGRAM(S): at 13:00

## 2025-03-04 RX ADMIN — Medication 1 TABLET(S): at 21:51

## 2025-03-04 RX ADMIN — Medication 1 DROP(S): at 17:26

## 2025-03-04 RX ADMIN — Medication 2.5 MILLIGRAM(S): at 05:17

## 2025-03-04 RX ADMIN — Medication 100 MILLIGRAM(S): at 05:15

## 2025-03-04 RX ADMIN — Medication 100 MILLIGRAM(S): at 21:51

## 2025-03-04 RX ADMIN — LEVETIRACETAM 500 MILLIGRAM(S): 10 INJECTION, SOLUTION INTRAVENOUS at 17:27

## 2025-03-04 RX ADMIN — Medication 2 TABLET(S): at 21:51

## 2025-03-04 RX ADMIN — Medication 40 MILLIGRAM(S): at 13:00

## 2025-03-04 RX ADMIN — Medication 15 MILLILITER(S): at 17:27

## 2025-03-04 NOTE — PROVIDER CONTACT NOTE (OTHER) - NAME OF MD/NP/PA/DO NOTIFIED:
07/28/17        Ana Nair  [address]  [facility] [address]      Dear Ana Nair,      She needs to quit smoking because of her medical condition of high blood pressure.      Sincerely,        SHIVANI Durand  [address] [place]  [address] [phone]    
stella montano
MD Mann
stella montano

## 2025-03-04 NOTE — PROVIDER CONTACT NOTE (OTHER) - SITUATION
Pt observed pulling at trach tubing
patient bleeding from trach stoma;
patients family requesting eye drops for lubrication

## 2025-03-04 NOTE — PROGRESS NOTE ADULT - SUBJECTIVE AND OBJECTIVE BOX
24H events:    Patient is a 69y old Male who presents with a chief complaint of penumonia (03 Mar 2025 11:53)    Primary diagnosis of Pneumonia          Today is hospital day 33d.   This morning patient was seen and examined at bedside. Updated wife regarding his status. Was able to pass SBT for a few hours in the AM, but required vent support by the PM. Also discussed GOC today, wife appears to be leaning DNR. Will discuss again tomorrow.   No acute or major events overnight.     Code Status: Full      PAST MEDICAL & SURGICAL HISTORY  Parkinson disease    CAD (coronary artery disease)    History of basal cell carcinoma (BCC) excision      SOCIAL HISTORY:  Social History:      ALLERGIES:  Allergy Status Unknown    MEDICATIONS:  STANDING MEDICATIONS  carbidopa/levodopa  25/250 1 Tablet(s) Oral every 8 hours  ceFAZolin   IVPB 2000 milliGRAM(s) IV Intermittent every 8 hours  chlorhexidine 0.12% Liquid 15 milliLiter(s) Oral Mucosa every 12 hours  chlorhexidine 2% Cloths 1 Application(s) Topical <User Schedule>  dextrose 5%. 1000 milliLiter(s) IV Continuous <Continuous>  dextrose 5%. 1000 milliLiter(s) IV Continuous <Continuous>  dextrose 50% Injectable 25 Gram(s) IV Push once  dextrose 50% Injectable 12.5 Gram(s) IV Push once  dextrose 50% Injectable 25 Gram(s) IV Push once  enoxaparin Injectable 50 milliGRAM(s) SubCutaneous every 12 hours  glucagon  Injectable 1 milliGRAM(s) IntraMuscular once  insulin lispro (ADMELOG) corrective regimen sliding scale   SubCutaneous every 6 hours  levETIRAcetam 500 milliGRAM(s) Oral two times a day  lidocaine 1%/epinephrine 1:100,000 Inj 2 Vial(s) Local Injection once  methadone    Tablet 2.5 milliGRAM(s) Oral every 12 hours  pantoprazole  Injectable 40 milliGRAM(s) IV Push daily  polyethylene glycol 3350 17 Gram(s) Oral every 12 hours  senna 2 Tablet(s) Oral at bedtime    PRN MEDICATIONS  acetaminophen     Tablet .. 650 milliGRAM(s) Oral every 6 hours PRN  artificial tears (preservative free) Ophthalmic Solution 1 Drop(s) Both EYES two times a day PRN  dextrose Oral Gel 15 Gram(s) Oral once PRN    VITALS:   T(F): 98.7  HR: 76  BP: 132/73  RR: 20  SpO2: 100%      PHYSICAL EXAM:  General: NAD, Awake, patient is laying comfortably in bed, cachectic   HEENT: AT, NC, trach present  Lungs: good breath sounds  B/L, no wheezing, no rhonchi  CVS: normal S1, S2, RRR, NO M/G/R  Abdomen: soft, bowel sounds present, non-tender, non-distended, peg present, right quadrant cholecystostomy  catheter present  : leslie present  Extremities: no edema, no clubbing, no cyanosis, positive peripheral pulses b/l  Neuro: persistent bed confinement status   Skin: no rash, no ecchymosis    LABS:                        9.1    8.15  )-----------( 254      ( 04 Mar 2025 06:47 )             29.1     03-04    145  |  105  |  33[H]  ----------------------------<  125[H]  4.4   |  31  |  0.7    Ca    7.4[L]      04 Mar 2025 06:47  Mg     2.5     03-04    TPro  4.7[L]  /  Alb  2.4[L]  /  TBili  0.5  /  DBili  x   /  AST  17  /  ALT  <5  /  AlkPhos  216[H]  03-04      Urinalysis Basic - ( 04 Mar 2025 06:47 )    Color: x / Appearance: x / SG: x / pH: x  Gluc: 125 mg/dL / Ketone: x  / Bili: x / Urobili: x   Blood: x / Protein: x / Nitrite: x   Leuk Esterase: x / RBC: x / WBC x   Sq Epi: x / Non Sq Epi: x / Bacteria: x

## 2025-03-04 NOTE — PROGRESS NOTE ADULT - SUBJECTIVE AND OBJECTIVE BOX
GARETT ESCAMILLA  [facility] [id number] B ([facility])      Patient was evaluated and examined  by bedside, remains on vent support PS trial today 6/8, afebrile, tolerating peg feedings ,no events over night         REVIEW OF SYSTEMS: unable to obtain, patient remains non-verbal , confused     T(C): , Max: 37.2 (03-03-25 @ 20:44)  HR: 75 (03-04-25 @ 08:10)  BP: 129/69 (03-04-25 @ 04:30)  RR: 20 (03-04-25 @ 04:30)  SpO2: 100% (03-04-25 @ 08:10)  CAPILLARY BLOOD GLUCOSE      POCT Blood Glucose.: 119 mg/dL (04 Mar 2025 05:12)  POCT Blood Glucose.: 124 mg/dL (03 Mar 2025 23:23)  POCT Blood Glucose.: 135 mg/dL (03 Mar 2025 17:41)      PHYSICAL EXAM:  General: NAD, Awake, patient is laying comfortably in bed, cachectic   HEENT: AT, NC, trach present  Lungs: good breath sounds  B/L, no wheezing, no rhonchi  CVS: normal S1, S2, RRR, NO M/G/R  Abdomen: soft, bowel sounds present, non-tender, non-distended, peg present, right quadrant cholecystostomy  catheter present  : leslie present  Extremities: no edema, no clubbing, no cyanosis, positive peripheral pulses b/l  Neuro: persistent bed confinement status   Skin: no rash, no ecchymosis        LAB  CBC  Date: 03-04-25 @ 06:47  Mean cell Mbqzlhefdq37.2  Mean cell Hemoglobin Conc31.3  Mean cell Volum 96.7  Platelet count-Automate 254  RBC Count 3.01  Red Cell Distrib Width18.1  WBC Count8.15  % Albumin, Urine--  Hematocrit 29.1  Hemoglobin 9.1    BMP  03-04-25 @ 06:47  Blood Gas Arterial-Calcium,Ionized--  Blood Urea Nitrogen, Serum 33 mg/dL[H] [10 - 20]  Carbon Dioxide, Serum31 mmol/L [17 - 32]  Chloride, Kmfvc384 mmol/L [98 - 110]  Creatinie, Serum0.7 mg/dL [0.7 - 1.5]  Glucose, Xibcj644 mg/dL[H] [70 - 99]  Potassium, Serum4.4 mmol/L [3.5 - 5.0]  Sodium, Serum 145 mmol/L [135 - 146]      Medications:  acetaminophen     Tablet .. 650 milliGRAM(s) Oral every 6 hours PRN  carbidopa/levodopa  25/250 1 Tablet(s) Oral every 8 hours  ceFAZolin   IVPB 2000 milliGRAM(s) IV Intermittent every 8 hours  chlorhexidine 0.12% Liquid 15 milliLiter(s) Oral Mucosa every 12 hours  chlorhexidine 2% Cloths 1 Application(s) Topical <User Schedule>  dextrose 5%. 1000 milliLiter(s) IV Continuous <Continuous>  dextrose 5%. 1000 milliLiter(s) IV Continuous <Continuous>  dextrose 50% Injectable 25 Gram(s) IV Push once  dextrose 50% Injectable 12.5 Gram(s) IV Push once  dextrose 50% Injectable 25 Gram(s) IV Push once  dextrose Oral Gel 15 Gram(s) Oral once PRN  enoxaparin Injectable 50 milliGRAM(s) SubCutaneous every 12 hours  glucagon  Injectable 1 milliGRAM(s) IntraMuscular once  insulin lispro (ADMELOG) corrective regimen sliding scale   SubCutaneous every 6 hours  levETIRAcetam 500 milliGRAM(s) Oral two times a day  lidocaine 1%/epinephrine 1:100,000 Inj 2 Vial(s) Local Injection once  methadone    Tablet 2.5 milliGRAM(s) Oral every 12 hours  pantoprazole  Injectable 40 milliGRAM(s) IV Push daily  polyethylene glycol 3350 17 Gram(s) Oral every 12 hours  senna 2 Tablet(s) Oral at bedtime        Assessment and Plan:  Patient is a 70 y/o  male past medical history of hypertension, Parkinson's, CAD presents for shortness of breath and cough of 1 day duration. Patient was dx. with  Flu, PNA and MSSA bacteremia  He was intubated for acute hypoxic respiratory failure with subsequent trache/PEG placement on 02/20. He  was found to have trace IVH R occipital horn on CTH 01/31, Pulmonary artery thrombus on CTA : 2/ 2 CTA There is a focal filling defect within the main pulmonary artery at site of the ductus arteriosus suspicious for thrombus.   In addition he was treated for suspected cholecystitis , on  2/5 Successful placement of 8Fr perc haydee tube. 250cc of dark bile aspirated from GB.  Pulmonary is following, s/p   2/4 bronch   No organisms seen per oil power field, pt was treated with broad spectrum ABx, downgraded to SDU on 2/25, than pt. was downgraded to vent unit on 3/1/25.     # Acute hypoxic respiratory failure / Multifocal Pneumonia : aspiration vs VAP/ Influenza A/  Septic shock / MSSA bacteremia / suspected cholecystitis / iatrogenic pneumothorax   - pt was intubated , s/p  trach 02/20, c/w local trach care   - Vent management as per pulmonary team   - aggressive pulmonary toilet, chest vest, frequent suction - pt completed course of Tamiflu  - recent CXR noted ( pt has small pneumothorax), pulmonary aware, CXR is stable    - BAL Cx 02/11: Stenotrophomonas -- colonizer as per ID  - midline 2/24, leslie 2/19  - TTE no vegetations , guidelines recommend MARY as community acquired bacteremia once stable , otherwise 6 weeks empiric therapy for MSSA bacteremia from date of cleared BCX E/D 3/13/25  - Blood Cx 01/29: MSSA, repeat blood Cx is NTD   - S/p cholecystostomy tube placement by IR on 02/05  - Biliary fluid culture 02/10 - candida albicans , likely colonizer  - ID followed up, recommendations noted: - s/p  zosyn 3.375 q8h IV till 3/2/25, currently resumed on IV Cefazolin 2grams every 8 hours till 3/13/25 .   - monitor output from biliary drain  - weekly labs        # Pulmonary Embolism / chronic diastolic CHF/  Pulmonary Hypertension/ Small pneumothorax  - CTA thorax 02/02: There is a focal filling defect within the main pulmonary artery at site of the ductus arteriosus suspicious for thrombus  - No DVT on duplex.   - ECHO: EF of 56%.  Mildly increased LV wall thickness. Grade I diastolic dysfunction. Moderate tricuspid regurgitation. Estimated pulmonary artery systolic pressure is 41.4 mmHg assuming a right atrial pressure of 3 mmHg, which is consistent with mild pulmonary hypertension.  - He was not started on therapeutic AC due to IVH present on admission.  - Neuro  recommendations: can restart patient on ACs. Patient's intraventricular hemorrhage occurred >2 weeks ago. CTA head from 1/31 showed no AVM or aneurysm. Bleed likely idiopathic small intraventricular hemorrhage. Current benefits of restarting ACs outweigh the risks.   - from 02/28/25- started on  therapeutic Lovenox - upon d/c transition to Eliquis via peg tx.  - most recent CXR- stable small left pneumothorax    # h/o CAD  - ASA  held in setting of IVH  - Since CT head from 02/25 shows resolution of IVH, needs neuro Sx f/u to clear for AC and ASA     # Right hip hematoma   - CT pelvis 02/09: Large intramuscular hematoma in the medial right thigh measuring approximately 9.8 x 6.4 x 17 cm  - Stable on repeat imaging on 02/12    # Sacrococcygeal decubitus ulcer   # Left heel DTI   - Appreciate wound care and burns consult   - S/p debridement 02/22 - no specimen sent for pathology/culture  - change position Q 2 hours, offload pressure points     # Dysphagia - s/p GJ tube 02/20   - Meds via G tube   - on continuos tube feedings vit J tube       # Parkinson's dementia /  Parkinson's Disease /  Acute IVH of right occipital horn   - CT head 01/31: Trace acute intraventricular hemorrhage is noted layering within the right occipital horn  - CTA head and neck 01/31: Moderate stenosis of the right P2 segment of the PCA. Intracranial vessels are otherwise patent and unremarkable. No evidence of carotid or vertebral artery stenosis.  - CT head 02/25:  Nearly resolved trace intraventricular hemorrhage in the right occipital horn compared to the prior CT head from 2/9/2025.  - Suspicion of seizures given increased disorientation as reported by family members.  EEG, CK, prolactin, lactic acid  - neurology is following, recommendations noted :   - Continue Keppra 500mg BID  - Continue Sinemet to 25/250 ONE tablet TID      # DM- Insulin Sliding Scale     # Urinary retention - leslie present - monthly exchange.    # DVT PPx: Lovenox  - GI PPx: Pantoprazole   - Diet: Tube feeding    #Progress Note Handoff:   continue  therapeutic Lovenox once ready for d/c transition to Eliquis tx,  pulmonary for vent management,  IV Cefazolin tx. sterile midline on d/c . start d/c to SNF planning    Communication with family: current patient's plan of tx. and initiation of d/c planning d/w family by patient's bedside , they are in agreement with treatment plan.   Disposition: SNF once bed is available     Total time spent to complete patient's bedside assessment, review medical chart, discuss medical plan of care with covering medical team was more than 55 minutes with >50% of time spent face to face with patient, discussion with patient/family and/or coordination of care

## 2025-03-04 NOTE — PROGRESS NOTE ADULT - ASSESSMENT
Patient is a 70 y/o  male past medical history of hypertension, Parkinson's, CAD presents for shortness of breath and cough of 1 day duration. Patient was dx. with  Flu, PNA and MSSA bacteremia  He was intubated for acute hypoxic respiratory failure with subsequent trache/PEG placement on 02/20. He  was found to have trace IVH R occipital horn on CTH 01/31, Pulmonary artery thrombus on CTA : 2/ 2 CTA There is a focal filling defect within the main pulmonary artery at site of the ductus arteriosus suspicious for thrombus.   In addition he was treated for suspected cholecystitis , on  2/5 Successful placement of 8Fr perc haydee tube. 250cc of dark bile aspirated from GB.  Pulmonary is following, s/p   2/4 bronch   No organisms seen per oil power field, pt was treated with broad spectrum ABx, downgraded to SDU on 2/25, than pt. was downgraded to vent unit on 3/1/25.     # Acute hypoxic respiratory failure / Multifocal Pneumonia : aspiration vs VAP/ Influenza A/  Septic shock / MSSA bacteremia / suspected cholecystitis / iatrogenic pneumothorax   - pt was intubated , s/p  trach 02/20, c/w local trach care   - Vent management as per pulmonary team   - aggressive pulmonary toilet, chest vest, frequent suction - pt completed course of Tamiflu  - recent CXR noted ( pt has small pneumothorax), pulmonary aware, CXR is stable    - BAL Cx 02/11: Stenotrophomonas -- colonizer as per ID  - ID followed up, recommendations noted: - c/w  zosyn 3.375 q8h IV till 3/2/25, than continue with IV Cefazolin .   - midline 2/24, leslie 2/19  - TTE no vegetations , guidelines recommend MARY as community acquired bacteremia once stable, otherwise 6 weeks empiric therapy for MSSA bacteremia from date of cleared BCX E/D 3/13/25. On Cefazolin per ID  - Blood Cx 01/29: MSSA, repeat blood Cx is NTD   - S/p cholecystostomy tube placement by IR on 02/05  - Biliary fluid culture 02/10 - candida albicans , likely colonizer   - monitor output from biliary drain, trend WBC     # Pulmonary Embolism / chronic diastolic CHF/  Pulmonary Hypertension   - CTA thorax 02/02: There is a focal filling defect within the main pulmonary artery at site of the ductus arteriosus suspicious for thrombus  - No DVT on duplex.   - ECHO: EF of 56%.  Mildly increased LV wall thickness. Grade I diastolic dysfunction. Moderate tricuspid regurgitation. Estimated pulmonary artery systolic pressure is 41.4 mmHg assuming a right atrial pressure of 3 mmHg, which is consistent with mild pulmonary hypertension.  - He was not started on therapeutic AC due to IVH present on admission.  - case d/w neurology attending today, recommendations noted:   - Can restart patient on ACs. Patient's intraventricular hemorrhage occurred >2 weeks ago. CTA head from 1/31 showed no AVM or aneurysm. Bleed likely idiopathic small intraventricular hemorrhage. Current benefits of restarting ACs outweigh the risks.   - C/w therapeutic Lovenox     # h/o CAD  - ASA  held in setting of IVH  - Since CT head from 02/25 shows resolution of IVH, needs neuro Sx f/u to clear for AC and ASA     # Right hip hematoma   - CT pelvis 02/09: Large intramuscular hematoma in the medial right thigh measuring approximately 9.8 x 6.4 x 17 cm  - Stable on repeat imaging on 02/12    # Sacrococcygeal decubitus ulcer   # Left heel DTI   - Appreciate wound care and burns consult   - S/p debridement 02/22 - no specimen sent for pathology/culture  - change position Q 2 hours, offload pressure points     # Dysphagia   - s/p GJ tube 02/20   - Meds via G tube   - on continuos tube feedings vit J tube   - Nutrition consult     # Parkinson's dementia /  Parkinson's Disease /  Acute IVH of right occipital horn   - CT head 01/31: Trace acute intraventricular hemorrhage is noted layering within the right occipital horn  - CTA head and neck 01/31: Moderate stenosis of the right P2 segment of the PCA. Intracranial vessels are otherwise patent and unremarkable. No evidence of carotid or vertebral artery stenosis.  - CT head 02/25:  Nearly resolved trace intraventricular hemorrhage in the right occipital horn compared to the prior CT head from 2/9/2025.  - Suspicion of seizures given increased disorientation as reported by family members.  EEG, CK, prolactin, lactic acid  - neurology is following, recommendations noted :   - Continue Keppra 500mg BID  - Continue Sinemet to 25/250 ONE tablet TID    # DM- Insulin Sliding Scale     # Urinary retention - leslie present    DVT PPx: Lovenox  GI PPx: Pantoprazole   Diet: Tube feeding  Activity: IAT  Code: Full    To-do: continue therapeutic Lovenox, pulmonary for vent management, IV abx Cefazolin until 3/13 (sterile midline on DC). DC process starting to SNF

## 2025-03-04 NOTE — PROGRESS NOTE ADULT - SUBJECTIVE AND OBJECTIVE BOX
Over Night Events: events noted, vent dependant, afebrile    PHYSICAL EXAM    ICU Vital Signs Last 24 Hrs  T(C): 37 (04 Mar 2025 04:30), Max: 37.2 (03 Mar 2025 20:44)  T(F): 98.6 (04 Mar 2025 04:30), Max: 98.9 (03 Mar 2025 20:44)  HR: 70 (04 Mar 2025 04:30) (70 - 96)  BP: 129/69 (04 Mar 2025 04:30) (111/67 - 156/82)  BP(mean): 89 (04 Mar 2025 04:30) (89 - 89)  RR: 20 (04 Mar 2025 04:30) (19 - 20)  SpO2: 100% (04 Mar 2025 04:30) (100% - 100%)    O2 Parameters below as of 04 Mar 2025 04:30  Patient On (Oxygen Delivery Method): ventilator            General: ill looking  trac  Lungs: Bilateral rhonchi  Cardiovascular: FRANKLYN 2.6  Abdomen: Soft, Positive BS  Extremities: No clubbing   Not following commands      03-02-25 @ 07:01  -  03-03-25 @ 07:00  --------------------------------------------------------  IN:    Enteral Tube Flush: 200 mL    Free Water: 200 mL    Glucerna: 1680 mL  Total IN: 2080 mL    OUT:    Indwelling Catheter - Urethral (mL): 1000 mL  Total OUT: 1000 mL    Total NET: 1080 mL      03-03-25 @ 07:01  -  03-04-25 @ 06:31  --------------------------------------------------------  IN:    Free Water: 400 mL    Glucerna: 1750 mL  Total IN: 2150 mL    OUT:    Drain (mL): 400 mL    Indwelling Catheter - Urethral (mL): 2550 mL  Total OUT: 2950 mL    Total NET: -800 mL          LABS:                          8.5    6.39  )-----------( 197      ( 02 Mar 2025 08:17 )             27.0                                               03-02    142  |  105  |  34[H]  ----------------------------<  138[H]  4.1   |  31  |  0.9    Ca    7.1[L]      02 Mar 2025 08:17  Mg     2.6     03-02    TPro  4.3[L]  /  Alb  2.2[L]  /  TBili  0.6  /  DBili  x   /  AST  16  /  ALT  <5  /  AlkPhos  192[H]  03-02                                             Urinalysis Basic - ( 02 Mar 2025 08:17 )    Color: x / Appearance: x / SG: x / pH: x  Gluc: 138 mg/dL / Ketone: x  / Bili: x / Urobili: x   Blood: x / Protein: x / Nitrite: x   Leuk Esterase: x / RBC: x / WBC x   Sq Epi: x / Non Sq Epi: x / Bacteria: x                                                  LIVER FUNCTIONS - ( 02 Mar 2025 08:17 )  Alb: 2.2 g/dL / Pro: 4.3 g/dL / ALK PHOS: 192 U/L / ALT: <5 U/L / AST: 16 U/L / GGT: x                                                                                               Mode: AC/ CMV (Assist Control/ Continuous Mandatory Ventilation)  RR (machine): 18  TV (machine): 450  FiO2: 40  PEEP: 8  PS:   ITime: 1  MAP: 8  PIP: 12                                          MEDICATIONS  (STANDING):  carbidopa/levodopa  25/250 1 Tablet(s) Oral every 8 hours  ceFAZolin   IVPB 2000 milliGRAM(s) IV Intermittent every 8 hours  chlorhexidine 0.12% Liquid 15 milliLiter(s) Oral Mucosa every 12 hours  chlorhexidine 2% Cloths 1 Application(s) Topical <User Schedule>  dextrose 5%. 1000 milliLiter(s) (100 mL/Hr) IV Continuous <Continuous>  dextrose 5%. 1000 milliLiter(s) (50 mL/Hr) IV Continuous <Continuous>  dextrose 50% Injectable 25 Gram(s) IV Push once  dextrose 50% Injectable 12.5 Gram(s) IV Push once  dextrose 50% Injectable 25 Gram(s) IV Push once  enoxaparin Injectable 50 milliGRAM(s) SubCutaneous every 12 hours  glucagon  Injectable 1 milliGRAM(s) IntraMuscular once  insulin lispro (ADMELOG) corrective regimen sliding scale   SubCutaneous every 6 hours  levETIRAcetam 500 milliGRAM(s) Oral two times a day  lidocaine 1%/epinephrine 1:100,000 Inj 2 Vial(s) Local Injection once  methadone    Tablet 2.5 milliGRAM(s) Oral every 12 hours  pantoprazole  Injectable 40 milliGRAM(s) IV Push daily  polyethylene glycol 3350 17 Gram(s) Oral every 12 hours  senna 2 Tablet(s) Oral at bedtime    MEDICATIONS  (PRN):  acetaminophen     Tablet .. 650 milliGRAM(s) Oral every 6 hours PRN Temp greater or equal to 38C (100.4F), Mild Pain (1 - 3)  dextrose Oral Gel 15 Gram(s) Oral once PRN Blood Glucose LESS THAN 70 milliGRAM(s)/deciliter

## 2025-03-04 NOTE — PROGRESS NOTE ADULT - ASSESSMENT
IMPRESSION:    Acute Hypoxic Respiratory Failure Sp Trach/PEG   Toxic Metabolic Encephalopathy  aspiration   Dyskinesia  small L ptx  Influenza A treated   MSSA bacteremia repeat CX -  Sepsis POA  cholecystitis sp percutaneous chol  Periventricular bleed  Suspected Mural thrombus in PA  Right thigh hematoma.    HO Parkinson's Disease   HO CAD  Left Ptx. stable     PLAN:    CNS:  SAT. Continue Anti parkinson's per neuro. dc methadone. Neuro fup    HEENT: Oral care. s/p trach and PEG    PULMONARY:  Monitor airway pressures. Remains with significant secretions. s/p trac, Keep Sao2 92 to 96%, c/w weaning trials. Chest PT. f/u CXR  ON 40%    CARDIOVASCULAR:  Avoid overload.     GI: GI prophylaxis. S/P perc cholecystostomy. Bowel regimen G tube for meds , feeds through J tube    RENAL: Follow up lytes. Mars in place for retention. Kidney function stable.     INFECTIOUS DISEASE: Cultures noted. SP Tamiflu. ABX per ID.     HEMATOLOGICAL: Monitor CBC and coags. CTA noted with no active bleed. Goal Hb > 7.  LE DOPPLER -    ENDOCRINE:  Follow up FS.  Insulin protocol if needed.    MUSCULOSKELETAL: Bed rest.  Off loading. Wound care for DTIs. Burn eval noted   spoke with team  Full code  palliative care follow up   very poor prognosis  vent unit

## 2025-03-04 NOTE — CHART NOTE - NSCHARTNOTESELECT_GEN_ALL_CORE
Event Note
ICU Transfer/Event Note
Post Op Check
Event Note
Follow Up/Nutrition Services
Follow Up/Nutrition Services
Neurology/Event Note
PreOp Check/Event Note
RD Follow Up/Nutrition Services
pre op

## 2025-03-04 NOTE — CHART NOTE - NSCHARTNOTEFT_GEN_A_CORE
Registered Dietitian Follow-Up     Patient Profile Reviewed                           Yes [x]   No []     Nutrition History Previously Obtained        Yes [x]  No []       Pertinent Subjective Information: TF running at goal rate. Pt tolerating feeds per RN. Continues to appear malnourished.  recent weight of 68 kg      Pertinent Medical Interventions:   # Acute hypoxic respiratory failure / Multifocal Pneumonia : aspiration vs VAP/ Influenza A/  Septic shock / MSSA bacteremia / suspected cholecystitis / iatrogenic pneumothorax   # Pulmonary Embolism / chronic diastolic CHF/  Pulmonary Hypertension   # Sacrococcygeal decubitus ulcer   # Left heel DTI   # Dysphagia   - s/p GJ tube    - Meds via G tube   - on continuos tube feedings vit J tube   # Parkinson's dementia /  Parkinson's Disease /  Acute IVH of right occipital horn   # DM- Insulin Sliding Scale        Diet order: Diet, NPO with Tube Feed:   Tube Feeding Modality: Jejunostomy  Glucerna 1.2 Zaid (GLUCERNARTH)  Total Volume for 24 Hours (mL): 1680  Continuous  Starting Tube Feed Rate {mL per Hour}: 20  Increase Tube Feed Rate by (mL): 10     Every 4 hours  Until Goal Tube Feed Rate (mL per Hour): 70  Tube Feed Duration (in Hours): 24  Tube Feed Start Time: 16:30  Free Water Flush  Free Water Flush Instructions:  100 mL water flushes q6hrs (25 @ 16:51) [Active]      Anthropometrics:  Height (cm): 175.3 (25 @ 21:01)  Weight (kg): 50.7 (25 @ 21:01)  BMI (kg/m2): 16.5 (25 @ 21:01)  IBW: 72.7 kg  Using HAMWI equation   UBW:  Unable to determine    Daily Weight in k (), Weight in k (), Weight in k ()  +5.8% Weight Change in 2 days --- fluid accumulation noted in arms.     MEDICATIONS  (STANDING):  carbidopa/levodopa  25/250 1 Tablet(s) Oral every 8 hours  ceFAZolin   IVPB 2000 milliGRAM(s) IV Intermittent every 8 hours  chlorhexidine 0.12% Liquid 15 milliLiter(s) Oral Mucosa every 12 hours  chlorhexidine 2% Cloths 1 Application(s) Topical <User Schedule>  dextrose 5%. 1000 milliLiter(s) (100 mL/Hr) IV Continuous <Continuous>  dextrose 5%. 1000 milliLiter(s) (50 mL/Hr) IV Continuous <Continuous>  dextrose 50% Injectable 25 Gram(s) IV Push once  dextrose 50% Injectable 12.5 Gram(s) IV Push once  dextrose 50% Injectable 25 Gram(s) IV Push once  enoxaparin Injectable 50 milliGRAM(s) SubCutaneous every 12 hours  glucagon  Injectable 1 milliGRAM(s) IntraMuscular once  insulin lispro (ADMELOG) corrective regimen sliding scale   SubCutaneous every 6 hours  levETIRAcetam 500 milliGRAM(s) Oral two times a day  lidocaine 1%/epinephrine 1:100,000 Inj 2 Vial(s) Local Injection once  methadone    Tablet 2.5 milliGRAM(s) Oral every 12 hours  pantoprazole  Injectable 40 milliGRAM(s) IV Push daily  polyethylene glycol 3350 17 Gram(s) Oral every 12 hours  senna 2 Tablet(s) Oral at bedtime    Pertinent Labs:  @ 06:47: Na 145, BUN 33[H], Cr 0.7, [H], K+ 4.4, Phos --, Mg 2.5[H], Alk Phos 216[H], ALT/SGPT <5, AST/SGOT 17, HbA1c --    Finger Sticks:  POCT Blood Glucose.: 137 mg/dL ( @ 12:12)  POCT Blood Glucose.: 119 mg/dL ( @ 05:12)  POCT Blood Glucose.: 124 mg/dL ( @ 23:23)  POCT Blood Glucose.: 135 mg/dL ( @ 17:41)    Physical Findings:  - Appearance: Asleep , NIKIA  - GI function: Last BM 3/3  - Tubes: G/J  Feeds VIA J  - Oral/Mouth cavity: NPO   - Skin: Sacral ulcer (stage 4)  - Edema: +2 Bilateral arms      Nutrition Requirements: Estimated energy and protein needs with consideration for weight maintenance/gain, BMI, age, mobility, wound healing, malnutrition and comorbidities.     Weight Used: 50.7 kg      Estimated Energy Needs    Continue [35-40 kcal/kg]  Adjust []  Adjusted Energy Recommendations:  4222-6393 kcal/day        Estimated Protein Needs    Continue [1.5-2.0 gm/kg]  Adjust []  Adjusted Protein Recommendations:   gm/day        Estimated Fluid Needs        Continue []  Adjust [35 mL/kg]  Adjusted Fluid Recommendations:  1774 mL/day     Nutrient Intake: Per Kangaroo Pump x24 hour = 1527 mL TVF, 1832 kcal, 91.4 gm , 1236 mL + 400 mL free water flushes= 1636 mL total free water     [x] Previous Nutrition Diagnosis: Malnutrition...  Severe protein-calorie malnutrition in the context of chronic illness  reduced function capacity and increased weakness              [x] Ongoing          [] Resolved    [] No active nutrition diagnosis identified at this time     Nutrition Education:  deferred 2/2 mental status     Goal/Expected Outcome:  Pt to meet and tolerate >85% and <115% of Estimated Energy Requirements via artificial nutrition in 3-5 days      Indicator/Monitoring: Labs, Lytes, Weekly weights, TF tolerance, NFPF, GI S/S, BMs, I/Os    Recommendation:   - c/w tube feed regimen of Glucerna 1.2 via J-port of G/J tube at goal rate 70 ml/hr x 24 hrs to provide 1680 mL total volume, 2016 kcal, 101 gm Protein, 1360.8 mL free water from formula + recommend 100 mL water flushes q6hrs = 1760.8 mL total water (or per Team)   - monitor BMs and tube feed tolerance  -- add banatrol BID for any diarrhea     Brianna Parks x [phone] or Via TEAMS    high risk Follow Up .

## 2025-03-05 VITALS — HEART RATE: 85 BPM | OXYGEN SATURATION: 100 %

## 2025-03-05 LAB
ALBUMIN SERPL ELPH-MCNC: 2.4 G/DL — LOW (ref 3.5–5.2)
ALP SERPL-CCNC: 213 U/L — HIGH (ref 30–115)
ALT FLD-CCNC: <5 U/L — SIGNIFICANT CHANGE UP (ref 0–41)
ANION GAP SERPL CALC-SCNC: 5 MMOL/L — LOW (ref 7–14)
AST SERPL-CCNC: 16 U/L — SIGNIFICANT CHANGE UP (ref 0–41)
BASOPHILS # BLD AUTO: 0.02 K/UL — SIGNIFICANT CHANGE UP (ref 0–0.2)
BASOPHILS NFR BLD AUTO: 0.3 % — SIGNIFICANT CHANGE UP (ref 0–1)
BILIRUB SERPL-MCNC: 0.4 MG/DL — SIGNIFICANT CHANGE UP (ref 0.2–1.2)
BUN SERPL-MCNC: 32 MG/DL — HIGH (ref 10–20)
CALCIUM SERPL-MCNC: 7.6 MG/DL — LOW (ref 8.4–10.5)
CHLORIDE SERPL-SCNC: 108 MMOL/L — SIGNIFICANT CHANGE UP (ref 98–110)
CO2 SERPL-SCNC: 34 MMOL/L — HIGH (ref 17–32)
CREAT SERPL-MCNC: 0.8 MG/DL — SIGNIFICANT CHANGE UP (ref 0.7–1.5)
EGFR: 95 ML/MIN/1.73M2 — SIGNIFICANT CHANGE UP
EGFR: 95 ML/MIN/1.73M2 — SIGNIFICANT CHANGE UP
EOSINOPHIL # BLD AUTO: 0.06 K/UL — SIGNIFICANT CHANGE UP (ref 0–0.7)
EOSINOPHIL NFR BLD AUTO: 0.8 % — SIGNIFICANT CHANGE UP (ref 0–8)
GLUCOSE BLDC GLUCOMTR-MCNC: 136 MG/DL — HIGH (ref 70–99)
GLUCOSE SERPL-MCNC: 129 MG/DL — HIGH (ref 70–99)
HCT VFR BLD CALC: 26.6 % — LOW (ref 42–52)
HGB BLD-MCNC: 8.3 G/DL — LOW (ref 14–18)
IMM GRANULOCYTES NFR BLD AUTO: 0.7 % — HIGH (ref 0.1–0.3)
LYMPHOCYTES # BLD AUTO: 1.27 K/UL — SIGNIFICANT CHANGE UP (ref 1.2–3.4)
LYMPHOCYTES # BLD AUTO: 17.8 % — LOW (ref 20.5–51.1)
MAGNESIUM SERPL-MCNC: 2.5 MG/DL — HIGH (ref 1.8–2.4)
MCHC RBC-ENTMCNC: 30.3 PG — SIGNIFICANT CHANGE UP (ref 27–31)
MCHC RBC-ENTMCNC: 31.2 G/DL — LOW (ref 32–37)
MCV RBC AUTO: 97.1 FL — HIGH (ref 80–94)
MONOCYTES # BLD AUTO: 0.41 K/UL — SIGNIFICANT CHANGE UP (ref 0.1–0.6)
MONOCYTES NFR BLD AUTO: 5.7 % — SIGNIFICANT CHANGE UP (ref 1.7–9.3)
NEUTROPHILS # BLD AUTO: 5.33 K/UL — SIGNIFICANT CHANGE UP (ref 1.4–6.5)
NEUTROPHILS NFR BLD AUTO: 74.7 % — SIGNIFICANT CHANGE UP (ref 42.2–75.2)
NRBC BLD AUTO-RTO: 0 /100 WBCS — SIGNIFICANT CHANGE UP (ref 0–0)
PLATELET # BLD AUTO: 286 K/UL — SIGNIFICANT CHANGE UP (ref 130–400)
PMV BLD: 10.6 FL — HIGH (ref 7.4–10.4)
POTASSIUM SERPL-MCNC: 4.5 MMOL/L — SIGNIFICANT CHANGE UP (ref 3.5–5)
POTASSIUM SERPL-SCNC: 4.5 MMOL/L — SIGNIFICANT CHANGE UP (ref 3.5–5)
PROT SERPL-MCNC: 4.7 G/DL — LOW (ref 6–8)
RBC # BLD: 2.74 M/UL — LOW (ref 4.7–6.1)
RBC # FLD: 18.1 % — HIGH (ref 11.5–14.5)
SODIUM SERPL-SCNC: 147 MMOL/L — HIGH (ref 135–146)
WBC # BLD: 7.14 K/UL — SIGNIFICANT CHANGE UP (ref 4.8–10.8)
WBC # FLD AUTO: 7.14 K/UL — SIGNIFICANT CHANGE UP (ref 4.8–10.8)

## 2025-03-05 PROCEDURE — 99239 HOSP IP/OBS DSCHRG MGMT >30: CPT

## 2025-03-05 RX ORDER — HYPROMELLOSE 0.4 %
1 DROPS OPHTHALMIC (EYE)
Qty: 0 | Refills: 0 | DISCHARGE
Start: 2025-03-05

## 2025-03-05 RX ORDER — ASPIRIN 325 MG
81 TABLET ORAL DAILY
Refills: 0 | Status: DISCONTINUED | OUTPATIENT
Start: 2025-03-05 | End: 2025-03-05

## 2025-03-05 RX ORDER — APIXABAN 2.5 MG/1
1 TABLET, FILM COATED ORAL
Qty: 0 | Refills: 0 | DISCHARGE

## 2025-03-05 RX ORDER — POLYETHYLENE GLYCOL 3350 17 G/17G
17 POWDER, FOR SOLUTION ORAL
Qty: 0 | Refills: 0 | DISCHARGE
Start: 2025-03-05

## 2025-03-05 RX ORDER — RASAGILINE 0.5 MG/1
1 TABLET ORAL
Refills: 0 | DISCHARGE

## 2025-03-05 RX ORDER — CEFAZOLIN SODIUM IN 0.9 % NACL 3 G/100 ML
2 INTRAVENOUS SOLUTION, PIGGYBACK (ML) INTRAVENOUS
Qty: 0 | Refills: 0 | DISCHARGE
Start: 2025-03-05

## 2025-03-05 RX ORDER — CARBIDOPA/LEVODOPA 25MG-100MG
1 TABLET ORAL
Qty: 0 | Refills: 0 | DISCHARGE
Start: 2025-03-05

## 2025-03-05 RX ORDER — CARBIDOPA/LEVODOPA 25MG-100MG
2 TABLET ORAL
Refills: 0 | DISCHARGE

## 2025-03-05 RX ORDER — SENNA 187 MG
2 TABLET ORAL
Qty: 0 | Refills: 0 | DISCHARGE
Start: 2025-03-05

## 2025-03-05 RX ORDER — LOSARTAN POTASSIUM 100 MG/1
1 TABLET, FILM COATED ORAL
Refills: 0 | DISCHARGE

## 2025-03-05 RX ORDER — LEVETIRACETAM 10 MG/ML
1 INJECTION, SOLUTION INTRAVENOUS
Qty: 0 | Refills: 0 | DISCHARGE
Start: 2025-03-05

## 2025-03-05 RX ADMIN — ENOXAPARIN SODIUM 50 MILLIGRAM(S): 100 INJECTION SUBCUTANEOUS at 05:31

## 2025-03-05 RX ADMIN — Medication 2.5 MILLIGRAM(S): at 05:30

## 2025-03-05 RX ADMIN — Medication 1 APPLICATION(S): at 05:32

## 2025-03-05 RX ADMIN — LEVETIRACETAM 500 MILLIGRAM(S): 10 INJECTION, SOLUTION INTRAVENOUS at 05:30

## 2025-03-05 RX ADMIN — ENOXAPARIN SODIUM 50 MILLIGRAM(S): 100 INJECTION SUBCUTANEOUS at 17:13

## 2025-03-05 RX ADMIN — Medication 100 MILLIGRAM(S): at 05:31

## 2025-03-05 RX ADMIN — Medication 1 TABLET(S): at 13:04

## 2025-03-05 RX ADMIN — Medication 15 MILLILITER(S): at 05:31

## 2025-03-05 RX ADMIN — Medication 1 TABLET(S): at 05:31

## 2025-03-05 RX ADMIN — Medication 40 MILLIGRAM(S): at 11:25

## 2025-03-05 RX ADMIN — LEVETIRACETAM 500 MILLIGRAM(S): 10 INJECTION, SOLUTION INTRAVENOUS at 17:14

## 2025-03-05 RX ADMIN — Medication 15 MILLILITER(S): at 17:14

## 2025-03-05 RX ADMIN — POLYETHYLENE GLYCOL 3350 17 GRAM(S): 17 POWDER, FOR SOLUTION ORAL at 05:30

## 2025-03-05 RX ADMIN — POLYETHYLENE GLYCOL 3350 17 GRAM(S): 17 POWDER, FOR SOLUTION ORAL at 17:15

## 2025-03-05 RX ADMIN — Medication 100 MILLIGRAM(S): at 13:04

## 2025-03-05 NOTE — DISCHARGE NOTE PROVIDER - ATTENDING DISCHARGE PHYSICAL EXAMINATION:
On exam  General: not in distress, ill-appearing, bed-bound   Lungs:  bilateral clear air entry, no wheezing, no crackles   Heart: S1, S2, no murmur   Abdomen: soft, non tender, non distended  Neuro: Wide awake and alert, moves all extremities, non-communicative, pupils reactive to light bilaterally   Extremity: No edema, cyanosis.  GJ tube, Trache, Mars

## 2025-03-05 NOTE — PROGRESS NOTE ADULT - TIME BILLING
I have personally seen and examined this patient.  I have reviewed all pertinent clinical information and reviewed all relevant imaging and diagnostic studies personally.     I discussed my recommendations with the primary team
time spent on review of labs, imaging studies, old records, obtaining history, personally examining patient, counselling and communicating with patient, entering orders for medications/tests/etc, discussions with other health care providers, documentation in electronic health records, independent interpretation of labs, imaging/procedure results and care coordination.
time spent on review of labs, imaging studies, old records, obtaining history, personally examining patient, counselling and communicating with patient, entering orders for medications/tests/etc, discussions with other health care providers, documentation in electronic health records, independent interpretation of labs, imaging/procedure results and care coordination.
above.  Total time spent includes but is not limited to personal review of patient chart, available results, collateral information (if necessary) and examination of patient at bedside and time spent formulating assessment and recommendations independent of teaching time.
I have personally seen and examined this patient.  I have reviewed all pertinent clinical information and reviewed all relevant imaging and diagnostic studies personally.     I discussed my recommendations with the primary team Dr Cheema
time spent on review of labs, imaging studies, old records, obtaining history, personally examining patient, counselling and communicating with patient, entering orders for medications/tests/etc, discussions with other health care providers, documentation in electronic health records, independent interpretation of labs, imaging/procedure results and care coordination.
I have personally seen and examined this patient.  I have reviewed all pertinent clinical information and reviewed all relevant imaging and diagnostic studies personally.     I discussed my recommendations with the primary teamDr Deni

## 2025-03-05 NOTE — DISCHARGE NOTE NURSING/CASE MANAGEMENT/SOCIAL WORK - NSDCPEFALRISK_GEN_ALL_CORE
For information on Fall & Injury Prevention, visit: [email or web address] OR  [email or web address] OR  https://www.cdc.gov/steadi/patient.html

## 2025-03-05 NOTE — DISCHARGE NOTE PROVIDER - CARE PROVIDER_API CALL
Ezio Chen  Pulmonary Disease  [address] [place] [address]  Phone: [phone]  Fax: [phone]  Follow Up Time: 1 week    Colleen Mix  Family Medicine  [address]  [place] [address]  Phone: [phone]  Fax: [phone]  Follow Up Time: 2 weeks    Nima Harrell  Neurology  [address], [address]  [place] [address]  Phone: [phone]  Fax: [phone]  Follow Up Time: 2 weeks   Ezio Chen  Pulmonary Disease  [address] [place] [address]  Phone: [phone]  Fax: [phone]  Follow Up Time: 1 week    Colleen Mix  Family Medicine  [address]  [place] [address]  Phone: [phone]  Fax: [phone]  Follow Up Time: 2 weeks    Nima Harrell  Neurology  [address], [address]  [place] [address]  Phone: [phone]  Fax: [phone]  Follow Up Time: 2 weeks    Prasanth Brannon Asa  Interventional Radiology and Diagnostic Radiology  [address] [place] [address]  Phone: [phone]  Fax: [phone]  Follow Up Time: 2 weeks    Myke Alcala  Thoracic Surgery  [address] [place] [address]  Phone: [phone]  Fax: [phone]  Follow Up Time: 2 weeks

## 2025-03-05 NOTE — DISCHARGE NOTE PROVIDER - NSDCFUADDINST_GEN_ALL_CORE_FT
WOUND CARE:  Type of Wound: Evolved Deep Tissue Injury  Location: Left heel  Measurements: ~0lfa2tr  Tunneling/ Undermining: No  Wound bed: Pink partial thickness skin loss  Wound edges: Intact  Periwound: Intact  Wound exudate: None  Wound odor: No  Induration, erythema, warmth: No  Wound pain: No    Burn following for sacral wound        Recommendation:  · Recommendations    Cleanse wound to left heel with soap and water.   Pat dry, apply Xeroform, wrap with Kerlix twice a day and prn for soiling.     Maintain pressure injury prevention.   Keep skin clean.   Maintain incontinence care.

## 2025-03-05 NOTE — DISCHARGE NOTE PROVIDER - INSTRUCTIONS
Diet, NPO with Tube Feed:   Tube Feeding Modality: Jejunostomy  Glucerna 1.2 Zaid (GLUCERNARTH)  Total Volume for 24 Hours (mL): 1680  Continuous  Starting Tube Feed Rate {mL per Hour}: 20  Increase Tube Feed Rate by (mL): 10     Every 4 hours  Until Goal Tube Feed Rate (mL per Hour): 70  Tube Feed Duration (in Hours): 24  Tube Feed Start Time: 16:30  Free Water Flush  Free Water Flush Instructions:  100 mL water flushes q6hrs (02-25-25 @ 16:51) [Active]

## 2025-03-05 NOTE — DISCHARGE NOTE PROVIDER - HOSPITAL COURSE
The patient, a 69-year-old male with a medical history that includes hypertension, Parkinson's disease, and coronary artery disease, was admitted with symptoms of shortness of breath and a cough that began one day prior to admission. His wife reported that he had experienced decreased oral intake, reduced activity levels, and unintentional weight loss over some time, with a recent onset of respiratory symptoms. Upon arrival at the emergency department, he was hypoxic and in respiratory distress, prompting initial oxygen therapy via facemask which was then escalated to AVAPS due to worsening condition. His mental status was notably impaired, being somnolent and lethargic with limited responsiveness to physical stimuli.    Diagnostic investigations revealed acute hypoxic respiratory failure (AHRF) and sepsis secondary to multilobar pneumonia (PNA). During his hospital course, he was admitted initially to the ICU on 1/31/25, followed by a transfer to the SDU on 2/22/25. He had numerous complications including influenza, MRSA bacteremia treated with cefazolin, and a trace intraventricular hemorrhage noted on the same day of his ICU admission. His condition was further complicated by a pulmonary embolism and limited anticoagulation due to the intracranial hemorrhage, and a cholecystectomy performed on 2/5/25 to manage suspected cholecystitis.    Over the course of his stay, he underwent tracheostomy and PEG tube placement on 2/20/25 for respiratory and nutritional support, respectively. Imaging revealed increased opacities and consolidation in his right lower lobe, and despite these challenges, his bleeding was managed, allowing consideration for resuming full anticoagulation. He had follow-ups with Infectious Diseases specialists, who recommended the continuation of broad-spectrum antibiotics and a plan to resume anticoagulation as his intraventricular hemorrhage resolved.    After stabilization, he was downgraded to the ventilator unit on 3/1/25. His care involved management of ventilatory support, thorough pulmonary care including suction and use of a chest compression vest, and appropriate antibiotic therapy tailored to culture results. Moreover, neurology and neurosurgery consultations were incorporated into his care for managing his intracranial events and to deliberate on the safety of resuming anticoagulation for his pulmonary embolism.    We have planned for continued administration of Lovenox and IV antibiotics with monitoring until the targeted end date of 3/13/25. Discharge to a skilled nursing facility has been arranged to ensure he receives comprehensive post-discharge care. The patient, a 69-year-old male with a medical history that includes hypertension, Parkinson's disease, and coronary artery disease, was admitted with symptoms of shortness of breath and a cough that began one day prior to admission. His wife reported that he had experienced decreased oral intake, reduced activity levels, and unintentional weight loss over some time, with a recent onset of respiratory symptoms. Upon arrival at the emergency department, he was hypoxic and in respiratory distress, prompting initial oxygen therapy via facemask which was then escalated to AVAPS due to worsening condition. His mental status was notably impaired, being somnolent and lethargic with limited responsiveness to physical stimuli.    Diagnostic investigations revealed acute hypoxic respiratory failure (AHRF) and sepsis secondary to multilobar pneumonia (PNA). During his hospital course, he was admitted initially to the ICU on 1/31/25, followed by a transfer to the SDU on 2/22/25. He had numerous complications including influenza, MRSA bacteremia treated with cefazolin, and a trace intraventricular hemorrhage noted on the same day of his ICU admission. His condition was further complicated by a pulmonary embolism and limited anticoagulation due to the intracranial hemorrhage, and a cholecystectomy performed on 2/5/25 to manage suspected cholecystitis.    Over the course of his stay, he underwent tracheostomy and PEG tube placement on 2/20/25 for respiratory and nutritional support, respectively. Imaging revealed increased opacities and consolidation in his right lower lobe, and despite these challenges, his bleeding was managed, allowing consideration for resuming full anticoagulation. He had follow-ups with Infectious Diseases specialists, who recommended the continuation of broad-spectrum antibiotics and a plan to resume anticoagulation as his intraventricular hemorrhage resolved.    After stabilization, he was downgraded to the ventilator unit on 3/1/25. His care involved management of ventilatory support, thorough pulmonary care including suction and use of a chest compression vest, and appropriate antibiotic therapy tailored to culture results. Moreover, neurology and neurosurgery consultations were incorporated into his care for managing his intracranial events and to deliberate on the safety of resuming anticoagulation for his pulmonary embolism.    We have planned for continued administration of Lovenox and IV antibiotics with monitoring until the targeted end date of 3/13/25. We also have resumed patient's aspirin 81mg after discussing with neurosurgery as patient's intraventricular bleed has resolvedDischarge to a skilled nursing facility has been arranged to ensure he receives comprehensive post-discharge care. HOSPITAL COURSE   69-year-old male with a medical history of hypertension, Parkinson's disease, and coronary artery disease, was admitted with shortness of breath and a cough that began one day prior to admission. His wife reported decreased oral intake, reduced activity levels, and unintentional weight loss over time, with a recent onset of respiratory symptoms. Upon arrival at the emergency department, he was hypoxic and in respiratory distress, requiring initial oxygen therapy via facemask and AVAPS. He failed AVAPS requiring intubation. He was failed to wean off ventilator and required tracheostomy and GJ tube placement. He was admitted to the ICU on 1/31/25 and later transferred to the SDU on 2/22/25.     During the prolonged hospital course he was treated fo ventilator associated pneumonia, influenza, MSSA bacteremia, trace intraventricular hemorrhage, pulmonary embolism, and focal seizures. Aspirin and Anticoagulation were held inially due to IVH. But repeat CT Head showed resolution of IVH, hence ASA and therapeutic anticoagulation were started after clearance from NSGY.     ASSESSMENT   # Acute hypoxic respiratory failure - intubated - trache 02/20  # Septic shock - resolved   # Multifocal Pneumonia - aspiration pneumonia vs ventilator associated   # MSSA Bacteremia  # Influenza A infection - s/p Tamiflu  # Dysphagia s/p GJ tube 02/20    # Pulmonary Embolism   # Pulmonary Hypertension  # Encephalopathy of unclear etiology (differentials: seizure, metabolic, septic, delirium, uncontrolled parkinson's) - resolved   # Parkinson's Disease   # Acute IVH of right occipital horn - resolved    # Cholecystitis - s/p cholecystostomy tube placement by IR on 02/05  # Right hip hematoma - stable on repeat imaging  # Sacrococcygeal decubitus ulcer   # Left heel DTI   # h/o CAD s/p stents on ASA  # h/o DM  # h/o Parkinson's disease   # Urinary retention requiring Mars catheter     DISCHARGE PLAN   - IV Ancef till 03/13/25  - Maintain Trache/PEG/Mars  - Vent management as per pulmonary team  - Tube feeding as per nutrition team   - ASA for CAD  - Apixaban for PE  - Keppra for seizures  - Sinemet 25/250 TID for parkinson's disease.  - Wound management and off-loading for decubitus ulcers   - Outpatient follow up: Neurology, Surgery, IR    Patient is clinically stable at the time of discharge. He is being discharged to SNF. I updated patient's wife over the phone, and she is in agreement with plan of care.

## 2025-03-05 NOTE — DISCHARGE NOTE PROVIDER - NSDCMRMEDTOKEN_GEN_ALL_CORE_FT
aspirin 81 mg oral tablet: 1 tab(s) orally once a day  Crexont 87.5 mg-350 mg oral capsule, extended release: 2 cap(s) orally 3 times a day  losartan 25 mg oral tablet: 1 tab(s) orally once a day Pt was taking this PRN  multivitamin: 1 tab(s) once a day  rasagiline 1 mg oral tablet: 1 tab(s) orally once a day   aspirin 81 mg oral tablet: 1 tab(s) orally once a day  carbidopa-levodopa 25 mg-250 mg oral tablet: 1 tab(s) orally every 8 hours  ceFAZolin 2 g intravenous injection: 2 gram(s) intravenous every 8 hours End date 3/13/25  Eliquis 5 mg oral tablet: 1 tab(s) orally 2 times a day  levETIRAcetam 500 mg oral tablet: 1 tab(s) orally 2 times a day  multivitamin: 1 tab(s) once a day  ocular lubricant preservative-free ophthalmic solution: 1 drop(s) to each affected eye 2 times a day As needed Dry Eyes  pantoprazole 40 mg intravenous injection: 40 milligram(s) intravenous once a day  polyethylene glycol 3350 oral powder for reconstitution: 17 gram(s) orally every 12 hours  senna leaf extract oral tablet: 2 tab(s) orally once a day (at bedtime)

## 2025-03-05 NOTE — DISCHARGE NOTE PROVIDER - NSDCFUADDAPPT_GEN_ALL_CORE_FT
APPTS ARE READY TO BE MADE: [x] YES    Best Family or Patient Contact (if needed): [phone] (spouse, Nichole Franco)    Additional Information about above appointments (if needed):    1: Dr. Ezio Chen (pulmonology) [phone]  2: Shelly Mix (family med) [phone]  3: Dr. Nima Harrell (neurology) [phone]    Other comments or requests:    APPTS ARE READY TO BE MADE: [x] YES    Best Family or Patient Contact (if needed): [phone] (spouse, Nichole Franco)    Additional Information about above appointments (if needed):    1: Dr. Ezio Chen (pulmonology) [phone]  2: Shelly Mix (family med) [phone]  3: Dr. Nima Harrell (neurology) [phone]  4. Dr. Prasanth Brannon (interventional radiology) [phone]  5. Dr. Myke Alcala (Thoracic surgery) [phone]    Other comments or requests:

## 2025-03-05 NOTE — DISCHARGE NOTE PROVIDER - CARE PROVIDERS DIRECT ADDRESSES
,[email or web address]

## 2025-03-05 NOTE — PROGRESS NOTE ADULT - SUBJECTIVE AND OBJECTIVE BOX
GARETT ESCAMILLA  69y, Male  Allergy: Allergy Status Unknown      LOS  35d    CHIEF COMPLAINT: penumonia (05 Mar 2025 08:54)      INTERVAL EVENTS/HPI  - T(F): , Max: 98.7 (03-04-25 @ 14:00)  - WBC Count: 7.14 (03-05-25 @ 06:16)  WBC Count: 8.15 (03-04-25 @ 06:47)     - Creatinine: 0.8 (03-05-25 @ 06:16)  Creatinine: 0.7 (03-04-25 @ 06:47)     -   -   -     ROS  cannot obtain secondary to patient's sedation and/or mental status    VITALS:  T(F): 98.3, Max: 98.7 (03-04-25 @ 14:00)  HR: 84  BP: 116/62  RR: 20Vital Signs Last 24 Hrs  T(C): 36.8 (05 Mar 2025 05:02), Max: 37.1 (04 Mar 2025 14:00)  T(F): 98.3 (05 Mar 2025 05:02), Max: 98.7 (04 Mar 2025 14:00)  HR: 84 (05 Mar 2025 08:15) (74 - 84)  BP: 116/62 (05 Mar 2025 05:02) (116/62 - 132/73)  BP(mean): 80 (05 Mar 2025 05:02) (80 - 80)  RR: 20 (05 Mar 2025 05:02) (20 - 20)  SpO2: 100% (05 Mar 2025 08:15) (99% - 100%)    Parameters below as of 05 Mar 2025 05:02  Patient On (Oxygen Delivery Method): ventilator        PHYSICAL EXAM:  ***    FH: Non-contributory  Social Hx: Non-contributory    TESTS & MEASUREMENTS:                        8.3    7.14  )-----------( 286      ( 05 Mar 2025 06:16 )             26.6     03-05    147[H]  |  108  |  32[H]  ----------------------------<  129[H]  4.5   |  34[H]  |  0.8    Ca    7.6[L]      05 Mar 2025 06:16  Mg     2.5     03-05    TPro  4.7[L]  /  Alb  2.4[L]  /  TBili  0.4  /  DBili  x   /  AST  16  /  ALT  <5  /  AlkPhos  213[H]  03-05      LIVER FUNCTIONS - ( 05 Mar 2025 06:16 )  Alb: 2.4 g/dL / Pro: 4.7 g/dL / ALK PHOS: 213 U/L / ALT: <5 U/L / AST: 16 U/L / GGT: x           Urinalysis Basic - ( 05 Mar 2025 06:16 )    Color: x / Appearance: x / SG: x / pH: x  Gluc: 129 mg/dL / Ketone: x  / Bili: x / Urobili: x   Blood: x / Protein: x / Nitrite: x   Leuk Esterase: x / RBC: x / WBC x   Sq Epi: x / Non Sq Epi: x / Bacteria: x        Culture - Blood (collected 02-25-25 @ 10:57)  Source: .Blood None  Final Report (03-02-25 @ 23:00):    No growth at 5 days    Culture - Blood (collected 02-24-25 @ 05:15)  Source: .Blood None  Final Report (03-01-25 @ 15:01):    No growth at 5 days    Culture - Viral, General (collected 02-11-25 @ 11:53)    Culture - Acid Fast - Bronchial w/Smear (collected 02-11-25 @ 11:53)  Source: Bronchial  Preliminary Report (02-26-25 @ 23:06):    No acid-fast bacilli isolated at 2 weeks.    Culture - Fungal, Bronchial (collected 02-11-25 @ 11:53)  Source: Bronchial  Preliminary Report (02-14-25 @ 07:48):    Few Yeast    Culture - Bronchial (collected 02-11-25 @ 11:53)  Source: Lavage  Gram Stain (02-16-25 @ 20:34):    No Squamous epithelial cells seen per low power field    Few polymorphonuclear leukocytes seen per low power field    No organisms seen per oil power field  Final Report (02-17-25 @ 15:49):    Few Stenotrophomonas maltophilia    Commensal halina consistent with body site  Organism: Stenotrophomonas maltophilia  Stenotrophomonas maltophilia (02-17-25 @ 15:49)  Organism: Stenotrophomonas maltophilia (02-17-25 @ 15:49)      -  Minocycline: S      Method Type:   Organism: Stenotrophomonas maltophilia (02-17-25 @ 15:49)      -  Levofloxacin: S 1      Method Type: KARTIK      -  Trimethoprim/Sulfamethoxazole: S <=0.5/9.5    Culture - Fungal, Body Fluid (collected 02-10-25 @ 16:22)  Source: Bile  Preliminary Report (02-14-25 @ 14:12):    Rare Candida albicans  Organism: Candida albicans (02-14-25 @ 14:12)  Organism: Candida albicans (02-14-25 @ 14:12)      -  Fluconazole: S <=0.5 Fluconazole: Susceptibility depends on achieving the maximum possible blood level. Doses higher than the standard dosing amount (6mg/kg/day) may be needed in adults with normal renal function and body habitus.  This test was developed and its performance characteristics determined by [facility], [place]  It has not been cleared or approved by the U.S. Food and Drug Administration. S - Susceptible; SDD - Susceptible Dose Dependent; I - Intermediate; R - Resistant; N/I - No Interpretation Available      Method Type: YSTMIC    Culture - Body Fluid with Gram Stain (collected 02-10-25 @ 16:22)  Source: Bile  Gram Stain (02-12-25 @ 23:54):    No polymorphonuclear cells seen per low power field    No organisms seen per oil power field  Final Report (02-15-25 @ 16:56):    Rare Candida albicans    See previous culture [id number] for susceptibility    Culture - Blood (collected 02-08-25 @ 17:24)  Source: .Blood BLOOD  Final Report (02-14-25 @ 01:01):    No growth at 5 days    Culture - Blood (collected 02-08-25 @ 04:58)  Source: .Blood BLOOD  Final Report (02-13-25 @ 17:00):    No growth at 5 days    Culture - Viral, General (collected 02-04-25 @ 09:29)    Culture - Bronchial (collected 02-04-25 @ 09:29)  Source: Bronch Wash  Gram Stain (02-05-25 @ 07:26):    Few-moderate polymorphonuclear leukocytes seen per low power field    No Squamous epithelial cells seen per low power field    No organisms seen per oil power field  Final Report (02-06-25 @ 11:36):    Commensal halina consistent with body site    Culture - Acid Fast - Bronchial w/Smear (collected 02-04-25 @ 09:29)  Source: Bronch Wash  Preliminary Report (02-26-25 @ 23:06):    No acid-fast bacilli isolated at 3 weeks.    Culture - Sputum (collected 02-04-25 @ 09:00)  Source: ET Tube ET Tube  Gram Stain (02-04-25 @ 19:37):    Numerous polymorphonuclear leukocytes per low power field    Rare Squamous epithelial cells per low power field    Rare Yeast like cells per oil power field  Final Report (02-05-25 @ 22:23):    Commensal halina consistent with body site            INFECTIOUS DISEASES TESTING  Procalcitonin: 0.62 (02-11-25 @ 08:30)  MRSA PCR Result.: Negative (02-10-25 @ 16:22)  MRSA PCR Result.: Negative (01-30-25 @ 13:40)  Procalcitonin: 6.93 (01-30-25 @ 07:26)  Legionella Antigen, Urine: Negative (01-29-25 @ 15:49)  Rapid RVP Result: Detected (01-29-25 @ 10:30)      INFLAMMATORY MARKERS      RADIOLOGY & ADDITIONAL TESTS:  I have personally reviewed the last available Chest xray  CXR      CT      CARDIOLOGY TESTING  12 Lead ECG:   Ventricular Rate 98 BPM    Atrial Rate 98 BPM    P-R Interval 154 ms    QRS Duration 68 ms    Q-T Interval 342 ms    QTC Calculation(Bazett) 436 ms    P Axis 87 degrees    R Axis 81 degrees    T Axis 78 degrees    Diagnosis Line Normal sinus rhythm  Low voltage QRS  Borderline ECG    Confirmed by Kane Hrermann ([id number]) on 2/25/2025 9:16:05 AM (02-23-25 @ 22:53)      MEDICATIONS  carbidopa/levodopa  25/250 1  ceFAZolin   IVPB 2000  chlorhexidine 0.12% Liquid 15  chlorhexidine 2% Cloths 1  dextrose 5%. 1000  dextrose 5%. 1000  dextrose 50% Injectable 25  dextrose 50% Injectable 12.5  dextrose 50% Injectable 25  enoxaparin Injectable 50  glucagon  Injectable 1  insulin lispro (ADMELOG) corrective regimen sliding scale   levETIRAcetam 500  lidocaine 1%/epinephrine 1:100,000 Inj 2  methadone    Tablet 2.5  pantoprazole  Injectable 40  polyethylene glycol 3350 17  senna 2      WEIGHT  Weight (kg): 50.7 (02-28-25 @ 21:01)  Creatinine: 0.8 mg/dL (03-05-25 @ 06:16)      ANTIBIOTICS:  ceFAZolin   IVPB 2000 milliGRAM(s) IV Intermittent every 8 hours      All available historical records have been reviewed   GARETT ESCAMILLA  69y, Male  Allergy: Allergy Status Unknown      LOS  35d    CHIEF COMPLAINT: penumonia (05 Mar 2025 08:54)      INTERVAL EVENTS/HPI  - T(F): , Max: 98.7 (03-04-25 @ 14:00)  - WBC Count: 7.14 (03-05-25 @ 06:16)  WBC Count: 8.15 (03-04-25 @ 06:47)     - Creatinine: 0.8 (03-05-25 @ 06:16)  Creatinine: 0.7 (03-04-25 @ 06:47)     -   -   -     ROS  cannot obtain secondary to patient's sedation and/or mental status    VITALS:  T(F): 98.3, Max: 98.7 (03-04-25 @ 14:00)  HR: 84  BP: 116/62  RR: 20Vital Signs Last 24 Hrs  T(C): 36.8 (05 Mar 2025 05:02), Max: 37.1 (04 Mar 2025 14:00)  T(F): 98.3 (05 Mar 2025 05:02), Max: 98.7 (04 Mar 2025 14:00)  HR: 84 (05 Mar 2025 08:15) (74 - 84)  BP: 116/62 (05 Mar 2025 05:02) (116/62 - 132/73)  BP(mean): 80 (05 Mar 2025 05:02) (80 - 80)  RR: 20 (05 Mar 2025 05:02) (20 - 20)  SpO2: 100% (05 Mar 2025 08:15) (99% - 100%)    Parameters below as of 05 Mar 2025 05:02  Patient On (Oxygen Delivery Method): ventilator        PHYSICAL EXAM:  Gen: trach/ vent, chronically ill appearing  HEENT: NCAT  CV: RRR  Lungs: Decreased BS at bases  Abd: Soft  Neuro: does not follow commands  Skin: no rash   Extremities: warm  Lines: clean, no phlebitis     FH: Non-contributory  Social Hx: Non-contributory    TESTS & MEASUREMENTS:                        8.3    7.14  )-----------( 286      ( 05 Mar 2025 06:16 )             26.6     03-05    147[H]  |  108  |  32[H]  ----------------------------<  129[H]  4.5   |  34[H]  |  0.8    Ca    7.6[L]      05 Mar 2025 06:16  Mg     2.5     03-05    TPro  4.7[L]  /  Alb  2.4[L]  /  TBili  0.4  /  DBili  x   /  AST  16  /  ALT  <5  /  AlkPhos  213[H]  03-05      LIVER FUNCTIONS - ( 05 Mar 2025 06:16 )  Alb: 2.4 g/dL / Pro: 4.7 g/dL / ALK PHOS: 213 U/L / ALT: <5 U/L / AST: 16 U/L / GGT: x           Urinalysis Basic - ( 05 Mar 2025 06:16 )    Color: x / Appearance: x / SG: x / pH: x  Gluc: 129 mg/dL / Ketone: x  / Bili: x / Urobili: x   Blood: x / Protein: x / Nitrite: x   Leuk Esterase: x / RBC: x / WBC x   Sq Epi: x / Non Sq Epi: x / Bacteria: x        Culture - Blood (collected 02-25-25 @ 10:57)  Source: .Blood None  Final Report (03-02-25 @ 23:00):    No growth at 5 days    Culture - Blood (collected 02-24-25 @ 05:15)  Source: .Blood None  Final Report (03-01-25 @ 15:01):    No growth at 5 days    Culture - Viral, General (collected 02-11-25 @ 11:53)    Culture - Acid Fast - Bronchial w/Smear (collected 02-11-25 @ 11:53)  Source: Bronchial  Preliminary Report (02-26-25 @ 23:06):    No acid-fast bacilli isolated at 2 weeks.    Culture - Fungal, Bronchial (collected 02-11-25 @ 11:53)  Source: Bronchial  Preliminary Report (02-14-25 @ 07:48):    Few Yeast    Culture - Bronchial (collected 02-11-25 @ 11:53)  Source: Lavage  Gram Stain (02-16-25 @ 20:34):    No Squamous epithelial cells seen per low power field    Few polymorphonuclear leukocytes seen per low power field    No organisms seen per oil power field  Final Report (02-17-25 @ 15:49):    Few Stenotrophomonas maltophilia    Commensal halina consistent with body site  Organism: Stenotrophomonas maltophilia  Stenotrophomonas maltophilia (02-17-25 @ 15:49)  Organism: Stenotrophomonas maltophilia (02-17-25 @ 15:49)      -  Minocycline: S      Method Type: KB  Organism: Stenotrophomonas maltophilia (02-17-25 @ 15:49)      -  Levofloxacin: S 1      Method Type: KARTIK      -  Trimethoprim/Sulfamethoxazole: S <=0.5/9.5    Culture - Fungal, Body Fluid (collected 02-10-25 @ 16:22)  Source: Bile  Preliminary Report (02-14-25 @ 14:12):    Rare Candida albicans  Organism: Candida albicans (02-14-25 @ 14:12)  Organism: Candida albicans (02-14-25 @ 14:12)      -  Fluconazole: S <=0.5 Fluconazole: Susceptibility depends on achieving the maximum possible blood level. Doses higher than the standard dosing amount (6mg/kg/day) may be needed in adults with normal renal function and body habitus.  This test was developed and its performance characteristics determined by [facility], [place]  It has not been cleared or approved by the U.S. Food and Drug Administration. S - Susceptible; SDD - Susceptible Dose Dependent; I - Intermediate; R - Resistant; N/I - No Interpretation Available      Method Type: YSTMIC    Culture - Body Fluid with Gram Stain (collected 02-10-25 @ 16:22)  Source: Bile  Gram Stain (02-12-25 @ 23:54):    No polymorphonuclear cells seen per low power field    No organisms seen per oil power field  Final Report (02-15-25 @ 16:56):    Rare Candida albicans    See previous culture [id number] for susceptibility    Culture - Blood (collected 02-08-25 @ 17:24)  Source: .Blood BLOOD  Final Report (02-14-25 @ 01:01):    No growth at 5 days    Culture - Blood (collected 02-08-25 @ 04:58)  Source: .Blood BLOOD  Final Report (02-13-25 @ 17:00):    No growth at 5 days    Culture - Viral, General (collected 02-04-25 @ 09:29)    Culture - Bronchial (collected 02-04-25 @ 09:29)  Source: Bronch Wash  Gram Stain (02-05-25 @ 07:26):    Few-moderate polymorphonuclear leukocytes seen per low power field    No Squamous epithelial cells seen per low power field    No organisms seen per oil power field  Final Report (02-06-25 @ 11:36):    Commensal halina consistent with body site    Culture - Acid Fast - Bronchial w/Smear (collected 02-04-25 @ 09:29)  Source: Bronch Wash  Preliminary Report (02-26-25 @ 23:06):    No acid-fast bacilli isolated at 3 weeks.    Culture - Sputum (collected 02-04-25 @ 09:00)  Source: ET Tube ET Tube  Gram Stain (02-04-25 @ 19:37):    Numerous polymorphonuclear leukocytes per low power field    Rare Squamous epithelial cells per low power field    Rare Yeast like cells per oil power field  Final Report (02-05-25 @ 22:23):    Commensal halina consistent with body site            INFECTIOUS DISEASES TESTING  Procalcitonin: 0.62 (02-11-25 @ 08:30)  MRSA PCR Result.: Negative (02-10-25 @ 16:22)  MRSA PCR Result.: Negative (01-30-25 @ 13:40)  Procalcitonin: 6.93 (01-30-25 @ 07:26)  Legionella Antigen, Urine: Negative (01-29-25 @ 15:49)  Rapid RVP Result: Detected (01-29-25 @ 10:30)      INFLAMMATORY MARKERS      RADIOLOGY & ADDITIONAL TESTS:  I have personally reviewed the last available Chest xray  CXR      CT      CARDIOLOGY TESTING  12 Lead ECG:   Ventricular Rate 98 BPM    Atrial Rate 98 BPM    P-R Interval 154 ms    QRS Duration 68 ms    Q-T Interval 342 ms    QTC Calculation(Bazett) 436 ms    P Axis 87 degrees    R Axis 81 degrees    T Axis 78 degrees    Diagnosis Line Normal sinus rhythm  Low voltage QRS  Borderline ECG    Confirmed by Kane Herrmann ([id number]) on 2/25/2025 9:16:05 AM (02-23-25 @ 22:53)      MEDICATIONS  carbidopa/levodopa  25/250 1  ceFAZolin   IVPB 2000  chlorhexidine 0.12% Liquid 15  chlorhexidine 2% Cloths 1  dextrose 5%. 1000  dextrose 5%. 1000  dextrose 50% Injectable 25  dextrose 50% Injectable 12.5  dextrose 50% Injectable 25  enoxaparin Injectable 50  glucagon  Injectable 1  insulin lispro (ADMELOG) corrective regimen sliding scale   levETIRAcetam 500  lidocaine 1%/epinephrine 1:100,000 Inj 2  methadone    Tablet 2.5  pantoprazole  Injectable 40  polyethylene glycol 3350 17  senna 2      WEIGHT  Weight (kg): 50.7 (02-28-25 @ 21:01)  Creatinine: 0.8 mg/dL (03-05-25 @ 06:16)      ANTIBIOTICS:  ceFAZolin   IVPB 2000 milliGRAM(s) IV Intermittent every 8 hours      All available historical records have been reviewed

## 2025-03-05 NOTE — DISCHARGE NOTE PROVIDER - PROVIDER TOKENS
PROVIDER:[TOKEN:[[id number]],FOLLOWUP:[1 week]],PROVIDER:[TOKEN:[[id number]],FOLLOWUP:[2 weeks]],PROVIDER:[TOKEN:[[id number]],FOLLOWUP:[2 weeks]] PROVIDER:[TOKEN:[[id number]],FOLLOWUP:[1 week]],PROVIDER:[TOKEN:[[id number]],FOLLOWUP:[2 weeks]],PROVIDER:[TOKEN:[[id number]],FOLLOWUP:[2 weeks]],PROVIDER:[TOKEN:[[id number]],FOLLOWUP:[2 weeks]],PROVIDER:[TOKEN:[[id number]],FOLLOWUP:[2 weeks]]

## 2025-03-05 NOTE — PROGRESS NOTE ADULT - PROVIDER SPECIALTY LIST ADULT
Burn
Critical Care
Hospitalist
Hospitalist
Internal Medicine
MICU
Pulmonology
Thoracic Surgery
Critical Care
Hospitalist
Hospitalist
Infectious Disease
Infectious Disease
Internal Medicine
Neurology
Pulmonology
Pulmonology
Surgery
Thoracic Surgery
Critical Care
Hospitalist
Hospitalist
Infectious Disease
Infectious Disease
Internal Medicine
Internal Medicine
MICU
Pulmonology
Pulmonology
Thoracic Surgery
Thoracic Surgery
Critical Care
Hospitalist
Infectious Disease
Internal Medicine
MICU
Pulmonology
Thoracic Surgery
Thoracic Surgery
Critical Care
Hospitalist
Infectious Disease
Infectious Disease
Palliative Care
Critical Care
Infectious Disease
Palliative Care

## 2025-03-05 NOTE — PROGRESS NOTE ADULT - ASSESSMENT
Patient is a 68 y/o  male past medical history of hypertension, Parkinson's, CAD presents for shortness of breath and cough of 1 day duration. Patient was dx. with  Flu, PNA and MSSA bacteremia  He was intubated for acute hypoxic respiratory failure with subsequent trache/PEG placement on 02/20. He  was found to have trace IVH R occipital horn on CTH 01/31, Pulmonary artery thrombus on CTA : 2/ 2 CTA There is a focal filling defect within the main pulmonary artery at site of the ductus arteriosus suspicious for thrombus.   In addition he was treated for suspected cholecystitis , on  2/5 Successful placement of 8Fr perc haydee tube. 250cc of dark bile aspirated from GB.  Pulmonary is following, s/p   2/4 bronch   No organisms seen per oil power field, pt was treated with broad spectrum ABx, downgraded to SDU on 2/25, than pt. was downgraded to vent unit on 3/1/25.     # Acute hypoxic respiratory failure / Multifocal Pneumonia : aspiration vs VAP/ Influenza A/  Septic shock / MSSA bacteremia / suspected cholecystitis / iatrogenic pneumothorax   - pt was intubated , s/p  trach 02/20, c/w local trach care   - Vent management as per pulmonary team   - aggressive pulmonary toilet, chest vest, frequent suction - pt completed course of Tamiflu  - recent CXR noted ( pt has small pneumothorax), pulmonary aware, CXR is stable    - BAL Cx 02/11: Stenotrophomonas -- colonizer as per ID  - ID followed up, recommendations noted: - c/w  zosyn 3.375 q8h IV till 3/2/25, than continue with IV Cefazolin .   - midline 2/24, leslie 2/19  - TTE no vegetations , guidelines recommend MARY as community acquired bacteremia once stable, otherwise 6 weeks empiric therapy for MSSA bacteremia from date of cleared BCX E/D 3/13/25. On Cefazolin per ID  - Blood Cx 01/29: MSSA, repeat blood Cx is NTD   - S/p cholecystostomy tube placement by IR on 02/05  - Biliary fluid culture 02/10 - candida albicans , likely colonizer   - monitor output from biliary drain, trend WBC     # Pulmonary Embolism / chronic diastolic CHF/  Pulmonary Hypertension   - CTA thorax 02/02: There is a focal filling defect within the main pulmonary artery at site of the ductus arteriosus suspicious for thrombus  - No DVT on duplex.   - ECHO: EF of 56%.  Mildly increased LV wall thickness. Grade I diastolic dysfunction. Moderate tricuspid regurgitation. Estimated pulmonary artery systolic pressure is 41.4 mmHg assuming a right atrial pressure of 3 mmHg, which is consistent with mild pulmonary hypertension.  - He was not started on therapeutic AC due to IVH present on admission.  - case d/w neurology attending today, recommendations noted:   - Can restart patient on ACs. Patient's intraventricular hemorrhage occurred >2 weeks ago. CTA head from 1/31 showed no AVM or aneurysm. Bleed likely idiopathic small intraventricular hemorrhage. Current benefits of restarting ACs outweigh the risks.   - C/w therapeutic Lovenox     # h/o CAD  - ASA  held in setting of IVH  - Since CT head from 02/25 shows resolution of IVH, needs neuro Sx f/u to clear for AC and ASA     # Right hip hematoma   - CT pelvis 02/09: Large intramuscular hematoma in the medial right thigh measuring approximately 9.8 x 6.4 x 17 cm  - Stable on repeat imaging on 02/12    # Sacrococcygeal decubitus ulcer   # Left heel DTI   - Appreciate wound care and burns consult   - S/p debridement 02/22 - no specimen sent for pathology/culture  - change position Q 2 hours, offload pressure points     # Dysphagia   - s/p GJ tube 02/20   - Meds via G tube   - on continuos tube feedings vit J tube   - Nutrition consult     # Parkinson's dementia /  Parkinson's Disease /  Acute IVH of right occipital horn   - CT head 01/31: Trace acute intraventricular hemorrhage is noted layering within the right occipital horn  - CTA head and neck 01/31: Moderate stenosis of the right P2 segment of the PCA. Intracranial vessels are otherwise patent and unremarkable. No evidence of carotid or vertebral artery stenosis.  - CT head 02/25:  Nearly resolved trace intraventricular hemorrhage in the right occipital horn compared to the prior CT head from 2/9/2025.  - Suspicion of seizures given increased disorientation as reported by family members.  EEG, CK, prolactin, lactic acid  - neurology is following, recommendations noted :   - Continue Keppra 500mg BID  - Continue Sinemet to 25/250 ONE tablet TID    # DM- Insulin Sliding Scale     # Urinary retention - leslie present    DVT PPx: Lovenox  GI PPx: Pantoprazole   Diet: Tube feeding  Activity: IAT  Code: Full    To-do: continue therapeutic Lovenox, pulmonary for vent management, IV abx Cefazolin until 3/13 (sterile midline on DC). DC process to SNF

## 2025-03-05 NOTE — DISCHARGE NOTE NURSING/CASE MANAGEMENT/SOCIAL WORK - PATIENT PORTAL LINK FT
You can access the FollowMyHealth Patient Portal offered by [facility] by registering at the following website: [email or web address]. By joining [facility]’s FollowMyHealth portal, you will also be able to view your health information using other applications (apps) compatible with our system.

## 2025-03-05 NOTE — DISCHARGE NOTE NURSING/CASE MANAGEMENT/SOCIAL WORK - FINANCIAL ASSISTANCE
[facility] provides services at a reduced cost to those who are determined to be eligible through [facility]’s financial assistance program. Information regarding [facility]’s financial assistance program can be found by going to [email or web address] or by calling [phone].

## 2025-03-05 NOTE — DISCHARGE NOTE PROVIDER - NPI NUMBER (FOR [employer] USE ONLY) :
[[id number]] [[id number]],[[id number]]

## 2025-03-05 NOTE — DISCHARGE NOTE PROVIDER - NSDCCPCAREPLAN_GEN_ALL_CORE_FT
PRINCIPAL DISCHARGE DIAGNOSIS  Diagnosis: Pneumonia  Assessment and Plan of Treatment: You were admitted to our hospital due to symptoms of shortness of breath and cough, presenting with acute hypoxic respiratory failure and multilobar pneumonia. After thorough assessments, it was found that your condition was exacerbated by influenza A, MRSA bacteremia, and a pulmonary embolism, which necessitated immediate and aggressive treatment.   Your hospital stay was further complicated by a trace intraventricular hemorrhage and management of suspected cholecystitis via a cholecystostomy on February 5. To support your respiratory needs long-term, you underwent tracheostomy and PEG tube placements on February 20. These procedures were vital for ensuring your continued respiratory support and nutritional intake.  Upon discharge, you are to continue receiving IV cefazolin until March 13, 2025, to address the MSSA bacteremia thoroughly. Management of your respiratory health will proceed at a skilled nursing facility, including regular assessments by a pulmonologist and continued care concerning your tracheostomy and PEG tube. Additionally, follow-ups with neurology are needed to evaluate the resolution of your intracranial hemorrhage and assess the feasibility of resuming full anticoagulation therapy. Maintaining your prescribed Lovenox and observing signs for resuming other parts of your anticoagulation regimen pending medical clearance are crucial.  Your nutrition will continue through tube feedings per the established plan, and we have arranged for regular wound care for your sacrococcygeal decubitus ulcer and left heel DTI. In terms of activity, we recommend a gradual increase as tolerated and adherence to mobility guidelines provided by your physical therapy team. Should you experience any acute worsening of your respiratory status, signs of infection, or increased bleeding, please seek immediate medical attention.      SECONDARY DISCHARGE DIAGNOSES  Diagnosis: Acute respiratory failure with hypoxia  Assessment and Plan of Treatment:      PRINCIPAL DISCHARGE DIAGNOSIS  Diagnosis: Pneumonia  Assessment and Plan of Treatment: You were admitted to our hospital due to symptoms of shortness of breath and cough. Your oxygen level was very low which is known as aute hypoxic respiratory failure. The reason of acute hypoxic respiratory failure were multilobar pneumonia, influenza infection, pulmonary embolism.   You completred treamtent of pneumonia and influenza in the hospital.         SECONDARY DISCHARGE DIAGNOSES  Diagnosis: Acute respiratory failure with hypoxia  Assessment and Plan of Treatment: You were intubated for acute hypoxic respiratory failure. We could not wean you off ventilator hence you required long term tracheostomy placement    Diagnosis: MSSA bacteremia  Assessment and Plan of Treatment: Your blood cultures isolated organism known as MSSA for which you will require IV Ancef (antibiotic). You will continue to recieved this antibiotic at your nursing home until 03/13/25.    Diagnosis: Pulmonary embolism  Assessment and Plan of Treatment: You are prescribed blood thinner (apixaban) for treatment of pulmonary embolism (also known as blood clots in lungs).    Diagnosis: Cholecystitis  Assessment and Plan of Treatment: Cholecysitis is inflmmation of gall bladder for which you required drain placement in your gall-bladder. That drain is also known as cholesystostomy tube. You will need to see surgeons and interventional radiologists in office settings for definitive plan of cholecysitis.    Diagnosis: IVH (intraventricular hemorrhage)  Assessment and Plan of Treatment: You had brain hemorrhage on admission to the hospital. However on repeating your brain CT, those hemorrhages were resolved.    Diagnosis: Seizures  Assessment and Plan of Treatment: You are prescribed a medication (Keppra) for seizures. Please follow up with your Neurologist in office settings of further care.    Diagnosis: History of Parkinson disease  Assessment and Plan of Treatment: Please take your parkinson's medications (sinemet and rasagiline) as prescribed. Discuss with Neurologist in office settings for further care.    Diagnosis: Decubitus skin ulcer  Assessment and Plan of Treatment: You have decubitus sacro-coccygeal ulcer and left heel deep tissue injury due to being bedbound. You will need appropriate wound managment in nursing home to promote the healing.    Diagnosis: Gastrojejunal (GJ) tube in place  Assessment and Plan of Treatment: You have s feeding tube placed in your stomach for nutritional needs and medication adminsitration.     PRINCIPAL DISCHARGE DIAGNOSIS  Diagnosis: Pneumonia  Assessment and Plan of Treatment: You were admitted to our hospital due to symptoms of shortness of breath and cough. Your oxygen level was very low which is known as aute hypoxic respiratory failure. The reason of acute hypoxic respiratory failure were multilobar pneumonia, influenza infection, pulmonary embolism.   You completred treamtent of pneumonia and influenza in the hospital.         SECONDARY DISCHARGE DIAGNOSES  Diagnosis: Acute respiratory failure with hypoxia  Assessment and Plan of Treatment: You were intubated for acute hypoxic respiratory failure. We could not wean you off ventilator hence you required long term tracheostomy placement    Diagnosis: MSSA bacteremia  Assessment and Plan of Treatment: Your blood cultures isolated organism known as MSSA for which you will require IV Ancef (antibiotic). You will continue to recieved this antibiotic at your nursing home until 03/13/25.    Diagnosis: Pulmonary embolism  Assessment and Plan of Treatment: You are prescribed blood thinner (apixaban) for treatment of pulmonary embolism (also known as blood clots in lungs).    Diagnosis: Cholecystitis  Assessment and Plan of Treatment: Cholecysitis is inflmmation of gall bladder for which you required drain placement in your gall-bladder. That drain is also known as cholesystostomy tube. You will need to see surgeons and interventional radiologists in office settings for definitive plan of cholecysitis.    Diagnosis: IVH (intraventricular hemorrhage)  Assessment and Plan of Treatment: You had brain hemorrhage on admission to the hospital. However on repeating your brain CT, those hemorrhages were resolved.    Diagnosis: Seizures  Assessment and Plan of Treatment: You are prescribed a medication (Keppra) for seizures. Please follow up with your Neurologist in office settings of further care.    Diagnosis: History of Parkinson disease  Assessment and Plan of Treatment: Please take your parkinson's medications (sinemet) as prescribed. Stop taking Rasagiline. Discuss with Neurologist in office settings for further care.    Diagnosis: Decubitus skin ulcer  Assessment and Plan of Treatment: You have decubitus sacro-coccygeal ulcer and left heel deep tissue injury due to being bedbound. You will need appropriate wound managment in nursing home to promote the healing.    Diagnosis: Gastrojejunal (GJ) tube in place  Assessment and Plan of Treatment: You have s feeding tube placed in your stomach for nutritional needs and medication adminsitration.

## 2025-03-05 NOTE — PROGRESS NOTE ADULT - ASSESSMENT
ASSESSMENT  69-year-old male past medical history of hypertension, Parkinson's, CAD presents for shortness of breath and cough of 1 day duration. History was obtained from patient's wife who noted that the patient has been having decreased po intake, activity, and unintentional weight loss for some time. Howver, yesterday he started having cough and shortness of breath. 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  Increased opacities/debris within the central bronchi with occlusion of the right lower lobe central bronchi. There is increased consolidation   within the right lower lobe.  #Suspected cholecystitis     2/10 biliary fluid NG,   Rare Candida albicans- suspect colonizer   RUQ GB sludge     2/5 Successful placement of 8Fr perc haydee tube. 250cc of dark bile aspirated from GB.  #Influenza +, MSSA bacteremia , suspect MSSA superimposed PNA    2/4 bronch   No organisms seen per oil power field    2/ 2 BCX NGTD     1/31 BCX NGTD     1/29 BCX MSSA 3/4 bottles    MRSA PCR Result.: Negative (01-30-25 @ 13:40)    Procalcitonin: 6.93 ng/mL (01-30-25 @ 07:26)    Legionella Antigen, Urine: Negative (01-29-25 @ 15:49)    Streptococcus pneumoniae Ag, Ur Result: Negative (01-29-25 @ 15:49)    CT Multifocal bilateral consolidative opacities as above, suggestive of multifocal pneumonia in the appropriate clinical context.  #Lactic acidosis  #Hypernatremia   #Severe protein calorie malnutrition  BMI (kg/m2): 17  #PD  #Immunodeficiency secondary to Senescence which could results in poor clinical outcomes  #Abx allergy: Allergy Status Unknown  #LYNNE , resolved  Creatinine: 1.3 mg/dL (02-26-25 @ 04:35)    Height (cm): 175.3 (01-30-25 @ 13:00)  Weight (kg): 52.2 (01-29-25 @ 10:14)    RECOMMENDATIONS  - Cefazolin 2g q8h IV   - TTE no vegetations , guidelines recommend MARY as community acquired bacteremia once stable , otherwise 6 weeks empiric therapy for MSSA bacteremia from date of cleared BCX E/D 3/13/25  - Trend WBC, Cr  - Grave prognosis, GOC     I will be away until 3/17/25  Please text/call Dr. Marcial or Dr. Farrell on Microsoft Teams with any questions.  Please continue to update ID with any pertinent new clinical, laboratory or radiographic findings

## 2025-03-05 NOTE — PROGRESS NOTE ADULT - SUBJECTIVE AND OBJECTIVE BOX
24H events:    Patient is a 69y old Male who presents with a chief complaint of penumonia (03 Mar 2025 11:53)    Primary diagnosis of Pneumonia          Today is hospital day 34d.   This morning patient was seen and examined at bedside. Updated wife regarding his status. Was able to pass SBT for a few hours yesterday AM, will attempt to wean again today.   No acute or major events overnight.     Code Status: Full      PAST MEDICAL & SURGICAL HISTORY  Parkinson disease    CAD (coronary artery disease)    History of basal cell carcinoma (BCC) excision      SOCIAL HISTORY:  Social History:      ALLERGIES:  Allergy Status Unknown    MEDICATIONS:  STANDING MEDICATIONS  carbidopa/levodopa  25/250 1 Tablet(s) Oral every 8 hours  ceFAZolin   IVPB 2000 milliGRAM(s) IV Intermittent every 8 hours  chlorhexidine 0.12% Liquid 15 milliLiter(s) Oral Mucosa every 12 hours  chlorhexidine 2% Cloths 1 Application(s) Topical <User Schedule>  dextrose 5%. 1000 milliLiter(s) IV Continuous <Continuous>  dextrose 5%. 1000 milliLiter(s) IV Continuous <Continuous>  dextrose 50% Injectable 25 Gram(s) IV Push once  dextrose 50% Injectable 12.5 Gram(s) IV Push once  dextrose 50% Injectable 25 Gram(s) IV Push once  enoxaparin Injectable 50 milliGRAM(s) SubCutaneous every 12 hours  glucagon  Injectable 1 milliGRAM(s) IntraMuscular once  insulin lispro (ADMELOG) corrective regimen sliding scale   SubCutaneous every 6 hours  levETIRAcetam 500 milliGRAM(s) Oral two times a day  lidocaine 1%/epinephrine 1:100,000 Inj 2 Vial(s) Local Injection once  methadone    Tablet 2.5 milliGRAM(s) Oral every 12 hours  pantoprazole  Injectable 40 milliGRAM(s) IV Push daily  polyethylene glycol 3350 17 Gram(s) Oral every 12 hours  senna 2 Tablet(s) Oral at bedtime    PRN MEDICATIONS  acetaminophen     Tablet .. 650 milliGRAM(s) Oral every 6 hours PRN  artificial tears (preservative free) Ophthalmic Solution 1 Drop(s) Both EYES two times a day PRN  dextrose Oral Gel 15 Gram(s) Oral once PRN    VITALS:   T(F): 98.3  HR: 79  BP: 116/62  RR: 20  SpO2: 100%      PHYSICAL EXAM:  General: NAD, Awake, patient is laying comfortably in bed, cachectic   HEENT: AT, NC, trach present  Lungs: good breath sounds  B/L, no wheezing, no rhonchi  CVS: normal S1, S2, RRR, NO M/G/R  Abdomen: soft, bowel sounds present, non-tender, non-distended, peg present, right quadrant cholecystostomy  catheter present  : leslie present  Extremities: no edema, no clubbing, no cyanosis, positive peripheral pulses b/l  Neuro: persistent bed confinement status   Skin: no rash, no ecchymosis    LABS:                        8.3    7.14  )-----------( 286      ( 05 Mar 2025 06:16 )             26.6     03-05    147[H]  |  108  |  32[H]  ----------------------------<  129[H]  4.5   |  34[H]  |  0.8    Ca    7.6[L]      05 Mar 2025 06:16  Mg     2.5     03-05    TPro  4.7[L]  /  Alb  2.4[L]  /  TBili  0.4  /  DBili  x   /  AST  16  /  ALT  <5  /  AlkPhos  213[H]  03-05      Urinalysis Basic - ( 05 Mar 2025 06:16 )    Color: x / Appearance: x / SG: x / pH: x  Gluc: 129 mg/dL / Ketone: x  / Bili: x / Urobili: x   Blood: x / Protein: x / Nitrite: x   Leuk Esterase: x / RBC: x / WBC x   Sq Epi: x / Non Sq Epi: x / Bacteria: x

## 2025-03-05 NOTE — DISCHARGE NOTE NURSING/CASE MANAGEMENT/SOCIAL WORK - NSDCFUADDAPPT_GEN_ALL_CORE_FT
APPTS ARE READY TO BE MADE: [x] YES    Best Family or Patient Contact (if needed): [phone] (spouse, Nichole Franco)    Additional Information about above appointments (if needed):    1: Dr. Ezio Chen (pulmonology) [phone]  2: Shelly Mix (family med) [phone]  3: Dr. Nima Harrell (neurology) [phone]    Other comments or requests:

## 2025-03-12 LAB
CULTURE RESULTS: ABNORMAL
CULTURE RESULTS: ABNORMAL
ORGANISM # SPEC MICROSCOPIC CNT: ABNORMAL
ORGANISM # SPEC MICROSCOPIC CNT: SIGNIFICANT CHANGE UP
SPECIMEN SOURCE: SIGNIFICANT CHANGE UP
SPECIMEN SOURCE: SIGNIFICANT CHANGE UP

## 2025-03-17 DIAGNOSIS — Z79.82 LONG TERM (CURRENT) USE OF ASPIRIN: ICD-10-CM

## 2025-03-17 DIAGNOSIS — E43 UNSPECIFIED SEVERE PROTEIN-CALORIE MALNUTRITION: ICD-10-CM

## 2025-03-17 DIAGNOSIS — I61.5 NONTRAUMATIC INTRACEREBRAL HEMORRHAGE, INTRAVENTRICULAR: ICD-10-CM

## 2025-03-17 DIAGNOSIS — I26.99 OTHER PULMONARY EMBOLISM WITHOUT ACUTE COR PULMONALE: ICD-10-CM

## 2025-03-17 DIAGNOSIS — K83.1 OBSTRUCTION OF BILE DUCT: ICD-10-CM

## 2025-03-17 DIAGNOSIS — J10.08 INFLUENZA DUE TO OTHER IDENTIFIED INFLUENZA VIRUS WITH OTHER SPECIFIED PNEUMONIA: ICD-10-CM

## 2025-03-17 DIAGNOSIS — T42.8X1A POISONING BY ANTIPARKINSONISM DRUGS AND OTHER CENTRAL MUSCLE-TONE DEPRESSANTS, ACCIDENTAL (UNINTENTIONAL), INITIAL ENCOUNTER: ICD-10-CM

## 2025-03-17 DIAGNOSIS — E11.649 TYPE 2 DIABETES MELLITUS WITH HYPOGLYCEMIA WITHOUT COMA: ICD-10-CM

## 2025-03-17 DIAGNOSIS — R33.9 RETENTION OF URINE, UNSPECIFIED: ICD-10-CM

## 2025-03-17 DIAGNOSIS — G20.A1 PARKINSON'S DISEASE WITHOUT DYSKINESIA, WITHOUT MENTION OF FLUCTUATIONS: ICD-10-CM

## 2025-03-17 DIAGNOSIS — F02.80 DEMENTIA IN OTHER DISEASES CLASSIFIED ELSEWHERE, UNSPECIFIED SEVERITY, WITHOUT BEHAVIORAL DISTURBANCE, PSYCHOTIC DISTURBANCE, MOOD DISTURBANCE, AND ANXIETY: ICD-10-CM

## 2025-03-17 DIAGNOSIS — I25.10 ATHEROSCLEROTIC HEART DISEASE OF NATIVE CORONARY ARTERY WITHOUT ANGINA PECTORIS: ICD-10-CM

## 2025-03-17 DIAGNOSIS — R13.10 DYSPHAGIA, UNSPECIFIED: ICD-10-CM

## 2025-03-17 DIAGNOSIS — I95.9 HYPOTENSION, UNSPECIFIED: ICD-10-CM

## 2025-03-17 DIAGNOSIS — K82.4 CHOLESTEROLOSIS OF GALLBLADDER: ICD-10-CM

## 2025-03-17 DIAGNOSIS — I08.3 COMBINED RHEUMATIC DISORDERS OF MITRAL, AORTIC AND TRICUSPID VALVES: ICD-10-CM

## 2025-03-17 DIAGNOSIS — R71.0 PRECIPITOUS DROP IN HEMATOCRIT: ICD-10-CM

## 2025-03-17 DIAGNOSIS — E87.1 HYPO-OSMOLALITY AND HYPONATREMIA: ICD-10-CM

## 2025-03-17 DIAGNOSIS — I11.0 HYPERTENSIVE HEART DISEASE WITH HEART FAILURE: ICD-10-CM

## 2025-03-17 DIAGNOSIS — M79.81 NONTRAUMATIC HEMATOMA OF SOFT TISSUE: ICD-10-CM

## 2025-03-17 DIAGNOSIS — N17.9 ACUTE KIDNEY FAILURE, UNSPECIFIED: ICD-10-CM

## 2025-03-17 DIAGNOSIS — Z74.01 BED CONFINEMENT STATUS: ICD-10-CM

## 2025-03-17 DIAGNOSIS — L89.626 PRESSURE-INDUCED DEEP TISSUE DAMAGE OF LEFT HEEL: ICD-10-CM
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
## 2025-03-17 DIAGNOSIS — J96.01 ACUTE RESPIRATORY FAILURE WITH HYPOXIA: ICD-10-CM

## 2025-03-17 DIAGNOSIS — L89.159 PRESSURE ULCER OF SACRAL REGION, UNSPECIFIED STAGE: ICD-10-CM

## 2025-03-17 DIAGNOSIS — G24.01 DRUG INDUCED SUBACUTE DYSKINESIA: ICD-10-CM

## 2025-03-17 DIAGNOSIS — E87.20 ACIDOSIS, UNSPECIFIED: ICD-10-CM

## 2025-03-17 DIAGNOSIS — G92.8 OTHER TOXIC ENCEPHALOPATHY: ICD-10-CM

## 2025-03-17 DIAGNOSIS — I50.32 CHRONIC DIASTOLIC (CONGESTIVE) HEART FAILURE: ICD-10-CM

## 2025-03-17 DIAGNOSIS — A41.01 SEPSIS DUE TO METHICILLIN SUSCEPTIBLE STAPHYLOCOCCUS AUREUS: ICD-10-CM

## 2025-03-17 DIAGNOSIS — I27.20 PULMONARY HYPERTENSION, UNSPECIFIED: ICD-10-CM

## 2025-03-17 DIAGNOSIS — J18.9 PNEUMONIA, UNSPECIFIED ORGANISM: ICD-10-CM

## 2025-03-17 DIAGNOSIS — K59.00 CONSTIPATION, UNSPECIFIED: ICD-10-CM

## 2025-03-17 DIAGNOSIS — J93.9 PNEUMOTHORAX, UNSPECIFIED: ICD-10-CM

## 2025-03-17 DIAGNOSIS — D84.81 IMMUNODEFICIENCY DUE TO CONDITIONS CLASSIFIED ELSEWHERE: ICD-10-CM

## 2025-03-22 LAB
CULTURE RESULTS: SIGNIFICANT CHANGE UP
SPECIMEN SOURCE: SIGNIFICANT CHANGE UP

## 2025-03-29 LAB
CULTURE RESULTS: SIGNIFICANT CHANGE UP
SPECIMEN SOURCE: SIGNIFICANT CHANGE UP

## 2025-04-19 NOTE — PATIENT PROFILE ADULT - FUNCTIONAL ASSESSMENT - BASIC MOBILITY ASSESSMENT TYPE
Admission Patient discharged to home with wife via private vehicle.  No s/sx of distress observed or reported.   IV SL removed, dressing applied, bleeding controlled. IV catheter tip intact. Telemetry removed, cleaned, and returned to proper paez in med room on inpatient south unit.

## 2025-06-21 ENCOUNTER — EMERGENCY (EMERGENCY)
Facility: HOSPITAL | Age: 70
LOS: 0 days | Discharge: HOME | End: 2025-06-21
Attending: EMERGENCY MEDICINE
Payer: MEDICARE

## 2025-06-21 VITALS
TEMPERATURE: 98 F | OXYGEN SATURATION: 97 % | SYSTOLIC BLOOD PRESSURE: 136 MMHG | RESPIRATION RATE: 20 BRPM | DIASTOLIC BLOOD PRESSURE: 67 MMHG | HEART RATE: 79 BPM

## 2025-06-21 VITALS
TEMPERATURE: 98 F | SYSTOLIC BLOOD PRESSURE: 140 MMHG | DIASTOLIC BLOOD PRESSURE: 76 MMHG | RESPIRATION RATE: 20 BRPM | HEART RATE: 79 BPM | OXYGEN SATURATION: 100 %

## 2025-06-21 DIAGNOSIS — K94.23 GASTROSTOMY MALFUNCTION: ICD-10-CM

## 2025-06-21 PROBLEM — Z98.890 OTHER SPECIFIED POSTPROCEDURAL STATES: Chronic | Status: ACTIVE | Noted: 2025-01-29

## 2025-06-21 PROBLEM — I25.10 ATHEROSCLEROTIC HEART DISEASE OF NATIVE CORONARY ARTERY WITHOUT ANGINA PECTORIS: Chronic | Status: ACTIVE | Noted: 2025-01-29

## 2025-06-21 PROCEDURE — 99285 EMERGENCY DEPT VISIT HI MDM: CPT

## 2025-06-21 PROCEDURE — L8699: CPT

## 2025-06-21 PROCEDURE — 74018 RADEX ABDOMEN 1 VIEW: CPT

## 2025-06-21 PROCEDURE — 74018 RADEX ABDOMEN 1 VIEW: CPT | Mod: 26

## 2025-06-21 PROCEDURE — 43762 RPLC GTUBE NO REVJ TRC: CPT

## 2025-06-21 PROCEDURE — 99284 EMERGENCY DEPT VISIT MOD MDM: CPT | Mod: 25

## 2025-06-21 RX ORDER — IOHEXOL 350 MG/ML
30 INJECTION, SOLUTION INTRAVENOUS ONCE
Refills: 0 | Status: DISCONTINUED | OUTPATIENT
Start: 2025-06-21 | End: 2025-06-21

## 2025-06-27 ENCOUNTER — EMERGENCY (EMERGENCY)
Facility: HOSPITAL | Age: 70
LOS: 0 days | Discharge: HOME | End: 2025-06-27
Attending: STUDENT IN AN ORGANIZED HEALTH CARE EDUCATION/TRAINING PROGRAM
Payer: MEDICARE

## 2025-06-27 VITALS
RESPIRATION RATE: 18 BRPM | TEMPERATURE: 98 F | WEIGHT: 110.89 LBS | SYSTOLIC BLOOD PRESSURE: 141 MMHG | OXYGEN SATURATION: 95 % | HEART RATE: 86 BPM | DIASTOLIC BLOOD PRESSURE: 76 MMHG

## 2025-06-27 DIAGNOSIS — I10 ESSENTIAL (PRIMARY) HYPERTENSION: ICD-10-CM

## 2025-06-27 DIAGNOSIS — S09.90XA UNSPECIFIED INJURY OF HEAD, INITIAL ENCOUNTER: ICD-10-CM

## 2025-06-27 DIAGNOSIS — Z93.1 GASTROSTOMY STATUS: ICD-10-CM

## 2025-06-27 DIAGNOSIS — I25.10 ATHEROSCLEROTIC HEART DISEASE OF NATIVE CORONARY ARTERY WITHOUT ANGINA PECTORIS: ICD-10-CM

## 2025-06-27 DIAGNOSIS — Z93.0 TRACHEOSTOMY STATUS: ICD-10-CM

## 2025-06-27 DIAGNOSIS — G20.A1 PARKINSON'S DISEASE WITHOUT DYSKINESIA, WITHOUT MENTION OF FLUCTUATIONS: ICD-10-CM
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
## 2025-06-27 DIAGNOSIS — S00.01XA ABRASION OF SCALP, INITIAL ENCOUNTER: ICD-10-CM

## 2025-06-27 PROCEDURE — 70450 CT HEAD/BRAIN W/O DYE: CPT

## 2025-06-27 PROCEDURE — 99284 EMERGENCY DEPT VISIT MOD MDM: CPT | Mod: FS

## 2025-06-27 PROCEDURE — 71045 X-RAY EXAM CHEST 1 VIEW: CPT

## 2025-06-27 PROCEDURE — 99284 EMERGENCY DEPT VISIT MOD MDM: CPT | Mod: 25

## 2025-06-27 PROCEDURE — 72125 CT NECK SPINE W/O DYE: CPT | Mod: 26

## 2025-06-27 PROCEDURE — 72170 X-RAY EXAM OF PELVIS: CPT

## 2025-06-27 PROCEDURE — 72170 X-RAY EXAM OF PELVIS: CPT | Mod: 26

## 2025-06-27 PROCEDURE — 70450 CT HEAD/BRAIN W/O DYE: CPT | Mod: 26

## 2025-06-27 PROCEDURE — 71045 X-RAY EXAM CHEST 1 VIEW: CPT | Mod: 26

## 2025-06-27 PROCEDURE — 72125 CT NECK SPINE W/O DYE: CPT

## 2025-06-27 NOTE — ED ADULT NURSE NOTE - NSFALLRISKINTERV_ED_ALL_ED

## 2025-06-27 NOTE — ED PROVIDER NOTE - PHYSICAL EXAMINATION
GENERAL: chronically ill. NAD  HEAD: No visible or palpable bumps or hematomas. No ecchymosis behind ears B/L.  Eyes: PERRLA  Neck: Supple. (+)trach. No cervical midline TTP. No paravertebral TTP to traps. FROM  CVS: No reproducible chest wall tenderness. Normal S1,S2. No murmurs appreciated on auscultation   RESP: No use of accessory muscles. Chest rise symmetrical with good expansion. Lungs clear to auscultation B/L. No wheezing, rales, or rhonchi auscultated.  GI: Normal auscultation of bowel sounds in all 4 quadrants. (+)peg tube in place. Soft, Nontender, Nondistended. No guarding or rebound tenderness.   MSK: FROM of upper and lower extremities B/L. No midline spinal TTP. FROM of back with flexion and extension.  Skin: Warm, Dry. No rashes or lesions. Good cap refill < 2 sec B/L.  EXT: Radial and pedal pulses present B/L. No calf tenderness or swelling B/L. No palpable cords. No pedal edema B/L.  Neuro: awake. alert

## 2025-06-27 NOTE — ED ADULT NURSE NOTE - CHIEF COMPLAINT QUOTE
Pt BIBEMS from [facility] c/o head trauma. Pt is a "grabber" and ripped feeding out from tube. Small abrasion with hematoma to top of the head. As per EMS, incident happened at 11PM. (+HT) (-LOC) (+AC on eliquis). PMH DM previous PE and PEG tube

## 2025-06-27 NOTE — ED PROVIDER NOTE - PATIENT PORTAL LINK FT
You can access the FollowMyHealth Patient Portal offered by [facility] by registering at the following website: [email or web address]. By joining [facility]’s FollowMyHealth portal, you will also be able to view your health information using other applications (apps) compatible with our system.

## 2025-06-27 NOTE — ED ADULT TRIAGE NOTE - PAIN RATING/NUMBER SCALE (0-10): ACTIVITY
Problem: Alteration in Thoughts and Perception  Goal: Treatment Goal: Gain control of psychotic behaviors/thinking, reduce/eliminate presenting symptoms and demonstrate improved reality functioning upon discharge  Outcome: Progressing  Goal: Agree to be compliant with medication regime, as prescribed and report medication side effects  Description: Interventions:  - Offer appropriate PRN medication and supervise ingestion; conduct AIMS, as needed   Outcome: Progressing     Problem: Ineffective Coping  Goal: Cooperates with admission process  Description: Interventions:   - Complete admission process  Outcome: Progressing  Goal: Identifies healthy coping skills  Outcome: Progressing  Goal: Patient/Family participate in treatment and DC plans  Description: Interventions:  - Provide therapeutic environment  Outcome: Progressing  Goal: Patient/Family verbalizes awareness of resources  Outcome: Progressing  Goal: Understands least restrictive measures  Description: Interventions:  - Utilize least restrictive behavior  Outcome: Progressing  Goal: Free from restraint events  Description: - Utilize least restrictive measures   - Provide behavioral interventions   - Redirect inappropriate behaviors   Outcome: Progressing     Problem: Depression  Goal: Treatment Goal: Demonstrate behavioral control of depressive symptoms, verbalize feelings of improved mood/affect, and adopt new coping skills prior to discharge  Outcome: Progressing  Goal: Verbalize thoughts and feelings  Description: Interventions:  - Assess and re-assess patient's level of risk   - Engage patient in 1:1 interactions, daily, for a minimum of 15 minutes   - Encourage patient to express feelings, fears, frustrations, hopes   Outcome: Progressing  Goal: Refrain from harming self  Description: Interventions:  - Monitor patient closely, per order   - Supervise medication ingestion, monitor effects and side effects   Outcome: Progressing  Goal: Refrain from  isolation  Description: Interventions:  - Develop a trusting relationship   - Encourage socialization   Outcome: Progressing  Goal: Refrain from self-neglect  Outcome: Progressing  Goal: Complete daily ADLs, including personal hygiene independently, as able  Description: Interventions:  - Observe, teach, and assist patient with ADLS  -  Monitor and promote a balance of rest/activity, with adequate nutrition and elimination   Outcome: Progressing     Problem: Anxiety  Goal: Anxiety is at manageable level  Description: Interventions:  - Assess and monitor patient's anxiety level.   - Monitor for signs and symptoms (heart palpitations, chest pain, shortness of breath, headaches, nausea, feeling jumpy, restlessness, irritable, apprehensive).   - Collaborate with interdisciplinary team and initiate plan and interventions as ordered.  - [place] patient to unit/surroundings  - Explain treatment plan  - Encourage participation in care  - Encourage verbalization of concerns/fears  - Identify coping mechanisms  - Assist in developing anxiety-reducing skills  - Administer/offer alternative therapies  - Limit or eliminate stimulants  Outcome: Progressing      0 (no pain/absence of nonverbal indicators of pain)

## 2025-06-27 NOTE — ED ADULT TRIAGE NOTE - CHIEF COMPLAINT QUOTE
Pt BIBEMS from [facility] c/o head trauma. Pt is a "grabber" and ripped feeding out from tube. Small abrasion with hematoma to top of the head. As per EMS, incident happened at 11PM. (+HT) (-LOC) (+AC on eliquis). PMH DM previous PE and PEG tube

## 2025-06-27 NOTE — ED PROVIDER NOTE - OBJECTIVE STATEMENT
69-year-old male with a medical history of hypertension, Parkinson's disease, and coronary artery disease, trach and peg tube sent to Ed from NH for eval of head injury. pt pulled feeding pole down and hit him on top of his head. no loc.

## 2025-06-27 NOTE — ED PROVIDER NOTE - CLINICAL SUMMARY MEDICAL DECISION MAKING FREE TEXT BOX
69-year-old male with a medical history of hypertension, Parkinson's disease, and coronary artery disease, trach and peg tube sent to Ed from NH for eval of head injury. pt pulled feeding pole down and hit him on top of his head.  CT head and CT spine no acute pathologies. pt discharged

## 2025-06-27 NOTE — ED PROVIDER NOTE - ATTENDING APP SHARED VISIT CONTRIBUTION OF CARE
69-year-old male with a medical history of hypertension, Parkinson's disease, and coronary artery disease, trach and peg tube sent to Ed from NH for eval of head injury. pt pulled feeding pole down and hit him on top of his head. no  CONSTITUTIONAL: ill appearing  HEAD: Normocephalic; abrasion on scalp  EYES: No conjunctival injection, no scleral icterus  ENT: No nasal discharge; airway clear.  NECK: Tracheostomy   CARD: Warm and well perfused, not tachycardic  RESP: No rhonchi or wheezing  Abdomen: Soft, non-tender GJ tube in place  NEURO: Alert, follows simple commands

## 2025-09-14 ENCOUNTER — EMERGENCY (EMERGENCY)
Facility: HOSPITAL | Age: 70
LOS: 0 days | Discharge: HOME | End: 2025-09-14
Attending: STUDENT IN AN ORGANIZED HEALTH CARE EDUCATION/TRAINING PROGRAM
Payer: MEDICARE

## 2025-09-14 VITALS
RESPIRATION RATE: 22 BRPM | OXYGEN SATURATION: 99 % | DIASTOLIC BLOOD PRESSURE: 69 MMHG | HEART RATE: 70 BPM | SYSTOLIC BLOOD PRESSURE: 150 MMHG

## 2025-09-14 VITALS
WEIGHT: 111.99 LBS | SYSTOLIC BLOOD PRESSURE: 154 MMHG | TEMPERATURE: 98 F | RESPIRATION RATE: 24 BRPM | OXYGEN SATURATION: 97 % | DIASTOLIC BLOOD PRESSURE: 74 MMHG | HEART RATE: 70 BPM
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
## 2025-09-14 DIAGNOSIS — S01.81XA LACERATION WITHOUT FOREIGN BODY OF OTHER PART OF HEAD, INITIAL ENCOUNTER: ICD-10-CM

## 2025-09-14 DIAGNOSIS — Z93.0 TRACHEOSTOMY STATUS: ICD-10-CM

## 2025-09-14 DIAGNOSIS — S09.90XA UNSPECIFIED INJURY OF HEAD, INITIAL ENCOUNTER: ICD-10-CM

## 2025-09-14 PROCEDURE — 70450 CT HEAD/BRAIN W/O DYE: CPT | Mod: 26

## 2025-09-14 PROCEDURE — 99284 EMERGENCY DEPT VISIT MOD MDM: CPT | Mod: 25

## 2025-09-14 PROCEDURE — 99283 EMERGENCY DEPT VISIT LOW MDM: CPT | Mod: FS

## 2025-09-14 PROCEDURE — 82962 GLUCOSE BLOOD TEST: CPT

## 2025-09-14 PROCEDURE — 70450 CT HEAD/BRAIN W/O DYE: CPT

## 2025-09-14 PROCEDURE — 99284 EMERGENCY DEPT VISIT MOD MDM: CPT | Mod: GC
